# Patient Record
Sex: FEMALE | Race: WHITE | NOT HISPANIC OR LATINO | Employment: OTHER | URBAN - NONMETROPOLITAN AREA
[De-identification: names, ages, dates, MRNs, and addresses within clinical notes are randomized per-mention and may not be internally consistent; named-entity substitution may affect disease eponyms.]

---

## 2017-02-08 ENCOUNTER — HOSPITAL ENCOUNTER (EMERGENCY)
Facility: HOSPITAL | Age: 62
Discharge: HOME | End: 2017-02-09
Attending: FAMILY MEDICINE | Admitting: FAMILY MEDICINE

## 2017-02-08 ENCOUNTER — APPOINTMENT (OUTPATIENT)
Dept: GENERAL RADIOLOGY | Facility: HOSPITAL | Age: 62
End: 2017-02-08

## 2017-02-08 DIAGNOSIS — R07.9 CHEST PAIN, UNSPECIFIED TYPE: Primary | ICD-10-CM

## 2017-02-08 LAB
ANION GAP SERPL CALCULATED.3IONS-SCNC: 4 MMOL/L (ref 4–13)
BASOPHILS # BLD AUTO: 0.03 10*3/MM3 (ref 0–0.2)
BASOPHILS NFR BLD AUTO: 0.5 % (ref 0–2)
BUN BLD-MCNC: 12 MG/DL (ref 5–21)
BUN/CREAT SERPL: 14.1 (ref 7–25)
CALCIUM SPEC-SCNC: 8.9 MG/DL (ref 8.4–10.4)
CHLORIDE SERPL-SCNC: 106 MMOL/L (ref 98–110)
CK MB SERPL-CCNC: 0.46 NG/ML (ref 0–5)
CK SERPL-CCNC: 88 U/L (ref 0–203)
CO2 SERPL-SCNC: 29 MMOL/L (ref 24–31)
CREAT BLD-MCNC: 0.85 MG/DL (ref 0.5–1.4)
D DIMER PPP FEU-MCNC: 0.45 MG/L (FEU) (ref 0–0.5)
DEPRECATED RDW RBC AUTO: 48.4 FL (ref 40–54)
EOSINOPHIL # BLD AUTO: 0.3 10*3/MM3 (ref 0–0.7)
EOSINOPHIL NFR BLD AUTO: 4.8 % (ref 0–4)
ERYTHROCYTE [DISTWIDTH] IN BLOOD BY AUTOMATED COUNT: 15.7 % (ref 12–15)
GFR SERPL CREATININE-BSD FRML MDRD: 68 ML/MIN/1.73
GLUCOSE BLD-MCNC: 98 MG/DL (ref 70–100)
HCT VFR BLD AUTO: 36.5 % (ref 37–47)
HGB BLD-MCNC: 11.6 G/DL (ref 12–16)
IMM GRANULOCYTES # BLD: 0 10*3/MM3 (ref 0–0.03)
IMM GRANULOCYTES NFR BLD: 0 % (ref 0–5)
LYMPHOCYTES # BLD AUTO: 2.11 10*3/MM3 (ref 0.72–4.86)
LYMPHOCYTES NFR BLD AUTO: 33.9 % (ref 15–45)
MCH RBC QN AUTO: 27.2 PG (ref 28–32)
MCHC RBC AUTO-ENTMCNC: 31.8 G/DL (ref 33–36)
MCV RBC AUTO: 85.7 FL (ref 82–98)
MONOCYTES # BLD AUTO: 0.44 10*3/MM3 (ref 0.19–1.3)
MONOCYTES NFR BLD AUTO: 7.1 % (ref 4–12)
NEUTROPHILS # BLD AUTO: 3.35 10*3/MM3 (ref 1.87–8.4)
NEUTROPHILS NFR BLD AUTO: 53.7 % (ref 39–78)
PLATELET # BLD AUTO: 245 10*3/MM3 (ref 130–400)
PMV BLD AUTO: 10.6 FL (ref 6–12)
POTASSIUM BLD-SCNC: 4.2 MMOL/L (ref 3.5–5.3)
RBC # BLD AUTO: 4.26 10*6/MM3 (ref 4.2–5.4)
SODIUM BLD-SCNC: 139 MMOL/L (ref 135–145)
TROPONIN I SERPL-MCNC: 0 NG/ML (ref 0–0.07)
TROPONIN I SERPL-MCNC: <0.012 NG/ML (ref 0–0.03)
WBC NRBC COR # BLD: 6.23 10*3/MM3 (ref 4.8–10.8)

## 2017-02-08 PROCEDURE — 85379 FIBRIN DEGRADATION QUANT: CPT | Performed by: FAMILY MEDICINE

## 2017-02-08 PROCEDURE — 84484 ASSAY OF TROPONIN QUANT: CPT | Performed by: FAMILY MEDICINE

## 2017-02-08 PROCEDURE — 71010 HC CHEST PA OR AP: CPT

## 2017-02-08 PROCEDURE — 93010 ELECTROCARDIOGRAM REPORT: CPT | Performed by: INTERNAL MEDICINE

## 2017-02-08 PROCEDURE — 99284 EMERGENCY DEPT VISIT MOD MDM: CPT

## 2017-02-08 PROCEDURE — 80048 BASIC METABOLIC PNL TOTAL CA: CPT | Performed by: FAMILY MEDICINE

## 2017-02-08 PROCEDURE — 93005 ELECTROCARDIOGRAM TRACING: CPT

## 2017-02-08 PROCEDURE — 82553 CREATINE MB FRACTION: CPT | Performed by: FAMILY MEDICINE

## 2017-02-08 PROCEDURE — 85025 COMPLETE CBC W/AUTO DIFF WBC: CPT | Performed by: FAMILY MEDICINE

## 2017-02-08 PROCEDURE — 82550 ASSAY OF CK (CPK): CPT | Performed by: FAMILY MEDICINE

## 2017-02-08 PROCEDURE — 84484 ASSAY OF TROPONIN QUANT: CPT

## 2017-02-08 RX ORDER — CYCLOBENZAPRINE HCL 10 MG
TABLET ORAL 3 TIMES DAILY PRN
COMMUNITY
End: 2020-05-28

## 2017-02-08 RX ORDER — OXYCODONE HYDROCHLORIDE 5 MG/1
TABLET ORAL
COMMUNITY
Start: 2016-03-28 | End: 2019-09-13

## 2017-02-08 RX ORDER — CITALOPRAM 40 MG/1
TABLET ORAL
COMMUNITY
End: 2022-08-18 | Stop reason: DRUGHIGH

## 2017-02-08 RX ORDER — ESOMEPRAZOLE MAGNESIUM 20 MG/1
FOR SUSPENSION ORAL
COMMUNITY
End: 2019-09-30

## 2017-02-08 RX ORDER — LEVOTHYROXINE SODIUM 0.12 MG/1
112 TABLET ORAL DAILY
Status: ON HOLD | COMMUNITY
End: 2023-01-25

## 2017-02-08 RX ORDER — PRAMIPEXOLE DIHYDROCHLORIDE 0.12 MG/1
0.75 TABLET ORAL 2 TIMES DAILY
COMMUNITY
End: 2022-08-18 | Stop reason: DRUGHIGH

## 2017-02-08 RX ORDER — TIZANIDINE 4 MG/1
TABLET ORAL EVERY 6 HOURS
COMMUNITY
End: 2019-09-13

## 2017-02-09 ENCOUNTER — HOSPITAL ENCOUNTER (EMERGENCY)
Facility: HOSPITAL | Age: 62
Discharge: HOME | End: 2017-02-09
Attending: EMERGENCY MEDICINE | Admitting: EMERGENCY MEDICINE

## 2017-02-09 VITALS
OXYGEN SATURATION: 99 % | TEMPERATURE: 98.4 F | DIASTOLIC BLOOD PRESSURE: 85 MMHG | SYSTOLIC BLOOD PRESSURE: 128 MMHG | BODY MASS INDEX: 32.44 KG/M2 | RESPIRATION RATE: 11 BRPM | HEIGHT: 64 IN | HEART RATE: 65 BPM | WEIGHT: 190 LBS

## 2017-02-09 VITALS
TEMPERATURE: 98.2 F | HEART RATE: 73 BPM | OXYGEN SATURATION: 95 % | BODY MASS INDEX: 32.44 KG/M2 | WEIGHT: 190 LBS | RESPIRATION RATE: 21 BRPM | SYSTOLIC BLOOD PRESSURE: 119 MMHG | HEIGHT: 64 IN | DIASTOLIC BLOOD PRESSURE: 73 MMHG

## 2017-02-09 DIAGNOSIS — R07.9 CHEST PAIN, UNSPECIFIED TYPE: Primary | ICD-10-CM

## 2017-02-09 LAB
HOLD SPECIMEN: NORMAL
TROPONIN I SERPL-MCNC: 0 NG/ML (ref 0–0.07)
TROPONIN I SERPL-MCNC: 0 NG/ML (ref 0–0.07)
WHOLE BLOOD HOLD SPECIMEN: NORMAL

## 2017-02-09 PROCEDURE — 93005 ELECTROCARDIOGRAM TRACING: CPT

## 2017-02-09 PROCEDURE — 99284 EMERGENCY DEPT VISIT MOD MDM: CPT

## 2017-02-09 PROCEDURE — 93005 ELECTROCARDIOGRAM TRACING: CPT | Performed by: FAMILY MEDICINE

## 2017-02-09 PROCEDURE — 93010 ELECTROCARDIOGRAM REPORT: CPT | Performed by: INTERNAL MEDICINE

## 2017-02-09 PROCEDURE — 84484 ASSAY OF TROPONIN QUANT: CPT

## 2017-02-09 NOTE — ED PROVIDER NOTES
Subjective   HPI Comments: Patient was here last night with negative evaluation for same and set up for OP stress test.  Pain returned about 2 AM and has moved from right chest to left chest and back so came back.  Upon further questioning, has actually had this pain off and on for 2-3 years and had had 3 negative stress tests in past.  Also worried about clot because she has had that in past.  Says she would like to know what is causing this pain.    Patient is a 61 y.o. female presenting with chest pain.   History provided by:  Patient   used: No    Chest Pain   Pain location:  R chest  Pain quality: aching    Pain radiates to:  Does not radiate  Pain severity:  Moderate  Onset quality:  Sudden  Duration:  5 hours  Timing:  Sporadic  Progression:  Unchanged  Chronicity:  Recurrent  Context: not breathing, not drug use, not eating, not intercourse, not lifting, not movement, not raising an arm, not at rest, not stress and not trauma    Relieved by:  Nothing  Worsened by:  Nothing  Ineffective treatments:  None tried  Associated symptoms: back pain    Associated symptoms: no abdominal pain, no AICD problem and no altered mental status    Risk factors: no aortic disease, no birth control and no coronary artery disease        Review of Systems   Constitutional: Negative.    HENT: Negative.    Respiratory: Negative.    Cardiovascular: Positive for chest pain.   Gastrointestinal: Negative.  Negative for abdominal pain.   Musculoskeletal: Positive for arthralgias, back pain and myalgias.   Neurological: Negative.    Hematological: Negative.    Psychiatric/Behavioral: Negative.    All other systems reviewed and are negative.      Past Medical History   Diagnosis Date   • Acid reflux    • Arthritis    • Chest pain    • Chronic back pain    • Chronic neck pain    • DVT (deep venous thrombosis)    • Hypothyroid    • Hypothyroidism    • Migraine    • PE (pulmonary thromboembolism)        Allergies    Allergen Reactions   • Dilaudid [Hydromorphone Hcl] Anaphylaxis   • Erythromycin Swelling     Throat swells   • Ondansetron Hcl Other (See Comments)     Coded   • Meperidine      Makes her an angry person   • Codeine Rash       Past Surgical History   Procedure Laterality Date   • Hysterectomy     • Dilatation and curettage     • Colonoscopy     • Cholecystectomy     • Hernia repair         History reviewed. No pertinent family history.    Social History     Social History   • Marital status:      Spouse name: N/A   • Number of children: N/A   • Years of education: N/A     Social History Main Topics   • Smoking status: Heavy Tobacco Smoker   • Smokeless tobacco: None   • Alcohol use Yes   • Drug use: None   • Sexual activity: Not Asked     Other Topics Concern   • None     Social History Narrative       Prior to Admission medications    Medication Sig Start Date End Date Taking? Authorizing Provider   citalopram (CeleXA) 40 MG tablet Take 40 mg by mouth daily.    Historical Provider, MD   cyclobenzaprine (FLEXERIL) 10 MG tablet 3 (Three) Times a Day.    Historical Provider, MD   esomeprazole (NexIUM) 20 MG packet Take 20 mg by mouth daily.    Historical Provider, MD   levothyroxine (SYNTHROID, LEVOTHROID) 125 MCG tablet Take 125 mcg by mouth Daily    Historical Provider, MD   oxyCODONE (ROXICODONE) 5 MG immediate release tablet 1 to 3 tablets po q 3 to 4 hours prn 3/28/16   Historical Provider, MD   pramipexole (MIRAPEX) 0.125 MG tablet Take 0.125 mg by mouth 2 times daily.    Historical Provider, MD   rivaroxaban (XARELTO) 10 MG tablet Take 10 mg by mouth once Indications: day before surgery x 1 dose    Historical Provider, MD   tiZANidine (ZANAFLEX) 4 MG tablet Every 6 (Six) Hours.    Historical Provider, MD       Medications - No data to display    Vitals:    02/09/17 0802   BP: 128/85   Pulse: 65   Resp:    Temp:    SpO2: 99%         Objective   Physical Exam   Constitutional: She is oriented to person,  place, and time. She appears well-developed and well-nourished.   HENT:   Head: Normocephalic and atraumatic.   Neck: Normal range of motion. Neck supple.   Cardiovascular: Normal rate and regular rhythm.    Pulmonary/Chest: Effort normal and breath sounds normal.   Abdominal: Soft. Bowel sounds are normal.   Musculoskeletal: Normal range of motion.   Neurological: She is alert and oriented to person, place, and time.   Skin: Skin is warm and dry.   Psychiatric: She has a normal mood and affect. Her behavior is normal.   Nursing note and vitals reviewed.      Procedures         Lab Results (last 24 hours)     Procedure Component Value Units Date/Time    CBC & Differential [[id number]] Collected:  02/08/17 2159    Specimen:  Blood Updated:  02/08/17 2301    Narrative:       The following orders were created for panel order CBC & Differential.  Procedure                               Abnormality         Status                     ---------                               -----------         ------                     CBC Auto Differential[[id number]]         Abnormal            Final result                 Please view results for these tests on the individual orders.    Basic Metabolic Panel [[id number]]  (Normal) Collected:  02/08/17 2159    Specimen:  Blood from Arm, Right Updated:  02/08/17 2305     Glucose 98 mg/dL      BUN 12 mg/dL      Creatinine 0.85 mg/dL      Sodium 139 mmol/L      Potassium 4.2 mmol/L      Chloride 106 mmol/L      CO2 29.0 mmol/L      Calcium 8.9 mg/dL      eGFR Non African Amer 68 mL/min/1.73      BUN/Creatinine Ratio 14.1      Anion Gap 4.0 mmol/L     Narrative:       GFR Normal >60  Chronic Kidney Disease <60  Kidney Failure <15    Troponin [[id number]]  (Normal) Collected:  02/08/17 2159    Specimen:  Blood from Arm, Right Updated:  02/08/17 2317     Troponin I <0.012 ng/mL     D-dimer, Quantitative [[id number]]  (Normal) Collected:  02/08/17 2159    Specimen:  Blood from Arm, Right Updated:   02/08/17 2306     D-Dimer, Quantitative 0.45 mg/L (FEU)     Narrative:       Reference Range is 0-0.50 mg/L FEU. However, results <0.50 mg/L FEU tends to rule out DVT or PE. Results >0.50 mg/L FEU are not useful in predicting absence or presence of DVT or PE.    CK-MB [[id number]]  (Normal) Collected:  02/08/17 2159    Specimen:  Blood from Arm, Right Updated:  02/08/17 2317     CKMB 0.46 ng/mL     Narrative:       CKMB Index not indicated    CK [[id number]]  (Normal) Collected:  02/08/17 2159    Specimen:  Blood from Arm, Right Updated:  02/08/17 2317     Creatine Kinase 88 U/L     CBC Auto Differential [[id number]]  (Abnormal) Collected:  02/08/17 2159    Specimen:  Blood from Arm, Right Updated:  02/08/17 2301     WBC 6.23 10*3/mm3      RBC 4.26 10*6/mm3      Hemoglobin 11.6 (L) g/dL      Hematocrit 36.5 (L) %      MCV 85.7 fL      MCH 27.2 (L) pg      MCHC 31.8 (L) g/dL      RDW 15.7 (H) %      RDW-SD 48.4 fl      MPV 10.6 fL      Platelets 245 10*3/mm3      Neutrophil % 53.7 %      Lymphocyte % 33.9 %      Monocyte % 7.1 %      Eosinophil % 4.8 (H) %      Basophil % 0.5 %      Immature Grans % 0.0 %      Neutrophils, Absolute 3.35 10*3/mm3      Lymphocytes, Absolute 2.11 10*3/mm3      Monocytes, Absolute 0.44 10*3/mm3      Eosinophils, Absolute 0.30 10*3/mm3      Basophils, Absolute 0.03 10*3/mm3      Immature Grans, Absolute 0.00 10*3/mm3     POC Troponin, Rapid [[id number]]  (Normal) Collected:  02/08/17 2210    Specimen:  Blood Updated:  02/08/17 2226     Troponin I 0.00 ng/mL       Serial Number: [id number]    : [id number]       POC Troponin, Rapid [[id number]]  (Normal) Collected:  02/09/17 0110    Specimen:  Blood Updated:  02/09/17 0125     Troponin I 0.00 ng/mL       Serial Number: [id number]    : [id number]       POC Troponin, Rapid [[id number]]  (Normal) Collected:  02/09/17 0654    Specimen:  Blood Updated:  02/09/17 0711     Troponin I 0.00 ng/mL       Serial Number: [id number]    : [id number]              No orders to display       ED Course  ED Course   Comment By Time   Told the patient her EKG was okay this AM and dimer last night was normal.  I offered to get her stress test this AM and that is when she tells me that she has had 3 negative ones in past.  I explained to her that in ED we only check for life threatening things and if she wants definitive answer for her pain she needs to see her PCP and talk to her because all of the other things that can cause this pain are not tested in ED.  She does not want stress test now and I think this is reasonable. Brenden Real Jr., MD 02/09 1050     HEART Score  History: Slightly suspicious (+0)  ECG: Non specific repolarization disturbance (+1)  Age: 45 through 65 (+1)  Risk Factors: 1 - 2 risk factors (+1)  Troponin: Normal limit or lower (+0)  Total: 3        MDM  Number of Diagnoses or Management Options  Chest pain, unspecified type: established and worsening     Amount and/or Complexity of Data Reviewed  Tests in the medicine section of CPT®: ordered and reviewed    Risk of Complications, Morbidity, and/or Mortality  Presenting problems: moderate  Diagnostic procedures: moderate  Management options: moderate    Patient Progress  Patient progress: stable      Final diagnoses:   Chest pain, unspecified type        Brenden Real Jr., MD  02/09/17 3557

## 2017-02-09 NOTE — ED PROVIDER NOTES
Subjective   Patient is a 61 y.o. female presenting with chest pain.   History provided by:  Patient   used: No    Chest Pain   Pain location:  Substernal area (left neck and left arm)  Pain quality: aching and dull    Pain radiates to:  Neck and L jaw  Pain severity:  Mild  Onset quality:  Sudden  Timing:  Constant  Progression:  Partially resolved  Chronicity:  New  Context: not breathing, not drug use, not eating, not intercourse, not lifting, not movement, not raising an arm, not at rest, not stress and not trauma    Relieved by:  None tried  Worsened by:  Nothing  Ineffective treatments:  None tried  Associated symptoms: no abdominal pain, no AICD problem, no altered mental status, no anorexia, no anxiety, no back pain, no claudication, no cough, no dizziness, no dysphagia, no fatigue, no fever, no headache, no heartburn, no lower extremity edema, no nausea, no numbness, no orthopnea, no palpitations, no PND, no shortness of breath, no syncope, no vomiting and no weakness        Review of Systems   Constitutional: Negative for activity change, appetite change, chills, fatigue and fever.   HENT: Negative for congestion, dental problem, drooling and trouble swallowing.    Eyes: Negative for discharge and itching.   Respiratory: Negative for apnea, cough, chest tightness and shortness of breath.    Cardiovascular: Positive for chest pain. Negative for palpitations, orthopnea, claudication, syncope and PND.   Gastrointestinal: Negative for abdominal pain, anorexia, diarrhea, heartburn, nausea and vomiting.   Endocrine: Negative for polydipsia and polyuria.   Genitourinary: Negative for difficulty urinating and dysuria.   Musculoskeletal: Negative for back pain.   Skin: Negative for color change, pallor and rash.   Neurological: Negative for dizziness, facial asymmetry, weakness, numbness and headaches.   Hematological: Negative for adenopathy. Does not bruise/bleed easily.    Psychiatric/Behavioral: Negative for behavioral problems and suicidal ideas.   All other systems reviewed and are negative.      Past Medical History   Diagnosis Date   • Acid reflux    • Arthritis    • Chest pain    • Chronic back pain    • Chronic neck pain    • DVT (deep venous thrombosis)    • Hypothyroid    • Hypothyroidism    • Migraine    • PE (pulmonary thromboembolism)        Allergies   Allergen Reactions   • Dilaudid [Hydromorphone Hcl] Anaphylaxis   • Erythromycin Swelling     Throat swells   • Ondansetron Hcl Other (See Comments)     Coded   • Meperidine      Makes her an angry person   • Codeine Rash       Past Surgical History   Procedure Laterality Date   • Hysterectomy     • Dilatation and curettage     • Colonoscopy     • Cholecystectomy     • Hernia repair         History reviewed. No pertinent family history.    Social History     Social History   • Marital status:      Spouse name: N/A   • Number of children: N/A   • Years of education: N/A     Social History Main Topics   • Smoking status: Heavy Tobacco Smoker   • Smokeless tobacco: None   • Alcohol use Yes   • Drug use: None   • Sexual activity: Not Asked     Other Topics Concern   • None     Social History Narrative   • None       Lab Results (last 24 hours)     Procedure Component Value Units Date/Time    CBC & Differential [[id number]] Collected:  02/08/17 2159    Specimen:  Blood Updated:  02/08/17 2301    Narrative:       The following orders were created for panel order CBC & Differential.  Procedure                               Abnormality         Status                     ---------                               -----------         ------                     CBC Auto Differential[[id number]]         Abnormal            Final result                 Please view results for these tests on the individual orders.    Basic Metabolic Panel [[id number]]  (Normal) Collected:  02/08/17 2159    Specimen:  Blood from Arm, Right Updated:   02/08/17 2305     Glucose 98 mg/dL      BUN 12 mg/dL      Creatinine 0.85 mg/dL      Sodium 139 mmol/L      Potassium 4.2 mmol/L      Chloride 106 mmol/L      CO2 29.0 mmol/L      Calcium 8.9 mg/dL      eGFR Non African Amer 68 mL/min/1.73      BUN/Creatinine Ratio 14.1      Anion Gap 4.0 mmol/L     Narrative:       GFR Normal >60  Chronic Kidney Disease <60  Kidney Failure <15    Troponin [[id number]]  (Normal) Collected:  02/08/17 2159    Specimen:  Blood from Arm, Right Updated:  02/08/17 2317     Troponin I <0.012 ng/mL     D-dimer, Quantitative [[id number]]  (Normal) Collected:  02/08/17 2159    Specimen:  Blood from Arm, Right Updated:  02/08/17 2306     D-Dimer, Quantitative 0.45 mg/L (FEU)     Narrative:       Reference Range is 0-0.50 mg/L FEU. However, results <0.50 mg/L FEU tends to rule out DVT or PE. Results >0.50 mg/L FEU are not useful in predicting absence or presence of DVT or PE.    CK-MB [[id number]]  (Normal) Collected:  02/08/17 2159    Specimen:  Blood from Arm, Right Updated:  02/08/17 2317     CKMB 0.46 ng/mL     Narrative:       CKMB Index not indicated    CK [[id number]]  (Normal) Collected:  02/08/17 2159    Specimen:  Blood from Arm, Right Updated:  02/08/17 2317     Creatine Kinase 88 U/L     CBC Auto Differential [[id number]]  (Abnormal) Collected:  02/08/17 2159    Specimen:  Blood from Arm, Right Updated:  02/08/17 2301     WBC 6.23 10*3/mm3      RBC 4.26 10*6/mm3      Hemoglobin 11.6 (L) g/dL      Hematocrit 36.5 (L) %      MCV 85.7 fL      MCH 27.2 (L) pg      MCHC 31.8 (L) g/dL      RDW 15.7 (H) %      RDW-SD 48.4 fl      MPV 10.6 fL      Platelets 245 10*3/mm3      Neutrophil % 53.7 %      Lymphocyte % 33.9 %      Monocyte % 7.1 %      Eosinophil % 4.8 (H) %      Basophil % 0.5 %      Immature Grans % 0.0 %      Neutrophils, Absolute 3.35 10*3/mm3      Lymphocytes, Absolute 2.11 10*3/mm3      Monocytes, Absolute 0.44 10*3/mm3      Eosinophils, Absolute 0.30 10*3/mm3      Basophils, Absolute  "0.03 10*3/mm3      Immature Grans, Absolute 0.00 10*3/mm3     POC Troponin, Rapid [[id number]]  (Normal) Collected:  02/08/17 2210    Specimen:  Blood Updated:  02/08/17 2226     Troponin I 0.00 ng/mL       Serial Number: [id number]    : [id number]       POC Troponin, Rapid [[id number]]  (Normal) Collected:  02/09/17 0110    Specimen:  Blood Updated:  02/09/17 0125     Troponin I 0.00 ng/mL       Serial Number: [id number]    : [id number]             Objective   Physical Exam   Constitutional: She is oriented to person, place, and time. She appears well-developed and well-nourished.   HENT:   Head: Normocephalic and atraumatic.   Eyes: Conjunctivae and EOM are normal. Pupils are equal, round, and reactive to light.   Neck: Normal range of motion. Neck supple.   Cardiovascular: Normal rate, regular rhythm, normal heart sounds and intact distal pulses.    Pulmonary/Chest: Effort normal and breath sounds normal.   Abdominal: Soft. Bowel sounds are normal.   Neurological: She is alert and oriented to person, place, and time.   Skin: Skin is warm and dry.   Psychiatric: She has a normal mood and affect. Her behavior is normal.   Nursing note and vitals reviewed.      Procedures         XR Chest 1 View    (Results Pending)       Visit Vitals   • /73   • Pulse 73   • Temp 98.9 °F (37.2 °C) (Temporal Artery )   • Resp 21   • Ht 64\" (162.6 cm)   • Wt 190 lb (86.2 kg)   • SpO2 95%   • BMI 32.61 kg/m2       ED Course    ED Course       Medications - No data to display    HEART Score  History: Moderately suspicious (+1)  ECG: Normal (+0)  Age: 45 through 65 (+1)  Risk Factors: 1 - 2 risk factors (+1)  Troponin: Normal limit or lower (+0)  Total: 3        MDM  Number of Diagnoses or Management Options  Chest pain, unspecified type: new and requires workup     Amount and/or Complexity of Data Reviewed  Clinical lab tests: ordered and reviewed  Tests in the radiology section of CPT®: ordered and reviewed  Tests in the " medicine section of CPT®: ordered and reviewed  Independent visualization of images, tracings, or specimens: yes    Risk of Complications, Morbidity, and/or Mortality  Presenting problems: moderate  Diagnostic procedures: moderate  Management options: moderate  General comments: Pain resolvedcompletely prior to my seeing her and did not return.  Patient did not want to stay for 3rd set of troponins.  Patient instructed on no exertion and to follow up closely.  Given out patient stress test order.  Patient will return immediately if symptoms return.    Patient Progress  Patient progress: improved      Final diagnoses:   Chest pain, unspecified type          Reyna Mora DO  02/09/17 0220

## 2017-02-09 NOTE — DISCHARGE INSTRUCTIONS

## 2017-03-01 ENCOUNTER — TRANSCRIBE ORDERS (OUTPATIENT)
Dept: ADMINISTRATIVE | Facility: HOSPITAL | Age: 62
End: 2017-03-01

## 2017-03-01 DIAGNOSIS — Z12.31 ENCOUNTER FOR SCREENING MAMMOGRAM FOR MALIGNANT NEOPLASM OF BREAST: Primary | ICD-10-CM

## 2017-03-01 DIAGNOSIS — R07.9 CHEST PAIN, UNSPECIFIED TYPE: ICD-10-CM

## 2017-04-16 ENCOUNTER — APPOINTMENT (OUTPATIENT)
Dept: GENERAL RADIOLOGY | Facility: HOSPITAL | Age: 62
End: 2017-04-16

## 2017-04-16 ENCOUNTER — HOSPITAL ENCOUNTER (EMERGENCY)
Facility: HOSPITAL | Age: 62
Discharge: HOME | End: 2017-04-16
Attending: EMERGENCY MEDICINE | Admitting: EMERGENCY MEDICINE

## 2017-04-16 VITALS
BODY MASS INDEX: 31.65 KG/M2 | OXYGEN SATURATION: 98 % | HEART RATE: 84 BPM | DIASTOLIC BLOOD PRESSURE: 76 MMHG | SYSTOLIC BLOOD PRESSURE: 120 MMHG | TEMPERATURE: 98.4 F | RESPIRATION RATE: 16 BRPM | HEIGHT: 65 IN | WEIGHT: 190 LBS

## 2017-04-16 DIAGNOSIS — S90.32XA CONTUSION OF LEFT FOOT, INITIAL ENCOUNTER: Primary | ICD-10-CM

## 2017-04-16 PROCEDURE — 99283 EMERGENCY DEPT VISIT LOW MDM: CPT

## 2017-04-16 PROCEDURE — 73630 X-RAY EXAM OF FOOT: CPT

## 2017-04-16 NOTE — DISCHARGE INSTRUCTIONS
I recommend that you follow up with your shin on Monday.  If your symptoms worsen or you are unable to follow-up he should feel free to return to the emergency room at any time.

## 2017-04-16 NOTE — ED PROVIDER NOTES
Subjective  foot injury  History of Present Illness  Ms. Kwesi Cruz is a 61-year-old white female comes in today with chief complaint of a foot injury.  According to the patient she dropped a candle on her foot and has been expressing some discomfort.  She thereby decided she come in to have it evaluated.  She is having very little pain at the time of evaluation.  Review of Systems   Musculoskeletal: Positive for myalgias.       Past Medical History:   Diagnosis Date   • Acid reflux    • Arthritis    • Chest pain    • Chronic back pain    • Chronic neck pain    • DVT (deep venous thrombosis)    • Hypothyroid    • Hypothyroidism    • Migraine    • PE (pulmonary thromboembolism)        Allergies   Allergen Reactions   • Dilaudid [Hydromorphone Hcl] Anaphylaxis   • Erythromycin Swelling     Throat swells   • Ondansetron Hcl Other (See Comments)     Coded   • Meperidine      Makes her an angry person   • Codeine Rash       Past Surgical History:   Procedure Laterality Date   • CHOLECYSTECTOMY     • COLONOSCOPY     • DILATATION AND CURETTAGE     • HERNIA REPAIR     • HYSTERECTOMY         History reviewed. No pertinent family history.    Social History     Social History   • Marital status:      Spouse name: N/A   • Number of children: N/A   • Years of education: N/A     Social History Main Topics   • Smoking status: Heavy Tobacco Smoker   • Smokeless tobacco: None   • Alcohol use Yes   • Drug use: None   • Sexual activity: Not Asked     Other Topics Concern   • None     Social History Narrative           Objective   Physical Exam   Musculoskeletal: Normal range of motion. She exhibits tenderness. She exhibits no edema or deformity.   Noted tenderness to the dorsum of the left foot.  2+ dorsalis pedis pulse       Procedures         ED Course  ED Course   Comment By Time   X ray does not reveal any evidence of at this time of a fracture.  She will be discharged to home with instructions to follow with her own  primary care physician. Maria C Jarvis MD 04/16 0125                  MDM  Number of Diagnoses or Management Options  Contusion of left foot, initial encounter: new and requires workup     Amount and/or Complexity of Data Reviewed  Tests in the radiology section of CPT®: ordered and reviewed    Risk of Complications, Morbidity, and/or Mortality  Presenting problems: low  Diagnostic procedures: low  Management options: low        Final diagnoses:   Contusion of left foot, initial encounter            Maria C Jarvis MD  04/16/17 0130

## 2017-08-09 ENCOUNTER — HOSPITAL ENCOUNTER (EMERGENCY)
Facility: HOSPITAL | Age: 62
Discharge: HOME | End: 2017-08-09
Attending: EMERGENCY MEDICINE | Admitting: EMERGENCY MEDICINE

## 2017-08-09 VITALS
BODY MASS INDEX: 32.44 KG/M2 | HEIGHT: 64 IN | HEART RATE: 72 BPM | RESPIRATION RATE: 18 BRPM | DIASTOLIC BLOOD PRESSURE: 63 MMHG | WEIGHT: 190 LBS | TEMPERATURE: 98.1 F | SYSTOLIC BLOOD PRESSURE: 113 MMHG | OXYGEN SATURATION: 95 %

## 2017-08-09 DIAGNOSIS — S39.012A BACK STRAIN, INITIAL ENCOUNTER: Primary | ICD-10-CM

## 2017-08-09 PROCEDURE — 25010000002 LORAZEPAM PER 2 MG: Performed by: EMERGENCY MEDICINE

## 2017-08-09 PROCEDURE — 25010000002 MORPHINE PER 10 MG: Performed by: EMERGENCY MEDICINE

## 2017-08-09 PROCEDURE — 96372 THER/PROPH/DIAG INJ SC/IM: CPT

## 2017-08-09 PROCEDURE — 99283 EMERGENCY DEPT VISIT LOW MDM: CPT

## 2017-08-09 RX ORDER — METHYLPREDNISOLONE 4 MG/1
TABLET ORAL
Qty: 21 TABLET | Refills: 0 | OUTPATIENT
Start: 2017-08-09 | End: 2018-06-05

## 2017-08-09 RX ORDER — MORPHINE SULFATE 4 MG/ML
4 INJECTION, SOLUTION INTRAMUSCULAR; INTRAVENOUS ONCE
Status: COMPLETED | OUTPATIENT
Start: 2017-08-09 | End: 2017-08-09

## 2017-08-09 RX ORDER — LORAZEPAM 2 MG/ML
0.5 INJECTION INTRAMUSCULAR ONCE
Status: COMPLETED | OUTPATIENT
Start: 2017-08-09 | End: 2017-08-09

## 2017-08-09 RX ADMIN — MORPHINE SULFATE 4 MG: 4 INJECTION, SOLUTION INTRAMUSCULAR; INTRAVENOUS at 02:55

## 2017-08-09 RX ADMIN — LORAZEPAM 0.5 MG: 2 INJECTION INTRAMUSCULAR; INTRAVENOUS at 02:55

## 2017-08-09 NOTE — ED PROVIDER NOTES
Subjective   HPI Comments: Patient says that 2 weeks ago she turned to her left side and all of a sudden felt a sudden pain in her right mid back.  She actually points to an area in the right upper buttock that goes up into her right mid back area as the source of her pain.  This is continued since that time.  She denies any other injury or fall or trauma.  She says the pain just seems to hit her off and on at different times mostly with some type of movement.  She has never had anything like this before though she has had chronic knee pain and neck pain and back pain in different places.    Patient is a 61 y.o. female presenting with back pain.   History provided by:  Patient   used: No    Back Pain   Location:  Thoracic spine and lumbar spine  Quality:  Aching and stabbing  Radiates to:  Does not radiate  Pain severity:  No pain  Pain is:  Unable to specify  Onset quality:  Sudden  Duration:  2 weeks  Timing:  Intermittent  Progression:  Waxing and waning  Chronicity:  New  Context: not emotional stress, not falling, not jumping from heights, not lifting heavy objects, not MCA, not MVA, not occupational injury, not pedestrian accident, not physical stress, not recent illness, not recent injury and not twisting    Relieved by:  Nothing  Worsened by:  Movement  Ineffective treatments:  None tried  Associated symptoms: no abdominal pain, no abdominal swelling, no bladder incontinence, no bowel incontinence, no chest pain, no dysuria, no fever, no headaches, no leg pain, no numbness, no paresthesias, no pelvic pain, no perianal numbness, no tingling, no weakness and no weight loss    Risk factors: no hx of cancer, no hx of osteoporosis, no lack of exercise, no menopause, not obese, not pregnant, no recent surgery, no steroid use and no vascular disease        Review of Systems   Constitutional: Negative.  Negative for fever and weight loss.   HENT: Negative.    Respiratory: Negative.     Cardiovascular: Negative.  Negative for chest pain.   Gastrointestinal: Negative.  Negative for abdominal pain and bowel incontinence.   Genitourinary: Negative.  Negative for bladder incontinence, dysuria and pelvic pain.   Musculoskeletal: Positive for back pain.   Neurological: Negative.  Negative for tingling, weakness, numbness, headaches and paresthesias.   Hematological: Negative.    Psychiatric/Behavioral: Negative.    All other systems reviewed and are negative.      Past Medical History:   Diagnosis Date   • Acid reflux    • Arthritis    • Chest pain    • Chronic back pain    • Chronic neck pain    • DVT (deep venous thrombosis)    • Hypothyroid    • Hypothyroidism    • Migraine    • PE (pulmonary thromboembolism)        Allergies   Allergen Reactions   • Dilaudid [Hydromorphone Hcl] Anaphylaxis   • Erythromycin Swelling     Throat swells   • Ondansetron Hcl Other (See Comments)     Coded   • Meperidine      Makes her an angry person   • Codeine Rash       Past Surgical History:   Procedure Laterality Date   • CHOLECYSTECTOMY     • COLONOSCOPY     • DILATATION AND CURETTAGE     • HERNIA REPAIR     • HYSTERECTOMY         History reviewed. No pertinent family history.    Social History     Social History   • Marital status:      Spouse name: N/A   • Number of children: N/A   • Years of education: N/A     Social History Main Topics   • Smoking status: Heavy Tobacco Smoker   • Smokeless tobacco: None   • Alcohol use Yes   • Drug use: None   • Sexual activity: Not Asked     Other Topics Concern   • None     Social History Narrative   • None       Prior to Admission medications    Medication Sig Start Date End Date Taking? Authorizing Provider   citalopram (CeleXA) 40 MG tablet Take 40 mg by mouth daily.    Historical Provider, MD   cyclobenzaprine (FLEXERIL) 10 MG tablet 3 (Three) Times a Day.    Historical Provider, MD   esomeprazole (NexIUM) 20 MG packet Take 20 mg by mouth daily.    Historical  Provider, MD   levothyroxine (SYNTHROID, LEVOTHROID) 125 MCG tablet Take 125 mcg by mouth Daily    Historical Provider, MD   oxyCODONE (ROXICODONE) 5 MG immediate release tablet 1 to 3 tablets po q 3 to 4 hours prn 3/28/16   Historical Provider, MD   pramipexole (MIRAPEX) 0.125 MG tablet Take 0.125 mg by mouth 2 times daily.    Historical Provider, MD   rivaroxaban (XARELTO) 10 MG tablet Take 10 mg by mouth once Indications: day before surgery x 1 dose    Historical Provider, MD   tiZANidine (ZANAFLEX) 4 MG tablet Every 6 (Six) Hours.    Historical Provider, MD       Medications   Morphine sulfate (PF) injection 4 mg (not administered)   LORazepam (ATIVAN) injection 0.5 mg (not administered)       Vitals:    08/09/17 0219   BP: 113/63   Pulse: 72   Resp: 18   Temp:    SpO2: 95%         Objective   Physical Exam   Constitutional: She is oriented to person, place, and time. She appears well-developed and well-nourished.   HENT:   Head: Normocephalic and atraumatic.   Neck: Normal range of motion. Neck supple.   Musculoskeletal: Normal range of motion. She exhibits tenderness.   The patient is tender to palpation along the right paravertebral musculature most specifically at the T 10 area.  She is point tender to palpation in that area.  She is also lesser tender down the right side of the lower thoracic spine and lumbar spine.  There is no bony deformity.  She has good and equal reflexes on both lower extremities   Neurological: She is alert and oriented to person, place, and time. She has normal reflexes.   Skin: Skin is warm and dry.   Psychiatric: She has a normal mood and affect. Her behavior is normal.   Nursing note and vitals reviewed.      Procedures         Lab Results (last 24 hours)     ** No results found for the last 24 hours. **          No orders to display       ED Course  ED Course   Comment By Time   I told the patient she gives a very good description of some type of muscular or ligament strain.  I  think a bony abnormality is very unlikely but I did offer x-rays if she wanted to get them done.  However she decided against it for right now.  Treat her for muscular strains it would get better and follow with her regular doctor if it does not. Brenden Real Jr., MD 08/09 3837          MDM  Number of Diagnoses or Management Options  Back strain, initial encounter: new and does not require workup  Risk of Complications, Morbidity, and/or Mortality  Presenting problems: moderate  Diagnostic procedures: moderate  Management options: moderate    Patient Progress  Patient progress: stable      Final diagnoses:   Back strain, initial encounter          Brenden Real Jr., MD  08/09/17 0239

## 2017-10-11 ENCOUNTER — APPOINTMENT (OUTPATIENT)
Dept: CT IMAGING | Facility: HOSPITAL | Age: 62
End: 2017-10-11

## 2017-10-11 ENCOUNTER — HOSPITAL ENCOUNTER (EMERGENCY)
Facility: HOSPITAL | Age: 62
Discharge: HOME | End: 2017-10-12
Admitting: EMERGENCY MEDICINE

## 2017-10-11 ENCOUNTER — APPOINTMENT (OUTPATIENT)
Dept: GENERAL RADIOLOGY | Facility: HOSPITAL | Age: 62
End: 2017-10-11

## 2017-10-11 DIAGNOSIS — J18.9 PNEUMONIA OF BOTH LUNGS DUE TO INFECTIOUS ORGANISM, UNSPECIFIED PART OF LUNG: Primary | ICD-10-CM

## 2017-10-11 LAB
ALBUMIN SERPL-MCNC: 4.1 G/DL (ref 3.5–5)
ALBUMIN/GLOB SERPL: 1.1 G/DL (ref 1.1–2.5)
ALP SERPL-CCNC: 111 U/L (ref 24–120)
ALT SERPL W P-5'-P-CCNC: 46 U/L (ref 0–54)
ANION GAP SERPL CALCULATED.3IONS-SCNC: 10 MMOL/L (ref 4–13)
AST SERPL-CCNC: 47 U/L (ref 7–45)
BASOPHILS # BLD AUTO: 0.03 10*3/MM3 (ref 0–0.2)
BASOPHILS NFR BLD AUTO: 0.3 % (ref 0–2)
BILIRUB SERPL-MCNC: 0.3 MG/DL (ref 0.1–1)
BUN BLD-MCNC: 11 MG/DL (ref 5–21)
BUN/CREAT SERPL: 13.3 (ref 7–25)
CALCIUM SPEC-SCNC: 9.1 MG/DL (ref 8.4–10.4)
CHLORIDE SERPL-SCNC: 102 MMOL/L (ref 98–110)
CO2 SERPL-SCNC: 27 MMOL/L (ref 24–31)
CREAT BLD-MCNC: 0.83 MG/DL (ref 0.5–1.4)
D DIMER PPP FEU-MCNC: 0.66 MG/L (FEU) (ref 0–0.5)
DEPRECATED RDW RBC AUTO: 46.2 FL (ref 40–54)
EOSINOPHIL # BLD AUTO: 0.26 10*3/MM3 (ref 0–0.7)
EOSINOPHIL NFR BLD AUTO: 2.9 % (ref 0–4)
ERYTHROCYTE [DISTWIDTH] IN BLOOD BY AUTOMATED COUNT: 14.7 % (ref 12–15)
GFR SERPL CREATININE-BSD FRML MDRD: 70 ML/MIN/1.73
GLOBULIN UR ELPH-MCNC: 3.6 GM/DL
GLUCOSE BLD-MCNC: 106 MG/DL (ref 70–100)
HCT VFR BLD AUTO: 37 % (ref 37–47)
HGB BLD-MCNC: 12 G/DL (ref 12–16)
IMM GRANULOCYTES # BLD: 0.02 10*3/MM3 (ref 0–0.03)
IMM GRANULOCYTES NFR BLD: 0.2 % (ref 0–5)
LYMPHOCYTES # BLD AUTO: 1.91 10*3/MM3 (ref 0.72–4.86)
LYMPHOCYTES NFR BLD AUTO: 21.6 % (ref 15–45)
MCH RBC QN AUTO: 27.6 PG (ref 28–32)
MCHC RBC AUTO-ENTMCNC: 32.4 G/DL (ref 33–36)
MCV RBC AUTO: 85.3 FL (ref 82–98)
MONOCYTES # BLD AUTO: 0.71 10*3/MM3 (ref 0.19–1.3)
MONOCYTES NFR BLD AUTO: 8 % (ref 4–12)
NEUTROPHILS # BLD AUTO: 5.9 10*3/MM3 (ref 1.87–8.4)
NEUTROPHILS NFR BLD AUTO: 67 % (ref 39–78)
PLATELET # BLD AUTO: 252 10*3/MM3 (ref 130–400)
PMV BLD AUTO: 10.1 FL (ref 6–12)
POTASSIUM BLD-SCNC: 3.8 MMOL/L (ref 3.5–5.3)
PROT SERPL-MCNC: 7.7 G/DL (ref 6.3–8.7)
RBC # BLD AUTO: 4.34 10*6/MM3 (ref 4.2–5.4)
SODIUM BLD-SCNC: 139 MMOL/L (ref 135–145)
WBC NRBC COR # BLD: 8.83 10*3/MM3 (ref 4.8–10.8)

## 2017-10-11 PROCEDURE — 93005 ELECTROCARDIOGRAM TRACING: CPT | Performed by: NURSE PRACTITIONER

## 2017-10-11 PROCEDURE — 71010 HC CHEST PA OR AP: CPT

## 2017-10-11 PROCEDURE — 96360 HYDRATION IV INFUSION INIT: CPT

## 2017-10-11 PROCEDURE — 96361 HYDRATE IV INFUSION ADD-ON: CPT

## 2017-10-11 PROCEDURE — 93010 ELECTROCARDIOGRAM REPORT: CPT | Performed by: INTERNAL MEDICINE

## 2017-10-11 PROCEDURE — 80053 COMPREHEN METABOLIC PANEL: CPT | Performed by: NURSE PRACTITIONER

## 2017-10-11 PROCEDURE — 94640 AIRWAY INHALATION TREATMENT: CPT

## 2017-10-11 PROCEDURE — 71275 CT ANGIOGRAPHY CHEST: CPT

## 2017-10-11 PROCEDURE — 0 IOPAMIDOL PER 1 ML: Performed by: NURSE PRACTITIONER

## 2017-10-11 PROCEDURE — 85025 COMPLETE CBC W/AUTO DIFF WBC: CPT | Performed by: NURSE PRACTITIONER

## 2017-10-11 PROCEDURE — 85379 FIBRIN DEGRADATION QUANT: CPT | Performed by: NURSE PRACTITIONER

## 2017-10-11 PROCEDURE — 99284 EMERGENCY DEPT VISIT MOD MDM: CPT

## 2017-10-11 PROCEDURE — 87040 BLOOD CULTURE FOR BACTERIA: CPT | Performed by: NURSE PRACTITIONER

## 2017-10-11 RX ORDER — AMOXICILLIN 500 MG/1
1000 CAPSULE ORAL 3 TIMES DAILY
COMMUNITY
Start: 2017-10-09 | End: 2018-06-05

## 2017-10-11 RX ORDER — SODIUM CHLORIDE 9 MG/ML
125 INJECTION, SOLUTION INTRAVENOUS CONTINUOUS
Status: DISCONTINUED | OUTPATIENT
Start: 2017-10-11 | End: 2017-10-11

## 2017-10-11 RX ORDER — ALBUTEROL SULFATE 2.5 MG/3ML
2.5 SOLUTION RESPIRATORY (INHALATION) ONCE
Status: COMPLETED | OUTPATIENT
Start: 2017-10-11 | End: 2017-10-11

## 2017-10-11 RX ORDER — BUSPIRONE HYDROCHLORIDE 5 MG/1
5 TABLET ORAL 3 TIMES DAILY
COMMUNITY
End: 2019-09-13

## 2017-10-11 RX ORDER — SODIUM CHLORIDE 9 MG/ML
125 INJECTION, SOLUTION INTRAVENOUS CONTINUOUS
Status: DISCONTINUED | OUTPATIENT
Start: 2017-10-11 | End: 2017-10-12 | Stop reason: HOSPADM

## 2017-10-11 RX ORDER — SODIUM CHLORIDE 0.9 % (FLUSH) 0.9 %
10 SYRINGE (ML) INJECTION AS NEEDED
Status: DISCONTINUED | OUTPATIENT
Start: 2017-10-11 | End: 2017-10-12 | Stop reason: HOSPADM

## 2017-10-11 RX ADMIN — SODIUM CHLORIDE 125 ML/HR: 9 INJECTION, SOLUTION INTRAVENOUS at 21:25

## 2017-10-11 RX ADMIN — IOPAMIDOL 100 ML: 755 INJECTION, SOLUTION INTRAVENOUS at 22:46

## 2017-10-11 RX ADMIN — ALBUTEROL SULFATE 2.5 MG: 2.5 SOLUTION RESPIRATORY (INHALATION) at 21:31

## 2017-10-12 VITALS
TEMPERATURE: 98.9 F | OXYGEN SATURATION: 95 % | RESPIRATION RATE: 16 BRPM | WEIGHT: 194 LBS | SYSTOLIC BLOOD PRESSURE: 118 MMHG | DIASTOLIC BLOOD PRESSURE: 57 MMHG | HEIGHT: 65 IN | BODY MASS INDEX: 32.32 KG/M2 | HEART RATE: 88 BPM

## 2017-10-12 RX ORDER — PREDNISONE 20 MG/1
20 TABLET ORAL DAILY
Qty: 5 TABLET | Refills: 0 | Status: SHIPPED | OUTPATIENT
Start: 2017-10-11 | End: 2019-09-13

## 2017-10-12 RX ORDER — ALBUTEROL SULFATE 90 UG/1
2 AEROSOL, METERED RESPIRATORY (INHALATION)
Qty: 6.7 G | Refills: 0 | Status: ON HOLD | OUTPATIENT
Start: 2017-10-11

## 2017-10-12 RX ORDER — LEVOFLOXACIN 500 MG/1
500 TABLET, FILM COATED ORAL DAILY
Qty: 10 TABLET | Refills: 0 | OUTPATIENT
Start: 2017-10-11 | End: 2018-06-05

## 2017-10-12 NOTE — ED NOTES
Pt c/o cough and sore throat that began Saturday and has not gotten better.   Pt states that she has been taking ABX x 3 days.     Gladys Freed RN  10/11/17 5011

## 2017-10-12 NOTE — ED PROVIDER NOTES
Subjective   Patient is a 62 y.o. female presenting with URI.   URI   Presenting symptoms: congestion, cough, fever and sore throat    Severity:  Moderate  Onset quality:  Gradual  Duration:  4 days  Timing:  Constant  Progression:  Worsening  Chronicity:  New  Relieved by:  Nothing (on abx and cough syrup)  Worsened by:  Nothing  Ineffective treatments:  Decongestant (abx)  Associated symptoms: wheezing      63 y/o female present to the ER with c/o cough, bronchitis, fever, and sore throat. She called the Doctors office Monday and they called her in Amoxicillin and cough medicine. She states that she is not feeling any better and is coughing more. She states that her temperature has gotten up to 103 degrees. She states that she is coughing so much that it hurts. She states that she is only SOB when she starts coughing. She has not had a recent chest xray.   Review of Systems   Constitutional: Positive for fever.   HENT: Positive for congestion and sore throat.    Respiratory: Positive for cough and wheezing.    All other systems reviewed and are negative.      Past Medical History:   Diagnosis Date   • Acid reflux    • Arthritis    • Chest pain    • Chronic back pain    • Chronic neck pain    • DVT (deep venous thrombosis)    • Hypothyroid    • Hypothyroidism    • Migraine    • PE (pulmonary thromboembolism)        Allergies   Allergen Reactions   • Dilaudid [Hydromorphone Hcl] Anaphylaxis   • Erythromycin Swelling     Throat swells   • Ondansetron Hcl Other (See Comments)     Coded   • Guaifenesin & Derivatives Nausea And Vomiting   • Meperidine      Makes her an angry person   • Codeine Rash       Past Surgical History:   Procedure Laterality Date   • CHOLECYSTECTOMY     • COLONOSCOPY     • DILATATION AND CURETTAGE     • HERNIA REPAIR     • HYSTERECTOMY         History reviewed. No pertinent family history.    Social History     Social History   • Marital status:      Spouse name: N/A   • Number of  children: N/A   • Years of education: N/A     Social History Main Topics   • Smoking status: Heavy Tobacco Smoker   • Smokeless tobacco: None   • Alcohol use Yes   • Drug use: None   • Sexual activity: Not Asked     Other Topics Concern   • None     Social History Narrative           Objective   Physical Exam   Constitutional: She is oriented to person, place, and time. She appears well-developed and well-nourished.   HENT:   Head: Normocephalic and atraumatic.   Mouth/Throat: Posterior oropharyngeal erythema present.   Eyes: EOM are normal. Pupils are equal, round, and reactive to light.   Neck: Normal range of motion. Neck supple.   Cardiovascular:   tachycardia   Pulmonary/Chest: She has wheezes in the right lower field and the left lower field.           Neurological: She is alert and oriented to person, place, and time.   Skin: Skin is warm and dry.   Psychiatric: She has a normal mood and affect. Her behavior is normal. Thought content normal.   Nursing note and vitals reviewed.      Procedures         ED Course  ED Course   Comment By Time   Chest X ray stable, no signs of pneumonia Shaina Busby, APRN 10/11 2116   D-dimer is elevated, will do CTA to r/o PE. Shaina Busby, APRN 10/11 2157                  MDM  Number of Diagnoses or Management Options  Diagnosis management comments: Reviewed history with objective and water's nurse practitioner, spoke with the patient regarding her history laboratory values diagnostic tests.  We will discharge this patient on Levaquin and steroids and also offer her albuterol inhaler.  CT reveals possibly a bilateral pneumonia.  X-ray does not reflect these findings.  There is no leukocytosis present at present, vitals are stable, no reason for admission at this time.  Strong return precautions were given       Amount and/or Complexity of Data Reviewed  Clinical lab tests: reviewed and ordered  Tests in the radiology section of CPT®: reviewed and ordered  Tests  in the medicine section of CPT®: ordered and reviewed    Risk of Complications, Morbidity, and/or Mortality  Presenting problems: moderate  Diagnostic procedures: moderate  Management options: moderate    Patient Progress  Patient progress: improved      Final diagnoses:   Pneumonia of both lungs due to infectious organism, unspecified part of lung            Cory Whittington PA-C  10/12/17 0359

## 2017-10-16 LAB
BACTERIA SPEC AEROBE CULT: NORMAL
BACTERIA SPEC AEROBE CULT: NORMAL

## 2018-01-21 ENCOUNTER — HOSPITAL ENCOUNTER (EMERGENCY)
Facility: HOSPITAL | Age: 63
Discharge: HOME | End: 2018-01-22
Admitting: EMERGENCY MEDICINE

## 2018-01-21 ENCOUNTER — APPOINTMENT (OUTPATIENT)
Dept: CT IMAGING | Facility: HOSPITAL | Age: 63
End: 2018-01-21

## 2018-01-21 ENCOUNTER — APPOINTMENT (OUTPATIENT)
Dept: GENERAL RADIOLOGY | Facility: HOSPITAL | Age: 63
End: 2018-01-21

## 2018-01-21 DIAGNOSIS — J06.9 UPPER RESPIRATORY TRACT INFECTION, UNSPECIFIED TYPE: Primary | ICD-10-CM

## 2018-01-21 DIAGNOSIS — R50.9 FEVER AND CHILLS: ICD-10-CM

## 2018-01-21 DIAGNOSIS — M79.642 LEFT HAND PAIN: ICD-10-CM

## 2018-01-21 DIAGNOSIS — J43.9 PULMONARY EMPHYSEMA, UNSPECIFIED EMPHYSEMA TYPE (HCC): ICD-10-CM

## 2018-01-21 LAB
ALBUMIN SERPL-MCNC: 3.6 G/DL (ref 3.5–5)
ALBUMIN/GLOB SERPL: 1.1 G/DL (ref 1.1–2.5)
ALP SERPL-CCNC: 99 U/L (ref 24–120)
ALT SERPL W P-5'-P-CCNC: 45 U/L (ref 0–54)
ANION GAP SERPL CALCULATED.3IONS-SCNC: 7 MMOL/L (ref 4–13)
AST SERPL-CCNC: 36 U/L (ref 7–45)
BASOPHILS # BLD AUTO: 0.04 10*3/MM3 (ref 0–0.2)
BASOPHILS NFR BLD AUTO: 0.6 % (ref 0–2)
BILIRUB SERPL-MCNC: 0.1 MG/DL (ref 0.1–1)
BILIRUB UR QL STRIP: NEGATIVE
BUN BLD-MCNC: 10 MG/DL (ref 5–21)
BUN/CREAT SERPL: 13 (ref 7–25)
CALCIUM SPEC-SCNC: 8.9 MG/DL (ref 8.4–10.4)
CHLORIDE SERPL-SCNC: 106 MMOL/L (ref 98–110)
CLARITY UR: CLEAR
CO2 SERPL-SCNC: 29 MMOL/L (ref 24–31)
COLOR UR: YELLOW
CREAT BLD-MCNC: 0.77 MG/DL (ref 0.5–1.4)
D-LACTATE SERPL-SCNC: 0.9 MMOL/L (ref 0.5–2)
DEPRECATED RDW RBC AUTO: 46.8 FL (ref 40–54)
EOSINOPHIL # BLD AUTO: 0.33 10*3/MM3 (ref 0–0.7)
EOSINOPHIL NFR BLD AUTO: 4.6 % (ref 0–4)
ERYTHROCYTE [DISTWIDTH] IN BLOOD BY AUTOMATED COUNT: 15.3 % (ref 12–15)
GFR SERPL CREATININE-BSD FRML MDRD: 76 ML/MIN/1.73
GLOBULIN UR ELPH-MCNC: 3.4 GM/DL
GLUCOSE BLD-MCNC: 90 MG/DL (ref 70–100)
GLUCOSE UR STRIP-MCNC: NEGATIVE MG/DL
HCT VFR BLD AUTO: 35.1 % (ref 37–47)
HGB BLD-MCNC: 11.2 G/DL (ref 12–16)
HGB UR QL STRIP.AUTO: NEGATIVE
HOLD SPECIMEN: NORMAL
HOLD SPECIMEN: NORMAL
IMM GRANULOCYTES # BLD: 0.01 10*3/MM3 (ref 0–0.03)
IMM GRANULOCYTES NFR BLD: 0.1 % (ref 0–5)
KETONES UR QL STRIP: NEGATIVE
LEUKOCYTE ESTERASE UR QL STRIP.AUTO: NEGATIVE
LYMPHOCYTES # BLD AUTO: 2.15 10*3/MM3 (ref 0.72–4.86)
LYMPHOCYTES NFR BLD AUTO: 30.2 % (ref 15–45)
MCH RBC QN AUTO: 27.1 PG (ref 28–32)
MCHC RBC AUTO-ENTMCNC: 31.9 G/DL (ref 33–36)
MCV RBC AUTO: 85 FL (ref 82–98)
MONOCYTES # BLD AUTO: 0.48 10*3/MM3 (ref 0.19–1.3)
MONOCYTES NFR BLD AUTO: 6.8 % (ref 4–12)
NEUTROPHILS # BLD AUTO: 4.1 10*3/MM3 (ref 1.87–8.4)
NEUTROPHILS NFR BLD AUTO: 57.7 % (ref 39–78)
NITRITE UR QL STRIP: NEGATIVE
NRBC BLD MANUAL-RTO: 0 /100 WBC (ref 0–0)
PH UR STRIP.AUTO: 6 [PH] (ref 5–8)
PLATELET # BLD AUTO: 263 10*3/MM3 (ref 130–400)
PMV BLD AUTO: 10.1 FL (ref 6–12)
POTASSIUM BLD-SCNC: 3.8 MMOL/L (ref 3.5–5.3)
PROT SERPL-MCNC: 7 G/DL (ref 6.3–8.7)
PROT UR QL STRIP: NEGATIVE
RBC # BLD AUTO: 4.13 10*6/MM3 (ref 4.2–5.4)
SODIUM BLD-SCNC: 142 MMOL/L (ref 135–145)
SP GR UR STRIP: 1.01 (ref 1–1.03)
TROPONIN I SERPL-MCNC: <0.012 NG/ML (ref 0–0.03)
UROBILINOGEN UR QL STRIP: NORMAL
WBC NRBC COR # BLD: 7.11 10*3/MM3 (ref 4.8–10.8)
WHOLE BLOOD HOLD SPECIMEN: NORMAL
WHOLE BLOOD HOLD SPECIMEN: NORMAL

## 2018-01-21 PROCEDURE — 84484 ASSAY OF TROPONIN QUANT: CPT | Performed by: NURSE PRACTITIONER

## 2018-01-21 PROCEDURE — 93005 ELECTROCARDIOGRAM TRACING: CPT

## 2018-01-21 PROCEDURE — 99284 EMERGENCY DEPT VISIT MOD MDM: CPT

## 2018-01-21 PROCEDURE — 87804 INFLUENZA ASSAY W/OPTIC: CPT | Performed by: NURSE PRACTITIONER

## 2018-01-21 PROCEDURE — 83605 ASSAY OF LACTIC ACID: CPT | Performed by: NURSE PRACTITIONER

## 2018-01-21 PROCEDURE — 94640 AIRWAY INHALATION TREATMENT: CPT

## 2018-01-21 PROCEDURE — 85025 COMPLETE CBC W/AUTO DIFF WBC: CPT | Performed by: NURSE PRACTITIONER

## 2018-01-21 PROCEDURE — 81003 URINALYSIS AUTO W/O SCOPE: CPT | Performed by: NURSE PRACTITIONER

## 2018-01-21 PROCEDURE — 71260 CT THORAX DX C+: CPT

## 2018-01-21 PROCEDURE — 94799 UNLISTED PULMONARY SVC/PX: CPT

## 2018-01-21 PROCEDURE — 73130 X-RAY EXAM OF HAND: CPT

## 2018-01-21 PROCEDURE — 80053 COMPREHEN METABOLIC PANEL: CPT | Performed by: NURSE PRACTITIONER

## 2018-01-21 PROCEDURE — 0 IOPAMIDOL 61 % SOLUTION: Performed by: NURSE PRACTITIONER

## 2018-01-21 PROCEDURE — 93010 ELECTROCARDIOGRAM REPORT: CPT | Performed by: INTERNAL MEDICINE

## 2018-01-21 PROCEDURE — 87040 BLOOD CULTURE FOR BACTERIA: CPT | Performed by: NURSE PRACTITIONER

## 2018-01-21 RX ORDER — IPRATROPIUM BROMIDE AND ALBUTEROL SULFATE 2.5; .5 MG/3ML; MG/3ML
3 SOLUTION RESPIRATORY (INHALATION) ONCE
Status: COMPLETED | OUTPATIENT
Start: 2018-01-21 | End: 2018-01-21

## 2018-01-21 RX ADMIN — IOPAMIDOL 100 ML: 612 INJECTION, SOLUTION INTRAVENOUS at 22:43

## 2018-01-21 RX ADMIN — IPRATROPIUM BROMIDE AND ALBUTEROL SULFATE 3 ML: 2.5; .5 SOLUTION RESPIRATORY (INHALATION) at 21:56

## 2018-01-22 VITALS
RESPIRATION RATE: 16 BRPM | SYSTOLIC BLOOD PRESSURE: 130 MMHG | OXYGEN SATURATION: 99 % | TEMPERATURE: 98.4 F | HEART RATE: 86 BPM | WEIGHT: 195 LBS | BODY MASS INDEX: 32.49 KG/M2 | DIASTOLIC BLOOD PRESSURE: 67 MMHG | HEIGHT: 65 IN

## 2018-01-22 LAB
FLUAV AG NPH QL: NEGATIVE
FLUBV AG NPH QL IA: NEGATIVE

## 2018-01-22 RX ORDER — LEVOFLOXACIN 750 MG/1
750 TABLET ORAL DAILY
Qty: 5 TABLET | Refills: 0 | Status: SHIPPED | OUTPATIENT
Start: 2018-01-22 | End: 2018-01-27

## 2018-01-22 RX ORDER — METHYLPREDNISOLONE 4 MG/1
TABLET ORAL
Qty: 1 EACH | Refills: 0 | Status: SHIPPED | OUTPATIENT
Start: 2018-01-22 | End: 2019-09-13

## 2018-01-22 NOTE — ED PROVIDER NOTES
Subjective   Patient is a 62 y.o. female presenting with shortness of breath.   History provided by:  Patient   used: No    Shortness of Breath     63 y/o  female presents to the emergency room with complaint of fatigue, shortness of breath, and hand pain due to setting her hand in the car door.  She states that she was diagnosed with pneumonia 2 weeks ago here at the hospital but the last saw was from October 2017.  The CT of the chest Showed Infiltrate on the Right Lobe.  She Was Given an Antibiotic but She Does Not Recall Which One.  She States That She Felt Better after 2 Days of the Antibiotic. She states that she started feeling about about 2 days ago. Her fever was high as 103.0. She states that when she was sick before she did not have SOB but now she gets tired and SOB just walking to the bathroom. Patient also states that she slammed her left hand in the door and had to actually open the door to remove her and. She is able to squeeze her hand and move her fingers, but she states it is painful.   Review of Systems   Respiratory: Positive for shortness of breath.        Past Medical History:   Diagnosis Date   • Acid reflux    • Arthritis    • Chest pain    • Chronic back pain    • Chronic neck pain    • DVT (deep venous thrombosis)    • Hypothyroid    • Hypothyroidism    • Migraine    • PE (pulmonary thromboembolism)        Allergies   Allergen Reactions   • Dilaudid [Hydromorphone Hcl] Anaphylaxis   • Erythromycin Swelling     Throat swells   • Ondansetron Hcl Other (See Comments)     Coded   • Guaifenesin & Derivatives Nausea And Vomiting   • Meperidine      Makes her an angry person   • Codeine Rash       Past Surgical History:   Procedure Laterality Date   • CHOLECYSTECTOMY     • COLONOSCOPY     • DILATATION AND CURETTAGE     • HERNIA REPAIR     • HYSTERECTOMY         History reviewed. No pertinent family history.    Social History     Social History   • Marital status:       Spouse name: N/A   • Number of children: N/A   • Years of education: N/A     Social History Main Topics   • Smoking status: Heavy Tobacco Smoker     Packs/day: 0.50   • Smokeless tobacco: None   • Alcohol use Yes   • Drug use: No   • Sexual activity: Not Asked     Other Topics Concern   • None     Social History Narrative   • None           Objective   Physical Exam    Procedures         ED Course  ED Course   Comment By Time   Labs are unremarkable. Urine is clear.  Blood cultures pending. No signs of sepsis.   EKG shows NSR.  The reading of the CT Stat rad reads : centrilobular emphysema.  No other acute findings.     Xray of the hand is normal. Shaina Busby, APRN 01/21 4117   Patient is flu negative.   Will discharge patient home in stable condition.  Discussed the findings with the patient.. She verbalizes understanding. Shaina Busby, APRN 01/22 0027      Labs Reviewed   CBC WITH AUTO DIFFERENTIAL - Abnormal; Notable for the following:        Result Value    RBC 4.13 (*)     Hemoglobin 11.2 (*)     Hematocrit 35.1 (*)     MCH 27.1 (*)     MCHC 31.9 (*)     RDW 15.3 (*)     Eosinophil % 4.6 (*)     All other components within normal limits   INFLUENZA ANTIGEN, RAPID - Normal    Narrative:     Recommend confirmation of negative results by viral culture or molecular assay.   URINALYSIS W/ CULTURE IF INDICATED - Normal    Narrative:     Urine microscopic not indicated.   LACTIC ACID, PLASMA - Normal   TROPONIN (IN-HOUSE) - Normal   BLOOD CULTURE WITH VERNELL   BLOOD CULTURE WITH VERNELL   RAINBOW DRAW    Narrative:     The following orders were created for panel order [place] Draw.  Procedure                               Abnormality         Status                     ---------                               -----------         ------                     Light Blue Top[[id number]]                                   Final result               Green Top (Gel)[[id number]]                                  Final  result               Lavender Top[[id number]]                                     Final result               Red Top[[id number]]                                          Final result                 Please view results for these tests on the individual orders.   COMPREHENSIVE METABOLIC PANEL   LIGHT BLUE TOP   GREEN TOP   LAVENDER TOP   RED TOP   CBC AND DIFFERENTIAL    Narrative:     The following orders were created for panel order CBC & Differential.  Procedure                               Abnormality         Status                     ---------                               -----------         ------                     CBC Auto Differential[[id number]]        Abnormal            Final result                 Please view results for these tests on the individual orders.     XR Hand 3+ View Left   Final Result   1. No evidence of acute fracture.   2. Degenerative osteoarthritis.   3. Sclerotic lunate bone consistent with [place] disease.   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       This report was finalized on 01/21/2018 21:58 by Dr. Brandon Fenton MD.      CT Chest With Contrast    (Results Pending)     Vitals:    01/21/18 2146 01/21/18 2156 01/21/18 2244 01/21/18 2246   BP: 125/73   130/57   Pulse:  76 94    Resp:  22     Temp:       SpO2:  95% 94%    Weight:       Height:                     MDM  Number of Diagnoses or Management Options  Fever and chills: new and requires workup  Left hand pain: new and requires workup  Pulmonary emphysema, unspecified emphysema type: new and requires workup  Upper respiratory tract infection, unspecified type: new and requires workup     Amount and/or Complexity of Data Reviewed  Clinical lab tests: reviewed  Tests in the radiology section of CPT®: reviewed  Tests in the medicine section of CPT®: reviewed  Decide to obtain previous medical records or to obtain history from someone other than the patient: yes  Discuss the patient with other providers:  yes    Risk of Complications, Morbidity, and/or Mortality  Presenting problems: moderate  Diagnostic procedures: moderate  Management options: moderate    Patient Progress  Patient progress: stable      Final diagnoses:   Upper respiratory tract infection, unspecified type   Pulmonary emphysema, unspecified emphysema type   Fever and chills   Left hand pain            Shaina Busby, APRN  01/22/18 0032

## 2018-01-22 NOTE — ED PROVIDER NOTES
Subjective   History of Present Illness    Review of Systems    Past Medical History:   Diagnosis Date   • Acid reflux    • Arthritis    • Chest pain    • Chronic back pain    • Chronic neck pain    • DVT (deep venous thrombosis)    • Hypothyroid    • Hypothyroidism    • Migraine    • PE (pulmonary thromboembolism)        Allergies   Allergen Reactions   • Dilaudid [Hydromorphone Hcl] Anaphylaxis   • Erythromycin Swelling     Throat swells   • Ondansetron Hcl Other (See Comments)     Coded   • Guaifenesin & Derivatives Nausea And Vomiting   • Meperidine      Makes her an angry person   • Codeine Rash       Past Surgical History:   Procedure Laterality Date   • CHOLECYSTECTOMY     • COLONOSCOPY     • DILATATION AND CURETTAGE     • HERNIA REPAIR     • HYSTERECTOMY         History reviewed. No pertinent family history.    Social History     Social History   • Marital status:      Spouse name: N/A   • Number of children: N/A   • Years of education: N/A     Social History Main Topics   • Smoking status: Heavy Tobacco Smoker     Packs/day: 0.50   • Smokeless tobacco: None   • Alcohol use Yes   • Drug use: No   • Sexual activity: Not Asked     Other Topics Concern   • None     Social History Narrative   • None           Objective   Physical Exam    Procedures         ED Course  ED Course   Comment By Time   Labs are unremarkable. Urine is clear.  Blood cultures pending. No signs of sepsis.   EKG shows NSR.  The reading of the CT Stat rad reads : centrilobular emphysema.  No other acute findings.     Xray of the hand is normal. Shaina Busby, APRN 01/21 1102   Patient is flu negative.   Will discharge patient home in stable condition.  Discussed the findings with the patient.. She verbalizes understanding. Shaina Busby, APRN 01/22 0027   10-11 mm ill-defined nodule in the right apex, similar to 2017 but  new from 2010. While this could represent postinflammatory scarring  (related to pneumonia in  October 2017) underlying malignancy is not  excluded. Further evaluation with PET/CT or short interval 3 month  follow-up CT could be obtained.  Case discussed with the pt at length on the phone and also with Dr Monahan the pt will get a PET scan via Dr Hero Segura MD 01/22 6511                  MDM    Final diagnoses:   Upper respiratory tract infection, unspecified type   Pulmonary emphysema, unspecified emphysema type   Fever and chills   Left hand pain            Jeremy Segura MD  01/23/18 3053

## 2018-01-22 NOTE — DISCHARGE INSTRUCTIONS
Follow up with PCP in 2-3 days  Return to ER with worsening symptoms.  Take medications as prescribed

## 2018-01-26 LAB
BACTERIA SPEC AEROBE CULT: NORMAL
BACTERIA SPEC AEROBE CULT: NORMAL

## 2018-01-28 NOTE — ED NOTES
"ED Call Back Questions    1. How are you doing since leaving the Emergency Department?    Better, said ER visit was wonderful.  Another CT in 2 months.    2. Do you have any questions about your discharge instructions? No     3. Have you filled your new prescriptions yet? Yes   a. Do you have any questions about those medications? No     4. Were you able to make a follow-up appointment with the physician? Yes     5. Do you have a primary care physician? Yes   a. If No, would you like for me to set you up with one? N/A  i. If Yes, “I will have our ED  give you a call right back at this number to work with you on the best time for an appointment.”    6. We are always looking to get better at what we do. Do you have any suggestions for what we can do to be even better? No   a. If Yes, \"Thank you for sharing your concerns. I apologize. I will follow up with our manager and patient . Would you like someone to call you back?\" N/A    7. Is there anything else I can do for you? No     "

## 2018-06-04 ENCOUNTER — HOSPITAL ENCOUNTER (OUTPATIENT)
Dept: ULTRASOUND IMAGING | Facility: HOSPITAL | Age: 63
Discharge: HOME | End: 2018-06-04
Attending: ORTHOPAEDIC SURGERY | Admitting: ORTHOPAEDIC SURGERY

## 2018-06-04 ENCOUNTER — TRANSCRIBE ORDERS (OUTPATIENT)
Dept: LAB | Facility: HOSPITAL | Age: 63
End: 2018-06-04

## 2018-06-04 DIAGNOSIS — M79.604 RIGHT LEG PAIN: ICD-10-CM

## 2018-06-04 DIAGNOSIS — M79.604 RIGHT LEG PAIN: Primary | ICD-10-CM

## 2018-06-04 PROCEDURE — 93971 EXTREMITY STUDY: CPT

## 2018-06-12 ENCOUNTER — HOSPITAL ENCOUNTER (OUTPATIENT)
Dept: ULTRASOUND IMAGING | Facility: HOSPITAL | Age: 63
Discharge: HOME | End: 2018-06-12
Attending: ORTHOPAEDIC SURGERY | Admitting: ORTHOPAEDIC SURGERY

## 2018-06-12 ENCOUNTER — TRANSCRIBE ORDERS (OUTPATIENT)
Dept: ADMINISTRATIVE | Facility: HOSPITAL | Age: 63
End: 2018-06-12

## 2018-06-12 DIAGNOSIS — M79.604 RIGHT LEG PAIN: Primary | ICD-10-CM

## 2018-06-12 DIAGNOSIS — M79.605 LEFT LEG PAIN: ICD-10-CM

## 2018-06-12 DIAGNOSIS — M79.604 RIGHT LEG PAIN: ICD-10-CM

## 2018-06-12 PROCEDURE — 93970 EXTREMITY STUDY: CPT

## 2018-08-16 ENCOUNTER — TRANSCRIBE ORDERS (OUTPATIENT)
Dept: ADMINISTRATIVE | Facility: HOSPITAL | Age: 63
End: 2018-08-16

## 2018-08-16 DIAGNOSIS — R93.89 ABNORMAL RADIOLOGICAL FINDINGS IN SKIN AND SUBCUTANEOUS TISSUE: Primary | ICD-10-CM

## 2018-08-20 ENCOUNTER — HOSPITAL ENCOUNTER (OUTPATIENT)
Dept: ULTRASOUND IMAGING | Facility: HOSPITAL | Age: 63
Discharge: HOME | End: 2018-08-20
Admitting: INTERNAL MEDICINE

## 2018-08-20 DIAGNOSIS — R93.89 ABNORMAL RADIOLOGICAL FINDINGS IN SKIN AND SUBCUTANEOUS TISSUE: ICD-10-CM

## 2018-08-20 PROCEDURE — 76775 US EXAM ABDO BACK WALL LIM: CPT

## 2018-09-19 ENCOUNTER — OFFICE VISIT (OUTPATIENT)
Dept: CARDIAC SURGERY | Facility: CLINIC | Age: 63
End: 2018-09-19

## 2018-09-19 VITALS
OXYGEN SATURATION: 95 % | WEIGHT: 190.2 LBS | HEIGHT: 65 IN | DIASTOLIC BLOOD PRESSURE: 80 MMHG | HEART RATE: 75 BPM | BODY MASS INDEX: 31.69 KG/M2 | SYSTOLIC BLOOD PRESSURE: 130 MMHG

## 2018-09-19 DIAGNOSIS — R91.1 LUNG NODULE: Primary | ICD-10-CM

## 2018-09-19 PROCEDURE — 99203 OFFICE O/P NEW LOW 30 MIN: CPT | Performed by: THORACIC SURGERY (CARDIOTHORACIC VASCULAR SURGERY)

## 2018-09-19 RX ORDER — WARFARIN SODIUM 7.5 MG/1
TABLET ORAL
COMMUNITY
Start: 2018-09-01 | End: 2019-09-13

## 2018-09-19 RX ORDER — BUPROPION HYDROCHLORIDE 100 MG/1
100 TABLET, EXTENDED RELEASE ORAL
COMMUNITY
End: 2020-05-28 | Stop reason: DRUGHIGH

## 2018-09-19 NOTE — PROGRESS NOTES
Melly Cruz  1955  Reynaldo Jeter, DO  No ref. provider found    Chief Complaint   Patient presents with   • Lung Nodule     Patient is here for lung nodule evaluation       Present Illness: Mrs. Melly Cruz is a 63-year-old female referred to lung nodule clinic by Dr. Reynaldo Jeter.  She has a history of having ammonia or bronchitis ×2 beginning in November and again in January.  The latest CT scan I see is from January and was reported to have a 10-11 mm ill-defined nodule in the right apex which was unchanged from a November CT scan.  Surveillance was recommended by the radiologist at that time.  Since that time she apparently has had 2 CT scans at [facility] were done for workup of a blood clot and apparently to rule out pulmonary emboli.  We do not have the scans to look at.  The patient will get them in reviewing the chart office for further view.    She was a long-time smoker but stopped in April.  She has had no cough or hemoptysis.  She is on both Coumadin and Lovenox because of DVT after her knee surgery.  She denies weight loss or voice change.    Past Medical History:   Diagnosis Date   • Acid reflux    • Arthritis    • Chest pain    • Chronic back pain    • Chronic neck pain    • DVT (deep venous thrombosis) (CMS/HCC)    • Hypothyroid    • Hypothyroidism    • Migraine    • PE (pulmonary thromboembolism) (CMS/HCC)        Past Surgical History:   Procedure Laterality Date   • CHOLECYSTECTOMY     • COLONOSCOPY     • DILATATION AND CURETTAGE     • HERNIA REPAIR     • HYSTERECTOMY         Allergies   Allergen Reactions   • Dilaudid [Hydromorphone Hcl] Anaphylaxis   • Erythromycin Swelling     Throat swells   • Ondansetron Hcl Other (See Comments)     Coded   • Orphenadrine Citrate Anaphylaxis   • Guaifenesin & Derivatives Nausea And Vomiting   • Meperidine      Makes her an angry person   • Codeine Rash         Current Outpatient Prescriptions:   •  albuterol (PROVENTIL HFA;VENTOLIN HFA)  108 (90 Base) MCG/ACT inhaler, Inhale 2 puffs 4 (Four) Times a Day., Disp: 6.7 g, Rfl: 0  •  buPROPion SR (WELLBUTRIN SR) 100 MG 12 hr tablet, Take 100 mg by mouth., Disp: , Rfl:   •  busPIRone (BUSPAR) 5 MG tablet, Take 5 mg by mouth 3 (Three) Times a Day., Disp: , Rfl:   •  citalopram (CeleXA) 40 MG tablet, Take 40 mg by mouth daily., Disp: , Rfl:   •  esomeprazole (NexIUM) 20 MG packet, Take 20 mg by mouth daily., Disp: , Rfl:   •  levothyroxine (SYNTHROID, LEVOTHROID) 125 MCG tablet, Take 125 mcg by mouth Daily, Disp: , Rfl:   •  oxyCODONE (ROXICODONE) 5 MG immediate release tablet, 1 to 3 tablets po q 3 to 4 hours prn, Disp: , Rfl:   •  pramipexole (MIRAPEX) 0.125 MG tablet, Take 0.125 mg by mouth 2 times daily., Disp: , Rfl:   •  Tfohwqvwl-Nfcagvfu-PQ 30-3-15 MG/5ML syrup, Take 10 mL by mouth Every 6 (Six) Hours As Needed (FOR COUGH)., Disp: , Rfl:   •  tiZANidine (ZANAFLEX) 4 MG tablet, Every 6 (Six) Hours., Disp: , Rfl:   •  warfarin (COUMADIN) 7.5 MG tablet, Take one po nightly, Disp: , Rfl:   •  cyclobenzaprine (FLEXERIL) 10 MG tablet, 3 (Three) Times a Day As Needed., Disp: , Rfl:   •  MethylPREDNISolone (MEDROL, LANCE,) 4 MG tablet, Take as directed on package instructions., Disp: 1 each, Rfl: 0  •  predniSONE (DELTASONE) 20 MG tablet, Take 1 tablet by mouth Daily., Disp: 5 tablet, Rfl: 0  •  rivaroxaban (XARELTO) 10 MG tablet, Take 10 mg by mouth once Indications: day before surgery x 1 dose, Disp: , Rfl:     History reviewed. No pertinent family history.    Social History     Social History   • Marital status:      Social History Main Topics   • Smoking status: Former Smoker     Packs/day: 0.50     Quit date: 4/1/2018   • Smokeless tobacco: Never Used   • Alcohol use Yes   • Drug use: No     Other Topics Concern   • Not on file         Review of Systems  Negative except for that listed in present illness    /80 (BP Location: Left arm, Patient Position: Sitting, Cuff Size: Adult)   Pulse 75   " Ht 165.1 cm (65\")   Wt 86.3 kg (190 lb 3.2 oz)   SpO2 95%   BMI 31.65 kg/m²     Physical Exam  General: Pleasant 63-year-old  female in no acute distress  HEENT: Eyes: Pupils equal round and react to light; extraocular movements intact                Nose mouth and throat are benign                Neck: Neck is supple without thyromegaly, mass or bruit.  Chest/Lungs: No chest wall abnormality.  Chest is clear decreased breath sounds at the bases and some wheezes.  Heart:: Regular rate and rhythm without murmur gallop or rub  Abdomen: Soft, nontender without rebound guarding or mass  Extremities: Without clubbing cyanosis or edema  Pulses: 2+ bilateral and equal throughout  Neurologic: Cranial nerves II through XII are intact.  Motor and sensation are grossly normal    Assessment: Mrs. Cruz has had bouts of pneumonia or bronchitis and has been treated at [facility] and by Dr. Reynaldo Jeter.  She now has a ill-defined lesion in her right upper lobe which is approximately 1 cm.  It could be well from her lung disease or could be a mass.  She has minus heavy smoker in the past but quit in April.  I told her I would like to look at the films from [facility].  If is no change from the January CT scan that I have seen then we could all of this.  However if it has changed or definitely looks more like a mass then we might have to proceed with operative treatment given her history of smoking.  Prior to operation of course she would have to have preoperative workup including pulmonary function tests etc.  For now I will review the films when she brings him to us and then talk with her further about our plan.      Plan: As above      Harman Garza MD  09/19/18  8:39 AM  "

## 2018-09-21 ENCOUNTER — DOCUMENTATION (OUTPATIENT)
Dept: CARDIAC SURGERY | Facility: CLINIC | Age: 63
End: 2018-09-21

## 2018-09-21 DIAGNOSIS — R91.1 NODULE OF RIGHT LUNG: Primary | ICD-10-CM

## 2018-09-21 DIAGNOSIS — R91.1 LUNG NODULE: Primary | ICD-10-CM

## 2018-09-21 NOTE — PROGRESS NOTES
Mrs. Cruz brought me her films from [facility].  She had a CTA performed on 09/01/2018 to rule out pulmonary embolus.  When I look at the films she still has the spiculated right upper lobe nodule with some cavitary areas in close proximity to the lesion.  I reviewed it with the radiologist here and there does not seem to be a difference from her previous scan in January 2018.  However she has been a smoker until just recently and the lesion does look somewhat suspicious.  I feel that a PET scan would be the best if we can get it okayed by her provider.  If not repeat CT scan should be done in December.    Harman Garza M.D.  9/21/18.

## 2018-09-27 ENCOUNTER — TRANSCRIBE ORDERS (OUTPATIENT)
Dept: ADMINISTRATIVE | Facility: HOSPITAL | Age: 63
End: 2018-09-27

## 2018-09-27 DIAGNOSIS — R93.89 ABNORMAL FINDINGS ON IMAGING TEST: Primary | ICD-10-CM

## 2018-10-02 ENCOUNTER — HOSPITAL ENCOUNTER (OUTPATIENT)
Dept: ULTRASOUND IMAGING | Facility: HOSPITAL | Age: 63
Discharge: HOME | End: 2018-10-02

## 2018-10-04 ENCOUNTER — HOSPITAL ENCOUNTER (OUTPATIENT)
Dept: CT IMAGING | Facility: HOSPITAL | Age: 63
Discharge: HOME | End: 2018-10-04

## 2018-10-04 ENCOUNTER — HOSPITAL ENCOUNTER (OUTPATIENT)
Dept: CT IMAGING | Facility: HOSPITAL | Age: 63
Discharge: HOME | End: 2018-10-04
Admitting: NURSE PRACTITIONER

## 2018-10-04 DIAGNOSIS — R91.1 LUNG NODULE: ICD-10-CM

## 2018-10-04 PROCEDURE — A9552 F18 FDG: HCPCS | Performed by: NURSE PRACTITIONER

## 2018-10-04 PROCEDURE — 78815 PET IMAGE W/CT SKULL-THIGH: CPT

## 2018-10-04 PROCEDURE — 0 FLUDEOXYGLUCOSE F18 SOLUTION: Performed by: NURSE PRACTITIONER

## 2018-10-04 RX ADMIN — FLUDEOXYGLUCOSE F18 1 DOSE: 300 INJECTION INTRAVENOUS at 10:13

## 2018-10-09 ENCOUNTER — TRANSCRIBE ORDERS (OUTPATIENT)
Dept: ADMINISTRATIVE | Facility: HOSPITAL | Age: 63
End: 2018-10-09

## 2018-10-09 DIAGNOSIS — D68.9 COAGULATION DEFECT (HCC): ICD-10-CM

## 2018-10-09 DIAGNOSIS — I82.401 ACUTE EMBOLISM AND THROMBOSIS OF DEEP VEIN OF RIGHT LOWER EXTREMITY (HCC): Primary | ICD-10-CM

## 2018-10-09 DIAGNOSIS — M79.604 RIGHT LEG PAIN: ICD-10-CM

## 2018-10-10 ENCOUNTER — TELEPHONE (OUTPATIENT)
Dept: CARDIAC SURGERY | Facility: CLINIC | Age: 63
End: 2018-10-10

## 2018-10-10 ENCOUNTER — DOCUMENTATION (OUTPATIENT)
Dept: CARDIAC SURGERY | Facility: CLINIC | Age: 63
End: 2018-10-10

## 2018-10-10 NOTE — PROGRESS NOTES
CT surgery Office Note    Mrs. Cruz and a PET scan performed on 10/04/2018.  The nodule in her right upper lobe that we have been following did not show enhancement.  There were no enhancing lesions on the PET scan.  I feel that we should follow up again with a CT scan approximate 6 months.  If this is the same with no enlargement we could go to every year for follow-up.    I talked with her on the phone and informed her of this and she will see us again in 6 months.  She has stopped smoking of encouraged her to continue this.    Harman Garza M.D.  Wednesday, 10/10/2018  2:10 PM

## 2018-10-10 NOTE — TELEPHONE ENCOUNTER
Patient called stating that she had a PET scan done last Thursday and was wanting to know if Dr. Garza had the results.

## 2018-10-18 ENCOUNTER — HOSPITAL ENCOUNTER (OUTPATIENT)
Dept: ULTRASOUND IMAGING | Facility: HOSPITAL | Age: 63
Discharge: HOME | End: 2018-10-18
Admitting: INTERNAL MEDICINE

## 2018-10-18 DIAGNOSIS — M79.604 RIGHT LEG PAIN: ICD-10-CM

## 2018-10-18 PROCEDURE — 93971 EXTREMITY STUDY: CPT | Performed by: SURGERY

## 2018-10-18 PROCEDURE — 93971 EXTREMITY STUDY: CPT

## 2018-11-19 ENCOUNTER — TRANSCRIBE ORDERS (OUTPATIENT)
Dept: ADMINISTRATIVE | Facility: HOSPITAL | Age: 63
End: 2018-11-19

## 2018-11-19 DIAGNOSIS — R93.5 ABNORMAL FINDINGS ON DIAGNOSTIC IMAGING OF OTHER ABDOMINAL REGIONS, INCLUDING RETROPERITONEUM: Primary | ICD-10-CM

## 2018-11-20 ENCOUNTER — APPOINTMENT (OUTPATIENT)
Dept: MRI IMAGING | Facility: HOSPITAL | Age: 63
End: 2018-11-20

## 2018-11-23 ENCOUNTER — HOSPITAL ENCOUNTER (OUTPATIENT)
Dept: MRI IMAGING | Facility: HOSPITAL | Age: 63
Discharge: HOME | End: 2018-11-23
Admitting: INTERNAL MEDICINE

## 2018-11-23 DIAGNOSIS — R93.5 ABNORMAL FINDINGS ON DIAGNOSTIC IMAGING OF OTHER ABDOMINAL REGIONS, INCLUDING RETROPERITONEUM: ICD-10-CM

## 2018-11-23 LAB — CREAT BLDA-MCNC: 0.8 MG/DL (ref 0.6–1.3)

## 2018-11-23 PROCEDURE — A9577 INJ MULTIHANCE: HCPCS | Performed by: INTERNAL MEDICINE

## 2018-11-23 PROCEDURE — 74183 MRI ABD W/O CNTR FLWD CNTR: CPT

## 2018-11-23 PROCEDURE — 82565 ASSAY OF CREATININE: CPT

## 2018-11-23 PROCEDURE — 0 GADOBENATE DIMEGLUMINE 529 MG/ML SOLUTION: Performed by: INTERNAL MEDICINE

## 2018-11-23 RX ADMIN — GADOBENATE DIMEGLUMINE 18 ML: 529 INJECTION, SOLUTION INTRAVENOUS at 08:21

## 2019-04-08 ENCOUNTER — TELEPHONE (OUTPATIENT)
Dept: CARDIAC SURGERY | Facility: CLINIC | Age: 64
End: 2019-04-08

## 2019-04-08 NOTE — TELEPHONE ENCOUNTER
Have attempted to call pt multiple times Friday (4-5-19) and again today re: appt with Dr Greg little for today that must be rescheduled.  No answer at h# in system and no option for voice mail.   is listed as emergency contact.  Tried his number but recording states it is not a valid number./nadia

## 2019-04-15 DIAGNOSIS — R91.1 LUNG NODULE: Primary | ICD-10-CM

## 2019-04-22 ENCOUNTER — HOSPITAL ENCOUNTER (OUTPATIENT)
Dept: CT IMAGING | Facility: HOSPITAL | Age: 64
Discharge: HOME | End: 2019-04-22
Admitting: NURSE PRACTITIONER

## 2019-04-22 ENCOUNTER — OFFICE VISIT (OUTPATIENT)
Dept: CARDIAC SURGERY | Facility: CLINIC | Age: 64
End: 2019-04-22

## 2019-04-22 VITALS
SYSTOLIC BLOOD PRESSURE: 132 MMHG | BODY MASS INDEX: 34.29 KG/M2 | HEART RATE: 77 BPM | WEIGHT: 205.8 LBS | HEIGHT: 65 IN | DIASTOLIC BLOOD PRESSURE: 82 MMHG | OXYGEN SATURATION: 97 %

## 2019-04-22 DIAGNOSIS — R91.1 LUNG NODULE: ICD-10-CM

## 2019-04-22 DIAGNOSIS — R91.1 LUNG NODULE: Primary | ICD-10-CM

## 2019-04-22 LAB — CREAT BLDA-MCNC: 0.9 MG/DL (ref 0.6–1.3)

## 2019-04-22 PROCEDURE — 82565 ASSAY OF CREATININE: CPT

## 2019-04-22 PROCEDURE — 71260 CT THORAX DX C+: CPT

## 2019-04-22 PROCEDURE — 25010000002 IOPAMIDOL 61 % SOLUTION: Performed by: NURSE PRACTITIONER

## 2019-04-22 PROCEDURE — 99214 OFFICE O/P EST MOD 30 MIN: CPT | Performed by: THORACIC SURGERY (CARDIOTHORACIC VASCULAR SURGERY)

## 2019-04-22 RX ORDER — HYDROCODONE BITARTRATE AND ACETAMINOPHEN 7.5; 325 MG/1; MG/1
1 TABLET ORAL EVERY 6 HOURS PRN
COMMUNITY
End: 2022-04-19 | Stop reason: SDUPTHER

## 2019-04-22 RX ORDER — ERGOCALCIFEROL 1.25 MG/1
50000 CAPSULE ORAL
Refills: 6 | Status: ON HOLD | COMMUNITY
Start: 2019-04-02

## 2019-04-22 RX ADMIN — IOPAMIDOL 100 ML: 612 INJECTION, SOLUTION INTRAVENOUS at 13:25

## 2019-04-23 NOTE — PROGRESS NOTES
Chief Complaint   Patient presents with   • Lung Nodule     Patient is here for a follow up w/ ct.       Subjective     History of Present Illness    Patient previously followed by Dr. Harman Garza for lung nodule.  Dr. Brianda Alex will continue follow-up of the patient as requested by Dr. Harman Garza.    Mrs. Melly Cruz is a 63-year-old female referred to lung nodule clinic by Dr. Reynaldo Jeter in September 2018.  Patient has had a CT scan of the chest in January 2018 showing a 10-11 mm ill-defined nodule in the right apex.  PET scan was conducted in October 2018 and it showed an 11 mm right apical sub-solid nodule without significant uptake patient recently has a repeat CT of the chest on April 22, 2019 reported as unchanged from prior CT scans.    Patient denies any chest pain, hemoptysis, or shortness of breath.  Patient denies any specific symptoms.     Patient was a long-time smoker but stopped in April 2018.       Review of Systems   Constitutional: Negative.    HENT: Negative.         Headaches   Eyes: Negative.    Respiratory: Negative.    Cardiovascular: Negative.    Gastrointestinal: Negative.    Endocrine: Negative.    Genitourinary: Negative.    Musculoskeletal: Positive for back pain.   Skin: Negative.    Allergic/Immunologic: Negative.    Neurological: Negative.    Hematological: Negative.         History of deep venous thrombosis in lower extremities        Past Medical History:   Diagnosis Date   • Acid reflux    • Arthritis    • Chest pain    • Chronic back pain    • Chronic neck pain    • DVT (deep venous thrombosis) (CMS/HCC)    • Hypothyroid    • Hypothyroidism    • Migraine    • PE (pulmonary thromboembolism) (CMS/HCC)      Past Surgical History:   Procedure Laterality Date   • CHOLECYSTECTOMY     • COLONOSCOPY     • DILATATION AND CURETTAGE     • HERNIA REPAIR     • HYSTERECTOMY     • REPLACEMENT TOTAL KNEE      Right knee      Allergies:  Dilaudid [hydromorphone hcl]; Erythromycin;  Ondansetron hcl; Orphenadrine citrate; Guaifenesin & derivatives; Meperidine; and Codeine    Current Outpatient Medications   Medication Sig Dispense Refill   • albuterol (PROVENTIL HFA;VENTOLIN HFA) 108 (90 Base) MCG/ACT inhaler Inhale 2 puffs 4 (Four) Times a Day. 6.7 g 0   • buPROPion SR (WELLBUTRIN SR) 100 MG 12 hr tablet Take 100 mg by mouth.     • busPIRone (BUSPAR) 5 MG tablet Take 5 mg by mouth 3 (Three) Times a Day.     • citalopram (CeleXA) 40 MG tablet Take 40 mg by mouth daily.     • cyclobenzaprine (FLEXERIL) 10 MG tablet 3 (Three) Times a Day As Needed.     • esomeprazole (NexIUM) 20 MG packet Take 20 mg by mouth daily.     • HYDROcodone-acetaminophen (NORCO) 7.5-325 MG per tablet Take 1 tablet by mouth Every 6 (Six) Hours As Needed for Moderate Pain .     • levothyroxine (SYNTHROID, LEVOTHROID) 125 MCG tablet Take 125 mcg by mouth Daily     • MethylPREDNISolone (MEDROL, LANCE,) 4 MG tablet Take as directed on package instructions. 1 each 0   • pramipexole (MIRAPEX) 0.125 MG tablet Take 0.125 mg by mouth 2 times daily.     • sertraline (ZOLOFT) 50 MG tablet   2   • tiZANidine (ZANAFLEX) 4 MG tablet Every 6 (Six) Hours.     • vitamin D (ERGOCALCIFEROL) 11564 units capsule capsule   6   • warfarin (COUMADIN) 7.5 MG tablet Take one po nightly     • oxyCODONE (ROXICODONE) 5 MG immediate release tablet 1 to 3 tablets po q 3 to 4 hours prn     • predniSONE (DELTASONE) 20 MG tablet Take 1 tablet by mouth Daily. 5 tablet 0   • Iwkskwocz-Hxgwwmyf-AI 30-3-15 MG/5ML syrup Take 10 mL by mouth Every 6 (Six) Hours As Needed (FOR COUGH).     • rivaroxaban (XARELTO) 10 MG tablet Take 10 mg by mouth once Indications: day before surgery x 1 dose       No current facility-administered medications for this visit.      Social History     Tobacco Use   • Smoking status: Former Smoker     Packs/day: 0.50     Last attempt to quit: 2018     Years since quittin.0   • Smokeless tobacco: Never Used   Substance Use Topics  "  • Alcohol use: Yes     Comment: OCC   • Drug use: No     History reviewed. No pertinent family history.    Objective      Vital Signs  Visit Vitals  /82 (BP Location: Left arm, Patient Position: Sitting, Cuff Size: Adult)   Pulse 77   Ht 165.1 cm (65\")   Wt 93.4 kg (205 lb 12.8 oz)   SpO2 97%   BMI 34.25 kg/m²     Physical Exam   Constitutional: She is oriented to person, place, and time. She appears well-developed and well-nourished.   Moderate obesity   HENT:   Head: Normocephalic and atraumatic.   Eyes: Conjunctivae and EOM are normal.   Neck: Normal range of motion. No JVD present. No thyromegaly present.   Cardiovascular: Normal rate, regular rhythm and normal heart sounds.   No murmur heard.  Pulmonary/Chest: Effort normal and breath sounds normal. No respiratory distress. She has no wheezes. She has no rales.   Abdominal: Soft. She exhibits no distension and no mass. There is no tenderness.   Musculoskeletal: Normal range of motion. She exhibits no edema or deformity.   Lymphadenopathy:     She has no cervical adenopathy.   Neurological: She is alert and oriented to person, place, and time. No cranial nerve deficit or sensory deficit.   Skin: Skin is warm and dry.   Psychiatric: She has a normal mood and affect.       Results Review:   Ct Chest With Contrast    Result Date: 4/22/2019  Narrative: History: 63-year-old Lung Nodule.  Reference: PET/CT October 2018. CT chest January 2018. CTA chest October 2017.  Technique: Contrast-enhanced CT chest performed.  For this CT exam, one or more of the following dose reduction techniques was employed: -automated exposure control -mA and/or kVp adjustment for patient size -iterative reconstruction   mGy-cm  Findings: Subsolid nodule posterior right apex measures about 15 mm which is unchanged from October 2017 using same measurement techniques. There was no hypermetabolic abnormality in this area October 2018. This nodule is new from October 2010. " Minimal linear scarring/atelectasis right middle lobe and lingula. Small subpleural nodule left lower lobe base has been present from 2010 and is benign. No new nodule is seen. There is mild emphysema. No endobronchial mass.  Heart size upper normal. No central pulmonary embolus. Mildly atherosclerotic thoracic aorta without aneurysm or dissection. No lymphadenopathy. Small hiatal hernia. Right hepatic lobe hemangioma. Fatty liver. Prior cholecystectomy.  No acute osseous findings       Impression: Subsolid nodule in the right apex is unchanged from October 2017 and showed no significant metabolic activity on October 2018 PET. The nodule has developed from 2010. Focal benign scarring is most likely. May continue annual surveillance to ensure stability. This report was finalized on 04/22/2019 14:58 by Dr Abelino Younger, .     I reviewed the patient's new clinical results.  Discussed with patient and       Assessment/Plan       Melly was seen today for lung nodule.    Diagnoses and all orders for this visit:    Lung nodule      I discussed the patients findings and my recommendations with patient and .      63-year-old female with stable lung nodule in the right upper lobe.  Lung nodule has no change in size over the last 2 years and is PET scan negative.  Patient to continue follow-up to monitor the right upper lobe nodule in 1 year    Patient's Body mass index is 34.25 kg/m². BMI is above normal parameters. Recommendations include: referral to primary care     Establishing durable lifestyle changes to accomplish a weight acceptable for age and height    I advised Melly of the risks of continuing to use tobacco, and I provided her with tobacco cessation educational materials in the After Visit Summary.     During this visit, I spent 5 minutes counseling the patient regarding tobacco cessation.        Aaron Alex MD   4/23/2019  6:15 PM

## 2019-04-29 ENCOUNTER — APPOINTMENT (OUTPATIENT)
Dept: GENERAL RADIOLOGY | Facility: HOSPITAL | Age: 64
End: 2019-04-29

## 2019-04-29 ENCOUNTER — HOSPITAL ENCOUNTER (EMERGENCY)
Facility: HOSPITAL | Age: 64
Discharge: HOME | End: 2019-04-29
Admitting: EMERGENCY MEDICINE

## 2019-04-29 ENCOUNTER — APPOINTMENT (OUTPATIENT)
Dept: CT IMAGING | Facility: HOSPITAL | Age: 64
End: 2019-04-29

## 2019-04-29 VITALS
BODY MASS INDEX: 34.51 KG/M2 | TEMPERATURE: 97.6 F | DIASTOLIC BLOOD PRESSURE: 80 MMHG | WEIGHT: 207.1 LBS | HEIGHT: 65 IN | SYSTOLIC BLOOD PRESSURE: 138 MMHG | RESPIRATION RATE: 19 BRPM | HEART RATE: 88 BPM | OXYGEN SATURATION: 95 %

## 2019-04-29 DIAGNOSIS — R07.9 CHEST PAIN, UNSPECIFIED TYPE: Primary | ICD-10-CM

## 2019-04-29 LAB
ALBUMIN SERPL-MCNC: 3.8 G/DL (ref 3.5–5)
ALBUMIN/GLOB SERPL: 1.2 G/DL (ref 1.1–2.5)
ALP SERPL-CCNC: 103 U/L (ref 24–120)
ALT SERPL W P-5'-P-CCNC: 29 U/L (ref 0–54)
ANION GAP SERPL CALCULATED.3IONS-SCNC: 5 MMOL/L (ref 4–13)
AST SERPL-CCNC: 45 U/L (ref 7–45)
BASOPHILS # BLD AUTO: 0.03 10*3/MM3 (ref 0–0.2)
BASOPHILS # BLD AUTO: 0.03 10*3/MM3 (ref 0–0.2)
BASOPHILS NFR BLD AUTO: 0.6 % (ref 0–2)
BASOPHILS NFR BLD AUTO: 0.6 % (ref 0–2)
BILIRUB SERPL-MCNC: 0.2 MG/DL (ref 0.1–1)
BUN BLD-MCNC: 12 MG/DL (ref 5–21)
BUN/CREAT SERPL: 17.9 (ref 7–25)
CALCIUM SPEC-SCNC: 8.6 MG/DL (ref 8.4–10.4)
CHLORIDE SERPL-SCNC: 105 MMOL/L (ref 98–110)
CO2 SERPL-SCNC: 28 MMOL/L (ref 24–31)
CREAT BLD-MCNC: 0.67 MG/DL (ref 0.5–1.4)
D DIMER PPP FEU-MCNC: 3.22 MG/L (FEU) (ref 0–0.5)
DEPRECATED RDW RBC AUTO: 47.1 FL (ref 40–54)
DEPRECATED RDW RBC AUTO: 48.5 FL (ref 40–54)
EOSINOPHIL # BLD AUTO: 0.24 10*3/MM3 (ref 0–0.7)
EOSINOPHIL # BLD AUTO: 0.25 10*3/MM3 (ref 0–0.7)
EOSINOPHIL NFR BLD AUTO: 4.9 % (ref 0–4)
EOSINOPHIL NFR BLD AUTO: 5 % (ref 0–4)
ERYTHROCYTE [DISTWIDTH] IN BLOOD BY AUTOMATED COUNT: 17.7 % (ref 12–15)
ERYTHROCYTE [DISTWIDTH] IN BLOOD BY AUTOMATED COUNT: 17.8 % (ref 12–15)
GFR SERPL CREATININE-BSD FRML MDRD: 89 ML/MIN/1.73
GLOBULIN UR ELPH-MCNC: 3.3 GM/DL
GLUCOSE BLD-MCNC: 102 MG/DL (ref 70–100)
HCT VFR BLD AUTO: 30.2 % (ref 37–47)
HCT VFR BLD AUTO: 31.8 % (ref 37–47)
HGB BLD-MCNC: 9.2 G/DL (ref 12–16)
HGB BLD-MCNC: 9.5 G/DL (ref 12–16)
HOLD SPECIMEN: NORMAL
HOLD SPECIMEN: NORMAL
IMM GRANULOCYTES # BLD AUTO: 0.01 10*3/MM3 (ref 0–0.05)
IMM GRANULOCYTES NFR BLD AUTO: 0.2 % (ref 0–5)
INR PPP: 2.46 (ref 0.91–1.09)
LYMPHOCYTES # BLD AUTO: 1.61 10*3/MM3 (ref 0.72–4.86)
LYMPHOCYTES # BLD AUTO: 1.63 10*3/MM3 (ref 0.72–4.86)
LYMPHOCYTES NFR BLD AUTO: 31.4 % (ref 15–45)
LYMPHOCYTES NFR BLD AUTO: 34 % (ref 15–45)
MCH RBC QN AUTO: 22.7 PG (ref 28–32)
MCH RBC QN AUTO: 22.8 PG (ref 28–32)
MCHC RBC AUTO-ENTMCNC: 29.9 G/DL (ref 33–36)
MCHC RBC AUTO-ENTMCNC: 30.5 G/DL (ref 33–36)
MCV RBC AUTO: 74.9 FL (ref 82–98)
MCV RBC AUTO: 75.9 FL (ref 82–98)
MONOCYTES # BLD AUTO: 0.4 10*3/MM3 (ref 0.19–1.3)
MONOCYTES # BLD AUTO: 0.41 10*3/MM3 (ref 0.19–1.3)
MONOCYTES NFR BLD AUTO: 7.8 % (ref 4–12)
MONOCYTES NFR BLD AUTO: 8.6 % (ref 4–12)
NEUTROPHILS # BLD AUTO: 2.47 10*3/MM3 (ref 1.87–8.4)
NEUTROPHILS # BLD AUTO: 2.83 10*3/MM3 (ref 1.87–8.4)
NEUTROPHILS NFR BLD AUTO: 51.6 % (ref 39–78)
NEUTROPHILS NFR BLD AUTO: 55.1 % (ref 39–78)
NRBC BLD AUTO-RTO: 0 /100 WBC (ref 0–0.2)
PLATELET # BLD AUTO: 279 10*3/MM3 (ref 130–400)
PLATELET # BLD AUTO: 282 10*3/MM3 (ref 130–400)
PMV BLD AUTO: 9 FL (ref 6–12)
PMV BLD AUTO: 9.5 FL (ref 6–12)
POTASSIUM BLD-SCNC: 3.9 MMOL/L (ref 3.5–5.3)
PROT SERPL-MCNC: 7.1 G/DL (ref 6.3–8.7)
PROTHROMBIN TIME: 27.6 SECONDS (ref 11.9–14.6)
RBC # BLD AUTO: 4.03 10*6/MM3 (ref 4.2–5.4)
RBC # BLD AUTO: 4.19 10*6/MM3 (ref 4.2–5.4)
SODIUM BLD-SCNC: 138 MMOL/L (ref 135–145)
TROPONIN I SERPL-MCNC: <0.012 NG/ML (ref 0–0.03)
TROPONIN I SERPL-MCNC: <0.012 NG/ML (ref 0–0.03)
WBC NRBC COR # BLD: 4.79 10*3/MM3 (ref 4.8–10.8)
WBC NRBC COR # BLD: 5.13 10*3/MM3 (ref 4.8–10.8)
WHOLE BLOOD HOLD SPECIMEN: NORMAL
WHOLE BLOOD HOLD SPECIMEN: NORMAL

## 2019-04-29 PROCEDURE — 85025 COMPLETE CBC W/AUTO DIFF WBC: CPT | Performed by: NURSE PRACTITIONER

## 2019-04-29 PROCEDURE — 85379 FIBRIN DEGRADATION QUANT: CPT | Performed by: NURSE PRACTITIONER

## 2019-04-29 PROCEDURE — 99284 EMERGENCY DEPT VISIT MOD MDM: CPT

## 2019-04-29 PROCEDURE — 93010 ELECTROCARDIOGRAM REPORT: CPT | Performed by: INTERNAL MEDICINE

## 2019-04-29 PROCEDURE — 93005 ELECTROCARDIOGRAM TRACING: CPT | Performed by: NURSE PRACTITIONER

## 2019-04-29 PROCEDURE — 93005 ELECTROCARDIOGRAM TRACING: CPT

## 2019-04-29 PROCEDURE — 71275 CT ANGIOGRAPHY CHEST: CPT

## 2019-04-29 PROCEDURE — 71045 X-RAY EXAM CHEST 1 VIEW: CPT

## 2019-04-29 PROCEDURE — 85610 PROTHROMBIN TIME: CPT | Performed by: NURSE PRACTITIONER

## 2019-04-29 PROCEDURE — 0 IOPAMIDOL PER 1 ML: Performed by: NURSE PRACTITIONER

## 2019-04-29 PROCEDURE — 84484 ASSAY OF TROPONIN QUANT: CPT

## 2019-04-29 PROCEDURE — 80053 COMPREHEN METABOLIC PANEL: CPT | Performed by: NURSE PRACTITIONER

## 2019-04-29 PROCEDURE — 85025 COMPLETE CBC W/AUTO DIFF WBC: CPT

## 2019-04-29 RX ORDER — SODIUM CHLORIDE 0.9 % (FLUSH) 0.9 %
10 SYRINGE (ML) INJECTION AS NEEDED
Status: DISCONTINUED | OUTPATIENT
Start: 2019-04-29 | End: 2019-04-29 | Stop reason: HOSPADM

## 2019-04-29 RX ORDER — ASPIRIN 81 MG/1
324 TABLET, CHEWABLE ORAL ONCE
Status: COMPLETED | OUTPATIENT
Start: 2019-04-29 | End: 2019-04-29

## 2019-04-29 RX ADMIN — IOPAMIDOL 150 ML: 755 INJECTION, SOLUTION INTRAVENOUS at 13:17

## 2019-04-29 RX ADMIN — ASPIRIN 81 MG 324 MG: 81 TABLET ORAL at 11:02

## 2019-06-03 ENCOUNTER — OUTSIDE FACILITY SERVICE (OUTPATIENT)
Dept: PULMONOLOGY | Facility: CLINIC | Age: 64
End: 2019-06-03

## 2019-06-03 PROCEDURE — 94060 EVALUATION OF WHEEZING: CPT | Performed by: INTERNAL MEDICINE

## 2019-06-03 PROCEDURE — 94729 DIFFUSING CAPACITY: CPT | Performed by: INTERNAL MEDICINE

## 2019-06-03 PROCEDURE — 94727 GAS DIL/WSHOT DETER LNG VOL: CPT | Performed by: INTERNAL MEDICINE

## 2019-06-13 ENCOUNTER — OFFICE VISIT (OUTPATIENT)
Dept: CARDIOLOGY | Facility: CLINIC | Age: 64
End: 2019-06-13

## 2019-06-13 VITALS
WEIGHT: 214 LBS | DIASTOLIC BLOOD PRESSURE: 82 MMHG | HEART RATE: 83 BPM | SYSTOLIC BLOOD PRESSURE: 138 MMHG | BODY MASS INDEX: 35.65 KG/M2 | HEIGHT: 65 IN | OXYGEN SATURATION: 96 %

## 2019-06-13 DIAGNOSIS — R07.89 CHEST HEAVINESS: ICD-10-CM

## 2019-06-13 DIAGNOSIS — G89.29 CHRONIC MIDLINE THORACIC BACK PAIN: ICD-10-CM

## 2019-06-13 DIAGNOSIS — Z86.718 HISTORY OF DVT OF LOWER EXTREMITY: ICD-10-CM

## 2019-06-13 DIAGNOSIS — E03.9 HYPOTHYROIDISM (ACQUIRED): ICD-10-CM

## 2019-06-13 DIAGNOSIS — R06.09 DOE (DYSPNEA ON EXERTION): ICD-10-CM

## 2019-06-13 DIAGNOSIS — R91.1 LUNG NODULE: Primary | ICD-10-CM

## 2019-06-13 DIAGNOSIS — M54.6 CHRONIC MIDLINE THORACIC BACK PAIN: ICD-10-CM

## 2019-06-13 DIAGNOSIS — Z87.891 EX-SMOKER: ICD-10-CM

## 2019-06-13 PROCEDURE — 99204 OFFICE O/P NEW MOD 45 MIN: CPT | Performed by: INTERNAL MEDICINE

## 2019-06-13 PROCEDURE — 93000 ELECTROCARDIOGRAM COMPLETE: CPT | Performed by: INTERNAL MEDICINE

## 2019-06-13 NOTE — PROGRESS NOTES
Melly Cruz  [id number]  1955  63 y.o.  female    Referring Provider: Reynaldo Jeter DO    Reason for  Visit:  Initial visit for   shortness of breath and chest pain   In past told to have a heart murmur     Subjective     Chest pain and heaviness  with exertion and also at rest, moderate substernal, pressure like. Lasts 30 minutes to 4 hours  Has been to ER for chest pain   Started 6 months ago  Occurs once or twice a week  No associated diaphoresis    No associated nausea  No radiation  Precipitated with exertion    Relieved with rest and sometimes spontaneously  Not positional    No change with intake of food or antacids  No change with breathing  Moderate associated dyspnea      Chronic back pain    Joint pain in small, medium and large joints     Ex smoker 1 ppd x 30 years quit 15 months ago    Sees CTS for pulmonary embolism    History of present illness:  Melly Cruz is a 63 y.o. yo female with history of pulmonary embolism who presents today for   Chief Complaint   Patient presents with   • Chest Pain     NEW PT    • Shortness of Breath   • Dizziness   .    History  Past Medical History:   Diagnosis Date   • Acid reflux    • Anxiety    • Arthritis    • Chest pain    • Chronic back pain    • Chronic neck pain    • DVT (deep venous thrombosis) (CMS/HCC)    • Hypothyroid    • Hypothyroidism    • Migraine    • PE (pulmonary thromboembolism) (CMS/HCC)    ,   Past Surgical History:   Procedure Laterality Date   • CHOLECYSTECTOMY     • COLONOSCOPY     • DILATATION AND CURETTAGE     • HERNIA REPAIR     • HYSTERECTOMY     • REPLACEMENT TOTAL KNEE      Right knee    ,   History reviewed. No pertinent family history.,   Social History     Tobacco Use   • Smoking status: Former Smoker     Packs/day: 0.50     Years: 30.00     Pack years: 15.00     Last attempt to quit: 2018     Years since quittin.2   • Smokeless tobacco: Never Used   Substance Use Topics   • Alcohol use: Yes     Comment:  OCC   • Drug use: No   ,     Medications  Current Outpatient Medications   Medication Sig Dispense Refill   • albuterol (PROVENTIL HFA;VENTOLIN HFA) 108 (90 Base) MCG/ACT inhaler Inhale 2 puffs 4 (Four) Times a Day. 6.7 g 0   • buPROPion SR (WELLBUTRIN SR) 100 MG 12 hr tablet Take 100 mg by mouth.     • busPIRone (BUSPAR) 5 MG tablet Take 5 mg by mouth 3 (Three) Times a Day.     • citalopram (CeleXA) 40 MG tablet Take 40 mg by mouth daily.     • cyclobenzaprine (FLEXERIL) 10 MG tablet 3 (Three) Times a Day As Needed.     • esomeprazole (NexIUM) 20 MG packet Take 20 mg by mouth daily.     • HYDROcodone-acetaminophen (NORCO) 7.5-325 MG per tablet Take 1 tablet by mouth Every 6 (Six) Hours As Needed for Moderate Pain .     • levothyroxine (SYNTHROID, LEVOTHROID) 125 MCG tablet Take 125 mcg by mouth Daily     • oxyCODONE (ROXICODONE) 5 MG immediate release tablet 1 to 3 tablets po q 3 to 4 hours prn     • pramipexole (MIRAPEX) 0.125 MG tablet Take 0.125 mg by mouth 2 times daily.     • predniSONE (DELTASONE) 20 MG tablet Take 1 tablet by mouth Daily. 5 tablet 0   • Alorupnhp-Tmpbtxma-QY 30-3-15 MG/5ML syrup Take 10 mL by mouth Every 6 (Six) Hours As Needed (FOR COUGH).     • rivaroxaban (XARELTO) 10 MG tablet Take 10 mg by mouth once Indications: day before surgery x 1 dose     • sertraline (ZOLOFT) 50 MG tablet   2   • tiZANidine (ZANAFLEX) 4 MG tablet Every 6 (Six) Hours.     • vitamin D (ERGOCALCIFEROL) 03816 units capsule capsule   6   • warfarin (COUMADIN) 7.5 MG tablet Take one po nightly     • MethylPREDNISolone (MEDROL, LANCE,) 4 MG tablet Take as directed on package instructions. 1 each 0     No current facility-administered medications for this visit.        Allergies:  Dilaudid [hydromorphone hcl]; Erythromycin; Ondansetron hcl; Orphenadrine citrate; Guaifenesin & derivatives; Meperidine; and Codeine    Review of Systems  Review of Systems   Constitution: Positive for weakness and malaise/fatigue.   HENT:  "Negative.    Eyes: Negative.    Cardiovascular: Positive for chest pain and dyspnea on exertion. Negative for claudication, cyanosis, irregular heartbeat, leg swelling, near-syncope, orthopnea, palpitations, paroxysmal nocturnal dyspnea and syncope.   Respiratory: Negative.    Endocrine: Negative.    Hematologic/Lymphatic: Negative.    Skin: Negative.    Musculoskeletal: Positive for arthritis and back pain.   Gastrointestinal: Negative for anorexia.   Genitourinary: Negative.    Psychiatric/Behavioral: Negative.        Objective     Physical Exam:  /82   Pulse 83   Ht 165.1 cm (65\")   Wt 97.1 kg (214 lb)   SpO2 96%   BMI 35.61 kg/m²     Physical Exam   Constitutional: She appears well-developed.   HENT:   Head: Normocephalic.   Neck: Normal carotid pulses and no JVD present. No tracheal tenderness present. Carotid bruit is not present. No tracheal deviation and no edema present.   Cardiovascular: Regular rhythm and normal pulses.   Murmur heard.   Systolic murmur is present with a grade of 1/6.  Pulmonary/Chest: Effort normal. No stridor.   Abdominal: Soft.   Neurological: She is alert. She has normal strength. No cranial nerve deficit or sensory deficit.   Skin: Skin is warm.   Psychiatric: She has a normal mood and affect. Her speech is normal and behavior is normal.       Results Review:       ECG 12 Lead  Date/Time: 6/13/2019 12:21 PM  Performed by: Michael Wei MD  Authorized by: Michael Wei MD   Comparison: compared with previous ECG from 4/29/2019  Similar to previous ECG  Rhythm: sinus rhythm  Rate: normal  ST Segments: ST segments normal  T Waves: T waves normal  QRS axis: normal  Other findings: left ventricular hypertrophy    Clinical impression: abnormal EKG            Assessment/Plan   Melly was seen today for chest pain, shortness of breath and dizziness.    Diagnoses and all orders for this visit:    Lung nodule    Chest heaviness  -     ECG 12 Lead  -     Adult Transthoracic Echo " Complete W/ Cont if Necessary Per Protocol; Future    Hypothyroidism (acquired)    Chronic midline thoracic back pain    History of DVT of lower extremity after kneee surgery 9/18 off Coumadin now    ALLISON (dyspnea on exertion)    Ex-smoker           Plan    Conservative medical therapy per patient. Risks, benefits and alternatives explained. The patient understands and wishes to proceed with only conservative therapy.  The patient expressively declined any invasive testing or treatment       Keep f/u Dr Lamar for pulmonary nodule    If chest pain recurs may consider right radial cardiac cath in future    ____________________________________________________________________________________________________________________________________________  Health maintenance and recommendations      Low salt/ HTN/ Heart healthy carbohydrate restricted cardiac diet   The patient is advised to reduce or avoid caffeine or other cardiac stimulants.     Minimize or avoid  NSAID-type medications        Monitor for any signs of bleeding including red or dark stools. Fall precautions.        Advised staying uptodate with immunizations per established standard guidelines.      Offered to give patient  a copy of my notes       Questions were encouraged, asked and answered to the patient's  understanding and satisfaction. Questions if any regarding current medications and side effects, need for refills and importance of compliance to medications stressed.    Reviewed available prior notes, consults, prior visits, laboratory findings, radiology and cardiology relevant reports. Updated chart as applicable. I have reviewed the patient's medical history in detail and updated the computerized patient record as relevant.      Updated patient regarding any new or relevant abnormalities on review of records or any new findings on physical exam. Mentioned to patient about purpose of visit and desirable health short and long term goals and  objectives.    Primary to monitor CBC CMP Lipid panel and TSH as applicable    ___________________________________________________________________________________________________________________________________________          Return in about 3 months (around 9/13/2019).

## 2019-07-09 ENCOUNTER — HOSPITAL ENCOUNTER (OUTPATIENT)
Dept: CARDIOLOGY | Facility: HOSPITAL | Age: 64
Discharge: HOME | End: 2019-07-09
Admitting: INTERNAL MEDICINE

## 2019-07-09 VITALS
DIASTOLIC BLOOD PRESSURE: 95 MMHG | HEIGHT: 65 IN | BODY MASS INDEX: 35.65 KG/M2 | SYSTOLIC BLOOD PRESSURE: 154 MMHG | WEIGHT: 214 LBS

## 2019-07-09 DIAGNOSIS — R07.89 CHEST HEAVINESS: ICD-10-CM

## 2019-07-09 PROCEDURE — 93306 TTE W/DOPPLER COMPLETE: CPT | Performed by: INTERNAL MEDICINE

## 2019-07-09 PROCEDURE — 93306 TTE W/DOPPLER COMPLETE: CPT

## 2019-07-11 LAB
BH CV ECHO MEAS - AO MAX PG (FULL): 1.2 MMHG
BH CV ECHO MEAS - AO MAX PG: 7.3 MMHG
BH CV ECHO MEAS - AO MEAN PG (FULL): 1 MMHG
BH CV ECHO MEAS - AO MEAN PG: 4 MMHG
BH CV ECHO MEAS - AO ROOT AREA (BSA CORRECTED): 1.5
BH CV ECHO MEAS - AO ROOT AREA: 7.5 CM^2
BH CV ECHO MEAS - AO ROOT DIAM: 3.1 CM
BH CV ECHO MEAS - AO V2 MAX: 135 CM/SEC
BH CV ECHO MEAS - AO V2 MEAN: 92.6 CM/SEC
BH CV ECHO MEAS - AO V2 VTI: 27 CM
BH CV ECHO MEAS - AVA(I,A): 3.5 CM^2
BH CV ECHO MEAS - AVA(I,D): 3.5 CM^2
BH CV ECHO MEAS - AVA(V,A): 3.5 CM^2
BH CV ECHO MEAS - AVA(V,D): 3.5 CM^2
BH CV ECHO MEAS - BSA(HAYCOCK): 2.2 M^2
BH CV ECHO MEAS - BSA: 2 M^2
BH CV ECHO MEAS - BZI_BMI: 35.6 KILOGRAMS/M^2
BH CV ECHO MEAS - BZI_METRIC_HEIGHT: 165.1 CM
BH CV ECHO MEAS - BZI_METRIC_WEIGHT: 97.1 KG
BH CV ECHO MEAS - EDV(CUBED): 130.3 ML
BH CV ECHO MEAS - EDV(MOD-SP4): 42.3 ML
BH CV ECHO MEAS - EDV(TEICH): 122.1 ML
BH CV ECHO MEAS - EF(CUBED): 78.4 %
BH CV ECHO MEAS - EF(MOD-SP4): 66 %
BH CV ECHO MEAS - EF(TEICH): 70.4 %
BH CV ECHO MEAS - ESV(CUBED): 28.1 ML
BH CV ECHO MEAS - ESV(MOD-SP4): 14.4 ML
BH CV ECHO MEAS - ESV(TEICH): 36.2 ML
BH CV ECHO MEAS - FS: 40 %
BH CV ECHO MEAS - IVS/LVPW: 1.1
BH CV ECHO MEAS - IVSD: 1.1 CM
BH CV ECHO MEAS - LA DIMENSION: 4.1 CM
BH CV ECHO MEAS - LA/AO: 1.3
BH CV ECHO MEAS - LAT PEAK E' VEL: 8.1 CM/SEC
BH CV ECHO MEAS - LV DIASTOLIC VOL/BSA (35-75): 20.8 ML/M^2
BH CV ECHO MEAS - LV MASS(C)D: 193 GRAMS
BH CV ECHO MEAS - LV MASS(C)DI: 94.8 GRAMS/M^2
BH CV ECHO MEAS - LV MAX PG: 6.1 MMHG
BH CV ECHO MEAS - LV MEAN PG: 3 MMHG
BH CV ECHO MEAS - LV SYSTOLIC VOL/BSA (12-30): 7.1 ML/M^2
BH CV ECHO MEAS - LV V1 MAX: 123 CM/SEC
BH CV ECHO MEAS - LV V1 MEAN: 87.3 CM/SEC
BH CV ECHO MEAS - LV V1 VTI: 25.1 CM
BH CV ECHO MEAS - LVIDD: 5.1 CM
BH CV ECHO MEAS - LVIDS: 3 CM
BH CV ECHO MEAS - LVLD AP4: 7 CM
BH CV ECHO MEAS - LVLS AP4: 5.1 CM
BH CV ECHO MEAS - LVOT AREA (M): 3.8 CM^2
BH CV ECHO MEAS - LVOT AREA: 3.8 CM^2
BH CV ECHO MEAS - LVOT DIAM: 2.2 CM
BH CV ECHO MEAS - LVPWD: 0.99 CM
BH CV ECHO MEAS - MED PEAK E' VEL: 5.98 CM/SEC
BH CV ECHO MEAS - MV A MAX VEL: 92.6 CM/SEC
BH CV ECHO MEAS - MV DEC TIME: 0.23 SEC
BH CV ECHO MEAS - MV E MAX VEL: 59.2 CM/SEC
BH CV ECHO MEAS - MV E/A: 0.64
BH CV ECHO MEAS - PA MAX PG: 2 MMHG
BH CV ECHO MEAS - PA V2 MAX: 70.3 CM/SEC
BH CV ECHO MEAS - RAP SYSTOLE: 5 MMHG
BH CV ECHO MEAS - RVSP: 19.7 MMHG
BH CV ECHO MEAS - SI(AO): 100.1 ML/M^2
BH CV ECHO MEAS - SI(CUBED): 50.2 ML/M^2
BH CV ECHO MEAS - SI(LVOT): 46.9 ML/M^2
BH CV ECHO MEAS - SI(MOD-SP4): 13.7 ML/M^2
BH CV ECHO MEAS - SI(TEICH): 42.2 ML/M^2
BH CV ECHO MEAS - SV(AO): 203.8 ML
BH CV ECHO MEAS - SV(CUBED): 102.2 ML
BH CV ECHO MEAS - SV(LVOT): 95.4 ML
BH CV ECHO MEAS - SV(MOD-SP4): 27.9 ML
BH CV ECHO MEAS - SV(TEICH): 86 ML
BH CV ECHO MEAS - TR MAX VEL: 192 CM/SEC
BH CV ECHO MEASUREMENTS AVERAGE E/E' RATIO: 8.41
LEFT ATRIUM VOLUME INDEX: 24.5 ML/M2
LEFT ATRIUM VOLUME: 49.9 CM3
LV EF 2D ECHO EST: 55 %
MAXIMAL PREDICTED HEART RATE: 159 BPM
STRESS TARGET HR: 135 BPM

## 2019-09-13 ENCOUNTER — OFFICE VISIT (OUTPATIENT)
Dept: CARDIOLOGY | Facility: CLINIC | Age: 64
End: 2019-09-13

## 2019-09-13 VITALS
OXYGEN SATURATION: 95 % | HEIGHT: 65 IN | HEART RATE: 71 BPM | BODY MASS INDEX: 37.49 KG/M2 | DIASTOLIC BLOOD PRESSURE: 80 MMHG | SYSTOLIC BLOOD PRESSURE: 112 MMHG | WEIGHT: 225 LBS

## 2019-09-13 DIAGNOSIS — Z87.891 EX-SMOKER: ICD-10-CM

## 2019-09-13 DIAGNOSIS — G89.29 CHRONIC MIDLINE THORACIC BACK PAIN: ICD-10-CM

## 2019-09-13 DIAGNOSIS — M54.6 CHRONIC MIDLINE THORACIC BACK PAIN: ICD-10-CM

## 2019-09-13 DIAGNOSIS — R91.1 LUNG NODULE: ICD-10-CM

## 2019-09-13 DIAGNOSIS — Z86.718 HISTORY OF DVT OF LOWER EXTREMITY: Primary | ICD-10-CM

## 2019-09-13 DIAGNOSIS — R06.09 DOE (DYSPNEA ON EXERTION): ICD-10-CM

## 2019-09-13 DIAGNOSIS — R07.2 PRECORDIAL CHEST PAIN: ICD-10-CM

## 2019-09-13 DIAGNOSIS — E03.9 HYPOTHYROIDISM (ACQUIRED): ICD-10-CM

## 2019-09-13 PROCEDURE — 93000 ELECTROCARDIOGRAM COMPLETE: CPT | Performed by: INTERNAL MEDICINE

## 2019-09-13 PROCEDURE — 99214 OFFICE O/P EST MOD 30 MIN: CPT | Performed by: INTERNAL MEDICINE

## 2019-09-13 RX ORDER — CARBAMAZEPINE 200 MG/1
200 TABLET ORAL 3 TIMES DAILY
COMMUNITY
End: 2019-09-30

## 2019-09-13 RX ORDER — SODIUM CHLORIDE 9 MG/ML
75 INJECTION, SOLUTION INTRAVENOUS CONTINUOUS
Status: CANCELLED | OUTPATIENT
Start: 2019-09-13

## 2019-09-13 NOTE — PROGRESS NOTES
Melly Cruz  [id number]  1955  64 y.o.  female    Referring Provider: Reynaldo Jeter DO    Reason for  Visit:  Here for follow up after cardiac testing after initial   visit for shortness of breath and chest pain   In past told to have a heart murmur   Cardiac workup test results as below     Subjective         Similar symptoms as during last visit   Overall the patient feels no major change from baseline symptoms     Chest pain and heaviness  with exertion and also at rest, moderate substernal, pressure like. Lasts 30 minutes to 4 hours  Has been to ER for chest pain   Started 6 months ago  Occurs once or twice a week  Associated diaphoresis    Associated nausea  No radiation  Precipitated with exertion    Relieved with rest and sometimes spontaneously  Not positional    No change with intake of food or antacids  No change with breathing  Moderate associated dyspnea      Chronic back pain    Joint pain in small, medium and large joints     Ex smoker 1 ppd x 30 years quit 15 months ago    Sees CTS for pulmonary embolism    History of present illness:  Melly Cruz is a 64 y.o. yo female with history of pulmonary embolism who presents today for   Chief Complaint   Patient presents with   • Chest Pain     3 month follow up - recent ECHO    .    History  Past Medical History:   Diagnosis Date   • Acid reflux    • Anxiety    • Arthritis    • Chest pain    • Chronic back pain    • Chronic neck pain    • DVT (deep venous thrombosis) (CMS/HCC)    • Hypothyroid    • Hypothyroidism    • Migraine    • PE (pulmonary thromboembolism) (CMS/HCC)    ,   Past Surgical History:   Procedure Laterality Date   • CHOLECYSTECTOMY     • COLONOSCOPY     • DILATATION AND CURETTAGE     • HERNIA REPAIR     • HYSTERECTOMY     • REPLACEMENT TOTAL KNEE      Right knee    ,   History reviewed. No pertinent family history.,   Social History     Tobacco Use   • Smoking status: Former Smoker     Packs/day: 0.50     Years:  30.00     Pack years: 15.00     Last attempt to quit: 2018     Years since quittin.4   • Smokeless tobacco: Never Used   Substance Use Topics   • Alcohol use: Yes     Comment: OCC   • Drug use: No   ,     Medications  Current Outpatient Medications   Medication Sig Dispense Refill   • albuterol (PROVENTIL HFA;VENTOLIN HFA) 108 (90 Base) MCG/ACT inhaler Inhale 2 puffs 4 (Four) Times a Day. 6.7 g 0   • buPROPion SR (WELLBUTRIN SR) 100 MG 12 hr tablet Take 100 mg by mouth.     • carBAMazepine (TEGretol) 200 MG tablet Take 200 mg by mouth 3 (Three) Times a Day.     • citalopram (CeleXA) 40 MG tablet Take 40 mg by mouth daily.     • cyclobenzaprine (FLEXERIL) 10 MG tablet 3 (Three) Times a Day As Needed.     • esomeprazole (NexIUM) 20 MG packet Take 20 mg by mouth daily.     • HYDROcodone-acetaminophen (NORCO) 7.5-325 MG per tablet Take 1 tablet by mouth Every 6 (Six) Hours As Needed for Moderate Pain .     • levothyroxine (SYNTHROID, LEVOTHROID) 125 MCG tablet Take 125 mcg by mouth Daily     • pramipexole (MIRAPEX) 0.125 MG tablet Take 0.125 mg by mouth 2 times daily.     • sertraline (ZOLOFT) 50 MG tablet   2   • vitamin D (ERGOCALCIFEROL) 20715 units capsule capsule   6   • Pdzkfgtca-Kcryeonf-FW 30-3-15 MG/5ML syrup Take 10 mL by mouth Every 6 (Six) Hours As Needed (FOR COUGH).       No current facility-administered medications for this visit.        Allergies:  Dilaudid [hydromorphone hcl]; Erythromycin; Ondansetron hcl; Orphenadrine citrate; Guaifenesin & derivatives; Meperidine; and Codeine    Review of Systems  Review of Systems   Constitution: Positive for weakness and malaise/fatigue.   HENT: Negative.    Eyes: Negative.    Cardiovascular: Positive for chest pain and dyspnea on exertion. Negative for claudication, cyanosis, irregular heartbeat, leg swelling, near-syncope, orthopnea, palpitations, paroxysmal nocturnal dyspnea and syncope.   Respiratory: Positive for shortness of breath and snoring.   "  Endocrine: Negative.    Hematologic/Lymphatic: Negative.    Skin: Negative.    Musculoskeletal: Positive for arthritis and back pain.   Gastrointestinal: Negative for anorexia.   Genitourinary: Negative.    Psychiatric/Behavioral: Negative.        Objective     Physical Exam:  /80   Pulse 71   Ht 165.1 cm (65\")   Wt 102 kg (225 lb)   SpO2 95%   BMI 37.44 kg/m²     Physical Exam   Constitutional: She appears well-developed.   HENT:   Head: Normocephalic.   Neck: Normal carotid pulses and no JVD present. No tracheal tenderness present. Carotid bruit is not present. No tracheal deviation and no edema present.   Cardiovascular: Regular rhythm and normal pulses.   Murmur heard.   Systolic murmur is present with a grade of 1/6.  Pulmonary/Chest: Effort normal. No stridor.   Abdominal: Soft. She exhibits no distension. There is no hepatosplenomegaly. There is no tenderness.   Neurological: She is alert. She has normal strength. No cranial nerve deficit or sensory deficit.   Skin: Skin is warm.   Psychiatric: She has a normal mood and affect. Her speech is normal and behavior is normal.       Results Review:      Results for orders placed during the hospital encounter of 07/09/19   Adult Transthoracic Echo Complete W/ Cont if Necessary Per Protocol    Narrative · Estimated EF = 55%.  · Left ventricular systolic function is normal.  · Left ventricular diastolic dysfunction.  · No evidence of pulmonary hypertension is present.              ECG 12 Lead  Date/Time: 9/13/2019 11:33 AM  Performed by: Michael Wei MD  Authorized by: Michael Wei MD   Comparison: compared with previous ECG from 6/13/2019  Rhythm: sinus rhythm  Rate: normal  Conduction: conduction normal  ST Segments: ST segments normal  QRS axis: normal  Other findings: left ventricular hypertrophy    Clinical impression: abnormal EKG            Assessment/Plan   Melly was seen today for chest pain.    Diagnoses and all orders for this " visit:    History of DVT of lower extremity after kneee surgery 9/18 off Coumadin now    Ex-smoker    Chronic midline thoracic back pain    Hypothyroidism (acquired)    ALLISON (dyspnea on exertion)    Lung nodule    Precordial chest pain  -     XR chest pa and lateral; Future  -     Case Request Cath Lab: Cardiac Catheterization/Vascular Study  Radial cath  TIM OK  -     sodium chloride 0.9 % infusion    Other orders  -     ECG 12 Lead  -     Clip bilateral groin.; Standing  -     Notify MD if patient is taking the following medications or has a contrast allergy.; Standing  -     CBC & Differential; Standing  -     Comprehensive Metabolic Panel; Standing  -     Protime-INR; Standing           Plan    Recommend cardiac catheterization, selective coronary angiography, left ventriculography and percutaneous coronary intervention with application of arteriotomy hemostatic closure device.    I discussed cardiac catheterization, the procedure, risks (including bleeding, infection, vascular damage [including minor oozing, bruising, bleeding, and up to and including but not limited to the need for vascular surgery, emergency cardiothoracic surgery, contrast reaction, renal failure, respiratory failure, heart attack, stroke, arrhythmia and even death), benefits, and alternatives and the patient has voiced understanding and is willing to proceed.    Adequate pre-hydration and post cardiac catheterization hydration.  Premedications as required and indicated for cardiac catheterization.    No contraindication to drug eluting stent placement if required  Further recommendations pending results of cardiac catheterization      Right radial arterial approach for cardiac cath.     ____________________________________________________________________________________________________________________________________________  Health maintenance and recommendations      Low salt/ HTN/ Heart healthy carbohydrate restricted cardiac diet   The  patient is advised to reduce or avoid caffeine or other cardiac stimulants.     Minimize or avoid  NSAID-type medications        Monitor for any signs of bleeding including red or dark stools. Fall precautions.        Advised staying uptodate with immunizations per established standard guidelines.      Offered to give patient  a copy of my notes       Questions were encouraged, asked and answered to the patient's  understanding and satisfaction. Questions if any regarding current medications and side effects, need for refills and importance of compliance to medications stressed.    Reviewed available prior notes, consults, prior visits, laboratory findings, radiology and cardiology relevant reports. Updated chart as applicable. I have reviewed the patient's medical history in detail and updated the computerized patient record as relevant.      Updated patient regarding any new or relevant abnormalities on review of records or any new findings on physical exam. Mentioned to patient about purpose of visit and desirable health short and long term goals and objectives.    Primary to monitor CBC CMP Lipid panel and TSH as applicable    ___________________________________________________________________________________________________________________________________________          Return in about 3 months (around 12/13/2019).

## 2019-09-30 ENCOUNTER — HOSPITAL ENCOUNTER (OUTPATIENT)
Facility: HOSPITAL | Age: 64
Setting detail: HOSPITAL OUTPATIENT SURGERY
Discharge: HOME | End: 2019-09-30
Attending: INTERNAL MEDICINE | Admitting: INTERNAL MEDICINE

## 2019-09-30 VITALS
OXYGEN SATURATION: 98 % | RESPIRATION RATE: 20 BRPM | TEMPERATURE: 97.8 F | SYSTOLIC BLOOD PRESSURE: 149 MMHG | DIASTOLIC BLOOD PRESSURE: 93 MMHG | HEART RATE: 88 BPM

## 2019-09-30 DIAGNOSIS — R07.2 PRECORDIAL CHEST PAIN: ICD-10-CM

## 2019-09-30 LAB
ALBUMIN SERPL-MCNC: 4.1 G/DL (ref 3.5–5.2)
ALBUMIN/GLOB SERPL: 1.2 G/DL
ALP SERPL-CCNC: 98 U/L (ref 39–117)
ALT SERPL W P-5'-P-CCNC: 31 U/L (ref 1–33)
ANION GAP SERPL CALCULATED.3IONS-SCNC: 9 MMOL/L (ref 5–15)
AST SERPL-CCNC: 31 U/L (ref 1–32)
BASOPHILS # BLD AUTO: 0.04 10*3/MM3 (ref 0–0.2)
BASOPHILS NFR BLD AUTO: 0.8 % (ref 0–1.5)
BILIRUB SERPL-MCNC: 0.2 MG/DL (ref 0.2–1.2)
BUN BLD-MCNC: 13 MG/DL (ref 8–23)
BUN/CREAT SERPL: 15.5 (ref 7–25)
CALCIUM SPEC-SCNC: 8.9 MG/DL (ref 8.6–10.5)
CHLORIDE SERPL-SCNC: 104 MMOL/L (ref 98–107)
CO2 SERPL-SCNC: 27 MMOL/L (ref 22–29)
CREAT BLD-MCNC: 0.84 MG/DL (ref 0.57–1)
DEPRECATED RDW RBC AUTO: 48.9 FL (ref 37–54)
EOSINOPHIL # BLD AUTO: 0.25 10*3/MM3 (ref 0–0.4)
EOSINOPHIL NFR BLD AUTO: 4.9 % (ref 0.3–6.2)
ERYTHROCYTE [DISTWIDTH] IN BLOOD BY AUTOMATED COUNT: 16.8 % (ref 12.3–15.4)
GFR SERPL CREATININE-BSD FRML MDRD: 69 ML/MIN/1.73
GLOBULIN UR ELPH-MCNC: 3.4 GM/DL
GLUCOSE BLD-MCNC: 115 MG/DL (ref 65–99)
HCT VFR BLD AUTO: 33.3 % (ref 34–46.6)
HGB BLD-MCNC: 10.6 G/DL (ref 12–15.9)
IMM GRANULOCYTES # BLD AUTO: 0.01 10*3/MM3 (ref 0–0.05)
IMM GRANULOCYTES NFR BLD AUTO: 0.2 % (ref 0–0.5)
INR PPP: 0.92 (ref 0.91–1.09)
LYMPHOCYTES # BLD AUTO: 1.6 10*3/MM3 (ref 0.7–3.1)
LYMPHOCYTES NFR BLD AUTO: 31.6 % (ref 19.6–45.3)
MCH RBC QN AUTO: 25.5 PG (ref 26.6–33)
MCHC RBC AUTO-ENTMCNC: 31.8 G/DL (ref 31.5–35.7)
MCV RBC AUTO: 80.2 FL (ref 79–97)
MONOCYTES # BLD AUTO: 0.39 10*3/MM3 (ref 0.1–0.9)
MONOCYTES NFR BLD AUTO: 7.7 % (ref 5–12)
NEUTROPHILS # BLD AUTO: 2.77 10*3/MM3 (ref 1.7–7)
NEUTROPHILS NFR BLD AUTO: 54.8 % (ref 42.7–76)
NRBC BLD AUTO-RTO: 0 /100 WBC (ref 0–0.2)
PLATELET # BLD AUTO: 242 10*3/MM3 (ref 140–450)
PMV BLD AUTO: 9.7 FL (ref 6–12)
POTASSIUM BLD-SCNC: 4.2 MMOL/L (ref 3.5–5.2)
PROT SERPL-MCNC: 7.5 G/DL (ref 6–8.5)
PROTHROMBIN TIME: 12.6 SECONDS (ref 11.9–14.6)
RBC # BLD AUTO: 4.15 10*6/MM3 (ref 3.77–5.28)
SODIUM BLD-SCNC: 140 MMOL/L (ref 136–145)
WBC NRBC COR # BLD: 5.06 10*3/MM3 (ref 3.4–10.8)

## 2019-09-30 PROCEDURE — 80053 COMPREHEN METABOLIC PANEL: CPT | Performed by: INTERNAL MEDICINE

## 2019-09-30 PROCEDURE — 85025 COMPLETE CBC W/AUTO DIFF WBC: CPT | Performed by: INTERNAL MEDICINE

## 2019-09-30 PROCEDURE — 85610 PROTHROMBIN TIME: CPT | Performed by: INTERNAL MEDICINE

## 2019-09-30 RX ORDER — SODIUM CHLORIDE 9 MG/ML
75 INJECTION, SOLUTION INTRAVENOUS CONTINUOUS
Status: DISCONTINUED | OUTPATIENT
Start: 2019-09-30 | End: 2019-10-01 | Stop reason: HOSPADM

## 2019-10-01 NOTE — INTERVAL H&P NOTE
H&P reviewed. The patient was examined and there are no changes to the H&P.     Due to recent GI bleeding as already documented case was canceled.  She will see me in follow-up as outpatient prior to another attempted cardiac catheterization

## 2019-12-13 ENCOUNTER — OFFICE VISIT (OUTPATIENT)
Dept: CARDIOLOGY | Facility: CLINIC | Age: 64
End: 2019-12-13

## 2019-12-13 VITALS
DIASTOLIC BLOOD PRESSURE: 70 MMHG | BODY MASS INDEX: 35.99 KG/M2 | SYSTOLIC BLOOD PRESSURE: 100 MMHG | WEIGHT: 216 LBS | HEART RATE: 84 BPM | HEIGHT: 65 IN | OXYGEN SATURATION: 98 %

## 2019-12-13 DIAGNOSIS — M54.6 CHRONIC MIDLINE THORACIC BACK PAIN: ICD-10-CM

## 2019-12-13 DIAGNOSIS — R91.1 LUNG NODULE: ICD-10-CM

## 2019-12-13 DIAGNOSIS — G89.29 CHRONIC MIDLINE THORACIC BACK PAIN: ICD-10-CM

## 2019-12-13 DIAGNOSIS — Z86.718 HISTORY OF DVT OF LOWER EXTREMITY: Primary | ICD-10-CM

## 2019-12-13 DIAGNOSIS — E03.9 HYPOTHYROIDISM (ACQUIRED): ICD-10-CM

## 2019-12-13 DIAGNOSIS — R06.09 DOE (DYSPNEA ON EXERTION): ICD-10-CM

## 2019-12-13 PROCEDURE — 93000 ELECTROCARDIOGRAM COMPLETE: CPT | Performed by: INTERNAL MEDICINE

## 2019-12-13 PROCEDURE — 99214 OFFICE O/P EST MOD 30 MIN: CPT | Performed by: INTERNAL MEDICINE

## 2019-12-13 RX ORDER — SUCRALFATE ORAL 1 G/10ML
1 SUSPENSION ORAL 2 TIMES DAILY
Status: ON HOLD | COMMUNITY
Start: 2019-09-25 | End: 2023-04-05

## 2019-12-13 RX ORDER — PANTOPRAZOLE SODIUM 40 MG/1
40 TABLET, DELAYED RELEASE ORAL 2 TIMES DAILY
Status: ON HOLD | COMMUNITY
Start: 2019-09-25

## 2019-12-13 RX ORDER — NITROGLYCERIN 0.4 MG/1
TABLET SUBLINGUAL
Qty: 100 TABLET | Refills: 11 | Status: SHIPPED | OUTPATIENT
Start: 2019-12-13 | End: 2021-01-04 | Stop reason: SDUPTHER

## 2019-12-13 RX ORDER — GABAPENTIN 300 MG/1
CAPSULE ORAL
Refills: 0 | COMMUNITY
Start: 2019-12-06 | End: 2020-05-28

## 2019-12-13 NOTE — PROGRESS NOTES
Melly Cruz  [id number]  1955  64 y.o.  female    Referring Provider: Reynaldo Jeter DO    Reason for  Visit:  Here for routine follow up   Prior visit for follow up after cardiac testing after initial   visit for shortness of breath and chest pain   In past told to have a heart murmur   Cardiac workup test results as below  Sees GI in [place]     Subjective       Feels same overall as during last visit except more GERD symptoms  No new events or complaints since last visit   Overall the patient feels no major change from baseline symptoms   Similar symptoms as during last visit        Similar symptoms as during last visit   Overall the patient feels no major change from baseline symptoms     Chest pain and heaviness  with exertion and also at rest, moderate substernal, pressure like. Lasts 30 minutes to 4 hours  Has been to ER for chest pain   Started 6 months ago  Occurs once or twice a week  Associated diaphoresis    Associated nausea  No radiation  Precipitated with exertion    Relieved with rest and sometimes spontaneously  Not positional    No change with intake of food or antacids  No change with breathing  Moderate associated dyspnea      Chronic back pain    Joint pain in small, medium and large joints     Ex smoker 1 ppd x 30 years quit 15 months ago    Sees CTS for pulmonary embolism    History of present illness:  Melly Cruz is a 64 y.o. yo female with history of pulmonary embolism who presents today for   Chief Complaint   Patient presents with   • Chest Pain     3 MO F/U   • DVT   .    History  Past Medical History:   Diagnosis Date   • Acid reflux    • Anxiety    • Arthritis    • Chest pain    • Chronic back pain    • Chronic neck pain    • DVT (deep venous thrombosis) (CMS/HCC)    • Hypothyroid    • Hypothyroidism    • Migraine    • PE (pulmonary thromboembolism) (CMS/HCC)    ,   Past Surgical History:   Procedure Laterality Date   • CHOLECYSTECTOMY     • COLONOSCOPY     •  DILATATION AND CURETTAGE     • HERNIA REPAIR     • HYSTERECTOMY     • REPLACEMENT TOTAL KNEE      Right knee    ,   History reviewed. No pertinent family history.,   Social History     Tobacco Use   • Smoking status: Former Smoker     Packs/day: 0.50     Years: 30.00     Pack years: 15.00     Last attempt to quit: 2018     Years since quittin.7   • Smokeless tobacco: Never Used   Substance Use Topics   • Alcohol use: Yes     Comment: OCC   • Drug use: No   ,     Medications  Current Outpatient Medications   Medication Sig Dispense Refill   • albuterol (PROVENTIL HFA;VENTOLIN HFA) 108 (90 Base) MCG/ACT inhaler Inhale 2 puffs 4 (Four) Times a Day. 6.7 g 0   • buPROPion HCl (WELLBUTRIN PO) Take 300 mg by mouth.     • citalopram (CeleXA) 40 MG tablet Take 40 mg by mouth daily.     • cyclobenzaprine (FLEXERIL) 10 MG tablet 3 (Three) Times a Day As Needed.     • gabapentin (NEURONTIN) 300 MG capsule TAKE 1 CAPSULE BY MOUTH AT BEDTIME FOR RESTLESS LEG SYNDROME  0   • HYDROcodone-acetaminophen (NORCO) 7.5-325 MG per tablet Take 1 tablet by mouth Every 6 (Six) Hours As Needed for Moderate Pain .     • levothyroxine (SYNTHROID, LEVOTHROID) 125 MCG tablet Take 125 mcg by mouth Daily     • pantoprazole (PROTONIX) 40 MG EC tablet Take 40 mg by mouth 2 (Two) Times a Day.     • sertraline (ZOLOFT) 50 MG tablet   2   • sucralfate (CARAFATE) 1 GM/10ML suspension Take 1 g by mouth Daily.     • vitamin D (ERGOCALCIFEROL) 68597 units capsule capsule   6   • buPROPion SR (WELLBUTRIN SR) 100 MG 12 hr tablet Take 100 mg by mouth.     • nitroglycerin (NITROSTAT) 0.4 MG SL tablet 1 under the tongue as needed for angina, may repeat q5mins for up three doses 100 tablet 11   • pramipexole (MIRAPEX) 0.125 MG tablet Take 0.125 mg by mouth 2 times daily.     • Fkazqzkiq-Iexriaew-ZX 30-3-15 MG/5ML syrup Take 10 mL by mouth Every 6 (Six) Hours As Needed (FOR COUGH).       No current facility-administered medications for this visit.   "      Allergies:  Dilaudid [hydromorphone hcl]; Erythromycin; Ondansetron hcl; Orphenadrine citrate; Guaifenesin & derivatives; Meperidine; and Codeine    Review of Systems  Review of Systems   Constitution: Positive for malaise/fatigue.   HENT: Negative.    Eyes: Negative.    Cardiovascular: Positive for chest pain and dyspnea on exertion. Negative for claudication, cyanosis, irregular heartbeat, leg swelling, near-syncope, orthopnea, palpitations, paroxysmal nocturnal dyspnea and syncope.   Respiratory: Positive for shortness of breath and snoring.    Endocrine: Negative.    Hematologic/Lymphatic: Negative.    Skin: Negative.    Musculoskeletal: Positive for arthritis and back pain.   Gastrointestinal: Positive for bloating, abdominal pain and heartburn. Negative for anorexia.   Genitourinary: Negative.    Neurological: Positive for weakness.   Psychiatric/Behavioral: Negative.        Objective     Physical Exam:  /70   Pulse 84   Ht 165.1 cm (65\")   Wt 98 kg (216 lb)   SpO2 98%   BMI 35.94 kg/m²     Physical Exam   Constitutional: She appears well-developed.   HENT:   Head: Normocephalic.   Neck: Normal carotid pulses and no JVD present. No tracheal tenderness present. Carotid bruit is not present. No tracheal deviation and no edema present.   Cardiovascular: Regular rhythm and normal pulses.   Murmur heard.   Systolic murmur is present with a grade of 1/6.  Pulmonary/Chest: Effort normal. No stridor.   Abdominal: Soft. She exhibits no distension. There is no hepatosplenomegaly. There is no tenderness.   Neurological: She is alert. She has normal strength. No cranial nerve deficit or sensory deficit.   Skin: Skin is warm.   Psychiatric: She has a normal mood and affect. Her speech is normal and behavior is normal.       Results Review:      Results for orders placed during the hospital encounter of 07/09/19   Adult Transthoracic Echo Complete W/ Cont if Necessary Per Protocol    Narrative · Estimated " EF = 55%.  · Left ventricular systolic function is normal.  · Left ventricular diastolic dysfunction.  · No evidence of pulmonary hypertension is present.              ECG 12 Lead  Date/Time: 2019 9:42 AM  Performed by: Michael Wei MD  Authorized by: Michael Wei MD   Comparison: compared with previous ECG from 2019  Similar to previous ECG  Rhythm: sinus rhythm  Rate: normal  Conduction: conduction normal  QRS axis: normal  Other findings: left ventricular hypertrophy    Clinical impression: abnormal EKG            Assessment/Plan   Melly was seen today for chest pain and dvt.    Diagnoses and all orders for this visit:    History of DVT of lower extremity after kneee surgery  off Coumadin now    ALLISON (dyspnea on exertion)  -     ECG 12 Lead    Chronic midline thoracic back pain    Hypothyroidism (acquired)    Lung nodule    Other orders  -     nitroglycerin (NITROSTAT) 0.4 MG SL tablet; 1 under the tongue as needed for angina, may repeat q5mins for up three doses           Plan    Conservative medical therapy per patient. Risks, benefits and alternatives explained. The patient understands and wishes to proceed with only conservative therapy.  The patient expressively declined any non-invasive/ invasive testing or treatment     Being monitored for GI bleeding   Has Curtis's esophagus      Requested Prescriptions     Signed Prescriptions Disp Refills   • nitroglycerin (NITROSTAT) 0.4 MG SL tablet 100 tablet 11     Si under the tongue as needed for angina, may repeat q5mins for up three doses        ____________________________________________________________________________________________________________________________________________  Health maintenance and recommendations    Similar recommendations as last visit     Monitor CBC, CMP, TSH (as indicated) and Lipid Panel by primary  Monitor for GI bleeding       Offered to give patient  a copy of my notes       Questions were encouraged, asked  and answered to the patient's  understanding and satisfaction. Questions if any regarding current medications and side effects, need for refills and importance of compliance to medications stressed.    Reviewed available prior notes, consults, prior visits, laboratory findings, radiology and cardiology relevant reports. Updated chart as applicable. I have reviewed the patient's medical history in detail and updated the computerized patient record as relevant.      Updated patient regarding any new or relevant abnormalities on review of records or any new findings on physical exam. Mentioned to patient about purpose of visit and desirable health short and long term goals and objectives.    Primary to monitor CBC CMP Lipid panel and TSH as applicable    ___________________________________________________________________________________________________________________________________________          Return in about 3 months (around 3/13/2020).

## 2020-03-02 ENCOUNTER — TRANSCRIBE ORDERS (OUTPATIENT)
Dept: ADMINISTRATIVE | Facility: HOSPITAL | Age: 65
End: 2020-03-02

## 2020-03-02 DIAGNOSIS — Z12.31 ENCOUNTER FOR SCREENING MAMMOGRAM FOR MALIGNANT NEOPLASM OF BREAST: Primary | ICD-10-CM

## 2020-03-16 ENCOUNTER — HOSPITAL ENCOUNTER (OUTPATIENT)
Dept: MAMMOGRAPHY | Facility: HOSPITAL | Age: 65
Discharge: HOME | End: 2020-03-16
Admitting: INTERNAL MEDICINE

## 2020-03-16 DIAGNOSIS — Z12.31 ENCOUNTER FOR SCREENING MAMMOGRAM FOR MALIGNANT NEOPLASM OF BREAST: ICD-10-CM

## 2020-03-16 PROCEDURE — 77063 BREAST TOMOSYNTHESIS BI: CPT

## 2020-03-16 PROCEDURE — 77067 SCR MAMMO BI INCL CAD: CPT

## 2020-03-20 ENCOUNTER — HOSPITAL ENCOUNTER (OUTPATIENT)
Dept: MAMMOGRAPHY | Facility: HOSPITAL | Age: 65
Discharge: HOME | End: 2020-03-20
Admitting: INTERNAL MEDICINE

## 2020-03-20 ENCOUNTER — HOSPITAL ENCOUNTER (OUTPATIENT)
Dept: ULTRASOUND IMAGING | Facility: HOSPITAL | Age: 65
Discharge: HOME | End: 2020-03-20

## 2020-03-20 DIAGNOSIS — R92.8 ABNORMAL MAMMOGRAM: ICD-10-CM

## 2020-03-20 PROCEDURE — G0279 TOMOSYNTHESIS, MAMMO: HCPCS

## 2020-03-20 PROCEDURE — 76642 ULTRASOUND BREAST LIMITED: CPT

## 2020-03-20 PROCEDURE — 77065 DX MAMMO INCL CAD UNI: CPT

## 2020-05-28 ENCOUNTER — OFFICE VISIT (OUTPATIENT)
Dept: OBSTETRICS AND GYNECOLOGY | Facility: CLINIC | Age: 65
End: 2020-05-28

## 2020-05-28 VITALS
HEIGHT: 65 IN | WEIGHT: 213 LBS | BODY MASS INDEX: 35.49 KG/M2 | SYSTOLIC BLOOD PRESSURE: 120 MMHG | DIASTOLIC BLOOD PRESSURE: 70 MMHG

## 2020-05-28 DIAGNOSIS — Z87.891 FORMER SMOKER: ICD-10-CM

## 2020-05-28 DIAGNOSIS — Z01.419 ENCOUNTER FOR GYNECOLOGICAL EXAMINATION WITHOUT ABNORMAL FINDING: Primary | ICD-10-CM

## 2020-05-28 PROBLEM — K21.9 GERD (GASTROESOPHAGEAL REFLUX DISEASE): Status: ACTIVE | Noted: 2018-12-19

## 2020-05-28 PROBLEM — M17.10 ARTHRITIS OF KNEE: Status: ACTIVE | Noted: 2018-05-09

## 2020-05-28 PROBLEM — K92.0 HEMATEMESIS: Status: ACTIVE | Noted: 2019-09-24

## 2020-05-28 PROBLEM — K59.01 SLOW TRANSIT CONSTIPATION: Status: ACTIVE | Noted: 2018-12-19

## 2020-05-28 PROBLEM — R73.9 HYPERGLYCEMIA: Status: ACTIVE | Noted: 2018-12-19

## 2020-05-28 PROBLEM — D50.9 IRON DEFICIENCY ANEMIA: Status: ACTIVE | Noted: 2018-12-19

## 2020-05-28 PROBLEM — I26.99 PE (PULMONARY THROMBOEMBOLISM): Status: ACTIVE | Noted: 2018-12-19

## 2020-05-28 PROBLEM — I10 HTN (HYPERTENSION): Status: ACTIVE | Noted: 2018-12-19

## 2020-05-28 PROBLEM — O22.30 DVT (DEEP VEIN THROMBOSIS) IN PREGNANCY: Status: ACTIVE | Noted: 2018-12-19

## 2020-05-28 PROCEDURE — G0101 CA SCREEN;PELVIC/BREAST EXAM: HCPCS | Performed by: NURSE PRACTITIONER

## 2020-05-28 RX ORDER — CYANOCOBALAMIN (VITAMIN B-12) 1000 MCG
1000 TABLET, EXTENDED RELEASE ORAL DAILY
Status: ON HOLD | COMMUNITY

## 2020-05-28 RX ORDER — PROMETHAZINE HYDROCHLORIDE 25 MG/1
25 TABLET ORAL
COMMUNITY
Start: 2020-03-01 | End: 2022-04-20 | Stop reason: HOSPADM

## 2020-05-28 RX ORDER — CELECOXIB 200 MG/1
CAPSULE ORAL
COMMUNITY
Start: 2020-05-21 | End: 2022-01-06

## 2020-05-28 RX ORDER — BUPROPION HYDROCHLORIDE 300 MG/1
300 TABLET ORAL EVERY MORNING
Status: ON HOLD | COMMUNITY

## 2020-05-28 RX ORDER — PROMETHAZINE HYDROCHLORIDE 25 MG/1
25 SUPPOSITORY RECTAL
COMMUNITY
Start: 2020-03-01 | End: 2022-04-19 | Stop reason: SDUPTHER

## 2020-05-28 NOTE — PROGRESS NOTES
"Subjective   Melly Cruz is a 64 y.o. female.     Annual exam.       The following portions of the patient's history were reviewed and updated as appropriate: allergies, current medications, past family history, past medical history, past social history, past surgical history and problem list.    /70 (BP Location: Right arm, Patient Position: Sitting, Cuff Size: Adult)   Ht 165.1 cm (65\")   Wt 96.6 kg (213 lb)   BMI 35.45 kg/m²     Review of Systems   Constitutional: Negative for activity change, appetite change, fatigue and fever.   HENT: Negative for congestion, sore throat and trouble swallowing.    Eyes: Negative for pain, discharge and visual disturbance.   Respiratory: Negative for apnea, shortness of breath and wheezing.    Cardiovascular: Negative for palpitations and leg swelling. Chest pain: followed by cardiologist.   Gastrointestinal: Negative for abdominal pain, constipation and diarrhea.   Genitourinary: Negative for frequency, pelvic pain, urgency and vaginal discharge.   Musculoskeletal: Negative for back pain and gait problem.   Skin: Negative for color change and rash.   Neurological: Negative for dizziness, weakness and numbness.   Psychiatric/Behavioral: Negative for confusion, self-injury, sleep disturbance and suicidal ideas.       Objective   Physical Exam   Constitutional: She is oriented to person, place, and time. She appears well-developed and well-nourished. No distress.   HENT:   Head: Normocephalic.   Right Ear: External ear normal.   Left Ear: External ear normal.   Nose: Nose normal.   Mouth/Throat: Oropharynx is clear and moist.   Eyes: Conjunctivae are normal. Right eye exhibits no discharge. Left eye exhibits no discharge. No scleral icterus.   Neck: Normal range of motion. Neck supple. Carotid bruit is not present. No tracheal deviation present. No thyromegaly present.   Cardiovascular: Normal rate, regular rhythm, normal heart sounds and intact distal pulses.   No " murmur heard.  Pulmonary/Chest: Effort normal and breath sounds normal. No respiratory distress. She has no wheezes. Right breast exhibits no inverted nipple, no mass, no nipple discharge, no skin change and no tenderness. Left breast exhibits no inverted nipple, no mass, no nipple discharge, no skin change and no tenderness. No breast swelling, tenderness, discharge or bleeding. Breasts are symmetrical.   Abdominal: Soft. She exhibits no distension and no mass. There is no tenderness. There is no guarding. No hernia. Hernia confirmed negative in the right inguinal area and confirmed negative in the left inguinal area.   Genitourinary: Rectum normal and vagina normal. Rectal exam shows no mass. No breast swelling, tenderness, discharge or bleeding. Pelvic exam was performed with patient supine. There is no rash, tenderness, lesion or injury on the right labia. There is no rash, tenderness, lesion or injury on the left labia. No erythema, tenderness or bleeding in the vagina. No foreign body in the vagina. No signs of injury around the vagina. No vaginal discharge found.   Genitourinary Comments:   BSU normal  Urethral meatus  Normal  Perineum  Normal   Musculoskeletal: Normal range of motion. She exhibits no edema or tenderness.   Lymphadenopathy:        Head (right side): No submental, no submandibular, no tonsillar, no preauricular, no posterior auricular and no occipital adenopathy present.        Head (left side): No submental, no submandibular, no tonsillar, no preauricular, no posterior auricular and no occipital adenopathy present.     She has no cervical adenopathy.        Right cervical: No superficial cervical, no deep cervical and no posterior cervical adenopathy present.       Left cervical: No superficial cervical, no deep cervical and no posterior cervical adenopathy present.     She has no axillary adenopathy.        Right: No inguinal adenopathy present.        Left: No inguinal adenopathy present.    Neurological: She is alert and oriented to person, place, and time. Coordination normal.   Skin: Skin is warm and dry. No bruising and no rash noted. She is not diaphoretic. No erythema.   Psychiatric: She has a normal mood and affect. Her behavior is normal. Judgment and thought content normal.   Nursing note and vitals reviewed.      Assessment/Plan   Well woman exam. Pap not indicated.   Mammograms followed by PCP.   Bone density followed by [facility].     Requested testosterone, informed pt she is not a good candidate for HRT.   Patient's Body mass index is 35.45 kg/m². BMI is above normal parameters. Recommendations include: educational material.    RV annual exam/prn  Melly was seen today for gynecologic exam.    Diagnoses and all orders for this visit:    Encounter for gynecological examination without abnormal finding    BMI 35.0-35.9,adult    Former smoker

## 2020-05-28 NOTE — PATIENT INSTRUCTIONS

## 2020-06-16 ENCOUNTER — OFFICE VISIT (OUTPATIENT)
Dept: CARDIAC SURGERY | Facility: CLINIC | Age: 65
End: 2020-06-16

## 2020-06-16 ENCOUNTER — HOSPITAL ENCOUNTER (OUTPATIENT)
Dept: CT IMAGING | Facility: HOSPITAL | Age: 65
Discharge: HOME | End: 2020-06-16
Admitting: NURSE PRACTITIONER

## 2020-06-16 VITALS
OXYGEN SATURATION: 97 % | HEIGHT: 65 IN | HEART RATE: 90 BPM | SYSTOLIC BLOOD PRESSURE: 128 MMHG | BODY MASS INDEX: 36.15 KG/M2 | DIASTOLIC BLOOD PRESSURE: 84 MMHG | WEIGHT: 217 LBS

## 2020-06-16 DIAGNOSIS — R91.1 LUNG NODULE: Primary | ICD-10-CM

## 2020-06-16 DIAGNOSIS — Z87.891 FORMER TOBACCO USE: ICD-10-CM

## 2020-06-16 DIAGNOSIS — E66.09 CLASS 2 OBESITY DUE TO EXCESS CALORIES WITH BODY MASS INDEX (BMI) OF 36.0 TO 36.9 IN ADULT, UNSPECIFIED WHETHER SERIOUS COMORBIDITY PRESENT: ICD-10-CM

## 2020-06-16 DIAGNOSIS — R91.1 LUNG NODULE: ICD-10-CM

## 2020-06-16 LAB — CREAT BLDA-MCNC: 0.9 MG/DL (ref 0.6–1.3)

## 2020-06-16 PROCEDURE — 99214 OFFICE O/P EST MOD 30 MIN: CPT | Performed by: NURSE PRACTITIONER

## 2020-06-16 PROCEDURE — 71260 CT THORAX DX C+: CPT

## 2020-06-16 PROCEDURE — 25010000002 IOPAMIDOL 61 % SOLUTION: Performed by: NURSE PRACTITIONER

## 2020-06-16 PROCEDURE — 82565 ASSAY OF CREATININE: CPT

## 2020-06-16 RX ADMIN — IOPAMIDOL 100 ML: 612 INJECTION, SOLUTION INTRAVENOUS at 07:56

## 2020-06-16 NOTE — PROGRESS NOTES
Chief Complaint   Patient presents with   • Lung Nodule     Patient is here for a follow up w/ ct.        Subjective     History of Present Illness  Mrs. Melly Cruz is a 64-year-old female followed by the lung nodule for a known right upper lobe nodule that has been stable since 2018. She returns for 1 year follow up with CT scan of the chest. She does report chest pain with radiation into left arm at times and shortness of breath with exertion. She is followed by Dr. Wei of the Cardiology service and states a cardiac catheterization was cancelled due to bleeding esophageal ulcer. She does not have follow up with cardiology. She reports 3 episodes of bronchitis last year. She was treated with steroids and reports a 40 pound weight gain. She has been trying to lose this weight and has lost 7 pounds over the last month. She denies chronic cough and hemoptysis.    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 PCP is Dr. Reynaldo Jeter. She is a former respiratory therapist.     Review of Systems   Constitutional: Positive for fatigue. Negative for chills, diaphoresis and fever.   HENT: Positive for trouble swallowing and voice change.    Eyes: Negative.    Respiratory: Positive for shortness of breath.    Cardiovascular: Positive for chest pain. Negative for palpitations and leg swelling.   Gastrointestinal: Negative.    Endocrine: Negative.    Genitourinary: Negative.    Musculoskeletal: Positive for back pain and neck pain.   Skin: Negative.    Allergic/Immunologic: Negative.    Neurological: Positive for weakness. Negative for seizures and speech difficulty.   Hematological: Negative.         History of DVT/PE   Psychiatric/Behavioral: Negative for behavioral problems and confusion.        Past Medical History:   Diagnosis Date   • Acid reflux    • Anxiety    • Arthritis    • Chest pain    •  Chronic back pain    • Chronic neck pain    • DVT (deep venous thrombosis) (CMS/HCC)    • Hypothyroid    • Hypothyroidism    • Migraine    • PE (pulmonary thromboembolism) (CMS/HCC)      Past Surgical History:   Procedure Laterality Date   • CHOLECYSTECTOMY     • COLONOSCOPY     • DILATATION AND CURETTAGE     • HERNIA REPAIR     • HYSTERECTOMY     • OOPHORECTOMY     • REPLACEMENT TOTAL KNEE      Right knee      Allergies:  Dilaudid [hydromorphone hcl]; Erythromycin; Ondansetron hcl; Orphenadrine citrate; Guaifenesin & derivatives; Meperidine; and Codeine    Current Outpatient Medications   Medication Sig Dispense Refill   • albuterol (PROVENTIL HFA;VENTOLIN HFA) 108 (90 Base) MCG/ACT inhaler Inhale 2 puffs 4 (Four) Times a Day. 6.7 g 0   • buPROPion XL (WELLBUTRIN XL) 300 MG 24 hr tablet Take 300 mg by mouth.     • celecoxib (CeleBREX) 200 MG capsule      • citalopram (CeleXA) 40 MG tablet Take 40 mg by mouth daily.     • Cyanocobalamin (VITAMIN B-12 ER) 1000 MCG tablet controlled-release Take 1,000 mcg by mouth.     • HYDROcodone-acetaminophen (NORCO) 7.5-325 MG per tablet Take 1 tablet by mouth Every 6 (Six) Hours As Needed for Moderate Pain .     • levothyroxine (SYNTHROID, LEVOTHROID) 125 MCG tablet Take 125 mcg by mouth Daily     • nitroglycerin (NITROSTAT) 0.4 MG SL tablet 1 under the tongue as needed for angina, may repeat q5mins for up three doses 100 tablet 11   • pantoprazole (PROTONIX) 40 MG EC tablet Take 40 mg by mouth 2 (Two) Times a Day.     • pramipexole (MIRAPEX) 0.125 MG tablet Take 0.125 mg by mouth 2 times daily.     • promethazine (PHENERGAN) 25 MG suppository Insert 25 mg into the rectum.     • promethazine (PHENERGAN) 25 MG tablet Take 25 mg by mouth.     • sertraline (ZOLOFT) 50 MG tablet   2   • sucralfate (CARAFATE) 1 GM/10ML suspension Take 1 g by mouth Daily.     • vitamin D (ERGOCALCIFEROL) 34873 units capsule capsule   6     No current facility-administered medications for this visit.   "    Social History     Tobacco Use   • Smoking status: Former Smoker     Packs/day: 1.00     Years: 30.00     Pack years: 30.00     Last attempt to quit: 2018     Years since quittin.2   • Smokeless tobacco: Never Used   Substance Use Topics   • Alcohol use: Yes     Comment: OCC   • Drug use: No     Family History   Problem Relation Age of Onset   • Breast cancer Paternal Aunt    • Ovarian cancer Neg Hx    • Uterine cancer Neg Hx    • Colon cancer Neg Hx        Objective      Vital Signs  Visit Vitals  /84 (BP Location: Left arm, Patient Position: Sitting, Cuff Size: Large Adult)   Pulse 90   Ht 165.1 cm (65\")   Wt 98.4 kg (217 lb)   SpO2 97%   BMI 36.11 kg/m²     Physical Exam   Constitutional: She is oriented to person, place, and time. She appears well-developed and well-nourished.   HENT:   Head: Normocephalic and atraumatic.   Eyes: Conjunctivae and EOM are normal.   Neck: Normal range of motion. No JVD present. No thyromegaly present.   Cardiovascular: Normal rate, regular rhythm and normal heart sounds.   No murmur heard.  Pulmonary/Chest: Effort normal and breath sounds normal. No respiratory distress. She has no wheezes. She has no rales.   Abdominal: Soft. She exhibits no distension and no mass. There is no tenderness.   obese   Musculoskeletal: Normal range of motion. She exhibits no edema or deformity.   Lymphadenopathy:     She has no cervical adenopathy.   Neurological: She is alert and oriented to person, place, and time. No cranial nerve deficit or sensory deficit.   Skin: Skin is warm and dry.   Psychiatric: She has a normal mood and affect.       Results Review:   Ct Chest With Contrast    Result Date: 2020  Narrative: EXAM: CT CHEST W CONTRAST- - 2020 7:54 AM CDT  HISTORY: Lung nodule, <1cm; R91.1-Solitary pulmonary nodule   COMPARISON: 2019.  DOSE LENGTH PRODUCT: 529 mGy cm. Automatic exposure control was utilized to make radiation dose as low as reasonably " achievable.  TECHNIQUE: Enhanced CT images of the chest obtained with multiplanar reformats.  FINDINGS:  AIRWAYS/PULMONARY PARENCHYMA: The central airways are midline and patent.  Stable right upper lobe groundglass opacity measuring about 1.5 cm compared to 4/29/2019, as well as 10/11/2017.  Lingula with a 0.9 cm pleural-based pulmonary nodule versus atelectasis. This previously had a more triangular appearance on 4/29/2019.  Emphysema. No pleural effusion or pneumothorax.  VASCULATURE: Thoracic aorta is normal in course and caliber. Mild calcified aortic atherosclerosis. No large central pulmonary artery filling defect on this non-CTA exam. Normal pulmonary artery caliber.  CARDIAC:  Normal heart size.  No pericardial effusion.  Scattered coronary artery calcifications.  MEDIASTINUM: There is no mediastinal or hilar lymphadenopathy by CT size criteria. Esophagus has normal course, caliber and wall thickness.  EXTRATHORACIC: The visualized portions of the thyroid gland are unremarkable. No thoracic inlet or axillary adenopathy. The extrathoracic soft tissues are within normal limits.  INCLUDED UPPER ABDOMEN: Diffuse low-density of the liver, which could be due to phase of imaging, but suspicious for fatty liver. There are 2 vague peripheral areas of enhancement on axial image 128 and 116, stable compared to priors considering differences in technique, may represent a transient hepatic attenuation difference or focal fatty sparing. The visualized portion of the pancreas, spleen, adrenal glands are within normal limits. Small hiatal hernia. Cholecystectomy clips.  BONES: No acute or suspicious bony finding.       Impression: 1. New 0.9 cm lingula pleural-based pulmonary nodule versus atelectasis. This previously appeared more triangular on 4/29/2019. Therefore, recommend follow-up CT chest in 3 months. 2. Stable right upper lobe groundglass opacity measuring 1.5 cm, stable compared to 10/11/2017 considering  "differences in technique.  Exam was tagged in PACS with \"new lung findings\" to assist in appropriate follow up. This report was finalized on 06/16/2020 08:30 by Dr Neeta Walker MD.    Independent review: right upper lobe nodule 15 mm is stable, new finding of 9 mm pleural based nodule in the lingula    I reviewed the patient's new clinical results. Discussed with patient.      Assessment/Plan       Melly was seen today for lung nodule.    Diagnoses and all orders for this visit:    Lung nodule  -     NM Pet Skull Base To Mid Thigh; Future    Class 2 obesity due to excess calories with body mass index (BMI) of 36.0 to 36.9 in adult, unspecified whether serious comorbidity present    Former tobacco use    I discussed the patient's findings and my recommendations with Ms. Anthony. We discussed the right upper lobe nodule has been stable since 2018. However, there is a new finding of a 9 mm pleural based nodule in the lingula. We discussed the options of repeat CT scan of the chest in 3 months verse CT/PET scan now. We discussed the differential diagnoses possible with malignancy in the differential. The pros and cons of each option were discussed at length. We discussed should she need to be considered for resection, an assessment of lung function and cardiac clearance would be needed. Decision-making made to proceed with CT/PET scan now. All questions have been answered to the best of my ability and she is agreeable to the aforementioned plan. RTC after PET scan obtained. Separately, we discussed importance of follow up with cardiology due to report of chest pain and shortness of breath with exertion. We discussed signs/symptoms of chest pain that warrant evaluation in the ER. She expresses understanding and has made follow up appointment with Dr. Wei for 6/29.     Patient's Body mass index is 36.11 kg/m². BMI is above normal parameters. Recommendations include: referral to primary care to establishing durable " lifestyle changes to accomplish a weight acceptable for age and height. She has been congratulated on recent weight loss.     Melly Cruz  reports that she quit smoking about 2 years ago. She has a 30.00 pack-year smoking history. She has never used smokeless tobacco. I have congratulated her on smoking cessation.    Advance Care Planning   ACP discussion was declined by the patient. Patient does not have an advance directive, declines further assistance.               Brandy Castillo, APRN   6/16/2020  12:41

## 2020-06-29 ENCOUNTER — OFFICE VISIT (OUTPATIENT)
Dept: CARDIOLOGY | Facility: CLINIC | Age: 65
End: 2020-06-29

## 2020-06-29 VITALS
DIASTOLIC BLOOD PRESSURE: 80 MMHG | SYSTOLIC BLOOD PRESSURE: 130 MMHG | BODY MASS INDEX: 35.99 KG/M2 | HEART RATE: 93 BPM | HEIGHT: 65 IN | OXYGEN SATURATION: 97 % | WEIGHT: 216 LBS

## 2020-06-29 DIAGNOSIS — Z86.718 HISTORY OF DVT IN ADULTHOOD: ICD-10-CM

## 2020-06-29 DIAGNOSIS — R91.1 LUNG NODULE: ICD-10-CM

## 2020-06-29 DIAGNOSIS — I26.99 PE (PULMONARY THROMBOEMBOLISM) (HCC): ICD-10-CM

## 2020-06-29 DIAGNOSIS — M54.6 CHRONIC MIDLINE THORACIC BACK PAIN: ICD-10-CM

## 2020-06-29 DIAGNOSIS — K21.9 GASTROESOPHAGEAL REFLUX DISEASE, ESOPHAGITIS PRESENCE NOT SPECIFIED: ICD-10-CM

## 2020-06-29 DIAGNOSIS — R07.89 CHEST HEAVINESS: ICD-10-CM

## 2020-06-29 DIAGNOSIS — Z87.891 FORMER HEAVY TOBACCO SMOKER: ICD-10-CM

## 2020-06-29 DIAGNOSIS — R73.9 HYPERGLYCEMIA: ICD-10-CM

## 2020-06-29 DIAGNOSIS — R06.09 DOE (DYSPNEA ON EXERTION): ICD-10-CM

## 2020-06-29 DIAGNOSIS — I10 ESSENTIAL HYPERTENSION: ICD-10-CM

## 2020-06-29 DIAGNOSIS — Z80.0 FAMILY HISTORY- STOMACH CANCER: ICD-10-CM

## 2020-06-29 DIAGNOSIS — E03.9 HYPOTHYROIDISM (ACQUIRED): ICD-10-CM

## 2020-06-29 DIAGNOSIS — G89.29 CHRONIC MIDLINE THORACIC BACK PAIN: ICD-10-CM

## 2020-06-29 DIAGNOSIS — O22.30 DVT (DEEP VEIN THROMBOSIS) IN PREGNANCY: Primary | ICD-10-CM

## 2020-06-29 DIAGNOSIS — K22.719 BARRETT'S ESOPHAGUS WITH DYSPLASIA: ICD-10-CM

## 2020-06-29 PROCEDURE — 99214 OFFICE O/P EST MOD 30 MIN: CPT | Performed by: INTERNAL MEDICINE

## 2020-06-29 PROCEDURE — 93000 ELECTROCARDIOGRAM COMPLETE: CPT | Performed by: INTERNAL MEDICINE

## 2020-06-29 NOTE — PROGRESS NOTES
Melly Cruz  [id number]  1955  64 y.o.  female    Referring Provider: Reynaldo Jeter DO    Reason for  Visit:  Here for routine follow up   Prior visit for follow up after cardiac testing after initial   visit for shortness of breath and chest pain   In past told to have a heart murmur   Cardiac workup test results as below  Sees GI in [place]     Subjective     Overall feels well    No new events or complaints since last visit   Overall the patient feels no major change from baseline symptoms   Similar symptoms as during last visit     Tolerating current medications well with no untoward side effects   Compliant with prescribed medication regimen. Tries to adhere to cardiac diet.      Chest pain and heaviness  with exertion and also at rest, moderate substernal, pressure like.   Lasts 30 minutes to 4 hours  Has been to ER for chest pain   Started 6 months ago  Occurs once or twice a week  Associated diaphoresis    Associated nausea  No radiation  Precipitated with exertion    Relieved with rest and sometimes spontaneously  Not positional    No change with intake of food or antacids  No change with breathing  Moderate associated dyspnea      Chronic back pain    Joint pain in small, medium and large joints     Ex smoker 1 ppd x 30 years quit 15 months ago    Sees CTS for pulmonary embolism    History of present illness:  Melly Cruz is a 64 y.o. yo female with history of pulmonary embolism who presents today for   Chief Complaint   Patient presents with   • Chest Pain     6 MON FU    .    History  Past Medical History:   Diagnosis Date   • Acid reflux    • Anxiety    • Arthritis    • Chest pain    • Chronic back pain    • Chronic neck pain    • DVT (deep venous thrombosis) (CMS/HCC)    • Hypothyroid    • Hypothyroidism    • Migraine    • PE (pulmonary thromboembolism) (CMS/HCC)    ,   Past Surgical History:   Procedure Laterality Date   • CHOLECYSTECTOMY     • COLONOSCOPY     • DILATATION AND  CURETTAGE     • HERNIA REPAIR     • HYSTERECTOMY     • OOPHORECTOMY     • REPLACEMENT TOTAL KNEE      Right knee    ,   Family History   Problem Relation Age of Onset   • Breast cancer Paternal Aunt    • Ovarian cancer Neg Hx    • Uterine cancer Neg Hx    • Colon cancer Neg Hx    ,   Social History     Tobacco Use   • Smoking status: Former Smoker     Packs/day: 1.00     Years: 30.00     Pack years: 30.00     Last attempt to quit: 2018     Years since quittin.2   • Smokeless tobacco: Never Used   Substance Use Topics   • Alcohol use: Yes     Comment: OCC   • Drug use: No   ,     Medications  Current Outpatient Medications   Medication Sig Dispense Refill   • albuterol (PROVENTIL HFA;VENTOLIN HFA) 108 (90 Base) MCG/ACT inhaler Inhale 2 puffs 4 (Four) Times a Day. 6.7 g 0   • buPROPion XL (WELLBUTRIN XL) 300 MG 24 hr tablet Take 300 mg by mouth.     • celecoxib (CeleBREX) 200 MG capsule      • citalopram (CeleXA) 40 MG tablet Take 40 mg by mouth daily.     • Cyanocobalamin (VITAMIN B-12 ER) 1000 MCG tablet controlled-release Take 1,000 mcg by mouth.     • HYDROcodone-acetaminophen (NORCO) 7.5-325 MG per tablet Take 1 tablet by mouth Every 6 (Six) Hours As Needed for Moderate Pain .     • levothyroxine (SYNTHROID, LEVOTHROID) 125 MCG tablet Take 125 mcg by mouth Daily     • nitroglycerin (NITROSTAT) 0.4 MG SL tablet 1 under the tongue as needed for angina, may repeat q5mins for up three doses 100 tablet 11   • pantoprazole (PROTONIX) 40 MG EC tablet Take 40 mg by mouth 2 (Two) Times a Day.     • pramipexole (MIRAPEX) 0.125 MG tablet Take 0.125 mg by mouth 2 times daily.     • promethazine (PHENERGAN) 25 MG suppository Insert 25 mg into the rectum.     • promethazine (PHENERGAN) 25 MG tablet Take 25 mg by mouth.     • sertraline (ZOLOFT) 50 MG tablet   2   • sucralfate (CARAFATE) 1 GM/10ML suspension Take 1 g by mouth Daily.     • vitamin D (ERGOCALCIFEROL) 53977 units capsule capsule   6     No current  "facility-administered medications for this visit.        Allergies:  Dilaudid [hydromorphone hcl]; Erythromycin; Ondansetron hcl; Orphenadrine citrate; Guaifenesin & derivatives; Meperidine; and Codeine    Review of Systems  Review of Systems   Constitution: Positive for malaise/fatigue.   HENT: Negative.    Eyes: Negative.    Cardiovascular: Positive for chest pain and dyspnea on exertion. Negative for claudication, cyanosis, irregular heartbeat, leg swelling, near-syncope, orthopnea, palpitations, paroxysmal nocturnal dyspnea and syncope.   Respiratory: Positive for shortness of breath and snoring.    Endocrine: Negative.    Hematologic/Lymphatic: Negative.    Skin: Negative.    Musculoskeletal: Positive for arthritis and back pain.   Gastrointestinal: Positive for bloating, abdominal pain and heartburn. Negative for anorexia.   Genitourinary: Negative.    Neurological: Positive for weakness.   Psychiatric/Behavioral: Negative.        Objective     Physical Exam:  /80   Pulse 93   Ht 165.1 cm (65\")   Wt 98 kg (216 lb)   SpO2 97%   BMI 35.94 kg/m²     Physical Exam   Constitutional: She appears well-developed.   HENT:   Head: Normocephalic.   Neck: Normal carotid pulses and no JVD present. No tracheal tenderness present. Carotid bruit is not present. No tracheal deviation and no edema present.   Cardiovascular: Regular rhythm and normal pulses.   Murmur heard.   Systolic murmur is present with a grade of 1/6.  Pulmonary/Chest: Effort normal. No stridor.   Abdominal: Soft. She exhibits no distension. There is no hepatosplenomegaly. There is no tenderness.   Neurological: She is alert. She has normal strength. No cranial nerve deficit or sensory deficit.   Skin: Skin is warm.   Psychiatric: She has a normal mood and affect. Her speech is normal and behavior is normal.       Results Review:      Results for orders placed during the hospital encounter of 07/09/19   Adult Transthoracic Echo Complete W/ Cont " if Necessary Per Protocol    Narrative · Estimated EF = 55%.  · Left ventricular systolic function is normal.  · Left ventricular diastolic dysfunction.  · No evidence of pulmonary hypertension is present.              ECG 12 Lead  Date/Time: 6/29/2020 1:27 PM  Performed by: Michael Wei MD  Authorized by: Michael Wei MD   Comparison: compared with previous ECG from 12/13/2019  Similar to previous ECG  Rhythm: sinus rhythm  Rate: normal  Conduction: conduction normal  ST Segments: ST segments normal  T Waves: T waves normal  QRS axis: normal  Other: no other findings    Clinical impression: normal ECG            Assessment/Plan   Melly was seen today for chest pain.    Diagnoses and all orders for this visit:    DVT (deep vein thrombosis) in pregnancy    Essential hypertension    PE (pulmonary thromboembolism) (CMS/HCC)    Lung nodule    ALLISON (dyspnea on exertion)    Gastroesophageal reflux disease, esophagitis presence not specified    Hypothyroidism (acquired)    Chronic midline thoracic back pain    Chest heaviness  -     CT Angiogram Coronary; Future    History of DVT in adulthood  2018    Former heavy tobacco smoker    Family history- stomach cancer    Hyperglycemia    Curtis's esophagus with dysplasia    Other orders  -     ECG 12 Lead           _________________________________________________________________________________________________________________  Health maintenance and recommendations    Similar recommendations as last visit     Monitor CBC, CMP, TSH (as indicated) and Lipid Panel by primary  Monitor for GI bleeding       Offered to give patient  a copy of my notes       Questions were encouraged, asked and answered to the patient's  understanding and satisfaction. Questions if any regarding current medications and side effects, need for refills and importance of compliance to medications stressed.    Reviewed available prior notes, consults, prior visits, laboratory findings, radiology and  cardiology relevant reports. Updated chart as applicable. I have reviewed the patient's medical history in detail and updated the computerized patient record as relevant.      Updated patient regarding any new or relevant abnormalities on review of records or any new findings on physical exam. Mentioned to patient about purpose of visit and desirable health short and long term goals and objectives.    Primary to monitor CBC CMP Lipid panel and TSH as applicable    ___________________________________________________________________________________________________________________________________________    Plan    Conservative medical therapy per patient. Risks, benefits and alternatives explained. The patient understands and wishes to proceed with only conservative therapy.  The patient expressively declined any non-invasive/ invasive testing or treatment     Being monitored for GI bleeding   Has Curtis's esophagus       Orders Placed This Encounter   Procedures   • CT Angiogram Coronary     Standing Status:   Future     Standing Expiration Date:   6/29/2021   • ECG 12 Lead     This order was created via procedure documentation        Coronary CT angiography would have the highest predictive value for cardiac events based on calcium scoring as well as high risk negative predictive value if no significant coronary artery disease is identified  This would be the most cost effective test for this patient with intermittent chest pain with multiple risk factors for coronary artery disease  Hopefully his insurance will see the benefit and approved coronary CT angiography         Return in about 6 weeks (around 8/10/2020).

## 2020-06-30 ENCOUNTER — HOSPITAL ENCOUNTER (OUTPATIENT)
Dept: CT IMAGING | Facility: HOSPITAL | Age: 65
Discharge: HOME | End: 2020-06-30
Admitting: NURSE PRACTITIONER

## 2020-06-30 ENCOUNTER — HOSPITAL ENCOUNTER (OUTPATIENT)
Dept: CT IMAGING | Facility: HOSPITAL | Age: 65
Discharge: HOME | End: 2020-06-30

## 2020-06-30 DIAGNOSIS — R91.1 LUNG NODULE: ICD-10-CM

## 2020-06-30 PROCEDURE — 0 FLUDEOXYGLUCOSE F18 SOLUTION: Performed by: NURSE PRACTITIONER

## 2020-06-30 PROCEDURE — 78815 PET IMAGE W/CT SKULL-THIGH: CPT

## 2020-06-30 PROCEDURE — A9552 F18 FDG: HCPCS | Performed by: NURSE PRACTITIONER

## 2020-06-30 RX ADMIN — FLUDEOXYGLUCOSE F18 1 DOSE: 300 INJECTION INTRAVENOUS at 09:41

## 2020-07-01 ENCOUNTER — DOCUMENTATION (OUTPATIENT)
Dept: CARDIAC SURGERY | Facility: CLINIC | Age: 65
End: 2020-07-01

## 2020-07-01 ENCOUNTER — TELEPHONE (OUTPATIENT)
Dept: CARDIAC SURGERY | Facility: CLINIC | Age: 65
End: 2020-07-01

## 2020-07-01 DIAGNOSIS — R91.1 LUNG NODULE: Primary | ICD-10-CM

## 2020-07-01 NOTE — TELEPHONE ENCOUNTER
Attempted to reach patient to discuss PET scan results and provide next appointment information 12/16/2020 9:30 am with Dr. Thompson with CT scan of chest. Patient and 's cell phones are not available and unable to leave message on either. Please inform me if she calls back. Can we reach out to Dr. Jeter's office to see if they have a different phone number for her?

## 2020-07-01 NOTE — PROGRESS NOTES
Ct Chest With Contrast    Result Date: 6/16/2020  Narrative: EXAM: CT CHEST W CONTRAST- - 6/16/2020 7:54 AM CDT  HISTORY: Lung nodule, <1cm; R91.1-Solitary pulmonary nodule   COMPARISON: 4/29/2019.  DOSE LENGTH PRODUCT: 529 mGy cm. Automatic exposure control was utilized to make radiation dose as low as reasonably achievable.  TECHNIQUE: Enhanced CT images of the chest obtained with multiplanar reformats.  FINDINGS:  AIRWAYS/PULMONARY PARENCHYMA: The central airways are midline and patent.  Stable right upper lobe groundglass opacity measuring about 1.5 cm compared to 4/29/2019, as well as 10/11/2017.  Lingula with a 0.9 cm pleural-based pulmonary nodule versus atelectasis. This previously had a more triangular appearance on 4/29/2019.  Emphysema. No pleural effusion or pneumothorax.  VASCULATURE: Thoracic aorta is normal in course and caliber. Mild calcified aortic atherosclerosis. No large central pulmonary artery filling defect on this non-CTA exam. Normal pulmonary artery caliber.  CARDIAC:  Normal heart size.  No pericardial effusion.  Scattered coronary artery calcifications.  MEDIASTINUM: There is no mediastinal or hilar lymphadenopathy by CT size criteria. Esophagus has normal course, caliber and wall thickness.  EXTRATHORACIC: The visualized portions of the thyroid gland are unremarkable. No thoracic inlet or axillary adenopathy. The extrathoracic soft tissues are within normal limits.  INCLUDED UPPER ABDOMEN: Diffuse low-density of the liver, which could be due to phase of imaging, but suspicious for fatty liver. There are 2 vague peripheral areas of enhancement on axial image 128 and 116, stable compared to priors considering differences in technique, may represent a transient hepatic attenuation difference or focal fatty sparing. The visualized portion of the pancreas, spleen, adrenal glands are within normal limits. Small hiatal hernia. Cholecystectomy clips.  BONES: No acute or suspicious bony finding.  "      Impression: 1. New 0.9 cm lingula pleural-based pulmonary nodule versus atelectasis. This previously appeared more triangular on 4/29/2019. Therefore, recommend follow-up CT chest in 3 months. 2. Stable right upper lobe groundglass opacity measuring 1.5 cm, stable compared to 10/11/2017 considering differences in technique.  Exam was tagged in PACS with \"new lung findings\" to assist in appropriate follow up. This report was finalized on 06/16/2020 08:30 by Dr Neeta Walker MD.    Nm Pet Skull Base To Mid Thigh    Result Date: 6/30/2020  Narrative: EXAMINATION: NM PET SKULL BASE TO MID THIGH- 6/30/2020 11:22 AM CDT  HISTORY: New lung nodule; R91.1-Solitary pulmonary nodule.  Dose: 1. 11.83 mCi FDG F-18 intravenously, the patient's weight equals 214 pounds and serum glucose level 100 mg/dL at the time of FDG injection. 2. 1064 mGycm for the CT portion of the examination.  REPORT: Multiplanar pet imaging was performed with a field-of-view extending from the skull base to the mid thighs, CT was also performed for attenuation correction and fusion imaging.  COMPARISON: PET/CT 10/04/2018. CT chest with contrast 06/16/2020.  Physiologic (normal) activity is noted within the head and neck. There is no abnormal FDG activity associated with the small new lingular nodule, which measures 9 mm. The fusion CT demonstrates a stable 13 mm groundglass nodule in the right upper lobe, with no significant FDG uptake. This most likely represents an area of focal scarring. There is a tiny subpleural nodule in the left lower lobe which is not calcified and measures approximately 3 mm. This is stable. There is mild diffuse emphysema. No pneumothorax or pleural effusion is identified. There is no evidence of axillary, mediastinal or hilar lymphadenopathy. There are a few normal sized lymph nodes in the inferior axilla bilaterally, which have low-level uptake, with a maximum SUV of 2.7. These may be reactive. Physiologic activity is " noted within the myocardium. Below the diaphragm, there is physiologic activity within the solid abdominal organs, collecting structures of the kidneys, ureters and bladder as expected. There is physiologic activity within the GI tract as expected. The CT also demonstrates diffuse fatty infiltration of the liver and evidence previous cholecystectomy. There is a small area of fat sparing in the upper right hepatic lobe which is stable. There is moderate atherosclerosis with calcification within the aorta and iliac arteries. The appendix is normal. There is streak artifact in the pelvis related to previous surgery. Prior hysterectomy is noted. There is mild sigmoid diverticulosis without evidence of acute diverticulitis.      Impression: 1. There is no abnormal FDG activity associated with the small new nodule in the lingula, which is probably related to focal scarring or atelectasis. There is no abnormal FDG activity associated with the 15 mm groundglass nodule in the right upper lobe. 2. There are a few normal sized lymph nodes in the inferior axilla bilaterally, with low level FDG avidity, these may be reactive lymph nodes. Correlation with physical examination of both axilla is suggested. No measurable axillary or intrathoracic lymphadenopathy is identified. 3. No evidence of active neoplasm is identified.   This report was finalized on 06/30/2020 12:34 by Dr. Tony Chen MD.    Reviewed PET scan and imaging with Dr. Thompson. Recommend 6 month follow up with repeat CT scan of the chest. Have attempted to reach patient to discuss but her voicemail box is full and unable to leave a message. Tried reaching her  as he was listed on release of information but his voicemail box is full as well. See separate telephone encounter for further information.

## 2020-07-02 NOTE — TELEPHONE ENCOUNTER
Called patient again. States she has been having phone issues. Discussed PET scan results to include few normal sized lymph nodes in the inferior axilla bilaterally, with low level FDG avidity, these may be reactive lymph  nodes. States she has seen GYN recently for exam. Instructed her to just relay the information to GYN. FU with Dr. Thompson 12/16/2020 9:30am with CT scan of the chest. She expresses understanding.

## 2020-07-22 ENCOUNTER — HOSPITAL ENCOUNTER (OUTPATIENT)
Dept: CT IMAGING | Facility: HOSPITAL | Age: 65
Discharge: HOME | End: 2020-07-22
Admitting: INTERNAL MEDICINE

## 2020-07-22 VITALS
SYSTOLIC BLOOD PRESSURE: 130 MMHG | HEART RATE: 56 BPM | OXYGEN SATURATION: 99 % | TEMPERATURE: 98 F | RESPIRATION RATE: 16 BRPM | BODY MASS INDEX: 35.82 KG/M2 | DIASTOLIC BLOOD PRESSURE: 80 MMHG | HEIGHT: 65 IN | WEIGHT: 215 LBS

## 2020-07-22 DIAGNOSIS — R07.89 CHEST HEAVINESS: ICD-10-CM

## 2020-07-22 LAB
CREAT SERPL-MCNC: 0.92 MG/DL (ref 0.57–1)
GFR SERPL CREATININE-BSD FRML MDRD: 62 ML/MIN/1.73

## 2020-07-22 PROCEDURE — 82565 ASSAY OF CREATININE: CPT | Performed by: INTERNAL MEDICINE

## 2020-07-22 PROCEDURE — 0 IOPAMIDOL PER 1 ML: Performed by: INTERNAL MEDICINE

## 2020-07-22 PROCEDURE — 75574 CT ANGIO HRT W/3D IMAGE: CPT

## 2020-07-22 PROCEDURE — 63710000001 NITROGLYCERIN 0.4 MG SUBLINGUAL TABLET 25 EACH BOTTLE

## 2020-07-22 PROCEDURE — A9270 NON-COVERED ITEM OR SERVICE: HCPCS

## 2020-07-22 PROCEDURE — 75574 CT ANGIO HRT W/3D IMAGE: CPT | Performed by: INTERNAL MEDICINE

## 2020-07-22 RX ORDER — METOPROLOL TARTRATE 50 MG/1
TABLET, FILM COATED ORAL
Status: DISCONTINUED
Start: 2020-07-22 | End: 2020-07-23 | Stop reason: HOSPADM

## 2020-07-22 RX ORDER — SODIUM CHLORIDE 0.9 % (FLUSH) 0.9 %
3 SYRINGE (ML) INJECTION EVERY 12 HOURS SCHEDULED
Status: DISCONTINUED | OUTPATIENT
Start: 2020-07-22 | End: 2020-07-23 | Stop reason: HOSPADM

## 2020-07-22 RX ORDER — SODIUM CHLORIDE 0.9 % (FLUSH) 0.9 %
10 SYRINGE (ML) INJECTION AS NEEDED
Status: DISCONTINUED | OUTPATIENT
Start: 2020-07-22 | End: 2020-07-23 | Stop reason: HOSPADM

## 2020-07-22 RX ORDER — METOPROLOL TARTRATE 50 MG/1
100 TABLET, FILM COATED ORAL ONCE AS NEEDED
Status: DISCONTINUED | OUTPATIENT
Start: 2020-07-22 | End: 2020-07-23 | Stop reason: HOSPADM

## 2020-07-22 RX ORDER — METOPROLOL TARTRATE 50 MG/1
50 TABLET, FILM COATED ORAL ONCE AS NEEDED
Status: DISCONTINUED | OUTPATIENT
Start: 2020-07-22 | End: 2020-07-23 | Stop reason: HOSPADM

## 2020-07-22 RX ORDER — LIDOCAINE HYDROCHLORIDE 10 MG/ML
5 INJECTION, SOLUTION EPIDURAL; INFILTRATION; INTRACAUDAL; PERINEURAL AS NEEDED
Status: DISCONTINUED | OUTPATIENT
Start: 2020-07-22 | End: 2020-07-23 | Stop reason: HOSPADM

## 2020-07-22 RX ORDER — NITROGLYCERIN 0.4 MG/1
0.4 TABLET SUBLINGUAL
Status: COMPLETED | OUTPATIENT
Start: 2020-07-22 | End: 2020-07-22

## 2020-07-22 RX ORDER — NITROGLYCERIN 0.4 MG/1
TABLET SUBLINGUAL
Status: COMPLETED
Start: 2020-07-22 | End: 2020-07-22

## 2020-07-22 RX ADMIN — IOPAMIDOL 96 ML: 755 INJECTION, SOLUTION INTRAVENOUS at 13:58

## 2020-07-22 RX ADMIN — NITROGLYCERIN 0.4 MG: 0.4 TABLET SUBLINGUAL at 13:55

## 2020-08-10 ENCOUNTER — OFFICE VISIT (OUTPATIENT)
Dept: CARDIOLOGY | Facility: CLINIC | Age: 65
End: 2020-08-10

## 2020-08-10 VITALS
SYSTOLIC BLOOD PRESSURE: 138 MMHG | HEART RATE: 95 BPM | HEIGHT: 65 IN | BODY MASS INDEX: 35.82 KG/M2 | OXYGEN SATURATION: 97 % | WEIGHT: 215 LBS | DIASTOLIC BLOOD PRESSURE: 82 MMHG

## 2020-08-10 DIAGNOSIS — Z87.891 FORMER HEAVY TOBACCO SMOKER: ICD-10-CM

## 2020-08-10 DIAGNOSIS — R06.09 DOE (DYSPNEA ON EXERTION): ICD-10-CM

## 2020-08-10 DIAGNOSIS — R07.2 PRECORDIAL CHEST PAIN: ICD-10-CM

## 2020-08-10 DIAGNOSIS — E03.9 HYPOTHYROIDISM (ACQUIRED): ICD-10-CM

## 2020-08-10 DIAGNOSIS — R73.9 HYPERGLYCEMIA: ICD-10-CM

## 2020-08-10 DIAGNOSIS — I10 ESSENTIAL HYPERTENSION: ICD-10-CM

## 2020-08-10 DIAGNOSIS — K22.719 BARRETT'S ESOPHAGUS WITH DYSPLASIA: Primary | ICD-10-CM

## 2020-08-10 DIAGNOSIS — K21.9 GASTROESOPHAGEAL REFLUX DISEASE, ESOPHAGITIS PRESENCE NOT SPECIFIED: ICD-10-CM

## 2020-08-10 DIAGNOSIS — Z80.0 FAMILY HISTORY- STOMACH CANCER: ICD-10-CM

## 2020-08-10 PROBLEM — Z86.711 HISTORY OF PULMONARY EMBOLISM: Status: ACTIVE | Noted: 2020-08-10

## 2020-08-10 PROBLEM — I26.99 PE (PULMONARY THROMBOEMBOLISM) (HCC): Status: RESOLVED | Noted: 2018-12-19 | Resolved: 2020-08-10

## 2020-08-10 PROCEDURE — 99214 OFFICE O/P EST MOD 30 MIN: CPT | Performed by: INTERNAL MEDICINE

## 2020-08-10 PROCEDURE — 93000 ELECTROCARDIOGRAM COMPLETE: CPT | Performed by: INTERNAL MEDICINE

## 2020-08-10 NOTE — PROGRESS NOTES
Melly Cruz  [id number]  1955  64 y.o.  female    Referring Provider: Reynaldo Jeter DO    Reason for  Visit:  Here for routine follow up   Prior visit for follow up after cardiac testing after initial   visit for shortness of breath and chest pain   In past told to have a heart murmur   Cardiac workup test results as below  Sees GI in [facility]  Recent admission 1 week ago   Had hematemesis and admitted in [facility]   upper GI endoscopy there showed ulcer and Curtis's esophagus    Cardiac CTA results as below     Subjective     Overall feels well    Overall the patient feels no major change from baseline symptoms   Similar symptoms as during last visit     Tolerating current medications well with no untoward side effects   Compliant with prescribed medication regimen. Tries to adhere to cardiac diet.        Chest pain occurs at random and not related to exertion   Overall much better   Chest pain and heaviness  with exertion and also at rest, moderate substernal, pressure like.   Lasts 30 minutes to 4 hours  Has been to ER for chest pain   Started >6 months ago    Occurs once or twice a week  Associated diaphoresis    Associated nausea  No radiation  Precipitated with exertion    Relieved with rest and sometimes spontaneously  Not positional    No change with intake of food or antacids  No change with breathing  Moderate associated dyspnea      Chronic back pain    Joint pain in small, medium and large joints     Ex smoker 1 ppd x 30 years quit 15 months ago    Sees CTS for pulmonary embolism    History of present illness:  Melly Cruz is a 64 y.o. yo female with history of pulmonary embolism who presents today for   Chief Complaint   Patient presents with   • Chest Pain     2 MON FU    .    History  Past Medical History:   Diagnosis Date   • Acid reflux    • Anxiety    • Arthritis    • Chest pain    • Chronic back pain    • Chronic neck pain    • DVT (deep venous thrombosis) (CMS/[facility])    •  Hypothyroid    • Hypothyroidism    • Migraine    • PE (pulmonary thromboembolism) (CMS/HCC)    ,   Past Surgical History:   Procedure Laterality Date   • CHOLECYSTECTOMY     • COLONOSCOPY     • DILATATION AND CURETTAGE     • HERNIA REPAIR     • HYSTERECTOMY     • OOPHORECTOMY     • REPLACEMENT TOTAL KNEE      Right knee    ,   Family History   Problem Relation Age of Onset   • Breast cancer Paternal Aunt    • Ovarian cancer Neg Hx    • Uterine cancer Neg Hx    • Colon cancer Neg Hx    ,   Social History     Tobacco Use   • Smoking status: Former Smoker     Packs/day: 1.00     Years: 30.00     Pack years: 30.00     Last attempt to quit: 2018     Years since quittin.3   • Smokeless tobacco: Never Used   Substance Use Topics   • Alcohol use: Yes     Comment: OCC   • Drug use: No   ,     Medications  Current Outpatient Medications   Medication Sig Dispense Refill   • albuterol (PROVENTIL HFA;VENTOLIN HFA) 108 (90 Base) MCG/ACT inhaler Inhale 2 puffs 4 (Four) Times a Day. 6.7 g 0   • buPROPion XL (WELLBUTRIN XL) 300 MG 24 hr tablet Take 300 mg by mouth.     • citalopram (CeleXA) 40 MG tablet Take 40 mg by mouth daily.     • Cyanocobalamin (VITAMIN B-12 ER) 1000 MCG tablet controlled-release Take 1,000 mcg by mouth.     • HYDROcodone-acetaminophen (NORCO) 7.5-325 MG per tablet Take 1 tablet by mouth Every 6 (Six) Hours As Needed for Moderate Pain .     • levothyroxine (SYNTHROID, LEVOTHROID) 125 MCG tablet Take 112 mcg by mouth Daily.     • nitroglycerin (NITROSTAT) 0.4 MG SL tablet 1 under the tongue as needed for angina, may repeat q5mins for up three doses 100 tablet 11   • pantoprazole (PROTONIX) 40 MG EC tablet Take 40 mg by mouth 2 (Two) Times a Day.     • pramipexole (MIRAPEX) 0.125 MG tablet Take 0.75 mg by mouth 2 (two) times a day.     • promethazine (PHENERGAN) 25 MG suppository Insert 25 mg into the rectum.     • promethazine (PHENERGAN) 25 MG tablet Take 25 mg by mouth.     • sertraline (ZOLOFT) 50 MG  "tablet   2   • sucralfate (CARAFATE) 1 GM/10ML suspension Take 1 g by mouth Daily.     • vitamin D (ERGOCALCIFEROL) 58210 units capsule capsule   6   • celecoxib (CeleBREX) 200 MG capsule        No current facility-administered medications for this visit.        Allergies:  Dilaudid [hydromorphone hcl]; Erythromycin; Ondansetron hcl; Orphenadrine citrate; Guaifenesin & derivatives; Meperidine; and Codeine    Review of Systems  Review of Systems   Constitution: Positive for malaise/fatigue.   HENT: Negative.    Eyes: Negative.    Cardiovascular: Positive for chest pain and dyspnea on exertion. Negative for claudication, cyanosis, irregular heartbeat, leg swelling, near-syncope, orthopnea, palpitations, paroxysmal nocturnal dyspnea and syncope.   Respiratory: Positive for shortness of breath and snoring.    Endocrine: Negative.    Hematologic/Lymphatic: Negative.    Skin: Negative.    Musculoskeletal: Positive for arthritis and back pain.   Gastrointestinal: Positive for bloating, abdominal pain and heartburn. Negative for anorexia.   Genitourinary: Negative.    Neurological: Positive for weakness.   Psychiatric/Behavioral: Negative.        Objective     Physical Exam:  /82   Pulse 95   Ht 165.1 cm (65\")   Wt 97.5 kg (215 lb)   SpO2 97%   BMI 35.78 kg/m²     Physical Exam   Constitutional: She appears well-developed.   HENT:   Head: Normocephalic.   Neck: Normal carotid pulses and no JVD present. No tracheal tenderness present. Carotid bruit is not present. No tracheal deviation and no edema present.   Cardiovascular: Regular rhythm and normal pulses.   Murmur heard.   Systolic murmur is present with a grade of 1/6.  Pulmonary/Chest: Effort normal. No stridor.   Abdominal: Soft. She exhibits no distension. There is no hepatosplenomegaly. There is no tenderness.   Neurological: She is alert. She has normal strength. No cranial nerve deficit or sensory deficit.   Skin: Skin is warm.   Psychiatric: She has a " normal mood and affect. Her speech is normal and behavior is normal.       Results Review:      Cardiac CT angiography 7/22/2020     Impression:      1. Total calcium score : 72.5.  This score is at 83rd percentile for age gender and ethnicity.  This reflects an increased risk of future cardiac events.  Recommend intensive risk factor modification including statin therapy and aspirin therapy.  2.  Mild focal calcification of the right coronary artery as well as the left anterior descending coronary artery without any obstructive disease  3.  Small patent foramen ovale with left-to-right shunt.         Results for orders placed during the hospital encounter of 07/09/19   Adult Transthoracic Echo Complete W/ Cont if Necessary Per Protocol    Narrative · Estimated EF = 55%.  · Left ventricular systolic function is normal.  · Left ventricular diastolic dysfunction.  · No evidence of pulmonary hypertension is present.              ECG 12 Lead  Date/Time: 8/10/2020 2:23 PM  Performed by: Michael Wei MD  Authorized by: Michael Wei MD   Comparison: compared with previous ECG from 6/29/2020  Similar to previous ECG  Rhythm: sinus rhythm  Rate: normal  Conduction: conduction normal  ST Segments: ST segments normal  T Waves: T waves normal  QRS axis: normal  Other: no other findings    Clinical impression: normal ECG            Assessment/Plan   Melly was seen today for chest pain.    Diagnoses and all orders for this visit:    Curtis's esophagus with dysplasia sees Chayo ALLISON (dyspnea on exertion)    Family history- stomach cancer    Former heavy tobacco smoker    Gastroesophageal reflux disease, esophagitis presence not specified    Hyperglycemia    Essential hypertension    Hypothyroidism (acquired)    Precordial chest pain    Other orders  -     ECG 12 Lead           _________________________________________________________________________________________________________________  Health maintenance and  recommendations    Similar recommendations as last visit     Monitor CBC, CMP, TSH (as indicated) and Lipid Panel by primary  Monitor for GI bleeding       Offered to give patient  a copy of my notes  Questions were encouraged, asked and answered to the patient's  understanding and satisfaction. Questions if any regarding current medications and side effects, need for refills and importance of compliance to medications stressed.    Reviewed available prior notes, consults, prior visits, laboratory findings, radiology and cardiology relevant reports. Updated chart as applicable. I have reviewed the patient's medical history in detail and updated the computerized patient record as relevant.      Updated patient regarding any new or relevant abnormalities on review of records or any new findings on physical exam. Mentioned to patient about purpose of visit and desirable health short and long term goals and objectives.    Primary to monitor CBC CMP Lipid panel and TSH as applicable    ___________________________________________________________________________________________________________________________________________    Plan    Discussed results of prior testing with patient  Patient expressed understanding  Encouraged and answered all questions   Discussed with the patient and all questioned fully answered. She will call me if any problems arise.      Being monitored for GI bleeding   Has Curtis's esophagus       Keep LDL below 70 mg/dl. Monitor liver and renal functions.   Monitor CBC, CMP, TSH (as indicated) and Lipid Panel by primary     No Aspirin due to GI bleed         Return in about 6 months (around 2/10/2021).

## 2020-12-16 ENCOUNTER — OFFICE VISIT (OUTPATIENT)
Dept: CARDIAC SURGERY | Facility: CLINIC | Age: 65
End: 2020-12-16

## 2020-12-16 ENCOUNTER — HOSPITAL ENCOUNTER (OUTPATIENT)
Dept: CT IMAGING | Facility: HOSPITAL | Age: 65
Discharge: HOME | End: 2020-12-16
Admitting: NURSE PRACTITIONER

## 2020-12-16 VITALS
DIASTOLIC BLOOD PRESSURE: 78 MMHG | SYSTOLIC BLOOD PRESSURE: 130 MMHG | HEART RATE: 79 BPM | WEIGHT: 212.8 LBS | OXYGEN SATURATION: 96 % | HEIGHT: 65 IN | BODY MASS INDEX: 35.45 KG/M2

## 2020-12-16 DIAGNOSIS — R91.1 LUNG NODULE: Primary | ICD-10-CM

## 2020-12-16 DIAGNOSIS — R91.1 LUNG NODULE: ICD-10-CM

## 2020-12-16 LAB — CREAT BLDA-MCNC: 0.9 MG/DL (ref 0.6–1.3)

## 2020-12-16 PROCEDURE — 25010000002 IOPAMIDOL 61 % SOLUTION: Performed by: NURSE PRACTITIONER

## 2020-12-16 PROCEDURE — 82565 ASSAY OF CREATININE: CPT

## 2020-12-16 PROCEDURE — 71260 CT THORAX DX C+: CPT

## 2020-12-16 PROCEDURE — 99213 OFFICE O/P EST LOW 20 MIN: CPT | Performed by: THORACIC SURGERY (CARDIOTHORACIC VASCULAR SURGERY)

## 2020-12-16 RX ORDER — PRAMIPEXOLE DIHYDROCHLORIDE 1 MG/1
TABLET ORAL
COMMUNITY
Start: 2020-11-16 | End: 2022-04-19 | Stop reason: SDUPTHER

## 2020-12-16 RX ORDER — SUCRALFATE 1 G/1
1 TABLET ORAL
Status: ON HOLD | COMMUNITY
Start: 2020-09-06 | End: 2022-04-20

## 2020-12-16 RX ORDER — CYCLOBENZAPRINE HCL 5 MG
5 TABLET ORAL 3 TIMES DAILY PRN
COMMUNITY
Start: 2020-10-13 | End: 2022-08-18 | Stop reason: DRUGHIGH

## 2020-12-16 RX ORDER — HYDROCODONE BITARTRATE AND ACETAMINOPHEN 7.5; 325 MG/1; MG/1
1 TABLET ORAL
COMMUNITY
End: 2022-04-20 | Stop reason: HOSPADM

## 2020-12-16 RX ADMIN — IOPAMIDOL 100 ML: 612 INJECTION, SOLUTION INTRAVENOUS at 08:24

## 2021-01-04 RX ORDER — NITROGLYCERIN 0.4 MG/1
TABLET SUBLINGUAL
Qty: 100 TABLET | Refills: 11 | Status: ON HOLD | OUTPATIENT
Start: 2021-01-04 | End: 2023-01-25

## 2021-01-04 NOTE — PROGRESS NOTES
Chief Complaint   Patient presents with   • Lung Nodule     Pt is here for follow up w/CT        Subjective     History of Present Illness      She was originally seen by Dr. Lamar and thereafter by Brandy Castillo.  She is followed for a lung nodule in the right upper lobe that has been stable since 2018.  A new lingular lesion was identified previously.  CT/PET scan was obtained and the lesion was not avid.  Continued surveillance was advised.  She returns for interval follow-up.   She denies unintended weight loss, hoarseness of voice, chest pain, hemoptysis, history of pneumonia, or cough.  From medical point of view no new changes.  Additional past medical history includes DVT/PE  She has quit smoking and remains free from smoking.    Review of Systems   Constitutional: Positive for fatigue. Negative for chills, diaphoresis and fever.   HENT: Positive for trouble swallowing and voice change.    Eyes: Negative.    Respiratory: Positive for shortness of breath.    Cardiovascular: Positive for chest pain. Negative for palpitations and leg swelling.   Gastrointestinal: Negative.    Endocrine: Negative.    Genitourinary: Negative.    Musculoskeletal: Positive for back pain and neck pain.   Skin: Negative.    Allergic/Immunologic: Negative.    Neurological: Positive for weakness. Negative for seizures and speech difficulty.   Hematological: Negative.         History of DVT/PE   Psychiatric/Behavioral: Negative for behavioral problems and confusion.        Allergies:  Dilaudid [hydromorphone hcl], Erythromycin, Ondansetron hcl, Orphenadrine citrate, Guaifenesin & derivatives, Meperidine, and Codeine    Current Outpatient Medications   Medication Sig Dispense Refill   • albuterol (PROVENTIL HFA;VENTOLIN HFA) 108 (90 Base) MCG/ACT inhaler Inhale 2 puffs 4 (Four) Times a Day. 6.7 g 0   • buPROPion XL (WELLBUTRIN XL) 300 MG 24 hr tablet Take 300 mg by mouth.     • citalopram (CeleXA) 40 MG tablet Take 40 mg by mouth  "daily.     • Cyanocobalamin (VITAMIN B-12 ER) 1000 MCG tablet controlled-release Take 1,000 mcg by mouth.     • HYDROcodone-acetaminophen (NORCO) 7.5-325 MG per tablet Take 1 tablet by mouth Every 6 (Six) Hours As Needed for Moderate Pain .     • levothyroxine (SYNTHROID, LEVOTHROID) 125 MCG tablet Take 112 mcg by mouth Daily.     • nitroglycerin (NITROSTAT) 0.4 MG SL tablet 1 under the tongue as needed for angina, may repeat q5mins for up three doses 100 tablet 11   • pantoprazole (PROTONIX) 40 MG EC tablet Take 40 mg by mouth 2 (Two) Times a Day.     • pramipexole (MIRAPEX) 0.125 MG tablet Take 0.75 mg by mouth 2 (two) times a day.     • promethazine (PHENERGAN) 25 MG tablet Take 25 mg by mouth.     • sertraline (ZOLOFT) 50 MG tablet   2   • sucralfate (CARAFATE) 1 g tablet Take 1 g by mouth.     • sucralfate (CARAFATE) 1 GM/10ML suspension Take 1 g by mouth Daily.     • vitamin D (ERGOCALCIFEROL) 30089 units capsule capsule   6   • celecoxib (CeleBREX) 200 MG capsule      • cyclobenzaprine (FLEXERIL) 5 MG tablet Take 5 mg by mouth 3 (Three) Times a Day As Needed.     • Diclofenac Sodium (VOLTAREN) 1 % gel gel APPLY 2 GRAMS FOUR TIMES DAILY TO AFFECTED AREA(S)     • HYDROcodone-acetaminophen (NORCO) 7.5-325 MG per tablet Take 1 tablet by mouth.     • pramipexole (MIRAPEX) 1 MG tablet      • promethazine (PHENERGAN) 25 MG suppository Insert 25 mg into the rectum.       No current facility-administered medications for this visit.        Objective      Vital Signs  Visit Vitals  /78 (BP Location: Left arm, Patient Position: Sitting, Cuff Size: Adult)   Pulse 79   Ht 165.1 cm (65\")   Wt 96.5 kg (212 lb 12.8 oz)   SpO2 96%   BMI 35.41 kg/m²     Physical Exam   Constitutional: She is oriented to person, place, and time. She appears well-developed.   HENT:   Head: Normocephalic and atraumatic.   Eyes: Conjunctivae are normal.   Neck: Normal range of motion. No JVD present. No thyromegaly present.   Cardiovascular: " Normal rate, regular rhythm and normal heart sounds.   No murmur heard.  Pulmonary/Chest: Effort normal and breath sounds normal. No respiratory distress. She has no wheezes. She has no rales.   Abdominal: Soft. She exhibits no distension and no mass. There is no abdominal tenderness.   obese   Musculoskeletal: Normal range of motion. No deformity.   Lymphadenopathy:     She has no cervical adenopathy.   Neurological: She is alert and oriented to person, place, and time. No cranial nerve deficit or sensory deficit.   Skin: Skin is warm and dry.       Results Review:   Study Result    EXAMINATION: NM PET SKULL BASE TO MID THIGH- 6/30/2020 11:22 AM CDT     HISTORY: New lung nodule; R91.1-Solitary pulmonary nodule.     Dose:  1. 11.83 mCi FDG F-18 intravenously, the patient's weight equals 214  pounds and serum glucose level 100 mg/dL at the time of FDG injection.  2. 1064 mGycm for the CT portion of the examination.     REPORT: Multiplanar pet imaging was performed with a field-of-view  extending from the skull base to the mid thighs, CT was also performed  for attenuation correction and fusion imaging.     COMPARISON: PET/CT 10/04/2018. CT chest with contrast 06/16/2020.     Physiologic (normal) activity is noted within the head and neck. There  is no abnormal FDG activity associated with the small new lingular  nodule, which measures 9 mm. The fusion CT demonstrates a stable 13 mm  groundglass nodule in the right upper lobe, with no significant FDG  uptake. This most likely represents an area of focal scarring. There is  a tiny subpleural nodule in the left lower lobe which is not calcified  and measures approximately 3 mm. This is stable. There is mild diffuse  emphysema. No pneumothorax or pleural effusion is identified. There is  no evidence of axillary, mediastinal or hilar lymphadenopathy. There are  a few normal sized lymph nodes in the inferior axilla bilaterally, which  have low-level uptake, with a maximum  SUV of 2.7. These may be reactive.  Physiologic activity is noted within the myocardium. Below the  diaphragm, there is physiologic activity within the solid abdominal  organs, collecting structures of the kidneys, ureters and bladder as  expected. There is physiologic activity within the GI tract as expected.  The CT also demonstrates diffuse fatty infiltration of the liver and  evidence previous cholecystectomy. There is a small area of fat sparing  in the upper right hepatic lobe which is stable. There is moderate  atherosclerosis with calcification within the aorta and iliac arteries.  The appendix is normal. There is streak artifact in the pelvis related  to previous surgery. Prior hysterectomy is noted. There is mild sigmoid  diverticulosis without evidence of acute diverticulitis.     IMPRESSION:  1. There is no abnormal FDG activity associated with the small new  nodule in the lingula, which is probably related to focal scarring or  atelectasis. There is no abnormal FDG activity associated with the 15 mm  groundglass nodule in the right upper lobe.  2. There are a few normal sized lymph nodes in the inferior axilla  bilaterally, with low level FDG avidity, these may be reactive lymph  nodes. Correlation with physical examination of both axilla is  suggested. No measurable axillary or intrathoracic lymphadenopathy is  identified.  3. No evidence of active neoplasm is identified.        This report was finalized on 06/30/2020 12:34 by Dr. Tony Chen MD.         Ct Chest With Contrast    Result Date: 12/16/2020  Narrative: EXAM: CT CHEST W CONTRAST- - 12/16/2020 8:21 AM CST  HISTORY: lung nodule; R91.1-Solitary pulmonary nodule   COMPARISON: 6/16/2020.  DOSE LENGTH PRODUCT: 511 mGy cm. Automatic exposure control was utilized to make radiation dose as low as reasonably achievable.  TECHNIQUE: Enhanced CT images of the chest obtained with multiplanar reformats.  Recommendations for incidental nodule  "follow-up are based on Fleischner Society recommendations.  FINDINGS:  AIRWAYS/PULMONARY PARENCHYMA: The central airways are midline and patent.  Right upper lobe groundglass opacity measuring about 1.5 cm appears similar to 6/16/2020, 4/29/2019. This has a decreased cystic component compared 1/21/2018.  Lingula with a 0.9 cm pulmonary nodule versus atelectasis on axial series 4, image 82, similar compared to 6/16/2020.  Stable 5 mm pleural-based left lower lobe pulmonary nodule compared to 1/21/2008, which is 3 years of stability.  Emphysema. No pleural effusion or pneumothorax.  VASCULATURE: Thoracic aorta is normal in course and caliber. Mild calcified aortic atherosclerosis. Normal pulmonary artery caliber.  CARDIAC:  Normal heart size.  No pericardial effusion.   MEDIASTINUM: There is no mediastinal or hilar lymphadenopathy by CT size criteria. Esophagus has normal course, caliber and wall thickness. Small hiatal hernia.  EXTRATHORACIC: Thyroid diminutive or absent. No supraclavicular or axillary adenopathy. The extrathoracic soft tissues are within normal limits.  INCLUDED UPPER ABDOMEN: Fatty liver. There are 2 peripheral areas of hyperenhancement anterolaterally and posterior medially, axial series 2, image 113 and 119 respectively. These are similar compared to 1/21/2018, possibly a area of focal fatty sparing or transient hepatic attenuation difference (KATI).  Cholecystectomy clips. No bile duct dilation. Normal appearance of the visualized pancreas, spleen, adrenal glands.  BONES: No acute or suspicious bony finding.       Impression: 1. Stable pulmonary findings compared to prior. Per Fleischner Society, consider follow-up CT chest in 12-18 months, which will be 2 years of follow-up. 2. Fatty liver.  Exam was tagged in PACS with \"new lung findings\" to assist in appropriate follow up. This report was finalized on 12/16/2020 08:48 by Dr Neeta Walker MD.      I reviewed the patient's new clinical " results. Discussed with patient.      Assessment/Plan       Diagnoses and all orders for this visit:    1. Lung nodule (Primary)    lingula  RUL 1.5 cm ground glass 2018    LLL 0.5 cm 2018  Lingula 0.9 cm 6/2020      I discussed the patient's findings and my recommendations with Ms. Cruz.   The previously identified 9 mm pleural-based nodule in the lingula remained stable.  The right upper lobe lesion has been stable since 2018.  He continues to do so.    Joint decision-making was made to proceed with ongoing surveillance.  We discussed signs/symptoms that would suggest an earlier evaluation.  On review of the chart she did previously have chest pain or shortness of breath.  She has established care with Dr. Wei.    Patient's Body mass index is 35.41 kg/m². BMI is above normal parameters. Recommendations include: referral to primary care to establishing durable lifestyle changes to accomplish a weight acceptable for age and height. She has been congratulated on recent weight loss.     Melly Cruz  reports that she quit smoking about 2 years ago. She has a 30.00 pack-year smoking history. She has never used smokeless tobacco. I have congratulated her on smoking cessation.    Advance Care Planning   ACP discussion was declined by the patient. Patient does not have an advance directive, declines further assistance.    Return to clinic in 1 year.

## 2021-03-09 ENCOUNTER — TELEPHONE (OUTPATIENT)
Dept: CARDIOLOGY | Facility: CLINIC | Age: 66
End: 2021-03-09

## 2021-03-16 ENCOUNTER — OFFICE VISIT (OUTPATIENT)
Dept: CARDIOLOGY | Facility: CLINIC | Age: 66
End: 2021-03-16

## 2021-03-16 VITALS
BODY MASS INDEX: 35.16 KG/M2 | DIASTOLIC BLOOD PRESSURE: 60 MMHG | HEIGHT: 65 IN | HEART RATE: 87 BPM | WEIGHT: 211 LBS | OXYGEN SATURATION: 97 % | SYSTOLIC BLOOD PRESSURE: 115 MMHG

## 2021-03-16 DIAGNOSIS — G89.29 CHRONIC MIDLINE THORACIC BACK PAIN: ICD-10-CM

## 2021-03-16 DIAGNOSIS — Z86.711 HISTORY OF PULMONARY EMBOLISM: ICD-10-CM

## 2021-03-16 DIAGNOSIS — E03.9 HYPOTHYROIDISM (ACQUIRED): Primary | ICD-10-CM

## 2021-03-16 DIAGNOSIS — Z86.718 HISTORY OF DVT IN ADULTHOOD: ICD-10-CM

## 2021-03-16 DIAGNOSIS — K21.9 GASTROESOPHAGEAL REFLUX DISEASE, UNSPECIFIED WHETHER ESOPHAGITIS PRESENT: ICD-10-CM

## 2021-03-16 DIAGNOSIS — I10 ESSENTIAL HYPERTENSION: ICD-10-CM

## 2021-03-16 DIAGNOSIS — M54.6 CHRONIC MIDLINE THORACIC BACK PAIN: ICD-10-CM

## 2021-03-16 PROBLEM — R07.89 CHEST HEAVINESS: Status: RESOLVED | Noted: 2019-06-13 | Resolved: 2021-03-16

## 2021-03-16 PROBLEM — R07.2 PRECORDIAL CHEST PAIN: Status: RESOLVED | Noted: 2019-09-13 | Resolved: 2021-03-16

## 2021-03-16 PROCEDURE — 99214 OFFICE O/P EST MOD 30 MIN: CPT | Performed by: INTERNAL MEDICINE

## 2021-03-16 PROCEDURE — 93000 ELECTROCARDIOGRAM COMPLETE: CPT | Performed by: INTERNAL MEDICINE

## 2021-03-16 NOTE — PROGRESS NOTES
Melly Cruz  [id number]  1955  65 y.o.  female    Referring Provider: Reynaldo Jeter DO    Reason for  Visit:      Here for routine follow up   Prior visit for follow up after cardiac testing after initial   visit for shortness of breath and chest pain   In past told to have a heart murmur     Cardiac workup test results as below  Sees GI in [facility]  Recent admission 1 week ago   Had hematemesis and admitted in [facility]     upper GI endoscopy there showed ulcer and Curtis's esophagus    Cardiac CTA results as below   Recently had pleuritic chest pain and was admitted to [facility] after breast reduction due to back pain   CTA chest was negative for pulmonary embolsm   Echo essentially normal except for LV diastolic dysfunction    Subjective       Subjective    Mild chronic exertional shortness of breath on exertion relieved with rest  No significant cough or wheezing    No palpitations  No associated chest pain  No significant pedal edema    No fever or chills  No significant expectoration    No hemoptysis  No presyncope or syncope    Tolerating current medications well with no untoward side effects   Compliant with prescribed medication regimen. Tries to adhere to cardiac diet.     Chest pain at random and not necessarily related to exertion  Overall feels better and chest pain now improved     Walking more and feeling better   Easy fatiguability   Feels tired   Planning to restart gym     Chronic back pain    Joint pain in small, medium and large joints     Ex smoker 1 ppd x 30 years quit 3 years      No results found for: CHOL  Lab Results   Component Value Date    TRIG 133 03/09/2021     Lab Results   Component Value Date    HDL 57 (L) 03/09/2021     Lab Results   Component Value Date    LDL 84 03/09/2021     No results found for: VLDL  No results found for: LDLHDL         History of present illness:  Melly Cruz is a 65 y.o. yo female with history of pulmonary embolism who presents today  for   Chief Complaint   Patient presents with   • Hypertension     6 mo f/u   • Chest Pain     recent ER visit ([facility])   .    History  Past Medical History:   Diagnosis Date   • Acid reflux    • Anxiety    • Arthritis    • Chest pain    • Chronic back pain    • Chronic neck pain    • DVT (deep venous thrombosis) (CMS/HCC)    • Hypothyroid    • Hypothyroidism    • Migraine    • PE (pulmonary thromboembolism) (CMS/HCC)    ,   Past Surgical History:   Procedure Laterality Date   • CHOLECYSTECTOMY     • COLONOSCOPY     • DILATATION AND CURETTAGE     • HERNIA REPAIR     • HYSTERECTOMY     • OOPHORECTOMY     • REPLACEMENT TOTAL KNEE      Right knee    ,   Family History   Problem Relation Age of Onset   • Breast cancer Paternal Aunt    • Ovarian cancer Neg Hx    • Uterine cancer Neg Hx    • Colon cancer Neg Hx    ,   Social History     Tobacco Use   • Smoking status: Former Smoker     Packs/day: 1.00     Years: 30.00     Pack years: 30.00     Quit date: 2018     Years since quittin.9   • Smokeless tobacco: Never Used   Substance Use Topics   • Alcohol use: Not Currently   • Drug use: No   ,     Medications  Current Outpatient Medications   Medication Sig Dispense Refill   • albuterol (PROVENTIL HFA;VENTOLIN HFA) 108 (90 Base) MCG/ACT inhaler Inhale 2 puffs 4 (Four) Times a Day. 6.7 g 0   • buPROPion XL (WELLBUTRIN XL) 300 MG 24 hr tablet Take 300 mg by mouth.     • citalopram (CeleXA) 40 MG tablet Take 40 mg by mouth daily.     • Cyanocobalamin (VITAMIN B-12 ER) 1000 MCG tablet controlled-release Take 1,000 mcg by mouth.     • cyclobenzaprine (FLEXERIL) 5 MG tablet Take 5 mg by mouth 3 (Three) Times a Day As Needed.     • Diclofenac Sodium (VOLTAREN) 1 % gel gel APPLY 2 GRAMS FOUR TIMES DAILY TO AFFECTED AREA(S)     • HYDROcodone-acetaminophen (NORCO) 7.5-325 MG per tablet Take 1 tablet by mouth Every 6 (Six) Hours As Needed for Moderate Pain .     • HYDROcodone-acetaminophen (NORCO) 7.5-325 MG per tablet Take 1  tablet by mouth.     • levothyroxine (SYNTHROID, LEVOTHROID) 125 MCG tablet Take 112 mcg by mouth Daily. 112 mcg alternating with 125 mcg     • nitroglycerin (NITROSTAT) 0.4 MG SL tablet 1 under the tongue as needed for angina, may repeat q5mins for up three doses 100 tablet 11   • pantoprazole (PROTONIX) 40 MG EC tablet Take 40 mg by mouth 2 (Two) Times a Day.     • pramipexole (MIRAPEX) 0.125 MG tablet Take 0.75 mg by mouth 2 (two) times a day.     • pramipexole (MIRAPEX) 1 MG tablet      • promethazine (PHENERGAN) 25 MG suppository Insert 25 mg into the rectum.     • promethazine (PHENERGAN) 25 MG tablet Take 25 mg by mouth.     • sertraline (ZOLOFT) 50 MG tablet   2   • sucralfate (CARAFATE) 1 g tablet Take 1 g by mouth.     • sucralfate (CARAFATE) 1 GM/10ML suspension Take 1 g by mouth Daily.     • vitamin D (ERGOCALCIFEROL) 62845 units capsule capsule   6   • celecoxib (CeleBREX) 200 MG capsule        No current facility-administered medications for this visit.       Allergies:  Dilaudid [hydromorphone hcl], Erythromycin, Ondansetron hcl, Orphenadrine citrate, Guaifenesin & derivatives, Meperidine, and Codeine    Review of Systems  Review of Systems   Constitutional: Positive for malaise/fatigue.   HENT: Negative.    Eyes: Negative.    Cardiovascular: Positive for chest pain and dyspnea on exertion. Negative for claudication, cyanosis, irregular heartbeat, leg swelling, near-syncope, orthopnea, palpitations, paroxysmal nocturnal dyspnea and syncope.   Respiratory: Positive for shortness of breath and snoring.    Endocrine: Negative.    Hematologic/Lymphatic: Negative.    Skin: Negative.    Musculoskeletal: Positive for arthritis and back pain.   Gastrointestinal: Positive for bloating, abdominal pain and heartburn. Negative for anorexia.   Genitourinary: Negative.    Neurological: Positive for weakness.   Psychiatric/Behavioral: Negative.        Objective     Physical Exam:  /60   Pulse 87   Ht 165.1  "cm (65\")   Wt 95.7 kg (211 lb)   SpO2 97%   BMI 35.11 kg/m²     Physical Exam   Constitutional: She appears well-developed.   HENT:   Head: Normocephalic.   Neck: Normal carotid pulses and no JVD present. No tracheal tenderness present. Carotid bruit is not present. No tracheal deviation present.   Cardiovascular: Regular rhythm and normal pulses.   Murmur heard.   Systolic murmur is present with a grade of 1/6.  Pulmonary/Chest: Effort normal. No stridor.   Abdominal: Soft. She exhibits no distension. There is no abdominal tenderness.   Neurological: She is alert. No cranial nerve deficit or sensory deficit.   Skin: Skin is warm.   Psychiatric: Her speech is normal and behavior is normal.       Results Review:      Cardiac CT angiography 7/22/2020     Impression:      1. Total calcium score : 72.5.  This score is at 83rd percentile for age gender and ethnicity.  This reflects an increased risk of future cardiac events.  Recommend intensive risk factor modification including statin therapy and aspirin therapy.  2.  Mild focal calcification of the right coronary artery as well as the left anterior descending coronary artery without any obstructive disease  3.  Small patent foramen ovale with left-to-right shunt.         Results for orders placed during the hospital encounter of 07/09/19    Adult Transthoracic Echo Complete W/ Cont if Necessary Per Protocol    Interpretation Summary  · Estimated EF = 55%.  · Left ventricular systolic function is normal.  · Left ventricular diastolic dysfunction.  · No evidence of pulmonary hypertension is present.          ECG 12 Lead    Date/Time: 3/16/2021 11:46 AM  Performed by: Michael Wei MD  Authorized by: Michael Wei MD   Comparison: compared with previous ECG from 8/10/2020  Similar to previous ECG  Rhythm: sinus rhythm  Rate: normal  Conduction: conduction normal  ST Segments: ST segments normal  QRS axis: normal  Other: no other findings    Clinical impression: normal " ECG            Assessment/Plan   Diagnoses and all orders for this visit:    1. Hypothyroidism (acquired) (Primary)    2. History of DVT in adulthood  2018    3. Chronic midline thoracic back pain    4. Gastroesophageal reflux disease, unspecified whether esophagitis present    5. Essential hypertension    6. History of pulmonary embolism    Other orders  -     ECG 12 Lead        Plan    Patient expressed understanding  Encouraged and answered all questions   Discussed with the patient and all questioned fully answered. She will call me if any problems arise.   Discussed results of prior testing with patient: echo and CT chest at [facility]     Check BP and heart rates twice daily at least 3x / week, week a month  at home and bring a recording for me to review next visit  If BP >130/85 or < 100/60 persistently over 3 reading 30 mins apart call sooner      Monitor for any signs of bleeding including red or dark stools as well as easy bruisabilty. Fall precautions.     Patient undecided regarding the Covid vaccine   Urged to consider vaccination further   I can provide more input if required   Recommend further discussion with primary care provider     Overall doing well no new cardiovascular symptoms and therefore no additional cardiac testing is required   If any interim issues arise will call me for further evaluation.         ____________________________________________________________________________________________________________________________________________  Health maintenance and recommendations    Low salt/ HTN/ Heart healthy carbohydrate restricted cardiac diet (printed dietary and general instructions provided for home review )  The patient is advised to reduce or avoid caffeine or other cardiac stimulants.     The patient was advised to avoid long term NSAIDS , use Tylenol PRN instead  Avoid cardiac stimulants including common drugs like Pseudoephedrine or excessive amounts of caffeine  Monitor for any  signs of bleeding including red or dark stools. Fall precautions.   Advised staying uptodate with immunizations per established standard guidelines.  Offered to give patient  a copy      Questions were encouraged, asked and answered to the patient's  understanding and satisfaction. Questions if any regarding current medications and side effects, need for refills and importance of compliance to medications stressed.    Reviewed available prior notes, consults, prior visits, laboratory findings, radiology and cardiology relevant reports. Updated chart as applicable. I have reviewed the patient's medical history in detail and updated the computerized patient record as relevant.      Updated patient regarding any new or relevant abnormalities on review of records or any new findings on physical exam. Mentioned to patient about purpose of visit and desirable health short and long term goals and objectives.    Primary to monitor CBC CMP Lipid panel and TSH as applicable    ___________________________________________________________________________________________________________________________________________        Follow up with [facility] to address interim issues             Return in about 4 months (around 7/16/2021).

## 2021-04-07 NOTE — TELEPHONE ENCOUNTER
PT CALLED TO LET US KNOW THAT SHE HAS BEEN ADMITTED TO CARDIAC CARE IN [facility] FOR CHEST PAIN & POSSIBLE BLOOD CLOT.    SHE WILL CALL US, ONCE DISCHARGED, TO SET UP A D/C F/U  
Discharged

## 2021-07-15 ENCOUNTER — HOSPITAL ENCOUNTER (OUTPATIENT)
Dept: GENERAL RADIOLOGY | Facility: HOSPITAL | Age: 66
Discharge: HOME | End: 2021-07-15
Admitting: NURSE PRACTITIONER

## 2021-07-15 ENCOUNTER — TRANSCRIBE ORDERS (OUTPATIENT)
Dept: ADMINISTRATIVE | Facility: HOSPITAL | Age: 66
End: 2021-07-15

## 2021-07-15 DIAGNOSIS — M54.16 LUMBAR RADICULOPATHY: ICD-10-CM

## 2021-07-15 DIAGNOSIS — M54.16 LUMBAR RADICULOPATHY: Primary | ICD-10-CM

## 2021-07-15 PROCEDURE — 72110 X-RAY EXAM L-2 SPINE 4/>VWS: CPT

## 2021-12-08 ENCOUNTER — OFFICE VISIT (OUTPATIENT)
Dept: CARDIAC SURGERY | Facility: CLINIC | Age: 66
End: 2021-12-08

## 2021-12-08 ENCOUNTER — HOSPITAL ENCOUNTER (OUTPATIENT)
Dept: CT IMAGING | Facility: HOSPITAL | Age: 66
Discharge: HOME | End: 2021-12-08
Admitting: THORACIC SURGERY (CARDIOTHORACIC VASCULAR SURGERY)

## 2021-12-08 VITALS
BODY MASS INDEX: 37.17 KG/M2 | DIASTOLIC BLOOD PRESSURE: 83 MMHG | HEART RATE: 89 BPM | SYSTOLIC BLOOD PRESSURE: 129 MMHG | WEIGHT: 223.1 LBS | OXYGEN SATURATION: 98 % | HEIGHT: 65 IN

## 2021-12-08 DIAGNOSIS — Z87.891 FORMER HEAVY TOBACCO SMOKER: ICD-10-CM

## 2021-12-08 DIAGNOSIS — R91.8 MULTIPLE LUNG NODULES: Primary | ICD-10-CM

## 2021-12-08 DIAGNOSIS — R91.1 LUNG NODULE: ICD-10-CM

## 2021-12-08 LAB — CREAT BLDA-MCNC: 0.8 MG/DL (ref 0.6–1.3)

## 2021-12-08 PROCEDURE — 99213 OFFICE O/P EST LOW 20 MIN: CPT | Performed by: THORACIC SURGERY (CARDIOTHORACIC VASCULAR SURGERY)

## 2021-12-08 PROCEDURE — 71260 CT THORAX DX C+: CPT

## 2021-12-08 PROCEDURE — 82565 ASSAY OF CREATININE: CPT

## 2021-12-08 PROCEDURE — 25010000002 IOPAMIDOL 61 % SOLUTION: Performed by: THORACIC SURGERY (CARDIOTHORACIC VASCULAR SURGERY)

## 2021-12-08 RX ORDER — FAMOTIDINE 20 MG/1
20 TABLET, FILM COATED ORAL 2 TIMES DAILY PRN
Status: ON HOLD | COMMUNITY
Start: 2021-11-29 | End: 2023-04-05

## 2021-12-08 RX ADMIN — IOPAMIDOL 100 ML: 612 INJECTION, SOLUTION INTRAVENOUS at 09:25

## 2021-12-15 DIAGNOSIS — R91.1 LUNG NODULE: Primary | ICD-10-CM

## 2022-01-03 PROBLEM — R91.8 MULTIPLE LUNG NODULES: Status: ACTIVE | Noted: 2018-09-19

## 2022-01-03 NOTE — PROGRESS NOTES
Chief Complaint   Patient presents with   • Lung Nodule     Pt is here for follow up w/CT        Subjective     History of Present Illness      She was seen by me recently.  Initial referral was for a right upper lobe lung nodule which has been stable since 2018.  During her last surveillance a new lingular nodule was identified in 2020.  This was not hypermetabolic.  Thereafter 1 imaging series was obtained and this demonstrated stability.  In the interim she has been diagnosed with Curtis's esophagus.  She has been seen by Dr. Wei with no new recommendations.  She is clinically stable.  She remains a non-smoker.    She denies unintended weight loss, hoarseness of voice, chest pain, hemoptysis, history of pneumonia, or cough.  Additional past medical history includes DVT/PE    Review of Systems   Constitutional: Positive for fatigue. Negative for chills, diaphoresis and fever.   HENT: Negative for trouble swallowing and voice change.    Eyes: Negative.    Respiratory: Positive for shortness of breath.    Cardiovascular: Negative for chest pain, palpitations and leg swelling.   Gastrointestinal: Negative.    Endocrine: Negative.    Genitourinary: Negative.    Musculoskeletal: Positive for back pain and neck pain.   Skin: Negative.    Allergic/Immunologic: Negative.    Neurological: Positive for weakness. Negative for seizures and speech difficulty.   Hematological: Negative.         History of DVT/PE   Psychiatric/Behavioral: Negative for behavioral problems and confusion.        Allergies:  Dilaudid [hydromorphone hcl], Erythromycin, Ondansetron hcl, Orphenadrine citrate, Guaifenesin & derivatives, Meperidine, and Codeine    Current Outpatient Medications   Medication Sig Dispense Refill   • albuterol (PROVENTIL HFA;VENTOLIN HFA) 108 (90 Base) MCG/ACT inhaler Inhale 2 puffs 4 (Four) Times a Day. 6.7 g 0   • budesonide (PULMICORT) 90 MCG/ACT inhaler 2 puffs three times a day, swallow, do not inhale, for 8 weeks    "  • buPROPion XL (WELLBUTRIN XL) 300 MG 24 hr tablet Take 300 mg by mouth.     • citalopram (CeleXA) 40 MG tablet Take 40 mg by mouth daily.     • Cyanocobalamin (VITAMIN B-12 ER) 1000 MCG tablet controlled-release Take 1,000 mcg by mouth.     • cyclobenzaprine (FLEXERIL) 5 MG tablet Take 5 mg by mouth 3 (Three) Times a Day As Needed.     • Diclofenac Sodium (VOLTAREN) 1 % gel gel APPLY 2 GRAMS FOUR TIMES DAILY TO AFFECTED AREA(S)     • famotidine (PEPCID) 20 MG tablet      • HYDROcodone-acetaminophen (NORCO) 7.5-325 MG per tablet Take 1 tablet by mouth Every 6 (Six) Hours As Needed for Moderate Pain .     • levothyroxine (SYNTHROID, LEVOTHROID) 125 MCG tablet Take 112 mcg by mouth Daily. 112 mcg alternating with 125 mcg     • nitroglycerin (NITROSTAT) 0.4 MG SL tablet 1 under the tongue as needed for angina, may repeat q5mins for up three doses 100 tablet 11   • pantoprazole (PROTONIX) 40 MG EC tablet Take 40 mg by mouth 2 (Two) Times a Day.     • pramipexole (MIRAPEX) 1 MG tablet      • sertraline (ZOLOFT) 50 MG tablet   2   • sucralfate (CARAFATE) 1 g tablet Take 1 g by mouth.     • vitamin D (ERGOCALCIFEROL) 33476 units capsule capsule   6   • celecoxib (CeleBREX) 200 MG capsule      • HYDROcodone-acetaminophen (NORCO) 7.5-325 MG per tablet Take 1 tablet by mouth.     • pramipexole (MIRAPEX) 0.125 MG tablet Take 0.75 mg by mouth 2 (two) times a day.     • promethazine (PHENERGAN) 25 MG suppository Insert 25 mg into the rectum.     • promethazine (PHENERGAN) 25 MG tablet Take 25 mg by mouth.     • sucralfate (CARAFATE) 1 GM/10ML suspension Take 1 g by mouth Daily.       No current facility-administered medications for this visit.       Objective      Vital Signs  Visit Vitals  /83 (BP Location: Right arm, Patient Position: Sitting, Cuff Size: Adult)   Pulse 89   Ht 165.1 cm (65\")   Wt 101 kg (223 lb 1.6 oz)   SpO2 98%   BMI 37.13 kg/m²     Physical Exam   Constitutional: She is oriented to person, place, " and time. She appears well-developed.   HENT:   Head: Normocephalic and atraumatic.   Eyes: Conjunctivae are normal.   Neck: No JVD present. No thyromegaly present.   Cardiovascular: Normal rate, regular rhythm and normal heart sounds.   No murmur heard.  Pulmonary/Chest: Effort normal and breath sounds normal. No respiratory distress. She has no wheezes. She has no rales.   Abdominal: Soft. She exhibits no distension and no mass. There is no abdominal tenderness.   obese   Musculoskeletal: Normal range of motion. No deformity.   Lymphadenopathy:     She has no cervical adenopathy.   Neurological: She is alert and oriented to person, place, and time. No cranial nerve deficit or sensory deficit.   Skin: Skin is warm and dry.       Results Review:   Study Result    EXAMINATION: NM PET SKULL BASE TO MID THIGH- 6/30/2020 11:22 AM CDT     HISTORY: New lung nodule; R91.1-Solitary pulmonary nodule.     Dose:  1. 11.83 mCi FDG F-18 intravenously, the patient's weight equals 214  pounds and serum glucose level 100 mg/dL at the time of FDG injection.  2. 1064 mGycm for the CT portion of the examination.     REPORT: Multiplanar pet imaging was performed with a field-of-view  extending from the skull base to the mid thighs, CT was also performed  for attenuation correction and fusion imaging.     COMPARISON: PET/CT 10/04/2018. CT chest with contrast 06/16/2020.     Physiologic (normal) activity is noted within the head and neck. There  is no abnormal FDG activity associated with the small new lingular  nodule, which measures 9 mm. The fusion CT demonstrates a stable 13 mm  groundglass nodule in the right upper lobe, with no significant FDG  uptake. This most likely represents an area of focal scarring. There is  a tiny subpleural nodule in the left lower lobe which is not calcified  and measures approximately 3 mm. This is stable. There is mild diffuse  emphysema. No pneumothorax or pleural effusion is identified. There is  no  evidence of axillary, mediastinal or hilar lymphadenopathy. There are  a few normal sized lymph nodes in the inferior axilla bilaterally, which  have low-level uptake, with a maximum SUV of 2.7. These may be reactive.  Physiologic activity is noted within the myocardium. Below the  diaphragm, there is physiologic activity within the solid abdominal  organs, collecting structures of the kidneys, ureters and bladder as  expected. There is physiologic activity within the GI tract as expected.  The CT also demonstrates diffuse fatty infiltration of the liver and  evidence previous cholecystectomy. There is a small area of fat sparing  in the upper right hepatic lobe which is stable. There is moderate  atherosclerosis with calcification within the aorta and iliac arteries.  The appendix is normal. There is streak artifact in the pelvis related  to previous surgery. Prior hysterectomy is noted. There is mild sigmoid  diverticulosis without evidence of acute diverticulitis.     IMPRESSION:  1. There is no abnormal FDG activity associated with the small new  nodule in the lingula, which is probably related to focal scarring or  atelectasis. There is no abnormal FDG activity associated with the 15 mm  groundglass nodule in the right upper lobe.  2. There are a few normal sized lymph nodes in the inferior axilla  bilaterally, with low level FDG avidity, these may be reactive lymph  nodes. Correlation with physical examination of both axilla is  suggested. No measurable axillary or intrathoracic lymphadenopathy is  identified.  3. No evidence of active neoplasm is identified.        This report was finalized on 06/30/2020 12:34 by Dr. Tony Chen MD.         Ct Chest With Contrast    Result Date: 12/16/2020  Narrative: EXAM: CT CHEST W CONTRAST- - 12/16/2020 8:21 AM CST  HISTORY: lung nodule; R91.1-Solitary pulmonary nodule   COMPARISON: 6/16/2020.  DOSE LENGTH PRODUCT: 511 mGy cm. Automatic exposure control was utilized  to make radiation dose as low as reasonably achievable.  TECHNIQUE: Enhanced CT images of the chest obtained with multiplanar reformats.  Recommendations for incidental nodule follow-up are based on Fleischner Society recommendations.  FINDINGS:  AIRWAYS/PULMONARY PARENCHYMA: The central airways are midline and patent.  Right upper lobe groundglass opacity measuring about 1.5 cm appears similar to 6/16/2020, 4/29/2019. This has a decreased cystic component compared 1/21/2018.  Lingula with a 0.9 cm pulmonary nodule versus atelectasis on axial series 4, image 82, similar compared to 6/16/2020.  Stable 5 mm pleural-based left lower lobe pulmonary nodule compared to 1/21/2008, which is 3 years of stability.  Emphysema. No pleural effusion or pneumothorax.  VASCULATURE: Thoracic aorta is normal in course and caliber. Mild calcified aortic atherosclerosis. Normal pulmonary artery caliber.  CARDIAC:  Normal heart size.  No pericardial effusion.   MEDIASTINUM: There is no mediastinal or hilar lymphadenopathy by CT size criteria. Esophagus has normal course, caliber and wall thickness. Small hiatal hernia.  EXTRATHORACIC: Thyroid diminutive or absent. No supraclavicular or axillary adenopathy. The extrathoracic soft tissues are within normal limits.  INCLUDED UPPER ABDOMEN: Fatty liver. There are 2 peripheral areas of hyperenhancement anterolaterally and posterior medially, axial series 2, image 113 and 119 respectively. These are similar compared to 1/21/2018, possibly a area of focal fatty sparing or transient hepatic attenuation difference (KATI).  Cholecystectomy clips. No bile duct dilation. Normal appearance of the visualized pancreas, spleen, adrenal glands.  BONES: No acute or suspicious bony finding.       Impression: 1. Stable pulmonary findings compared to prior. Per Fleischner Society, consider follow-up CT chest in 12-18 months, which will be 2 years of follow-up. 2. Fatty liver.  Exam was tagged in PACS with  "\"new lung findings\" to assist in appropriate follow up. This report was finalized on 12/16/2020 08:48 by Dr Neeta Walker MD.      I reviewed the patient's new clinical results. Discussed with patient.    Study Result    Narrative & Impression   CT CHEST W CONTRAST DIAGNOSTIC- 12/8/2021 8:52 AM CST     HISTORY: multiple lung nodules; R91.1-Solitary pulmonary nodule      COMPARISON: 12/16/2020, 6/16/2020 and 4/29/2019     DLP: 485 mGy cm. Automated exposure control was utilized to diminish  patient radiation dose.     TECHNIQUE: Serial helical tomographic images of the chest were acquired  following the infusion of Isovue contrast intravenously. Bone and soft  tissue algorithms were provided.       FINDINGS:   Neck base: The imaged portion of the neck and thyroid gland is  unremarkable.      Lungs: There is a groundglass opacity within the right upper lobe on  image 29. This is essentially unchanged in appearance from the previous  study of 4/29/2019 and may represent an area of asymmetric parenchymal  scarring. An adjacent subpleural nodule posteriorly within the right  upper lobe on image 27 is stable from the previous study. The focus of  pleural-based nodularity within the inferior lingular segment of the  left upper lobe on image 81 is unchanged from the previous study and I  suspect represent some focal atelectasis and/or scarring within this  segment. A subpleural soft tissue nodule within the left posterior  costophrenic angle and lower lobe is stable from previous studies dating  back to 2019. No new lung nodules are present. There are changes of  centrilobular emphysema.. No pleural effusion is present. The trachea  and bronchial tree are patent.      Heart: There is moderate cardiomegaly. Mild coronary calcifications are  present.. There is no pericardial effusion.      Vasculature: Aorta is normal in caliber and appearance without  significant atherosclerosis, stenosis, or aneurysm. Mild " coronary  calcifications are demonstrated. The great vessels are normal in  appearance. The pulmonary arteries are normal in appearance.     Lymph nodes: No enlarged axillary, hilar, or mediastinal lymph nodes.      Bones and soft tissues: There is a focus of fat density within the left  breast. This could be related to previous surgery representing some fat  necrosis versus a lipoma or hamartoma..      Upper abdomen: A moderate size hiatal hernia is present. There is  diffuse steatosis of the liver. The adrenals are unremarkable. The  patient is status post cholecystectomy..      IMPRESSION:  1. Stable pulmonary nodules within the lungs from the previous study  with no new lesions present. Given the lack of change over a more than  two-year period of time I feel benignity would BE suggested. No new lung  lesions are present. There are changes of centrilobular emphysema.  2. Cardiomegaly with mild coronary calcifications present. The thoracic  aorta is normal in caliber.  3. Fat-containing lesion within the upper left breast. This may be  postoperative in nature versus posttraumatic fat necrosis versus a fatty  containing mass. No overlying skin changes are observed.  4. Steatosis of the liver. A moderate size hiatal hernia is present..        This report was finalized on 12/08/2021 10:00 by Dr. Grey Mckeon MD.     Independent review of imaging obtained today is performed by me.  Her right upper lobe abnormality is more consistent with scarring at this time.  The left lower lobe nodule is stable.  Lingular lesion is also stable.  No new additional nodules.  Emphysematous changes are noted.      Assessment/Plan       Diagnoses and all orders for this visit:    1. Multiple lung nodules (Primary)    2. Former heavy tobacco smoker    lingula  RUL 1.5 cm ground glass 2018  Stable  LLL 0.5 cm 2018 Stable  Lingula 0.9 cm 6/2020  Stable    I discussed the patient's findings and my recommendations with Ms. Cruz.   The  previously identified 9 mm pleural-based nodule in the lingula remains stable.  The remaining nodules are also stable.  Joint decision-making was made to proceed with ongoing surveillance.  We discussed signs/symptoms that would suggest an earlier evaluation.      Patient's Body mass index is 37.13 kg/m². BMI is above normal parameters. Recommendations include: referral to primary care to establishing durable lifestyle changes to accomplish a weight acceptable for age and height. She has been congratulated on recent weight loss.     Melly Orosco Anthony  reports that she quit smoking about 3 years ago. She has a 30.00 pack-year smoking history. She has never used smokeless tobacco. I have congratulated her on smoking cessation.    Advance Care Planning   ACP discussion was declined by the patient. Patient does not have an advance directive, declines further assistance.    Return to clinic in 1 year.  If the lingula nodule is stable and no new lung nodules are identified,she can proceed with ongoing screening.

## 2022-01-06 ENCOUNTER — OFFICE VISIT (OUTPATIENT)
Dept: PODIATRY | Facility: CLINIC | Age: 67
End: 2022-01-06

## 2022-01-06 ENCOUNTER — PATIENT ROUNDING (BHMG ONLY) (OUTPATIENT)
Dept: PODIATRY | Facility: CLINIC | Age: 67
End: 2022-01-06

## 2022-01-06 ENCOUNTER — HOSPITAL ENCOUNTER (OUTPATIENT)
Dept: GENERAL RADIOLOGY | Facility: HOSPITAL | Age: 67
Discharge: HOME | End: 2022-01-06
Admitting: PODIATRIST

## 2022-01-06 VITALS — HEART RATE: 88 BPM | BODY MASS INDEX: 37.89 KG/M2 | HEIGHT: 65 IN | WEIGHT: 227.4 LBS | OXYGEN SATURATION: 96 %

## 2022-01-06 DIAGNOSIS — M79.672 FOOT PAIN, LEFT: ICD-10-CM

## 2022-01-06 DIAGNOSIS — M79.672 FOOT PAIN, LEFT: Primary | ICD-10-CM

## 2022-01-06 DIAGNOSIS — G57.61 PLANTAR NEUROMA, RIGHT: ICD-10-CM

## 2022-01-06 PROCEDURE — 99203 OFFICE O/P NEW LOW 30 MIN: CPT | Performed by: PODIATRIST

## 2022-01-06 PROCEDURE — 73630 X-RAY EXAM OF FOOT: CPT

## 2022-01-06 NOTE — PROGRESS NOTES
January 6, 2022    Hello, may I speak with Mellybina Agudeloblair Cruz?    My name is TRACY MATHEW      I am  with [facility]  [address], [address]  formerly [facility] [id number] [phone].    Before we get started may I verify your date of birth? 1955    I am calling to officially welcome you to our practice and ask about your recent visit. Is this a good time to talk? yes    Tell me about your visit with us. What things went well?  EVERYTHING WENT WELL ALL QUESTIONS WERE ANSWERED       We're always looking for ways to make our patients' experiences even better. Do you have recommendations on ways we may improve?  no    Overall were you satisfied with your first visit to our practice? yes       I appreciate you taking the time to speak with me today. Is there anything else I can do for you? no      Thank you, and have a great day.

## 2022-01-06 NOTE — PROGRESS NOTES
[facility] - PODIATRY    Today's Date: 01/06/22    Patient Name: Melly Cruz  MRN: [id number]  CSN: [id number]  PCP: Reynaldo Jeter DO  Referring Provider: Reynaldo Jeter DO    SUBJECTIVE     Chief Complaint   Patient presents with   • Establish Care     PCP05/03/2021 HUB READY TO SCHEDULE- NEW PATIENT- INCOMING REF- LEFT FOOT PAIN- PROGNOTES 11/05/2021 - pt states dropped shampoo bottle on left foot, whenever walking starts to worsen - pt pain 5/10 - pt presents left top foot, swollen and no other visible siting      HPI: Melly Cruz, a 66 y.o.female, comes to clinic as a(n) new patient complaining of foot pain. Patient has h/o acid reflux, anxiety, arthritis, back pain, DVT, Hypothyroid, Migraine, PE. Relates that 3 months ago she dropped a shampoo bottle on the top of her left foot. Since that time the area has slowly gotten more painful and tender when pressed upon. Had xrays done at [facility] but relates they did not see any issues.  Also notes a neuroma to her right foot that is not currently painful. Can't take NSAIDs. Admits pain at 5/10 level and described as aching, nagging and throbbing. Denies previous treatment. Denies any constitutional symptoms. No other pedal complaints at this time.    Past Medical History:   Diagnosis Date   • Acid reflux    • Anxiety    • Arthritis    • Chest pain    • Chronic back pain    • Chronic neck pain    • DVT (deep venous thrombosis) (HCC)    • Hypothyroid    • Hypothyroidism    • Migraine    • PE (pulmonary thromboembolism) (HCC)      Past Surgical History:   Procedure Laterality Date   • CHOLECYSTECTOMY     • COLONOSCOPY     • DILATATION AND CURETTAGE     • HERNIA REPAIR     • HYSTERECTOMY     • OOPHORECTOMY     • REPLACEMENT TOTAL KNEE      Right knee      Family History   Problem Relation Age of Onset   • Breast cancer Paternal Aunt    • Ovarian cancer Neg Hx    • Uterine cancer Neg Hx    • Colon cancer Neg Hx      Social History      Socioeconomic History   • Marital status:    Tobacco Use   • Smoking status: Former Smoker     Packs/day: 1.00     Years: 30.00     Pack years: 30.00     Quit date: 4/1/2018     Years since quitting: 3.7   • Smokeless tobacco: Never Used   Vaping Use   • Vaping Use: Never used   Substance and Sexual Activity   • Alcohol use: Not Currently   • Drug use: No   • Sexual activity: Yes     Partners: Male     Birth control/protection: Surgical     Allergies   Allergen Reactions   • Dilaudid [Hydromorphone Hcl] Anaphylaxis   • Erythromycin Swelling     Throat swells   • Ondansetron Hcl Other (See Comments)     Coded   • Orphenadrine Citrate Anaphylaxis   • Guaifenesin & Derivatives Nausea And Vomiting   • Meperidine      Makes her an angry person   • Codeine Rash     Current Outpatient Medications   Medication Sig Dispense Refill   • albuterol (PROVENTIL HFA;VENTOLIN HFA) 108 (90 Base) MCG/ACT inhaler Inhale 2 puffs 4 (Four) Times a Day. 6.7 g 0   • budesonide (PULMICORT) 90 MCG/ACT inhaler 2 puffs three times a day, swallow, do not inhale, for 8 weeks     • buPROPion XL (WELLBUTRIN XL) 300 MG 24 hr tablet Take 300 mg by mouth.     • citalopram (CeleXA) 40 MG tablet Take 40 mg by mouth daily.     • Cyanocobalamin (VITAMIN B-12 ER) 1000 MCG tablet controlled-release Take 1,000 mcg by mouth.     • cyclobenzaprine (FLEXERIL) 5 MG tablet Take 5 mg by mouth 3 (Three) Times a Day As Needed.     • Diclofenac Sodium (VOLTAREN) 1 % gel gel APPLY 2 GRAMS FOUR TIMES DAILY TO AFFECTED AREA(S)     • famotidine (PEPCID) 20 MG tablet      • HYDROcodone-acetaminophen (NORCO) 7.5-325 MG per tablet Take 1 tablet by mouth Every 6 (Six) Hours As Needed for Moderate Pain .     • HYDROcodone-acetaminophen (NORCO) 7.5-325 MG per tablet Take 1 tablet by mouth.     • levothyroxine (SYNTHROID, LEVOTHROID) 125 MCG tablet Take 112 mcg by mouth Daily. 112 mcg alternating with 125 mcg     • nitroglycerin (NITROSTAT) 0.4 MG SL tablet 1 under  the tongue as needed for angina, may repeat q5mins for up three doses 100 tablet 11   • pantoprazole (PROTONIX) 40 MG EC tablet Take 40 mg by mouth 2 (Two) Times a Day.     • pramipexole (MIRAPEX) 0.125 MG tablet Take 0.75 mg by mouth 2 (two) times a day.     • pramipexole (MIRAPEX) 1 MG tablet      • promethazine (PHENERGAN) 25 MG suppository Insert 25 mg into the rectum.     • promethazine (PHENERGAN) 25 MG tablet Take 25 mg by mouth.     • sertraline (ZOLOFT) 50 MG tablet   2   • sucralfate (CARAFATE) 1 g tablet Take 1 g by mouth.     • sucralfate (CARAFATE) 1 GM/10ML suspension Take 1 g by mouth Daily.     • vitamin D (ERGOCALCIFEROL) 65550 units capsule capsule   6     No current facility-administered medications for this visit.     Review of Systems   Constitutional: Negative for chills and fever.   HENT: Negative for congestion.    Respiratory: Negative for shortness of breath.    Cardiovascular: Negative for chest pain and leg swelling.   Gastrointestinal: Negative for constipation, diarrhea, nausea and vomiting.   Musculoskeletal: Positive for arthralgias, back pain and neck pain.        Foot pain   Skin: Negative for wound.   Neurological: Positive for numbness.       OBJECTIVE     Vitals:    01/06/22 0921   Pulse: 88   SpO2: 96%       PHYSICAL EXAM  GEN:   Accompanied by none.     Foot/Ankle Exam:       General:   Appearance: appears stated age and healthy and obesity    Orientation: AAOx3    Affect: appropriate    Gait: antalgic    Gait comment:  To left  Assistance: independent    Shoe Gear:  Casual shoes    VASCULAR      Right Foot Vascularity   Dorsalis pedis:  2+  Posterior tibial:  2+  Skin Temperature: warm    Edema Grading:  None  CFT:  3  Pedal Hair Growth:  Present  Varicosities: spider veins       Left Foot Vascularity   Dorsalis pedis:  2+  Posterior tibial:  2+  Skin Temperature: warm    Edema Grading:  None  CFT:  3  Pedal Hair Growth:  Present  Varicosities: spider veins         NEUROLOGIC     Right Foot Neurologic   Light touch sensation:  Diminished  Vibratory sensation:  Normal  Hot/Cold sensation: normal    Protective Sensation using [place] Monofilament:  10     Left Foot Neurologic   Light touch sensation:  Diminished  Vibratory sensation:  Normal  Hot/cold sensation: normal    Protective Sensation using [place] Monofilament:  10     MUSCULOSKELETAL      Right Foot Musculoskeletal   Ecchymosis:  None  Tenderness: neuroma    Tenderness comment:  Plantar 3rd interspace  Arch:  Normal  Hammertoe:  Second toe, third toe, fourth toe and fifth toe     Left Foot Musculoskeletal   Ecchymosis:  None  Tenderness: dorsal foot    Arch:  Normal  Hammertoe:  Second toe, third toe, fourth toe and fifth toe     MUSCLE STRENGTH     Right Foot Muscle Strength   Foot dorsiflexion:  5  Foot plantar flexion:  5  Foot inversion:  5  Foot eversion:  5     Left Foot Muscle Strength   Foot dorsiflexion:  5  Foot plantar flexion:  5  Foot inversion:  5  Foot eversion:  5     RANGE OF MOTION      Right Foot Range of Motion   Foot and ankle ROM within normal limits       Left Foot Range of Motion   Foot and ankle ROM within normal limits    active eversion pain    active inversion pain       DERMATOLOGIC     Right Foot Dermatologic   Skin: skin intact    Nails: onychomycosis and abnormally thick       Left Foot Dermatologic   Skin: skin intact    Nails: onychomycosis and abnormally thick       Image:       RADIOLOGY/NUCLEAR:  CT Chest With Contrast Diagnostic    Result Date: 12/8/2021  Narrative: CT CHEST W CONTRAST DIAGNOSTIC- 12/8/2021 8:52 AM CST  HISTORY: multiple lung nodules; R91.1-Solitary pulmonary nodule  COMPARISON: 12/16/2020, 6/16/2020 and 4/29/2019  DLP: 485 mGy cm. Automated exposure control was utilized to diminish patient radiation dose.  TECHNIQUE: Serial helical tomographic images of the chest were acquired following the infusion of Isovue contrast intravenously. Bone and soft  tissue algorithms were provided.   FINDINGS: Neck base: The imaged portion of the neck and thyroid gland is unremarkable.  Lungs: There is a groundglass opacity within the right upper lobe on image 29. This is essentially unchanged in appearance from the previous study of 4/29/2019 and may represent an area of asymmetric parenchymal scarring. An adjacent subpleural nodule posteriorly within the right upper lobe on image 27 is stable from the previous study. The focus of pleural-based nodularity within the inferior lingular segment of the left upper lobe on image 81 is unchanged from the previous study and I suspect represent some focal atelectasis and/or scarring within this segment. A subpleural soft tissue nodule within the left posterior costophrenic angle and lower lobe is stable from previous studies dating back to 2019. No new lung nodules are present. There are changes of centrilobular emphysema.. No pleural effusion is present. The trachea and bronchial tree are patent.  Heart: There is moderate cardiomegaly. Mild coronary calcifications are present.. There is no pericardial effusion.  Vasculature: Aorta is normal in caliber and appearance without significant atherosclerosis, stenosis, or aneurysm. Mild coronary calcifications are demonstrated. The great vessels are normal in appearance. The pulmonary arteries are normal in appearance.  Lymph nodes: No enlarged axillary, hilar, or mediastinal lymph nodes.  Bones and soft tissues: There is a focus of fat density within the left breast. This could be related to previous surgery representing some fat necrosis versus a lipoma or hamartoma..  Upper abdomen: A moderate size hiatal hernia is present. There is diffuse steatosis of the liver. The adrenals are unremarkable. The patient is status post cholecystectomy..      Impression: 1. Stable pulmonary nodules within the lungs from the previous study with no new lesions present. Given the lack of change over a more  than two-year period of time I feel benignity would BE suggested. No new lung lesions are present. There are changes of centrilobular emphysema. 2. Cardiomegaly with mild coronary calcifications present. The thoracic aorta is normal in caliber. 3. Fat-containing lesion within the upper left breast. This may be postoperative in nature versus posttraumatic fat necrosis versus a fatty containing mass. No overlying skin changes are observed. 4. Steatosis of the liver. A moderate size hiatal hernia is present..   This report was finalized on 12/08/2021 10:00 by Dr. Grey Mckeon MD.      LABORATORY/CULTURE RESULTS:      PATHOLOGY RESULTS:       ASSESSMENT/PLAN     Diagnoses and all orders for this visit:    1. Foot pain, left (Primary)  -     XR Foot 3+ View Bilateral; Future    2. Plantar neuroma, right      Comprehensive lower extremity examination and evaluation was performed.  Discussed findings and treatment plan including risks, benefits, and treatment options with patient in detail. Patient agreed with treatment plan.  Concerned for midfoot arthritis vs avulsion fracture.    Will order new xrays to further evaluate. May ultimately need CT.  Initially will place in CAM for immobilization.    An After Visit Summary was printed and given to the patient at discharge, including (if requested) any available informative/educational handouts regarding diagnosis, treatment, or medications. All questions were answered to patient/family satisfaction. Should symptoms fail to improve or worsen they agree to call or return to clinic or to go to the Emergency Department. Discussed the importance of following up with any needed screening tests/labs/specialist appointments and any requested follow-up recommended by me today. Importance of maintaining follow-up discussed and patient accepts that missed appointments can delay diagnosis and potentially lead to worsening of conditions.  Return in about 1 month (around 2/6/2022) for  Follow-up with Dr. Maradiaga., or sooner if acute issues arise.    Lab Frequency Next Occurrence   CT chest w contrast Once 12/15/2022       This document has been electronically signed by Bud Maradiaga DPM on January 6, 2022 09:52 CST

## 2022-01-12 ENCOUNTER — TRANSCRIBE ORDERS (OUTPATIENT)
Dept: NEUROLOGY | Facility: HOSPITAL | Age: 67
End: 2022-01-12

## 2022-01-12 DIAGNOSIS — M54.16 LUMBAR RADICULOPATHY: Primary | ICD-10-CM

## 2022-01-27 ENCOUNTER — APPOINTMENT (OUTPATIENT)
Dept: MRI IMAGING | Facility: HOSPITAL | Age: 67
End: 2022-01-27

## 2022-01-31 ENCOUNTER — TRANSCRIBE ORDERS (OUTPATIENT)
Dept: ADMINISTRATIVE | Facility: HOSPITAL | Age: 67
End: 2022-01-31

## 2022-01-31 DIAGNOSIS — M54.16 LUMBAR RADICULOPATHY: Primary | ICD-10-CM

## 2022-02-07 NOTE — PROGRESS NOTES
[facility] - PODIATRY    Today's Date: 02/08/22    Patient Name: Melly Cruz  MRN: [id number]  CSN: [id number]  PCP: Reynaldo Jeter DO  Referring Provider: No ref. provider found    SUBJECTIVE     Chief Complaint   Patient presents with   • Follow-up     Reynaldo Jeter DO pcp05/03/2021 1 MTH FU Foot pain, left - pt states out of boot its about same, in boot it feels much better- pt pain 7/10 without boot- pt present in walking boot left foot, pain on top of left foot     HPI: Melly Cruz, a 66 y.o.female, comes to clinic as a(n) established patient for follow-up treatment of left foot pain. Patient has h/o acid reflux, anxiety, arthritis, back pain, DVT, Hypothyroid, Migraine, PE. Patient presents for re-evaluation of left dorsal foot pain. States that when wearing the boot she has 70-80% improvement in the pain. When attempting to walk barefoot or in regular shoe, pain increases in severity up to 7/10. She did get x-rays after last appointment.  Admits pain at 7/10 level and described as aching, nagging and throbbing. Relates previous treatment(s) including CAM boot. Denies any constitutional symptoms. No other pedal complaints at this time.    Past Medical History:   Diagnosis Date   • Acid reflux    • Anxiety    • Arthritis    • Chest pain    • Chronic back pain    • Chronic neck pain    • DVT (deep venous thrombosis) (HCC)    • Hypothyroid    • Hypothyroidism    • Migraine    • PE (pulmonary thromboembolism) (HCC)      Past Surgical History:   Procedure Laterality Date   • CHOLECYSTECTOMY     • COLONOSCOPY     • DILATATION AND CURETTAGE     • HERNIA REPAIR     • HYSTERECTOMY     • OOPHORECTOMY     • REPLACEMENT TOTAL KNEE      Right knee      Family History   Problem Relation Age of Onset   • Breast cancer Paternal Aunt    • Ovarian cancer Neg Hx    • Uterine cancer Neg Hx    • Colon cancer Neg Hx      Social History     Socioeconomic History   • Marital status:     Tobacco Use   • Smoking status: Former Smoker     Packs/day: 1.00     Years: 30.00     Pack years: 30.00     Quit date: 4/1/2018     Years since quitting: 3.8   • Smokeless tobacco: Never Used   Vaping Use   • Vaping Use: Never used   Substance and Sexual Activity   • Alcohol use: Not Currently   • Drug use: No   • Sexual activity: Yes     Partners: Male     Birth control/protection: Surgical     Allergies   Allergen Reactions   • Dilaudid [Hydromorphone Hcl] Anaphylaxis   • Erythromycin Swelling     Throat swells   • Ondansetron Hcl Other (See Comments)     Coded   • Orphenadrine Citrate Anaphylaxis   • Guaifenesin & Derivatives Nausea And Vomiting   • Meperidine      Makes her an angry person   • Codeine Rash     Current Outpatient Medications   Medication Sig Dispense Refill   • albuterol (PROVENTIL HFA;VENTOLIN HFA) 108 (90 Base) MCG/ACT inhaler Inhale 2 puffs 4 (Four) Times a Day. 6.7 g 0   • budesonide (PULMICORT) 90 MCG/ACT inhaler 2 puffs three times a day, swallow, do not inhale, for 8 weeks     • buPROPion XL (WELLBUTRIN XL) 300 MG 24 hr tablet Take 300 mg by mouth.     • citalopram (CeleXA) 40 MG tablet Take 40 mg by mouth daily.     • Cyanocobalamin (VITAMIN B-12 ER) 1000 MCG tablet controlled-release Take 1,000 mcg by mouth.     • cyclobenzaprine (FLEXERIL) 5 MG tablet Take 5 mg by mouth 3 (Three) Times a Day As Needed.     • Diclofenac Sodium (VOLTAREN) 1 % gel gel APPLY 2 GRAMS FOUR TIMES DAILY TO AFFECTED AREA(S)     • famotidine (PEPCID) 20 MG tablet      • HYDROcodone-acetaminophen (NORCO) 7.5-325 MG per tablet Take 1 tablet by mouth Every 6 (Six) Hours As Needed for Moderate Pain .     • HYDROcodone-acetaminophen (NORCO) 7.5-325 MG per tablet Take 1 tablet by mouth.     • levothyroxine (SYNTHROID, LEVOTHROID) 125 MCG tablet Take 112 mcg by mouth Daily. 112 mcg alternating with 125 mcg     • nitroglycerin (NITROSTAT) 0.4 MG SL tablet 1 under the tongue as needed for angina, may repeat q5mins for  up three doses 100 tablet 11   • pantoprazole (PROTONIX) 40 MG EC tablet Take 40 mg by mouth 2 (Two) Times a Day.     • pramipexole (MIRAPEX) 0.125 MG tablet Take 0.75 mg by mouth 2 (two) times a day.     • pramipexole (MIRAPEX) 1 MG tablet      • promethazine (PHENERGAN) 25 MG suppository Insert 25 mg into the rectum.     • promethazine (PHENERGAN) 25 MG tablet Take 25 mg by mouth.     • sertraline (ZOLOFT) 50 MG tablet   2   • sucralfate (CARAFATE) 1 g tablet Take 1 g by mouth.     • sucralfate (CARAFATE) 1 GM/10ML suspension Take 1 g by mouth Daily.     • vitamin D (ERGOCALCIFEROL) 75071 units capsule capsule   6     No current facility-administered medications for this visit.     Review of Systems   Constitutional: Negative for chills and fever.   HENT: Negative for congestion.    Respiratory: Negative for shortness of breath.    Cardiovascular: Negative for chest pain and leg swelling.   Gastrointestinal: Negative for constipation, diarrhea, nausea and vomiting.   Musculoskeletal: Positive for arthralgias, back pain and neck pain.        Foot pain   Skin: Negative for wound.   Neurological: Positive for numbness.       OBJECTIVE     Vitals:    02/08/22 1123   BP: 124/64   Pulse: 85   SpO2: 96%       PHYSICAL EXAM  GEN:   Accompanied by none.     Foot/Ankle Exam:       General:   Appearance: appears stated age and healthy and obesity    Orientation: AAOx3    Affect: appropriate    Gait: antalgic    Gait comment:  To left  Assistance: independent    Shoe Gear:  Casual shoes and CAM boot    VASCULAR      Right Foot Vascularity   Dorsalis pedis:  2+  Posterior tibial:  2+  Skin Temperature: warm    Edema Grading:  None  CFT:  3  Pedal Hair Growth:  Present  Varicosities: spider veins       Left Foot Vascularity   Dorsalis pedis:  2+  Posterior tibial:  2+  Skin Temperature: warm    Edema Grading:  None  CFT:  3  Pedal Hair Growth:  Present  Varicosities: spider veins        NEUROLOGIC     Right Foot Neurologic    Light touch sensation:  Diminished  Vibratory sensation:  Normal  Hot/Cold sensation: normal    Protective Sensation using [place] Monofilament:  10     Left Foot Neurologic   Light touch sensation:  Diminished  Vibratory sensation:  Normal  Hot/cold sensation: normal    Protective Sensation using [place] Monofilament:  10     MUSCULOSKELETAL      Right Foot Musculoskeletal   Ecchymosis:  None  Tenderness: neuroma    Tenderness comment:  Plantar 3rd interspace  Arch:  Normal  Hammertoe:  Second toe, third toe, fourth toe and fifth toe     Left Foot Musculoskeletal   Ecchymosis:  None  Tenderness: dorsal foot    Arch:  Normal  Hammertoe:  Second toe, third toe, fourth toe and fifth toe     MUSCLE STRENGTH     Right Foot Muscle Strength   Foot dorsiflexion:  5  Foot plantar flexion:  5  Foot inversion:  5  Foot eversion:  5     Left Foot Muscle Strength   Foot dorsiflexion:  5  Foot plantar flexion:  5  Foot inversion:  5  Foot eversion:  5     RANGE OF MOTION      Right Foot Range of Motion   Foot and ankle ROM within normal limits       Left Foot Range of Motion   Foot and ankle ROM within normal limits    active eversion pain    active inversion pain       DERMATOLOGIC     Right Foot Dermatologic   Skin: skin intact    Nails: onychomycosis and abnormally thick       Left Foot Dermatologic   Skin: skin intact    Nails: onychomycosis and abnormally thick       Image:       RADIOLOGY/NUCLEAR:  No results found.    LABORATORY/CULTURE RESULTS:      PATHOLOGY RESULTS:       ASSESSMENT/PLAN     Diagnoses and all orders for this visit:    1. Foot pain, left (Primary)    2. Neurapraxia of left lower extremity, subsequent encounter      Comprehensive lower extremity examination and evaluation was performed.  Discussed findings and treatment plan including risks, benefits, and treatment options with patient in detail. Patient agreed with treatment plan.  Xrays reviewed with patient. Arthritic changes noted  but no other bony abnormalities seen   Continue CAM for mobilization to relieve dorsal foot pressure/pain.  May use OTC Voltaren gel or Lidocaine cream.   May consider additional imaging in the future if symptoms fail to improve with continued conservative measures.    An After Visit Summary was printed and given to the patient at discharge, including (if requested) any available informative/educational handouts regarding diagnosis, treatment, or medications. All questions were answered to patient/family satisfaction. Should symptoms fail to improve or worsen they agree to call or return to clinic or to go to the Emergency Department. Discussed the importance of following up with any needed screening tests/labs/specialist appointments and any requested follow-up recommended by me today. Importance of maintaining follow-up discussed and patient accepts that missed appointments can delay diagnosis and potentially lead to worsening of conditions.  Return in about 1 month (around 3/8/2022)., or sooner if acute issues arise.    Lab Frequency Next Occurrence   CT chest w contrast Once 12/15/2022       This document has been electronically signed by Bud Maradiaga DPM on February 8, 2022 11:52 CST

## 2022-02-08 ENCOUNTER — OFFICE VISIT (OUTPATIENT)
Dept: PODIATRY | Facility: CLINIC | Age: 67
End: 2022-02-08

## 2022-02-08 VITALS
WEIGHT: 227 LBS | HEIGHT: 65 IN | OXYGEN SATURATION: 96 % | HEART RATE: 85 BPM | BODY MASS INDEX: 37.82 KG/M2 | SYSTOLIC BLOOD PRESSURE: 124 MMHG | DIASTOLIC BLOOD PRESSURE: 64 MMHG

## 2022-02-08 DIAGNOSIS — M79.672 FOOT PAIN, LEFT: Primary | ICD-10-CM

## 2022-02-08 DIAGNOSIS — S84.92XD NEURAPRAXIA OF LEFT LOWER EXTREMITY, SUBSEQUENT ENCOUNTER: ICD-10-CM

## 2022-02-08 PROCEDURE — 99213 OFFICE O/P EST LOW 20 MIN: CPT | Performed by: PODIATRIST

## 2022-02-18 ENCOUNTER — APPOINTMENT (OUTPATIENT)
Dept: MRI IMAGING | Facility: HOSPITAL | Age: 67
End: 2022-02-18

## 2022-03-08 NOTE — PROGRESS NOTES
[facility] - PODIATRY    Today's Date: 03/10/22    Patient Name: Melly Cruz  MRN: [id number]  CSN: [id number]  PCP: Reynaldo Jeter DO  Referring Provider: No ref. provider found    SUBJECTIVE     Chief Complaint   Patient presents with   • Follow-up     Reynaldo Jeter, DOPCP05/03/2021 1 month fu - pt states as long as wearing the boot, doing good without the boot it does hurt - pt denies pain - pt presents walking boot, 1 month follow up      HPI: Melly Cruz, a 66 y.o.female, comes to clinic as a(n) established patient for follow-up treatment of left foot pain. Patient has h/o acid reflux, anxiety, arthritis, back pain, DVT, Hypothyroid, Migraine, PE. Patient presents for re-evaluation of left dorsal foot pain. States that when wearing the boot she has 70-80% improvement in the pain. When attempting to walk barefoot or in regular shoe, pain increases in severity up to 7/10. Relates no significant change from previous.  Denies pain today while wearing CAM. Relates previous treatment(s) including CAM boot. Denies any constitutional symptoms. No other pedal complaints at this time.    Past Medical History:   Diagnosis Date   • Acid reflux    • Anxiety    • Arthritis    • Chest pain    • Chronic back pain    • Chronic neck pain    • DVT (deep venous thrombosis) (HCC)    • Hypothyroid    • Hypothyroidism    • Migraine    • PE (pulmonary thromboembolism) (HCC)      Past Surgical History:   Procedure Laterality Date   • CHOLECYSTECTOMY     • COLONOSCOPY     • DILATATION AND CURETTAGE     • HERNIA REPAIR     • HYSTERECTOMY     • OOPHORECTOMY     • REPLACEMENT TOTAL KNEE      Right knee      Family History   Problem Relation Age of Onset   • Breast cancer Paternal Aunt    • Ovarian cancer Neg Hx    • Uterine cancer Neg Hx    • Colon cancer Neg Hx      Social History     Socioeconomic History   • Marital status:    Tobacco Use   • Smoking status: Former Smoker     Packs/day:  1.00     Years: 30.00     Pack years: 30.00     Quit date: 4/1/2018     Years since quitting: 3.9   • Smokeless tobacco: Never Used   Vaping Use   • Vaping Use: Never used   Substance and Sexual Activity   • Alcohol use: Not Currently   • Drug use: No   • Sexual activity: Yes     Partners: Male     Birth control/protection: Surgical     Allergies   Allergen Reactions   • Dilaudid [Hydromorphone Hcl] Anaphylaxis   • Erythromycin Swelling     Throat swells   • Ondansetron Hcl Other (See Comments)     Coded   • Orphenadrine Citrate Anaphylaxis   • Guaifenesin & Derivatives Nausea And Vomiting   • Meperidine      Makes her an angry person   • Codeine Rash     Current Outpatient Medications   Medication Sig Dispense Refill   • albuterol (PROVENTIL HFA;VENTOLIN HFA) 108 (90 Base) MCG/ACT inhaler Inhale 2 puffs 4 (Four) Times a Day. 6.7 g 0   • budesonide (PULMICORT) 90 MCG/ACT inhaler 2 puffs three times a day, swallow, do not inhale, for 8 weeks     • buPROPion XL (WELLBUTRIN XL) 300 MG 24 hr tablet Take 300 mg by mouth.     • citalopram (CeleXA) 40 MG tablet Take 40 mg by mouth daily.     • Cyanocobalamin (VITAMIN B-12 ER) 1000 MCG tablet controlled-release Take 1,000 mcg by mouth.     • cyclobenzaprine (FLEXERIL) 5 MG tablet Take 5 mg by mouth 3 (Three) Times a Day As Needed.     • Diclofenac Sodium (VOLTAREN) 1 % gel gel APPLY 2 GRAMS FOUR TIMES DAILY TO AFFECTED AREA(S)     • famotidine (PEPCID) 20 MG tablet      • HYDROcodone-acetaminophen (NORCO) 7.5-325 MG per tablet Take 1 tablet by mouth Every 6 (Six) Hours As Needed for Moderate Pain .     • HYDROcodone-acetaminophen (NORCO) 7.5-325 MG per tablet Take 1 tablet by mouth.     • levothyroxine (SYNTHROID, LEVOTHROID) 125 MCG tablet Take 112 mcg by mouth Daily. 112 mcg alternating with 125 mcg     • nitroglycerin (NITROSTAT) 0.4 MG SL tablet 1 under the tongue as needed for angina, may repeat q5mins for up three doses 100 tablet 11   • pantoprazole (PROTONIX) 40 MG  EC tablet Take 40 mg by mouth 2 (Two) Times a Day.     • pramipexole (MIRAPEX) 0.125 MG tablet Take 0.75 mg by mouth 2 (two) times a day.     • pramipexole (MIRAPEX) 1 MG tablet      • promethazine (PHENERGAN) 25 MG suppository Insert 25 mg into the rectum.     • promethazine (PHENERGAN) 25 MG tablet Take 25 mg by mouth.     • sertraline (ZOLOFT) 50 MG tablet   2   • sucralfate (CARAFATE) 1 g tablet Take 1 g by mouth.     • sucralfate (CARAFATE) 1 GM/10ML suspension Take 1 g by mouth Daily.     • tiZANidine (ZANAFLEX) 4 MG tablet Take 10 mg by mouth At Night As Needed for Muscle Spasms.     • vitamin D (ERGOCALCIFEROL) 72667 units capsule capsule   6     No current facility-administered medications for this visit.     Review of Systems   Constitutional: Negative for chills and fever.   HENT: Negative for congestion.    Respiratory: Negative for shortness of breath.    Cardiovascular: Negative for chest pain and leg swelling.   Gastrointestinal: Negative for constipation, diarrhea, nausea and vomiting.   Musculoskeletal: Positive for arthralgias, back pain and neck pain.        Foot pain   Skin: Negative for wound.   Neurological: Positive for numbness.       OBJECTIVE     Vitals:    03/10/22 1319   BP: 115/85   Pulse: 90   SpO2: 95%       PHYSICAL EXAM  GEN:   Accompanied by none.     Foot/Ankle Exam:       General:   Appearance: appears stated age and healthy and obesity    Orientation: AAOx3    Affect: appropriate    Gait: antalgic    Gait comment:  To left  Assistance: independent    Shoe Gear:  Casual shoes and CAM boot    VASCULAR      Right Foot Vascularity   Dorsalis pedis:  2+  Posterior tibial:  2+  Skin Temperature: warm    Edema Grading:  None  CFT:  3  Pedal Hair Growth:  Present  Varicosities: spider veins       Left Foot Vascularity   Dorsalis pedis:  2+  Posterior tibial:  2+  Skin Temperature: warm    Edema Grading:  None  CFT:  3  Pedal Hair Growth:  Present  Varicosities: spider veins         NEUROLOGIC     Right Foot Neurologic   Light touch sensation:  Diminished  Vibratory sensation:  Normal  Hot/Cold sensation: normal    Protective Sensation using [place] Monofilament:  10     Left Foot Neurologic   Light touch sensation:  Diminished  Vibratory sensation:  Normal  Hot/cold sensation: normal    Protective Sensation using [place] Monofilament:  10     MUSCULOSKELETAL      Right Foot Musculoskeletal   Ecchymosis:  None  Tenderness: neuroma    Tenderness comment:  Plantar 3rd interspace  Arch:  Normal  Hamm[place]oe:  Second toe, third toe, fourth toe and fifth toe     Left Foot Musculoskeletal   Ecchymosis:  None  Tenderness: dorsal foot    Arch:  Normal  Hamm[place]oe:  Second toe, third toe, fourth toe and fifth toe     MUSCLE STRENGTH     Right Foot Muscle Strength   Foot dorsiflexion:  5  Foot plantar flexion:  5  Foot inversion:  5  Foot eversion:  5     Left Foot Muscle Strength   Foot dorsiflexion:  5  Foot plantar flexion:  5  Foot inversion:  5  Foot eversion:  5     RANGE OF MOTION      Right Foot Range of Motion   Foot and ankle ROM within normal limits       Left Foot Range of Motion   Foot and ankle ROM within normal limits    active eversion pain    active inversion pain       DERMATOLOGIC     Right Foot Dermatologic   Skin: skin intact    Nails: onychomycosis and abnormally thick       Left Foot Dermatologic   Skin: skin intact    Nails: onychomycosis and abnormally thick       Image:       RADIOLOGY/NUCLEAR:  No results found.    LABORATORY/CULTURE RESULTS:      PATHOLOGY RESULTS:       ASSESSMENT/PLAN     Diagnoses and all orders for this visit:    1. Foot pain, left (Primary)  -     CT FOOT LEFT WITHOUT CONTRAST; Future    2. Arthritis of midfoot  -     CT FOOT LEFT WITHOUT CONTRAST; Future    3. Neurapraxia of left lower extremity, subsequent encounter      Comprehensive lower extremity examination and evaluation was performed.  Discussed findings and treatment plan  including risks, benefits, and treatment options with patient in detail. Patient agreed with treatment plan.  Xrays reviewed with patient. Arthritic changes noted but no other bony abnormalities seen   Continue CAM for mobilization to relieve dorsal foot pressure/pain.  Will order CT for further evaluation.   Discussed potential need for surgery if pain fails to improve.  An After Visit Summary was printed and given to the patient at discharge, including (if requested) any available informative/educational handouts regarding diagnosis, treatment, or medications. All questions were answered to patient/family satisfaction. Should symptoms fail to improve or worsen they agree to call or return to clinic or to go to the Emergency Department. Discussed the importance of following up with any needed screening tests/labs/specialist appointments and any requested follow-up recommended by me today. Importance of maintaining follow-up discussed and patient accepts that missed appointments can delay diagnosis and potentially lead to worsening of conditions.  Return in about 3 weeks (around 3/31/2022) for Follow-up with Dr. Maradiaga., or sooner if acute issues arise.    Lab Frequency Next Occurrence   CT chest w contrast Once 12/15/2022       This document has been electronically signed by Bud Maradiaga DPM on March 10, 2022 13:32 CST

## 2022-03-09 ENCOUNTER — TELEPHONE (OUTPATIENT)
Dept: PODIATRY | Facility: CLINIC | Age: 67
End: 2022-03-09

## 2022-03-09 NOTE — TELEPHONE ENCOUNTER
Called patient regarding appt on 03/10/2022. Left message for patient to return call if any questions or concerns arise.

## 2022-03-10 ENCOUNTER — OFFICE VISIT (OUTPATIENT)
Dept: PODIATRY | Facility: CLINIC | Age: 67
End: 2022-03-10

## 2022-03-10 VITALS
DIASTOLIC BLOOD PRESSURE: 85 MMHG | WEIGHT: 228 LBS | HEIGHT: 65 IN | OXYGEN SATURATION: 95 % | SYSTOLIC BLOOD PRESSURE: 115 MMHG | HEART RATE: 90 BPM | BODY MASS INDEX: 37.99 KG/M2

## 2022-03-10 DIAGNOSIS — M79.672 FOOT PAIN, LEFT: Primary | ICD-10-CM

## 2022-03-10 DIAGNOSIS — S84.92XD NEURAPRAXIA OF LEFT LOWER EXTREMITY, SUBSEQUENT ENCOUNTER: ICD-10-CM

## 2022-03-10 DIAGNOSIS — M19.079 ARTHRITIS OF MIDFOOT: ICD-10-CM

## 2022-03-10 PROCEDURE — 99213 OFFICE O/P EST LOW 20 MIN: CPT | Performed by: PODIATRIST

## 2022-03-10 RX ORDER — TIZANIDINE 4 MG/1
10 TABLET ORAL NIGHTLY PRN
Status: ON HOLD | COMMUNITY
End: 2023-01-25 | Stop reason: DRUGHIGH

## 2022-03-14 ENCOUNTER — APPOINTMENT (OUTPATIENT)
Dept: CT IMAGING | Facility: HOSPITAL | Age: 67
End: 2022-03-14

## 2022-03-14 ENCOUNTER — HOSPITAL ENCOUNTER (OUTPATIENT)
Dept: CT IMAGING | Facility: HOSPITAL | Age: 67
Discharge: HOME | End: 2022-03-14
Admitting: PODIATRIST

## 2022-03-14 DIAGNOSIS — M19.079 ARTHRITIS OF MIDFOOT: ICD-10-CM

## 2022-03-14 DIAGNOSIS — M79.672 FOOT PAIN, LEFT: ICD-10-CM

## 2022-03-14 PROCEDURE — 73700 CT LOWER EXTREMITY W/O DYE: CPT

## 2022-03-28 NOTE — PROGRESS NOTES
[facility] - PODIATRY    Today's Date: 03/31/22    Patient Name: Melly Cruz  MRN: [id number]  CSN: [id number]  PCP: Reynaldo Jeter DO  Referring Provider: No ref. provider found    SUBJECTIVE     Chief Complaint   Patient presents with   • Follow-up     Reynaldo Jeter, DOp05/03/2021 3 week fu foot care - pt states hurts without the boot but doing ok - pt pain without the boot 7/10 and with the boot walking for so long left foot will start to hurt - pt presents 3 week follow up with left foot pain with xrays     HPI: Melly Cruz, a 66 y.o.female, comes to clinic as a(n) established patient for follow-up treatment of left foot pain from arthritis and deformities. Patient has h/o acid reflux, anxiety, arthritis, back pain, DVT, Hypothyroid, Migraine, PE. Patient presents for re-evaluation of left dorsal foot pain. States that when wearing the boot she has 70-80% improvement in the pain. When attempting to walk barefoot or in regular shoe, pain increases in severity up to 7/10. Relates no significant change from previous.  Patient had her CT scan performed.  She is interested in surgical correction for improvement in pain. Admits pain at 7/10 level and described as aching, nagging and throbbing. Relates previous treatment(s) including CAM boot. Denies any constitutional symptoms. No other pedal complaints at this time.    Past Medical History:   Diagnosis Date   • Acid reflux    • Anxiety    • Arthritis    • Chest pain    • Chronic back pain    • Chronic neck pain    • DVT (deep venous thrombosis) (HCC)    • Hypothyroid    • Hypothyroidism    • Migraine    • PE (pulmonary thromboembolism) (HCC)      Past Surgical History:   Procedure Laterality Date   • CHOLECYSTECTOMY     • COLONOSCOPY     • DILATATION AND CURETTAGE     • HERNIA REPAIR     • HYSTERECTOMY     • OOPHORECTOMY     • REPLACEMENT TOTAL KNEE      Right knee      Family History   Problem Relation Age of Onset   •  Breast cancer Paternal Aunt    • Ovarian cancer Neg Hx    • Uterine cancer Neg Hx    • Colon cancer Neg Hx      Social History     Socioeconomic History   • Marital status:    Tobacco Use   • Smoking status: Former Smoker     Packs/day: 1.00     Years: 30.00     Pack years: 30.00     Quit date: 2018     Years since quittin.0   • Smokeless tobacco: Never Used   Vaping Use   • Vaping Use: Never used   Substance and Sexual Activity   • Alcohol use: Not Currently   • Drug use: No   • Sexual activity: Yes     Partners: Male     Birth control/protection: Surgical     Allergies   Allergen Reactions   • Dilaudid [Hydromorphone Hcl] Anaphylaxis   • Erythromycin Swelling     Throat swells   • Ondansetron Hcl Other (See Comments)     Coded   • Orphenadrine Citrate Anaphylaxis   • Guaifenesin & Derivatives Nausea And Vomiting   • Meperidine      Makes her an angry person   • Codeine Rash     Current Outpatient Medications   Medication Sig Dispense Refill   • albuterol (PROVENTIL HFA;VENTOLIN HFA) 108 (90 Base) MCG/ACT inhaler Inhale 2 puffs 4 (Four) Times a Day. 6.7 g 0   • budesonide (PULMICORT) 90 MCG/ACT inhaler 2 puffs three times a day, swallow, do not inhale, for 8 weeks     • buPROPion XL (WELLBUTRIN XL) 300 MG 24 hr tablet Take 300 mg by mouth.     • citalopram (CeleXA) 40 MG tablet Take 40 mg by mouth daily.     • Cyanocobalamin (VITAMIN B-12 ER) 1000 MCG tablet controlled-release Take 1,000 mcg by mouth.     • cyclobenzaprine (FLEXERIL) 5 MG tablet Take 5 mg by mouth 3 (Three) Times a Day As Needed.     • Diclofenac Sodium (VOLTAREN) 1 % gel gel APPLY 2 GRAMS FOUR TIMES DAILY TO AFFECTED AREA(S)     • famotidine (PEPCID) 20 MG tablet      • HYDROcodone-acetaminophen (NORCO) 7.5-325 MG per tablet Take 1 tablet by mouth Every 6 (Six) Hours As Needed for Moderate Pain .     • HYDROcodone-acetaminophen (NORCO) 7.5-325 MG per tablet Take 1 tablet by mouth.     • levothyroxine (SYNTHROID, LEVOTHROID) 125 MCG  tablet Take 112 mcg by mouth Daily. 112 mcg alternating with 125 mcg     • nitroglycerin (NITROSTAT) 0.4 MG SL tablet 1 under the tongue as needed for angina, may repeat q5mins for up three doses 100 tablet 11   • omeprazole (priLOSEC) 40 MG capsule Take 40 mg by mouth Daily.     • pantoprazole (PROTONIX) 40 MG EC tablet Take 40 mg by mouth 2 (Two) Times a Day.     • pramipexole (MIRAPEX) 0.125 MG tablet Take 0.75 mg by mouth 2 (two) times a day.     • pramipexole (MIRAPEX) 1 MG tablet      • pregabalin (LYRICA) 50 MG capsule Take 50 mg by mouth 3 (Three) Times a Day.     • promethazine (PHENERGAN) 25 MG suppository Insert 25 mg into the rectum.     • promethazine (PHENERGAN) 25 MG tablet Take 25 mg by mouth.     • sertraline (ZOLOFT) 50 MG tablet   2   • sucralfate (CARAFATE) 1 g tablet Take 1 g by mouth.     • sucralfate (CARAFATE) 1 GM/10ML suspension Take 1 g by mouth Daily.     • tiZANidine (ZANAFLEX) 4 MG tablet Take 10 mg by mouth At Night As Needed for Muscle Spasms.     • vitamin D (ERGOCALCIFEROL) 43140 units capsule capsule   6     No current facility-administered medications for this visit.     Review of Systems   Constitutional: Negative for chills and fever.   HENT: Negative for congestion.    Respiratory: Negative for shortness of breath.    Cardiovascular: Negative for chest pain and leg swelling.   Gastrointestinal: Negative for constipation, diarrhea, nausea and vomiting.   Musculoskeletal: Positive for arthralgias, back pain and neck pain.        Foot pain   Skin: Negative for wound.   Neurological: Positive for numbness.       OBJECTIVE     Vitals:    03/31/22 0851   BP: 128/90   Pulse: 89   SpO2: 94%       PHYSICAL EXAM  GEN:   Accompanied by none.     Physical Exam  Vitals reviewed.   Constitutional:       Appearance: Normal appearance. She is well-developed. She is obese.   HENT:      Head: Normocephalic and atraumatic.      Right Ear: Tympanic membrane normal.      Left Ear: Tympanic membrane  normal.      Nose: Nose normal.      Mouth/Throat:      Pharynx: Oropharynx is clear.   Eyes:      Extraocular Movements: Extraocular movements intact.      Pupils: Pupils are equal, round, and reactive to light.   Cardiovascular:      Rate and Rhythm: Normal rate and regular rhythm.      Pulses: Normal pulses.           Dorsalis pedis pulses are 2+ on the right side and 2+ on the left side.        Posterior tibial pulses are 2+ on the right side and 2+ on the left side.      Heart sounds: Normal heart sounds.   Pulmonary:      Effort: Pulmonary effort is normal.      Breath sounds: Normal breath sounds.   Abdominal:      General: Bowel sounds are normal.      Palpations: Abdomen is soft.   Musculoskeletal:      Cervical back: Normal range of motion and neck supple.      Left foot: Bunion (Mild medial bony eminence with abduction of hallux. No crepitus. Not track bound.) present.   Feet:      Right foot:      Protective Sensation: 10 sites sensed.      Skin integrity: Warmth present.      Toenail Condition: Right toenails are abnormally thick.      Left foot:      Protective Sensation: 10 sites sensed.      Skin integrity: Warmth present.      Toenail Condition: Left toenails are abnormally thick.   Neurological:      General: No focal deficit present.      Mental Status: She is alert and oriented to person, place, and time. Mental status is at baseline.   Psychiatric:         Mood and Affect: Mood normal.         Behavior: Behavior normal.         Thought Content: Thought content normal.         Judgment: Judgment normal.         Foot/Ankle Exam:       General:   Appearance: appears stated age and healthy and obesity    Orientation: AAOx3    Affect: appropriate    Gait: antalgic    Gait comment:  To left  Assistance: independent    Shoe Gear:  Casual shoes and CAM boot    VASCULAR      Right Foot Vascularity   Dorsalis pedis:  2+  Posterior tibial:  2+  Skin Temperature: warm    Edema Grading:  None  CFT:  3  Pedal  Hair Growth:  Present  Varicosities: spider veins       Left Foot Vascularity   Dorsalis pedis:  2+  Posterior tibial:  2+  Skin Temperature: warm    Edema Grading:  None  CFT:  3  Pedal Hair Growth:  Present  Varicosities: spider veins        NEUROLOGIC     Right Foot Neurologic   Light touch sensation:  Diminished  Vibratory sensation:  Normal  Hot/Cold sensation: normal    Protective Sensation using [place]erry Monofilament:  10     Left Foot Neurologic   Light touch sensation:  Diminished  Vibratory sensation:  Normal  Hot/cold sensation: normal    Protective Sensation using [place]-Kerry Monofilament:  10     MUSCULOSKELETAL      Right Foot Musculoskeletal   Ecchymosis:  None  Tenderness: neuroma    Tenderness comment:  Plantar 3rd interspace  Arch:  Normal  Hammertoe:  Second toe, third toe, fourth toe and fifth toe     Left Foot Musculoskeletal   Ecchymosis:  None  Tenderness: great toe metatarsophalangeal joint, dorsal foot, toe 2, toe 3, toe 4 and toe 5    Arch:  Normal  Hammertoe:  Second toe, third toe, fourth toe and fifth toe  Hallux valgus: Yes (Mild medial bony eminence with abduction of hallux. No crepitus. Not track bound.)       MUSCLE STRENGTH     Right Foot Muscle Strength   Foot dorsiflexion:  5  Foot plantar flexion:  5  Foot inversion:  5  Foot eversion:  5     Left Foot Muscle Strength   Foot dorsiflexion:  5  Foot plantar flexion:  5  Foot inversion:  5  Foot eversion:  5     RANGE OF MOTION      Right Foot Range of Motion   Foot and ankle ROM within normal limits       Left Foot Range of Motion   Foot and ankle ROM within normal limits    active eversion pain    active inversion pain       DERMATOLOGIC     Right Foot Dermatologic   Skin: skin intact    Nails: onychomycosis and abnormally thick       Left Foot Dermatologic   Skin: skin intact    Nails: onychomycosis and abnormally thick       Image:       RADIOLOGY/NUCLEAR:  CT foot left wo contrast    Result Date:  3/14/2022  Narrative: EXAMINATION: CT FOOT LEFT WO CONTRAST-  3/14/2022 1:29 PM CDT  History: Foot pain, chronic, osteoarthritis suspected; M79.672-Pain in left foot; M19.079-Primary osteoarthritis, unspecified ankle and foot  Comparison: 01/06/2022  DLP: 199 mGy cm  In order to have a CT radiation dose as low as reasonably achievable, Automated Exposure Control was utilized for adjustment of the mA and/or KV according to patient size.  Technique: Helical tomographic images of the LEFT foot were obtained without the use of contrast. Multiplanar reformatted images were provided for review. In order to have a CT radiation dose as low as reasonably achievable, Automated Exposure Control was utilized for adjustment of the mA and/or KV according to patient size.  Findings: OSSEOUS STRUCTURES: Subchondral cystic change in the second and third TMT joints on both sides of the joints. Some mild subchondral sclerosis in this region as well. Mild dorsal spurring at both of these joints as well. Plantar calcaneal spur noted. I don't see any definite evidence of tarsal coalition. Bipartite medial first metatarsal sesamoid noted.  JOINT SPACES: Ossific density along the anterior aspect of the medial portion of the tibiotalar joint space may represent sequelae of an old injury but is not acute finding.  SOFT TISSUES: Mild diffuse soft tissue swelling over the dorsum of the midfoot in the region of the second and third TMT joints.  OTHER: No other acute findings are identified.       Impression:  1.  Moderate degenerative osteoarthritis at the second third TMT joints with some mild hypertrophic change as well as subchondral cystic change and joint space narrowing.  2.  No acute fracture.  3.  There is some mild nonspecific soft tissue swelling of the dorsum of the midfoot in the region of the TMT joint osteoarthritis.  4.  Plantar calcaneal spur noted.  5.  Other incidental findings as above. This report was finalized on 03/14/2022  13:35 by Dr. Paulino Scruggs MD.      LABORATORY/CULTURE RESULTS:      PATHOLOGY RESULTS:       ASSESSMENT/PLAN     Diagnoses and all orders for this visit:    1. Arthritis of midfoot (Primary)  -     COVID PRE-OP / PRE-PROCEDURE SCREENING ORDER (NO ISOLATION) - Swab, Nasopharynx; Future  -     Case Request; Standing  -     Instructions on coughing, deep breathing, and incentive spirometry.; Future  -     CBC & Differential; Future  -     Comprehensive Metabolic Panel; Future  -     ECG 12 Lead; Future  -     XR chest 2 vw; Future  -     Case Request    2. Hallux valgus, left  -     COVID PRE-OP / PRE-PROCEDURE SCREENING ORDER (NO ISOLATION) - Swab, Nasopharynx; Future  -     Case Request; Standing  -     Instructions on coughing, deep breathing, and incentive spirometry.; Future  -     CBC & Differential; Future  -     Comprehensive Metabolic Panel; Future  -     ECG 12 Lead; Future  -     XR chest 2 vw; Future  -     Case Request    3. Hammer toe of left foot  -     COVID PRE-OP / PRE-PROCEDURE SCREENING ORDER (NO ISOLATION) - Swab, Nasopharynx; Future  -     Case Request; Standing  -     Instructions on coughing, deep breathing, and incentive spirometry.; Future  -     CBC & Differential; Future  -     Comprehensive Metabolic Panel; Future  -     ECG 12 Lead; Future  -     XR chest 2 vw; Future  -     Case Request    4. Foot pain, left    5. Neurapraxia of left lower extremity, subsequent encounter    6. Pre-op exam   -     CBC & Differential; Future    Other orders  -     Follow Anesthesia Guidelines / Protocol; Future  -     Obtain Informed Consent; Future  -     Provide Instructions to Patient Regarding NPO Status; Future  -     Chlorhexidine Skin Prep - Educate and Review With Patient; Future  -     Provide NPO Instructions to Patient      Comprehensive lower extremity examination and evaluation was performed.  Discussed findings and treatment plan including risks, benefits, and treatment options with patient  in detail. Patient agreed with treatment plan.  Reviewed x-rays and CAT scan with patient.   Discussed continued conservative therapy versus surgical intervention.  Patient wishes to forego conservative therapy and pursue surgery.  I believe the best course of action would be to proceed with a Lapidus bunionectomy with possible intercuneiform 1-2 arthrodesis, arthrodesis of tarsometatarsal joints 2 and 3, hammertoe repair 2 through 5, possible harvesting of bone graft from calcaneus of the left foot.  All options, benefits, nurse associate with surgery were discussed with the patient including but not limited to: Standard risk of anesthesia, pain, bleeding, infection, nonhealing/dehiscence, deformity, nonunion, malunion, arthritis, loss of limb or life.  Pre and postoperative courses were discussed in detail including minimum 3 to 6 weeks nonweightbearing status postoperatively.  No guarantees were inferred.  An After Visit Summary was printed and given to the patient at discharge, including (if requested) any available informative/educational handouts regarding diagnosis, treatment, or medications. All questions were answered to patient/family satisfaction. Should symptoms fail to improve or worsen they agree to call or return to clinic or to go to the Emergency Department. Discussed the importance of following up with any needed screening tests/labs/specialist appointments and any requested follow-up recommended by me today. Importance of maintaining follow-up discussed and patient accepts that missed appointments can delay diagnosis and potentially lead to worsening of conditions.  Return for Post-Op appointment., or sooner if acute issues arise.    Lab Frequency Next Occurrence   CT chest w contrast Once 12/15/2022       This document has been electronically signed by Bud Maradiaga DPM on March 31, 2022 09:15 CDT

## 2022-03-31 ENCOUNTER — OFFICE VISIT (OUTPATIENT)
Dept: PODIATRY | Facility: CLINIC | Age: 67
End: 2022-03-31

## 2022-03-31 VITALS
WEIGHT: 230.6 LBS | SYSTOLIC BLOOD PRESSURE: 128 MMHG | OXYGEN SATURATION: 94 % | DIASTOLIC BLOOD PRESSURE: 90 MMHG | BODY MASS INDEX: 38.42 KG/M2 | HEIGHT: 65 IN | HEART RATE: 89 BPM

## 2022-03-31 DIAGNOSIS — M20.42 HAMMER TOE OF LEFT FOOT: ICD-10-CM

## 2022-03-31 DIAGNOSIS — M79.672 FOOT PAIN, LEFT: ICD-10-CM

## 2022-03-31 DIAGNOSIS — M19.079 ARTHRITIS OF MIDFOOT: Primary | ICD-10-CM

## 2022-03-31 DIAGNOSIS — S84.92XD NEURAPRAXIA OF LEFT LOWER EXTREMITY, SUBSEQUENT ENCOUNTER: ICD-10-CM

## 2022-03-31 DIAGNOSIS — Z01.818 PRE-OP EXAM: ICD-10-CM

## 2022-03-31 DIAGNOSIS — M20.12 HALLUX VALGUS, LEFT: ICD-10-CM

## 2022-03-31 PROCEDURE — 99214 OFFICE O/P EST MOD 30 MIN: CPT | Performed by: PODIATRIST

## 2022-03-31 RX ORDER — PREGABALIN 50 MG/1
50 CAPSULE ORAL 3 TIMES DAILY
Status: ON HOLD | COMMUNITY
End: 2023-01-25 | Stop reason: DRUGHIGH

## 2022-03-31 RX ORDER — OMEPRAZOLE 40 MG/1
40 CAPSULE, DELAYED RELEASE ORAL DAILY
Status: ON HOLD | COMMUNITY
End: 2023-04-05

## 2022-03-31 NOTE — H&P
[facility] - PODIATRY    Today's Date: 03/31/22    Patient Name: Melly Cruz  MRN: [id number]  CSN: [id number]  PCP: Reynaldo Jeter DO  Referring Provider: No ref. provider found    SUBJECTIVE     Chief Complaint   Patient presents with   • Follow-up     Reynaldo Jeter, DOp05/03/2021 3 week fu foot care - pt states hurts without the boot but doing ok - pt pain without the boot 7/10 and with the boot walking for so long left foot will start to hurt - pt presents 3 week follow up with left foot pain with xrays     HPI: Melly Cruz, a 66 y.o.female, comes to clinic as a(n) established patient for follow-up treatment of left foot pain from arthritis and deformities. Patient has h/o acid reflux, anxiety, arthritis, back pain, DVT, Hypothyroid, Migraine, PE. Patient presents for re-evaluation of left dorsal foot pain. States that when wearing the boot she has 70-80% improvement in the pain. When attempting to walk barefoot or in regular shoe, pain increases in severity up to 7/10. Relates no significant change from previous.  Patient had her CT scan performed.  She is interested in surgical correction for improvement in pain. Admits pain at 7/10 level and described as aching, nagging and throbbing. Relates previous treatment(s) including CAM boot. Denies any constitutional symptoms. No other pedal complaints at this time.    Past Medical History:   Diagnosis Date   • Acid reflux    • Anxiety    • Arthritis    • Chest pain    • Chronic back pain    • Chronic neck pain    • DVT (deep venous thrombosis) (HCC)    • Hypothyroid    • Hypothyroidism    • Migraine    • PE (pulmonary thromboembolism) (HCC)      Past Surgical History:   Procedure Laterality Date   • CHOLECYSTECTOMY     • COLONOSCOPY     • DILATATION AND CURETTAGE     • HERNIA REPAIR     • HYSTERECTOMY     • OOPHORECTOMY     • REPLACEMENT TOTAL KNEE      Right knee      Family History   Problem Relation Age of Onset   •  Breast cancer Paternal Aunt    • Ovarian cancer Neg Hx    • Uterine cancer Neg Hx    • Colon cancer Neg Hx      Social History     Socioeconomic History   • Marital status:    Tobacco Use   • Smoking status: Former Smoker     Packs/day: 1.00     Years: 30.00     Pack years: 30.00     Quit date: 2018     Years since quittin.0   • Smokeless tobacco: Never Used   Vaping Use   • Vaping Use: Never used   Substance and Sexual Activity   • Alcohol use: Not Currently   • Drug use: No   • Sexual activity: Yes     Partners: Male     Birth control/protection: Surgical     Allergies   Allergen Reactions   • Dilaudid [Hydromorphone Hcl] Anaphylaxis   • Erythromycin Swelling     Throat swells   • Ondansetron Hcl Other (See Comments)     Coded   • Orphenadrine Citrate Anaphylaxis   • Guaifenesin & Derivatives Nausea And Vomiting   • Meperidine      Makes her an angry person   • Codeine Rash     Current Outpatient Medications   Medication Sig Dispense Refill   • albuterol (PROVENTIL HFA;VENTOLIN HFA) 108 (90 Base) MCG/ACT inhaler Inhale 2 puffs 4 (Four) Times a Day. 6.7 g 0   • budesonide (PULMICORT) 90 MCG/ACT inhaler 2 puffs three times a day, swallow, do not inhale, for 8 weeks     • buPROPion XL (WELLBUTRIN XL) 300 MG 24 hr tablet Take 300 mg by mouth.     • citalopram (CeleXA) 40 MG tablet Take 40 mg by mouth daily.     • Cyanocobalamin (VITAMIN B-12 ER) 1000 MCG tablet controlled-release Take 1,000 mcg by mouth.     • cyclobenzaprine (FLEXERIL) 5 MG tablet Take 5 mg by mouth 3 (Three) Times a Day As Needed.     • Diclofenac Sodium (VOLTAREN) 1 % gel gel APPLY 2 GRAMS FOUR TIMES DAILY TO AFFECTED AREA(S)     • famotidine (PEPCID) 20 MG tablet      • HYDROcodone-acetaminophen (NORCO) 7.5-325 MG per tablet Take 1 tablet by mouth Every 6 (Six) Hours As Needed for Moderate Pain .     • HYDROcodone-acetaminophen (NORCO) 7.5-325 MG per tablet Take 1 tablet by mouth.     • levothyroxine (SYNTHROID, LEVOTHROID) 125 MCG  tablet Take 112 mcg by mouth Daily. 112 mcg alternating with 125 mcg     • nitroglycerin (NITROSTAT) 0.4 MG SL tablet 1 under the tongue as needed for angina, may repeat q5mins for up three doses 100 tablet 11   • omeprazole (priLOSEC) 40 MG capsule Take 40 mg by mouth Daily.     • pantoprazole (PROTONIX) 40 MG EC tablet Take 40 mg by mouth 2 (Two) Times a Day.     • pramipexole (MIRAPEX) 0.125 MG tablet Take 0.75 mg by mouth 2 (two) times a day.     • pramipexole (MIRAPEX) 1 MG tablet      • pregabalin (LYRICA) 50 MG capsule Take 50 mg by mouth 3 (Three) Times a Day.     • promethazine (PHENERGAN) 25 MG suppository Insert 25 mg into the rectum.     • promethazine (PHENERGAN) 25 MG tablet Take 25 mg by mouth.     • sertraline (ZOLOFT) 50 MG tablet   2   • sucralfate (CARAFATE) 1 g tablet Take 1 g by mouth.     • sucralfate (CARAFATE) 1 GM/10ML suspension Take 1 g by mouth Daily.     • tiZANidine (ZANAFLEX) 4 MG tablet Take 10 mg by mouth At Night As Needed for Muscle Spasms.     • vitamin D (ERGOCALCIFEROL) 36068 units capsule capsule   6     No current facility-administered medications for this visit.     Review of Systems   Constitutional: Negative for chills and fever.   HENT: Negative for congestion.    Respiratory: Negative for shortness of breath.    Cardiovascular: Negative for chest pain and leg swelling.   Gastrointestinal: Negative for constipation, diarrhea, nausea and vomiting.   Musculoskeletal: Positive for arthralgias, back pain and neck pain.        Foot pain   Skin: Negative for wound.   Neurological: Positive for numbness.       OBJECTIVE     Vitals:    03/31/22 0851   BP: 128/90   Pulse: 89   SpO2: 94%       PHYSICAL EXAM  GEN:   Accompanied by none.     Physical Exam  Vitals reviewed.   Constitutional:       Appearance: Normal appearance. She is well-developed. She is obese.   HENT:      Head: Normocephalic and atraumatic.      Right Ear: Tympanic membrane normal.      Left Ear: Tympanic membrane  normal.      Nose: Nose normal.      Mouth/Throat:      Pharynx: Oropharynx is clear.   Eyes:      Extraocular Movements: Extraocular movements intact.      Pupils: Pupils are equal, round, and reactive to light.   Cardiovascular:      Rate and Rhythm: Normal rate and regular rhythm.      Pulses: Normal pulses.           Dorsalis pedis pulses are 2+ on the right side and 2+ on the left side.        Posterior tibial pulses are 2+ on the right side and 2+ on the left side.      Heart sounds: Normal heart sounds.   Pulmonary:      Effort: Pulmonary effort is normal.      Breath sounds: Normal breath sounds.   Abdominal:      General: Bowel sounds are normal.      Palpations: Abdomen is soft.   Musculoskeletal:      Cervical back: Normal range of motion and neck supple.      Left foot: Bunion (Mild medial bony eminence with abduction of hallux. No crepitus. Not track bound.) present.   Feet:      Right foot:      Protective Sensation: 10 sites sensed.      Skin integrity: Warmth present.      Toenail Condition: Right toenails are abnormally thick.      Left foot:      Protective Sensation: 10 sites sensed.      Skin integrity: Warmth present.      Toenail Condition: Left toenails are abnormally thick.   Neurological:      General: No focal deficit present.      Mental Status: She is alert and oriented to person, place, and time. Mental status is at baseline.   Psychiatric:         Mood and Affect: Mood normal.         Behavior: Behavior normal.         Thought Content: Thought content normal.         Judgment: Judgment normal.         Foot/Ankle Exam:       General:   Appearance: appears stated age and healthy and obesity    Orientation: AAOx3    Affect: appropriate    Gait: antalgic    Gait comment:  To left  Assistance: independent    Shoe Gear:  Casual shoes and CAM boot    VASCULAR      Right Foot Vascularity   Dorsalis pedis:  2+  Posterior tibial:  2+  Skin Temperature: warm    Edema Grading:  None  CFT:  3  Pedal  Hair Growth:  Present  Varicosities: spider veins       Left Foot Vascularity   Dorsalis pedis:  2+  Posterior tibial:  2+  Skin Temperature: warm    Edema Grading:  None  CFT:  3  Pedal Hair Growth:  Present  Varicosities: spider veins        NEUROLOGIC     Right Foot Neurologic   Light touch sensation:  Diminished  Vibratory sensation:  Normal  Hot/Cold sensation: normal    Protective Sensation using Burns-Kerry Monofilament:  10     Left Foot Neurologic   Light touch sensation:  Diminished  Vibratory sensation:  Normal  Hot/cold sensation: normal    Protective Sensation using Burns-Kerry Monofilament:  10     MUSCULOSKELETAL      Right Foot Musculoskeletal   Ecchymosis:  None  Tenderness: neuroma    Tenderness comment:  Plantar 3rd interspace  Arch:  Normal  Hammertoe:  Second toe, third toe, fourth toe and fifth toe     Left Foot Musculoskeletal   Ecchymosis:  None  Tenderness: great toe metatarsophalangeal joint, dorsal foot, toe 2, toe 3, toe 4 and toe 5    Arch:  Normal  Hammertoe:  Second toe, third toe, fourth toe and fifth toe  Hallux valgus: Yes (Mild medial bony eminence with abduction of hallux. No crepitus. Not track bound.)       MUSCLE STRENGTH     Right Foot Muscle Strength   Foot dorsiflexion:  5  Foot plantar flexion:  5  Foot inversion:  5  Foot eversion:  5     Left Foot Muscle Strength   Foot dorsiflexion:  5  Foot plantar flexion:  5  Foot inversion:  5  Foot eversion:  5     RANGE OF MOTION      Right Foot Range of Motion   Foot and ankle ROM within normal limits       Left Foot Range of Motion   Foot and ankle ROM within normal limits    active eversion pain    active inversion pain       DERMATOLOGIC     Right Foot Dermatologic   Skin: skin intact    Nails: onychomycosis and abnormally thick       Left Foot Dermatologic   Skin: skin intact    Nails: onychomycosis and abnormally thick       Image:       RADIOLOGY/NUCLEAR:  CT foot left wo contrast    Result Date:  3/14/2022  Narrative: EXAMINATION: CT FOOT LEFT WO CONTRAST-  3/14/2022 1:29 PM CDT  History: Foot pain, chronic, osteoarthritis suspected; M79.672-Pain in left foot; M19.079-Primary osteoarthritis, unspecified ankle and foot  Comparison: 01/06/2022  DLP: 199 mGy cm  In order to have a CT radiation dose as low as reasonably achievable, Automated Exposure Control was utilized for adjustment of the mA and/or KV according to patient size.  Technique: Helical tomographic images of the LEFT foot were obtained without the use of contrast. Multiplanar reformatted images were provided for review. In order to have a CT radiation dose as low as reasonably achievable, Automated Exposure Control was utilized for adjustment of the mA and/or KV according to patient size.  Findings: OSSEOUS STRUCTURES: Subchondral cystic change in the second and third TMT joints on both sides of the joints. Some mild subchondral sclerosis in this region as well. Mild dorsal spurring at both of these joints as well. Plantar calcaneal spur noted. I don't see any definite evidence of tarsal coalition. Bipartite medial first metatarsal sesamoid noted.  JOINT SPACES: Ossific density along the anterior aspect of the medial portion of the tibiotalar joint space may represent sequelae of an old injury but is not acute finding.  SOFT TISSUES: Mild diffuse soft tissue swelling over the dorsum of the midfoot in the region of the second and third TMT joints.  OTHER: No other acute findings are identified.       Impression:  1.  Moderate degenerative osteoarthritis at the second third TMT joints with some mild hypertrophic change as well as subchondral cystic change and joint space narrowing.  2.  No acute fracture.  3.  There is some mild nonspecific soft tissue swelling of the dorsum of the midfoot in the region of the TMT joint osteoarthritis.  4.  Plantar calcaneal spur noted.  5.  Other incidental findings as above. This report was finalized on 03/14/2022  13:35 by Dr. Paulino Scruggs MD.      LABORATORY/CULTURE RESULTS:      PATHOLOGY RESULTS:       ASSESSMENT/PLAN     Diagnoses and all orders for this visit:    1. Arthritis of midfoot (Primary)  -     COVID PRE-OP / PRE-PROCEDURE SCREENING ORDER (NO ISOLATION) - Swab, Nasopharynx; Future  -     Case Request; Standing  -     Instructions on coughing, deep breathing, and incentive spirometry.; Future  -     CBC & Differential; Future  -     Comprehensive Metabolic Panel; Future  -     ECG 12 Lead; Future  -     XR chest 2 vw; Future  -     Case Request    2. Hallux valgus, left  -     COVID PRE-OP / PRE-PROCEDURE SCREENING ORDER (NO ISOLATION) - Swab, Nasopharynx; Future  -     Case Request; Standing  -     Instructions on coughing, deep breathing, and incentive spirometry.; Future  -     CBC & Differential; Future  -     Comprehensive Metabolic Panel; Future  -     ECG 12 Lead; Future  -     XR chest 2 vw; Future  -     Case Request    3. Hammer toe of left foot  -     COVID PRE-OP / PRE-PROCEDURE SCREENING ORDER (NO ISOLATION) - Swab, Nasopharynx; Future  -     Case Request; Standing  -     Instructions on coughing, deep breathing, and incentive spirometry.; Future  -     CBC & Differential; Future  -     Comprehensive Metabolic Panel; Future  -     ECG 12 Lead; Future  -     XR chest 2 vw; Future  -     Case Request    4. Foot pain, left    5. Neurapraxia of left lower extremity, subsequent encounter    6. Pre-op exam   -     CBC & Differential; Future    Other orders  -     Follow Anesthesia Guidelines / Protocol; Future  -     Obtain Informed Consent; Future  -     Provide Instructions to Patient Regarding NPO Status; Future  -     Chlorhexidine Skin Prep - Educate and Review With Patient; Future  -     Provide NPO Instructions to Patient      Comprehensive lower extremity examination and evaluation was performed.  Discussed findings and treatment plan including risks, benefits, and treatment options with patient  in detail. Patient agreed with treatment plan.  Reviewed x-rays and CAT scan with patient.   Discussed continued conservative therapy versus surgical intervention.  Patient wishes to forego conservative therapy and pursue surgery.  I believe the best course of action would be to proceed with a Lapidus bunionectomy with possible intercuneiform 1-2 arthrodesis, arthrodesis of tarsometatarsal joints 2 and 3, hammertoe repair 2 through 5, possible harvesting of bone graft from calcaneus of the left foot.  All options, benefits, nurse associate with surgery were discussed with the patient including but not limited to: Standard risk of anesthesia, pain, bleeding, infection, nonhealing/dehiscence, deformity, nonunion, malunion, arthritis, loss of limb or life.  Pre and postoperative courses were discussed in detail including minimum 3 to 6 weeks nonweightbearing status postoperatively.  No guarantees were inferred.  An After Visit Summary was printed and given to the patient at discharge, including (if requested) any available informative/educational handouts regarding diagnosis, treatment, or medications. All questions were answered to patient/family satisfaction. Should symptoms fail to improve or worsen they agree to call or return to clinic or to go to the Emergency Department. Discussed the importance of following up with any needed screening tests/labs/specialist appointments and any requested follow-up recommended by me today. Importance of maintaining follow-up discussed and patient accepts that missed appointments can delay diagnosis and potentially lead to worsening of conditions.  Return for Post-Op appointment., or sooner if acute issues arise.    Lab Frequency Next Occurrence   CT chest w contrast Once 12/15/2022       This document has been electronically signed by Bud Maradiaga DPM on March 31, 2022 18:27 CDT

## 2022-04-05 ENCOUNTER — TELEPHONE (OUTPATIENT)
Dept: PODIATRY | Facility: CLINIC | Age: 67
End: 2022-04-05

## 2022-04-05 PROBLEM — M20.12 HALLUX VALGUS, LEFT: Status: ACTIVE | Noted: 2022-04-05

## 2022-04-05 PROBLEM — M19.079 ARTHRITIS OF MIDFOOT: Status: ACTIVE | Noted: 2022-04-05

## 2022-04-05 PROBLEM — M20.42 HAMMER TOE OF LEFT FOOT: Status: ACTIVE | Noted: 2022-04-05

## 2022-04-05 NOTE — TELEPHONE ENCOUNTER
SPOKE WITH PATIENT REGARDING SURGERY. PT WAS INFORMED OF PRE WORK ON 04/15 AT 1215. PT WAS ALSO INFORMED OF SURGERY ON 04/20 AT 0600. PT WAS INFORMED OF COVID TEST AND VISITATION. PT STATED UNDERSTANDING OF INSTRUCTIONS AND ALL INFORMATION.

## 2022-04-15 ENCOUNTER — HOSPITAL ENCOUNTER (OUTPATIENT)
Dept: GENERAL RADIOLOGY | Facility: HOSPITAL | Age: 67
Discharge: HOME | End: 2022-04-15

## 2022-04-15 ENCOUNTER — PRE-ADMISSION TESTING (OUTPATIENT)
Dept: PREADMISSION TESTING | Facility: HOSPITAL | Age: 67
End: 2022-04-15

## 2022-04-15 VITALS
HEIGHT: 64 IN | RESPIRATION RATE: 16 BRPM | DIASTOLIC BLOOD PRESSURE: 96 MMHG | OXYGEN SATURATION: 96 % | SYSTOLIC BLOOD PRESSURE: 169 MMHG | HEART RATE: 96 BPM | BODY MASS INDEX: 38.43 KG/M2 | WEIGHT: 225.09 LBS

## 2022-04-15 DIAGNOSIS — M20.42 HAMMER TOE OF LEFT FOOT: ICD-10-CM

## 2022-04-15 DIAGNOSIS — M19.079 ARTHRITIS OF MIDFOOT: ICD-10-CM

## 2022-04-15 DIAGNOSIS — Z01.818 PRE-OP EXAM: ICD-10-CM

## 2022-04-15 DIAGNOSIS — Z01.818 PRE-OP TESTING: Primary | ICD-10-CM

## 2022-04-15 DIAGNOSIS — M20.12 HALLUX VALGUS, LEFT: ICD-10-CM

## 2022-04-15 LAB
ALBUMIN SERPL-MCNC: 4 G/DL (ref 3.5–5.2)
ALBUMIN/GLOB SERPL: 1.4 G/DL
ALP SERPL-CCNC: 88 U/L (ref 39–117)
ALT SERPL W P-5'-P-CCNC: 39 U/L (ref 1–33)
ANION GAP SERPL CALCULATED.3IONS-SCNC: 11 MMOL/L (ref 5–15)
AST SERPL-CCNC: 32 U/L (ref 1–32)
BASOPHILS # BLD AUTO: 0.02 10*3/MM3 (ref 0–0.2)
BASOPHILS NFR BLD AUTO: 0.4 % (ref 0–1.5)
BILIRUB SERPL-MCNC: 0.4 MG/DL (ref 0–1.2)
BUN SERPL-MCNC: 10 MG/DL (ref 8–23)
BUN/CREAT SERPL: 12.7 (ref 7–25)
CALCIUM SPEC-SCNC: 9.2 MG/DL (ref 8.6–10.5)
CHLORIDE SERPL-SCNC: 104 MMOL/L (ref 98–107)
CO2 SERPL-SCNC: 23 MMOL/L (ref 22–29)
CREAT SERPL-MCNC: 0.79 MG/DL (ref 0.57–1)
DEPRECATED RDW RBC AUTO: 44.4 FL (ref 37–54)
EGFRCR SERPLBLD CKD-EPI 2021: 83.6 ML/MIN/1.73
EOSINOPHIL # BLD AUTO: 0.29 10*3/MM3 (ref 0–0.4)
EOSINOPHIL NFR BLD AUTO: 5.5 % (ref 0.3–6.2)
ERYTHROCYTE [DISTWIDTH] IN BLOOD BY AUTOMATED COUNT: 14.1 % (ref 12.3–15.4)
GLOBULIN UR ELPH-MCNC: 2.9 GM/DL
GLUCOSE SERPL-MCNC: 116 MG/DL (ref 65–99)
HCT VFR BLD AUTO: 38.5 % (ref 34–46.6)
HGB BLD-MCNC: 12.3 G/DL (ref 12–15.9)
IMM GRANULOCYTES # BLD AUTO: 0.01 10*3/MM3 (ref 0–0.05)
IMM GRANULOCYTES NFR BLD AUTO: 0.2 % (ref 0–0.5)
LYMPHOCYTES # BLD AUTO: 1.64 10*3/MM3 (ref 0.7–3.1)
LYMPHOCYTES NFR BLD AUTO: 30.9 % (ref 19.6–45.3)
MCH RBC QN AUTO: 27.4 PG (ref 26.6–33)
MCHC RBC AUTO-ENTMCNC: 31.9 G/DL (ref 31.5–35.7)
MCV RBC AUTO: 85.7 FL (ref 79–97)
MONOCYTES # BLD AUTO: 0.35 10*3/MM3 (ref 0.1–0.9)
MONOCYTES NFR BLD AUTO: 6.6 % (ref 5–12)
NEUTROPHILS NFR BLD AUTO: 2.99 10*3/MM3 (ref 1.7–7)
NEUTROPHILS NFR BLD AUTO: 56.4 % (ref 42.7–76)
NRBC BLD AUTO-RTO: 0 /100 WBC (ref 0–0.2)
PLATELET # BLD AUTO: 246 10*3/MM3 (ref 140–450)
PMV BLD AUTO: 9.5 FL (ref 6–12)
POTASSIUM SERPL-SCNC: 3.7 MMOL/L (ref 3.5–5.2)
PROT SERPL-MCNC: 6.9 G/DL (ref 6–8.5)
RBC # BLD AUTO: 4.49 10*6/MM3 (ref 3.77–5.28)
SODIUM SERPL-SCNC: 138 MMOL/L (ref 136–145)
WBC NRBC COR # BLD: 5.3 10*3/MM3 (ref 3.4–10.8)

## 2022-04-15 PROCEDURE — 93010 ELECTROCARDIOGRAM REPORT: CPT | Performed by: INTERNAL MEDICINE

## 2022-04-15 PROCEDURE — 71046 X-RAY EXAM CHEST 2 VIEWS: CPT

## 2022-04-15 PROCEDURE — 36415 COLL VENOUS BLD VENIPUNCTURE: CPT

## 2022-04-15 PROCEDURE — 80053 COMPREHEN METABOLIC PANEL: CPT

## 2022-04-15 PROCEDURE — 85025 COMPLETE CBC W/AUTO DIFF WBC: CPT

## 2022-04-15 PROCEDURE — 93005 ELECTROCARDIOGRAM TRACING: CPT

## 2022-04-15 NOTE — DISCHARGE INSTRUCTIONS
Before you come to the hospital        Arrival time: AS DIRECTED BY OFFICE   Only one family member or friend will be allowed per patient    YOU MAY TAKE THE FOLLOWING MEDICATION(S) THE MORNING OF SURGERY WITH A SIP OF WATER: ***none     ALL OTHER HOME MEDICATION CHECK WITH YOUR PHYSICIAN (especially if you are taking diabetes medicines or blood thinners)    Do not take any Erectile Dysfunction medications (EX: CIALIS, VIAGRA) 24 hours prior to surgery      If you were given and instructed to use a germ- killing soap, use as directed the night before surgery and the morning of surgery before coming to the hospital.       Eating and drinking restrictions  (It is extremely important that you follow these guidelines to prevent delay or cancelation of their procedure)   Up to 2 hours prior to the time you are told to arrive to the hospital - you may continue to drink clear liquids, such as water, clear fruit juice (no pulp), black coffee, and plain tea.          Eating and drinking restrictions prior to scheduled arrival time  Clear liquids (water, apple juice-no pulp)                       2 hours   Breast milk                           4 hours   Milk or drinks that contain milk, full liquids           6 hours   Light meals or foods, such as toast or cereal                6 hours   Heavy foods, such as meat, fried foods or fatty foods   8 hours       Clear Liquids  Water and flavored water                                                                       Sugar water.  Ice or frozen ice pops.  Clear Fruit juices, such as cranberry juice and apple juice.  Black coffee.  Plain tea  Clear bouillon or broth.  Broth-based soups that have been strained.  Flavored gelatin.  Soda.  Gatorade or Powerade.  Full liquid examples  Juices that have pulp.  Frozen ice pops that contain fruit pieces.  Coffee with creamer  Milk.  Cream or cream-based soups.  Yogurt.              MANAGING PAIN AFTER SURGERY    We know you are probably  wondering what your pain will be like after surgery.  Following surgery it is unrealistic to expect you will not have pain.   Pain is how our bodies let us know that something is wrong or cautions us to be careful.  That said, our goal is to make your pain tolerable.    Methods we may use to treat your pain include (oral or IV medications, PCAs, epidurals, nerve blocks, etc.)   While some procedures require IV pain medications for a short time after surgery, transitioning to pain medications by mouth allows for better management of pain.   Your nurse will encourage you to take oral pain medications whenever possible.  IV medications work almost immediately, but only last a short while.  Taking medications by mouth allows for a more constant level of medication in your blood stream for a longer period of time.      Once your pain is out of control it is harder to get back under control.  It is important you are aware when your next dose of pain medication is due.  If you are admitted, your nurse may write the time of your next dose on the white board in your room to help you remember.      We are interested in your pain and encourage you to inform us about aggravating factors during your visit.   Many times a simple repositioning every few hours can make a big difference.    If your physician says it is okay, do not let your pain prevent you from getting out of bed. Be sure to call your nurse for assistance prior to getting up so you do not fall.      Before surgery, please decide your tolerable pain goal.  These faces help describe the pain ratings we use on a 0-10 scale.   Be prepared to tell us your goal and whether or not you take pain or anxiety medications at home.          Before you come to the hospital        Arrival time: AS DIRECTED BY OFFICE   Only one family member or friend will be allowed per patient    YOU MAY TAKE THE FOLLOWING MEDICATION(S) THE MORNING OF SURGERY WITH A SIP OF WATER: ***     ALL  OTHER HOME MEDICATION CHECK WITH YOUR PHYSICIAN (especially if you are taking diabetes medicines or blood thinners)    Do not take any Erectile Dysfunction medications (EX: CIALIS, VIAGRA) 24 hours prior to surgery      If you were given and instructed to use a germ- killing soap, use as directed the night before surgery and the morning of surgery before coming to the hospital.       Eating and drinking restrictions  (It is extremely important that you follow these guidelines to prevent delay or cancelation of their procedure)   Up to 2 hours prior to the time you are told to arrive to the hospital - you may continue to drink clear liquids, such as water, clear fruit juice (no pulp), black coffee, and plain tea.          Eating and drinking restrictions prior to scheduled arrival time  Clear liquids (water, apple juice-no pulp)                       2 hours   Breast milk                           4 hours   Milk or drinks that contain milk, full liquids           6 hours   Light meals or foods, such as toast or cereal                6 hours   Heavy foods, such as meat, fried foods or fatty foods   8 hours       Clear Liquids  Water and flavored water                                                                       Sugar water.  Ice or frozen ice pops.  Clear Fruit juices, such as cranberry juice and apple juice.  Black coffee.  Plain tea  Clear bouillon or broth.  Broth-based soups that have been strained.  Flavored gelatin.  Soda.  Gatorade or Powerade.  Full liquid examples  Juices that have pulp.  Frozen ice pops that contain fruit pieces.  Coffee with creamer  Milk.  Cream or cream-based soups.  Yogurt.              MANAGING PAIN AFTER SURGERY    We know you are probably wondering what your pain will be like after surgery.  Following surgery it is unrealistic to expect you will not have pain.   Pain is how our bodies let us know that something is wrong or cautions us to be careful.  That said, our goal is to  make your pain tolerable.    Methods we may use to treat your pain include (oral or IV medications, PCAs, epidurals, nerve blocks, etc.)   While some procedures require IV pain medications for a short time after surgery, transitioning to pain medications by mouth allows for better management of pain.   Your nurse will encourage you to take oral pain medications whenever possible.  IV medications work almost immediately, but only last a short while.  Taking medications by mouth allows for a more constant level of medication in your blood stream for a longer period of time.      Once your pain is out of control it is harder to get back under control.  It is important you are aware when your next dose of pain medication is due.  If you are admitted, your nurse may write the time of your next dose on the white board in your room to help you remember.      We are interested in your pain and encourage you to inform us about aggravating factors during your visit.   Many times a simple repositioning every few hours can make a big difference.    If your physician says it is okay, do not let your pain prevent you from getting out of bed. Be sure to call your nurse for assistance prior to getting up so you do not fall.      Before surgery, please decide your tolerable pain goal.  These faces help describe the pain ratings we use on a 0-10 scale.   Be prepared to tell us your goal and whether or not you take pain or anxiety medications at home.

## 2022-04-18 ENCOUNTER — LAB (OUTPATIENT)
Dept: LAB | Facility: HOSPITAL | Age: 67
End: 2022-04-18

## 2022-04-18 DIAGNOSIS — M19.079 ARTHRITIS OF MIDFOOT: ICD-10-CM

## 2022-04-18 DIAGNOSIS — M20.42 HAMMER TOE OF LEFT FOOT: ICD-10-CM

## 2022-04-18 DIAGNOSIS — M20.12 HALLUX VALGUS, LEFT: ICD-10-CM

## 2022-04-18 LAB
QT INTERVAL: 384 MS
QTC INTERVAL: 456 MS
SARS-COV-2 ORF1AB RESP QL NAA+PROBE: NOT DETECTED

## 2022-04-18 PROCEDURE — U0004 COV-19 TEST NON-CDC HGH THRU: HCPCS

## 2022-04-18 PROCEDURE — C9803 HOPD COVID-19 SPEC COLLECT: HCPCS

## 2022-04-19 ENCOUNTER — OFFICE VISIT (OUTPATIENT)
Dept: NEUROSURGERY | Facility: CLINIC | Age: 67
End: 2022-04-19

## 2022-04-19 ENCOUNTER — TELEPHONE (OUTPATIENT)
Dept: PODIATRY | Facility: CLINIC | Age: 67
End: 2022-04-19

## 2022-04-19 VITALS
WEIGHT: 228.4 LBS | DIASTOLIC BLOOD PRESSURE: 82 MMHG | BODY MASS INDEX: 38.05 KG/M2 | SYSTOLIC BLOOD PRESSURE: 132 MMHG | HEIGHT: 65 IN

## 2022-04-19 DIAGNOSIS — M54.50 CHRONIC BILATERAL LOW BACK PAIN WITHOUT SCIATICA: ICD-10-CM

## 2022-04-19 DIAGNOSIS — M51.37 DEGENERATION OF LUMBAR OR LUMBOSACRAL INTERVERTEBRAL DISC: Primary | ICD-10-CM

## 2022-04-19 DIAGNOSIS — G89.29 CHRONIC BILATERAL LOW BACK PAIN WITHOUT SCIATICA: ICD-10-CM

## 2022-04-19 DIAGNOSIS — Z78.9 NON-SMOKER: ICD-10-CM

## 2022-04-19 DIAGNOSIS — E66.09 CLASS 2 OBESITY DUE TO EXCESS CALORIES WITH BODY MASS INDEX (BMI) OF 38.0 TO 38.9 IN ADULT, UNSPECIFIED WHETHER SERIOUS COMORBIDITY PRESENT: ICD-10-CM

## 2022-04-19 PROCEDURE — 99214 OFFICE O/P EST MOD 30 MIN: CPT | Performed by: NURSE PRACTITIONER

## 2022-04-19 NOTE — PATIENT INSTRUCTIONS
"BMI for Adults  What is BMI?  Body mass index (BMI) is a number that is calculated from a person's weight and height. BMI can help estimate how much of a person's weight is composed of fat. BMI does not measure body fat directly. Rather, it is an alternative to procedures that directly measure body fat, which can be difficult and expensive.  BMI can help identify people who may be at higher risk for certain medical problems.  What are BMI measurements used for?  BMI is used as a screening tool to identify possible weight problems. It helps determine whether a person is obese, overweight, a healthy weight, or underweight.  BMI is useful for:  Identifying a weight problem that may be related to a medical condition or may increase the risk for medical problems.  Promoting changes, such as changes in diet and exercise, to help reach a healthy weight. BMI screening can be repeated to see if these changes are working.  How is BMI calculated?  BMI involves measuring your weight in relation to your height. Both height and weight are measured, and the BMI is calculated from those numbers. This can be done either in English (U[place] or metric measurements. Note that charts and online BMI calculators are available to help you find your BMI quickly and easily without having to do these calculations yourself.  To calculate your BMI in English (U[place] measurements:    Measure your weight in pounds (lb).  Multiply the number of pounds by 703.  For example, for a person who weighs 180 lb, multiply that number by 703, which equals 126,540.  Measure your height in inches. Then multiply that number by itself to get a measurement called \"inches squared.\"  For example, for a person who is 70 inches tall, the \"inches squared\" measurement is 70 inches x 70 inches, which equals 4,900 inches squared.  Divide the total from step 2 (number of lb x 703) by the total from step 3 (inches squared): 126,540 ÷ 4,900 = 25.8. This is your BMI.    To " "calculate your BMI in metric measurements:  Measure your weight in kilograms (kg).  Measure your height in meters (m). Then multiply that number by itself to get a measurement called \"meters squared.\"  For example, for a person who is 1.75 m tall, the \"meters squared\" measurement is 1.75 m x 1.75 m, which is equal to 3.1 meters squared.  Divide the number of kilograms (your weight) by the meters squared number. In this example: 70 ÷ 3.1 = 22.6. This is your BMI.  What do the results mean?  BMI charts are used to identify whether you are underweight, normal weight, overweight, or obese. The following guidelines will be used:  Underweight: BMI less than 18.5.  Normal weight: BMI between 18.5 and 24.9.  Overweight: BMI between 25 and 29.9.  Obese: BMI of 30 or above.  Keep these notes in mind:  Weight includes both fat and muscle, so someone with a muscular build, such as an athlete, may have a BMI that is higher than 24.9. In cases like these, BMI is not an accurate measure of body fat.  To determine if excess body fat is the cause of a BMI of 25 or higher, further assessments may need to be done by a health care provider.  BMI is usually interpreted in the same way for men and women.  Where to find more information  For more information about BMI, including tools to quickly calculate your BMI, go to these websites:  Centers for Disease Control and Prevention: www.cdc.gov  American Heart Association: www.heart.org  National Heart, Lung, and Blood Comfort: www.nhlbi.nih.gov  Summary  Body mass index (BMI) is a number that is calculated from a person's weight and height.  BMI may help estimate how much of a person's weight is composed of fat. BMI can help identify those who may be at higher risk for certain medical problems.  BMI can be measured using English measurements or metric measurements.  BMI charts are used to identify whether you are underweight, normal weight, overweight, or obese.  This information is not " "intended to replace advice given to you by your health care provider. Make sure you discuss any questions you have with your health care provider.  Document Revised: 09/09/2020 Document Reviewed: 07/17/2020  [employer]itesh Patient Education © 2021 [employer]  https://www.nhlbi.nih.gov/files/docs/public/heart/dash_brief.pdf\">   DASH Eating Plan  DASH stands for Dietary Approaches to Stop Hypertension. The DASH eating plan is a healthy eating plan that has been shown to:  Reduce high blood pressure (hypertension).  Reduce your risk for type 2 diabetes, heart disease, and stroke.  Help with weight loss.  What are tips for following this plan?  Reading food labels  Check food labels for the amount of salt (sodium) per serving. Choose foods with less than 5 percent of the Daily Value of sodium. Generally, foods with less than 300 milligrams (mg) of sodium per serving fit into this eating plan.  To find whole grains, look for the word \"whole\" as the first word in the ingredient list.  Shopping  Buy products labeled as \"low-sodium\" or \"no salt added.\"  Buy fresh foods. Avoid canned foods and pre-made or frozen meals.  Cooking  Avoid adding salt when cooking. Use salt-free seasonings or herbs instead of table salt or sea salt. Check with your health care provider or pharmacist before using salt substitutes.  Do not pino foods. Cook foods using healthy methods such as baking, boiling, grilling, roasting, and broiling instead.  Cook with heart-healthy oils, such as olive, canola, avocado, soybean, or sunflower oil.  Meal planning    Eat a balanced diet that includes:  4 or more servings of fruits and 4 or more servings of vegetables each day. Try to fill one-half of your plate with fruits and vegetables.  6-8 servings of whole grains each day.  Less than 6 oz (170 g) of lean meat, poultry, or fish each day. A 3-oz (85-g) serving of meat is about the same size as a deck of cards. One egg equals 1 oz (28 g).  2-3 servings of " low-fat dairy each day. One serving is 1 cup (237 mL).  1 serving of nuts, seeds, or beans 5 times each week.  2-3 servings of heart-healthy fats. Healthy fats called omega-3 fatty acids are found in foods such as walnuts, flaxseeds, fortified milks, and eggs. These fats are also found in cold-water fish, such as sardines, salmon, and mackerel.  Limit how much you eat of:  Canned or prepackaged foods.  Food that is high in trans fat, such as some fried foods.  Food that is high in saturated fat, such as fatty meat.  Desserts and other sweets, sugary drinks, and other foods with added sugar.  Full-fat dairy products.  Do not salt foods before eating.  Do not eat more than 4 egg yolks a week.  Try to eat at least 2 vegetarian meals a week.  Eat more home-cooked food and less restaurant, buffet, and fast food.    Lifestyle  When eating at a restaurant, ask that your food be prepared with less salt or no salt, if possible.  If you drink alcohol:  Limit how much you use to:  0-1 drink a day for women who are not pregnant.  0-2 drinks a day for men.  Be aware of how much alcohol is in your drink. In the [place] one drink equals one 12 oz bottle of beer (355 mL), one 5 oz glass of wine (148 mL), or one 1½ oz glass of hard liquor (44 mL).  General information  Avoid eating more than 2,300 mg of salt a day. If you have hypertension, you may need to reduce your sodium intake to 1,500 mg a day.  Work with your health care provider to maintain a healthy body weight or to lose weight. Ask what an ideal weight is for you.  Get at least 30 minutes of exercise that causes your heart to beat faster (aerobic exercise) most days of the week. Activities may include walking, swimming, or biking.  Work with your health care provider or dietitian to adjust your eating plan to your individual calorie needs.  What foods should I eat?  Fruits  All fresh, dried, or frozen fruit. Canned fruit in natural juice (without added  sugar).  Vegetables  Fresh or frozen vegetables (raw, steamed, roasted, or grilled). Low-sodium or reduced-sodium tomato and vegetable juice. Low-sodium or reduced-sodium tomato sauce and tomato paste. Low-sodium or reduced-sodium canned vegetables.  Grains  Whole-grain or whole-wheat bread. Whole-grain or whole-wheat pasta. Brown rice. Oatmeal. Quinoa. Bulgur. Whole-grain and low-sodium cereals. Juani bread. Low-fat, low-sodium crackers. Whole-wheat flour tortillas.  Meats and other proteins  Skinless chicken or turkey. Ground chicken or turkey. Pork with fat trimmed off. Fish and seafood. Egg whites. Dried beans, peas, or lentils. Unsalted nuts, nut butters, and seeds. Unsalted canned beans. Lean cuts of beef with fat trimmed off. Low-sodium, lean precooked or cured meat, such as sausages or meat loaves.  Dairy  Low-fat (1%) or fat-free (skim) milk. Reduced-fat, low-fat, or fat-free cheeses. Nonfat, low-sodium ricotta or cottage cheese. Low-fat or nonfat yogurt. Low-fat, low-sodium cheese.  Fats and oils  Soft margarine without trans fats. Vegetable oil. Reduced-fat, low-fat, or light mayonnaise and salad dressings (reduced-sodium). Canola, safflower, olive, avocado, soybean, and sunflower oils. Avocado.  Seasonings and condiments  Herbs. Spices. Seasoning mixes without salt.  Other foods  Unsalted popcorn and pretzels. Fat-free sweets.  The items listed above may not be a complete list of foods and beverages you can eat. Contact a dietitian for more information.  What foods should I avoid?  Fruits  Canned fruit in a light or heavy syrup. Fried fruit. Fruit in cream or butter sauce.  Vegetables  Creamed or fried vegetables. Vegetables in a cheese sauce. Regular canned vegetables (not low-sodium or reduced-sodium). Regular canned tomato sauce and paste (not low-sodium or reduced-sodium). Regular tomato and vegetable juice (not low-sodium or reduced-sodium). Pickles. Olives.  Grains  Baked goods made with fat, such  as croissants, muffins, or some breads. Dry pasta or rice meal packs.  Meats and other proteins  Fatty cuts of meat. Ribs. Fried meat. Collier. Bologna, salami, and other precooked or cured meats, such as sausages or meat loaves. Fat from the back of a pig (fatback). Bratwurst. Salted nuts and seeds. Canned beans with added salt. Canned or smoked fish. Whole eggs or egg yolks. Chicken or turkey with skin.  Dairy  Whole or 2% milk, cream, and half-and-half. Whole or full-fat cream cheese. Whole-fat or sweetened yogurt. Full-fat cheese. Nondairy creamers. Whipped toppings. Processed cheese and cheese spreads.  Fats and oils  Butter. Stick margarine. Lard. Shortening. Ghee. Collier fat. Tropical oils, such as coconut, palm kernel, or palm oil.  Seasonings and condiments  Onion salt, garlic salt, seasoned salt, table salt, and sea salt. Worcestershire sauce. Tartar sauce. Barbecue sauce. Teriyaki sauce. Soy sauce, including reduced-sodium. Steak sauce. Canned and packaged gravies. Fish sauce. Oyster sauce. Cocktail sauce. Store-bought horseradish. Ketchup. Mustard. Meat flavorings and tenderizers. Bouillon cubes. Hot sauces. Pre-made or packaged marinades. Pre-made or packaged taco seasonings. Relishes. Regular salad dressings.  Other foods  Salted popcorn and pretzels.  The items listed above may not be a complete list of foods and beverages you should avoid. Contact a dietitian for more information.  Where to find more information  National Heart, Lung, and Blood [place]: www.nhlbi.nih.gov  American Heart Association: www.heart.org  Academy of Nutrition and Dietetics: www.eatright.org  National Kidney Foundation: www.kidney.org  Summary  The DASH eating plan is a healthy eating plan that has been shown to reduce high blood pressure (hypertension). It may also reduce your risk for type 2 diabetes, heart disease, and stroke.  When on the DASH eating plan, aim to eat more fresh fruits and vegetables, whole grains, lean  proteins, low-fat dairy, and heart-healthy fats.  With the DASH eating plan, you should limit salt (sodium) intake to 2,300 mg a day. If you have hypertension, you may need to reduce your sodium intake to 1,500 mg a day.  Work with your health care provider or dietitian to adjust your eating plan to your individual calorie needs.  This information is not intended to replace advice given to you by your health care provider. Make sure you discuss any questions you have with your health care provider.  Document Revised: 11/20/2020 Document Reviewed: 11/20/2020  Elsevier Patient Education © 2021 [employer]

## 2022-04-19 NOTE — PROGRESS NOTES
Chief complaint:   Chief Complaint   Patient presents with   • Back Pain     Melly has been referred here today for her low back pain, she treats with [facility] for her chronic back pain, no recent imaging, pain management tried to get an MRI but insurance wouldn't allow it.  No previous back surgery and she states the pain management is not working any more.         Subjective     HPI: This is a 66-year-old female patient who was referred to us by Dr. Reynaldo Jeter for back pain.  She is here to be evaluated today.  The patient says that the pain has been going on for about 8 or 9 years.  There is no accident or injury associated with this.  The pain in her back is constant.  Is worse with standing and better with sitting and lying down.  She denies any lower extremity pain.  She denies any urinary or fecal incontinence.  She did do 12 sessions of physical therapy over the course of 6 weeks within the last 6 months.  She has not done any chiropractic care.  She has been working with pain management and has had multiple injections with varying amounts of improvement.  She also does take hydrocodone 3 times a day.  She also takes Lyrica and Flexeril.  The patient is not able to tolerate NSAIDs.  The patient is disabled.  She denies any tobacco, alcohol, or illicit drug use.  She is right-hand dominant.  She is .  Rates her pain on a scale 0-10 at a 7.  She says it does interfere with her actives of daily living    Review of Systems   Constitutional: Positive for fatigue.   HENT: Positive for congestion and sinus pressure.    Genitourinary: Positive for urgency.   Musculoskeletal: Positive for back pain, neck pain and neck stiffness.   Allergic/Immunologic: Positive for environmental allergies.   Neurological: Positive for weakness and numbness.   Psychiatric/Behavioral: The patient is nervous/anxious.    All other systems reviewed and are negative.       Past Medical History:   Diagnosis Date   •  "Acid reflux    • Anxiety    • Arthritis    • Curtis esophagus    • Chest pain    • Chronic back pain    • Chronic neck pain    • COPD (chronic obstructive pulmonary disease) ([facility])    • DVT (deep venous thrombosis) ([facility])    • Hypothyroid    • Hypothyroidism    • Low back pain    • Migraine    • PE (pulmonary thromboembolism) ([facility])      Past Surgical History:   Procedure Laterality Date   • CHOLECYSTECTOMY     • COLONOSCOPY     • DILATATION AND CURETTAGE     • HERNIA REPAIR     • HYSTERECTOMY     • JOINT REPLACEMENT     • OOPHORECTOMY     • REPLACEMENT TOTAL KNEE      Right knee partial, left knee     Family History   Problem Relation Age of Onset   • Breast cancer Paternal Aunt    • Ovarian cancer Neg Hx    • Uterine cancer Neg Hx    • Colon cancer Neg Hx      Social History     Tobacco Use   • Smoking status: Former Smoker     Packs/day: 1.00     Years: 30.00     Pack years: 30.00     Quit date: 2018     Years since quittin.0   • Smokeless tobacco: Never Used   Vaping Use   • Vaping Use: Never used   Substance Use Topics   • Alcohol use: Not Currently   • Drug use: No     (Not in a hospital admission)    Allergies:  Dilaudid [hydromorphone hcl], Erythromycin, Ondansetron hcl, Orphenadrine citrate, Guaifenesin & derivatives, Meperidine, and Codeine    Objective      Vital Signs  /82   Ht 163.8 cm (64.5\")   Wt 104 kg (228 lb 6.4 oz)   BMI 38.60 kg/m²     Physical Exam  Constitutional:       Appearance: Normal appearance. She is well-developed.   HENT:      Head: Normocephalic.   Eyes:      General: Lids are normal.      Extraocular Movements: EOM normal.      Conjunctiva/sclera: Conjunctivae normal.      Pupils: Pupils are equal, round, and reactive to light.   Cardiovascular:      Rate and Rhythm: Normal rate and regular rhythm.   Pulmonary:      Effort: Pulmonary effort is normal.      Breath sounds: Normal breath sounds.   Musculoskeletal:         General: Normal range of motion.      Cervical " back: Normal range of motion.      Comments: Back pain    Left foot is in a walking boot   Skin:     General: Skin is warm.   Neurological:      Mental Status: She is alert and oriented to person, place, and time.      GCS: GCS eye subscore is 4. GCS verbal subscore is 5. GCS motor subscore is 6.      Cranial Nerves: No cranial nerve deficit.      Sensory: No sensory deficit.      Gait: Gait is intact.      Deep Tendon Reflexes: Strength normal and reflexes are normal and symmetric. Reflexes normal.   Psychiatric:         Speech: Speech normal.         Behavior: Behavior normal.         Thought Content: Thought content normal.         Neurologic Exam     Mental Status   Oriented to person, place, and time.   Attention: normal. Concentration: normal.   Speech: speech is normal   Level of consciousness: alert  Normal comprehension.     Cranial Nerves     CN II   Visual fields full to confrontation.     CN III, IV, VI   Pupils are equal, round, and reactive to light.  Extraocular motions are normal.     CN V   Facial sensation intact.     CN VII   Facial expression full, symmetric.     CN VIII   CN VIII normal.     CN IX, X   CN IX normal.   CN X normal.     CN XI   CN XI normal.     CN XII   CN XII normal.     Motor Exam   Muscle bulk: normal    Strength   Strength 5/5 throughout.     Sensory Exam   Light touch normal.     Gait, Coordination, and Reflexes     Gait  Gait: normal    Reflexes   Reflexes 2+ except as noted.       Imaging review: X-ray of the lumbar spine that was done here at [facility] on July 15, 2021 does show disc degeneration from L3-S1.  There is a slight exaggeration of the lumbar lordosis.  No fracture visualized.        Assessment/Plan: The patient has been through conservative measures.  I do feel like it would be appropriate to send the patient for an MRI of the lumbar spine to see if there is anything from a surgical standpoint that would be of a benefit to the patient.  We will order the  MRI and have her follow-up with Dr. Granados at the next available appointment.  Her questions and concerns were addressed  Patient is a nonsmoker  The patient's Body mass index is 38.6 kg/m².. BMI is above normal parameters. Recommendations include: educational material and nutrition counseling    Diagnoses and all orders for this visit:    1. Degeneration of lumbar or lumbosacral intervertebral disc (Primary)  -     MRI Lumbar Spine Without Contrast; Future    2. Chronic bilateral low back pain without sciatica  -     MRI Lumbar Spine Without Contrast; Future    3. Class 2 obesity due to excess calories with body mass index (BMI) of 38.0 to 38.9 in adult, unspecified whether serious comorbidity present    4. Non-smoker          I discussed the patients findings and my recommendations with patient    Alfredo Gant, APRN  04/19/22  13:32 CDT

## 2022-04-20 ENCOUNTER — ANESTHESIA (OUTPATIENT)
Dept: PERIOP | Facility: HOSPITAL | Age: 67
End: 2022-04-20

## 2022-04-20 ENCOUNTER — ANESTHESIA EVENT (OUTPATIENT)
Dept: PERIOP | Facility: HOSPITAL | Age: 67
End: 2022-04-20

## 2022-04-20 ENCOUNTER — APPOINTMENT (OUTPATIENT)
Dept: GENERAL RADIOLOGY | Facility: HOSPITAL | Age: 67
End: 2022-04-20

## 2022-04-20 ENCOUNTER — HOSPITAL ENCOUNTER (OUTPATIENT)
Facility: HOSPITAL | Age: 67
Setting detail: HOSPITAL OUTPATIENT SURGERY
Discharge: HOME | End: 2022-04-20
Attending: PODIATRIST | Admitting: PODIATRIST

## 2022-04-20 ENCOUNTER — PATIENT ROUNDING (BHMG ONLY) (OUTPATIENT)
Dept: NEUROSURGERY | Facility: CLINIC | Age: 67
End: 2022-04-20

## 2022-04-20 VITALS
TEMPERATURE: 98.2 F | OXYGEN SATURATION: 92 % | DIASTOLIC BLOOD PRESSURE: 76 MMHG | RESPIRATION RATE: 18 BRPM | HEART RATE: 89 BPM | SYSTOLIC BLOOD PRESSURE: 120 MMHG

## 2022-04-20 DIAGNOSIS — M20.42 HAMMER TOE OF LEFT FOOT: ICD-10-CM

## 2022-04-20 DIAGNOSIS — M19.079 ARTHRITIS OF MIDFOOT: ICD-10-CM

## 2022-04-20 DIAGNOSIS — M20.12 HALLUX VALGUS, LEFT: ICD-10-CM

## 2022-04-20 PROCEDURE — C1713 ANCHOR/SCREW BN/BN,TIS/BN: HCPCS | Performed by: PODIATRIST

## 2022-04-20 PROCEDURE — 28285 REPAIR OF HAMMERTOE: CPT | Performed by: NURSE PRACTITIONER

## 2022-04-20 PROCEDURE — C1769 GUIDE WIRE: HCPCS | Performed by: PODIATRIST

## 2022-04-20 PROCEDURE — 28730 FUSION OF FOOT BONES: CPT | Performed by: PODIATRIST

## 2022-04-20 PROCEDURE — 28285 REPAIR OF HAMMERTOE: CPT | Performed by: PODIATRIST

## 2022-04-20 PROCEDURE — 28730 FUSION OF FOOT BONES: CPT | Performed by: NURSE PRACTITIONER

## 2022-04-20 PROCEDURE — 28297 COR HLX VLGS JT ARTHRD: CPT | Performed by: NURSE PRACTITIONER

## 2022-04-20 PROCEDURE — 20900 REMOVAL OF BONE FOR GRAFT: CPT | Performed by: NURSE PRACTITIONER

## 2022-04-20 PROCEDURE — C1776 JOINT DEVICE (IMPLANTABLE): HCPCS | Performed by: PODIATRIST

## 2022-04-20 PROCEDURE — 28297 COR HLX VLGS JT ARTHRD: CPT | Performed by: PODIATRIST

## 2022-04-20 PROCEDURE — 20900 REMOVAL OF BONE FOR GRAFT: CPT | Performed by: PODIATRIST

## 2022-04-20 PROCEDURE — 73620 X-RAY EXAM OF FOOT: CPT

## 2022-04-20 PROCEDURE — 28310 REVISION OF BIG TOE: CPT | Performed by: NURSE PRACTITIONER

## 2022-04-20 PROCEDURE — 25010000002 FENTANYL CITRATE (PF) 100 MCG/2ML SOLUTION: Performed by: NURSE ANESTHETIST, CERTIFIED REGISTERED

## 2022-04-20 PROCEDURE — 25010000002 DEXAMETHASONE PER 1 MG: Performed by: ANESTHESIOLOGY

## 2022-04-20 PROCEDURE — 28310 REVISION OF BIG TOE: CPT | Performed by: PODIATRIST

## 2022-04-20 PROCEDURE — 25010000002 PROPOFOL 10 MG/ML EMULSION: Performed by: NURSE ANESTHETIST, CERTIFIED REGISTERED

## 2022-04-20 DEVICE — 2.7MM HIGH PITCH LOCKING SCREW - 12MM/14MM
Type: IMPLANTABLE DEVICE | Site: FOOT | Status: FUNCTIONAL
Brand: FASTPITCH

## 2022-04-20 DEVICE — ANATOMIC FIXATION
Type: IMPLANTABLE DEVICE | Site: FOOT | Status: FUNCTIONAL
Brand: LAPIPLASTY LESSER TMT FIXATION PACK

## 2022-04-20 DEVICE — KWIRE SMOTH DBL/TROC .062X9IN: Type: IMPLANTABLE DEVICE | Site: TOE FIRST | Status: FUNCTIONAL

## 2022-04-20 DEVICE — LOCKING SCREWS
Type: IMPLANTABLE DEVICE | Site: FOOT | Status: FUNCTIONAL
Brand: FASTPITCH 2.7MM HIGH PITCH LOCKING SCREW

## 2022-04-20 DEVICE — ANATOMIC BIPLANAR IMPLANTS
Type: IMPLANTABLE DEVICE | Site: FOOT | Status: FUNCTIONAL
Brand: LAPIPLASTY MINI INCISION SYSTEM

## 2022-04-20 DEVICE — STPL FIX/COMPR BONE ZSTAPLE/MINI ARTHRD TI 8X8MM STRL: Type: IMPLANTABLE DEVICE | Site: FOOT | Status: FUNCTIONAL

## 2022-04-20 DEVICE — HAMMERTUBE™ SYSTEM IMPLANT KIT, Ø2.75 X 14MM, 0°, STERILE
Type: IMPLANTABLE DEVICE | Site: FOOT | Status: FUNCTIONAL
Brand: HAMMERTUBE™ SYSTEM

## 2022-04-20 DEVICE — 3.5MM TRANSVERSE SCREW
Type: IMPLANTABLE DEVICE | Site: FOOT | Status: FUNCTIONAL
Brand: LAPIPLASTY

## 2022-04-20 RX ORDER — DEXAMETHASONE SODIUM PHOSPHATE 4 MG/ML
4 INJECTION, SOLUTION INTRA-ARTICULAR; INTRALESIONAL; INTRAMUSCULAR; INTRAVENOUS; SOFT TISSUE ONCE AS NEEDED
Status: COMPLETED | OUTPATIENT
Start: 2022-04-20 | End: 2022-04-20

## 2022-04-20 RX ORDER — PROPOFOL 10 MG/ML
VIAL (ML) INTRAVENOUS AS NEEDED
Status: DISCONTINUED | OUTPATIENT
Start: 2022-04-20 | End: 2022-04-20 | Stop reason: SURG

## 2022-04-20 RX ORDER — DEXTROSE MONOHYDRATE 25 G/50ML
12.5 INJECTION, SOLUTION INTRAVENOUS AS NEEDED
Status: DISCONTINUED | OUTPATIENT
Start: 2022-04-20 | End: 2022-04-20 | Stop reason: HOSPADM

## 2022-04-20 RX ORDER — DROPERIDOL 2.5 MG/ML
0.62 INJECTION, SOLUTION INTRAMUSCULAR; INTRAVENOUS ONCE AS NEEDED
Status: DISCONTINUED | OUTPATIENT
Start: 2022-04-20 | End: 2022-04-20 | Stop reason: HOSPADM

## 2022-04-20 RX ORDER — DOCUSATE SODIUM 100 MG/1
100 CAPSULE, LIQUID FILLED ORAL DAILY
Qty: 7 CAPSULE | Refills: 0 | Status: SHIPPED | OUTPATIENT
Start: 2022-04-20 | End: 2022-04-27

## 2022-04-20 RX ORDER — LIDOCAINE HYDROCHLORIDE 10 MG/ML
0.5 INJECTION, SOLUTION EPIDURAL; INFILTRATION; INTRACAUDAL; PERINEURAL ONCE AS NEEDED
Status: DISCONTINUED | OUTPATIENT
Start: 2022-04-20 | End: 2022-04-20 | Stop reason: HOSPADM

## 2022-04-20 RX ORDER — FENTANYL CITRATE 50 UG/ML
INJECTION, SOLUTION INTRAMUSCULAR; INTRAVENOUS AS NEEDED
Status: DISCONTINUED | OUTPATIENT
Start: 2022-04-20 | End: 2022-04-20 | Stop reason: SURG

## 2022-04-20 RX ORDER — MAGNESIUM HYDROXIDE 1200 MG/15ML
LIQUID ORAL AS NEEDED
Status: DISCONTINUED | OUTPATIENT
Start: 2022-04-20 | End: 2022-04-20 | Stop reason: HOSPADM

## 2022-04-20 RX ORDER — SUFENTANIL CITRATE 50 UG/ML
INJECTION EPIDURAL; INTRAVENOUS AS NEEDED
Status: DISCONTINUED | OUTPATIENT
Start: 2022-04-20 | End: 2022-04-20 | Stop reason: SURG

## 2022-04-20 RX ORDER — LABETALOL HYDROCHLORIDE 5 MG/ML
INJECTION, SOLUTION INTRAVENOUS AS NEEDED
Status: DISCONTINUED | OUTPATIENT
Start: 2022-04-20 | End: 2022-04-20 | Stop reason: SURG

## 2022-04-20 RX ORDER — ROCURONIUM BROMIDE 10 MG/ML
INJECTION, SOLUTION INTRAVENOUS AS NEEDED
Status: DISCONTINUED | OUTPATIENT
Start: 2022-04-20 | End: 2022-04-20 | Stop reason: SURG

## 2022-04-20 RX ORDER — NALOXONE HCL 0.4 MG/ML
0.04 VIAL (ML) INJECTION AS NEEDED
Status: DISCONTINUED | OUTPATIENT
Start: 2022-04-20 | End: 2022-04-20 | Stop reason: HOSPADM

## 2022-04-20 RX ORDER — OXYCODONE AND ACETAMINOPHEN 10; 325 MG/1; MG/1
1 TABLET ORAL ONCE AS NEEDED
Status: DISCONTINUED | OUTPATIENT
Start: 2022-04-20 | End: 2022-04-20 | Stop reason: HOSPADM

## 2022-04-20 RX ORDER — PROMETHAZINE HYDROCHLORIDE 12.5 MG/1
12.5 TABLET ORAL EVERY 8 HOURS PRN
Qty: 21 TABLET | Refills: 0 | Status: SHIPPED | OUTPATIENT
Start: 2022-04-20 | End: 2022-04-27

## 2022-04-20 RX ORDER — FENTANYL CITRATE 50 UG/ML
25 INJECTION, SOLUTION INTRAMUSCULAR; INTRAVENOUS
Status: DISCONTINUED | OUTPATIENT
Start: 2022-04-20 | End: 2022-04-20 | Stop reason: HOSPADM

## 2022-04-20 RX ORDER — OXYCODONE AND ACETAMINOPHEN 7.5; 325 MG/1; MG/1
2 TABLET ORAL EVERY 4 HOURS PRN
Status: DISCONTINUED | OUTPATIENT
Start: 2022-04-20 | End: 2022-04-20 | Stop reason: HOSPADM

## 2022-04-20 RX ORDER — SODIUM CHLORIDE 0.9 % (FLUSH) 0.9 %
10 SYRINGE (ML) INJECTION AS NEEDED
Status: DISCONTINUED | OUTPATIENT
Start: 2022-04-20 | End: 2022-04-20 | Stop reason: HOSPADM

## 2022-04-20 RX ORDER — BUPIVACAINE HYDROCHLORIDE 5 MG/ML
INJECTION, SOLUTION PERINEURAL AS NEEDED
Status: DISCONTINUED | OUTPATIENT
Start: 2022-04-20 | End: 2022-04-20 | Stop reason: HOSPADM

## 2022-04-20 RX ORDER — FLUMAZENIL 0.1 MG/ML
0.2 INJECTION INTRAVENOUS AS NEEDED
Status: DISCONTINUED | OUTPATIENT
Start: 2022-04-20 | End: 2022-04-20 | Stop reason: HOSPADM

## 2022-04-20 RX ORDER — ASPIRIN 325 MG
325 TABLET ORAL DAILY
Qty: 30 TABLET | Refills: 1 | Status: SHIPPED | OUTPATIENT
Start: 2022-04-20 | End: 2022-08-09

## 2022-04-20 RX ORDER — LABETALOL HYDROCHLORIDE 5 MG/ML
5 INJECTION, SOLUTION INTRAVENOUS
Status: DISCONTINUED | OUTPATIENT
Start: 2022-04-20 | End: 2022-04-20 | Stop reason: HOSPADM

## 2022-04-20 RX ORDER — OXYCODONE AND ACETAMINOPHEN 7.5; 325 MG/1; MG/1
1 TABLET ORAL EVERY 6 HOURS PRN
Qty: 28 TABLET | Refills: 0 | Status: SHIPPED | OUTPATIENT
Start: 2022-04-20 | End: 2022-04-27

## 2022-04-20 RX ORDER — MIDAZOLAM HYDROCHLORIDE 1 MG/ML
1 INJECTION INTRAMUSCULAR; INTRAVENOUS
Status: DISCONTINUED | OUTPATIENT
Start: 2022-04-20 | End: 2022-04-20 | Stop reason: HOSPADM

## 2022-04-20 RX ORDER — SODIUM CHLORIDE 0.9 % (FLUSH) 0.9 %
10 SYRINGE (ML) INJECTION EVERY 12 HOURS SCHEDULED
Status: DISCONTINUED | OUTPATIENT
Start: 2022-04-20 | End: 2022-04-20 | Stop reason: HOSPADM

## 2022-04-20 RX ORDER — SODIUM CHLORIDE 0.9 % (FLUSH) 0.9 %
3 SYRINGE (ML) INJECTION AS NEEDED
Status: DISCONTINUED | OUTPATIENT
Start: 2022-04-20 | End: 2022-04-20 | Stop reason: HOSPADM

## 2022-04-20 RX ORDER — SODIUM CHLORIDE, SODIUM LACTATE, POTASSIUM CHLORIDE, CALCIUM CHLORIDE 600; 310; 30; 20 MG/100ML; MG/100ML; MG/100ML; MG/100ML
1000 INJECTION, SOLUTION INTRAVENOUS CONTINUOUS
Status: DISCONTINUED | OUTPATIENT
Start: 2022-04-20 | End: 2022-04-20 | Stop reason: HOSPADM

## 2022-04-20 RX ORDER — IBUPROFEN 600 MG/1
600 TABLET ORAL ONCE AS NEEDED
Status: DISCONTINUED | OUTPATIENT
Start: 2022-04-20 | End: 2022-04-20 | Stop reason: HOSPADM

## 2022-04-20 RX ORDER — SODIUM CHLORIDE, SODIUM LACTATE, POTASSIUM CHLORIDE, CALCIUM CHLORIDE 600; 310; 30; 20 MG/100ML; MG/100ML; MG/100ML; MG/100ML
9 INJECTION, SOLUTION INTRAVENOUS CONTINUOUS
Status: DISCONTINUED | OUTPATIENT
Start: 2022-04-20 | End: 2022-04-20 | Stop reason: HOSPADM

## 2022-04-20 RX ORDER — LIDOCAINE HYDROCHLORIDE 20 MG/ML
INJECTION, SOLUTION EPIDURAL; INFILTRATION; INTRACAUDAL; PERINEURAL AS NEEDED
Status: DISCONTINUED | OUTPATIENT
Start: 2022-04-20 | End: 2022-04-20 | Stop reason: SURG

## 2022-04-20 RX ORDER — ONDANSETRON 2 MG/ML
4 INJECTION INTRAMUSCULAR; INTRAVENOUS AS NEEDED
Status: DISCONTINUED | OUTPATIENT
Start: 2022-04-20 | End: 2022-04-20

## 2022-04-20 RX ORDER — SCOLOPAMINE TRANSDERMAL SYSTEM 1 MG/1
1 PATCH, EXTENDED RELEASE TRANSDERMAL CONTINUOUS
Status: DISCONTINUED | OUTPATIENT
Start: 2022-04-20 | End: 2022-04-20 | Stop reason: HOSPADM

## 2022-04-20 RX ADMIN — SCOPALAMINE 1 PATCH: 1 PATCH, EXTENDED RELEASE TRANSDERMAL at 08:57

## 2022-04-20 RX ADMIN — SODIUM CHLORIDE, POTASSIUM CHLORIDE, SODIUM LACTATE AND CALCIUM CHLORIDE 1000 ML: 600; 310; 30; 20 INJECTION, SOLUTION INTRAVENOUS at 08:40

## 2022-04-20 RX ADMIN — LIDOCAINE HYDROCHLORIDE 100 MG: 20 INJECTION, SOLUTION EPIDURAL; INFILTRATION; INTRACAUDAL; PERINEURAL at 10:34

## 2022-04-20 RX ADMIN — PROPOFOL 150 MG: 10 INJECTION, EMULSION INTRAVENOUS at 10:36

## 2022-04-20 RX ADMIN — LABETALOL HYDROCHLORIDE 10 MG: 5 INJECTION INTRAVENOUS at 12:27

## 2022-04-20 RX ADMIN — SUFENTANIL CITRATE 20 MCG: 50 INJECTION EPIDURAL; INTRAVENOUS at 11:01

## 2022-04-20 RX ADMIN — OXYCODONE HYDROCHLORIDE AND ACETAMINOPHEN 2 TABLET: 7.5; 325 TABLET ORAL at 14:16

## 2022-04-20 RX ADMIN — SUFENTANIL CITRATE 20 MCG: 50 INJECTION EPIDURAL; INTRAVENOUS at 10:34

## 2022-04-20 RX ADMIN — SODIUM CHLORIDE, POTASSIUM CHLORIDE, SODIUM LACTATE AND CALCIUM CHLORIDE: 600; 310; 30; 20 INJECTION, SOLUTION INTRAVENOUS at 12:19

## 2022-04-20 RX ADMIN — DEXAMETHASONE SODIUM PHOSPHATE 4 MG: 4 INJECTION, SOLUTION INTRA-ARTICULAR; INTRALESIONAL; INTRAMUSCULAR; INTRAVENOUS; SOFT TISSUE at 08:57

## 2022-04-20 RX ADMIN — CEFAZOLIN SODIUM 2 G: 1 INJECTION, POWDER, FOR SOLUTION INTRAMUSCULAR; INTRAVENOUS at 10:43

## 2022-04-20 RX ADMIN — SUFENTANIL CITRATE 10 MCG: 50 INJECTION EPIDURAL; INTRAVENOUS at 10:54

## 2022-04-20 RX ADMIN — FENTANYL CITRATE 100 MCG: 50 INJECTION, SOLUTION INTRAMUSCULAR; INTRAVENOUS at 12:48

## 2022-04-20 RX ADMIN — LABETALOL HYDROCHLORIDE 10 MG: 5 INJECTION INTRAVENOUS at 12:35

## 2022-04-20 RX ADMIN — ROCURONIUM BROMIDE 30 MG: 10 SOLUTION INTRAVENOUS at 10:36

## 2022-04-20 NOTE — ANESTHESIA PROCEDURE NOTES
Airway  Date/Time: 4/20/2022 10:38 AM  Airway not difficult    General Information and Staff    Patient location during procedure: OR  CRNA: Meliton Babcock CRNA    Indications and Patient Condition  Indications for airway management: airway protection    Preoxygenated: yes  Mask difficulty assessment: 0 - not attempted    Final Airway Details  Final airway type: endotracheal airway      Successful airway: ETT  Cuffed: yes   Successful intubation technique: direct laryngoscopy  Blade: Yoder  Blade size: 2  ETT size (mm): 7.5  Cormack-Lehane Classification: grade I - full view of glottis  Placement verified by: chest auscultation and capnometry   Cuff volume (mL): 6  Measured from: gums  ETT/EBT to gums (cm): 22  Number of attempts at approach: 1  Assessment: lips, teeth, and gum same as pre-op and atraumatic intubation

## 2022-04-20 NOTE — ANESTHESIA POSTPROCEDURE EVALUATION
Patient: Melly Cruz    Procedure Summary     Date: 04/20/22 Room / Location:  [facility] OR [address] [place] [address] [facility] OR    Anesthesia Start: 1032 Anesthesia Stop: 1346    Procedures:       Lapidus Bunionectomy with Possible Inner Cuneiform 1-2 Arthrodesis, Tarsal Metatarsal Joint 2 and 3 Arthrodesis, Hammertoe Repair 2-5, Possible Harvesting of Bone Graft from Calcaneus - Left Foot (Left Foot)      Hammertoe Repair 2-5, (Left Toes)      1-2 Arthrodesis, Tarsal Metatarsal Joint 2 and 3 Arthrodesis (Left Foot) Diagnosis:       Arthritis of midfoot      Hallux valgus, left      Hammer toe of left foot      (Arthritis of midfoot [M19.079])      (Hallux valgus, left [M20.12])      (Hammer toe of left foot [M20.42])    Surgeons: Bud Maradiaga DPM Provider: Meliton Babcock CRNA    Anesthesia Type: general ASA Status: 2          Anesthesia Type: general    Vitals  Vitals Value Taken Time   /72 04/20/22 1420   Temp 98.2 °F (36.8 °C) 04/20/22 1420   Pulse 93 04/20/22 1421   Resp 18 04/20/22 1420   SpO2 96 % 04/20/22 1421   Vitals shown include unvalidated device data.        Post Anesthesia Care and Evaluation    Patient location during evaluation: PACU  Patient participation: complete - patient participated  Level of consciousness: awake and alert  Pain management: adequate  Airway patency: patent  Anesthetic complications: No anesthetic complications  PONV Status: none  Cardiovascular status: acceptable and hemodynamically stable  Respiratory status: acceptable  Hydration status: acceptable    Comments: Blood pressure 134/93, pulse 92, temperature 98.2 °F (36.8 °C), temperature source Temporal, resp. rate 18, SpO2 95 %.    Patient discharged from PACU based upon Kwan score. Please see RN notes for further details

## 2022-04-20 NOTE — PROGRESS NOTES
A My-Chart message has been sent to the patient for PATIENT ROUNDING with [facility].

## 2022-04-20 NOTE — ANESTHESIA PREPROCEDURE EVALUATION
Anesthesia Evaluation     Patient summary reviewed   no history of anesthetic complications:  NPO Solid Status: > 8 hours             Airway   Mallampati: II  TM distance: >3 FB  Neck ROM: full  Dental    (+) upper dentures and lower dentures    Pulmonary    (+) a smoker Former, COPD,   (-) asthma, sleep apnea  Cardiovascular   Exercise tolerance: good (4-7 METS)    (+) DVT,   (-) pacemaker, past MI, angina, cardiac stents      Neuro/Psych  (-) seizures, TIA, CVA  GI/Hepatic/Renal/Endo    (+) obesity,  GERD,  thyroid problem hypothyroidism  (-) liver disease, no renal disease, diabetes    Musculoskeletal     Abdominal    Substance History      OB/GYN          Other                        Anesthesia Plan    ASA 2     general     intravenous induction     Anesthetic plan, all risks, benefits, and alternatives have been provided, discussed and informed consent has been obtained with: patient.        CODE STATUS:

## 2022-04-21 ENCOUNTER — TELEPHONE (OUTPATIENT)
Dept: PODIATRY | Facility: CLINIC | Age: 67
End: 2022-04-21

## 2022-04-21 NOTE — TELEPHONE ENCOUNTER
Called pt checking on her post op. Pt states she is having a lot of pain to the point of making her shake. Keeping ice on it, elevated, no visible blood.

## 2022-04-22 ENCOUNTER — TELEPHONE (OUTPATIENT)
Dept: PODIATRY | Facility: CLINIC | Age: 67
End: 2022-04-22

## 2022-04-22 DIAGNOSIS — G89.18 POST-OP PAIN: Primary | ICD-10-CM

## 2022-04-22 RX ORDER — GABAPENTIN 100 MG/1
100 CAPSULE ORAL 3 TIMES DAILY
Qty: 42 CAPSULE | Refills: 0 | Status: SHIPPED | OUTPATIENT
Start: 2022-04-22 | End: 2022-08-09

## 2022-04-22 RX ORDER — MORPHINE SULFATE 15 MG/1
15 TABLET ORAL EVERY 6 HOURS PRN
Qty: 20 TABLET | Refills: 0 | Status: SHIPPED | OUTPATIENT
Start: 2022-04-22 | End: 2022-04-27

## 2022-04-22 NOTE — TELEPHONE ENCOUNTER
Spoke with pt this am, is in such pain she cant sleep for more than 15 to 20 min. Its a burning type pain. Would like something a little stronger if poss to get through this for couple days. She is keeping it elevated, iced, no blood. She has had a temp since getting home of 100.2ish but has had this in prior surgerys.     Pt also stated that its so swollen her toes feel numb.

## 2022-04-25 ENCOUNTER — HOSPITAL ENCOUNTER (EMERGENCY)
Facility: HOSPITAL | Age: 67
Discharge: HOME | End: 2022-04-26
Attending: STUDENT IN AN ORGANIZED HEALTH CARE EDUCATION/TRAINING PROGRAM | Admitting: STUDENT IN AN ORGANIZED HEALTH CARE EDUCATION/TRAINING PROGRAM

## 2022-04-25 ENCOUNTER — APPOINTMENT (OUTPATIENT)
Dept: ULTRASOUND IMAGING | Facility: HOSPITAL | Age: 67
End: 2022-04-25

## 2022-04-25 DIAGNOSIS — I82.4Z2 ACUTE DEEP VEIN THROMBOSIS (DVT) OF DISTAL VEIN OF LEFT LOWER EXTREMITY: Primary | ICD-10-CM

## 2022-04-25 LAB
ALBUMIN SERPL-MCNC: 3.8 G/DL (ref 3.5–5.2)
ALBUMIN/GLOB SERPL: 1.1 G/DL
ALP SERPL-CCNC: 108 U/L (ref 39–117)
ALT SERPL W P-5'-P-CCNC: 34 U/L (ref 1–33)
ANION GAP SERPL CALCULATED.3IONS-SCNC: 9 MMOL/L (ref 5–15)
APTT PPP: 32.1 SECONDS (ref 24.1–35)
AST SERPL-CCNC: 24 U/L (ref 1–32)
BASOPHILS # BLD AUTO: 0.04 10*3/MM3 (ref 0–0.2)
BASOPHILS NFR BLD AUTO: 0.5 % (ref 0–1.5)
BILIRUB SERPL-MCNC: 0.6 MG/DL (ref 0–1.2)
BUN SERPL-MCNC: 9 MG/DL (ref 8–23)
BUN/CREAT SERPL: 11.5 (ref 7–25)
CALCIUM SPEC-SCNC: 9.4 MG/DL (ref 8.6–10.5)
CHLORIDE SERPL-SCNC: 97 MMOL/L (ref 98–107)
CO2 SERPL-SCNC: 28 MMOL/L (ref 22–29)
CREAT SERPL-MCNC: 0.78 MG/DL (ref 0.57–1)
DEPRECATED RDW RBC AUTO: 46.7 FL (ref 37–54)
EGFRCR SERPLBLD CKD-EPI 2021: 84.9 ML/MIN/1.73
EOSINOPHIL # BLD AUTO: 0.21 10*3/MM3 (ref 0–0.4)
EOSINOPHIL NFR BLD AUTO: 2.7 % (ref 0.3–6.2)
ERYTHROCYTE [DISTWIDTH] IN BLOOD BY AUTOMATED COUNT: 14.6 % (ref 12.3–15.4)
GLOBULIN UR ELPH-MCNC: 3.5 GM/DL
GLUCOSE SERPL-MCNC: 116 MG/DL (ref 65–99)
HCT VFR BLD AUTO: 35 % (ref 34–46.6)
HGB BLD-MCNC: 10.9 G/DL (ref 12–15.9)
HOLD SPECIMEN: NORMAL
HOLD SPECIMEN: NORMAL
IMM GRANULOCYTES # BLD AUTO: 0.02 10*3/MM3 (ref 0–0.05)
IMM GRANULOCYTES NFR BLD AUTO: 0.3 % (ref 0–0.5)
INR PPP: 1.08 (ref 0.91–1.09)
LYMPHOCYTES # BLD AUTO: 1.43 10*3/MM3 (ref 0.7–3.1)
LYMPHOCYTES NFR BLD AUTO: 18.1 % (ref 19.6–45.3)
MCH RBC QN AUTO: 27.6 PG (ref 26.6–33)
MCHC RBC AUTO-ENTMCNC: 31.1 G/DL (ref 31.5–35.7)
MCV RBC AUTO: 88.6 FL (ref 79–97)
MONOCYTES # BLD AUTO: 0.66 10*3/MM3 (ref 0.1–0.9)
MONOCYTES NFR BLD AUTO: 8.3 % (ref 5–12)
NEUTROPHILS NFR BLD AUTO: 5.55 10*3/MM3 (ref 1.7–7)
NEUTROPHILS NFR BLD AUTO: 70.1 % (ref 42.7–76)
NRBC BLD AUTO-RTO: 0 /100 WBC (ref 0–0.2)
PLATELET # BLD AUTO: 249 10*3/MM3 (ref 140–450)
PMV BLD AUTO: 9.3 FL (ref 6–12)
POTASSIUM SERPL-SCNC: 4 MMOL/L (ref 3.5–5.2)
PROT SERPL-MCNC: 7.3 G/DL (ref 6–8.5)
PROTHROMBIN TIME: 13.6 SECONDS (ref 11.9–14.6)
RBC # BLD AUTO: 3.95 10*6/MM3 (ref 3.77–5.28)
SODIUM SERPL-SCNC: 134 MMOL/L (ref 136–145)
WBC NRBC COR # BLD: 7.91 10*3/MM3 (ref 3.4–10.8)
WHOLE BLOOD HOLD SPECIMEN: NORMAL
WHOLE BLOOD HOLD SPECIMEN: NORMAL

## 2022-04-25 PROCEDURE — 25010000002 MORPHINE PER 10 MG: Performed by: STUDENT IN AN ORGANIZED HEALTH CARE EDUCATION/TRAINING PROGRAM

## 2022-04-25 PROCEDURE — 93971 EXTREMITY STUDY: CPT

## 2022-04-25 PROCEDURE — 85730 THROMBOPLASTIN TIME PARTIAL: CPT | Performed by: STUDENT IN AN ORGANIZED HEALTH CARE EDUCATION/TRAINING PROGRAM

## 2022-04-25 PROCEDURE — 99283 EMERGENCY DEPT VISIT LOW MDM: CPT

## 2022-04-25 PROCEDURE — 93971 EXTREMITY STUDY: CPT | Performed by: SURGERY

## 2022-04-25 PROCEDURE — 85025 COMPLETE CBC W/AUTO DIFF WBC: CPT | Performed by: STUDENT IN AN ORGANIZED HEALTH CARE EDUCATION/TRAINING PROGRAM

## 2022-04-25 PROCEDURE — 96372 THER/PROPH/DIAG INJ SC/IM: CPT

## 2022-04-25 PROCEDURE — 36415 COLL VENOUS BLD VENIPUNCTURE: CPT

## 2022-04-25 PROCEDURE — 80053 COMPREHEN METABOLIC PANEL: CPT | Performed by: STUDENT IN AN ORGANIZED HEALTH CARE EDUCATION/TRAINING PROGRAM

## 2022-04-25 PROCEDURE — 85610 PROTHROMBIN TIME: CPT | Performed by: STUDENT IN AN ORGANIZED HEALTH CARE EDUCATION/TRAINING PROGRAM

## 2022-04-25 RX ADMIN — MORPHINE SULFATE 4 MG: 4 INJECTION, SOLUTION INTRAMUSCULAR; INTRAVENOUS at 22:52

## 2022-04-25 NOTE — PROGRESS NOTES
[facility] - PODIATRY    Today's Date: 04/28/22    Patient Name: Melly Cruz  MRN: [id number]  CSN: [id number]  PCP: Reynaldo Jeter DO  Referring Provider: No ref. provider found    SUBJECTIVE     Chief Complaint   Patient presents with   • Follow-up      Reynaldo Jeter DO PCP05/03/2021 1 WK POST OP Lapidus Bunionectomy with Possible Inner Cuneiform 1-2 Arthrodesis, Tarsal Metatarsal Joint 2 and 3 Arthrodesis, Hammertoe Repair 2-5, Possible Harvesting of Bone Graft from Calcaneus - Left Foot  Hammertoe Repair 2-5, Left General  1-2 Arthrodesis, Tarsal Metatarsal Joint 2 and 3 Arthrodesis- pt states doing pretty good- pt pain 8/10 in last 48 hours, its more of a burning type pain, throbbing is 5/10 at its worst- pt presents in wheel chair, wrapped      HPI: Melly Cruz, a 66 y.o.female, comes to clinic as a(n) established patient for post-op appt 1 weeks s/p Left Lapidus/Akin, 2nd and 3rd TMT AD, HT 2-5 Repair. Patient has h/o acid reflux, anxiety, arthritis, back pain, DVT, Hypothyroid, Migraine, PE. Relates that she has kept the bandage clean, dry, and intact.  Last night, she did end up going to the ER for evaluation and was found to have a DVT to her left lower leg postoperatively.  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  She was placed on Lovenox.  She was given a full dose aspirin after surgery but she was advised to discontinue this by GI and was given a baby aspirin.  Relates that she does have some tenderness but pain is managed with pain medication.  Relates that while she was in the ER, the outer bandage was removed for evaluation but then her bandage was left intact.  She does note keeping her leg in a dependent position but has remained nonweightbearing.  Admits pain at 5-8/10 level and described as aching, nagging and throbbing. Relates previous treatment(s) including She is taking medications as  prescribed. Denies any constitutional symptoms. No other pedal complaints at this time.    Past Medical History:   Diagnosis Date   • Acid reflux    • Anxiety    • Arthritis    • Curtis esophagus    • Chest pain    • Chronic back pain    • Chronic neck pain    • COPD (chronic obstructive pulmonary disease) (HCC)    • DVT (deep venous thrombosis) ([facility])    • Hypothyroid    • Hypothyroidism    • Low back pain    • Migraine    • PE (pulmonary thromboembolism) ([facility])      Past Surgical History:   Procedure Laterality Date   • CHOLECYSTECTOMY     • COLONOSCOPY     • DILATATION AND CURETTAGE     • HERNIA REPAIR     • HYSTERECTOMY     • JOINT REPLACEMENT     • OOPHORECTOMY     • REPLACEMENT TOTAL KNEE      Right knee partial, left knee     Family History   Problem Relation Age of Onset   • Breast cancer Paternal Aunt    • Ovarian cancer Neg Hx    • Uterine cancer Neg Hx    • Colon cancer Neg Hx      Social History     Socioeconomic History   • Marital status:    Tobacco Use   • Smoking status: Former Smoker     Packs/day: 1.00     Years: 30.     Pack years: 30.     Quit date: 2018     Years since quittin.0   • Smokeless tobacco: Never Used   Vaping Use   • Vaping Use: Never used   Substance and Sexual Activity   • Alcohol use: Not Currently   • Drug use: No   • Sexual activity: Yes     Partners: Male     Birth control/protection: Surgical     Allergies   Allergen Reactions   • Dilaudid [Hydromorphone Hcl] Anaphylaxis   • Erythromycin Swelling     Throat swells   • Ondansetron Hcl Other (See Comments)     Coded   • Orphenadrine Citrate Anaphylaxis   • Meperidine Other (See Comments)     Makes her an angry person   • Codeine Rash   • Guaifenesin & Derivatives Nausea And Vomiting     Current Outpatient Medications   Medication Sig Dispense Refill   • albuterol (PROVENTIL HFA;VENTOLIN HFA) 108 (90 Base) MCG/ACT inhaler Inhale 2 puffs 4 (Four) Times a Day. 6.7 g 0   • aspirin (Yasir Aspirin) 325 MG tablet Take  1 tablet by mouth Daily. 30 tablet 1   • budesonide (PULMICORT) 90 MCG/ACT inhaler 2 puffs three times a day, swallow, do not inhale, for 8 weeks     • buPROPion XL (WELLBUTRIN XL) 300 MG 24 hr tablet Take 300 mg by mouth.     • citalopram (CeleXA) 40 MG tablet Take 40 mg by mouth daily.     • Cyanocobalamin (VITAMIN B-12 ER) 1000 MCG tablet controlled-release Take 1,000 mcg by mouth.     • cyclobenzaprine (FLEXERIL) 5 MG tablet Take 5 mg by mouth 3 (Three) Times a Day As Needed.     • Diclofenac Sodium (VOLTAREN) 1 % gel gel APPLY 2 GRAMS FOUR TIMES DAILY TO AFFECTED AREA(S)     • enoxaparin (LOVENOX) 60 MG/0.6ML solution syringe Inject 1 mL under the skin into the appropriate area as directed Every 12 (Twelve) Hours for 7 days. 14 mL 0   • famotidine (PEPCID) 20 MG tablet      • gabapentin (NEURONTIN) 100 MG capsule Take 1 capsule by mouth 3 (Three) Times a Day for 14 days. 42 capsule 0   • levothyroxine (SYNTHROID, LEVOTHROID) 125 MCG tablet Take 112 mcg by mouth Daily. 112 mcg alternating with 125 mcg     • nitroglycerin (NITROSTAT) 0.4 MG SL tablet 1 under the tongue as needed for angina, may repeat q5mins for up three doses 100 tablet 11   • omeprazole (priLOSEC) 40 MG capsule Take 40 mg by mouth Daily.     • pantoprazole (PROTONIX) 40 MG EC tablet Take 40 mg by mouth 2 (Two) Times a Day.     • pramipexole (MIRAPEX) 0.125 MG tablet Take 0.75 mg by mouth 2 (two) times a day.     • pregabalin (LYRICA) 50 MG capsule Take 50 mg by mouth 3 (Three) Times a Day.     • Semaglutide (OZEMPIC, 1 MG/DOSE, SC) Inject  under the skin into the appropriate area as directed 1 (One) Time Per Week. On sundays. Pt states she takes it to decrease appetite and reduce weight     • sertraline (ZOLOFT) 50 MG tablet   2   • sucralfate (CARAFATE) 1 GM/10ML suspension Take 1 g by mouth Daily.     • tiZANidine (ZANAFLEX) 4 MG tablet Take 10 mg by mouth At Night As Needed for Muscle Spasms.     • vitamin D (ERGOCALCIFEROL) 23618 units  capsule capsule   6   • VITAMIN D PO Take 1 tablet by mouth Daily.       No current facility-administered medications for this visit.     Review of Systems   Constitutional: Negative for chills and fever.   HENT: Negative for congestion.    Respiratory: Negative for shortness of breath.    Cardiovascular: Positive for leg swelling. Negative for chest pain.   Gastrointestinal: Negative for constipation, diarrhea, nausea and vomiting.   Musculoskeletal: Positive for arthralgias, back pain and neck pain.        Foot pain   Skin: Positive for wound.   Neurological: Positive for numbness.       OBJECTIVE     Vitals:    22 1419   BP: 132/88   Pulse: 90   SpO2: 96%       PHYSICAL EXAM  GEN:   Accompanied by friend.     Foot/Ankle Exam:       General:   Appearance: appears stated age and healthy and obesity    Orientation: AAOx3    Affect: appropriate    Assistance: wheelchair    Shoes: Posterior splint.    VASCULAR      Right Foot Vascularity   Dorsalis pedis:  2+  Posterior tibial:  2+  Skin Temperature: warm    Edema Grading:  None  CFT:  3  Pedal Hair Growth:  Present  Varicosities: spider veins       Left Foot Vascularity   Dorsalis pedis:  2+  Posterior tibial:  2+  Skin Temperature: warm    Edema Gradin+ and pitting  CFT:  3  Pedal Hair Growth:  Present  Varicosities: spider veins        NEUROLOGIC     Right Foot Neurologic   Light touch sensation:  Diminished  Vibratory sensation:  Normal  Hot/Cold sensation: normal    Protective Sensation using [place] Monofilament:  10     Left Foot Neurologic   Light touch sensation:  Diminished  Vibratory sensation:  Normal  Hot/cold sensation: normal    Protective Sensation using [place] Monofilament:  10     MUSCULOSKELETAL      Right Foot Musculoskeletal   Ecchymosis:  None  Tenderness: neuroma    Tenderness comment:  Plantar 3rd interspace  Arch:  Normal  Hammertoe:  Second toe, third toe, fourth toe and fifth toe     Left Foot Musculoskeletal    Ecchymosis:  None  Tenderness comment:  Mildly to surgical sites.  Diffusely to lower leg.  Arch:  Normal  Hammertoe:  Second toe, third toe, fourth toe and fifth toe  Hallux valgus: Yes (Mild medial bony eminence with abduction of hallux. No crepitus. Not track bound.)       MUSCLE STRENGTH     Right Foot Muscle Strength   Foot dorsiflexion:  5  Foot plantar flexion:  5  Foot inversion:  5  Foot eversion:  5     Left Foot Muscle Strength   Foot dorsiflexion:  5  Foot plantar flexion:  5  Foot inversion:  5  Foot eversion:  5     RANGE OF MOTION      Right Foot Range of Motion   Foot and ankle ROM within normal limits       Left Foot Range of Motion   Foot and ankle ROM within normal limits       DERMATOLOGIC     Right Foot Dermatologic   Skin: skin intact    Nails: onychomycosis and abnormally thick       Left Foot Dermatologic   Nails: onychomycosis and abnormally thick        Left Foot Additional Comments: Incisions to left foot with sutures intact to all wounds except for the dorsal wound that has had a few sutures appear to have come loose from edema but the skin at this area is coapted.  Increased edema distally.      RADIOLOGY/NUCLEAR:  XR chest 2 vw    Result Date: 4/15/2022  Narrative: EXAM: XR CHEST 2 VW-  INDICATION: pre-op; M19.079-Primary osteoarthritis, unspecified ankle and foot; M20.12-Hallux valgus (acquired), left foot; M20.42-Other hammer toe(s) (acquired), left foot  COMPARISON: 4/29/2019  FINDINGS:  Cardiomediastinal silhouette is within normal limits. No pleural effusion or pneumothorax. No focal consolidation. No acute osseous findings.      Impression:  No acute findings. This report was finalized on 04/15/2022 12:45 by Dr. Andrei Ortiz MD.    XR Foot 2 View Left    Result Date: 4/20/2022  Narrative: HISTORY: Postop assessment left foot  Left foot: 2 views left foot are obtained. Splint in place.  External fixation screw traverses the left great toe with osteotomy site of the proximal  great toe. Arthrodesis of the first second and third tarsometatarsal joints. Alignment appears anatomic. Calcaneal spurring. Questionable osteotomy defect of the posterior inferior calcaneus. Soft tissue swelling and subcutaneous emphysema related to surgery.      Impression: 1. Postoperative changes left foot as described above. This report was finalized on 04/20/2022 14:44 by Dr. Marisol Martino MD.    US Venous Doppler Lower Extremity Left (duplex)    Result Date: 4/26/2022  Narrative: History: Pain and swelling      Impression: Impression: There is evidence of deep venous thrombosis of the left lower extremity.  Comments: Left lower extremity venous duplex exam was performed using color Doppler flow, Doppler wave form analysis, and grayscale imaging, with and without compression. There is evidence of deep venous thrombosis in the peroneal veins and soleal veins in the mid calf. There is no thrombus identified in the saphenofemoral junction or the greater saphenous vein.  This report was finalized on 04/26/2022 15:50 by Dr. Chuck Stubbs MD.      LABORATORY/CULTURE RESULTS:      PATHOLOGY RESULTS:       ASSESSMENT/PLAN     Diagnoses and all orders for this visit:    1. S/P foot surgery (Primary)    2. Acute deep vein thrombosis (DVT) of distal vein of left lower extremity (HCC)      Comprehensive lower extremity examination and evaluation was performed.  Discussed findings and treatment plan including risks, benefits, and treatment options with patient in detail. Patient agreed with treatment plan.  Viewed postoperative x-rays with patient.   Bandage removed and surgical sites evaluated.  Overall, healing as expected.  Reapplied similar bandage.  Continue Lovenox as prescribed.  Advised patient to keep leg elevated to assist with edema but to move her hips and knees frequently throughout the day.  An After Visit Summary was printed and given to the patient at discharge, including (if requested) any available  informative/educational handouts regarding diagnosis, treatment, or medications. All questions were answered to patient/family satisfaction. Should symptoms fail to improve or worsen they agree to call or return to clinic or to go to the Emergency Department. Discussed the importance of following up with any needed screening tests/labs/specialist appointments and any requested follow-up recommended by me today. Importance of maintaining follow-up discussed and patient accepts that missed appointments can delay diagnosis and potentially lead to worsening of conditions.  Return in about 2 weeks (around 5/12/2022) for Post-Op appointment, Follow-up with Dr. Maradiaga., or sooner if acute issues arise.    Lab Frequency Next Occurrence   CT chest w contrast Once 12/15/2022       This document has been electronically signed by Bud Maradiaga DPM on April 28, 2022 14:50 CDT

## 2022-04-26 VITALS
SYSTOLIC BLOOD PRESSURE: 119 MMHG | WEIGHT: 220 LBS | RESPIRATION RATE: 16 BRPM | BODY MASS INDEX: 37.56 KG/M2 | DIASTOLIC BLOOD PRESSURE: 77 MMHG | OXYGEN SATURATION: 93 % | HEIGHT: 64 IN | HEART RATE: 88 BPM | TEMPERATURE: 98.1 F

## 2022-04-28 ENCOUNTER — HOSPITAL ENCOUNTER (EMERGENCY)
Facility: HOSPITAL | Age: 67
End: 2022-04-28

## 2022-04-28 ENCOUNTER — OFFICE VISIT (OUTPATIENT)
Dept: PODIATRY | Facility: CLINIC | Age: 67
End: 2022-04-28

## 2022-04-28 VITALS
BODY MASS INDEX: 37.56 KG/M2 | OXYGEN SATURATION: 96 % | HEIGHT: 64 IN | SYSTOLIC BLOOD PRESSURE: 132 MMHG | WEIGHT: 220 LBS | HEART RATE: 90 BPM | DIASTOLIC BLOOD PRESSURE: 88 MMHG

## 2022-04-28 DIAGNOSIS — Z98.890 S/P FOOT SURGERY: Primary | ICD-10-CM

## 2022-04-28 DIAGNOSIS — I82.4Z2 ACUTE DEEP VEIN THROMBOSIS (DVT) OF DISTAL VEIN OF LEFT LOWER EXTREMITY: ICD-10-CM

## 2022-04-28 PROCEDURE — 99024 POSTOP FOLLOW-UP VISIT: CPT | Performed by: PODIATRIST

## 2022-05-01 NOTE — ED PROVIDER NOTES
Subjective   Patient states that she has history of blood clots in her legs and lungs.  States that she was told that she is not allowed to take most of the blood thinners that he can take for them.  She states that she had surgery recently on her left foot and noticed worsening swelling and pain and so came in for further evaluation.  States that she is not having any new shortness of breath, chest pain or any numbness or tingling.  States that she is concerned about a blood clot in that leg.          Review of Systems   All other systems reviewed and are negative.      Past Medical History:   Diagnosis Date   • Acid reflux    • Anxiety    • Arthritis    • Curtis esophagus    • Chest pain    • Chronic back pain    • Chronic neck pain    • COPD (chronic obstructive pulmonary disease) ([facility])    • DVT (deep venous thrombosis) ([facility])    • Hypothyroid    • Hypothyroidism    • Low back pain    • Migraine    • PE (pulmonary thromboembolism) ([facility])        Allergies   Allergen Reactions   • Dilaudid [Hydromorphone Hcl] Anaphylaxis   • Erythromycin Swelling     Throat swells   • Ondansetron Hcl Other (See Comments)     Coded   • Orphenadrine Citrate Anaphylaxis   • Meperidine Other (See Comments)     Makes her an angry person   • Codeine Rash   • Guaifenesin & Derivatives Nausea And Vomiting       Past Surgical History:   Procedure Laterality Date   • CHOLECYSTECTOMY     • COLONOSCOPY     • DILATATION AND CURETTAGE     • HERNIA REPAIR     • HYSTERECTOMY     • JOINT REPLACEMENT     • OOPHORECTOMY     • REPLACEMENT TOTAL KNEE      Right knee partial, left knee       Family History   Problem Relation Age of Onset   • Breast cancer Paternal Aunt    • Ovarian cancer Neg Hx    • Uterine cancer Neg Hx    • Colon cancer Neg Hx        Social History     Socioeconomic History   • Marital status:    Tobacco Use   • Smoking status: Former Smoker     Packs/day: 1.00     Years: 30.00     Pack years: 30.00     Quit date: 4/1/2018      Years since quittin.0   • Smokeless tobacco: Never Used   Vaping Use   • Vaping Use: Never used   Substance and Sexual Activity   • Alcohol use: Not Currently   • Drug use: No   • Sexual activity: Yes     Partners: Male     Birth control/protection: Surgical           Objective   Physical Exam  Vitals and nursing note reviewed.   Constitutional:       General: She is not in acute distress.     Appearance: She is not toxic-appearing or diaphoretic.   HENT:      Head: Normocephalic and atraumatic.      Nose: Nose normal.   Eyes:      General:         Right eye: No discharge.         Left eye: No discharge.      Extraocular Movements: Extraocular movements intact.      Conjunctiva/sclera: Conjunctivae normal.   Cardiovascular:      Rate and Rhythm: Normal rate.      Pulses: Normal pulses.   Pulmonary:      Effort: Pulmonary effort is normal. No respiratory distress.      Breath sounds: No stridor. No rhonchi.   Abdominal:      General: Abdomen is flat.   Musculoskeletal:         General: Tenderness present. Normal range of motion.      Cervical back: No rigidity.      Right lower leg: No edema.      Left lower leg: Edema present.      Comments: Surgical site appears clean, dry, intact. Pin in place in toes.   Skin:     General: Skin is warm.      Capillary Refill: Capillary refill takes less than 2 seconds.   Neurological:      General: No focal deficit present.      Mental Status: She is alert and oriented to person, place, and time.      Cranial Nerves: No cranial nerve deficit.      Sensory: No sensory deficit.      Motor: No weakness.   Psychiatric:         Mood and Affect: Mood normal.         Behavior: Behavior normal.         Thought Content: Thought content normal.         Judgment: Judgment normal.         Procedures           ED Course                                                 MDM  Number of Diagnoses or Management Options  Acute deep vein thrombosis (DVT) of distal vein of left lower extremity  (HCC)  Diagnosis management comments: This is a 66-year-old female presenting today with leg swelling after surgery.  Given her history of examination plan to obtain basic labs, CBC, CMP as well as an ultrasound of the left lower extremity to evaluate for a DVT.  Patient has a history of DVTs.  She currently has no chest pain, shortness of breath.  She is not tachycardic or hypoxic.  She is not complaining of anything other than her leg swelling.  Splint was removed and her leg appears to be clean dry and intact however there is some mild tenderness over the calf.  Venous duplex was obtained and showed evidence of a DVT.  I asked the patient what medication she can take for anticoagulation.  She states that she has taken Eliquis in the past and is okay with taking this. I reassessed the patient and discussed the findings of the work up so far. I told her that if she notices any GI bleeding or any kind of bleeding or shortness of breath or other symptoms that she needs to return to the ER soon as possible.  She is comfortable taking the Lovenox and following up with her surgeon and her primary care doctor. I answered all her questions regarding the emergency department evaluation, diagnosis, and treatment plan in plain and simple language that she was able to understand.     She voiced agreement with the plan of care so far and had no further questions. I told her that there is always some diagnostic uncertainty in the ER and that her work up, physical exam, and even her current presentation may not always reveal other underlying conditions. I also went over the fact that her condition may change or show itself after being discharged. She expressed understanding and agreed that there are reasonable limitations with the practice of emergency medicine.    I gave her return precautions and told her to return to the emergency department within 24 - 48hrs if she has any new, worsening, or concerning symptoms. I told her  that it is VERY IMPORTANT that she follows up (by calling to set up an appointment) with her primary care doctor within the next few days or as soon as reasonably possible so that she can be re-evaluated for improvement in her symptoms or for any other questions. She verbalized understanding of these instructions.     She was discharged in stable condition and was observed ambulating out of the ER.         Amount and/or Complexity of Data Reviewed  Clinical lab tests: reviewed and ordered  Tests in the radiology section of CPT®: ordered and reviewed        Final diagnoses:   Acute deep vein thrombosis (DVT) of distal vein of left lower extremity (HCC)       ED Disposition  ED Disposition     ED Disposition   Discharge    Condition   Stable    Comment   --             Reynaldo Jeter, DO  [address] [facility] [address]  [phone]    Call in 1 day  As needed, If symptoms worsen         Medication List      New Prescriptions    enoxaparin 60 MG/0.6ML solution syringe  Commonly known as: LOVENOX  Inject 1 mL under the skin into the appropriate area as directed Every 12 (Twelve) Hours for 7 days.        ASK your doctor about these medications    docusate sodium 100 MG capsule  Commonly known as: COLACE  Take 1 capsule by mouth Daily for 7 days.  Ask about: Should I take this medication?     Morphine 15 MG tablet  Commonly known as: MSIR  Take 1 tablet by mouth Every 6 (Six) Hours As Needed for Severe Pain  (breakthrough pain) for up to 5 days.  Ask about: Should I take this medication?     oxyCODONE-acetaminophen 7.5-325 MG per tablet  Commonly known as: PERCOCET  Take 1 tablet by mouth Every 6 (Six) Hours As Needed (Pain) for up to 7 days.  Ask about: Should I take this medication?     promethazine 12.5 MG tablet  Commonly known as: PHENERGAN  Take 1 tablet by mouth Every 8 (Eight) Hours As Needed for Nausea or Vomiting for up to 7 days.  Ask about: Should I take this medication?           Where to Get Your  Medications      These medications were sent to [facility] [place] [place] - [address] - [phone]  - [phone] FX  [address] [address]    Phone: [phone]   · enoxaparin 60 MG/0.6ML solution syringe          Giovany Quijano MD  05/01/22 9499

## 2022-05-02 ENCOUNTER — TELEPHONE (OUTPATIENT)
Dept: VASCULAR SURGERY | Facility: CLINIC | Age: 67
End: 2022-05-02

## 2022-05-02 NOTE — TELEPHONE ENCOUNTER
Pt called and is experiencing severe itching, to the left foot, following surgery on 04/20/22 and would like to know what she can do to relieve the symptoms.  Pt states she, also has burning and knows this is due to nerve pain.    Please advise.

## 2022-05-02 NOTE — TELEPHONE ENCOUNTER
Called pt and gave instructions from [employer]bin [employer]UBBS to take Benadryl as directed on pkg for itching.    Pt voiced understanding.

## 2022-05-10 ENCOUNTER — TELEPHONE (OUTPATIENT)
Dept: PODIATRY | Facility: CLINIC | Age: 67
End: 2022-05-10

## 2022-05-10 DIAGNOSIS — G89.18 POST-OP PAIN: Primary | ICD-10-CM

## 2022-05-10 RX ORDER — TRAMADOL HYDROCHLORIDE 50 MG/1
50 TABLET ORAL EVERY 6 HOURS PRN
Qty: 28 TABLET | Refills: 0 | Status: ON HOLD | OUTPATIENT
Start: 2022-05-10 | End: 2023-01-24

## 2022-05-12 ENCOUNTER — OFFICE VISIT (OUTPATIENT)
Dept: PODIATRY | Facility: CLINIC | Age: 67
End: 2022-05-12

## 2022-05-12 VITALS
HEART RATE: 88 BPM | SYSTOLIC BLOOD PRESSURE: 140 MMHG | OXYGEN SATURATION: 98 % | DIASTOLIC BLOOD PRESSURE: 90 MMHG | WEIGHT: 220 LBS | BODY MASS INDEX: 37.56 KG/M2 | HEIGHT: 64 IN

## 2022-05-12 DIAGNOSIS — I82.4Z2 ACUTE DEEP VEIN THROMBOSIS (DVT) OF DISTAL VEIN OF LEFT LOWER EXTREMITY: ICD-10-CM

## 2022-05-12 DIAGNOSIS — Z98.890 S/P FOOT SURGERY: Primary | ICD-10-CM

## 2022-05-12 PROCEDURE — 99024 POSTOP FOLLOW-UP VISIT: CPT | Performed by: PODIATRIST

## 2022-05-12 RX ORDER — BUSPIRONE HYDROCHLORIDE 10 MG/1
300 TABLET ORAL 3 TIMES DAILY
COMMUNITY
End: 2022-08-18 | Stop reason: DRUGHIGH

## 2022-05-12 NOTE — PROGRESS NOTES
[facility] - PODIATRY    Today's Date: 05/12/22    Patient Name: Melly Cruz  MRN: [id number]  CSN: [id number]  PCP: Reynaldo Jeter DO  Referring Provider: No ref. provider found    SUBJECTIVE     Chief Complaint   Patient presents with   • Post-op     Reynaldo Jeter DO pcp05/03/2021 2 WK POST OP - pt states a knot has started to develop is where the stitched area is and its very painful, having pain in great toe and on down the side - pt pain 4/10 - pt present 3 week  post op, having some pain and swelling in left foot      HPI: Melly Cruz, a 66 y.o.female, comes to clinic as a(n) established patient for post-op appt 3 weeks s/p Left Lapidus/Akin, 2nd and 3rd TMT AD, HT 2-5 Repair. Patient has h/o acid reflux, anxiety, arthritis, back pain, DVT, Hypothyroid, Migraine, PE. She has been taking Lovenox and feels that the DVT has resolved. She has remained NWB in her splint. She did  her Tramadol and notes mild improvement in pain.  Admits pain at 4/10 level and described as aching, throbbing and dull. Relates previous treatment(s) including She is taking medications as prescribed. Denies any constitutional symptoms. No other pedal complaints at this time.    Past Medical History:   Diagnosis Date   • Acid reflux    • Anxiety    • Arthritis    • Curtis esophagus    • Chest pain    • Chronic back pain    • Chronic neck pain    • COPD (chronic obstructive pulmonary disease) ([facility])    • DVT (deep venous thrombosis) ([facility])    • Hypothyroid    • Hypothyroidism    • Low back pain    • Migraine    • PE (pulmonary thromboembolism) ([facility])      Past Surgical History:   Procedure Laterality Date   • CHOLECYSTECTOMY     • COLONOSCOPY     • DILATATION AND CURETTAGE     • HERNIA REPAIR     • HYSTERECTOMY     • JOINT REPLACEMENT     • OOPHORECTOMY     • REPLACEMENT TOTAL KNEE      Right knee partial, left knee     Family History   Problem Relation Age of Onset   • Breast cancer  Paternal Aunt    • Ovarian cancer Neg Hx    • Uterine cancer Neg Hx    • Colon cancer Neg Hx      Social History     Socioeconomic History   • Marital status:    Tobacco Use   • Smoking status: Former Smoker     Packs/day: 1.00     Years: 30.00     Pack years: 30.00     Quit date: 2018     Years since quittin.1   • Smokeless tobacco: Never Used   Vaping Use   • Vaping Use: Never used   Substance and Sexual Activity   • Alcohol use: Not Currently   • Drug use: No   • Sexual activity: Yes     Partners: Male     Birth control/protection: Surgical     Allergies   Allergen Reactions   • Dilaudid [Hydromorphone Hcl] Anaphylaxis   • Erythromycin Swelling     Throat swells   • Ondansetron Hcl Other (See Comments)     Coded   • Orphenadrine Citrate Anaphylaxis   • Meperidine Other (See Comments)     Makes her an angry person   • Codeine Rash   • Guaifenesin & Derivatives Nausea And Vomiting     Current Outpatient Medications   Medication Sig Dispense Refill   • albuterol (PROVENTIL HFA;VENTOLIN HFA) 108 (90 Base) MCG/ACT inhaler Inhale 2 puffs 4 (Four) Times a Day. 6.7 g 0   • aspirin (Yasir Aspirin) 325 MG tablet Take 1 tablet by mouth Daily. 30 tablet 1   • budesonide (PULMICORT) 90 MCG/ACT inhaler 2 puffs three times a day, swallow, do not inhale, for 8 weeks     • buPROPion XL (WELLBUTRIN XL) 300 MG 24 hr tablet Take 300 mg by mouth.     • busPIRone (BUSPAR) 10 MG tablet Take 300 mg by mouth 3 (Three) Times a Day.     • citalopram (CeleXA) 40 MG tablet Take 40 mg by mouth daily.     • Cyanocobalamin (VITAMIN B-12 ER) 1000 MCG tablet controlled-release Take 1,000 mcg by mouth.     • cyclobenzaprine (FLEXERIL) 5 MG tablet Take 5 mg by mouth 3 (Three) Times a Day As Needed.     • Diclofenac Sodium (VOLTAREN) 1 % gel gel APPLY 2 GRAMS FOUR TIMES DAILY TO AFFECTED AREA(S)     • famotidine (PEPCID) 20 MG tablet      • levothyroxine (SYNTHROID, LEVOTHROID) 125 MCG tablet Take 112 mcg by mouth Daily. 112 mcg  alternating with 125 mcg     • nitroglycerin (NITROSTAT) 0.4 MG SL tablet 1 under the tongue as needed for angina, may repeat q5mins for up three doses 100 tablet 11   • omeprazole (priLOSEC) 40 MG capsule Take 40 mg by mouth Daily.     • pantoprazole (PROTONIX) 40 MG EC tablet Take 40 mg by mouth 2 (Two) Times a Day.     • pramipexole (MIRAPEX) 0.125 MG tablet Take 0.75 mg by mouth 2 (two) times a day.     • pregabalin (LYRICA) 50 MG capsule Take 50 mg by mouth 3 (Three) Times a Day.     • Semaglutide (OZEMPIC, 1 MG/DOSE, SC) Inject  under the skin into the appropriate area as directed 1 (One) Time Per Week. On sundays. Pt states she takes it to decrease appetite and reduce weight     • sertraline (ZOLOFT) 50 MG tablet   2   • sucralfate (CARAFATE) 1 GM/10ML suspension Take 1 g by mouth Daily.     • tiZANidine (ZANAFLEX) 4 MG tablet Take 10 mg by mouth At Night As Needed for Muscle Spasms.     • traMADol (ULTRAM) 50 MG tablet Take 1 tablet by mouth Every 6 (Six) Hours As Needed for Moderate Pain  (pain). Take one to two every 4-6 hours prn pain. 28 tablet 0   • vitamin D (ERGOCALCIFEROL) 34089 units capsule capsule   6   • VITAMIN D PO Take 1 tablet by mouth Daily.     • gabapentin (NEURONTIN) 100 MG capsule Take 1 capsule by mouth 3 (Three) Times a Day for 14 days. 42 capsule 0     No current facility-administered medications for this visit.     Review of Systems   Constitutional: Negative for chills and fever.   HENT: Negative for congestion.    Respiratory: Negative for shortness of breath.    Cardiovascular: Positive for leg swelling. Negative for chest pain.   Gastrointestinal: Negative for constipation, diarrhea, nausea and vomiting.   Musculoskeletal: Positive for arthralgias, back pain and neck pain.        Foot pain   Skin: Positive for wound.   Neurological: Positive for numbness.       OBJECTIVE     Vitals:    05/12/22 0824   BP: 140/90   Pulse: 88   SpO2: 98%       PHYSICAL EXAM  GEN:   Accompanied by  mother.     Foot/Ankle Exam:       General:   Appearance: appears stated age and healthy and obesity    Orientation: AAOx3    Affect: appropriate    Assistance: wheelchair    Shoes: Posterior splint.    VASCULAR      Right Foot Vascularity   Dorsalis pedis:  2+  Posterior tibial:  2+  Skin Temperature: warm    Edema Grading:  None  CFT:  3  Pedal Hair Growth:  Present  Varicosities: spider veins       Left Foot Vascularity   Dorsalis pedis:  2+  Posterior tibial:  2+  Skin Temperature: warm    Edema Gradin+ and pitting  CFT:  3  Pedal Hair Growth:  Present  Varicosities: spider veins        NEUROLOGIC     Right Foot Neurologic   Light touch sensation:  Diminished  Vibratory sensation:  Normal  Hot/Cold sensation: normal    Protective Sensation using [place] Monofilament:  10     Left Foot Neurologic   Light touch sensation:  Diminished  Vibratory sensation:  Normal  Hot/cold sensation: normal    Protective Sensation using [place] Monofilament:  10     MUSCULOSKELETAL      Right Foot Musculoskeletal   Ecchymosis:  None  Tenderness: neuroma    Tenderness comment:  Plantar 3rd interspace  Arch:  Normal  Hammertoe:  Second toe, third toe, fourth toe and fifth toe     Left Foot Musculoskeletal   Ecchymosis:  None  Tenderness comment:  Mildly to surgical sites.  Diffusely to lower leg.  Arch:  Normal     MUSCLE STRENGTH     Right Foot Muscle Strength   Foot dorsiflexion:  5  Foot plantar flexion:  5  Foot inversion:  5  Foot eversion:  5     RANGE OF MOTION      Right Foot Range of Motion   Foot and ankle ROM within normal limits       Left Foot Range of Motion   Foot and ankle ROM within normal limits       DERMATOLOGIC     Right Foot Dermatologic   Skin: skin intact    Nails: onychomycosis and abnormally thick       Left Foot Dermatologic   Nails: onychomycosis and abnormally thick        Left Foot Additional Comments: Incisions to left foot with sutures intact to all wounds except for the dorsal  wound that has had a few sutures appear to have come loose from edema but the skin at this area is coapted.  Upon removal of sutures, incisions are fully coapted. No SOI or dehiscence.  Increased edema distally.      RADIOLOGY/NUCLEAR:  XR chest 2 vw    Result Date: 4/15/2022  Narrative: EXAM: XR CHEST 2 VW-  INDICATION: pre-op; M19.079-Primary osteoarthritis, unspecified ankle and foot; M20.12-Hallux valgus (acquired), left foot; M20.42-Other hammer toe(s) (acquired), left foot  COMPARISON: 4/29/2019  FINDINGS:  Cardiomediastinal silhouette is within normal limits. No pleural effusion or pneumothorax. No focal consolidation. No acute osseous findings.      Impression:  No acute findings. This report was finalized on 04/15/2022 12:45 by Dr. Andrei Ortiz MD.    XR Foot 2 View Left    Result Date: 4/20/2022  Narrative: HISTORY: Postop assessment left foot  Left foot: 2 views left foot are obtained. Splint in place.  External fixation screw traverses the left great toe with osteotomy site of the proximal great toe. Arthrodesis of the first second and third tarsometatarsal joints. Alignment appears anatomic. Calcaneal spurring. Questionable osteotomy defect of the posterior inferior calcaneus. Soft tissue swelling and subcutaneous emphysema related to surgery.      Impression: 1. Postoperative changes left foot as described above. This report was finalized on 04/20/2022 14:44 by Dr. Marisol Martino MD.    US Venous Doppler Lower Extremity Left (duplex)    Result Date: 4/26/2022  Narrative: History: Pain and swelling      Impression: Impression: There is evidence of deep venous thrombosis of the left lower extremity.  Comments: Left lower extremity venous duplex exam was performed using color Doppler flow, Doppler wave form analysis, and grayscale imaging, with and without compression. There is evidence of deep venous thrombosis in the peroneal veins and soleal veins in the mid calf. There is no thrombus identified in  the saphenofemoral junction or the greater saphenous vein.  This report was finalized on 04/26/2022 15:50 by Dr. Chuck Stubbs MD.      LABORATORY/CULTURE RESULTS:      PATHOLOGY RESULTS:       ASSESSMENT/PLAN     Diagnoses and all orders for this visit:    1. S/P foot surgery (Primary)  -     XR Foot 3+ View Left; Future    2. Acute deep vein thrombosis (DVT) of distal vein of left lower extremity (HCC)      Comprehensive lower extremity examination and evaluation was performed.  Discussed findings and treatment plan including risks, benefits, and treatment options with patient in detail. Patient agreed with treatment plan.  Viewed postoperative x-rays with patient.   Bandage removed and surgical sites evaluated.  Sutures removed  Applied ACE and CAM  Ok for heel touch only.   New xrays before next appt.   Continue Lovenox as prescribed.  Advised patient to keep leg elevated to assist with edema but to move her hips and knees frequently throughout the day.  An After Visit Summary was printed and given to the patient at discharge, including (if requested) any available informative/educational handouts regarding diagnosis, treatment, or medications. All questions were answered to patient/family satisfaction. Should symptoms fail to improve or worsen they agree to call or return to clinic or to go to the Emergency Department. Discussed the importance of following up with any needed screening tests/labs/specialist appointments and any requested follow-up recommended by me today. Importance of maintaining follow-up discussed and patient accepts that missed appointments can delay diagnosis and potentially lead to worsening of conditions.  Return in 3 weeks (on 6/2/2022) for Post-Op appointment, Follow-up with Dr. Maradiaga., or sooner if acute issues arise.    Lab Frequency Next Occurrence   CT chest w contrast Once 12/15/2022       This document has been electronically signed by Bud Maradiaga DPM on May 12, 2022  08:48 CDT       Answers for HPI/ROS submitted by the patient on 5/11/2022  Please describe your symptoms.: Post-op stiches out  Have you had these symptoms before?: No  How long have you been having these symptoms?: Greater than 2 weeks  Please list any medications you are currently taking for this condition.: Tremadol 50mg q6  Please describe any probable cause for these symptoms. : Post surgery  What is the primary reason for your visit?: Other

## 2022-05-16 ENCOUNTER — HOSPITAL ENCOUNTER (OUTPATIENT)
Dept: MRI IMAGING | Facility: HOSPITAL | Age: 67
Discharge: HOME | End: 2022-05-16

## 2022-05-16 DIAGNOSIS — G89.29 CHRONIC BILATERAL LOW BACK PAIN WITHOUT SCIATICA: ICD-10-CM

## 2022-05-16 DIAGNOSIS — M54.50 CHRONIC BILATERAL LOW BACK PAIN WITHOUT SCIATICA: ICD-10-CM

## 2022-05-16 DIAGNOSIS — M51.37 DEGENERATION OF LUMBAR OR LUMBOSACRAL INTERVERTEBRAL DISC: ICD-10-CM

## 2022-06-02 ENCOUNTER — OFFICE VISIT (OUTPATIENT)
Dept: PODIATRY | Facility: CLINIC | Age: 67
End: 2022-06-02

## 2022-06-02 ENCOUNTER — HOSPITAL ENCOUNTER (OUTPATIENT)
Dept: GENERAL RADIOLOGY | Facility: HOSPITAL | Age: 67
Discharge: HOME | End: 2022-06-02
Admitting: PODIATRIST

## 2022-06-02 VITALS
WEIGHT: 212 LBS | SYSTOLIC BLOOD PRESSURE: 132 MMHG | BODY MASS INDEX: 36.19 KG/M2 | HEART RATE: 86 BPM | OXYGEN SATURATION: 98 % | HEIGHT: 64 IN | DIASTOLIC BLOOD PRESSURE: 80 MMHG

## 2022-06-02 DIAGNOSIS — Z98.890 S/P FOOT SURGERY: Primary | ICD-10-CM

## 2022-06-02 DIAGNOSIS — Z98.890 S/P FOOT SURGERY: ICD-10-CM

## 2022-06-02 PROCEDURE — 99024 POSTOP FOLLOW-UP VISIT: CPT | Performed by: PODIATRIST

## 2022-06-02 PROCEDURE — 73630 X-RAY EXAM OF FOOT: CPT

## 2022-06-03 ENCOUNTER — HOSPITAL ENCOUNTER (OUTPATIENT)
Dept: MRI IMAGING | Facility: HOSPITAL | Age: 67
Discharge: HOME | End: 2022-06-03
Admitting: NURSE PRACTITIONER

## 2022-06-03 PROCEDURE — 72148 MRI LUMBAR SPINE W/O DYE: CPT

## 2022-06-06 ENCOUNTER — TRANSCRIBE ORDERS (OUTPATIENT)
Dept: ADMINISTRATIVE | Facility: HOSPITAL | Age: 67
End: 2022-06-06

## 2022-06-06 ENCOUNTER — HOSPITAL ENCOUNTER (OUTPATIENT)
Dept: GENERAL RADIOLOGY | Facility: HOSPITAL | Age: 67
Discharge: HOME | End: 2022-06-06
Admitting: NURSE PRACTITIONER

## 2022-06-06 DIAGNOSIS — M54.16 LUMBAR RADICULOPATHY: Primary | ICD-10-CM

## 2022-06-06 PROCEDURE — 72110 X-RAY EXAM L-2 SPINE 4/>VWS: CPT

## 2022-07-01 ENCOUNTER — TELEPHONE (OUTPATIENT)
Dept: PODIATRY | Facility: CLINIC | Age: 67
End: 2022-07-01

## 2022-07-05 ENCOUNTER — OFFICE VISIT (OUTPATIENT)
Dept: PODIATRY | Facility: CLINIC | Age: 67
End: 2022-07-05

## 2022-07-05 VITALS
WEIGHT: 212 LBS | SYSTOLIC BLOOD PRESSURE: 138 MMHG | HEIGHT: 64 IN | HEART RATE: 84 BPM | DIASTOLIC BLOOD PRESSURE: 88 MMHG | OXYGEN SATURATION: 96 % | BODY MASS INDEX: 36.19 KG/M2

## 2022-07-05 DIAGNOSIS — T84.84XA PAINFUL ORTHOPAEDIC HARDWARE: ICD-10-CM

## 2022-07-05 DIAGNOSIS — Z98.890 S/P FOOT SURGERY: Primary | ICD-10-CM

## 2022-07-05 PROCEDURE — 99024 POSTOP FOLLOW-UP VISIT: CPT | Performed by: PODIATRIST

## 2022-07-05 NOTE — PROGRESS NOTES
[facility] - PODIATRY    Today's Date: 07/05/22    Patient Name: Melly Cruz  MRN: [id number]  CSN: [id number]  PCP: Reynaldo Jeter DO  Referring Provider: No ref. provider found    SUBJECTIVE     Chief Complaint   Patient presents with   • Post-op     Reynaldo Jeter DO PCP05/03/2021 Return in about 1 month - pt states left foot still very painful and swollen, hurts more after walking so much - pt pain 5/10 - pt presents 2 month post op, with left foot swollen and causing pain     HPI: Melly Cruz, a 66 y.o.female, comes to clinic as a(n) established patient for post-op appt 10 weeks s/p Left Lapidus/Akin, 2nd and 3rd TMT AD, HT 2-5 Repair. Patient has h/o acid reflux, anxiety, arthritis, back pain, DVT, Hypothyroid, Migraine, PE. Patient notes that she has returned to normal shoe gear but states that she continues to have swelling and tenderness that hurts much more with walking. States that most of her pain is actually in the ankle and is only somewhat tender near surgical sites.  Admits pain at 5/10 level and described as aching and dull. Relates previous treatment(s) including surgical correction. Denies any constitutional symptoms. No other pedal complaints at this time.    Past Medical History:   Diagnosis Date   • Acid reflux    • Anxiety    • Arthritis    • Curtis esophagus    • Chest pain    • Chronic back pain    • Chronic neck pain    • COPD (chronic obstructive pulmonary disease) ([facility])    • DVT (deep venous thrombosis) ([facility])    • Hypothyroid    • Hypothyroidism    • Low back pain    • Migraine    • PE (pulmonary thromboembolism) ([facility])      Past Surgical History:   Procedure Laterality Date   • CHOLECYSTECTOMY     • COLONOSCOPY     • DILATATION AND CURETTAGE     • FOOT FUSION Left 4/20/2022    Procedure: 1-2 Arthrodesis, Tarsal Metatarsal Joint 2 and 3 Arthrodesis;  Surgeon: Bud Maradiaga DPM;  Location: [facility];  Service: Podiatry;  Laterality: Left;   •  HAMMER TOE REPAIR Left 2022    Procedure: Hammertoe Repair 2-5,;  Surgeon: Bud Maradiaga DPM;  Location: [facility] OR;  Service: Podiatry;  Laterality: Left;   • HERNIA REPAIR     • HYSTERECTOMY     • JOINT REPLACEMENT     • OOPHORECTOMY     • REPLACEMENT TOTAL KNEE      Right knee partial, left knee     Family History   Problem Relation Age of Onset   • Breast cancer Paternal Aunt    • Ovarian cancer Neg Hx    • Uterine cancer Neg Hx    • Colon cancer Neg Hx      Social History     Socioeconomic History   • Marital status:    Tobacco Use   • Smoking status: Former Smoker     Packs/day: 1.00     Years: 30.00     Pack years: 30.00     Quit date: 2018     Years since quittin.2   • Smokeless tobacco: Never Used   Vaping Use   • Vaping Use: Never used   Substance and Sexual Activity   • Alcohol use: Not Currently   • Drug use: No   • Sexual activity: Yes     Partners: Male     Birth control/protection: Surgical     Allergies   Allergen Reactions   • Dilaudid [Hydromorphone Hcl] Anaphylaxis   • Erythromycin Swelling     Throat swells   • Ondansetron Hcl Other (See Comments)     Coded   • Orphenadrine Citrate Anaphylaxis   • Meperidine Other (See Comments)     Makes her an angry person   • Codeine Rash   • Guaifenesin & Derivatives Nausea And Vomiting     Current Outpatient Medications   Medication Sig Dispense Refill   • albuterol (PROVENTIL HFA;VENTOLIN HFA) 108 (90 Base) MCG/ACT inhaler Inhale 2 puffs 4 (Four) Times a Day. 6.7 g 0   • aspirin (Yasir Aspirin) 325 MG tablet Take 1 tablet by mouth Daily. 30 tablet 1   • budesonide (PULMICORT) 90 MCG/ACT inhaler 2 puffs three times a day, swallow, do not inhale, for 8 weeks     • buPROPion XL (WELLBUTRIN XL) 300 MG 24 hr tablet Take 300 mg by mouth.     • busPIRone (BUSPAR) 10 MG tablet Take 300 mg by mouth 3 (Three) Times a Day.     • citalopram (CeleXA) 40 MG tablet Take 40 mg by mouth daily.     • Cyanocobalamin (VITAMIN B-12 ER) 1000 MCG tablet  controlled-release Take 1,000 mcg by mouth.     • cyclobenzaprine (FLEXERIL) 5 MG tablet Take 5 mg by mouth 3 (Three) Times a Day As Needed.     • Diclofenac Sodium (VOLTAREN) 1 % gel gel APPLY 2 GRAMS FOUR TIMES DAILY TO AFFECTED AREA(S)     • famotidine (PEPCID) 20 MG tablet      • levothyroxine (SYNTHROID, LEVOTHROID) 125 MCG tablet Take 112 mcg by mouth Daily. 112 mcg alternating with 125 mcg     • nitroglycerin (NITROSTAT) 0.4 MG SL tablet 1 under the tongue as needed for angina, may repeat q5mins for up three doses 100 tablet 11   • omeprazole (priLOSEC) 40 MG capsule Take 40 mg by mouth Daily.     • pantoprazole (PROTONIX) 40 MG EC tablet Take 40 mg by mouth 2 (Two) Times a Day.     • pramipexole (MIRAPEX) 0.125 MG tablet Take 0.75 mg by mouth 2 (two) times a day.     • pregabalin (LYRICA) 50 MG capsule Take 50 mg by mouth 3 (Three) Times a Day.     • Semaglutide (OZEMPIC, 1 MG/DOSE, SC) Inject  under the skin into the appropriate area as directed 1 (One) Time Per Week. On sundays. Pt states she takes it to decrease appetite and reduce weight     • sertraline (ZOLOFT) 50 MG tablet   2   • sucralfate (CARAFATE) 1 GM/10ML suspension Take 1 g by mouth Daily.     • tiZANidine (ZANAFLEX) 4 MG tablet Take 10 mg by mouth At Night As Needed for Muscle Spasms.     • traMADol (ULTRAM) 50 MG tablet Take 1 tablet by mouth Every 6 (Six) Hours As Needed for Moderate Pain  (pain). Take one to two every 4-6 hours prn pain. 28 tablet 0   • vitamin D (ERGOCALCIFEROL) 16560 units capsule capsule   6   • VITAMIN D PO Take 1 tablet by mouth Daily.     • gabapentin (NEURONTIN) 100 MG capsule Take 1 capsule by mouth 3 (Three) Times a Day for 14 days. 42 capsule 0     No current facility-administered medications for this visit.     Review of Systems   Constitutional: Negative for chills and fever.   HENT: Negative for congestion.    Respiratory: Negative for shortness of breath.    Cardiovascular: Positive for leg swelling. Negative  for chest pain.   Gastrointestinal: Negative for constipation, diarrhea, nausea and vomiting.   Musculoskeletal: Positive for arthralgias, back pain and neck pain.        Foot pain   Skin: Negative for wound.   Neurological: Positive for numbness.       OBJECTIVE     Vitals:    22 0958   BP: 138/88   Pulse: 84   SpO2: 96%       PHYSICAL EXAM  GEN:   Accompanied by none.     Foot/Ankle Exam:       General:   Appearance: appears stated age and healthy and obesity    Orientation: AAOx3    Affect: appropriate    Shoe Gear:  Casual shoes    VASCULAR      Right Foot Vascularity   Dorsalis pedis:  2+  Posterior tibial:  2+  Skin Temperature: warm    Edema Grading:  None  CFT:  3  Pedal Hair Growth:  Present  Varicosities: spider veins       Left Foot Vascularity   Dorsalis pedis:  2+  Posterior tibial:  2+  Skin Temperature: warm    Edema Gradin+ and pitting  CFT:  3  Pedal Hair Growth:  Present  Varicosities: spider veins        NEUROLOGIC     Right Foot Neurologic   Light touch sensation:  Diminished  Vibratory sensation:  Normal  Hot/Cold sensation: normal    Protective Sensation using [place] Monofilament:  10     Left Foot Neurologic   Light touch sensation:  Diminished  Vibratory sensation:  Normal  Hot/cold sensation: normal    Protective Sensation using [place] Monofilament:  10     MUSCULOSKELETAL      Right Foot Musculoskeletal   Ecchymosis:  None  Tenderness: none    Arch:  Normal  Hammertoe:  Second toe, third toe, fourth toe and fifth toe     Left Foot Musculoskeletal   Ecchymosis:  None  Tenderness: dorsal foot    Tenderness comment:  Mildly to dorsal foot near surgical sites  Arch:  Normal     MUSCLE STRENGTH     Right Foot Muscle Strength   Foot dorsiflexion:  5  Foot plantar flexion:  5  Foot inversion:  5  Foot eversion:  5     Left Foot Muscle Strength   Foot dorsiflexion:  4+  Foot plantar flexion:  4+  Foot inversion:  4+  Foot eversion:  4+     RANGE OF MOTION      Right  Foot Range of Motion   Foot and ankle ROM within normal limits       Left Foot Range of Motion   Foot and ankle ROM within normal limits       DERMATOLOGIC     Right Foot Dermatologic   Skin: skin intact    Nails: onychomycosis and abnormally thick       Left Foot Dermatologic   Skin: skin intact    Nails: onychomycosis and abnormally thick        Left Foot Additional Comments: Mildly prominent distal aspect of hardware at dorsal 1st Metatarsal      RADIOLOGY/NUCLEAR:  XR Spine Lumbar AP & Lateral With Flex & Ext    Result Date: 6/6/2022  Narrative: HISTORY: Low back pain radiating to the hips  Lumbar spine: Frontal and lateral views lumbar spine are obtained with lateral flexion-extension views.  COMPARISON: 7/15/2021  FINDINGS: There is minimal grade 1 spondylolisthesis at the L3/L4 level with retrolisthesis of L3 over L4 measuring 2 mm. This is stable with flexion and extension. There is no compression deformity. Degenerative facet changes at the lower 3 lumbar levels. Only borderline L3/L4 and L5-S1 disc space narrowing.  Cholecystectomy clips. Mild vascular calcification.      Impression: 1. Subtle grade 1 spondylolisthesis at L3/L4 with no dynamic instability. Lower lumbar facet arthropathy with no acute radiographic abnormality. 2. Moderate degenerative disc change at T11/T12. This report was finalized on 06/06/2022 09:51 by Dr. Marisol Martino MD.      LABORATORY/CULTURE RESULTS:      PATHOLOGY RESULTS:       ASSESSMENT/PLAN     Diagnoses and all orders for this visit:    1. S/P foot surgery (Primary)  -     XR Foot 3+ View Left; Future    2. Painful orthopaedic hardware (HCC)      Comprehensive lower extremity examination and evaluation was performed.  Discussed findings and treatment plan including risks, benefits, and treatment options with patient in detail. Patient agreed with treatment plan.  Overall surgical site feels stable and is healing without significant complications.   Residual swelling is NWL  of healing process.   Xrays of left foot in 1 month.  Prominent hardware along dorsal 1st metatarsal noted. Will further evaluate with x-rays and at next appointment. Discussed potentially removing hardware if it continues to be problematic moving forward.   Compressigrip applied.  An After Visit Summary was printed and given to the patient at discharge, including (if requested) any available informative/educational handouts regarding diagnosis, treatment, or medications. All questions were answered to patient/family satisfaction. Should symptoms fail to improve or worsen they agree to call or return to clinic or to go to the Emergency Department. Discussed the importance of following up with any needed screening tests/labs/specialist appointments and any requested follow-up recommended by me today. Importance of maintaining follow-up discussed and patient accepts that missed appointments can delay diagnosis and potentially lead to worsening of conditions.  Return in about 1 month (around 8/5/2022)., or sooner if acute issues arise.    Lab Frequency Next Occurrence   CT chest w contrast Once 12/15/2022       This document has been electronically signed by Bud Maradiaga DPM on July 5, 2022 10:18 CDT

## 2022-08-09 ENCOUNTER — OFFICE VISIT (OUTPATIENT)
Dept: PODIATRY | Facility: CLINIC | Age: 67
End: 2022-08-09

## 2022-08-09 ENCOUNTER — HOSPITAL ENCOUNTER (OUTPATIENT)
Dept: GENERAL RADIOLOGY | Facility: HOSPITAL | Age: 67
Discharge: HOME | End: 2022-08-09
Admitting: PODIATRIST

## 2022-08-09 VITALS
OXYGEN SATURATION: 97 % | SYSTOLIC BLOOD PRESSURE: 136 MMHG | HEART RATE: 84 BPM | DIASTOLIC BLOOD PRESSURE: 76 MMHG | HEIGHT: 64 IN | WEIGHT: 207.4 LBS | BODY MASS INDEX: 35.41 KG/M2

## 2022-08-09 DIAGNOSIS — Z98.890 S/P FOOT SURGERY: Primary | ICD-10-CM

## 2022-08-09 DIAGNOSIS — Z98.890 S/P FOOT SURGERY: ICD-10-CM

## 2022-08-09 DIAGNOSIS — M79.672 FOOT PAIN, LEFT: ICD-10-CM

## 2022-08-09 PROCEDURE — 99213 OFFICE O/P EST LOW 20 MIN: CPT | Performed by: PODIATRIST

## 2022-08-09 PROCEDURE — 73630 X-RAY EXAM OF FOOT: CPT

## 2022-08-09 NOTE — PROGRESS NOTES
[facility] - PODIATRY    Today's Date: 08/09/22    Patient Name: Melly Cruz  MRN: [id number]  CSN: [id number]  PCP: Reynaldo Jeter DO  Referring Provider: No ref. provider found    SUBJECTIVE     Chief Complaint   Patient presents with   • Follow-up     Reynaldo Jeter DO pcp03/17/2022 Return in about 1 month- pt states left foot still having pain, cant stand for long before it starts throbbing. 2nd toe left foot curving bad- pt pain 8/10 at worst, burning/throbbing pain- pt presents with 2nd toe left foot curving, healing surgical site top of left foot from april     HPI: Melly Cruz, a 66 y.o.female, comes to clinic as a(n) established patient for follow-up treatment of left foot pain post operatively. Patient has h/o acid reflux, anxiety, arthritis, back pain, DVT, Hypothyroid, Migraine, PE. Patient presents for follow-up of left foot pain near surgical sites. Had repeat x-rays completed this morning. Notes that she continues to have throbbing pain near the medial aspect of the foot at surgical site. Pain is worse with standing/walking. Notes that 2nd toe of left foot seems to be continuing to curve out laterally at the distal tip. Note that the redness and swelling have improved since last visit.  Admits pain at 7-8/10 level and described as aching and dull. Relates previous treatment(s) including surgical correction. Denies any constitutional symptoms. No other pedal complaints at this time.    Past Medical History:   Diagnosis Date   • Acid reflux    • Anxiety    • Arthritis    • Curtis esophagus    • Chest pain    • Chronic back pain    • Chronic neck pain    • COPD (chronic obstructive pulmonary disease) (HCC)    • DVT (deep venous thrombosis) ([facility])    • Hypothyroid    • Hypothyroidism    • Low back pain    • Migraine    • PE (pulmonary thromboembolism) ([facility])      Past Surgical History:   Procedure Laterality Date   • CHOLECYSTECTOMY     • COLONOSCOPY     •  DILATATION AND CURETTAGE     • FOOT FUSION Left 2022    Procedure: 1-2 Arthrodesis, Tarsal Metatarsal Joint 2 and 3 Arthrodesis;  Surgeon: Bud Maradiaga DPM;  Location:  [facility] OR;  Service: Podiatry;  Laterality: Left;   • HAMMER TOE REPAIR Left 2022    Procedure: Hammertoe Repair 2-5,;  Surgeon: Bud Maradiaga DPM;  Location:  [facility] OR;  Service: Podiatry;  Laterality: Left;   • HERNIA REPAIR     • HYSTERECTOMY     • JOINT REPLACEMENT     • OOPHORECTOMY     • REPLACEMENT TOTAL KNEE      Right knee partial, left knee     Family History   Problem Relation Age of Onset   • Breast cancer Paternal Aunt    • Ovarian cancer Neg Hx    • Uterine cancer Neg Hx    • Colon cancer Neg Hx      Social History     Socioeconomic History   • Marital status:    Tobacco Use   • Smoking status: Former Smoker     Packs/day: 1.00     Years: 30.00     Pack years: 30.00     Quit date: 2018     Years since quittin.3   • Smokeless tobacco: Never Used   Vaping Use   • Vaping Use: Never used   Substance and Sexual Activity   • Alcohol use: Not Currently   • Drug use: No   • Sexual activity: Yes     Partners: Male     Birth control/protection: Surgical     Allergies   Allergen Reactions   • Dilaudid [Hydromorphone Hcl] Anaphylaxis   • Erythromycin Swelling     Throat swells   • Ondansetron Hcl Other (See Comments)     Coded   • Orphenadrine Citrate Anaphylaxis   • Meperidine Other (See Comments)     Makes her an angry person   • Codeine Rash   • Guaifenesin & Derivatives Nausea And Vomiting     Current Outpatient Medications   Medication Sig Dispense Refill   • albuterol (PROVENTIL HFA;VENTOLIN HFA) 108 (90 Base) MCG/ACT inhaler Inhale 2 puffs 4 (Four) Times a Day. 6.7 g 0   • budesonide (PULMICORT) 90 MCG/ACT inhaler 2 puffs three times a day, swallow, do not inhale, for 8 weeks     • buPROPion XL (WELLBUTRIN XL) 300 MG 24 hr tablet Take 300 mg by mouth.     • busPIRone (BUSPAR) 10 MG tablet Take 300 mg by  mouth 3 (Three) Times a Day.     • citalopram (CeleXA) 40 MG tablet Take 40 mg by mouth daily.     • Cyanocobalamin (VITAMIN B-12 ER) 1000 MCG tablet controlled-release Take 1,000 mcg by mouth.     • cyclobenzaprine (FLEXERIL) 5 MG tablet Take 5 mg by mouth 3 (Three) Times a Day As Needed.     • Diclofenac Sodium (VOLTAREN) 1 % gel gel APPLY 2 GRAMS FOUR TIMES DAILY TO AFFECTED AREA(S)     • famotidine (PEPCID) 20 MG tablet      • levothyroxine (SYNTHROID, LEVOTHROID) 125 MCG tablet Take 112 mcg by mouth Daily. 112 mcg alternating with 125 mcg     • nitroglycerin (NITROSTAT) 0.4 MG SL tablet 1 under the tongue as needed for angina, may repeat q5mins for up three doses 100 tablet 11   • omeprazole (priLOSEC) 40 MG capsule Take 40 mg by mouth Daily.     • pantoprazole (PROTONIX) 40 MG EC tablet Take 40 mg by mouth 2 (Two) Times a Day.     • pramipexole (MIRAPEX) 0.125 MG tablet Take 0.75 mg by mouth 2 (two) times a day.     • pregabalin (LYRICA) 50 MG capsule Take 50 mg by mouth 3 (Three) Times a Day.     • Semaglutide (OZEMPIC, 1 MG/DOSE, SC) Inject  under the skin into the appropriate area as directed 1 (One) Time Per Week. On sundays. Pt states she takes it to decrease appetite and reduce weight     • sertraline (ZOLOFT) 50 MG tablet   2   • sucralfate (CARAFATE) 1 GM/10ML suspension Take 1 g by mouth Daily.     • tiZANidine (ZANAFLEX) 4 MG tablet Take 10 mg by mouth At Night As Needed for Muscle Spasms.     • traMADol (ULTRAM) 50 MG tablet Take 1 tablet by mouth Every 6 (Six) Hours As Needed for Moderate Pain  (pain). Take one to two every 4-6 hours prn pain. 28 tablet 0   • vitamin D (ERGOCALCIFEROL) 72808 units capsule capsule   6   • VITAMIN D PO Take 1 tablet by mouth Daily.       No current facility-administered medications for this visit.     Review of Systems   Constitutional: Negative for chills and fever.   HENT: Negative for congestion.    Respiratory: Negative for shortness of breath.    Cardiovascular:  Positive for leg swelling. Negative for chest pain.   Gastrointestinal: Negative for constipation, diarrhea, nausea and vomiting.   Musculoskeletal: Positive for arthralgias, back pain and neck pain.        Foot pain   Skin: Negative for wound.   Neurological: Positive for numbness.       OBJECTIVE     Vitals:    22 0831   BP: 136/76   Pulse: 84   SpO2: 97%       PHYSICAL EXAM  GEN:   Accompanied by none.     Foot/Ankle Exam:       General:   Appearance: appears stated age and healthy and obesity    Orientation: AAOx3    Affect: appropriate    Shoe Gear:  Casual shoes    VASCULAR      Right Foot Vascularity   Dorsalis pedis:  2+  Posterior tibial:  2+  Skin Temperature: warm    Edema Grading:  None  CFT:  3  Pedal Hair Growth:  Present  Varicosities: spider veins       Left Foot Vascularity   Dorsalis pedis:  2+  Posterior tibial:  2+  Skin Temperature: warm    Edema Gradin+ and pitting  CFT:  3  Pedal Hair Growth:  Present  Varicosities: spider veins        NEUROLOGIC     Right Foot Neurologic   Light touch sensation:  Diminished  Vibratory sensation:  Normal  Hot/Cold sensation: normal    Protective Sensation using [place]erry Monofilament:  10     Left Foot Neurologic   Light touch sensation:  Diminished  Vibratory sensation:  Normal  Hot/cold sensation: normal    Protective Sensation using [place]Kerry Monofilament:  10     MUSCULOSKELETAL      Right Foot Musculoskeletal   Ecchymosis:  None  Tenderness: none    Arch:  Normal  Hammertoe:  Second toe, third toe, fourth toe and fifth toe     Left Foot Musculoskeletal   Ecchymosis:  None  Tenderness: dorsal foot    Tenderness comment:  Mildly to dorsal foot near surgical sites  Arch:  Normal     MUSCLE STRENGTH     Right Foot Muscle Strength   Foot dorsiflexion:  5  Foot plantar flexion:  5  Foot inversion:  5  Foot eversion:  5     Left Foot Muscle Strength   Foot dorsiflexion:  4+  Foot plantar flexion:  4+  Foot inversion:  4+  Foot eversion:   4+     RANGE OF MOTION      Right Foot Range of Motion   Foot and ankle ROM within normal limits       Left Foot Range of Motion   Foot and ankle ROM within normal limits       DERMATOLOGIC     Right Foot Dermatologic   Skin: skin intact    Nails: onychomycosis and abnormally thick       Left Foot Dermatologic   Skin: skin intact    Nails: onychomycosis and abnormally thick        Left Foot Additional Comments: Mildly prominent distal aspect of hardware at dorsal 1st Metatarsal      RADIOLOGY/NUCLEAR:  XR Foot 3+ View Left    Result Date: 8/9/2022  Narrative: EXAMINATION: XR FOOT 3+ VW LEFT- 8/9/2022 8:26 AM CDT  HISTORY: foot pain, post-op; Z98.890-Other specified postprocedural states.  REPORT: 3 views of the left foot were obtained.  COMPARISON: Left foot x-ray 6/2/2022.  Arthrodesis hardware is noted at the first, second and third tarsometatarsal joints as before, with well-healed fusion and no hardware failure. The external pin seen within the left great toe before has been removed. There is an osteotomy site at the midshaft of the proximal phalanx of the great toe with a metallic staple, with evidence of interval healing. The osteotomies and postoperative changes involving the second, third and fourth toes appear stable. There is diffuse osteopenia. Soft tissue swelling is noted over the distal foot dorsally at the plantar surface. An inferior calcaneal enthesophyte is present.      Impression: 1. Evidence of interval removal of the external pin within the left great toe. There is healing of the osteotomy site at the proximal phalanx of the great toe. There is previous arthrodesis involving the first, second and third tarsometatarsal joints with excellent bony bridging and satisfactory hardware position. No change is seen in appearance of postop changes involving the second, third and fourth PIP joints. Soft tissue swelling is present distally. This report was finalized on 08/09/2022 08:29 by Dr. Jimenez  MD Gustavo.      LABORATORY/CULTURE RESULTS:      PATHOLOGY RESULTS:       ASSESSMENT/PLAN     Diagnoses and all orders for this visit:    1. S/P foot surgery (Primary)    2. Foot pain, left      Comprehensive lower extremity examination and evaluation was performed.  Discussed findings and treatment plan including risks, benefits, and treatment options with patient in detail. Patient agreed with treatment plan.  Updated x-rays reviewed today with patient. Arthrodesis sites appear fully fused without complication. No evidence of hardware complication. Possible mild elevation of medial plate that could be causing some irritation.   Residual swelling is NWL of healing process but improving.   I feel that she is dealing with the normal post-op healing process.  Will continue to monitor for now and re-evaluate at follow-up.    An After Visit Summary was printed and given to the patient at discharge, including (if requested) any available informative/educational handouts regarding diagnosis, treatment, or medications. All questions were answered to patient/family satisfaction. Should symptoms fail to improve or worsen they agree to call or return to clinic or to go to the Emergency Department. Discussed the importance of following up with any needed screening tests/labs/specialist appointments and any requested follow-up recommended by me today. Importance of maintaining follow-up discussed and patient accepts that missed appointments can delay diagnosis and potentially lead to worsening of conditions.  Return in about 3 months (around 11/9/2022) for Recheck., or sooner if acute issues arise.    Lab Frequency Next Occurrence   CT chest w contrast Once 12/15/2022       This document has been electronically signed by Bud Maradiaga DPM on August 9, 2022 08:58 CDT

## 2022-08-18 ENCOUNTER — OFFICE VISIT (OUTPATIENT)
Dept: NEUROSURGERY | Facility: CLINIC | Age: 67
End: 2022-08-18

## 2022-08-18 VITALS — WEIGHT: 205.2 LBS | HEIGHT: 64 IN | BODY MASS INDEX: 35.03 KG/M2

## 2022-08-18 DIAGNOSIS — G89.29 CHRONIC BILATERAL LOW BACK PAIN WITHOUT SCIATICA: ICD-10-CM

## 2022-08-18 DIAGNOSIS — Z87.891 FORMER HEAVY TOBACCO SMOKER: ICD-10-CM

## 2022-08-18 DIAGNOSIS — M54.50 CHRONIC BILATERAL LOW BACK PAIN WITHOUT SCIATICA: ICD-10-CM

## 2022-08-18 DIAGNOSIS — E66.01 CLASS 2 SEVERE OBESITY DUE TO EXCESS CALORIES WITH SERIOUS COMORBIDITY AND BODY MASS INDEX (BMI) OF 35.0 TO 35.9 IN ADULT: ICD-10-CM

## 2022-08-18 DIAGNOSIS — M51.37 DEGENERATION OF LUMBAR OR LUMBOSACRAL INTERVERTEBRAL DISC: Primary | ICD-10-CM

## 2022-08-18 PROCEDURE — 99213 OFFICE O/P EST LOW 20 MIN: CPT | Performed by: NEUROLOGICAL SURGERY

## 2022-08-18 RX ORDER — LANCETS 30 GAUGE
EACH MISCELLANEOUS
Status: ON HOLD | COMMUNITY
Start: 2022-06-16 | End: 2023-01-25

## 2022-08-18 RX ORDER — PRAMIPEXOLE DIHYDROCHLORIDE 1 MG/1
1 TABLET ORAL 2 TIMES DAILY
Status: ON HOLD | COMMUNITY
Start: 2022-08-05

## 2022-08-18 RX ORDER — BLOOD SUGAR DIAGNOSTIC
1 STRIP MISCELLANEOUS 3 TIMES DAILY
Status: ON HOLD | COMMUNITY
Start: 2022-06-16 | End: 2023-01-25

## 2022-08-18 RX ORDER — FOLIC ACID 1 MG/1
1 TABLET ORAL DAILY
COMMUNITY
Start: 2022-07-12 | End: 2022-10-11

## 2022-08-18 RX ORDER — LEVOTHYROXINE SODIUM 112 UG/1
TABLET ORAL
Status: ON HOLD | COMMUNITY
Start: 2022-06-23 | End: 2023-01-25 | Stop reason: DRUGHIGH

## 2022-08-18 RX ORDER — SEMAGLUTIDE 1.34 MG/ML
INJECTION, SOLUTION SUBCUTANEOUS
Status: ON HOLD | COMMUNITY
Start: 2022-06-29 | End: 2023-01-25

## 2022-08-18 RX ORDER — CYCLOBENZAPRINE HCL 10 MG
10 TABLET ORAL 3 TIMES DAILY PRN
Status: ON HOLD | COMMUNITY
Start: 2022-08-11

## 2022-08-18 RX ORDER — EPINEPHRINE 0.3 MG/.3ML
INJECTION SUBCUTANEOUS
Status: ON HOLD | COMMUNITY
Start: 2022-06-07

## 2022-08-18 RX ORDER — HYDROCODONE BITARTRATE AND ACETAMINOPHEN 7.5; 325 MG/1; MG/1
1 TABLET ORAL EVERY 8 HOURS PRN
Status: ON HOLD | COMMUNITY
Start: 2022-08-15

## 2022-08-18 NOTE — PROGRESS NOTES
SUBJECTIVE:  Patient ID: Melly Cruz is a 66 y.o. female is here today for follow-up.    Chief Complaint: Back pain  Chief Complaint   Patient presents with   • Back Pain     Patient is here today to go over the MRI that was done @ [facility] on 6/3/22.  She has completed therapy and has been seeing Dr Lopez - no relief of her symptoms.       HPI  66-year-old female with several year history of back pain.  He denies any lower extremity pain numbness or tingling.  Back pain is claudicant in nature.  She is working with Dr. Lopez for several years.  The injection treatments at this point she feels that no longer helpful.  6 Norco 3 times a day as well as Flexeril and Lyrica.  She did a dedicated course of physical therapy within the last 6 months without any improvement.    The following portions of the patient's history were reviewed and updated as appropriate: allergies, current medications, past family history, past medical history, past social history, past surgical history and problem list.    OBJECTIVE:    Review of Systems   Musculoskeletal: Positive for back pain.   All other systems reviewed and are negative.         Physical Exam  Eyes:      Extraocular Movements: EOM normal.      Pupils: Pupils are equal, round, and reactive to light.   Neurological:      Mental Status: She is oriented to person, place, and time.      Coordination: Finger-Nose-Finger Test normal.      Gait: Gait is intact.      Deep Tendon Reflexes: Strength normal.   Psychiatric:         Speech: Speech normal.         Neurologic Exam     Mental Status   Oriented to person, place, and time.   Attention: normal.   Speech: speech is normal   Level of consciousness: alert  Knowledge: good.     Cranial Nerves     CN II   Visual fields full to confrontation.     CN III, IV, VI   Pupils are equal, round, and reactive to light.  Extraocular motions are normal.     CN V   Facial sensation intact.     CN VII   Facial expression full, symmetric.      CN VIII   CN VIII normal.     CN IX, X   CN IX normal.   CN X normal.     CN XI   CN XI normal.     CN XII   CN XII normal.     Motor Exam   Muscle bulk: normal  Overall muscle tone: normal  Right arm pronator drift: absent  Left arm pronator drift: absent    Strength   Strength 5/5 throughout.     Sensory Exam   Light touch normal.   Pinprick normal.     Gait, Coordination, and Reflexes     Gait  Gait: normal    Coordination   Finger to nose coordination: normal    Tremor   Resting tremor: absent  Intention tremor: absent  Action tremor: absent    Reflexes   Reflexes 2+ except as noted.       Independent Review of Radiographic Studies:   MRI of the lumbar spine and plain x-rays show very mild degenerative changes with no significant neuroforaminal compromise or compression.    ASSESSMENT/PLAN:  Patient complains of low back pain.  This is also a myofascial, tension myositis pain syndrome.  She has not acrania surgical intervention.  We discussed the possibility of a dorsal column stimulator trial.  Her questions and concerns were addressed.  She is getting continue to work with Dr. Lpoez to maximize control of her symptoms.      1. Degeneration of lumbar or lumbosacral intervertebral disc    2. Chronic bilateral low back pain without sciatica    3. Former heavy tobacco smoker    4. Class 2 severe obesity due to excess calories with serious comorbidity and body mass index (BMI) of 35.0 to 35.9 in adult (HCC)        The patient's Body mass index is 35.22 kg/m².. BMI is above normal parameters. Recommendations include: educational material    Return if symptoms worsen or fail to improve.      Brenden Granados MD

## 2022-08-18 NOTE — PATIENT INSTRUCTIONS
"PATIENT TO CONTINUE TO FOLLOW UP WITH HER PRIMARY CARE PROVIDER FOR YEARLY PHYSICAL EXAMS TO ENSURE COMPLETE HEALTH MAINTENANCE      BMI for Adults  What is BMI?  Body mass index (BMI) is a number that is calculated from a person's weight and height. BMI can help estimate how much of a person's weight is composed of fat. BMI does not measure body fat directly. Rather, it is an alternative to procedures that directly measure body fat, which can be difficult and expensive.  BMI can help identify people who may be at higher risk for certain medical problems.  What are BMI measurements used for?  BMI is used as a screening tool to identify possible weight problems. It helps determine whether a person is obese, overweight, a healthy weight, or underweight.  BMI is useful for:  Identifying a weight problem that may be related to a medical condition or may increase the risk for medical problems.  Promoting changes, such as changes in diet and exercise, to help reach a healthy weight. BMI screening can be repeated to see if these changes are working.  How is BMI calculated?  BMI involves measuring your weight in relation to your height. Both height and weight are measured, and the BMI is calculated from those numbers. This can be done either in English (U.S[place] or metric measurements. Note that charts and online BMI calculators are available to help you find your BMI quickly and easily without having to do these calculations yourself.  To calculate your BMI in English (U.S.) measurements:    Measure your weight in pounds (lb).  Multiply the number of pounds by 703.  For example, for a person who weighs 180 lb, multiply that number by 703, which equals 126,540.  Measure your height in inches. Then multiply that number by itself to get a measurement called \"inches squared.\"  For example, for a person who is 70 inches tall, the \"inches squared\" measurement is 70 inches x 70 inches, which equals 4,900 inches squared.  Divide the " "total from step 2 (number of lb x 703) by the total from step 3 (inches squared): 126,540 ÷ 4,900 = 25.8. This is your BMI.    To calculate your BMI in metric measurements:  Measure your weight in kilograms (kg).  Measure your height in meters (m). Then multiply that number by itself to get a measurement called \"meters squared.\"  For example, for a person who is 1.75 m tall, the \"meters squared\" measurement is 1.75 m x 1.75 m, which is equal to 3.1 meters squared.  Divide the number of kilograms (your weight) by the meters squared number. In this example: 70 ÷ 3.1 = 22.6. This is your BMI.  What do the results mean?  BMI charts are used to identify whether you are underweight, normal weight, overweight, or obese. The following guidelines will be used:  Underweight: BMI less than 18.5.  Normal weight: BMI between 18.5 and 24.9.  Overweight: BMI between 25 and 29.9.  Obese: BMI of 30 or above.  Keep these notes in mind:  Weight includes both fat and muscle, so someone with a muscular build, such as an athlete, may have a BMI that is higher than 24.9. In cases like these, BMI is not an accurate measure of body fat.  To determine if excess body fat is the cause of a BMI of 25 or higher, further assessments may need to be done by a health care provider.  BMI is usually interpreted in the same way for men and women.  Where to find more information  For more information about BMI, including tools to quickly calculate your BMI, go to these websites:  Centers for Disease Control and Prevention: www.cdc.gov  American Heart Association: www.heart.org  National Heart, Lung, and Blood [place]: www.nhlbi.nih.gov  Summary  Body mass index (BMI) is a number that is calculated from a person's weight and height.  BMI may help estimate how much of a person's weight is composed of fat. BMI can help identify those who may be at higher risk for certain medical problems.  BMI can be measured using English measurements or metric " "measurements.  BMI charts are used to identify whether you are underweight, normal weight, overweight, or obese.  This information is not intended to replace advice given to you by your health care provider. Make sure you discuss any questions you have with your health care provider.  Document Revised: 09/09/2020 Document Reviewed: 07/17/2020  [employer]h Patient Education © 2021 [employer]  https://www.nhlbi.nih.gov/files/docs/public/heart/dash_brief.pdf\">   DASH Eating Plan  DASH stands for Dietary Approaches to Stop Hypertension. The DASH eating plan is a healthy eating plan that has been shown to:  Reduce high blood pressure (hypertension).  Reduce your risk for type 2 diabetes, heart disease, and stroke.  Help with weight loss.  What are tips for following this plan?  Reading food labels  Check food labels for the amount of salt (sodium) per serving. Choose foods with less than 5 percent of the Daily Value of sodium. Generally, foods with less than 300 milligrams (mg) of sodium per serving fit into this eating plan.  To find whole grains, look for the word \"whole\" as the first word in the ingredient list.  Shopping  Buy products labeled as \"low-sodium\" or \"no salt added.\"  Buy fresh foods. Avoid canned foods and pre-made or frozen meals.  Cooking  Avoid adding salt when cooking. Use salt-free seasonings or herbs instead of table salt or sea salt. Check with your health care provider or pharmacist before using salt substitutes.  Do not pino foods. Cook foods using healthy methods such as baking, boiling, grilling, roasting, and broiling instead.  Cook with heart-healthy oils, such as olive, canola, avocado, soybean, or sunflower oil.  Meal planning    Eat a balanced diet that includes:  4 or more servings of fruits and 4 or more servings of vegetables each day. Try to fill one-half of your plate with fruits and vegetables.  6-8 servings of whole grains each day.  Less than 6 oz (170 g) of lean meat, poultry, or " fish each day. A 3-oz (85-g) serving of meat is about the same size as a deck of cards. One egg equals 1 oz (28 g).  2-3 servings of low-fat dairy each day. One serving is 1 cup (237 mL).  1 serving of nuts, seeds, or beans 5 times each week.  2-3 servings of heart-healthy fats. Healthy fats called omega-3 fatty acids are found in foods such as walnuts, flaxseeds, fortified milks, and eggs. These fats are also found in cold-water fish, such as sardines, salmon, and mackerel.  Limit how much you eat of:  Canned or prepackaged foods.  Food that is high in trans fat, such as some fried foods.  Food that is high in saturated fat, such as fatty meat.  Desserts and other sweets, sugary drinks, and other foods with added sugar.  Full-fat dairy products.  Do not salt foods before eating.  Do not eat more than 4 egg yolks a week.  Try to eat at least 2 vegetarian meals a week.  Eat more home-cooked food and less restaurant, buffet, and fast food.    Lifestyle  When eating at a restaurant, ask that your food be prepared with less salt or no salt, if possible.  If you drink alcohol:  Limit how much you use to:  0-1 drink a day for women who are not pregnant.  0-2 drinks a day for men.  Be aware of how much alcohol is in your drink. In the [place] one drink equals one 12 oz bottle of beer (355 mL), one 5 oz glass of wine (148 mL), or one 1½ oz glass of hard liquor (44 mL).  General information  Avoid eating more than 2,300 mg of salt a day. If you have hypertension, you may need to reduce your sodium intake to 1,500 mg a day.  Work with your health care provider to maintain a healthy body weight or to lose weight. Ask what an ideal weight is for you.  Get at least 30 minutes of exercise that causes your heart to beat faster (aerobic exercise) most days of the week. Activities may include walking, swimming, or biking.  Work with your health care provider or dietitian to adjust your eating plan to your individual calorie  needs.  What foods should I eat?  Fruits  All fresh, dried, or frozen fruit. Canned fruit in natural juice (without added sugar).  Vegetables  Fresh or frozen vegetables (raw, steamed, roasted, or grilled). Low-sodium or reduced-sodium tomato and vegetable juice. Low-sodium or reduced-sodium tomato sauce and tomato paste. Low-sodium or reduced-sodium canned vegetables.  Grains  Whole-grain or whole-wheat bread. Whole-grain or whole-wheat pasta. Brown rice. Oatmeal. Quinoa. Bulgur. Whole-grain and low-sodium cereals. Juani bread. Low-fat, low-sodium crackers. Whole-wheat flour tortillas.  Meats and other proteins  Skinless chicken or turkey. Ground chicken or turkey. Pork with fat trimmed off. Fish and seafood. Egg whites. Dried beans, peas, or lentils. Unsalted nuts, nut butters, and seeds. Unsalted canned beans. Lean cuts of beef with fat trimmed off. Low-sodium, lean precooked or cured meat, such as sausages or meat loaves.  Dairy  Low-fat (1%) or fat-free (skim) milk. Reduced-fat, low-fat, or fat-free cheeses. Nonfat, low-sodium ricotta or cottage cheese. Low-fat or nonfat yogurt. Low-fat, low-sodium cheese.  Fats and oils  Soft margarine without trans fats. Vegetable oil. Reduced-fat, low-fat, or light mayonnaise and salad dressings (reduced-sodium). Canola, safflower, olive, avocado, soybean, and sunflower oils. Avocado.  Seasonings and condiments  Herbs. Spices. Seasoning mixes without salt.  Other foods  Unsalted popcorn and pretzels. Fat-free sweets.  The items listed above may not be a complete list of foods and beverages you can eat. Contact a dietitian for more information.  What foods should I avoid?  Fruits  Canned fruit in a light or heavy syrup. Fried fruit. Fruit in cream or butter sauce.  Vegetables  Creamed or fried vegetables. Vegetables in a cheese sauce. Regular canned vegetables (not low-sodium or reduced-sodium). Regular canned tomato sauce and paste (not low-sodium or reduced-sodium). Regular  tomato and vegetable juice (not low-sodium or reduced-sodium). Pickles. Olives.  Grains  Baked goods made with fat, such as croissants, muffins, or some breads. Dry pasta or rice meal packs.  Meats and other proteins  Fatty cuts of meat. Ribs. Fried meat. Collier. Bologna, salami, and other precooked or cured meats, such as sausages or meat loaves. Fat from the back of a pig (fatback). Bratwurst. Salted nuts and seeds. Canned beans with added salt. Canned or smoked fish. Whole eggs or egg yolks. Chicken or turkey with skin.  Dairy  Whole or 2% milk, cream, and half-and-half. Whole or full-fat cream cheese. Whole-fat or sweetened yogurt. Full-fat cheese. Nondairy creamers. Whipped toppings. Processed cheese and cheese spreads.  Fats and oils  Butter. Stick margarine. Lard. Shortening. Ghee. Collier fat. Tropical oils, such as coconut, palm kernel, or palm oil.  Seasonings and condiments  Onion salt, garlic salt, seasoned salt, table salt, and sea salt. Worcestershire sauce. Tartar sauce. Barbecue sauce. Teriyaki sauce. Soy sauce, including reduced-sodium. Steak sauce. Canned and packaged gravies. Fish sauce. Oyster sauce. Cocktail sauce. Store-bought horseradish. Ketchup. Mustard. Meat flavorings and tenderizers. Bouillon cubes. Hot sauces. Pre-made or packaged marinades. Pre-made or packaged taco seasonings. Relishes. Regular salad dressings.  Other foods  Salted popcorn and pretzels.  The items listed above may not be a complete list of foods and beverages you should avoid. Contact a dietitian for more information.  Where to find more information  National Heart, Lung, and Blood [place]: www.nhlbi.nih.gov  American Heart Association: www.heart.org  Academy of Nutrition and Dietetics: www.eatright.org  National Kidney Foundation: www.kidney.org  Summary  The DASH eating plan is a healthy eating plan that has been shown to reduce high blood pressure (hypertension). It may also reduce your risk for type 2 diabetes,  heart disease, and stroke.  When on the DASH eating plan, aim to eat more fresh fruits and vegetables, whole grains, lean proteins, low-fat dairy, and heart-healthy fats.  With the DASH eating plan, you should limit salt (sodium) intake to 2,300 mg a day. If you have hypertension, you may need to reduce your sodium intake to 1,500 mg a day.  Work with your health care provider or dietitian to adjust your eating plan to your individual calorie needs.  This information is not intended to replace advice given to you by your health care provider. Make sure you discuss any questions you have with your health care provider.  Document Revised: 11/20/2020 Document Reviewed: 11/20/2020  Elsevier Patient Education © 2021 [employer]

## 2022-08-23 ENCOUNTER — HOSPITAL ENCOUNTER (EMERGENCY)
Facility: HOSPITAL | Age: 67
Discharge: HOME | End: 2022-08-23
Attending: EMERGENCY MEDICINE | Admitting: EMERGENCY MEDICINE

## 2022-08-23 ENCOUNTER — APPOINTMENT (OUTPATIENT)
Dept: CT IMAGING | Facility: HOSPITAL | Age: 67
End: 2022-08-23

## 2022-08-23 VITALS
HEART RATE: 95 BPM | BODY MASS INDEX: 34.83 KG/M2 | DIASTOLIC BLOOD PRESSURE: 78 MMHG | RESPIRATION RATE: 18 BRPM | HEIGHT: 64 IN | TEMPERATURE: 98.8 F | SYSTOLIC BLOOD PRESSURE: 122 MMHG | OXYGEN SATURATION: 97 % | WEIGHT: 204 LBS

## 2022-08-23 DIAGNOSIS — R91.1 PULMONARY NODULE: ICD-10-CM

## 2022-08-23 DIAGNOSIS — R11.2 NAUSEA AND VOMITING, UNSPECIFIED VOMITING TYPE: Primary | ICD-10-CM

## 2022-08-23 DIAGNOSIS — R19.7 DIARRHEA, UNSPECIFIED TYPE: ICD-10-CM

## 2022-08-23 LAB
ALBUMIN SERPL-MCNC: 3.9 G/DL (ref 3.5–5.2)
ALBUMIN/GLOB SERPL: 1.2 G/DL
ALP SERPL-CCNC: 108 U/L (ref 39–117)
ALT SERPL W P-5'-P-CCNC: 28 U/L (ref 1–33)
ANION GAP SERPL CALCULATED.3IONS-SCNC: 6 MMOL/L (ref 5–15)
AST SERPL-CCNC: 27 U/L (ref 1–32)
BACTERIA UR QL AUTO: ABNORMAL /HPF
BASOPHILS # BLD AUTO: 0.03 10*3/MM3 (ref 0–0.2)
BASOPHILS NFR BLD AUTO: 0.4 % (ref 0–1.5)
BILIRUB SERPL-MCNC: 0.3 MG/DL (ref 0–1.2)
BILIRUB UR QL STRIP: NEGATIVE
BUN SERPL-MCNC: 7 MG/DL (ref 8–23)
BUN/CREAT SERPL: 8.5 (ref 7–25)
CALCIUM SPEC-SCNC: 9.3 MG/DL (ref 8.6–10.5)
CHLORIDE SERPL-SCNC: 103 MMOL/L (ref 98–107)
CLARITY UR: CLEAR
CO2 SERPL-SCNC: 30 MMOL/L (ref 22–29)
COLOR UR: YELLOW
CREAT SERPL-MCNC: 0.82 MG/DL (ref 0.57–1)
DEPRECATED RDW RBC AUTO: 47.6 FL (ref 37–54)
EGFRCR SERPLBLD CKD-EPI 2021: 80 ML/MIN/1.73
EOSINOPHIL # BLD AUTO: 0.44 10*3/MM3 (ref 0–0.4)
EOSINOPHIL NFR BLD AUTO: 6 % (ref 0.3–6.2)
ERYTHROCYTE [DISTWIDTH] IN BLOOD BY AUTOMATED COUNT: 14.7 % (ref 12.3–15.4)
GLOBULIN UR ELPH-MCNC: 3.3 GM/DL
GLUCOSE SERPL-MCNC: 119 MG/DL (ref 65–99)
GLUCOSE UR STRIP-MCNC: NEGATIVE MG/DL
HCT VFR BLD AUTO: 37.9 % (ref 34–46.6)
HGB BLD-MCNC: 11.5 G/DL (ref 12–15.9)
HGB UR QL STRIP.AUTO: NEGATIVE
HYALINE CASTS UR QL AUTO: ABNORMAL /LPF
IMM GRANULOCYTES # BLD AUTO: 0 10*3/MM3 (ref 0–0.05)
IMM GRANULOCYTES NFR BLD AUTO: 0 % (ref 0–0.5)
KETONES UR QL STRIP: NEGATIVE
LEUKOCYTE ESTERASE UR QL STRIP.AUTO: ABNORMAL
LIPASE SERPL-CCNC: 36 U/L (ref 13–60)
LYMPHOCYTES # BLD AUTO: 1.53 10*3/MM3 (ref 0.7–3.1)
LYMPHOCYTES NFR BLD AUTO: 20.9 % (ref 19.6–45.3)
MCH RBC QN AUTO: 26.6 PG (ref 26.6–33)
MCHC RBC AUTO-ENTMCNC: 30.3 G/DL (ref 31.5–35.7)
MCV RBC AUTO: 87.7 FL (ref 79–97)
MONOCYTES # BLD AUTO: 0.33 10*3/MM3 (ref 0.1–0.9)
MONOCYTES NFR BLD AUTO: 4.5 % (ref 5–12)
NEUTROPHILS NFR BLD AUTO: 4.98 10*3/MM3 (ref 1.7–7)
NEUTROPHILS NFR BLD AUTO: 68.2 % (ref 42.7–76)
NITRITE UR QL STRIP: NEGATIVE
NRBC BLD AUTO-RTO: 0 /100 WBC (ref 0–0.2)
PH UR STRIP.AUTO: 5.5 [PH] (ref 5–8)
PLATELET # BLD AUTO: 268 10*3/MM3 (ref 140–450)
PMV BLD AUTO: 9.9 FL (ref 6–12)
POTASSIUM SERPL-SCNC: 4.3 MMOL/L (ref 3.5–5.2)
PROT SERPL-MCNC: 7.2 G/DL (ref 6–8.5)
PROT UR QL STRIP: ABNORMAL
RBC # BLD AUTO: 4.32 10*6/MM3 (ref 3.77–5.28)
RBC # UR STRIP: ABNORMAL /HPF
REF LAB TEST METHOD: ABNORMAL
SODIUM SERPL-SCNC: 139 MMOL/L (ref 136–145)
SP GR UR STRIP: 1.02 (ref 1–1.03)
SQUAMOUS #/AREA URNS HPF: ABNORMAL /HPF
UROBILINOGEN UR QL STRIP: ABNORMAL
WBC # UR STRIP: ABNORMAL /HPF
WBC NRBC COR # BLD: 7.31 10*3/MM3 (ref 3.4–10.8)

## 2022-08-23 PROCEDURE — 81001 URINALYSIS AUTO W/SCOPE: CPT | Performed by: EMERGENCY MEDICINE

## 2022-08-23 PROCEDURE — 74176 CT ABD & PELVIS W/O CONTRAST: CPT

## 2022-08-23 PROCEDURE — 96374 THER/PROPH/DIAG INJ IV PUSH: CPT

## 2022-08-23 PROCEDURE — 85025 COMPLETE CBC W/AUTO DIFF WBC: CPT | Performed by: EMERGENCY MEDICINE

## 2022-08-23 PROCEDURE — 25010000002 MORPHINE PER 10 MG: Performed by: EMERGENCY MEDICINE

## 2022-08-23 PROCEDURE — 83690 ASSAY OF LIPASE: CPT | Performed by: EMERGENCY MEDICINE

## 2022-08-23 PROCEDURE — 25010000002 METOCLOPRAMIDE PER 10 MG: Performed by: EMERGENCY MEDICINE

## 2022-08-23 PROCEDURE — 99283 EMERGENCY DEPT VISIT LOW MDM: CPT

## 2022-08-23 PROCEDURE — 96375 TX/PRO/DX INJ NEW DRUG ADDON: CPT

## 2022-08-23 PROCEDURE — 80053 COMPREHEN METABOLIC PANEL: CPT | Performed by: EMERGENCY MEDICINE

## 2022-08-23 RX ORDER — METOCLOPRAMIDE 5 MG/1
5 TABLET ORAL 3 TIMES DAILY PRN
Qty: 10 TABLET | Refills: 0 | Status: ON HOLD | OUTPATIENT
Start: 2022-08-23

## 2022-08-23 RX ORDER — PANTOPRAZOLE SODIUM 40 MG/10ML
80 INJECTION, POWDER, LYOPHILIZED, FOR SOLUTION INTRAVENOUS ONCE
Status: COMPLETED | OUTPATIENT
Start: 2022-08-23 | End: 2022-08-23

## 2022-08-23 RX ORDER — SODIUM CHLORIDE 0.9 % (FLUSH) 0.9 %
10 SYRINGE (ML) INJECTION AS NEEDED
Status: DISCONTINUED | OUTPATIENT
Start: 2022-08-23 | End: 2022-08-23 | Stop reason: HOSPADM

## 2022-08-23 RX ORDER — METOCLOPRAMIDE HYDROCHLORIDE 5 MG/ML
10 INJECTION INTRAMUSCULAR; INTRAVENOUS ONCE
Status: COMPLETED | OUTPATIENT
Start: 2022-08-23 | End: 2022-08-23

## 2022-08-23 RX ORDER — ALUMINA, MAGNESIA, AND SIMETHICONE 2400; 2400; 240 MG/30ML; MG/30ML; MG/30ML
15 SUSPENSION ORAL ONCE
Status: COMPLETED | OUTPATIENT
Start: 2022-08-23 | End: 2022-08-23

## 2022-08-23 RX ORDER — PRAMIPEXOLE DIHYDROCHLORIDE 1 MG/1
1 TABLET ORAL ONCE
Status: DISCONTINUED | OUTPATIENT
Start: 2022-08-23 | End: 2022-08-23 | Stop reason: HOSPADM

## 2022-08-23 RX ORDER — DICYCLOMINE HYDROCHLORIDE 10 MG/1
10 CAPSULE ORAL 3 TIMES DAILY PRN
Qty: 12 CAPSULE | Refills: 0 | Status: ON HOLD | OUTPATIENT
Start: 2022-08-23 | End: 2023-04-05

## 2022-08-23 RX ORDER — LIDOCAINE HYDROCHLORIDE 20 MG/ML
15 SOLUTION OROPHARYNGEAL ONCE
Status: COMPLETED | OUTPATIENT
Start: 2022-08-23 | End: 2022-08-23

## 2022-08-23 RX ADMIN — METOCLOPRAMIDE 10 MG: 5 INJECTION, SOLUTION INTRAMUSCULAR; INTRAVENOUS at 11:49

## 2022-08-23 RX ADMIN — PANTOPRAZOLE SODIUM 80 MG: 40 INJECTION, POWDER, FOR SOLUTION INTRAVENOUS at 11:49

## 2022-08-23 RX ADMIN — LIDOCAINE HYDROCHLORIDE 15 ML: 20 SOLUTION ORAL; TOPICAL at 11:49

## 2022-08-23 RX ADMIN — ALUMINUM HYDROXIDE, MAGNESIUM HYDROXIDE, AND DIMETHICONE 15 ML: 400; 400; 40 SUSPENSION ORAL at 11:49

## 2022-08-23 RX ADMIN — SODIUM CHLORIDE 500 ML: 9 INJECTION, SOLUTION INTRAVENOUS at 13:01

## 2022-08-23 RX ADMIN — MORPHINE SULFATE 4 MG: 4 INJECTION, SOLUTION INTRAMUSCULAR; INTRAVENOUS at 11:48

## 2022-08-23 NOTE — ED PROVIDER NOTES
Subjective   Patient is a 66-year-old female who presents to the ER with nausea vomiting and diarrhea.  Patient states she has had nausea vomiting and diarrhea for the last 2-1/2 weeks intermittently but has been more constant for the last 2 days.  Patient also complains of right-sided abdominal pain.  Patient states she feels like she has a lot of acid in her body.  She denies any fever, chest pain, shortness of air, urinary changes, neurologic changes.  Patient has no other concerns.           Review of Systems   Constitutional: Negative.    HENT: Negative.    Eyes: Negative.    Respiratory: Negative.    Cardiovascular: Negative.    Gastrointestinal: Positive for abdominal pain, diarrhea, nausea and vomiting.   Endocrine: Negative.    Genitourinary: Negative.    Musculoskeletal: Negative.    Skin: Negative.    Allergic/Immunologic: Negative.    Neurological: Negative.    Hematological: Negative.    Psychiatric/Behavioral: Negative.    All other systems reviewed and are negative.      Past Medical History:   Diagnosis Date   • Acid reflux    • Anxiety    • Arthritis    • Curtis esophagus    • Chest pain    • Chronic back pain    • Chronic neck pain    • COPD (chronic obstructive pulmonary disease) ([facility])    • DVT (deep venous thrombosis) ([facility])    • GI bleed    • Hypothyroid    • Hypothyroidism    • Low back pain    • Migraine    • PE (pulmonary thromboembolism) ([facility])        Allergies   Allergen Reactions   • Erythromycin Swelling     Throat swells   • Hydromorphone Hcl Anaphylaxis and Other (See Comments)     Other reaction(s): Shock and/or Unconsciousness  Note: Anaphylaxis   • Ondansetron Hcl Other (See Comments)     Coded  Coded  Other reaction(s): Zofran  Note:  Allergic Reaction: Anaphylaxis   • Orphenadrine Citrate Anaphylaxis   • Meperidine Other (See Comments)     Makes her an angry person   • Codeine Rash   • Guaifenesin & Derivatives Nausea And Vomiting   • Guaifenesin Er Swelling     Throat swelling        Past Surgical History:   Procedure Laterality Date   • CHOLECYSTECTOMY     • COLONOSCOPY     • DILATATION AND CURETTAGE     • FOOT FUSION Left 2022    Procedure: 1-2 Arthrodesis, Tarsal Metatarsal Joint 2 and 3 Arthrodesis;  Surgeon: Bud Maradiaga DPM;  Location:  [facility] OR;  Service: Podiatry;  Laterality: Left;   • HAMMER TOE REPAIR Left 2022    Procedure: Hammertoe Repair 2-5,;  Surgeon: Bud Maradiaga DPM;  Location:  [facility] OR;  Service: Podiatry;  Laterality: Left;   • HERNIA REPAIR     • HYSTERECTOMY     • JOINT REPLACEMENT     • OOPHORECTOMY     • REPLACEMENT TOTAL KNEE      Right knee partial, left knee       Family History   Problem Relation Age of Onset   • Breast cancer Paternal Aunt    • Ovarian cancer Neg Hx    • Uterine cancer Neg Hx    • Colon cancer Neg Hx        Social History     Socioeconomic History   • Marital status:    Tobacco Use   • Smoking status: Former Smoker     Packs/day: 1.00     Years: 30.00     Pack years: 30.00     Quit date: 2018     Years since quittin.3   • Smokeless tobacco: Never Used   Vaping Use   • Vaping Use: Never used   Substance and Sexual Activity   • Alcohol use: Not Currently   • Drug use: No   • Sexual activity: Yes     Partners: Male     Birth control/protection: Surgical           Objective   Physical Exam  Vitals and nursing note reviewed.   Constitutional:       Appearance: She is well-developed.   HENT:      Head: Normocephalic and atraumatic.   Eyes:      Conjunctiva/sclera: Conjunctivae normal.      Pupils: Pupils are equal, round, and reactive to light.   Cardiovascular:      Rate and Rhythm: Normal rate and regular rhythm.      Heart sounds: Normal heart sounds.   Pulmonary:      Effort: Pulmonary effort is normal.      Breath sounds: Normal breath sounds.   Abdominal:      Palpations: Abdomen is soft.      Tenderness: There is no abdominal tenderness. There is no right CVA tenderness, left CVA tenderness, guarding or  rebound.   Musculoskeletal:         General: No deformity. Normal range of motion.      Cervical back: Normal range of motion.   Skin:     General: Skin is warm.   Neurological:      Mental Status: She is alert and oriented to person, place, and time.   Psychiatric:         Behavior: Behavior normal.         Procedures           ED Course      Patient was given IV fluids, morphine, Reglan, Protonix and a GI cocktail.  Symptoms resolved with treatment and patient was feeling much better.     Lab Results (last 24 hours)     Procedure Component Value Units Date/Time    CBC & Differential [[id number]]  (Abnormal) Collected: 08/23/22 1148    Specimen: Blood Updated: 08/23/22 1207    Narrative:      The following orders were created for panel order CBC & Differential.  Procedure                               Abnormality         Status                     ---------                               -----------         ------                     CBC Auto Differential[[id number]]        Abnormal            Final result                 Please view results for these tests on the individual orders.    Comprehensive Metabolic Panel [[id number]]  (Abnormal) Collected: 08/23/22 1148    Specimen: Blood Updated: 08/23/22 1224     Glucose 119 mg/dL      BUN 7 mg/dL      Creatinine 0.82 mg/dL      Sodium 139 mmol/L      Potassium 4.3 mmol/L      Chloride 103 mmol/L      CO2 30.0 mmol/L      Calcium 9.3 mg/dL      Total Protein 7.2 g/dL      Albumin 3.90 g/dL      ALT (SGPT) 28 U/L      AST (SGOT) 27 U/L      Alkaline Phosphatase 108 U/L      Total Bilirubin 0.3 mg/dL      Globulin 3.3 gm/dL      A/G Ratio 1.2 g/dL      BUN/Creatinine Ratio 8.5     Anion Gap 6.0 mmol/L      eGFR 80.0 mL/min/1.73      Comment: National Kidney Foundation and American Society of Nephrology (ASN) Task Force recommended calculation based on the Chronic Kidney Disease Epidemiology Collaboration (CKD-EPI) equation refit without adjustment for race.       Narrative:       GFR Normal >60  Chronic Kidney Disease <60  Kidney Failure <15      Lipase [[id number]]  (Normal) Collected: 08/23/22 1148    Specimen: Blood Updated: 08/23/22 1219     Lipase 36 U/L     CBC Auto Differential [[id number]]  (Abnormal) Collected: 08/23/22 1148    Specimen: Blood Updated: 08/23/22 1207     WBC 7.31 10*3/mm3      RBC 4.32 10*6/mm3      Hemoglobin 11.5 g/dL      Hematocrit 37.9 %      MCV 87.7 fL      MCH 26.6 pg      MCHC 30.3 g/dL      RDW 14.7 %      RDW-SD 47.6 fl      MPV 9.9 fL      Platelets 268 10*3/mm3      Neutrophil % 68.2 %      Lymphocyte % 20.9 %      Monocyte % 4.5 %      Eosinophil % 6.0 %      Basophil % 0.4 %      Immature Grans % 0.0 %      Neutrophils, Absolute 4.98 10*3/mm3      Lymphocytes, Absolute 1.53 10*3/mm3      Monocytes, Absolute 0.33 10*3/mm3      Eosinophils, Absolute 0.44 10*3/mm3      Basophils, Absolute 0.03 10*3/mm3      Immature Grans, Absolute 0.00 10*3/mm3      nRBC 0.0 /100 WBC     Urinalysis With Culture If Indicated - Urine, Clean Catch [[id number]]  (Abnormal) Collected: 08/23/22 1152    Specimen: Urine, Clean Catch Updated: 08/23/22 1211     Color, UA Yellow     Appearance, UA Clear     pH, UA 5.5     Specific Gravity, UA 1.021     Glucose, UA Negative     Ketones, UA Negative     Bilirubin, UA Negative     Blood, UA Negative     Protein, UA Trace     Leuk Esterase, UA Trace     Nitrite, UA Negative     Urobilinogen, UA 1.0 E.U./dL    Narrative:      In absence of clinical symptoms, the presence of pyuria, bacteria, and/or nitrites on the urinalysis result does not correlate with infection.    Urinalysis, Microscopic Only - Urine, Clean Catch [[id number]]  (Abnormal) Collected: 08/23/22 1152    Specimen: Urine, Clean Catch Updated: 08/23/22 1211     RBC, UA 3-5 /HPF      WBC, UA 3-5 /HPF      Comment: Urine culture not indicated.        Bacteria, UA None Seen /HPF      Squamous Epithelial Cells, UA 7-12 /HPF      Hyaline Casts, UA 3-6 /LPF      Methodology  Automated Microscopy        CT Abdomen Pelvis Without Contrast   Final Result        Summary:  1. Indeterminate 15 x 17 mm lingular nodule.  Follow-up options include 3 month noncontrast chest CT and PET/CT.  2. No acute abnormality is seen within the abdomen or pelvis.    Labs were normal.  CT scan showed 15 x 17 mm lingular nodule.  Patient was advised to have a repeat CT scan and PET scan in 3 months to ensure stability.  Patient had no acute findings in her abdomen or pelvis.  She was feeling much better.  Patient states she is waiting to see a GI doctor at [facility] at the beginning of next month.  She was advised to continue her current medicines.  She will be given Reglan for nausea and Bentyl for abdominal cramping.  She is to return to the ER for any further pain, vomiting or other concerns.  Patient agreeable.                                MDM    Final diagnoses:   Nausea and vomiting, unspecified vomiting type   Diarrhea, unspecified type   Pulmonary nodule       ED Disposition  ED Disposition     ED Disposition   Discharge    Condition   Good    Comment   --             Reynaldo Jeter, DO  [address] [place] [id number] [phone]    Schedule an appointment as soon as possible for a visit            Medication List      New Prescriptions    dicyclomine 10 MG capsule  Commonly known as: BENTYL  Take 1 capsule by mouth 3 (Three) Times a Day As Needed (abdominal cramping).     metoclopramide 5 MG tablet  Commonly known as: REGLAN  Take 1 tablet by mouth 3 (Three) Times a Day As Needed (nausea and vomiting).           Where to Get Your Medications      These medications were sent to [facility] [address] [place] [address] - [address] - [phone] [facility] [phone]   [address] [facility] [address]    Phone: [phone]   · dicyclomine 10 MG capsule  · metoclopramide 5 MG tablet          Neeta Barger MD  08/23/22 3719

## 2022-08-24 ENCOUNTER — TELEPHONE (OUTPATIENT)
Dept: CARDIAC SURGERY | Facility: CLINIC | Age: 67
End: 2022-08-24

## 2022-08-24 NOTE — TELEPHONE ENCOUNTER
Pt calling.  She has follow up sched with Dr Thompson in December but she was in [facility] ER yesterday for GI bleed and had CT of her abdomen done which pt was told revealed a new lung nodule.  Pt calling to find out if she needs to be seen sooner or what to do re: this.  Can reach pt at #[phone]/nadia

## 2022-11-07 NOTE — PROGRESS NOTES
[facility] - PODIATRY    Today's Date: 11/11/22    Patient Name: Melly Cruz  MRN: [id number]  CSN: [id number]  PCP: Reynaldo Jeter DO  Referring Provider: No ref. provider found    SUBJECTIVE     Chief Complaint   Patient presents with   • Follow-up     Reynaldo Jeter DO pcp03/17/2022 3 months for Recheck-pt states her feet are a pain to walk or to touch-pt reports 5/10 to 6/10 pain- pt presents with slight swelling in L foot      HPI: Melly Cruz, a 67 y.o.female, comes to clinic as a(n) established patient complaining of foot pain. Patient has h/o acid reflux, anxiety, arthritis, back pain, DVT, Hypothyroid, Migraine, PE. Patient presents wt complaints of pain in the left lateral foot near the 5th met base. Denies any known injury or trauma to the area. States that the area is not close to previous surgical sites. Pain is present when walking or standing and also notes some pain through light touch with sheet on bed.   Admits pain at 5-6/10 level and described as burning, stabbing, shooting and sharp. Relates previous treatment(s) including lidocaine ointment. Denies any constitutional symptoms. No other pedal complaints at this time.    Past Medical History:   Diagnosis Date   • Acid reflux    • Anxiety    • Arthritis    • Curtis esophagus    • Chest pain    • Chronic back pain    • Chronic neck pain    • COPD (chronic obstructive pulmonary disease) ([facility])    • DVT (deep venous thrombosis) ([facility])    • GI bleed    • Hypothyroid    • Hypothyroidism    • Low back pain    • Migraine    • PE (pulmonary thromboembolism) ([facility])      Past Surgical History:   Procedure Laterality Date   • CHOLECYSTECTOMY     • COLONOSCOPY     • DILATATION AND CURETTAGE     • FOOT FUSION Left 4/20/2022    Procedure: 1-2 Arthrodesis, Tarsal Metatarsal Joint 2 and 3 Arthrodesis;  Surgeon: Bud Maradiaga DPM;  Location: [facility];  Service: Podiatry;  Laterality: Left;   • HAMMER TOE REPAIR Left  2022    Procedure: Hammertoe Repair 2-5,;  Surgeon: Bud Maradiaga DPM;  Location: [facility] OR;  Service: Podiatry;  Laterality: Left;   • HERNIA REPAIR     • HYSTERECTOMY     • JOINT REPLACEMENT     • OOPHORECTOMY     • REPLACEMENT TOTAL KNEE      Right knee partial, left knee     Family History   Problem Relation Age of Onset   • Breast cancer Paternal Aunt    • Ovarian cancer Neg Hx    • Uterine cancer Neg Hx    • Colon cancer Neg Hx      Social History     Socioeconomic History   • Marital status:    Tobacco Use   • Smoking status: Former     Packs/day: 1.00     Years: 30.00     Pack years: 30.00     Types: Cigarettes     Quit date: 2018     Years since quittin.6     Passive exposure: Past   • Smokeless tobacco: Never   Vaping Use   • Vaping Use: Never used   Substance and Sexual Activity   • Alcohol use: Not Currently   • Drug use: No   • Sexual activity: Yes     Partners: Male     Birth control/protection: Surgical     Allergies   Allergen Reactions   • Erythromycin Swelling     Throat swells   • Hydromorphone Hcl Anaphylaxis and Other (See Comments)     Other reaction(s): Shock and/or Unconsciousness  Note: Anaphylaxis   • Ondansetron Hcl Other (See Comments)     Coded  Coded  Other reaction(s): Zofran  Note:  Allergic Reaction: Anaphylaxis   • Orphenadrine Citrate Anaphylaxis   • Meperidine Other (See Comments)     Makes her an angry person   • Codeine Rash   • Guaifenesin & Derivatives Nausea And Vomiting   • Guaifenesin Er Swelling     Throat swelling     Current Outpatient Medications   Medication Sig Dispense Refill   • albuterol (PROVENTIL HFA;VENTOLIN HFA) 108 (90 Base) MCG/ACT inhaler Inhale 2 puffs 4 (Four) Times a Day. 6.7 g 0   • budesonide (PULMICORT) 90 MCG/ACT inhaler 2 puffs three times a day, swallow, do not inhale, for 8 weeks     • buPROPion XL (WELLBUTRIN XL) 300 MG 24 hr tablet Take 300 mg by mouth.     • Cyanocobalamin (VITAMIN B-12 ER) 1000 MCG tablet  controlled-release Take 1,000 mcg by mouth.     • cyclobenzaprine (FLEXERIL) 10 MG tablet      • Diclofenac Sodium (VOLTAREN) 1 % gel gel APPLY 2 GRAMS FOUR TIMES DAILY TO AFFECTED AREA(S)     • dicyclomine (BENTYL) 10 MG capsule Take 1 capsule by mouth 3 (Three) Times a Day As Needed (abdominal cramping). 12 capsule 0   • EPINEPHrine (EPIPEN) 0.3 MG/0.3ML solution auto-injector injection      • famotidine (PEPCID) 20 MG tablet      • HYDROcodone-acetaminophen (NORCO) 7.5-325 MG per tablet      • Lancets (OneTouch Delica Plus Pxyfbg25V) misc USE 1 TO CHECK GLUCOSE THREE TIMES DAILY     • levothyroxine (SYNTHROID, LEVOTHROID) 112 MCG tablet TAKE 1 TABLET BY MOUTH EVERY OTHER DAY ALTERNATING WITH 125 MCG     • levothyroxine (SYNTHROID, LEVOTHROID) 125 MCG tablet Take 112 mcg by mouth Daily. 112 mcg alternating with 125 mcg     • metoclopramide (REGLAN) 5 MG tablet Take 1 tablet by mouth 3 (Three) Times a Day As Needed (nausea and vomiting). 10 tablet 0   • nitroglycerin (NITROSTAT) 0.4 MG SL tablet 1 under the tongue as needed for angina, may repeat q5mins for up three doses 100 tablet 11   • omeprazole (priLOSEC) 40 MG capsule Take 40 mg by mouth Daily.     • OneTouch Ultra test strip 1 each by Other route 3 (Three) Times a Day.     • Ozempic, 1 MG/DOSE, 4 MG/3ML solution pen-injector INJECT 1 MG SUBCUTANEOUSLY ONCE A WEEK     • pantoprazole (PROTONIX) 40 MG EC tablet Take 40 mg by mouth 2 (Two) Times a Day.     • pramipexole (MIRAPEX) 1 MG tablet      • pregabalin (LYRICA) 50 MG capsule Take 50 mg by mouth 3 (Three) Times a Day.     • sertraline (ZOLOFT) 50 MG tablet   2   • sucralfate (CARAFATE) 1 GM/10ML suspension Take 1 g by mouth Daily.     • tiZANidine (ZANAFLEX) 4 MG tablet Take 10 mg by mouth At Night As Needed for Muscle Spasms.     • traMADol (ULTRAM) 50 MG tablet Take 1 tablet by mouth Every 6 (Six) Hours As Needed for Moderate Pain  (pain). Take one to two every 4-6 hours prn pain. 28 tablet 0   •  vitamin D (ERGOCALCIFEROL) 24328 units capsule capsule   6   • VITAMIN D PO Take 1 tablet by mouth Daily.     • lidocaine (XYLOCAINE) 5 % ointment Apply 1 application topically to the appropriate area as directed Every 2 (Two) Hours As Needed for Mild Pain. 2500 g 5     No current facility-administered medications for this visit.     Review of Systems   Constitutional: Negative for chills and fever.   HENT: Negative for congestion.    Respiratory: Negative for shortness of breath.    Cardiovascular: Positive for leg swelling. Negative for chest pain.   Gastrointestinal: Negative for constipation, diarrhea, nausea and vomiting.   Musculoskeletal: Positive for arthralgias, back pain and neck pain.        Foot pain   Skin: Negative for wound.   Neurological: Positive for numbness.       OBJECTIVE     Vitals:    22 1314   BP: 130/83   Pulse: 95   SpO2: 98%       PHYSICAL EXAM  GEN:   Accompanied by none.     Foot/Ankle Exam:       General:   Appearance: appears stated age and healthy and obesity    Orientation: AAOx3    Affect: appropriate    Shoe Gear:  Casual shoes    VASCULAR      Right Foot Vascularity   Dorsalis pedis:  2+  Posterior tibial:  2+  Skin Temperature: warm    Edema Grading:  None  CFT:  3  Pedal Hair Growth:  Present  Varicosities: spider veins       Left Foot Vascularity   Dorsalis pedis:  2+  Posterior tibial:  2+  Skin Temperature: warm    Edema Gradin+ and pitting  CFT:  3  Pedal Hair Growth:  Present  Varicosities: spider veins        NEUROLOGIC     Right Foot Neurologic   Light touch sensation:  Diminished  Vibratory sensation:  Normal  Hot/Cold sensation: normal    Protective Sensation using [place] Monofilament:  10     Left Foot Neurologic   Light touch sensation:  Diminished  Vibratory sensation:  Normal  Hot/cold sensation: normal    Protective Sensation using [place] Monofilament:  10     MUSCULOSKELETAL      Right Foot Musculoskeletal   Ecchymosis:   None  Tenderness: none    Arch:  Normal  Hammertoe:  Second toe, third toe, fourth toe and fifth toe     Left Foot Musculoskeletal   Ecchymosis:  None  Tenderness: fifth metatarsal base    Arch:  Normal     MUSCLE STRENGTH     Right Foot Muscle Strength   Foot dorsiflexion:  5  Foot plantar flexion:  5  Foot inversion:  5  Foot eversion:  5     Left Foot Muscle Strength   Foot dorsiflexion:  4+  Foot plantar flexion:  4+  Foot inversion:  4+  Foot eversion:  4+     RANGE OF MOTION      Right Foot Range of Motion   Foot and ankle ROM within normal limits       Left Foot Range of Motion   Foot and ankle ROM within normal limits       DERMATOLOGIC     Right Foot Dermatologic   Skin: skin intact    Nails: onychomycosis and abnormally thick       Left Foot Dermatologic   Skin: skin intact    Nails: onychomycosis and abnormally thick       Image:       RADIOLOGY/NUCLEAR:  Manometry Esophageal    Result Date: 11/3/2022  Narrative: This result has an attachment that is not available.      LABORATORY/CULTURE RESULTS:      PATHOLOGY RESULTS:       ASSESSMENT/PLAN     Diagnoses and all orders for this visit:    1. Foot pain, left (Primary)    2. Neuritis of left sural nerve  -     lidocaine (XYLOCAINE) 5 % ointment; Apply 1 application topically to the appropriate area as directed Every 2 (Two) Hours As Needed for Mild Pain.  Dispense: 2500 g; Refill: 5      Comprehensive lower extremity examination and evaluation was performed.  Discussed findings and treatment plan including risks, benefits, and treatment options with patient in detail. Patient agreed with treatment plan.  Pain seems mostly associated with sural neuritis. Apply lidocaine ointment PRN.    Suggest use of Mole Skin to decreased friction to site.   Patient is interested in right foot surgery but will wait till spring.   An After Visit Summary was printed and given to the patient at discharge, including (if requested) any available informative/educational  handouts regarding diagnosis, treatment, or medications. All questions were answered to patient/family satisfaction. Should symptoms fail to improve or worsen they agree to call or return to clinic or to go to the Emergency Department. Discussed the importance of following up with any needed screening tests/labs/specialist appointments and any requested follow-up recommended by me today. Importance of maintaining follow-up discussed and patient accepts that missed appointments can delay diagnosis and potentially lead to worsening of conditions.  Return if symptoms worsen or fail to improve., or sooner if acute issues arise.    Lab Frequency Next Occurrence   CT chest w contrast Once 12/15/2022       This document has been electronically signed by Bud Maradiaga DPM on November 11, 2022 13:26 CST

## 2022-11-10 ENCOUNTER — TELEPHONE (OUTPATIENT)
Dept: PODIATRY | Facility: CLINIC | Age: 67
End: 2022-11-10

## 2022-11-11 ENCOUNTER — OFFICE VISIT (OUTPATIENT)
Dept: PODIATRY | Facility: CLINIC | Age: 67
End: 2022-11-11

## 2022-11-11 VITALS
BODY MASS INDEX: 35.51 KG/M2 | DIASTOLIC BLOOD PRESSURE: 83 MMHG | SYSTOLIC BLOOD PRESSURE: 130 MMHG | OXYGEN SATURATION: 98 % | HEIGHT: 64 IN | WEIGHT: 208 LBS | HEART RATE: 95 BPM

## 2022-11-11 DIAGNOSIS — M79.672 FOOT PAIN, LEFT: Primary | ICD-10-CM

## 2022-11-11 DIAGNOSIS — G57.82 NEURITIS OF LEFT SURAL NERVE: ICD-10-CM

## 2022-11-11 PROCEDURE — 99213 OFFICE O/P EST LOW 20 MIN: CPT | Performed by: PODIATRIST

## 2022-11-11 RX ORDER — LIDOCAINE 50 MG/G
1 OINTMENT TOPICAL
Qty: 2500 G | Refills: 5 | Status: ON HOLD | OUTPATIENT
Start: 2022-11-11

## 2022-11-14 ENCOUNTER — TELEPHONE (OUTPATIENT)
Dept: PODIATRY | Facility: CLINIC | Age: 67
End: 2022-11-14

## 2022-12-07 ENCOUNTER — OFFICE VISIT (OUTPATIENT)
Dept: CARDIAC SURGERY | Facility: CLINIC | Age: 67
End: 2022-12-07
Payer: MEDICARE

## 2022-12-07 ENCOUNTER — HOSPITAL ENCOUNTER (OUTPATIENT)
Dept: CT IMAGING | Facility: HOSPITAL | Age: 67
Discharge: HOME | End: 2022-12-07
Admitting: NURSE PRACTITIONER

## 2022-12-07 VITALS
HEART RATE: 81 BPM | WEIGHT: 209.4 LBS | SYSTOLIC BLOOD PRESSURE: 136 MMHG | DIASTOLIC BLOOD PRESSURE: 90 MMHG | HEIGHT: 64 IN | BODY MASS INDEX: 35.75 KG/M2 | OXYGEN SATURATION: 95 %

## 2022-12-07 DIAGNOSIS — Z80.3 FAMILY HISTORY OF BREAST CANCER: ICD-10-CM

## 2022-12-07 DIAGNOSIS — R91.8 MULTIPLE LUNG NODULES: ICD-10-CM

## 2022-12-07 DIAGNOSIS — R91.1 LUNG NODULE: ICD-10-CM

## 2022-12-07 DIAGNOSIS — R91.1 LUNG NODULE: Primary | ICD-10-CM

## 2022-12-07 DIAGNOSIS — Z87.891 FORMER HEAVY TOBACCO SMOKER: ICD-10-CM

## 2022-12-07 DIAGNOSIS — N64.1 FAT NECROSIS OF LEFT BREAST: ICD-10-CM

## 2022-12-07 LAB — CREAT BLDA-MCNC: 0.9 MG/DL (ref 0.6–1.3)

## 2022-12-07 PROCEDURE — 82565 ASSAY OF CREATININE: CPT

## 2022-12-07 PROCEDURE — 99213 OFFICE O/P EST LOW 20 MIN: CPT | Performed by: THORACIC SURGERY (CARDIOTHORACIC VASCULAR SURGERY)

## 2022-12-07 PROCEDURE — 71260 CT THORAX DX C+: CPT

## 2022-12-07 PROCEDURE — 25010000002 IOPAMIDOL 61 % SOLUTION: Performed by: NURSE PRACTITIONER

## 2022-12-07 PROCEDURE — 1160F RVW MEDS BY RX/DR IN RCRD: CPT | Performed by: THORACIC SURGERY (CARDIOTHORACIC VASCULAR SURGERY)

## 2022-12-07 PROCEDURE — 1159F MED LIST DOCD IN RCRD: CPT | Performed by: THORACIC SURGERY (CARDIOTHORACIC VASCULAR SURGERY)

## 2022-12-07 RX ADMIN — IOPAMIDOL 100 ML: 612 INJECTION, SOLUTION INTRAVENOUS at 09:46

## 2022-12-08 NOTE — PROGRESS NOTES
Chief Complaint   Patient presents with   • Lung Nodule     Pt is here for 1 year follow-up w/ CT       Subjective     History of Present Illness      She reports no change in her condition since her last visit.  Upcoming Nissen fundoplication to be done soon.  She was told that no one in [place] was capable of doing this procedure and was referred to [place], but [place] does not accept her insurance. She was asked will contact Dr. Ortiz's office to inquire about having procedure done locally.  She had a muscle strength test performed.    Reports COPD has worsened. Her general practitioner prescribes her an inhaler for this.  Denies any unintentional weight loss.  Denies pneumonia.  Suspects oncoming bronchitis. She reports that her cough is more so in her neck and upper chest rather than deep in her chest.  Discussed CT results.  Reports intermittent chest pain likely related to reflux. She states that any cardiac cause for chest pain was previously ruled out with echocardiograms and x-rays.  She denies unintended weight loss, hoarseness of voice, chest pain, hemoptysis, or history of pneumonia      Initial referral was for a right upper lobe lung nodule which has been stable since 2018.  During her last surveillance a new lingular nodule was identified in 2020.  This was not hypermetabolic.    In the interim she has been diagnosed with Curtis's esophagus.  She is seen by Dr. Wei as well.  She is clinically stable.  She remains a non-smoker.  Mrs. Cruz has a history of smoking. At least 46 pack years but quit in 2018.    Additional past medical history includes DVT/PE      Review of Systems   Constitutional: Positive for fatigue. Negative for chills, diaphoresis and fever.   HENT: Negative for trouble swallowing and voice change.    Eyes: Negative.    Respiratory: Positive for shortness of breath.    Cardiovascular: Negative for chest pain, palpitations and leg swelling.   Gastrointestinal: Negative.     Endocrine: Negative.    Genitourinary: Negative.    Musculoskeletal: Positive for back pain and neck pain.   Skin: Negative.    Allergic/Immunologic: Negative.    Neurological: Positive for weakness. Negative for seizures and speech difficulty.   Hematological: Negative.         History of DVT/PE   Psychiatric/Behavioral: Negative for behavioral problems and confusion.        Allergies:  Erythromycin, Hydromorphone hcl, Ondansetron hcl, Orphenadrine citrate, Meperidine, Codeine, Guaifenesin & derivatives, and Guaifenesin er    Current Outpatient Medications   Medication Sig Dispense Refill   • albuterol (PROVENTIL HFA;VENTOLIN HFA) 108 (90 Base) MCG/ACT inhaler Inhale 2 puffs 4 (Four) Times a Day. 6.7 g 0   • budesonide (PULMICORT) 90 MCG/ACT inhaler 2 puffs three times a day, swallow, do not inhale, for 8 weeks     • buPROPion XL (WELLBUTRIN XL) 300 MG 24 hr tablet Take 300 mg by mouth.     • Cyanocobalamin (VITAMIN B-12 ER) 1000 MCG tablet controlled-release Take 1,000 mcg by mouth.     • cyclobenzaprine (FLEXERIL) 10 MG tablet      • Diclofenac Sodium (VOLTAREN) 1 % gel gel APPLY 2 GRAMS FOUR TIMES DAILY TO AFFECTED AREA(S)     • dicyclomine (BENTYL) 10 MG capsule Take 1 capsule by mouth 3 (Three) Times a Day As Needed (abdominal cramping). 12 capsule 0   • EPINEPHrine (EPIPEN) 0.3 MG/0.3ML solution auto-injector injection      • famotidine (PEPCID) 20 MG tablet      • HYDROcodone-acetaminophen (NORCO) 7.5-325 MG per tablet      • Lancets (OneTouch Delica Plus [id number]) misc USE 1 TO CHECK GLUCOSE THREE TIMES DAILY     • levothyroxine (SYNTHROID, LEVOTHROID) 112 MCG tablet TAKE 1 TABLET BY MOUTH EVERY OTHER DAY ALTERNATING WITH 125 MCG     • lidocaine (XYLOCAINE) 5 % ointment Apply 1 application topically to the appropriate area as directed Every 2 (Two) Hours As Needed for Mild Pain. 2500 g 5   • metoclopramide (REGLAN) 5 MG tablet Take 1 tablet by mouth 3 (Three) Times a Day As Needed (nausea and vomiting).  10 tablet 0   • nitroglycerin (NITROSTAT) 0.4 MG SL tablet 1 under the tongue as needed for angina, may repeat q5mins for up three doses 100 tablet 11   • omeprazole (priLOSEC) 40 MG capsule Take 40 mg by mouth Daily.     • OneTouch Ultra test strip 1 each by Other route 3 (Three) Times a Day.     • Ozempic, 1 MG/DOSE, 4 MG/3ML solution pen-injector INJECT 1 MG SUBCUTANEOUSLY ONCE A WEEK     • pantoprazole (PROTONIX) 40 MG EC tablet Take 40 mg by mouth 2 (Two) Times a Day.     • pramipexole (MIRAPEX) 1 MG tablet      • pregabalin (LYRICA) 50 MG capsule Take 50 mg by mouth 3 (Three) Times a Day.     • sertraline (ZOLOFT) 50 MG tablet   2   • sucralfate (CARAFATE) 1 GM/10ML suspension Take 1 g by mouth Daily.     • tiZANidine (ZANAFLEX) 4 MG tablet Take 10 mg by mouth At Night As Needed for Muscle Spasms.     • vitamin D (ERGOCALCIFEROL) 00561 units capsule capsule Take 50,000 Units by mouth Every 7 (Seven) Days.  6   • levothyroxine (SYNTHROID, LEVOTHROID) 125 MCG tablet Take 112 mcg by mouth Daily. 112 mcg alternating with 125 mcg     • traMADol (ULTRAM) 50 MG tablet Take 1 tablet by mouth Every 6 (Six) Hours As Needed for Moderate Pain  (pain). Take one to two every 4-6 hours prn pain. 28 tablet 0   • VITAMIN D PO Take 1 tablet by mouth Daily.       No current facility-administered medications for this visit.       Objective      Vital Signs  Visit Vitals  /90 (BP Location: Right arm, Patient Position: Sitting, Cuff Size: Adult)   Pulse 81   Ht 162.6 cm (64.02\")   Wt 95 kg (209 lb 6.4 oz)   SpO2 95%   BMI 35.93 kg/m²     Physical Exam   Constitutional: She is oriented to person, place, and time. She appears well-developed.   HENT:   Head: Normocephalic and atraumatic.   Eyes: Conjunctivae are normal.   Neck: No JVD present. No thyromegaly present.   Cardiovascular: Normal rate, regular rhythm and normal heart sounds.   No murmur heard.  Pulmonary/Chest: Effort normal and breath sounds normal. No respiratory  distress. She has no wheezes. She has no rales.   Abdominal: Soft. She exhibits no distension and no mass. There is no abdominal tenderness.   obese   Musculoskeletal: Normal range of motion. No deformity.   Lymphadenopathy:     She has no cervical adenopathy.   Neurological: She is alert and oriented to person, place, and time. No cranial nerve deficit or sensory deficit.   Skin: Skin is warm and dry.       Results Review:   Study Result    EXAMINATION: NM PET SKULL BASE TO MID THIGH- 6/30/2020 11:22 AM CDT     HISTORY: New lung nodule; R91.1-Solitary pulmonary nodule.     Dose:  1. 11.83 mCi FDG F-18 intravenously, the patient's weight equals 214  pounds and serum glucose level 100 mg/dL at the time of FDG injection.  2. 1064 mGycm for the CT portion of the examination.     REPORT: Multiplanar pet imaging was performed with a field-of-view  extending from the skull base to the mid thighs, CT was also performed  for attenuation correction and fusion imaging.     COMPARISON: PET/CT 10/04/2018. CT chest with contrast 06/16/2020.     Physiologic (normal) activity is noted within the head and neck. There  is no abnormal FDG activity associated with the small new lingular  nodule, which measures 9 mm. The fusion CT demonstrates a stable 13 mm  groundglass nodule in the right upper lobe, with no significant FDG  uptake. This most likely represents an area of focal scarring. There is  a tiny subpleural nodule in the left lower lobe which is not calcified  and measures approximately 3 mm. This is stable. There is mild diffuse  emphysema. No pneumothorax or pleural effusion is identified. There is  no evidence of axillary, mediastinal or hilar lymphadenopathy. There are  a few normal sized lymph nodes in the inferior axilla bilaterally, which  have low-level uptake, with a maximum SUV of 2.7. These may be reactive.  Physiologic activity is noted within the myocardium. Below the  diaphragm, there is physiologic activity  within the solid abdominal  organs, collecting structures of the kidneys, ureters and bladder as  expected. There is physiologic activity within the GI tract as expected.  The CT also demonstrates diffuse fatty infiltration of the liver and  evidence previous cholecystectomy. There is a small area of fat sparing  in the upper right hepatic lobe which is stable. There is moderate  atherosclerosis with calcification within the aorta and iliac arteries.  The appendix is normal. There is streak artifact in the pelvis related  to previous surgery. Prior hysterectomy is noted. There is mild sigmoid  diverticulosis without evidence of acute diverticulitis.     IMPRESSION:  1. There is no abnormal FDG activity associated with the small new  nodule in the lingula, which is probably related to focal scarring or  atelectasis. There is no abnormal FDG activity associated with the 15 mm  groundglass nodule in the right upper lobe.  2. There are a few normal sized lymph nodes in the inferior axilla  bilaterally, with low level FDG avidity, these may be reactive lymph  nodes. Correlation with physical examination of both axilla is  suggested. No measurable axillary or intrathoracic lymphadenopathy is  identified.  3. No evidence of active neoplasm is identified.        This report was finalized on 06/30/2020 12:34 by Dr. Tony Chen MD.         Ct Chest With Contrast    Result Date: 12/16/2020  Narrative: EXAM: CT CHEST W CONTRAST- - 12/16/2020 8:21 AM CST  HISTORY: lung nodule; R91.1-Solitary pulmonary nodule   COMPARISON: 6/16/2020.  DOSE LENGTH PRODUCT: 511 mGy cm. Automatic exposure control was utilized to make radiation dose as low as reasonably achievable.  TECHNIQUE: Enhanced CT images of the chest obtained with multiplanar reformats.  Recommendations for incidental nodule follow-up are based on Fleischner Society recommendations.  FINDINGS:  AIRWAYS/PULMONARY PARENCHYMA: The central airways are midline and patent.   Right upper lobe groundglass opacity measuring about 1.5 cm appears similar to 6/16/2020, 4/29/2019. This has a decreased cystic component compared 1/21/2018.  Lingula with a 0.9 cm pulmonary nodule versus atelectasis on axial series 4, image 82, similar compared to 6/16/2020.  Stable 5 mm pleural-based left lower lobe pulmonary nodule compared to 1/21/2008, which is 3 years of stability.  Emphysema. No pleural effusion or pneumothorax.  VASCULATURE: Thoracic aorta is normal in course and caliber. Mild calcified aortic atherosclerosis. Normal pulmonary artery caliber.  CARDIAC:  Normal heart size.  No pericardial effusion.   MEDIASTINUM: There is no mediastinal or hilar lymphadenopathy by CT size criteria. Esophagus has normal course, caliber and wall thickness. Small hiatal hernia.  EXTRATHORACIC: Thyroid diminutive or absent. No supraclavicular or axillary adenopathy. The extrathoracic soft tissues are within normal limits.  INCLUDED UPPER ABDOMEN: Fatty liver. There are 2 peripheral areas of hyperenhancement anterolaterally and posterior medially, axial series 2, image 113 and 119 respectively. These are similar compared to 1/21/2018, possibly a area of focal fatty sparing or transient hepatic attenuation difference (KATI).  Cholecystectomy clips. No bile duct dilation. Normal appearance of the visualized pancreas, spleen, adrenal glands.  BONES: No acute or suspicious bony finding.       Impression: 1. Stable pulmonary findings compared to prior. Per Fleischner Society, consider follow-up CT chest in 12-18 months, which will be 2 years of follow-up. 2. Fatty liver.  Exam was tagged in PACS with \"new lung findings\" to assist in appropriate follow up. This report was finalized on 12/16/2020 08:48 by Dr Neeta Walker MD.      I reviewed the patient's new clinical results. Discussed with patient.    Study Result    Narrative & Impression   CT CHEST W CONTRAST DIAGNOSTIC- 12/8/2021 8:52 AM CST     HISTORY: multiple  lung nodules; R91.1-Solitary pulmonary nodule      COMPARISON: 12/16/2020, 6/16/2020 and 4/29/2019     DLP: 485 mGy cm. Automated exposure control was utilized to diminish  patient radiation dose.     TECHNIQUE: Serial helical tomographic images of the chest were acquired  following the infusion of Isovue contrast intravenously. Bone and soft  tissue algorithms were provided.       FINDINGS:   Neck base: The imaged portion of the neck and thyroid gland is  unremarkable.      Lungs: There is a groundglass opacity within the right upper lobe on  image 29. This is essentially unchanged in appearance from the previous  study of 4/29/2019 and may represent an area of asymmetric parenchymal  scarring. An adjacent subpleural nodule posteriorly within the right  upper lobe on image 27 is stable from the previous study. The focus of  pleural-based nodularity within the inferior lingular segment of the  left upper lobe on image 81 is unchanged from the previous study and I  suspect represent some focal atelectasis and/or scarring within this  segment. A subpleural soft tissue nodule within the left posterior  costophrenic angle and lower lobe is stable from previous studies dating  back to 2019. No new lung nodules are present. There are changes of  centrilobular emphysema.. No pleural effusion is present. The trachea  and bronchial tree are patent.      Heart: There is moderate cardiomegaly. Mild coronary calcifications are  present.. There is no pericardial effusion.      Vasculature: Aorta is normal in caliber and appearance without  significant atherosclerosis, stenosis, or aneurysm. Mild coronary  calcifications are demonstrated. The great vessels are normal in  appearance. The pulmonary arteries are normal in appearance.     Lymph nodes: No enlarged axillary, hilar, or mediastinal lymph nodes.      Bones and soft tissues: There is a focus of fat density within the left  breast. This could be related to previous surgery  representing some fat  necrosis versus a lipoma or hamartoma..      Upper abdomen: A moderate size hiatal hernia is present. There is  diffuse steatosis of the liver. The adrenals are unremarkable. The  patient is status post cholecystectomy..      IMPRESSION:  1. Stable pulmonary nodules within the lungs from the previous study  with no new lesions present. Given the lack of change over a more than  two-year period of time I feel benignity would BE suggested. No new lung  lesions are present. There are changes of centrilobular emphysema.  2. Cardiomegaly with mild coronary calcifications present. The thoracic  aorta is normal in caliber.  3. Fat-containing lesion within the upper left breast. This may be  postoperative in nature versus posttraumatic fat necrosis versus a fatty  containing mass. No overlying skin changes are observed.  4. Steatosis of the liver. A moderate size hiatal hernia is present..        This report was finalized on 12/08/2021 10:00 by Dr. Grey Mckeon MD.     Independent review of imaging obtained today is performed by me.  Her right upper lobe abnormality is more consistent with scarring at this time.  The left lower lobe nodule is stable.  Lingular lesion is also stable.  No new additional nodules.  Emphysematous changes are noted.  CT chest w contrast [CXX923] (Order [id number])  Order  Date: 12/7/2022 Department: [facility] CARDIOTHORACIC SURGERY Ordering/Authorizing: Agusto Thompson MD     Future Order Information    Expected Expires      12/12/2022 12/7/2023               Order History  Outpatient  Date/Time Action Taken User Additional Information   12/07/22 1412 Stefani Auguste MA Ordering Mode: Verbal with readback     Order Details    Frequency Duration Priority Order Class   None None Routine Hospital Performed     Start Date/Time    Start Date   12/07/22     Source Order Set / Preference List    Preference List   [facility] OP IMAGING [[id number]]      Verbal Order Info    Action Created on Order Mode Entered by Responsible Provider Signed by Signed on   Ordering 12/07/22 1412 Verbal with readback Stefani Johnson MA Lopez, Nicholas M, MD       Clinical Indications     ICD-10-CM ICD-9-CM   Lung nodule  - Primary     R91.1 793.11     Reprint Order Requisition    CT chest w contrast (Order #[id number]) on 12/7/22     Encounter    View Encounter           Intra Procedure Documentation    Intra Procedure Documentation      Order Provider Info        Office phone Pager E-mail   Ordering User Stefani Johnson MA -- -- --   Authorizing Provider Agusto Thompson [phone] -- --   Entered By Stefani Johnson MA -- -- --   Ordering Provider Agusto Thompson [phone] -- --     Linked Charges    Charge Code Link Type Charge Type Modifiers   HC CT Chest W Contrast Diagnostic [[id number]] 45030 Automatic Technical    Diagnostic Computed Tomography Thorax W/Contrast 75722 Automatic Professional 26   Diagnostic Computed Tomography Thorax W/Contrast 59905 Automatic Global      Tracking Reports    Cosign Tracking Order Transmittal Tracking     Referred To    Specialty: Radiology     Independent interpretation reveals stable right upper lobe ground glass lesion now since 2018. Her lingular lesion was identified in June 2020 and subjectively shows potentially some increase in growth, but given his position, precise measurements suggest 11.9 x 5.7 versus 13.8 by 4.9. Differences may be allotted due to technique. The left lower lobe half centimeter lesion remains 5 mm in size as well.    Assessment & Plan       Diagnoses and all orders for this visit:    1. Lung nodule (Primary)  -     CT chest w contrast; Future    2. Fat necrosis of left breast  -     Ambulatory Referral to General Surgery    3. Family history of breast cancer  -     Ambulatory Referral to General Surgery    lingula  RUL 1.5 cm ground glass 2018  Stable  LLL 0.5 cm 2018 Stable  Lingula 0.9 cm 6/2020   Stable    I discussed the patient's findings and my recommendations with Ms. Cruz.       Overall, her right upper lobe and her lower lobe nodule are clearly stable. The lingular lesion, by measurements, appears to be reasonably stable, however, getting a precise measurement on previous imaging compared to the current is with some variance due to proximity of chest wall and imaging technique. Subjectively this may in fact have increased in size from a from a width point of view rather than length. I have recommended an interval CT scan in 6 months to ensure stability. She has been congratulated on smoking cessation for 4 and a half years. Discussed signs and symptoms which would suggest potential nodular progression.      Patient's Body mass index is 35.93 kg/m². BMI is above normal parameters. Recommendations include: referral to primary care to establishing durable lifestyle changes to accomplish a weight acceptable for age and height. She has been congratulated on recent weight loss.     Melly Cruz  reports that she quit smoking about 4 years ago. Her smoking use included cigarettes. She started smoking about 50 years ago. She has a 69.00 pack-year smoking history. She has been exposed to tobacco smoke. She has never used smokeless tobacco. I have congratulated her on smoking cessation.    Advance Care Planning   ACP discussion was declined by the patient. Patient does not have an advance directive, declines further assistance.    If the lingula nodule is stable and no new lung nodules are identified,she can proceed with ongoing screening.

## 2022-12-08 NOTE — PROGRESS NOTES
Reviewed imaging with Dr. Thompson-see his formal office note for full details but there is mention of left breast fat necrosis. Referral placed to Dr. Beatrice Birmingham. Called patient to inform her of this. States she underwent breast reduction surgery about 2 years ago and has not had a follow up mammogram since then. She is very appreciative as she reports a family history of breast cancer. Please make sure patient gets appt with Dr. Birmingham.

## 2022-12-12 ENCOUNTER — OFFICE VISIT (OUTPATIENT)
Dept: SURGERY | Facility: CLINIC | Age: 67
End: 2022-12-12

## 2022-12-12 ENCOUNTER — PATIENT ROUNDING (BHMG ONLY) (OUTPATIENT)
Dept: SURGERY | Facility: CLINIC | Age: 67
End: 2022-12-12

## 2022-12-12 VITALS
OXYGEN SATURATION: 98 % | HEART RATE: 89 BPM | HEIGHT: 64 IN | WEIGHT: 209.44 LBS | BODY MASS INDEX: 35.76 KG/M2 | SYSTOLIC BLOOD PRESSURE: 134 MMHG | DIASTOLIC BLOOD PRESSURE: 87 MMHG

## 2022-12-12 DIAGNOSIS — N63.21 MASS OF UPPER OUTER QUADRANT OF LEFT BREAST: Primary | ICD-10-CM

## 2022-12-12 PROCEDURE — 99203 OFFICE O/P NEW LOW 30 MIN: CPT | Performed by: SPECIALIST

## 2022-12-12 NOTE — PROGRESS NOTES
December 12, 2022    Hello, may I speak with Melly Cruz?    My name is Rose Marie    I am  [place] [facility]  [facility] GENERAL SURGERY  [address]  [facility] [id number] [phone].    Before we get started may I verify your date of birth? 1955    I am calling to officially welcome you to our practice and ask about your recent visit. Is this a good time to talk? yes    Tell me about your visit with us. What things went well?  You all are perfection, everyone very nice. I like Dr. Birmingham.       We're always looking for ways to make our patients' experiences even better. Do you have recommendations on ways we may improve?  no    Overall were you satisfied with your first visit to our practice? yes       I appreciate you taking the time to speak with me today. Is there anything else I can do for you? no      Thank you, and have a great day.

## 2022-12-12 NOTE — PROGRESS NOTES
Beatrice Birmingham MD [facility] History and Physical     Referring Provider: Agusto Thompson MD      Chief complaint   Chief Complaint   Patient presents with   • Abnormal Breast Imaging        Subjective .     History of present illness:  Melly Cruz is a 67 y.o. female who presents after recent CT chest that demonstrated a left breast mass.  She is status post bilateral reduction mammoplasty and has developed a known mass at 12 o'clock.  She denies any breast, nipple, or skin changes.    History    Past Medical History:   Diagnosis Date   • Acid reflux    • Anxiety    • Arthritis    • Curtis esophagus    • Chest pain    • Chronic back pain    • Chronic neck pain    • COPD (chronic obstructive pulmonary disease) ([facility])    • DVT (deep venous thrombosis) ([facility])    • GI bleed    • Hypothyroid    • Hypothyroidism    • Low back pain    • Migraine    • PE (pulmonary thromboembolism) ([facility])    ,   Past Surgical History:   Procedure Laterality Date   • CHOLECYSTECTOMY     • COLONOSCOPY     • DILATATION AND CURETTAGE     • FOOT FUSION Left 2022    Procedure: 1-2 Arthrodesis, Tarsal Metatarsal Joint 2 and 3 Arthrodesis;  Surgeon: Bud Maradiaga DPM;  Location:  [facility] OR;  Service: Podiatry;  Laterality: Left;   • HAMMER TOE REPAIR Left 2022    Procedure: Hammertoe Repair 2-5,;  Surgeon: Bud Maradiaga DPM;  Location:  [facility] OR;  Service: Podiatry;  Laterality: Left;   • HERNIA REPAIR     • HYSTERECTOMY     • JOINT REPLACEMENT     • OOPHORECTOMY     • REPLACEMENT TOTAL KNEE      Right knee partial, left knee   ,   Family History   Problem Relation Age of Onset   • Breast cancer Paternal Aunt    • Ovarian cancer Neg Hx    • Uterine cancer Neg Hx    • Colon cancer Neg Hx    ,   Social History     Tobacco Use   • Smoking status: Former     Packs/day: 1.50     Years: 46.00     Pack years: 69.00     Types: Cigarettes     Start date:      Quit date: 2018     Years since quittin.7     Passive  exposure: Past   • Smokeless tobacco: Never   • Tobacco comments:     Pt started at age 17, then quit at age 31. Pt restarted smoking at age 32-33 and quit smoking in 2018   Vaping Use   • Vaping Use: Never used   Substance Use Topics   • Alcohol use: Not Currently   • Drug use: No   , (Not in a hospital admission)   and Allergies:  Erythromycin, Hydromorphone hcl, Ondansetron hcl, Orphenadrine citrate, Meperidine, Codeine, Guaifenesin & derivatives, and Guaifenesin er    Current Outpatient Medications:   •  albuterol (PROVENTIL HFA;VENTOLIN HFA) 108 (90 Base) MCG/ACT inhaler, Inhale 2 puffs 4 (Four) Times a Day., Disp: 6.7 g, Rfl: 0  •  budesonide (PULMICORT) 90 MCG/ACT inhaler, 2 puffs three times a day, swallow, do not inhale, for 8 weeks, Disp: , Rfl:   •  buPROPion XL (WELLBUTRIN XL) 300 MG 24 hr tablet, Take 300 mg by mouth., Disp: , Rfl:   •  Cyanocobalamin (VITAMIN B-12 ER) 1000 MCG tablet controlled-release, Take 1,000 mcg by mouth., Disp: , Rfl:   •  cyclobenzaprine (FLEXERIL) 10 MG tablet, , Disp: , Rfl:   •  Diclofenac Sodium (VOLTAREN) 1 % gel gel, APPLY 2 GRAMS FOUR TIMES DAILY TO AFFECTED AREA(S), Disp: , Rfl:   •  dicyclomine (BENTYL) 10 MG capsule, Take 1 capsule by mouth 3 (Three) Times a Day As Needed (abdominal cramping)., Disp: 12 capsule, Rfl: 0  •  EPINEPHrine (EPIPEN) 0.3 MG/0.3ML solution auto-injector injection, , Disp: , Rfl:   •  famotidine (PEPCID) 20 MG tablet, , Disp: , Rfl:   •  HYDROcodone-acetaminophen (NORCO) 7.5-325 MG per tablet, , Disp: , Rfl:   •  Lancets (OneTouch Delica Plus Flumct08A) misc, USE 1 TO CHECK GLUCOSE THREE TIMES DAILY, Disp: , Rfl:   •  levothyroxine (SYNTHROID, LEVOTHROID) 112 MCG tablet, TAKE 1 TABLET BY MOUTH EVERY OTHER DAY ALTERNATING WITH 125 MCG, Disp: , Rfl:   •  levothyroxine (SYNTHROID, LEVOTHROID) 125 MCG tablet, Take 112 mcg by mouth Daily. 112 mcg alternating with 125 mcg, Disp: , Rfl:   •  lidocaine (XYLOCAINE) 5 % ointment, Apply 1 application  topically to the appropriate area as directed Every 2 (Two) Hours As Needed for Mild Pain., Disp: 2500 g, Rfl: 5  •  metoclopramide (REGLAN) 5 MG tablet, Take 1 tablet by mouth 3 (Three) Times a Day As Needed (nausea and vomiting)., Disp: 10 tablet, Rfl: 0  •  nitroglycerin (NITROSTAT) 0.4 MG SL tablet, 1 under the tongue as needed for angina, may repeat q5mins for up three doses, Disp: 100 tablet, Rfl: 11  •  omeprazole (priLOSEC) 40 MG capsule, Take 40 mg by mouth Daily., Disp: , Rfl:   •  OneTouch Ultra test strip, 1 each by Other route 3 (Three) Times a Day., Disp: , Rfl:   •  Ozempic, 1 MG/DOSE, 4 MG/3ML solution pen-injector, INJECT 1 MG SUBCUTANEOUSLY ONCE A WEEK, Disp: , Rfl:   •  pantoprazole (PROTONIX) 40 MG EC tablet, Take 40 mg by mouth 2 (Two) Times a Day., Disp: , Rfl:   •  pramipexole (MIRAPEX) 1 MG tablet, , Disp: , Rfl:   •  pregabalin (LYRICA) 50 MG capsule, Take 50 mg by mouth 3 (Three) Times a Day., Disp: , Rfl:   •  sertraline (ZOLOFT) 50 MG tablet, , Disp: , Rfl: 2  •  sucralfate (CARAFATE) 1 GM/10ML suspension, Take 1 g by mouth Daily., Disp: , Rfl:   •  tiZANidine (ZANAFLEX) 4 MG tablet, Take 10 mg by mouth At Night As Needed for Muscle Spasms., Disp: , Rfl:   •  traMADol (ULTRAM) 50 MG tablet, Take 1 tablet by mouth Every 6 (Six) Hours As Needed for Moderate Pain  (pain). Take one to two every 4-6 hours prn pain., Disp: 28 tablet, Rfl: 0  •  vitamin D (ERGOCALCIFEROL) 36506 units capsule capsule, Take 50,000 Units by mouth Every 7 (Seven) Days., Disp: , Rfl: 6  •  VITAMIN D PO, Take 1 tablet by mouth Daily., Disp: , Rfl:     GYN History:  Age of first menses    16    Age of first live birth    30    Age of menopause    33, TAHBSO    Hormone replacement therapy  yes    Family history breast cancer   P aunt    Family history ovarian cancer   Vulvar, P aunt    History breast biopsy    no        Review of Systems:    All organ systems were evaluated and found negative except those which are  "mentioned in the History of Present Illness.      Objective     Physical Exam:    Vital Signs   /87 (BP Location: Left arm, Patient Position: Sitting, Cuff Size: Adult)   Pulse 89   Ht 162.6 cm (64.02\")   Wt 95 kg (209 lb 7 oz)   SpO2 98%   BMI 35.93 kg/m²        Constitutional:    Well-developed, well-nourished in no acute distress  Eyes:     Extraocular movements intact; pupils equal, round, and reactive  Ears, Nose, Mouth, Throat:  Hearing intact, nose midline, no mucosal lesions  Cardiovascular:   Regular rate and rhythm   Respiratory:    Clear to auscultation bilaterally  Gastrointestinal:   Soft, nontender, nondistended, no masses, bowel sounds intact  Genitourinary:    Deferred  Musculoskeletal:   Full range of motion, no muscle wasting, no weakness  Skin:     No rashes or excoriations  Neurological:    Moves all extremities, sensation intact  Psychiatric:    Alert and oriented to person, place, and time  Hematologic/Lymphatic/Immune: No lymphadenopathy  Breast     Incisional scars in Wise pattern, 2-3cm round mass 12-1 o'clock on left, no axillary adenopathy    Imaging:       Class 2 Severe Obesity (BMI >=35 and <=39.9). Obesity-related health conditions include the following: none. Obesity is newly identified. BMI is is above average; BMI management plan is completed. We discussed portion control and increasing exercise.    Assessment & Plan       Diagnoses and all orders for this visit:    1. Mass of upper outer quadrant of left breast (Primary)  -     Mammo Diagnostic Bilateral With CAD; Future           A comprehensive discussion of the breast including her clinical findings and imaging was undertaken.  The mass seen on the CT chest most likely is fat necrosis from the recent reduction mammoplasty.  She will be scheduled for bilateral diagnostic mammograms and left ultrasound.  She will return to discuss the findings.      An extensive review of patient intake forms, referring physician " documents, laboratories, and imaging was performed in the medical decision making and surgical planning of this patient.          Beatrice Birmingham MD

## 2022-12-21 ENCOUNTER — HOSPITAL ENCOUNTER (OUTPATIENT)
Dept: MAMMOGRAPHY | Facility: HOSPITAL | Age: 67
Discharge: HOME | End: 2022-12-21

## 2022-12-21 ENCOUNTER — HOSPITAL ENCOUNTER (OUTPATIENT)
Dept: ULTRASOUND IMAGING | Facility: HOSPITAL | Age: 67
Discharge: HOME | End: 2022-12-21

## 2022-12-21 DIAGNOSIS — N63.21 MASS OF UPPER OUTER QUADRANT OF LEFT BREAST: ICD-10-CM

## 2022-12-21 PROCEDURE — 77066 DX MAMMO INCL CAD BI: CPT

## 2022-12-21 PROCEDURE — G0279 TOMOSYNTHESIS, MAMMO: HCPCS

## 2022-12-21 PROCEDURE — 76642 ULTRASOUND BREAST LIMITED: CPT

## 2022-12-22 DIAGNOSIS — Z12.31 ENCOUNTER FOR SCREENING MAMMOGRAM FOR MALIGNANT NEOPLASM OF BREAST: ICD-10-CM

## 2022-12-22 DIAGNOSIS — M79.89 FAT NECROSIS: Primary | ICD-10-CM

## 2023-01-24 ENCOUNTER — APPOINTMENT (OUTPATIENT)
Dept: GENERAL RADIOLOGY | Facility: HOSPITAL | Age: 68
End: 2023-01-24
Payer: MEDICARE

## 2023-01-24 ENCOUNTER — APPOINTMENT (OUTPATIENT)
Dept: CT IMAGING | Facility: HOSPITAL | Age: 68
End: 2023-01-24
Payer: MEDICARE

## 2023-01-24 ENCOUNTER — HOSPITAL ENCOUNTER (OUTPATIENT)
Facility: HOSPITAL | Age: 68
Setting detail: OBSERVATION
Discharge: HOME | End: 2023-01-25
Attending: INTERNAL MEDICINE | Admitting: INTERNAL MEDICINE
Payer: MEDICARE

## 2023-01-24 DIAGNOSIS — R07.1 CHEST PAIN ON BREATHING: Primary | ICD-10-CM

## 2023-01-24 PROBLEM — E66.9 OBESITY (BMI 30-39.9): Status: ACTIVE | Noted: 2023-01-24

## 2023-01-24 PROBLEM — E11.9 TYPE 2 DIABETES MELLITUS, WITHOUT LONG-TERM CURRENT USE OF INSULIN (HCC): Status: ACTIVE | Noted: 2023-01-24

## 2023-01-24 PROBLEM — R07.9 CHEST PAIN: Status: ACTIVE | Noted: 2023-01-24

## 2023-01-24 LAB
ALBUMIN SERPL-MCNC: 3.8 G/DL (ref 3.5–5.2)
ALBUMIN/GLOB SERPL: 1.2 G/DL
ALP SERPL-CCNC: 103 U/L (ref 39–117)
ALT SERPL W P-5'-P-CCNC: 26 U/L (ref 1–33)
ANION GAP SERPL CALCULATED.3IONS-SCNC: 9 MMOL/L (ref 5–15)
AST SERPL-CCNC: 25 U/L (ref 1–32)
BASOPHILS # BLD AUTO: 0.03 10*3/MM3 (ref 0–0.2)
BASOPHILS NFR BLD AUTO: 0.6 % (ref 0–1.5)
BILIRUB SERPL-MCNC: 0.2 MG/DL (ref 0–1.2)
BUN SERPL-MCNC: 9 MG/DL (ref 8–23)
BUN/CREAT SERPL: 11.3 (ref 7–25)
CALCIUM SPEC-SCNC: 8.7 MG/DL (ref 8.6–10.5)
CHLORIDE SERPL-SCNC: 105 MMOL/L (ref 98–107)
CO2 SERPL-SCNC: 26 MMOL/L (ref 22–29)
CREAT SERPL-MCNC: 0.8 MG/DL (ref 0.57–1)
D DIMER PPP FEU-MCNC: 0.54 MCGFEU/ML (ref 0–0.65)
DEPRECATED RDW RBC AUTO: 45.3 FL (ref 37–54)
EGFRCR SERPLBLD CKD-EPI 2021: 81.9 ML/MIN/1.73
EOSINOPHIL # BLD AUTO: 0.18 10*3/MM3 (ref 0–0.4)
EOSINOPHIL NFR BLD AUTO: 3.5 % (ref 0.3–6.2)
ERYTHROCYTE [DISTWIDTH] IN BLOOD BY AUTOMATED COUNT: 14.5 % (ref 12.3–15.4)
GLOBULIN UR ELPH-MCNC: 3.2 GM/DL
GLUCOSE SERPL-MCNC: 100 MG/DL (ref 65–99)
HCT VFR BLD AUTO: 38.3 % (ref 34–46.6)
HGB BLD-MCNC: 11.7 G/DL (ref 12–15.9)
IMM GRANULOCYTES # BLD AUTO: 0.01 10*3/MM3 (ref 0–0.05)
IMM GRANULOCYTES NFR BLD AUTO: 0.2 % (ref 0–0.5)
INR PPP: 1 (ref 0.91–1.09)
LIPASE SERPL-CCNC: 39 U/L (ref 13–60)
LYMPHOCYTES # BLD AUTO: 1.61 10*3/MM3 (ref 0.7–3.1)
LYMPHOCYTES NFR BLD AUTO: 30.9 % (ref 19.6–45.3)
MCH RBC QN AUTO: 26.2 PG (ref 26.6–33)
MCHC RBC AUTO-ENTMCNC: 30.5 G/DL (ref 31.5–35.7)
MCV RBC AUTO: 85.7 FL (ref 79–97)
MONOCYTES # BLD AUTO: 0.39 10*3/MM3 (ref 0.1–0.9)
MONOCYTES NFR BLD AUTO: 7.5 % (ref 5–12)
NEUTROPHILS NFR BLD AUTO: 2.99 10*3/MM3 (ref 1.7–7)
NEUTROPHILS NFR BLD AUTO: 57.3 % (ref 42.7–76)
NRBC BLD AUTO-RTO: 0 /100 WBC (ref 0–0.2)
PLATELET # BLD AUTO: 247 10*3/MM3 (ref 140–450)
PMV BLD AUTO: 9.6 FL (ref 6–12)
POTASSIUM SERPL-SCNC: 3.9 MMOL/L (ref 3.5–5.2)
PROT SERPL-MCNC: 7 G/DL (ref 6–8.5)
PROTHROMBIN TIME: 13.3 SECONDS (ref 11.8–14.8)
RBC # BLD AUTO: 4.47 10*6/MM3 (ref 3.77–5.28)
SODIUM SERPL-SCNC: 140 MMOL/L (ref 136–145)
TROPONIN T SERPL-MCNC: <0.01 NG/ML (ref 0–0.03)
TROPONIN T SERPL-MCNC: <0.01 NG/ML (ref 0–0.03)
WBC NRBC COR # BLD: 5.21 10*3/MM3 (ref 3.4–10.8)

## 2023-01-24 PROCEDURE — 84484 ASSAY OF TROPONIN QUANT: CPT | Performed by: INTERNAL MEDICINE

## 2023-01-24 PROCEDURE — 71275 CT ANGIOGRAPHY CHEST: CPT

## 2023-01-24 PROCEDURE — 80053 COMPREHEN METABOLIC PANEL: CPT | Performed by: INTERNAL MEDICINE

## 2023-01-24 PROCEDURE — G0378 HOSPITAL OBSERVATION PER HR: HCPCS

## 2023-01-24 PROCEDURE — 71045 X-RAY EXAM CHEST 1 VIEW: CPT

## 2023-01-24 PROCEDURE — 96375 TX/PRO/DX INJ NEW DRUG ADDON: CPT

## 2023-01-24 PROCEDURE — 96372 THER/PROPH/DIAG INJ SC/IM: CPT

## 2023-01-24 PROCEDURE — 85025 COMPLETE CBC W/AUTO DIFF WBC: CPT | Performed by: INTERNAL MEDICINE

## 2023-01-24 PROCEDURE — 99285 EMERGENCY DEPT VISIT HI MDM: CPT

## 2023-01-24 PROCEDURE — 85379 FIBRIN DEGRADATION QUANT: CPT | Performed by: INTERNAL MEDICINE

## 2023-01-24 PROCEDURE — 25010000002 MORPHINE PER 10 MG: Performed by: INTERNAL MEDICINE

## 2023-01-24 PROCEDURE — 83690 ASSAY OF LIPASE: CPT | Performed by: INTERNAL MEDICINE

## 2023-01-24 PROCEDURE — 93010 ELECTROCARDIOGRAM REPORT: CPT | Performed by: INTERNAL MEDICINE

## 2023-01-24 PROCEDURE — 0 IOPAMIDOL PER 1 ML: Performed by: INTERNAL MEDICINE

## 2023-01-24 PROCEDURE — 85610 PROTHROMBIN TIME: CPT | Performed by: INTERNAL MEDICINE

## 2023-01-24 PROCEDURE — 36415 COLL VENOUS BLD VENIPUNCTURE: CPT | Performed by: INTERNAL MEDICINE

## 2023-01-24 PROCEDURE — 25010000002 ENOXAPARIN PER 10 MG: Performed by: INTERNAL MEDICINE

## 2023-01-24 PROCEDURE — 93005 ELECTROCARDIOGRAM TRACING: CPT | Performed by: INTERNAL MEDICINE

## 2023-01-24 RX ORDER — SODIUM CHLORIDE 0.9 % (FLUSH) 0.9 %
10 SYRINGE (ML) INJECTION AS NEEDED
Status: DISCONTINUED | OUTPATIENT
Start: 2023-01-24 | End: 2023-01-25 | Stop reason: HOSPADM

## 2023-01-24 RX ORDER — METOCLOPRAMIDE 5 MG/1
5 TABLET ORAL 3 TIMES DAILY PRN
Status: DISCONTINUED | OUTPATIENT
Start: 2023-01-24 | End: 2023-01-25 | Stop reason: HOSPADM

## 2023-01-24 RX ORDER — ACETAMINOPHEN 325 MG/1
650 TABLET ORAL EVERY 4 HOURS PRN
Status: DISCONTINUED | OUTPATIENT
Start: 2023-01-24 | End: 2023-01-25 | Stop reason: HOSPADM

## 2023-01-24 RX ORDER — ACETAMINOPHEN 650 MG/1
650 SUPPOSITORY RECTAL EVERY 4 HOURS PRN
Status: DISCONTINUED | OUTPATIENT
Start: 2023-01-24 | End: 2023-01-25 | Stop reason: HOSPADM

## 2023-01-24 RX ORDER — CYCLOBENZAPRINE HCL 10 MG
10 TABLET ORAL 3 TIMES DAILY PRN
Status: DISCONTINUED | OUTPATIENT
Start: 2023-01-24 | End: 2023-01-25 | Stop reason: HOSPADM

## 2023-01-24 RX ORDER — PRAMIPEXOLE DIHYDROCHLORIDE 1 MG/1
1 TABLET ORAL NIGHTLY
Status: DISCONTINUED | OUTPATIENT
Start: 2023-01-24 | End: 2023-01-25 | Stop reason: HOSPADM

## 2023-01-24 RX ORDER — ENOXAPARIN SODIUM 100 MG/ML
40 INJECTION SUBCUTANEOUS
Status: DISCONTINUED | OUTPATIENT
Start: 2023-01-24 | End: 2023-01-25 | Stop reason: HOSPADM

## 2023-01-24 RX ORDER — LEVOTHYROXINE SODIUM 112 UG/1
112 TABLET ORAL
Status: DISCONTINUED | OUTPATIENT
Start: 2023-01-25 | End: 2023-01-25 | Stop reason: HOSPADM

## 2023-01-24 RX ORDER — BUPROPION HYDROCHLORIDE 150 MG/1
300 TABLET ORAL DAILY
Status: DISCONTINUED | OUTPATIENT
Start: 2023-01-25 | End: 2023-01-25 | Stop reason: HOSPADM

## 2023-01-24 RX ORDER — HYDROCODONE BITARTRATE AND ACETAMINOPHEN 7.5; 325 MG/1; MG/1
1 TABLET ORAL EVERY 6 HOURS PRN
Status: DISCONTINUED | OUTPATIENT
Start: 2023-01-24 | End: 2023-01-25 | Stop reason: HOSPADM

## 2023-01-24 RX ORDER — SODIUM CHLORIDE 9 MG/ML
40 INJECTION, SOLUTION INTRAVENOUS AS NEEDED
Status: DISCONTINUED | OUTPATIENT
Start: 2023-01-24 | End: 2023-01-25 | Stop reason: HOSPADM

## 2023-01-24 RX ORDER — LANOLIN ALCOHOL/MO/W.PET/CERES
6 CREAM (GRAM) TOPICAL NIGHTLY PRN
Status: DISCONTINUED | OUTPATIENT
Start: 2023-01-24 | End: 2023-01-25 | Stop reason: HOSPADM

## 2023-01-24 RX ORDER — MORPHINE SULFATE 2 MG/ML
1 INJECTION, SOLUTION INTRAMUSCULAR; INTRAVENOUS ONCE
Status: COMPLETED | OUTPATIENT
Start: 2023-01-24 | End: 2023-01-24

## 2023-01-24 RX ORDER — ACETAMINOPHEN 160 MG/5ML
650 SOLUTION ORAL EVERY 4 HOURS PRN
Status: DISCONTINUED | OUTPATIENT
Start: 2023-01-24 | End: 2023-01-25 | Stop reason: HOSPADM

## 2023-01-24 RX ORDER — PREGABALIN 50 MG/1
50 CAPSULE ORAL 3 TIMES DAILY
Status: DISCONTINUED | OUTPATIENT
Start: 2023-01-24 | End: 2023-01-25 | Stop reason: HOSPADM

## 2023-01-24 RX ORDER — FAMOTIDINE 20 MG/1
20 TABLET, FILM COATED ORAL DAILY
Status: DISCONTINUED | OUTPATIENT
Start: 2023-01-25 | End: 2023-01-25 | Stop reason: HOSPADM

## 2023-01-24 RX ORDER — DICYCLOMINE HYDROCHLORIDE 10 MG/1
10 CAPSULE ORAL 3 TIMES DAILY PRN
Status: DISCONTINUED | OUTPATIENT
Start: 2023-01-24 | End: 2023-01-25 | Stop reason: HOSPADM

## 2023-01-24 RX ORDER — SODIUM CHLORIDE 0.9 % (FLUSH) 0.9 %
10 SYRINGE (ML) INJECTION EVERY 12 HOURS SCHEDULED
Status: DISCONTINUED | OUTPATIENT
Start: 2023-01-24 | End: 2023-01-25 | Stop reason: HOSPADM

## 2023-01-24 RX ADMIN — MORPHINE SULFATE 1 MG: 2 INJECTION, SOLUTION INTRAMUSCULAR; INTRAVENOUS at 15:46

## 2023-01-24 RX ADMIN — PREGABALIN 50 MG: 50 CAPSULE ORAL at 22:36

## 2023-01-24 RX ADMIN — PRAMIPEXOLE DIHYDROCHLORIDE 1 MG: 1 TABLET ORAL at 23:48

## 2023-01-24 RX ADMIN — CYCLOBENZAPRINE 10 MG: 10 TABLET, FILM COATED ORAL at 22:36

## 2023-01-24 RX ADMIN — IOPAMIDOL 84 ML: 755 INJECTION, SOLUTION INTRAVENOUS at 19:49

## 2023-01-24 RX ADMIN — HYDROCODONE BITARTRATE AND ACETAMINOPHEN 1 TABLET: 7.5; 325 TABLET ORAL at 22:37

## 2023-01-24 RX ADMIN — Medication 10 ML: at 22:38

## 2023-01-24 RX ADMIN — ENOXAPARIN SODIUM 40 MG: 100 INJECTION SUBCUTANEOUS at 22:36

## 2023-01-25 ENCOUNTER — APPOINTMENT (OUTPATIENT)
Dept: CARDIOLOGY | Facility: HOSPITAL | Age: 68
End: 2023-01-25
Payer: MEDICARE

## 2023-01-25 ENCOUNTER — APPOINTMENT (OUTPATIENT)
Dept: CT IMAGING | Facility: HOSPITAL | Age: 68
End: 2023-01-25
Payer: MEDICARE

## 2023-01-25 VITALS
SYSTOLIC BLOOD PRESSURE: 140 MMHG | TEMPERATURE: 98 F | DIASTOLIC BLOOD PRESSURE: 78 MMHG | RESPIRATION RATE: 18 BRPM | HEART RATE: 72 BPM | WEIGHT: 218 LBS | OXYGEN SATURATION: 95 % | BODY MASS INDEX: 37.22 KG/M2 | HEIGHT: 64 IN

## 2023-01-25 LAB
ANION GAP SERPL CALCULATED.3IONS-SCNC: 11 MMOL/L (ref 5–15)
BH CV STRESS BP STAGE 1: NORMAL
BH CV STRESS BP STAGE 2: NORMAL
BH CV STRESS BP STAGE 3: NORMAL
BH CV STRESS DOSE DOBUTAMINE STAGE 1: 10
BH CV STRESS DOSE DOBUTAMINE STAGE 2: 20
BH CV STRESS DOSE DOBUTAMINE STAGE 3: 30
BH CV STRESS DURATION MIN STAGE 1: 3
BH CV STRESS DURATION MIN STAGE 2: 3
BH CV STRESS DURATION MIN STAGE 3: 2
BH CV STRESS DURATION SEC STAGE 1: 0
BH CV STRESS DURATION SEC STAGE 2: 0
BH CV STRESS DURATION SEC STAGE 3: 36
BH CV STRESS HR STAGE 1: 97
BH CV STRESS HR STAGE 2: 123
BH CV STRESS HR STAGE 3: 140
BH CV STRESS PROTOCOL 1: NORMAL
BH CV STRESS RECOVERY BP: NORMAL MMHG
BH CV STRESS RECOVERY HR: 87 BPM
BH CV STRESS STAGE 1: 1
BH CV STRESS STAGE 2: 2
BH CV STRESS STAGE 3: 3
BUN SERPL-MCNC: 12 MG/DL (ref 8–23)
BUN/CREAT SERPL: 13.2 (ref 7–25)
CALCIUM SPEC-SCNC: 9.7 MG/DL (ref 8.6–10.5)
CHLORIDE SERPL-SCNC: 100 MMOL/L (ref 98–107)
CHOLEST SERPL-MCNC: 178 MG/DL (ref 0–200)
CO2 SERPL-SCNC: 27 MMOL/L (ref 22–29)
CREAT SERPL-MCNC: 0.91 MG/DL (ref 0.57–1)
EGFRCR SERPLBLD CKD-EPI 2021: 70.2 ML/MIN/1.73
GLUCOSE SERPL-MCNC: 100 MG/DL (ref 65–99)
HBA1C MFR BLD: 5.8 % (ref 4.8–5.6)
HDLC SERPL-MCNC: 59 MG/DL (ref 40–60)
LDLC SERPL CALC-MCNC: 103 MG/DL (ref 0–100)
LDLC/HDLC SERPL: 1.73 {RATIO}
MAXIMAL PREDICTED HEART RATE: 155 BPM
PERCENT MAX PREDICTED HR: 90.32 %
POTASSIUM SERPL-SCNC: 4.2 MMOL/L (ref 3.5–5.2)
QT INTERVAL: 386 MS
QTC INTERVAL: 442 MS
SODIUM SERPL-SCNC: 138 MMOL/L (ref 136–145)
STRESS BASELINE BP: NORMAL MMHG
STRESS BASELINE HR: 72 BPM
STRESS PERCENT HR: 106 %
STRESS POST EXERCISE DUR MIN: 8 MIN
STRESS POST EXERCISE DUR SEC: 36 SEC
STRESS POST PEAK BP: NORMAL MMHG
STRESS POST PEAK HR: 140 BPM
STRESS TARGET HR: 132 BPM
TRIGL SERPL-MCNC: 85 MG/DL (ref 0–150)
TROPONIN T SERPL-MCNC: <0.01 NG/ML (ref 0–0.03)
VLDLC SERPL-MCNC: 16 MG/DL (ref 5–40)

## 2023-01-25 PROCEDURE — 25010000002 PERFLUTREN 6.52 MG/ML SUSPENSION: Performed by: INTERNAL MEDICINE

## 2023-01-25 PROCEDURE — 93018 CV STRESS TEST I&R ONLY: CPT | Performed by: INTERNAL MEDICINE

## 2023-01-25 PROCEDURE — 74176 CT ABD & PELVIS W/O CONTRAST: CPT

## 2023-01-25 PROCEDURE — 0 DOBUTAMINE PER 250 MG: Performed by: INTERNAL MEDICINE

## 2023-01-25 PROCEDURE — 80061 LIPID PANEL: CPT | Performed by: INTERNAL MEDICINE

## 2023-01-25 PROCEDURE — 93350 STRESS TTE ONLY: CPT | Performed by: INTERNAL MEDICINE

## 2023-01-25 PROCEDURE — G0378 HOSPITAL OBSERVATION PER HR: HCPCS

## 2023-01-25 PROCEDURE — 80048 BASIC METABOLIC PNL TOTAL CA: CPT | Performed by: INTERNAL MEDICINE

## 2023-01-25 PROCEDURE — 93017 CV STRESS TEST TRACING ONLY: CPT

## 2023-01-25 PROCEDURE — 93350 STRESS TTE ONLY: CPT

## 2023-01-25 PROCEDURE — 83036 HEMOGLOBIN GLYCOSYLATED A1C: CPT | Performed by: INTERNAL MEDICINE

## 2023-01-25 PROCEDURE — 93352 ADMIN ECG CONTRAST AGENT: CPT | Performed by: INTERNAL MEDICINE

## 2023-01-25 RX ORDER — BUDESONIDE 90 UG/1
2 AEROSOL, POWDER RESPIRATORY (INHALATION)
Refills: 11
Start: 2023-01-25

## 2023-01-25 RX ORDER — BUDESONIDE 90 UG/1
2 AEROSOL, POWDER RESPIRATORY (INHALATION) 3 TIMES DAILY
Status: ON HOLD | COMMUNITY
End: 2023-01-25 | Stop reason: SDUPTHER

## 2023-01-25 RX ORDER — TIZANIDINE 4 MG/1
2-4 TABLET ORAL NIGHTLY PRN
COMMUNITY

## 2023-01-25 RX ORDER — LEVOTHYROXINE SODIUM 112 UG/1
112 TABLET ORAL DAILY
COMMUNITY

## 2023-01-25 RX ORDER — FOLIC ACID 1 MG/1
1 TABLET ORAL DAILY
COMMUNITY

## 2023-01-25 RX ORDER — NITROGLYCERIN 0.4 MG/1
0.4 TABLET SUBLINGUAL
COMMUNITY

## 2023-01-25 RX ORDER — SEMAGLUTIDE 1.34 MG/ML
1 INJECTION, SOLUTION SUBCUTANEOUS WEEKLY
COMMUNITY

## 2023-01-25 RX ORDER — FLUTICASONE PROPIONATE 50 MCG
2 SPRAY, SUSPENSION (ML) NASAL DAILY
COMMUNITY

## 2023-01-25 RX ORDER — DOBUTAMINE HYDROCHLORIDE 100 MG/100ML
5-50 INJECTION INTRAVENOUS CONTINUOUS
Status: DISCONTINUED | OUTPATIENT
Start: 2023-01-25 | End: 2023-01-25

## 2023-01-25 RX ORDER — CIPROFLOXACIN 500 MG/1
500 TABLET, FILM COATED ORAL 2 TIMES DAILY
Qty: 20 TABLET | Refills: 0 | Status: SHIPPED | OUTPATIENT
Start: 2023-01-25 | End: 2023-02-04

## 2023-01-25 RX ORDER — PREGABALIN 75 MG/1
75 CAPSULE ORAL 2 TIMES DAILY
COMMUNITY

## 2023-01-25 RX ORDER — METRONIDAZOLE 500 MG/1
500 TABLET ORAL 3 TIMES DAILY
Qty: 30 TABLET | Refills: 0 | Status: SHIPPED | OUTPATIENT
Start: 2023-01-25 | End: 2023-02-04

## 2023-01-25 RX ADMIN — HYDROCODONE BITARTRATE AND ACETAMINOPHEN 1 TABLET: 7.5; 325 TABLET ORAL at 17:13

## 2023-01-25 RX ADMIN — PERFLUTREN 8.48 MG: 6.52 INJECTION, SUSPENSION INTRAVENOUS at 14:05

## 2023-01-25 RX ADMIN — HYDROCODONE BITARTRATE AND ACETAMINOPHEN 1 TABLET: 7.5; 325 TABLET ORAL at 09:36

## 2023-01-25 RX ADMIN — LEVOTHYROXINE SODIUM 112 MCG: 112 TABLET ORAL at 12:34

## 2023-01-25 RX ADMIN — FAMOTIDINE 20 MG: 20 TABLET, FILM COATED ORAL at 12:34

## 2023-01-25 RX ADMIN — DOBUTAMINE HYDROCHLORIDE 10 MCG/KG/MIN: 100 INJECTION INTRAVENOUS at 14:06

## 2023-01-25 RX ADMIN — Medication 10 ML: at 09:36

## 2023-01-25 RX ADMIN — PRAMIPEXOLE DIHYDROCHLORIDE 1 MG: 1 TABLET ORAL at 12:34

## 2023-01-26 ENCOUNTER — TELEPHONE (OUTPATIENT)
Dept: CARDIAC SURGERY | Facility: CLINIC | Age: 68
End: 2023-01-26
Payer: MEDICARE

## 2023-01-26 PROBLEM — R07.89 MUSCULOSKELETAL CHEST PAIN: Status: ACTIVE | Noted: 2023-01-24

## 2023-01-26 PROBLEM — K57.32 SIGMOID DIVERTICULITIS: Status: ACTIVE | Noted: 2023-01-26

## 2023-02-08 ENCOUNTER — TELEPHONE (OUTPATIENT)
Dept: PODIATRY | Facility: CLINIC | Age: 68
End: 2023-02-08
Payer: MEDICARE

## 2023-02-09 ENCOUNTER — HOSPITAL ENCOUNTER (OUTPATIENT)
Dept: GENERAL RADIOLOGY | Facility: HOSPITAL | Age: 68
Discharge: HOME | End: 2023-02-09
Admitting: NURSE PRACTITIONER
Payer: MEDICARE

## 2023-02-09 ENCOUNTER — OFFICE VISIT (OUTPATIENT)
Dept: PODIATRY | Facility: CLINIC | Age: 68
End: 2023-02-09
Payer: MEDICARE

## 2023-02-09 VITALS
DIASTOLIC BLOOD PRESSURE: 88 MMHG | BODY MASS INDEX: 36.37 KG/M2 | HEART RATE: 82 BPM | SYSTOLIC BLOOD PRESSURE: 140 MMHG | WEIGHT: 213 LBS | HEIGHT: 64 IN | OXYGEN SATURATION: 98 %

## 2023-02-09 DIAGNOSIS — M79.672 FOOT PAIN, LEFT: Primary | ICD-10-CM

## 2023-02-09 DIAGNOSIS — Z98.890 HISTORY OF FOOT SURGERY: ICD-10-CM

## 2023-02-09 DIAGNOSIS — G57.62 MORTON NEUROMA, LEFT: ICD-10-CM

## 2023-02-09 PROCEDURE — 99214 OFFICE O/P EST MOD 30 MIN: CPT | Performed by: NURSE PRACTITIONER

## 2023-02-09 PROCEDURE — 73630 X-RAY EXAM OF FOOT: CPT

## 2023-03-06 ENCOUNTER — TELEPHONE (OUTPATIENT)
Dept: PODIATRY | Facility: CLINIC | Age: 68
End: 2023-03-06
Payer: MEDICARE

## 2023-03-06 NOTE — TELEPHONE ENCOUNTER
Called patient to confirmed appointment for 03/07 @ 1715 with Dr. Maradiaga. Patient will be here for appointment.

## 2023-03-07 ENCOUNTER — OFFICE VISIT (OUTPATIENT)
Dept: PODIATRY | Facility: CLINIC | Age: 68
End: 2023-03-07
Payer: MEDICARE

## 2023-03-07 VITALS
RESPIRATION RATE: 18 BRPM | BODY MASS INDEX: 38.1 KG/M2 | HEIGHT: 64 IN | WEIGHT: 223.2 LBS | DIASTOLIC BLOOD PRESSURE: 78 MMHG | HEART RATE: 87 BPM | SYSTOLIC BLOOD PRESSURE: 118 MMHG | OXYGEN SATURATION: 96 %

## 2023-03-07 DIAGNOSIS — M79.672 FOOT PAIN, LEFT: Primary | ICD-10-CM

## 2023-03-07 DIAGNOSIS — M77.42 METATARSALGIA, LEFT FOOT: ICD-10-CM

## 2023-03-07 PROCEDURE — 99213 OFFICE O/P EST LOW 20 MIN: CPT | Performed by: PODIATRIST

## 2023-03-08 ENCOUNTER — OFFICE VISIT (OUTPATIENT)
Dept: CARDIAC SURGERY | Facility: CLINIC | Age: 68
End: 2023-03-08
Payer: MEDICARE

## 2023-03-08 VITALS
OXYGEN SATURATION: 97 % | BODY MASS INDEX: 38.07 KG/M2 | SYSTOLIC BLOOD PRESSURE: 146 MMHG | HEART RATE: 88 BPM | WEIGHT: 223 LBS | HEIGHT: 64 IN | DIASTOLIC BLOOD PRESSURE: 102 MMHG

## 2023-03-08 DIAGNOSIS — Z87.891 FORMER HEAVY TOBACCO SMOKER: ICD-10-CM

## 2023-03-08 DIAGNOSIS — E11.69 TYPE 2 DIABETES MELLITUS WITH OTHER SPECIFIED COMPLICATION, WITHOUT LONG-TERM CURRENT USE OF INSULIN: ICD-10-CM

## 2023-03-08 DIAGNOSIS — E66.09 CLASS 2 OBESITY DUE TO EXCESS CALORIES WITH BODY MASS INDEX (BMI) OF 38.0 TO 38.9 IN ADULT, UNSPECIFIED WHETHER SERIOUS COMORBIDITY PRESENT: ICD-10-CM

## 2023-03-08 DIAGNOSIS — R91.1 LUNG NODULE: Primary | ICD-10-CM

## 2023-03-08 DIAGNOSIS — K92.0 HEMATEMESIS, UNSPECIFIED WHETHER NAUSEA PRESENT: ICD-10-CM

## 2023-03-08 DIAGNOSIS — R91.1 PULMONARY NODULE, RIGHT: ICD-10-CM

## 2023-03-08 DIAGNOSIS — Z86.718 HISTORY OF DVT IN ADULTHOOD: ICD-10-CM

## 2023-03-08 DIAGNOSIS — K22.719 BARRETT'S ESOPHAGUS WITH DYSPLASIA: ICD-10-CM

## 2023-03-08 DIAGNOSIS — Z86.711 HISTORY OF PULMONARY EMBOLISM: ICD-10-CM

## 2023-03-08 DIAGNOSIS — K92.2 UPPER GI BLEED: ICD-10-CM

## 2023-03-08 PROCEDURE — 1160F RVW MEDS BY RX/DR IN RCRD: CPT | Performed by: THORACIC SURGERY (CARDIOTHORACIC VASCULAR SURGERY)

## 2023-03-08 PROCEDURE — 3077F SYST BP >= 140 MM HG: CPT | Performed by: THORACIC SURGERY (CARDIOTHORACIC VASCULAR SURGERY)

## 2023-03-08 PROCEDURE — 3080F DIAST BP >= 90 MM HG: CPT | Performed by: THORACIC SURGERY (CARDIOTHORACIC VASCULAR SURGERY)

## 2023-03-08 PROCEDURE — 99213 OFFICE O/P EST LOW 20 MIN: CPT | Performed by: THORACIC SURGERY (CARDIOTHORACIC VASCULAR SURGERY)

## 2023-03-08 PROCEDURE — 1159F MED LIST DOCD IN RCRD: CPT | Performed by: THORACIC SURGERY (CARDIOTHORACIC VASCULAR SURGERY)

## 2023-03-08 NOTE — PROGRESS NOTES
Chief Complaint   Patient presents with   • Lung Nodule     3MO FU 12/07/2022       Subjective     History of Present Illness  3/8/2023  Ms. GERSON BROWNLEE presents for evaluation of a lung nodule.    - She was recently hospitalized for chest pain.  - She had a CAT scan performed at that time.  - She denies a history of pneumonia.  - She denies hemoptysis.  - She reports severe acid reflux which causes her to cough up blood from her stomach.  - She is scheduled to undergo Nissen fundoplication.  - She denies unintentional weight loss.  - She had a lung test performed a few years ago, which showed mild COPD.  - She has not smoked in approximately 5 years.  - She has a family history of cancer.  - She was on Ozempic and lost 26 pounds.  - She was advised to discontinue Ozempic until after her stomach surgery.  - She reports that her A1c was in normal range while on Ozempic.  - She denies abdominal pain.  - She reports undergoing a PET scan some time ago.    Family History  She reports cancer as the cause of death for all of her paternal family members.      Previously noted  Initial referral was for a right upper lobe lung nodule which has been stable since 2018.  During her last surveillance a new lingular nodule was identified in 2020.  This was not hypermetabolic.    In the interim she has been diagnosed with Curtis's esophagus.  She is seen by Dr. Wei as well.  She is clinically stable.  She remains a non-smoker.  Mrs. Brownlee has a history of smoking. At least 46 pack years but quit in 2018.    Additional past medical history includes DVT/PE      Review of Systems   Constitutional: Negative for chills, diaphoresis, fatigue and fever.   HENT: Negative for trouble swallowing and voice change.    Eyes: Negative.    Respiratory: Negative for shortness of breath.    Cardiovascular: Negative for chest pain, palpitations and leg swelling.   Gastrointestinal: Negative.    Endocrine: Negative.    Genitourinary: Negative.     Musculoskeletal: Negative for back pain and neck pain.   Skin: Negative.    Allergic/Immunologic: Negative.    Neurological: Negative for seizures, speech difficulty and weakness.   Hematological: Negative.         History of DVT/PE   Psychiatric/Behavioral: Negative for behavioral problems and confusion.        Allergies:  Erythromycin, Hydromorphone hcl, Ondansetron hcl, Orphenadrine, Orphenadrine citrate, Meperidine, Codeine, Guaifenesin & derivatives, and Guaifenesin er    Current Outpatient Medications   Medication Sig Dispense Refill   • albuterol (PROVENTIL HFA;VENTOLIN HFA) 108 (90 Base) MCG/ACT inhaler Inhale 2 puffs 4 (Four) Times a Day. 6.7 g 0   • budesonide (Pulmicort Flexhaler) 90 MCG/ACT inhaler Inhale 2 puffs 2 (Two) Times a Day. Swallow, do not inhale  11   • buPROPion XL (WELLBUTRIN XL) 300 MG 24 hr tablet Take 1 tablet by mouth Every Morning.     • Cyanocobalamin (VITAMIN B-12 ER) 1000 MCG tablet controlled-release Take 1,000 mcg by mouth Daily.     • cyclobenzaprine (FLEXERIL) 10 MG tablet Take 1 tablet by mouth 3 (Three) Times a Day As Needed.     • EPINEPHrine (EPIPEN) 0.3 MG/0.3ML solution auto-injector injection      • fluticasone (FLONASE) 50 MCG/ACT nasal spray 2 sprays into the nostril(s) as directed by provider Daily.     • folic acid (FOLVITE) 1 MG tablet Take 1 tablet by mouth Daily.     • HYDROcodone-acetaminophen (NORCO) 7.5-325 MG per tablet Take 1 tablet by mouth Every 8 (Eight) Hours As Needed.     • levothyroxine (SYNTHROID, LEVOTHROID) 112 MCG tablet Take 1 tablet by mouth Daily.     • lidocaine (XYLOCAINE) 5 % ointment Apply 1 application topically to the appropriate area as directed Every 2 (Two) Hours As Needed for Mild Pain. (Patient taking differently: Apply 1 application topically to the appropriate area as directed Every 2 (Two) Hours As Needed for Mild Pain. USES ON FOOT) 2500 g 5   • metoclopramide (REGLAN) 5 MG tablet Take 1 tablet by mouth 3 (Three) Times a Day As  "Needed (nausea and vomiting). 10 tablet 0   • nitroglycerin (NITROSTAT) 0.4 MG SL tablet Place 1 tablet under the tongue Every 5 (Five) Minutes As Needed for Chest Pain. Take no more than 3 doses in 15 minutes.     • pramipexole (MIRAPEX) 1 MG tablet Take 1 tablet by mouth 2 (Two) Times a Day.     • pregabalin (LYRICA) 75 MG capsule Take 1 capsule by mouth 2 (Two) Times a Day.     • Semaglutide, 1 MG/DOSE, (Ozempic, 1 MG/DOSE,) 4 MG/3ML solution pen-injector Inject 1 mg under the skin into the appropriate area as directed 1 (One) Time Per Week. Friday (Patient not taking: Reported on 3/29/2023)     • tiZANidine (ZANAFLEX) 4 MG tablet Take 2-4 mg by mouth At Night As Needed for Muscle Spasms.     • vitamin D (ERGOCALCIFEROL) 34836 units capsule capsule Take 1 capsule by mouth Every 7 (Seven) Days. SUNDAY  6   • aluminum-magnesium hydroxide-simethicone (MAALOX MAX) 400-400-40 MG/5ML suspension Take 30 mL by mouth Every 6 (Six) Hours As Needed for Indigestion or Heartburn.     • esomeprazole (nexIUM) 40 MG capsule Take 2 capsules by mouth Every Morning Before Breakfast. OTC     • levoFLOXacin (LEVAQUIN) 750 MG tablet Take 1 tablet by mouth Daily. Indications: Pneumonia 5 tablet 0   • sucralfate (CARAFATE) 1 GM/10ML suspension Take 10 mL by mouth 4 (Four) Times a Day Before Meals & at Bedtime. 414 mL 0     No current facility-administered medications for this visit.       Objective      Vital Signs  Visit Vitals  BP (!) 146/102   Pulse 88   Ht 162.6 cm (64.02\")   Wt 101 kg (223 lb)   SpO2 97%   BMI 38.26 kg/m²     Physical Exam   Constitutional: She is oriented to person, place, and time. She appears well-developed.   HENT:   Head: Normocephalic and atraumatic.   Eyes: Conjunctivae are normal.   Neck: No JVD present. No thyromegaly present.   Cardiovascular: Normal rate, regular rhythm and normal heart sounds.   No murmur heard.  Pulmonary/Chest: Effort normal and breath sounds normal. No respiratory distress. She has " no wheezes. She has no rales.   Abdominal: Soft. She exhibits no distension and no mass. There is no abdominal tenderness.   obese   Musculoskeletal: Normal range of motion. No deformity.      Comments: She has a foot brace on her left foot.   Lymphadenopathy:     She has no cervical adenopathy.   Neurological: She is alert and oriented to person, place, and time. No cranial nerve deficit or sensory deficit.   Skin: Skin is warm and dry.       Results Review:   Study Result    EXAMINATION: NM PET SKULL BASE TO MID THIGH- 6/30/2020 11:22 AM CDT     HISTORY: New lung nodule; R91.1-Solitary pulmonary nodule.     Dose:  1. 11.83 mCi FDG F-18 intravenously, the patient's weight equals 214  pounds and serum glucose level 100 mg/dL at the time of FDG injection.  2. 1064 mGycm for the CT portion of the examination.     REPORT: Multiplanar pet imaging was performed with a field-of-view  extending from the skull base to the mid thighs, CT was also performed  for attenuation correction and fusion imaging.     COMPARISON: PET/CT 10/04/2018. CT chest with contrast 06/16/2020.     Physiologic (normal) activity is noted within the head and neck. There  is no abnormal FDG activity associated with the small new lingular  nodule, which measures 9 mm. The fusion CT demonstrates a stable 13 mm  groundglass nodule in the right upper lobe, with no significant FDG  uptake. This most likely represents an area of focal scarring. There is  a tiny subpleural nodule in the left lower lobe which is not calcified  and measures approximately 3 mm. This is stable. There is mild diffuse  emphysema. No pneumothorax or pleural effusion is identified. There is  no evidence of axillary, mediastinal or hilar lymphadenopathy. There are  a few normal sized lymph nodes in the inferior axilla bilaterally, which  have low-level uptake, with a maximum SUV of 2.7. These may be reactive.  Physiologic activity is noted within the myocardium. Below  the  diaphragm, there is physiologic activity within the solid abdominal  organs, collecting structures of the kidneys, ureters and bladder as  expected. There is physiologic activity within the GI tract as expected.  The CT also demonstrates diffuse fatty infiltration of the liver and  evidence previous cholecystectomy. There is a small area of fat sparing  in the upper right hepatic lobe which is stable. There is moderate  atherosclerosis with calcification within the aorta and iliac arteries.  The appendix is normal. There is streak artifact in the pelvis related  to previous surgery. Prior hysterectomy is noted. There is mild sigmoid  diverticulosis without evidence of acute diverticulitis.     IMPRESSION:  1. There is no abnormal FDG activity associated with the small new  nodule in the lingula, which is probably related to focal scarring or  atelectasis. There is no abnormal FDG activity associated with the 15 mm  groundglass nodule in the right upper lobe.  2. There are a few normal sized lymph nodes in the inferior axilla  bilaterally, with low level FDG avidity, these may be reactive lymph  nodes. Correlation with physical examination of both axilla is  suggested. No measurable axillary or intrathoracic lymphadenopathy is  identified.  3. No evidence of active neoplasm is identified.        This report was finalized on 06/30/2020 12:34 by Dr. Tony Chen MD.         Ct Chest With Contrast    Result Date: 12/16/2020  Narrative: EXAM: CT CHEST W CONTRAST- - 12/16/2020 8:21 AM CST  HISTORY: lung nodule; R91.1-Solitary pulmonary nodule   COMPARISON: 6/16/2020.  DOSE LENGTH PRODUCT: 511 mGy cm. Automatic exposure control was utilized to make radiation dose as low as reasonably achievable.  TECHNIQUE: Enhanced CT images of the chest obtained with multiplanar reformats.  Recommendations for incidental nodule follow-up are based on Fleischner Society recommendations.  FINDINGS:  AIRWAYS/PULMONARY PARENCHYMA:  "The central airways are midline and patent.  Right upper lobe groundglass opacity measuring about 1.5 cm appears similar to 6/16/2020, 4/29/2019. This has a decreased cystic component compared 1/21/2018.  Lingula with a 0.9 cm pulmonary nodule versus atelectasis on axial series 4, image 82, similar compared to 6/16/2020.  Stable 5 mm pleural-based left lower lobe pulmonary nodule compared to 1/21/2008, which is 3 years of stability.  Emphysema. No pleural effusion or pneumothorax.  VASCULATURE: Thoracic aorta is normal in course and caliber. Mild calcified aortic atherosclerosis. Normal pulmonary artery caliber.  CARDIAC:  Normal heart size.  No pericardial effusion.   MEDIASTINUM: There is no mediastinal or hilar lymphadenopathy by CT size criteria. Esophagus has normal course, caliber and wall thickness. Small hiatal hernia.  EXTRATHORACIC: Thyroid diminutive or absent. No supraclavicular or axillary adenopathy. The extrathoracic soft tissues are within normal limits.  INCLUDED UPPER ABDOMEN: Fatty liver. There are 2 peripheral areas of hyperenhancement anterolaterally and posterior medially, axial series 2, image 113 and 119 respectively. These are similar compared to 1/21/2018, possibly a area of focal fatty sparing or transient hepatic attenuation difference (KATI).  Cholecystectomy clips. No bile duct dilation. Normal appearance of the visualized pancreas, spleen, adrenal glands.  BONES: No acute or suspicious bony finding.       Impression: 1. Stable pulmonary findings compared to prior. Per Fleischner Society, consider follow-up CT chest in 12-18 months, which will be 2 years of follow-up. 2. Fatty liver.  Exam was tagged in PACS with \"new lung findings\" to assist in appropriate follow up. This report was finalized on 12/16/2020 08:48 by Dr Neeta Walker MD.      I reviewed the patient's new clinical results. Discussed with patient.    Study Result    Narrative & Impression   CT CHEST W CONTRAST " DIAGNOSTIC- 12/8/2021 8:52 AM CST     HISTORY: multiple lung nodules; R91.1-Solitary pulmonary nodule      COMPARISON: 12/16/2020, 6/16/2020 and 4/29/2019     DLP: 485 mGy cm. Automated exposure control was utilized to diminish  patient radiation dose.     TECHNIQUE: Serial helical tomographic images of the chest were acquired  following the infusion of Isovue contrast intravenously. Bone and soft  tissue algorithms were provided.       FINDINGS:   Neck base: The imaged portion of the neck and thyroid gland is  unremarkable.      Lungs: There is a groundglass opacity within the right upper lobe on  image 29. This is essentially unchanged in appearance from the previous  study of 4/29/2019 and may represent an area of asymmetric parenchymal  scarring. An adjacent subpleural nodule posteriorly within the right  upper lobe on image 27 is stable from the previous study. The focus of  pleural-based nodularity within the inferior lingular segment of the  left upper lobe on image 81 is unchanged from the previous study and I  suspect represent some focal atelectasis and/or scarring within this  segment. A subpleural soft tissue nodule within the left posterior  costophrenic angle and lower lobe is stable from previous studies dating  back to 2019. No new lung nodules are present. There are changes of  centrilobular emphysema.. No pleural effusion is present. The trachea  and bronchial tree are patent.      Heart: There is moderate cardiomegaly. Mild coronary calcifications are  present.. There is no pericardial effusion.      Vasculature: Aorta is normal in caliber and appearance without  significant atherosclerosis, stenosis, or aneurysm. Mild coronary  calcifications are demonstrated. The great vessels are normal in  appearance. The pulmonary arteries are normal in appearance.     Lymph nodes: No enlarged axillary, hilar, or mediastinal lymph nodes.      Bones and soft tissues: There is a focus of fat density within the  left  breast. This could be related to previous surgery representing some fat  necrosis versus a lipoma or hamartoma..      Upper abdomen: A moderate size hiatal hernia is present. There is  diffuse steatosis of the liver. The adrenals are unremarkable. The  patient is status post cholecystectomy..      IMPRESSION:  1. Stable pulmonary nodules within the lungs from the previous study  with no new lesions present. Given the lack of change over a more than  two-year period of time I feel benignity would BE suggested. No new lung  lesions are present. There are changes of centrilobular emphysema.  2. Cardiomegaly with mild coronary calcifications present. The thoracic  aorta is normal in caliber.  3. Fat-containing lesion within the upper left breast. This may be  postoperative in nature versus posttraumatic fat necrosis versus a fatty  containing mass. No overlying skin changes are observed.  4. Steatosis of the liver. A moderate size hiatal hernia is present..        This report was finalized on 12/08/2021 10:00 by Dr. Grey Mckeon MD.     Independent review of imaging obtained today is performed by me.  Her right upper lobe abnormality is more consistent with scarring at this time.  The left lower lobe nodule is stable.  Lingular lesion is also stable.  No new additional nodules.  Emphysematous changes are noted.  CT chest w contrast [[id number]] (Order [id number])  Order  Date: 12/7/2022 Department: [facility] CARDIOTHORACIC SURGERY Ordering/Authorizing: Agusto Thompson MD     Future Order Information    Expected Expires      12/12/2022 12/7/2023               Order History  Outpatient  Date/Time Action Taken User Additional Information   12/07/22 1412 Stefani Auguste MA Ordering Mode: Verbal with readback     Order Details    Frequency Duration Priority Order Class   None None Routine Hospital Performed     Start Date/Time    Start Date   12/07/22     Source Order Set / Preference  List    Preference List   [facility] [[id number]]     Verbal Order Info    Action Created on Order Mode Entered by Responsible Provider Signed by Signed on   Ordering 12/07/22 1412 Verbal with readback Stefani Johnson MA Lopez, Nicholas M, MD       Clinical Indications     ICD-10-CM ICD-9-CM   Lung nodule  - Primary     R91.1 793.11     Reprint Order Requisition    CT chest w contrast (Order #[id number]) on 12/7/22     Encounter    View Encounter           Intra Procedure Documentation    Intra Procedure Documentation      Order Provider Info        Office phone Pager E-mail   Ordering User Stefani Johnson MA -- -- --   Authorizing Provider Agusto Thompson [phone] -- --   Entered By Stefani Johnson MA -- -- --   Ordering Provider Agusto Thompson [phone] -- --     Linked Charges    Charge Code Link Type Charge Type Modifiers   HC CT Chest W Contrast Diagnostic [[id number]] 82898 Automatic Technical    Diagnostic Computed Tomography Thorax W/Contrast 32395 Automatic Professional 26   Diagnostic Computed Tomography Thorax W/Contrast 46521 Automatic Global      Tracking Reports    Cosign Tracking Order Transmittal Tracking     Referred To    Specialty: Radiology     Independent interpretation reveals stable right upper lobe ground glass lesion now since 2018. Her lingular lesion was identified in June 2020 and subjectively shows potentially some increase in growth, but given his position, precise measurements suggest 11.9 x 5.7 versus 13.8 by 4.9. Differences may be allotted due to technique. The left lower lobe half centimeter lesion remains 5 mm in size as well.      Assessment & Plan     Impression:  1. Right upper lobe nodularity, appears to have increased volume. Given it's a tangential shape, direct comparative measurements are with increased challenge. Lingular finding is stable.  2. History of past tobacco use, quit now 5 years.    Medical decision making/Recommendations/Plan:  I think  at this point a malignancy certainly is high in her family's history.  In the differential, I would say certainly elevated in the higher key of a list for nodular finding.  She has no recent history of pneumonia.  I believe a PET scan is an order at this time.  Additionally, we will obtain pulmonary function test pre and post bronchodilator with DLCO and EBG.  I will have her come back to see me in 3 weeks to discuss the findings of those results and make further recommendations at that time.  She has been congratulated as to being a non-smoker.    Many thanks for opportunity to care for your patient. I will continue to keep you apprised of care as it ensues.    Diagnoses and all orders for this visit:    1. Lung nodule (Primary)  -     Full Pulmonary Function Test With Bronchodilator & ABG; Future  -     NM PET/CT Skull Base to Mid Thigh; Future    2. Pulmonary nodule, right    3. Former heavy tobacco smoker    4. Hematemesis, unspecified whether nausea present    5. History of DVT in adulthood  2018    6. History of pulmonary embolism    7. Class 2 obesity due to excess calories with body mass index (BMI) of 38.0 to 38.9 in adult, unspecified whether serious comorbidity present    8. Type 2 diabetes mellitus with other specified complication, without long-term current use of insulin    9. Curtis's esophagus with dysplasia sees [facility]    10. Upper GI bleed      Transcribed from ambient dictation for Agusto Thompson MD by Roxi Garza.  03/08/23   17:34 CST    Patient or patient representative verbalized consent to the visit recording.  I have personally performed the services described in this document as transcribed by the above individual, and it is both accurate and complete.

## 2023-03-17 ENCOUNTER — HOSPITAL ENCOUNTER (OUTPATIENT)
Dept: CT IMAGING | Facility: HOSPITAL | Age: 68
Discharge: HOME | End: 2023-03-17
Payer: MEDICARE

## 2023-03-17 DIAGNOSIS — R91.1 LUNG NODULE: ICD-10-CM

## 2023-03-17 PROCEDURE — 0 FLUDEOXYGLUCOSE F18 SOLUTION: Performed by: THORACIC SURGERY (CARDIOTHORACIC VASCULAR SURGERY)

## 2023-03-17 PROCEDURE — 78815 PET IMAGE W/CT SKULL-THIGH: CPT

## 2023-03-17 PROCEDURE — A9552 F18 FDG: HCPCS | Performed by: THORACIC SURGERY (CARDIOTHORACIC VASCULAR SURGERY)

## 2023-03-17 RX ADMIN — FLUDEOXYGLUCOSE F18 1 DOSE: 300 INJECTION INTRAVENOUS at 10:34

## 2023-03-20 ENCOUNTER — TELEPHONE (OUTPATIENT)
Dept: CARDIAC SURGERY | Facility: CLINIC | Age: 68
End: 2023-03-20
Payer: MEDICARE

## 2023-03-20 NOTE — TELEPHONE ENCOUNTER
I did not call patient.  Her upcoming appointment is to go over results and she will need PFTs completed as well to discuss best plan of care by Dr. Thompson.

## 2023-03-20 NOTE — TELEPHONE ENCOUNTER
Pt returning call to our office. She believes this is regarding her PET Scan results from 03/17/2023. She would like a return call from provider to discuss these. I informed her I will put a message in her chart to the providers. Reminded her of upcoming PFT appointment on 03/28/2023 and follow up appointment with Dr. Thompson on 03/29/2023. Pt voiced understanding. Pt can be reached at [phone].

## 2023-03-21 ENCOUNTER — APPOINTMENT (OUTPATIENT)
Dept: CT IMAGING | Facility: HOSPITAL | Age: 68
End: 2023-03-21
Payer: MEDICARE

## 2023-03-21 ENCOUNTER — HOSPITAL ENCOUNTER (EMERGENCY)
Facility: HOSPITAL | Age: 68
Discharge: HOME | End: 2023-03-21
Admitting: EMERGENCY MEDICINE
Payer: MEDICARE

## 2023-03-21 ENCOUNTER — APPOINTMENT (OUTPATIENT)
Dept: GENERAL RADIOLOGY | Facility: HOSPITAL | Age: 68
End: 2023-03-21
Payer: MEDICARE

## 2023-03-21 ENCOUNTER — TELEPHONE (OUTPATIENT)
Dept: CARDIAC SURGERY | Facility: CLINIC | Age: 68
End: 2023-03-21
Payer: MEDICARE

## 2023-03-21 VITALS
TEMPERATURE: 98.1 F | RESPIRATION RATE: 19 BRPM | HEART RATE: 89 BPM | SYSTOLIC BLOOD PRESSURE: 127 MMHG | DIASTOLIC BLOOD PRESSURE: 79 MMHG | WEIGHT: 220 LBS | OXYGEN SATURATION: 97 % | BODY MASS INDEX: 37.56 KG/M2 | HEIGHT: 64 IN

## 2023-03-21 DIAGNOSIS — R07.89 CHEST WALL PAIN: ICD-10-CM

## 2023-03-21 DIAGNOSIS — R91.8 MASS OF RIGHT LUNG: Primary | ICD-10-CM

## 2023-03-21 DIAGNOSIS — K44.9 HIATAL HERNIA: ICD-10-CM

## 2023-03-21 DIAGNOSIS — K76.9 LIVER LESION: ICD-10-CM

## 2023-03-21 LAB
ALBUMIN SERPL-MCNC: 4.3 G/DL (ref 3.5–5.2)
ALBUMIN/GLOB SERPL: 1.4 G/DL
ALP SERPL-CCNC: 114 U/L (ref 39–117)
ALT SERPL W P-5'-P-CCNC: 19 U/L (ref 1–33)
ANION GAP SERPL CALCULATED.3IONS-SCNC: 12 MMOL/L (ref 5–15)
AST SERPL-CCNC: 20 U/L (ref 1–32)
BASOPHILS # BLD AUTO: 0.05 10*3/MM3 (ref 0–0.2)
BASOPHILS NFR BLD AUTO: 0.5 % (ref 0–1.5)
BILIRUB SERPL-MCNC: 0.3 MG/DL (ref 0–1.2)
BUN SERPL-MCNC: 10 MG/DL (ref 8–23)
BUN/CREAT SERPL: 11.6 (ref 7–25)
CALCIUM SPEC-SCNC: 9.1 MG/DL (ref 8.6–10.5)
CHLORIDE SERPL-SCNC: 99 MMOL/L (ref 98–107)
CO2 SERPL-SCNC: 26 MMOL/L (ref 22–29)
CREAT SERPL-MCNC: 0.86 MG/DL (ref 0.57–1)
DEPRECATED RDW RBC AUTO: 47.4 FL (ref 37–54)
EGFRCR SERPLBLD CKD-EPI 2021: 75.1 ML/MIN/1.73
EOSINOPHIL # BLD AUTO: 0.41 10*3/MM3 (ref 0–0.4)
EOSINOPHIL NFR BLD AUTO: 4 % (ref 0.3–6.2)
ERYTHROCYTE [DISTWIDTH] IN BLOOD BY AUTOMATED COUNT: 14.8 % (ref 12.3–15.4)
GEN 5 2HR TROPONIN T REFLEX: <6 NG/L
GLOBULIN UR ELPH-MCNC: 3.1 GM/DL
GLUCOSE SERPL-MCNC: 130 MG/DL (ref 65–99)
HCT VFR BLD AUTO: 38.3 % (ref 34–46.6)
HGB BLD-MCNC: 11.5 G/DL (ref 12–15.9)
HOLD SPECIMEN: NORMAL
IMM GRANULOCYTES # BLD AUTO: 0.04 10*3/MM3 (ref 0–0.05)
IMM GRANULOCYTES NFR BLD AUTO: 0.4 % (ref 0–0.5)
LYMPHOCYTES # BLD AUTO: 2.59 10*3/MM3 (ref 0.7–3.1)
LYMPHOCYTES NFR BLD AUTO: 25.2 % (ref 19.6–45.3)
MCH RBC QN AUTO: 26.1 PG (ref 26.6–33)
MCHC RBC AUTO-ENTMCNC: 30 G/DL (ref 31.5–35.7)
MCV RBC AUTO: 86.8 FL (ref 79–97)
MONOCYTES # BLD AUTO: 0.63 10*3/MM3 (ref 0.1–0.9)
MONOCYTES NFR BLD AUTO: 6.1 % (ref 5–12)
NEUTROPHILS NFR BLD AUTO: 6.56 10*3/MM3 (ref 1.7–7)
NEUTROPHILS NFR BLD AUTO: 63.8 % (ref 42.7–76)
NRBC BLD AUTO-RTO: 0 /100 WBC (ref 0–0.2)
NT-PROBNP SERPL-MCNC: 53.1 PG/ML (ref 0–900)
PLATELET # BLD AUTO: 289 10*3/MM3 (ref 140–450)
PMV BLD AUTO: 10.6 FL (ref 6–12)
POTASSIUM SERPL-SCNC: 4.5 MMOL/L (ref 3.5–5.2)
PROT SERPL-MCNC: 7.4 G/DL (ref 6–8.5)
RBC # BLD AUTO: 4.41 10*6/MM3 (ref 3.77–5.28)
SODIUM SERPL-SCNC: 137 MMOL/L (ref 136–145)
TROPONIN T DELTA: NORMAL
TROPONIN T SERPL HS-MCNC: <6 NG/L
WBC NRBC COR # BLD: 10.28 10*3/MM3 (ref 3.4–10.8)
WHOLE BLOOD HOLD COAG: NORMAL
WHOLE BLOOD HOLD SPECIMEN: NORMAL

## 2023-03-21 PROCEDURE — 84484 ASSAY OF TROPONIN QUANT: CPT

## 2023-03-21 PROCEDURE — 83880 ASSAY OF NATRIURETIC PEPTIDE: CPT

## 2023-03-21 PROCEDURE — 71045 X-RAY EXAM CHEST 1 VIEW: CPT

## 2023-03-21 PROCEDURE — 99284 EMERGENCY DEPT VISIT MOD MDM: CPT

## 2023-03-21 PROCEDURE — 80053 COMPREHEN METABOLIC PANEL: CPT

## 2023-03-21 PROCEDURE — 25010000002 MORPHINE PER 10 MG: Performed by: EMERGENCY MEDICINE

## 2023-03-21 PROCEDURE — 85025 COMPLETE CBC W/AUTO DIFF WBC: CPT

## 2023-03-21 PROCEDURE — 96374 THER/PROPH/DIAG INJ IV PUSH: CPT

## 2023-03-21 PROCEDURE — 25510000001 IOPAMIDOL PER 1 ML

## 2023-03-21 PROCEDURE — 36415 COLL VENOUS BLD VENIPUNCTURE: CPT

## 2023-03-21 PROCEDURE — 96376 TX/PRO/DX INJ SAME DRUG ADON: CPT

## 2023-03-21 PROCEDURE — 71275 CT ANGIOGRAPHY CHEST: CPT

## 2023-03-21 RX ORDER — MORPHINE SULFATE 2 MG/ML
2 INJECTION, SOLUTION INTRAMUSCULAR; INTRAVENOUS ONCE
Status: COMPLETED | OUTPATIENT
Start: 2023-03-21 | End: 2023-03-21

## 2023-03-21 RX ORDER — SODIUM CHLORIDE 0.9 % (FLUSH) 0.9 %
10 SYRINGE (ML) INJECTION AS NEEDED
Status: DISCONTINUED | OUTPATIENT
Start: 2023-03-21 | End: 2023-03-21 | Stop reason: HOSPADM

## 2023-03-21 RX ADMIN — MORPHINE SULFATE 4 MG: 4 INJECTION, SOLUTION INTRAMUSCULAR; INTRAVENOUS at 10:19

## 2023-03-21 RX ADMIN — IOPAMIDOL 100 ML: 755 INJECTION, SOLUTION INTRAVENOUS at 11:15

## 2023-03-21 RX ADMIN — MORPHINE SULFATE 4 MG: 4 INJECTION, SOLUTION INTRAMUSCULAR; INTRAVENOUS at 15:06

## 2023-03-21 RX ADMIN — MORPHINE SULFATE 2 MG: 2 INJECTION, SOLUTION INTRAMUSCULAR; INTRAVENOUS at 11:55

## 2023-03-21 NOTE — DISCHARGE INSTRUCTIONS
Your CT scan shows that you have a mass in your right lung which you have reported that you are aware of.  Your heart work-up is normal.  Please follow-up with your PCP in the next 24 to 48 hours and return to the ED with any new, worsening, or persistent symptoms.

## 2023-03-21 NOTE — ED PROVIDER NOTES
Subjective   History of Present Illness  Patient is a 67-year-old female who presents to the ED today via EMS complaining of right anterior chest pain that radiates through to her back, down her right arm, and up the right side of her neck.  She reports this came on slowly and progressively this morning.  She was just recently diagnosed with right-sided lung cancer via PET scan.  On my exam the patient is writhing in pain, is tearful.  She denies any medications at home for this.  She is hypertensive at 176/114, heart rate is 92 bpm.  Patient reports the pain is worse with deep inspiration as well as movement, pain is reproducible with palpation. PMH is significant for anemia, anxiety, acid reflux, arthritis, COPD, Curtis's esophagus, GI bleed, chest pain, chronic back and neck pain, DVT, hypothyroidism, migraine, and PE.  Differential diagnoses: ACS, PE, worsening lung neoplasm, aortic aneurysm/dissection, pneumonia, and other.    History provided by:  Patient   used: No        Review of Systems   Constitutional: Negative for chills, diaphoresis, fatigue and fever.   HENT: Negative.    Eyes: Negative.    Respiratory: Negative for cough, shortness of breath and wheezing.    Cardiovascular: Positive for chest pain. Negative for palpitations and leg swelling.   Gastrointestinal: Negative for abdominal pain, nausea and vomiting.   Genitourinary: Negative.    Musculoskeletal: Positive for back pain. Negative for neck pain.   Skin: Negative.    Neurological: Negative for dizziness, syncope, weakness, numbness and headaches.   Psychiatric/Behavioral: Negative.        Past Medical History:   Diagnosis Date   • Acid reflux    • Anemia    • Anxiety    • Arthritis    • Asthma     Mild COPD   • Curtis esophagus    • Bleeding disorder (HCC)     Reflux acid overload   • Bunion Aug    Repaired with surgery   • Chest pain    • Chronic back pain    • Chronic neck pain    • Clotting disorder (HCC)     Stomach  bleeds from acid   • COPD (chronic obstructive pulmonary disease) ([facility])    • Difficulty walking Dec 2022    After surgery wslking has become more and more difficult   • DVT (deep venous thrombosis) ([facility])    • GI bleed    • Hammer toe Aug 22    Repaired surgery   • Hypothyroid    • Hypothyroidism    • Low back pain    • Migraine    • PE (pulmonary thromboembolism) ([facility])    • Plantar fasciitis Years ago   • Shin splints Years ago       Allergies   Allergen Reactions   • Erythromycin Swelling     Throat swells   • Hydromorphone Hcl Anaphylaxis and Other (See Comments)     Other reaction(s): Shock and/or Unconsciousness  Note: Anaphylaxis   • Ondansetron Hcl Other (See Comments)     Coded  Coded  Other reaction(s): Zofran  Note:  Allergic Reaction: Anaphylaxis   • Orphenadrine Anaphylaxis   • Orphenadrine Citrate Anaphylaxis   • Meperidine Other (See Comments)     Makes her an angry person   • Codeine Rash   • Guaifenesin & Derivatives Nausea And Vomiting   • Guaifenesin Er Swelling     Throat swelling       Past Surgical History:   Procedure Laterality Date   • BUNIONECTOMY     • CHOLECYSTECTOMY     • COLONOSCOPY     • CORRECTION HAMMER TOE     • DILATATION AND CURETTAGE     • FOOT FUSION Left 04/20/2022    Procedure: 1-2 Arthrodesis, Tarsal Metatarsal Joint 2 and 3 Arthrodesis;  Surgeon: Bud Maradiaga DPM;  Location:  [facility] OR;  Service: Podiatry;  Laterality: Left;   • HAMMER TOE REPAIR Left 04/20/2022    Procedure: Hammertoe Repair 2-5,;  Surgeon: Bud Maradiaga DPM;  Location:  [facility] OR;  Service: Podiatry;  Laterality: Left;   • HERNIA REPAIR     • HYSTERECTOMY     • JOINT REPLACEMENT     • OOPHORECTOMY     • REDUCTION MAMMAPLASTY  01/2021   • REPLACEMENT TOTAL KNEE      Right knee partial, left knee       Family History   Problem Relation Age of Onset   • Breast cancer Paternal Aunt    • Heart disease Mother    • Osteoporosis Mother    • Thyroid disease Mother    • Cancer Father         Father’s entire  family  from Cancer   • Diabetes Father    • Thyroid disease Father    • Thyroid disease Maternal Aunt    • Thyroid disease Maternal Uncle    • Thyroid disease Sister    • Ovarian cancer Neg Hx    • Uterine cancer Neg Hx    • Colon cancer Neg Hx        Social History     Socioeconomic History   • Marital status:    Tobacco Use   • Smoking status: Former     Packs/day: 1.50     Years: 46.00     Pack years: 69.00     Types: Cigarettes     Start date: 1972     Quit date: 2018     Years since quittin.9     Passive exposure: Past   • Smokeless tobacco: Never   • Tobacco comments:     Pt started at age 17, then quit at age 31. Pt restarted smoking at age 32-33 and quit smoking in 2018   Vaping Use   • Vaping Use: Never used   Substance and Sexual Activity   • Alcohol use: Not Currently     Alcohol/week: 1.0 standard drink     Types: 1 Drinks containing 0.5 oz of alcohol per week     Comment: A chocolate drink made with moonshine and creamer   • Drug use: No   • Sexual activity: Yes     Partners: Male     Birth control/protection: Surgical           Objective   Physical Exam  Vitals and nursing note reviewed.   Constitutional:       General: She is in acute distress.      Appearance: Normal appearance. She is not toxic-appearing or diaphoretic.   HENT:      Head: Normocephalic and atraumatic.      Right Ear: External ear normal.      Left Ear: External ear normal.      Nose: Nose normal.   Eyes:      Extraocular Movements: Extraocular movements intact.      Conjunctiva/sclera: Conjunctivae normal.      Pupils: Pupils are equal, round, and reactive to light.   Cardiovascular:      Rate and Rhythm: Normal rate and regular rhythm.      Pulses: Normal pulses.           Radial pulses are 2+ on the right side and 2+ on the left side.      Heart sounds: Normal heart sounds.      Comments: Right anterior chest pain, tightness, radiates into back as well as down the right arm and up the right side of her  neck.  Is worse on deep inspiration and is reproducible on palpation.  Pulmonary:      Effort: Pulmonary effort is normal. No respiratory distress.      Breath sounds: Normal breath sounds. No stridor. No wheezing, rhonchi or rales.   Musculoskeletal:      Cervical back: Normal range of motion and neck supple. No rigidity or tenderness.   Lymphadenopathy:      Cervical: No cervical adenopathy.   Skin:     General: Skin is warm and dry.      Capillary Refill: Capillary refill takes less than 2 seconds.   Neurological:      Mental Status: She is alert and oriented to person, place, and time. Mental status is at baseline.      GCS: GCS eye subscore is 4. GCS verbal subscore is 5. GCS motor subscore is 6.   Psychiatric:         Mood and Affect: Mood is anxious. Affect is tearful.         Behavior: Behavior normal.         Thought Content: Thought content normal.         Judgment: Judgment normal.       Labs Reviewed   COMPREHENSIVE METABOLIC PANEL - Abnormal; Notable for the following components:       Result Value    Glucose 130 (*)     All other components within normal limits    Narrative:     GFR Normal >60  Chronic Kidney Disease <60  Kidney Failure <15     CBC WITH AUTO DIFFERENTIAL - Abnormal; Notable for the following components:    Hemoglobin 11.5 (*)     MCH 26.1 (*)     MCHC 30.0 (*)     Eosinophils, Absolute 0.41 (*)     All other components within normal limits   BNP (IN-HOUSE) - Normal    Narrative:     Among patients with dyspnea, NT-proBNP is highly sensitive for the detection of acute congestive heart failure. In addition NT-proBNP of <300 pg/ml effectively rules out acute congestive heart failure with 99% negative predictive value.    Results may be falsely decreased if patient taking Biotin.     TROPONIN - Normal    Narrative:     High Sensitive Troponin T Reference Range:  <10.0 ng/L- Negative Female for AMI  <15.0 ng/L- Negative Male for AMI  >=10 - Abnormal Female indicating possible myocardial  injury.  >=15 - Abnormal Male indicating possible myocardial injury.   Clinicians would have to utilize clinical acumen, EKG, Troponin, and serial changes to determine if it is an Acute Myocardial Infarction or myocardial injury due to an underlying chronic condition.        RAINBOW DRAW    Narrative:     The following orders were created for panel order [place] Draw.  Procedure                               Abnormality         Status                     ---------                               -----------         ------                     Green Top (Gel)[[id number]]                                  Final result               Lavender Top[[id number]]                                     Final result               Red Top[[id number]]                                          Final result               Gray Top[[id number]]                                         Final result               Light Blue Top[[id number]]                                   Final result                 Please view results for these tests on the individual orders.   HIGH SENSITIVITIY TROPONIN T 2HR    Narrative:     High Sensitive Troponin T Reference Range:  <10.0 ng/L- Negative Female for AMI  <15.0 ng/L- Negative Male for AMI  >=10 - Abnormal Female indicating possible myocardial injury.  >=15 - Abnormal Male indicating possible myocardial injury.   Clinicians would have to utilize clinical acumen, EKG, Troponin, and serial changes to determine if it is an Acute Myocardial Infarction or myocardial injury due to an underlying chronic condition.        GREEN TOP   LAVENDER TOP   RED TOP   GRAY TOP   LIGHT BLUE TOP   CBC AND DIFFERENTIAL    Narrative:     The following orders were created for panel order CBC & Differential.  Procedure                               Abnormality         Status                     ---------                               -----------         ------                     CBC Auto Differential[[id number]]        Abnormal             Final result                 Please view results for these tests on the individual orders.     CT Angiogram Chest   Final Result   1. A limited diagnostic study due to poor opacification of the pulmonary   arteries. No significant or large vessel embolus is noted. The smaller   subsegmental lesion/emboli may not be evaluated or excluded.   2. Chronic emphysematous lung changes.   3. An enlarging mass in the right upper lobe. Of                                       This report was finalized on 03/21/2023 11:47 by Dr. Arsalan Narayan MD.      XR Chest 1 View   Final Result   1. Shallow inspiration with low lung volumes. No pulmonary edema or   obvious acute infiltrate.       This report was finalized on 03/21/2023 09:57 by Dr Reed Mari, .        HEART Score for Major Cardiac Events - MDCalc  Calculated on Mar 21 2023 2:47 PM  3 points -> Low Score (0-3 points) Risk of MACE of 0.9-1.7%.    Data Reviewed:  Adult Stress Echo W/ Cont or Stress Agent if Necessary Per Protocol (01/25/2023 14:59)    Procedures           ED Course  ED Course as of 03/21/23 2207   Tue Mar 21, 2023   1019 EKG shows NSR 89 bpm. [HE]   1134 Nursing staff is informing that the patient is still complaining of 10 out of 10 pain with IV morphine dose. [HE]   1233 Blood pressure and heart rate have improved with pain medication administration. [HE]   1354 On reeval the patient is reporting that she is still having a significant amount of pain despite IV morphine.  She states the pain is very specific with a deep inspiration. [HE]      ED Course User Index  [HE] Terri Bender, ENOC                                           Medical Decision Making  Patient is a 67-year-old female who presents to the ED today via EMS complaining of right anterior chest pain that radiates through to her back, down her right arm, and up the right side of her neck.  She reports this came on slowly and progressively.  She was just recently diagnosed with  right-sided lung cancer via PET scan.  On my exam the patient is writhing in pain, is tearful.  She denies any medications at home for this.  She is hypertensive at 176/114, heart rate is 92 bpm.  Patient reports the pain is worse with deep inspiration as well as movement, pain is reproducible with palpation. PMH is significant for anemia, anxiety, acid reflux, arthritis, COPD, Curtis's esophagus, GI bleed, chest pain, chronic back and neck pain, DVT, hypothyroidism, migraine, and PE.  Differential diagnoses: ACS, PE, worsening lung neoplasm, aortic aneurysm/dissection, pneumonia, and other.    Patient was given 4 mg morphine in the ED for pain control.  Chest x-ray reveals shallow inspiration with low lung volumes. No pulmonary edema or obvious acute infiltrate.  EKG shows NSR 89 bpm.  WBC unremarkable, H&H at baseline for patient, BNP normal, and CMP unremarkable.  Serial troponins are negative.  CT angiogram chest reveals a limited diagnostic study due to poor opacification of the pulmonary arteries. No significant or large vessel embolus is noted. The smaller subsegmental lesion/emboli may not be evaluated or excluded. Chronic emphysematous lung changes. An enlarging mass in the right upper lobe.  Pt is not septic. Heart score is 3.   On re-eval pt is no longer writhing in pain and VS have normalized although the pt still reports pain. Cardiac workup is negative, pain is not cardiac related but likely related to her worsening lung mass/chest wall pain. She is not requiring O2. She will f/u with her PCP and CT surgeon and return to the ED with any new, worsening, or persistent symptoms. She reports she has not been called about her PET scan results yet specifically but they are working on getting her in to an oncologist.     Mass of right lung: acute illness or injury  Amount and/or Complexity of Data Reviewed  Labs: ordered.  Radiology: ordered.      Risk  Prescription drug management.          Final diagnoses:    Mass of right lung   Chest wall pain   Liver lesion   Hiatal hernia       ED Disposition  ED Disposition     ED Disposition   Discharge    Condition   Stable    Comment   --             Reynaldo Jeter, DO  [address] [place] [id number]  [phone]    In 2 days           Medication List      No changes were made to your prescriptions during this visit.          Terri Bender, APRN  03/21/23 [id number]

## 2023-03-21 NOTE — TELEPHONE ENCOUNTER
Pt calling from [facility] ER stating that she wanted Dr. Thompson to be aware she was there because she is unable to breath well and thinks this has to do with the spot on her lung. Informed pt that I will message the providers and they will see her if consulted. Pt voiced understanding.

## 2023-03-28 ENCOUNTER — HOSPITAL ENCOUNTER (OUTPATIENT)
Dept: PULMONOLOGY | Facility: HOSPITAL | Age: 68
Discharge: HOME | End: 2023-03-28
Admitting: THORACIC SURGERY (CARDIOTHORACIC VASCULAR SURGERY)
Payer: MEDICARE

## 2023-03-28 DIAGNOSIS — R91.1 LUNG NODULE: ICD-10-CM

## 2023-03-28 LAB
ARTERIAL PATENCY WRIST A: POSITIVE
ATMOSPHERIC PRESS: 755 MMHG
BASE EXCESS BLDA CALC-SCNC: 3.6 MMOL/L (ref 0–2)
BDY SITE: ABNORMAL
BODY TEMPERATURE: 37 C
HCO3 BLDA-SCNC: 27.8 MMOL/L (ref 20–26)
Lab: ABNORMAL
MODALITY: ABNORMAL
PCO2 BLDA: 39.7 MM HG (ref 35–45)
PCO2 TEMP ADJ BLD: 39.7 MM HG (ref 35–45)
PH BLDA: 7.45 PH UNITS (ref 7.35–7.45)
PH, TEMP CORRECTED: 7.45 PH UNITS (ref 7.35–7.45)
PO2 BLDA: 90.3 MM HG (ref 83–108)
PO2 TEMP ADJ BLD: 90.3 MM HG (ref 83–108)
SAO2 % BLDCOA: 97.8 % (ref 94–99)
VENTILATOR MODE: ABNORMAL

## 2023-03-28 PROCEDURE — 94726 PLETHYSMOGRAPHY LUNG VOLUMES: CPT | Performed by: INTERNAL MEDICINE

## 2023-03-28 PROCEDURE — 94729 DIFFUSING CAPACITY: CPT | Performed by: INTERNAL MEDICINE

## 2023-03-28 PROCEDURE — 94726 PLETHYSMOGRAPHY LUNG VOLUMES: CPT

## 2023-03-28 PROCEDURE — 94010 BREATHING CAPACITY TEST: CPT | Performed by: INTERNAL MEDICINE

## 2023-03-28 PROCEDURE — 94010 BREATHING CAPACITY TEST: CPT

## 2023-03-28 PROCEDURE — 94729 DIFFUSING CAPACITY: CPT

## 2023-03-28 PROCEDURE — 36600 WITHDRAWAL OF ARTERIAL BLOOD: CPT

## 2023-03-28 PROCEDURE — 82803 BLOOD GASES ANY COMBINATION: CPT

## 2023-03-28 RX ORDER — ALBUTEROL SULFATE 2.5 MG/3ML
2.5 SOLUTION RESPIRATORY (INHALATION) ONCE
Status: DISCONTINUED | OUTPATIENT
Start: 2023-03-28 | End: 2023-03-28

## 2023-03-29 ENCOUNTER — OFFICE VISIT (OUTPATIENT)
Dept: CARDIAC SURGERY | Facility: CLINIC | Age: 68
End: 2023-03-29
Payer: MEDICARE

## 2023-03-29 VITALS
HEIGHT: 64 IN | DIASTOLIC BLOOD PRESSURE: 90 MMHG | OXYGEN SATURATION: 97 % | HEART RATE: 83 BPM | WEIGHT: 226.6 LBS | SYSTOLIC BLOOD PRESSURE: 138 MMHG | BODY MASS INDEX: 38.68 KG/M2

## 2023-03-29 DIAGNOSIS — K92.2 UPPER GI BLEED: ICD-10-CM

## 2023-03-29 DIAGNOSIS — R91.1 LUNG NODULE: Primary | ICD-10-CM

## 2023-03-29 DIAGNOSIS — Z86.711 HISTORY OF PULMONARY EMBOLISM: ICD-10-CM

## 2023-03-29 DIAGNOSIS — R91.1 PULMONARY NODULE, RIGHT: ICD-10-CM

## 2023-03-29 DIAGNOSIS — Z87.891 FORMER HEAVY TOBACCO SMOKER: ICD-10-CM

## 2023-03-29 PROCEDURE — 3080F DIAST BP >= 90 MM HG: CPT | Performed by: THORACIC SURGERY (CARDIOTHORACIC VASCULAR SURGERY)

## 2023-03-29 PROCEDURE — 1159F MED LIST DOCD IN RCRD: CPT | Performed by: THORACIC SURGERY (CARDIOTHORACIC VASCULAR SURGERY)

## 2023-03-29 PROCEDURE — 3075F SYST BP GE 130 - 139MM HG: CPT | Performed by: THORACIC SURGERY (CARDIOTHORACIC VASCULAR SURGERY)

## 2023-03-29 PROCEDURE — 1160F RVW MEDS BY RX/DR IN RCRD: CPT | Performed by: THORACIC SURGERY (CARDIOTHORACIC VASCULAR SURGERY)

## 2023-03-29 PROCEDURE — 99214 OFFICE O/P EST MOD 30 MIN: CPT | Performed by: THORACIC SURGERY (CARDIOTHORACIC VASCULAR SURGERY)

## 2023-03-29 RX ORDER — DOXYCYCLINE 100 MG/1
CAPSULE ORAL
COMMUNITY
Start: 2023-03-24 | End: 2023-04-04

## 2023-03-29 RX ORDER — DEXAMETHASONE 4 MG/1
TABLET ORAL
COMMUNITY
Start: 2023-03-24 | End: 2023-04-04

## 2023-03-29 NOTE — PROGRESS NOTES
Chief Complaint   Patient presents with   • Lung Nodule     Pt is here for 3 week follow-up       Subjective     History of Present Illness  3/29/2023    Ms. GERSON BROWNLEE is here for a 3-week follow-up of lung nodule. She is accompanied by an adult male.    She reports that she had surgery on her foot 1 year ago that needs to be redone.  She reports being told that some of the hardware in her foot is beginning to come back out.  She is being treated by Dr. Maradiaga for her foot.  She reports she is not in any hurry to have her foot surgery.  She reports being scheduled for a CT scan the end of 4/2023.  She reports being able to run with her foot in its current condition.    She reports seeing a new spot on her recent scan.  She reports her only reservation about surgery is her history of blood clots.  She can not take blood thinners due to her stomach issues.  She reports blood thinner will cause her to have a GI bleed.  She has severe acid reflux for which she takes several medications.  She has a hiatal hernia.  She reports burning associated with eating and drinking which causes her stomach to bleed and ultimately results in her being hospitalized.  Her last blood clot was in 4/2022.  She states her blood clot in 4/2022 was the first clot she has had in several years.  She reports that her blood clots travel and generally develop after undergoing surgery.  This is the first blood clot she has had in several years.  She states she does not have a GI ulcer.  She reports having GERD and acid reflux caused by her hiatal hernia.  She reports undergoing tests to determine if she is eligible for Nissen fundoplication surgery.  She reports having an appointment with a provider in [place] on 4/7/2023 to find out if she is eligible for Nissen fundoplication surgery.  She reports previously having 17 blood clots discovered in her lungs at [facility].  She reports her blood clots being mistaken as pleurisy  at that time.  At that time, her blood clots were discovered when she began to experience hemoptysis.  She has had 5 pulmonary emboli over the last 36 years.  She has not smoked in 5 years.  She does have bronchitis.            Initial referral was for a right upper lobe lung nodule which has been stable since 2018.  During her last surveillance a new lingular nodule was identified in 2020.  This was not hypermetabolic.    In the interim she has been diagnosed with Curtis's esophagus.  She is seen by Dr. Wei as well.  She is clinically stable.  She remains a non-smoker.  Mrs. Cruz has a history of smoking. At least 46 pack years but quit in 2018.    Additional past medical history includes DVT/PE      Review of Systems   Constitutional: Negative for chills, diaphoresis, fatigue and fever.   HENT: Negative for trouble swallowing and voice change.    Eyes: Negative.    Respiratory: Negative for shortness of breath.    Cardiovascular: Negative for chest pain, palpitations and leg swelling.   Gastrointestinal: Negative.    Endocrine: Negative.    Genitourinary: Negative.    Musculoskeletal: Negative for back pain and neck pain.   Skin: Negative.    Allergic/Immunologic: Negative.    Neurological: Negative for seizures, speech difficulty and weakness.   Hematological: Negative.         History of DVT/PE   Psychiatric/Behavioral: Negative for behavioral problems and confusion.        Allergies:  Erythromycin, Hydromorphone hcl, Ondansetron hcl, Orphenadrine, Orphenadrine citrate, Meperidine, Codeine, Guaifenesin & derivatives, and Guaifenesin er    Current Outpatient Medications   Medication Sig Dispense Refill   • albuterol (PROVENTIL HFA;VENTOLIN HFA) 108 (90 Base) MCG/ACT inhaler Inhale 2 puffs 4 (Four) Times a Day. 6.7 g 0   • budesonide (Pulmicort Flexhaler) 90 MCG/ACT inhaler Inhale 2 puffs 2 (Two) Times a Day. Swallow, do not inhale  11   • buPROPion XL (WELLBUTRIN XL) 300 MG 24 hr tablet Take 1 tablet by mouth  Every Morning.     • Cyanocobalamin (VITAMIN B-12 ER) 1000 MCG tablet controlled-release Take 1,000 mcg by mouth Daily.     • dexamethasone (DECADRON) 4 MG tablet      • dicyclomine (BENTYL) 10 MG capsule Take 1 capsule by mouth 3 (Three) Times a Day As Needed (abdominal cramping). 12 capsule 0   • doxycycline (MONODOX) 100 MG capsule      • EPINEPHrine (EPIPEN) 0.3 MG/0.3ML solution auto-injector injection      • famotidine (PEPCID) 20 MG tablet Take 1 tablet by mouth 2 (Two) Times a Day As Needed.     • fluticasone (FLONASE) 50 MCG/ACT nasal spray 2 sprays into the nostril(s) as directed by provider Daily.     • folic acid (FOLVITE) 1 MG tablet Take 1 tablet by mouth Daily.     • HYDROcodone-acetaminophen (NORCO) 7.5-325 MG per tablet Take 1 tablet by mouth Every 8 (Eight) Hours As Needed.     • levothyroxine (SYNTHROID, LEVOTHROID) 112 MCG tablet Take 1 tablet by mouth Daily.     • lidocaine (XYLOCAINE) 5 % ointment Apply 1 application topically to the appropriate area as directed Every 2 (Two) Hours As Needed for Mild Pain. 2500 g 5   • metoclopramide (REGLAN) 5 MG tablet Take 1 tablet by mouth 3 (Three) Times a Day As Needed (nausea and vomiting). 10 tablet 0   • nitroglycerin (NITROSTAT) 0.4 MG SL tablet Place 1 tablet under the tongue Every 5 (Five) Minutes As Needed for Chest Pain. Take no more than 3 doses in 15 minutes.     • omeprazole (priLOSEC) 40 MG capsule Take 1 capsule by mouth Daily.     • pantoprazole (PROTONIX) 40 MG EC tablet Take 1 tablet by mouth 2 (Two) Times a Day.     • pramipexole (MIRAPEX) 1 MG tablet Take 1 tablet by mouth 2 (Two) Times a Day.     • pregabalin (LYRICA) 75 MG capsule Take 1 capsule by mouth 2 (Two) Times a Day.     • sucralfate (CARAFATE) 1 GM/10ML suspension Take 10 mL by mouth 2 (Two) Times a Day.     • tiZANidine (ZANAFLEX) 4 MG tablet Take 2-4 mg by mouth At Night As Needed for Muscle Spasms.     • vitamin D (ERGOCALCIFEROL) 03205 units capsule capsule Take 1 capsule  "by mouth Every 7 (Seven) Days. Sunday  6   • cyclobenzaprine (FLEXERIL) 10 MG tablet Take 1 tablet by mouth 3 (Three) Times a Day As Needed. (Patient not taking: Reported on 3/29/2023)     • loratadine-pseudoephedrine (Allergy Relief D12) 5-120 MG per 12 hr tablet Take 1 tablet by mouth 2 (Two) Times a Day As Needed for Allergies. (Patient not taking: Reported on 3/29/2023)     • Semaglutide, 1 MG/DOSE, (Ozempic, 1 MG/DOSE,) 4 MG/3ML solution pen-injector Inject 1 mg under the skin into the appropriate area as directed 1 (One) Time Per Week. Friday (Patient not taking: Reported on 3/29/2023)     • sertraline (ZOLOFT) 50 MG tablet        No current facility-administered medications for this visit.       Objective      Vital Signs  Visit Vitals  /90 (BP Location: Right arm, Patient Position: Sitting, Cuff Size: Adult)   Pulse 83   Ht 162.6 cm (64\")   Wt 103 kg (226 lb 9.6 oz)   SpO2 97%   BMI 38.90 kg/m²     Physical Exam   Constitutional: She is oriented to person, place, and time. She appears well-developed.   HENT:   Head: Normocephalic and atraumatic.   Eyes: Conjunctivae are normal.   Neck: No JVD present. No thyromegaly present.   Cardiovascular: Normal rate, regular rhythm and normal heart sounds.   No murmur heard.  Pulmonary/Chest: Effort normal and breath sounds normal. No respiratory distress. She has no wheezes. She has no rales.   Abdominal: Soft. She exhibits no distension and no mass. There is no abdominal tenderness.   obese   Musculoskeletal: Normal range of motion. No deformity.      Comments: She has a foot brace on her left foot.   Lymphadenopathy:     She has no cervical adenopathy.   Neurological: She is alert and oriented to person, place, and time. No cranial nerve deficit or sensory deficit.   Skin: Skin is warm and dry.       Results Review:     PFT was obtained on 3/28/2023. FEV1 is 90%, FVC is 89%. Her DLCO is 64% with an adjusted for alveolar volume of 82% value.      Study " Result  Narrative & Impression   EXAMINATION: CT ANGIOGRAM CHEST-      3/21/2023 11:19 AM CDT     HISTORY: Right anterior chest pain through to the back, down right  shoulder and up the neck     In order to have a CT radiation dose as low as reasonably achievable  Automated Exposure Control was utilized for adjustment of the mA and/or  KV according to patient size.     DLP in mGycm= 552     The CT angiography of the chest are performed after intravenous contrast  enhancement.     Images are acquired in axial plane and subsequent 2-D reconstruction in  coronal and sagittal planes and 3-D maximum intensity projection  reconstruction.     The comparison is made with the previous study dated 01/24/2023.     The study is of limited diagnostic value due to delayed image  acquisition. There is significant opacification of the left heart and  aorta in relation to the pulmonary arterial branches     There is suboptimal opacification of pulmonary artery and branches. No  large filling defects in the main pulmonary arteries and segmental  branches are noted. However distal/subsegmental lesions may not be  evaluated aren't visualized in this study.     RV/LV ratio is 26/34. This is normal range. No finding to suggest right  heart strain.     Atheromatous change of thoracic aorta. No aneurysmal dilatation.  Atheromatous plaques at the origin of the brachiocephalic, left common  carotid and left subclavian arteries. No stenosis.     Mild atheromatous changes of coronary arteries.     There is no evidence of mediastinal mass or lymphadenopathy.     An oblong mass in the right upper lobe posterior laterally now measures  3.2 x 1.7 there are surrounding pleural adhesions. Cm. It previously  measured 2.5 x 1.6 cm. There is another small nodule in the anterior  costophrenic sulcus measuring 6 6 mm in diameter. It was noted in the  previous study and appears to have moderately increased in size in the  interval.     There are chronic  emphysematous lung changes.     There are atelectatic change at bilateral lung bases, right more than  the left.     The central airway is patent. No endobronchial abnormality or a lesion  is noted.     There is a moderate-sized Bochdalek hernia and a moderate size hiatal  hernia similar to the previous study.     The limited visualized liver shows fatty infiltration of the liver.  There are ill-defined foci of contrast enhancement in the prevertebral  part of the right lobe of the liver which probably represent transient  hepatic attenuation difference (Gunner). However an enhancing lesion may  not be excluded. This is incompletely evaluated in the study. Spleen is  normal. The adrenal glands are normal. The pancreas and kidneys are  incompletely included and suboptimally evaluated. The gallbladder is  surgically absent.     Images are reviewed in bone window show no acute bony abnormality.  Marginal sclerosis of vertebra T11 and T12 probably represent a chronic  degenerative process. No focal bony lesion or acute bony abnormality.     An area of fat necrosis in the left breast is similar to the previous  study and may represent site of previous surgery/biopsy?.     IMPRESSION:  1. A limited diagnostic study due to poor opacification of the pulmonary  arteries. No significant or large vessel embolus is noted. The smaller  subsegmental lesion/emboli may not be evaluated or excluded.  2. Chronic emphysematous lung changes.  3. An enlarging mass in the right upper lobe. Of                             This report was finalized on 03/21/2023 11:47 by Dr. Arsalan Narayan MD.     CT scan of the chest is performed on 3/21/2023 showing an increased right upper lobe lung lesion.      Study Result  EXAMINATION: NM PET SKULL BASE TO MID THIGH- 6/30/2020 11:22 AM CDT     HISTORY: New lung nodule; R91.1-Solitary pulmonary nodule.     Dose:  1. 11.83 mCi FDG F-18 intravenously, the patient's weight equals 214  pounds and serum  glucose level 100 mg/dL at the time of FDG injection.  2. 1064 mGycm for the CT portion of the examination.     REPORT: Multiplanar pet imaging was performed with a field-of-view  extending from the skull base to the mid thighs, CT was also performed  for attenuation correction and fusion imaging.     COMPARISON: PET/CT 10/04/2018. CT chest with contrast 06/16/2020.     Physiologic (normal) activity is noted within the head and neck. There  is no abnormal FDG activity associated with the small new lingular  nodule, which measures 9 mm. The fusion CT demonstrates a stable 13 mm  groundglass nodule in the right upper lobe, with no significant FDG  uptake. This most likely represents an area of focal scarring. There is  a tiny subpleural nodule in the left lower lobe which is not calcified  and measures approximately 3 mm. This is stable. There is mild diffuse  emphysema. No pneumothorax or pleural effusion is identified. There is  no evidence of axillary, mediastinal or hilar lymphadenopathy. There are  a few normal sized lymph nodes in the inferior axilla bilaterally, which  have low-level uptake, with a maximum SUV of 2.7. These may be reactive.  Physiologic activity is noted within the myocardium. Below the  diaphragm, there is physiologic activity within the solid abdominal  organs, collecting structures of the kidneys, ureters and bladder as  expected. There is physiologic activity within the GI tract as expected.  The CT also demonstrates diffuse fatty infiltration of the liver and  evidence previous cholecystectomy. There is a small area of fat sparing  in the upper right hepatic lobe which is stable. There is moderate  atherosclerosis with calcification within the aorta and iliac arteries.  The appendix is normal. There is streak artifact in the pelvis related  to previous surgery. Prior hysterectomy is noted. There is mild sigmoid  diverticulosis without evidence of acute diverticulitis.     IMPRESSION:  1.  There is no abnormal FDG activity associated with the small new  nodule in the lingula, which is probably related to focal scarring or  atelectasis. There is no abnormal FDG activity associated with the 15 mm  groundglass nodule in the right upper lobe.  2. There are a few normal sized lymph nodes in the inferior axilla  bilaterally, with low level FDG avidity, these may be reactive lymph  nodes. Correlation with physical examination of both axilla is  suggested. No measurable axillary or intrathoracic lymphadenopathy is  identified.  3. No evidence of active neoplasm is identified.        This report was finalized on 06/30/2020 12:34 by Dr. Tony Chen MD.         Ct Chest With Contrast    Result Date: 12/16/2020  Narrative: EXAM: CT CHEST W CONTRAST- - 12/16/2020 8:21 AM CST  HISTORY: lung nodule; R91.1-Solitary pulmonary nodule   COMPARISON: 6/16/2020.  DOSE LENGTH PRODUCT: 511 mGy cm. Automatic exposure control was utilized to make radiation dose as low as reasonably achievable.  TECHNIQUE: Enhanced CT images of the chest obtained with multiplanar reformats.  Recommendations for incidental nodule follow-up are based on Fleischner Society recommendations.  FINDINGS:  AIRWAYS/PULMONARY PARENCHYMA: The central airways are midline and patent.  Right upper lobe groundglass opacity measuring about 1.5 cm appears similar to 6/16/2020, 4/29/2019. This has a decreased cystic component compared 1/21/2018.  Lingula with a 0.9 cm pulmonary nodule versus atelectasis on axial series 4, image 82, similar compared to 6/16/2020.  Stable 5 mm pleural-based left lower lobe pulmonary nodule compared to 1/21/2008, which is 3 years of stability.  Emphysema. No pleural effusion or pneumothorax.  VASCULATURE: Thoracic aorta is normal in course and caliber. Mild calcified aortic atherosclerosis. Normal pulmonary artery caliber.  CARDIAC:  Normal heart size.  No pericardial effusion.   MEDIASTINUM: There is no mediastinal or hilar  "lymphadenopathy by CT size criteria. Esophagus has normal course, caliber and wall thickness. Small hiatal hernia.  EXTRATHORACIC: Thyroid diminutive or absent. No supraclavicular or axillary adenopathy. The extrathoracic soft tissues are within normal limits.  INCLUDED UPPER ABDOMEN: Fatty liver. There are 2 peripheral areas of hyperenhancement anterolaterally and posterior medially, axial series 2, image 113 and 119 respectively. These are similar compared to 1/21/2018, possibly a area of focal fatty sparing or transient hepatic attenuation difference (KATI).  Cholecystectomy clips. No bile duct dilation. Normal appearance of the visualized pancreas, spleen, adrenal glands.  BONES: No acute or suspicious bony finding.       Impression: 1. Stable pulmonary findings compared to prior. Per Fleischner Society, consider follow-up CT chest in 12-18 months, which will be 2 years of follow-up. 2. Fatty liver.  Exam was tagged in PACS with \"new lung findings\" to assist in appropriate follow up. This report was finalized on 12/16/2020 08:48 by Dr Neeta Walker MD.      I reviewed the patient's new clinical results. Discussed with patient.    Study Result    Narrative & Impression   CT CHEST W CONTRAST DIAGNOSTIC- 12/8/2021 8:52 AM CST     HISTORY: multiple lung nodules; R91.1-Solitary pulmonary nodule      COMPARISON: 12/16/2020, 6/16/2020 and 4/29/2019     DLP: 485 mGy cm. Automated exposure control was utilized to diminish  patient radiation dose.     TECHNIQUE: Serial helical tomographic images of the chest were acquired  following the infusion of Isovue contrast intravenously. Bone and soft  tissue algorithms were provided.       FINDINGS:   Neck base: The imaged portion of the neck and thyroid gland is  unremarkable.      Lungs: There is a groundglass opacity within the right upper lobe on  image 29. This is essentially unchanged in appearance from the previous  study of 4/29/2019 and may represent an area of " asymmetric parenchymal  scarring. An adjacent subpleural nodule posteriorly within the right  upper lobe on image 27 is stable from the previous study. The focus of  pleural-based nodularity within the inferior lingular segment of the  left upper lobe on image 81 is unchanged from the previous study and I  suspect represent some focal atelectasis and/or scarring within this  segment. A subpleural soft tissue nodule within the left posterior  costophrenic angle and lower lobe is stable from previous studies dating  back to 2019. No new lung nodules are present. There are changes of  centrilobular emphysema.. No pleural effusion is present. The trachea  and bronchial tree are patent.      Heart: There is moderate cardiomegaly. Mild coronary calcifications are  present.. There is no pericardial effusion.      Vasculature: Aorta is normal in caliber and appearance without  significant atherosclerosis, stenosis, or aneurysm. Mild coronary  calcifications are demonstrated. The great vessels are normal in  appearance. The pulmonary arteries are normal in appearance.     Lymph nodes: No enlarged axillary, hilar, or mediastinal lymph nodes.      Bones and soft tissues: There is a focus of fat density within the left  breast. This could be related to previous surgery representing some fat  necrosis versus a lipoma or hamartoma..      Upper abdomen: A moderate size hiatal hernia is present. There is  diffuse steatosis of the liver. The adrenals are unremarkable. The  patient is status post cholecystectomy..      IMPRESSION:  1. Stable pulmonary nodules within the lungs from the previous study  with no new lesions present. Given the lack of change over a more than  two-year period of time I feel benignity would BE suggested. No new lung  lesions are present. There are changes of centrilobular emphysema.  2. Cardiomegaly with mild coronary calcifications present. The thoracic  aorta is normal in caliber.  3. Fat-containing  lesion within the upper left breast. This may be  postoperative in nature versus posttraumatic fat necrosis versus a fatty  containing mass. No overlying skin changes are observed.  4. Steatosis of the liver. A moderate size hiatal hernia is present..        This report was finalized on 12/08/2021 10:00 by Dr. Grey Mckeon MD.     Independent review of imaging obtained today is performed by me.  Her right upper lobe abnormality is more consistent with scarring at this time.  The left lower lobe nodule is stable.  Lingular lesion is also stable.  No new additional nodules.  Emphysematous changes are noted.  CT chest w contrast [[id number]] (Order [id number])  Order  Date: 12/7/2022 Department: [facility] CARDIOTHORACIC SURGERY Ordering/Authorizing: Agusto Thompson MD     Future Order Information    Expected Expires      12/12/2022 12/7/2023               Order History  Outpatient  Date/Time Action Taken User Additional Information   12/07/22 1412 Sign Stefani Johnson MA Ordering Mode: Verbal with readback     Order Details    Frequency Duration Priority Order Class   None None Routine Hospital Performed     Start Date/Time    Start Date   12/07/22     Source Order Set / Preference List    Preference List   [facility] OP IMAGING [[id number]]     Verbal Order Info    Action Created on Order Mode Entered by Responsible Provider Signed by Signed on   Ordering 12/07/22 1412 Verbal with readback Stefani Johnson MA Lopez, Nicholas M, MD       Clinical Indications     ICD-10-CM ICD-9-CM   Lung nodule  - Primary     R91.1 793.11     Reprint Order Requisition    CT chest w contrast (Order #[id number]) on 12/7/22     Encounter    View Encounter           Intra Procedure Documentation    Intra Procedure Documentation      Order Provider Info        Office phone Pager E-mail   Ordering User Stefani Johnson MA -- -- --   Authorizing Provider Agusto Thompson [phone] -- --   Entered By Stefani Johnson MA --  -- --   Ordering Provider Agusto Thompson [phone] -- --     Linked Charges    Charge Code Link Type Charge Type Modifiers   HC CT Chest W Contrast Diagnostic [[id number]] 49094 Automatic Technical    Diagnostic Computed Tomography Thorax W/Contrast 03140 Automatic Professional 26   Diagnostic Computed Tomography Thorax W/Contrast 93747 Automatic Global      Tracking Reports    Cosign Tracking Order Transmittal Tracking     Referred To    Specialty: Radiology     Independent interpretation reveals stable right upper lobe ground glass lesion now since 2018. Her lingular lesion was identified in June 2020 and subjectively shows potentially some increase in growth, but given his position, precise measurements suggest 11.9 x 5.7 versus 13.8 by 4.9. Differences may be allotted due to technique. The left lower lobe half centimeter lesion remains 5 mm in size as well.      Assessment & Plan     Impression:  1. Increasing right upper lobe lung lesion.  2. History of multiple pulmonary emboli.  3. Contraindication to anticoagulation.  4. History of GI bleeding, multiple events including blood transfusions.  5. LLL 0.5 cm 2018 Stable  6  Lingula 0.9 cm 6/2020  Stable        Medical decision making/Recommendations/Plan:  At this point, from a straightforward presence of a increasing right upper lobe lung lesion, no adenopathy, she certainly would be a candidate for resection based on pulmonary function tests with wedge resection and then if positive for lung cancer to proceed with lobectomy and mediastinal lymph node sampling.  Problematic is the following: She has had at least five different events of pulmonary emboli and she has also had several bouts of bleeding.  I have proposed two scenarios, the first options to proceed with a IVC filter placed prior to thoracic surgery and then to proceed with surgery as discussed.  The alternative option would be to proceed with a biopsy and sampling of the mediastinum as  indicated with EBUS and navigational bronchoscopy.  Discussed that we have partnered with Dr. Mendoza and Dr. Dove for access.  Then would favor radiation then of this lesion.  Certainly, this would avoid the risks of bleeding complications as well as multiple pulmonary emboli.  We discussed the pros and cons of each option at large.  Melly Crzu will be added to the tumor conference list to be presented.  I have answered all questions to the best of my ability.  She favors to proceed forward with the second option, therefore, we will make a referral to Dr. Mendoza and Dr. Dove for diagnosis and laurie sampling.  After which we will plan to refer to Dr. Jerry for his opinion.  Certainly, we could reconsider if the bulk of a clinical conference and Dr. Jerry's input that the proposed currently agreed option would be suboptimal.  Certainly, agree that this is not the standard first answer, but in light of this lady's comorbidities, perhaps may be a very reasonable answer.  She remains asymptomatic from a lung nodule point of view.  She is a non-smoker.  I will continue to keep you apprised of care as it ensues.  Many thanks.    She has been congratulated as to being a non-smoker.    Many thanks for opportunity to care for your patient. I will continue to keep you apprised of care as it ensues.    Diagnoses and all orders for this visit:    1. Lung nodule (Primary)  -     Ambulatory Referral to Pulmonology         Transcribed from ambient dictation for Agusto Thompson MD by Roxi Garza.  03/29/23   13:04 CDT    Patient or patient representative verbalized consent to the visit recording.  I have personally performed the services described in this document as transcribed by the above individual, and it is both accurate and complete.

## 2023-03-31 ENCOUNTER — TELEPHONE (OUTPATIENT)
Dept: CARDIAC SURGERY | Facility: CLINIC | Age: 68
End: 2023-03-31
Payer: MEDICARE

## 2023-03-31 NOTE — TELEPHONE ENCOUNTER
Cameron with [facility] called this morning to report that they are unable to see patient as her [place] [employer] is out of network with facility and providers, so she will need to be re-referred.

## 2023-04-04 ENCOUNTER — HOSPITAL ENCOUNTER (OUTPATIENT)
Facility: HOSPITAL | Age: 68
Setting detail: OBSERVATION
Discharge: HOME | End: 2023-04-08
Attending: FAMILY MEDICINE | Admitting: FAMILY MEDICINE
Payer: MEDICARE

## 2023-04-04 ENCOUNTER — APPOINTMENT (OUTPATIENT)
Dept: CT IMAGING | Facility: HOSPITAL | Age: 68
End: 2023-04-04
Payer: MEDICARE

## 2023-04-04 ENCOUNTER — APPOINTMENT (OUTPATIENT)
Dept: GENERAL RADIOLOGY | Facility: HOSPITAL | Age: 68
End: 2023-04-04
Payer: MEDICARE

## 2023-04-04 DIAGNOSIS — J18.9 PNEUMONIA OF BOTH LUNGS DUE TO INFECTIOUS ORGANISM, UNSPECIFIED PART OF LUNG: Primary | ICD-10-CM

## 2023-04-04 DIAGNOSIS — J96.01 ACUTE RESPIRATORY FAILURE WITH HYPOXIA: ICD-10-CM

## 2023-04-04 DIAGNOSIS — R06.02 SHORTNESS OF BREATH: ICD-10-CM

## 2023-04-04 DIAGNOSIS — R91.1 PULMONARY NODULE, RIGHT: ICD-10-CM

## 2023-04-04 LAB
ALBUMIN SERPL-MCNC: 3.6 G/DL (ref 3.5–5.2)
ALBUMIN/GLOB SERPL: 1.2 G/DL
ALP SERPL-CCNC: 97 U/L (ref 39–117)
ALT SERPL W P-5'-P-CCNC: 37 U/L (ref 1–33)
ANION GAP SERPL CALCULATED.3IONS-SCNC: 10 MMOL/L (ref 5–15)
AST SERPL-CCNC: 29 U/L (ref 1–32)
BASOPHILS # BLD AUTO: 0.04 10*3/MM3 (ref 0–0.2)
BASOPHILS NFR BLD AUTO: 0.2 % (ref 0–1.5)
BILIRUB SERPL-MCNC: 0.6 MG/DL (ref 0–1.2)
BUN SERPL-MCNC: 19 MG/DL (ref 8–23)
BUN/CREAT SERPL: 25 (ref 7–25)
CALCIUM SPEC-SCNC: 7.9 MG/DL (ref 8.6–10.5)
CHLORIDE SERPL-SCNC: 98 MMOL/L (ref 98–107)
CO2 SERPL-SCNC: 23 MMOL/L (ref 22–29)
CREAT SERPL-MCNC: 0.76 MG/DL (ref 0.57–1)
D DIMER PPP FEU-MCNC: 1.26 MCGFEU/ML (ref 0–0.65)
D-LACTATE SERPL-SCNC: 1.7 MMOL/L (ref 0.5–2)
DEPRECATED RDW RBC AUTO: 47.8 FL (ref 37–54)
EGFRCR SERPLBLD CKD-EPI 2021: 87.1 ML/MIN/1.73
EOSINOPHIL # BLD AUTO: 0.03 10*3/MM3 (ref 0–0.4)
EOSINOPHIL NFR BLD AUTO: 0.2 % (ref 0.3–6.2)
ERYTHROCYTE [DISTWIDTH] IN BLOOD BY AUTOMATED COUNT: 15.9 % (ref 12.3–15.4)
GEN 5 2HR TROPONIN T REFLEX: <6 NG/L
GLOBULIN UR ELPH-MCNC: 2.9 GM/DL
GLUCOSE SERPL-MCNC: 125 MG/DL (ref 65–99)
HCT VFR BLD AUTO: 39.6 % (ref 34–46.6)
HGB BLD-MCNC: 12.2 G/DL (ref 12–15.9)
IMM GRANULOCYTES # BLD AUTO: 0.09 10*3/MM3 (ref 0–0.05)
IMM GRANULOCYTES NFR BLD AUTO: 0.6 % (ref 0–0.5)
LYMPHOCYTES # BLD AUTO: 0.81 10*3/MM3 (ref 0.7–3.1)
LYMPHOCYTES NFR BLD AUTO: 5 % (ref 19.6–45.3)
MCH RBC QN AUTO: 25.7 PG (ref 26.6–33)
MCHC RBC AUTO-ENTMCNC: 30.8 G/DL (ref 31.5–35.7)
MCV RBC AUTO: 83.5 FL (ref 79–97)
MONOCYTES # BLD AUTO: 0.52 10*3/MM3 (ref 0.1–0.9)
MONOCYTES NFR BLD AUTO: 3.2 % (ref 5–12)
NEUTROPHILS NFR BLD AUTO: 14.83 10*3/MM3 (ref 1.7–7)
NEUTROPHILS NFR BLD AUTO: 90.8 % (ref 42.7–76)
NRBC BLD AUTO-RTO: 0 /100 WBC (ref 0–0.2)
NT-PROBNP SERPL-MCNC: 55.3 PG/ML (ref 0–900)
PLATELET # BLD AUTO: 255 10*3/MM3 (ref 140–450)
PMV BLD AUTO: 9 FL (ref 6–12)
POTASSIUM SERPL-SCNC: 3.7 MMOL/L (ref 3.5–5.2)
PROCALCITONIN SERPL-MCNC: 0.62 NG/ML (ref 0–0.25)
PROT SERPL-MCNC: 6.5 G/DL (ref 6–8.5)
RBC # BLD AUTO: 4.74 10*6/MM3 (ref 3.77–5.28)
SODIUM SERPL-SCNC: 131 MMOL/L (ref 136–145)
TROPONIN T DELTA: NORMAL
TROPONIN T SERPL HS-MCNC: <6 NG/L
WBC NRBC COR # BLD: 16.32 10*3/MM3 (ref 3.4–10.8)

## 2023-04-04 PROCEDURE — 96375 TX/PRO/DX INJ NEW DRUG ADDON: CPT

## 2023-04-04 PROCEDURE — 96372 THER/PROPH/DIAG INJ SC/IM: CPT

## 2023-04-04 PROCEDURE — 83605 ASSAY OF LACTIC ACID: CPT | Performed by: FAMILY MEDICINE

## 2023-04-04 PROCEDURE — 85025 COMPLETE CBC W/AUTO DIFF WBC: CPT | Performed by: FAMILY MEDICINE

## 2023-04-04 PROCEDURE — 25010000002 LEVOFLOXACIN PER 250 MG: Performed by: INTERNAL MEDICINE

## 2023-04-04 PROCEDURE — 71045 X-RAY EXAM CHEST 1 VIEW: CPT

## 2023-04-04 PROCEDURE — 25010000002 CEFEPIME PER 500 MG: Performed by: FAMILY MEDICINE

## 2023-04-04 PROCEDURE — G0378 HOSPITAL OBSERVATION PER HR: HCPCS

## 2023-04-04 PROCEDURE — 99285 EMERGENCY DEPT VISIT HI MDM: CPT

## 2023-04-04 PROCEDURE — 25010000002 KETOROLAC TROMETHAMINE PER 15 MG: Performed by: FAMILY MEDICINE

## 2023-04-04 PROCEDURE — 93005 ELECTROCARDIOGRAM TRACING: CPT | Performed by: FAMILY MEDICINE

## 2023-04-04 PROCEDURE — 93010 ELECTROCARDIOGRAM REPORT: CPT | Performed by: INTERNAL MEDICINE

## 2023-04-04 PROCEDURE — 84484 ASSAY OF TROPONIN QUANT: CPT | Performed by: FAMILY MEDICINE

## 2023-04-04 PROCEDURE — 25010000002 ENOXAPARIN PER 10 MG: Performed by: INTERNAL MEDICINE

## 2023-04-04 PROCEDURE — 25510000001 IOPAMIDOL PER 1 ML: Performed by: FAMILY MEDICINE

## 2023-04-04 PROCEDURE — 80053 COMPREHEN METABOLIC PANEL: CPT | Performed by: FAMILY MEDICINE

## 2023-04-04 PROCEDURE — 84145 PROCALCITONIN (PCT): CPT | Performed by: FAMILY MEDICINE

## 2023-04-04 PROCEDURE — 96367 TX/PROPH/DG ADDL SEQ IV INF: CPT

## 2023-04-04 PROCEDURE — 94799 UNLISTED PULMONARY SVC/PX: CPT

## 2023-04-04 PROCEDURE — 25010000002 CEFTRIAXONE PER 250 MG: Performed by: FAMILY MEDICINE

## 2023-04-04 PROCEDURE — 71275 CT ANGIOGRAPHY CHEST: CPT

## 2023-04-04 PROCEDURE — 85379 FIBRIN DEGRADATION QUANT: CPT | Performed by: FAMILY MEDICINE

## 2023-04-04 PROCEDURE — 96366 THER/PROPH/DIAG IV INF ADDON: CPT

## 2023-04-04 PROCEDURE — 25010000002 MAGNESIUM SULFATE IN D5W 1G/100ML (PREMIX) 1-5 GM/100ML-% SOLUTION: Performed by: FAMILY MEDICINE

## 2023-04-04 PROCEDURE — 87040 BLOOD CULTURE FOR BACTERIA: CPT | Performed by: FAMILY MEDICINE

## 2023-04-04 PROCEDURE — 83880 ASSAY OF NATRIURETIC PEPTIDE: CPT | Performed by: FAMILY MEDICINE

## 2023-04-04 PROCEDURE — 96365 THER/PROPH/DIAG IV INF INIT: CPT

## 2023-04-04 RX ORDER — IPRATROPIUM BROMIDE AND ALBUTEROL SULFATE 2.5; .5 MG/3ML; MG/3ML
3 SOLUTION RESPIRATORY (INHALATION) EVERY 4 HOURS PRN
Status: DISCONTINUED | OUTPATIENT
Start: 2023-04-04 | End: 2023-04-08 | Stop reason: HOSPADM

## 2023-04-04 RX ORDER — FAMOTIDINE 20 MG/1
20 TABLET, FILM COATED ORAL 2 TIMES DAILY PRN
Status: DISCONTINUED | OUTPATIENT
Start: 2023-04-04 | End: 2023-04-05

## 2023-04-04 RX ORDER — IPRATROPIUM BROMIDE AND ALBUTEROL SULFATE 2.5; .5 MG/3ML; MG/3ML
3 SOLUTION RESPIRATORY (INHALATION) ONCE
Status: COMPLETED | OUTPATIENT
Start: 2023-04-04 | End: 2023-04-04

## 2023-04-04 RX ORDER — FLUTICASONE PROPIONATE 50 MCG
2 SPRAY, SUSPENSION (ML) NASAL DAILY
Status: DISCONTINUED | OUTPATIENT
Start: 2023-04-05 | End: 2023-04-08 | Stop reason: HOSPADM

## 2023-04-04 RX ORDER — MAGNESIUM SULFATE 1 G/100ML
1 INJECTION INTRAVENOUS ONCE
Status: COMPLETED | OUTPATIENT
Start: 2023-04-04 | End: 2023-04-04

## 2023-04-04 RX ORDER — CYCLOBENZAPRINE HCL 10 MG
10 TABLET ORAL 3 TIMES DAILY PRN
Status: DISCONTINUED | OUTPATIENT
Start: 2023-04-04 | End: 2023-04-08 | Stop reason: HOSPADM

## 2023-04-04 RX ORDER — BUPROPION HYDROCHLORIDE 150 MG/1
300 TABLET ORAL
Status: DISCONTINUED | OUTPATIENT
Start: 2023-04-05 | End: 2023-04-08 | Stop reason: HOSPADM

## 2023-04-04 RX ORDER — DICYCLOMINE HYDROCHLORIDE 10 MG/1
10 CAPSULE ORAL 3 TIMES DAILY PRN
Status: DISCONTINUED | OUTPATIENT
Start: 2023-04-04 | End: 2023-04-08 | Stop reason: HOSPADM

## 2023-04-04 RX ORDER — LEVOFLOXACIN 5 MG/ML
750 INJECTION, SOLUTION INTRAVENOUS EVERY 24 HOURS
Status: DISCONTINUED | OUTPATIENT
Start: 2023-04-05 | End: 2023-04-08

## 2023-04-04 RX ORDER — BUDESONIDE 0.5 MG/2ML
0.5 INHALANT ORAL
Status: DISCONTINUED | OUTPATIENT
Start: 2023-04-04 | End: 2023-04-08 | Stop reason: HOSPADM

## 2023-04-04 RX ORDER — ALUMINA, MAGNESIA, AND SIMETHICONE 2400; 2400; 240 MG/30ML; MG/30ML; MG/30ML
15 SUSPENSION ORAL ONCE
Status: COMPLETED | OUTPATIENT
Start: 2023-04-04 | End: 2023-04-04

## 2023-04-04 RX ORDER — ENOXAPARIN SODIUM 100 MG/ML
40 INJECTION SUBCUTANEOUS EVERY 24 HOURS
Status: DISCONTINUED | OUTPATIENT
Start: 2023-04-04 | End: 2023-04-06

## 2023-04-04 RX ORDER — FOLIC ACID 1 MG/1
1 TABLET ORAL DAILY
Status: DISCONTINUED | OUTPATIENT
Start: 2023-04-05 | End: 2023-04-08 | Stop reason: HOSPADM

## 2023-04-04 RX ORDER — PREGABALIN 75 MG/1
75 CAPSULE ORAL 2 TIMES DAILY
Status: DISCONTINUED | OUTPATIENT
Start: 2023-04-04 | End: 2023-04-05

## 2023-04-04 RX ORDER — METOCLOPRAMIDE 5 MG/1
5 TABLET ORAL 3 TIMES DAILY PRN
Status: DISCONTINUED | OUTPATIENT
Start: 2023-04-04 | End: 2023-04-08 | Stop reason: HOSPADM

## 2023-04-04 RX ORDER — KETOROLAC TROMETHAMINE 30 MG/ML
30 INJECTION, SOLUTION INTRAMUSCULAR; INTRAVENOUS ONCE
Status: COMPLETED | OUTPATIENT
Start: 2023-04-04 | End: 2023-04-04

## 2023-04-04 RX ORDER — HYDROCODONE BITARTRATE AND ACETAMINOPHEN 7.5; 325 MG/1; MG/1
1 TABLET ORAL EVERY 8 HOURS PRN
Status: DISCONTINUED | OUTPATIENT
Start: 2023-04-04 | End: 2023-04-08 | Stop reason: HOSPADM

## 2023-04-04 RX ORDER — LIDOCAINE HYDROCHLORIDE 20 MG/ML
15 SOLUTION OROPHARYNGEAL ONCE
Status: COMPLETED | OUTPATIENT
Start: 2023-04-04 | End: 2023-04-04

## 2023-04-04 RX ORDER — LEVOTHYROXINE SODIUM 112 UG/1
112 TABLET ORAL
Status: DISCONTINUED | OUTPATIENT
Start: 2023-04-05 | End: 2023-04-08 | Stop reason: HOSPADM

## 2023-04-04 RX ADMIN — HYDROCODONE BITARTRATE AND ACETAMINOPHEN 1 TABLET: 7.5; 325 TABLET ORAL at 21:59

## 2023-04-04 RX ADMIN — CEFEPIME 2 G: 2 INJECTION, POWDER, FOR SOLUTION INTRAVENOUS at 22:55

## 2023-04-04 RX ADMIN — MAGNESIUM SULFATE IN DEXTROSE 1 G: 10 INJECTION, SOLUTION INTRAVENOUS at 15:46

## 2023-04-04 RX ADMIN — CEFTRIAXONE 1 G: 1 INJECTION, POWDER, FOR SOLUTION INTRAMUSCULAR; INTRAVENOUS at 17:21

## 2023-04-04 RX ADMIN — METOCLOPRAMIDE 5 MG: 5 TABLET ORAL at 23:46

## 2023-04-04 RX ADMIN — KETOROLAC TROMETHAMINE 30 MG: 30 INJECTION, SOLUTION INTRAMUSCULAR; INTRAVENOUS at 19:09

## 2023-04-04 RX ADMIN — FAMOTIDINE 20 MG: 20 TABLET, FILM COATED ORAL at 23:46

## 2023-04-04 RX ADMIN — LIDOCAINE HYDROCHLORIDE 15 ML: 20 SOLUTION ORAL; TOPICAL at 15:44

## 2023-04-04 RX ADMIN — IPRATROPIUM BROMIDE AND ALBUTEROL SULFATE 3 ML: 2.5; .5 SOLUTION RESPIRATORY (INHALATION) at 15:49

## 2023-04-04 RX ADMIN — SODIUM CHLORIDE, POTASSIUM CHLORIDE, SODIUM LACTATE AND CALCIUM CHLORIDE 500 ML: 600; 310; 30; 20 INJECTION, SOLUTION INTRAVENOUS at 15:45

## 2023-04-04 RX ADMIN — IOPAMIDOL 100 ML: 755 INJECTION, SOLUTION INTRAVENOUS at 18:12

## 2023-04-04 RX ADMIN — ALUMINUM HYDROXIDE, MAGNESIUM HYDROXIDE, AND DIMETHICONE 15 ML: 400; 400; 40 SUSPENSION ORAL at 15:44

## 2023-04-04 RX ADMIN — SODIUM CHLORIDE 1000 ML: 9 INJECTION, SOLUTION INTRAVENOUS at 20:03

## 2023-04-04 RX ADMIN — ENOXAPARIN SODIUM 40 MG: 100 INJECTION SUBCUTANEOUS at 22:55

## 2023-04-04 RX ADMIN — LEVOFLOXACIN 750 MG: 750 INJECTION, SOLUTION INTRAVENOUS at 23:46

## 2023-04-04 NOTE — TELEPHONE ENCOUNTER
Pt called today to find out what is next since she cannot be seen with [facility]. Informed her that we are waiting on a new decision from Dr. Thomspon, however he is out of the office this week and will return next week. When he returns, we will discuss this and return her call with the next steps. Pt voiced understanding.

## 2023-04-04 NOTE — ED PROVIDER NOTES
Subjective   History of Present Illness  Patient is a 67-year-old female that presents to the emergency room with dyspnea.  Patient has a history of COPD, hypothyroid, pulmonary embolisms.  Patient was febrile when she was initially assessed, she is accompanied by her daughter.        Review of Systems   All other systems reviewed and are negative.      Past Medical History:   Diagnosis Date   • Acid reflux    • Anemia    • Anxiety    • Arthritis    • Asthma     Mild COPD   • Curtis esophagus    • Bleeding disorder     Reflux acid overload   • Bunion Aug    Repaired with surgery   • Chest pain    • Chronic back pain    • Chronic neck pain    • Clotting disorder     Stomach bleeds from acid   • COPD (chronic obstructive pulmonary disease)    • Difficulty walking Dec 2022    After surgery wslking has become more and more difficult   • DVT (deep venous thrombosis)    • GI bleed    • Hammer toe Aug 22    Repaired surgery   • Hypothyroid    • Hypothyroidism    • Low back pain    • Migraine    • PE (pulmonary thromboembolism)    • Plantar fasciitis Years ago   • Shin splints Years ago       Allergies   Allergen Reactions   • Erythromycin Swelling     Throat swells   • Hydromorphone Hcl Anaphylaxis and Other (See Comments)     Other reaction(s): Shock and/or Unconsciousness  Note: Anaphylaxis   • Ondansetron Hcl Other (See Comments)     Coded  Coded  Other reaction(s): Zofran  Note:  Allergic Reaction: Anaphylaxis   • Orphenadrine Anaphylaxis   • Orphenadrine Citrate Anaphylaxis   • Meperidine Other (See Comments)     Makes her an angry person   • Codeine Rash   • Guaifenesin & Derivatives Nausea And Vomiting   • Guaifenesin Er Swelling     Throat swelling       Past Surgical History:   Procedure Laterality Date   • BUNIONECTOMY     • CHOLECYSTECTOMY     • COLONOSCOPY     • CORRECTION HAMMER TOE     • DILATATION AND CURETTAGE     • FOOT FUSION Left 04/20/2022    Procedure: 1-2 Arthrodesis, Tarsal Metatarsal Joint 2 and 3  Arthrodesis;  Surgeon: Bud Maradiaga DPM;  Location:  [facility] OR;  Service: Podiatry;  Laterality: Left;   • HAMMER TOE REPAIR Left 2022    Procedure: Hammertoe Repair 2-5,;  Surgeon: Bud Maradiaga DPM;  Location:  [facility];  Service: Podiatry;  Laterality: Left;   • HERNIA REPAIR     • HYSTERECTOMY     • JOINT REPLACEMENT     • OOPHORECTOMY     • REDUCTION MAMMAPLASTY  2021   • REPLACEMENT TOTAL KNEE      Right knee partial, left knee       Family History   Problem Relation Age of Onset   • Breast cancer Paternal Aunt    • Heart disease Mother    • Osteoporosis Mother    • Thyroid disease Mother    • Cancer Father         Father’s entire family  from Cancer   • Diabetes Father    • Thyroid disease Father    • Thyroid disease Maternal Aunt    • Thyroid disease Maternal Uncle    • Thyroid disease Sister    • Ovarian cancer Neg Hx    • Uterine cancer Neg Hx    • Colon cancer Neg Hx        Social History     Socioeconomic History   • Marital status:    Tobacco Use   • Smoking status: Former     Packs/day: 1.50     Years: 46.00     Pack years: 69.00     Types: Cigarettes     Start date: 1972     Quit date: 2018     Years since quittin.0     Passive exposure: Past   • Smokeless tobacco: Never   • Tobacco comments:     Pt started at age 17, then quit at age 31. Pt restarted smoking at age 32-33 and quit smoking in 2018   Vaping Use   • Vaping Use: Never used   Substance and Sexual Activity   • Alcohol use: Not Currently     Alcohol/week: 1.0 standard drink     Types: 1 Drinks containing 0.5 oz of alcohol per week     Comment: A chocolate drink made with moonshine and creamer   • Drug use: No   • Sexual activity: Yes     Partners: Male     Birth control/protection: Surgical           Objective   Physical Exam  Vitals reviewed.   Constitutional:       General: She is not in acute distress.     Appearance: She is normal weight. She is not ill-appearing or toxic-appearing.   HENT:       Head: Normocephalic and atraumatic.   Cardiovascular:      Rate and Rhythm: Normal rate and regular rhythm.   Pulmonary:      Effort: Pulmonary effort is normal.      Breath sounds: Decreased breath sounds and wheezing present.   Abdominal:      General: Bowel sounds are normal.      Palpations: Abdomen is soft.   Musculoskeletal:      Right lower leg: Edema present.      Left lower leg: Edema present.   Skin:     General: Skin is warm and dry.      Capillary Refill: Capillary refill takes less than 2 seconds.   Neurological:      General: No focal deficit present.      Mental Status: She is alert and oriented to person, place, and time.   Psychiatric:         Mood and Affect: Mood normal.         Procedures           ED Course  ED Course as of 04/1955 Tue Apr 04, 2023   1603 WBC(!): 16.32 [MA]   1603 MCHC(!): 30.8 [MA]   1603 RDW(!): 15.9  Patient has elevated leukocytosis will order a Rocephin on patient. [MA]   1621 Procalcitonin(!): 0.62 [MA]   1621 D-Dimer, Quant(!): 1.26 [MA]   1621 HS Troponin T: <6 [MA]   1621 proBNP: 55.3 [MA]   1633 Patient has an elevated D-dimer of 1.26 with a Pro-Joshua of 0.62 and a white count of 16.32 he did [MA]   1724 IMPRESSION:  Pulmonary hypoventilation with advanced COPD, mild increase  in central vascular congestion and increased interstitial opacities in  the left upper lobe. This may be related to left greater than right  asymmetric interstitial pneumonitis, versus asymmetric pulmonary edema. [MA]   1838 Temp(!): 100.6 °F (38.1 °C) [MA]   1839 I assumed care of the patient from Dr. Gillis.  Patient CT angiogram of the chest was pending. [RP]      ED Course User Index  [MA] Karthik Gillis MD  [RP] Tommy Trotter MD                                         EXAMINATION:  CT ANGIOGRAM CHEST-  4/4/2023 6:07 PM CDT     HISTORY: Pulmonary embolism (PE) suspected, unknown D-dimer. Shortness  of air. COPD. Fever. Wheezing and decreased breath sounds. Lower  extremity edema.      COMPARISON : 03/21/2023.     DLP: 800 mGy-cm. Automated dosage reduction technique was utilized to  decrease patient dosage.     TECHNIQUE: CT angio was performed of the chest with IV contrast.  Coronal, sagittal and 3-D reconstruction were performed.     INDEPENDENT 3-D WORKSTATION UTILIZED FOR RECONSTRUCTION: Yes. A  radiologist was not present in the department.     MEDIASTINUM, HEART AND VASCULAR STRUCTURES: There is mild atheromatous  disease of the thoracic aorta and coronary arteries. The thoracic aorta  is normal caliber. There is cardiomegaly. The main pulmonary artery  segment measures 3 cm. There is no CT evidence of pulmonary embolus.     LUNGS: A mass in the right lung apex is again noted. There is no change  compared to the recent CT. However, this lesion has been increasing in  size and is PET positive. A subpleural 7 mm right middle lobe nodule  image 87 series 8 is stable. There are new bilateral parenchymal  infiltrates greatest in the left upper lobe. There are new infiltrates  in both lower lobes. There is no significant pleural effusion.     UPPER ABDOMEN: There is fatty infiltration of the liver. There is a  moderate size hiatal hernia. There has been prior cholecystectomy. There  is a 1.6 cm right renal lesion with a Hounsfield unit measurement of 48.     BONES: No acute bony abnormality is seen.        IMPRESSION:  1. No CT evidence of pulmonary embolus. Main pulmonary artery segment  borderline dilated at 3 cm. Atheromatous disease of the thoracic aorta  and coronary arteries. Cardiomegaly.  2. New lung infiltrates bilaterally most likely due to pneumonia. This  is most dense in the left upper lobe.  3. A mass in the right lung apex is again noted. This mass is PET  positive and worrisome for primary lung carcinoma.  4. Stable small nodule in the right middle lobe laterally.  5. Indeterminate small right renal lesion measuring 1.6 cm with a  Hounsfield unit measurement of 48.  6. Fatty  infiltration of the liver.  7. Moderate size hiatal hernia.     The full report of this exam was immediately signed and available to the  emergency room. The patient is currently in the emergency room.    This report was finalized on 04/04/2023 18:44 by Dr. Brandon Fenton MD.    Lab Results (last 24 hours)     Procedure Component Value Units Date/Time    CBC & Differential [[id number]]  (Abnormal) Collected: 04/04/23 1546    Specimen: Blood Updated: 04/04/23 1554    Narrative:      The following orders were created for panel order CBC & Differential.  Procedure                               Abnormality         Status                     ---------                               -----------         ------                     CBC Auto Differential[[id number]]        Abnormal            Final result                 Please view results for these tests on the individual orders.    Comprehensive Metabolic Panel [[id number]]  (Abnormal) Collected: 04/04/23 1546    Specimen: Blood Updated: 04/04/23 1614     Glucose 125 mg/dL      BUN 19 mg/dL      Creatinine 0.76 mg/dL      Sodium 131 mmol/L      Potassium 3.7 mmol/L      Chloride 98 mmol/L      CO2 23.0 mmol/L      Calcium 7.9 mg/dL      Total Protein 6.5 g/dL      Albumin 3.6 g/dL      ALT (SGPT) 37 U/L      AST (SGOT) 29 U/L      Alkaline Phosphatase 97 U/L      Total Bilirubin 0.6 mg/dL      Globulin 2.9 gm/dL      A/G Ratio 1.2 g/dL      BUN/Creatinine Ratio 25.0     Anion Gap 10.0 mmol/L      eGFR 87.1 mL/min/1.73     Narrative:      GFR Normal >60  Chronic Kidney Disease <60  Kidney Failure <15      BNP [[id number]]  (Normal) Collected: 04/04/23 1546    Specimen: Blood Updated: 04/04/23 1610     proBNP 55.3 pg/mL     Narrative:      Among patients with dyspnea, NT-proBNP is highly sensitive for the detection of acute congestive heart failure. In addition NT-proBNP of <300 pg/ml effectively rules out acute congestive heart failure with 99% negative predictive  "value.    Results may be falsely decreased if patient taking Biotin.      D-dimer, Quantitative [[id number]]  (Abnormal) Collected: 04/04/23 1546    Specimen: Blood Updated: 04/04/23 1610     D-Dimer, Quantitative 1.26 MCGFEU/mL     Narrative:      According to the assay 's published package insert, a normal (<0.50 MCGFEU/mL) D-dimer result in conjunction with a non-high clinical probability assessment, excludes deep vein thrombosis (DVT) and pulmonary embolism (PE) with high sensitivity.    D-dimer values increase with age and this can make VTE exclusion of an older population difficult. To address this, the American College [employer], based on best available evidence and recent guidelines, recommends that clinicians use age-adjusted D-dimer thresholds in patients greater than 50 years of age with: a) a low probability of PE who do not meet all Pulmonary Embolism Rule Out Criteria, or b) in those with intermediate probability of PE.   The formula for an age-adjusted D-dimer cut-off is \"age/100\".  For example, a 60 year old patient would have an age-adjusted cut-off of 0.60 MCGFEU/mL and an 80 year old 0.80 MCGFEU/mL.    High Sensitivity Troponin T [[id number]]  (Normal) Collected: 04/04/23 1546    Specimen: Blood Updated: 04/04/23 1610     HS Troponin T <6 ng/L     Narrative:      High Sensitive Troponin T Reference Range:  <10.0 ng/L- Negative Female for AMI  <15.0 ng/L- Negative Male for AMI  >=10 - Abnormal Female indicating possible myocardial injury.  >=15 - Abnormal Male indicating possible myocardial injury.   Clinicians would have to utilize clinical acumen, EKG, Troponin, and serial changes to determine if it is an Acute Myocardial Infarction or myocardial injury due to an underlying chronic condition.         Procalcitonin [[id number]]  (Abnormal) Collected: 04/04/23 1546    Specimen: Blood Updated: 04/04/23 1620     Procalcitonin 0.62 ng/mL     Narrative:      As a Marker for Sepsis " "(Non-Neonates):    1. <0.5 ng/mL represents a low risk of severe sepsis and/or septic shock.  2. >2 ng/mL represents a high risk of severe sepsis and/or septic shock.    As a Marker for Lower Respiratory Tract Infections that require antibiotic therapy:    PCT on Admission    Antibiotic Therapy       6-12 Hrs later    >0.5                Strongly Recommended  >0.25 - <0.5        Recommended   0.1 - 0.25          Discouraged              Remeasure/reassess PCT  <0.1                Strongly Discouraged     Remeasure/reassess PCT    As 28 day mortality risk marker: \"Change in Procalcitonin Result\" (>80% or <=80%) if Day 0 (or Day 1) and Day 4 values are available. Refer to http://[email or web address] [email or web address]-[email or web address].com    Change in PCT <=80%  A decrease of PCT levels below or equal to 80% defines a positive change in PCT test result representing a higher risk for 28-day all-cause mortality of patients diagnosed with severe sepsis for septic shock.    Change in PCT >80%  A decrease of PCT levels of more than 80% defines a negative change in PCT result representing a lower risk for 28-day all-cause mortality of patients diagnosed with severe sepsis or septic shock.       CBC Auto Differential [[id number]]  (Abnormal) Collected: 04/04/23 1546    Specimen: Blood Updated: 04/04/23 1554     WBC 16.32 10*3/mm3      RBC 4.74 10*6/mm3      Hemoglobin 12.2 g/dL      Hematocrit 39.6 %      MCV 83.5 fL      MCH 25.7 pg      MCHC 30.8 g/dL      RDW 15.9 %      RDW-SD 47.8 fl      MPV 9.0 fL      Platelets 255 10*3/mm3      Neutrophil % 90.8 %      Lymphocyte % 5.0 %      Monocyte % 3.2 %      Eosinophil % 0.2 %      Basophil % 0.2 %      Immature Grans % 0.6 %      Neutrophils, Absolute 14.83 10*3/mm3      Lymphocytes, Absolute 0.81 10*3/mm3      Monocytes, Absolute 0.52 10*3/mm3      Eosinophils, Absolute 0.03 10*3/mm3      Basophils, Absolute 0.04 10*3/mm3      Immature Grans, Absolute 0.09 10*3/mm3      nRBC 0.0 /100 WBC     Lactic Acid, " Plasma [[id number]]  (Normal) Collected: 04/04/23 1619    Specimen: Blood Updated: 04/04/23 1706     Lactate 1.7 mmol/L     Blood Culture - Blood, Arm, Right [[id number]] Collected: 04/04/23 1630    Specimen: Blood from Arm, Right Updated: 04/04/23 1712    Blood Culture - Blood, Wrist, Right [[id number]] Collected: 04/04/23 1715    Specimen: Blood from Wrist, Right Updated: 04/04/23 1747    High Sensitivity Troponin T 2Hr [[id number]] Collected: 04/04/23 1845    Specimen: Blood Updated: 04/04/23 1917     HS Troponin T <6 ng/L      Troponin T Delta --     Comment: Unable to calculate.       Narrative:      High Sensitive Troponin T Reference Range:  <10.0 ng/L- Negative Female for AMI  <15.0 ng/L- Negative Male for AMI  >=10 - Abnormal Female indicating possible myocardial injury.  >=15 - Abnormal Male indicating possible myocardial injury.   Clinicians would have to utilize clinical acumen, EKG, Troponin, and serial changes to determine if it is an Acute Myocardial Infarction or myocardial injury due to an underlying chronic condition.               Medical Decision Making  Differential diagnosis for the patient includes COPD exacerbation, CHF exacerbation, PE, ACS, PNA, PTX, URI, Viral illness, Anemia, Electrolyte abnormality, Pneumonia, Sepsis. In the emergency room, the patient was given Duonebs immediately, and we will obtain an EKG. Will also pursue with a chest x-ray and labs including CBC, CMP, Cardiac Troponin, Dimer, and BNP.  Patient was also given magnesium 1g IV.       1953  Patient CT angiogram of the chest did show bilateral pneumonia.  Patient CT angiogram of the chest did not show PE.  Patient is hypoxic and requiring oxygen.  Patient does not use any oxygen at home.  Patient was given cefepime for her pneumonia that was found on the CAT scan.  Patient will be admitted to the hospitalist service for further work-up and evaluation.  I spoke with the hospitalist on-call Dr. Lock who has accepted the  patient under his services.    Acute respiratory failure with hypoxia: acute illness or injury  Pneumonia of both lungs due to infectious organism, unspecified part of lung: acute illness or injury  Shortness of breath: acute illness or injury  Amount and/or Complexity of Data Reviewed  Labs: ordered. Decision-making details documented in ED Course.  Radiology: ordered. Decision-making details documented in ED Course.  ECG/medicine tests: ordered. Decision-making details documented in ED Course.      Risk  OTC drugs.  Prescription drug management.  Decision regarding hospitalization.          Final diagnoses:   Pneumonia of both lungs due to infectious organism, unspecified part of lung   Shortness of breath   Acute respiratory failure with hypoxia       ED Disposition  ED Disposition     ED Disposition   Decision to Admit    Condition   --    Comment   Level of Care: Telemetry [5]   Diagnosis: Pneumonia of both lungs due to infectious organism, unspecified part of lung [[id number]]   Admitting Physician: JUAN RAMON CEBALLOS [[id number]]   Attending Physician: JUAN RAMON CEBALLOS [[id number]]               No follow-up provider specified.       Medication List      No changes were made to your prescriptions during this visit.          Tommy Trotter MD  04/1955

## 2023-04-05 PROBLEM — R11.2 NAUSEA AND VOMITING: Status: ACTIVE | Noted: 2023-04-05

## 2023-04-05 PROBLEM — G25.81 RESTLESS LEG SYNDROME: Status: ACTIVE | Noted: 2023-04-05

## 2023-04-05 PROBLEM — R19.7 DIARRHEA: Status: ACTIVE | Noted: 2023-04-05

## 2023-04-05 PROBLEM — N28.9 RENAL LESION: Status: ACTIVE | Noted: 2023-04-05

## 2023-04-05 LAB
ANION GAP SERPL CALCULATED.3IONS-SCNC: 9 MMOL/L (ref 5–15)
BASOPHILS # BLD AUTO: 0.05 10*3/MM3 (ref 0–0.2)
BASOPHILS NFR BLD AUTO: 0.3 % (ref 0–1.5)
BUN SERPL-MCNC: 21 MG/DL (ref 8–23)
BUN/CREAT SERPL: 26.3 (ref 7–25)
CALCIUM SPEC-SCNC: 8.3 MG/DL (ref 8.6–10.5)
CHLORIDE SERPL-SCNC: 107 MMOL/L (ref 98–107)
CO2 SERPL-SCNC: 22 MMOL/L (ref 22–29)
CREAT SERPL-MCNC: 0.8 MG/DL (ref 0.57–1)
DEPRECATED RDW RBC AUTO: 51.7 FL (ref 37–54)
EGFRCR SERPLBLD CKD-EPI 2021: 81.9 ML/MIN/1.73
EOSINOPHIL # BLD AUTO: 0.08 10*3/MM3 (ref 0–0.4)
EOSINOPHIL NFR BLD AUTO: 0.5 % (ref 0.3–6.2)
ERYTHROCYTE [DISTWIDTH] IN BLOOD BY AUTOMATED COUNT: 15.9 % (ref 12.3–15.4)
FLUAV RNA RESP QL NAA+PROBE: NOT DETECTED
FLUBV RNA RESP QL NAA+PROBE: NOT DETECTED
GLUCOSE SERPL-MCNC: 87 MG/DL (ref 65–99)
HCT VFR BLD AUTO: 42.8 % (ref 34–46.6)
HGB BLD-MCNC: 12.4 G/DL (ref 12–15.9)
IMM GRANULOCYTES # BLD AUTO: 0.14 10*3/MM3 (ref 0–0.05)
IMM GRANULOCYTES NFR BLD AUTO: 0.9 % (ref 0–0.5)
LYMPHOCYTES # BLD AUTO: 1.46 10*3/MM3 (ref 0.7–3.1)
LYMPHOCYTES NFR BLD AUTO: 9.6 % (ref 19.6–45.3)
MCH RBC QN AUTO: 25.6 PG (ref 26.6–33)
MCHC RBC AUTO-ENTMCNC: 29 G/DL (ref 31.5–35.7)
MCV RBC AUTO: 88.4 FL (ref 79–97)
MONOCYTES # BLD AUTO: 0.46 10*3/MM3 (ref 0.1–0.9)
MONOCYTES NFR BLD AUTO: 3 % (ref 5–12)
NEUTROPHILS NFR BLD AUTO: 12.98 10*3/MM3 (ref 1.7–7)
NEUTROPHILS NFR BLD AUTO: 85.7 % (ref 42.7–76)
NRBC BLD AUTO-RTO: 0 /100 WBC (ref 0–0.2)
PLATELET # BLD AUTO: 242 10*3/MM3 (ref 140–450)
PMV BLD AUTO: 9.5 FL (ref 6–12)
POTASSIUM SERPL-SCNC: 3.8 MMOL/L (ref 3.5–5.2)
QT INTERVAL: 342 MS
QTC INTERVAL: 429 MS
RBC # BLD AUTO: 4.84 10*6/MM3 (ref 3.77–5.28)
SARS-COV-2 RNA RESP QL NAA+PROBE: NOT DETECTED
SODIUM SERPL-SCNC: 138 MMOL/L (ref 136–145)
TROPONIN T SERPL HS-MCNC: 7 NG/L
WBC NRBC COR # BLD: 15.17 10*3/MM3 (ref 3.4–10.8)

## 2023-04-05 PROCEDURE — 85025 COMPLETE CBC W/AUTO DIFF WBC: CPT | Performed by: INTERNAL MEDICINE

## 2023-04-05 PROCEDURE — 94799 UNLISTED PULMONARY SVC/PX: CPT

## 2023-04-05 PROCEDURE — 25010000002 ENOXAPARIN PER 10 MG: Performed by: INTERNAL MEDICINE

## 2023-04-05 PROCEDURE — 36415 COLL VENOUS BLD VENIPUNCTURE: CPT | Performed by: INTERNAL MEDICINE

## 2023-04-05 PROCEDURE — 84484 ASSAY OF TROPONIN QUANT: CPT | Performed by: INTERNAL MEDICINE

## 2023-04-05 PROCEDURE — G0378 HOSPITAL OBSERVATION PER HR: HCPCS

## 2023-04-05 PROCEDURE — 94761 N-INVAS EAR/PLS OXIMETRY MLT: CPT

## 2023-04-05 PROCEDURE — 94640 AIRWAY INHALATION TREATMENT: CPT

## 2023-04-05 PROCEDURE — 87636 SARSCOV2 & INF A&B AMP PRB: CPT

## 2023-04-05 PROCEDURE — 80048 BASIC METABOLIC PNL TOTAL CA: CPT | Performed by: INTERNAL MEDICINE

## 2023-04-05 RX ORDER — PROCHLORPERAZINE EDISYLATE 5 MG/ML
5 INJECTION INTRAMUSCULAR; INTRAVENOUS EVERY 6 HOURS PRN
Status: DISCONTINUED | OUTPATIENT
Start: 2023-04-05 | End: 2023-04-08 | Stop reason: HOSPADM

## 2023-04-05 RX ORDER — PRAMIPEXOLE DIHYDROCHLORIDE 1 MG/1
1 TABLET ORAL 2 TIMES DAILY
Status: DISCONTINUED | OUTPATIENT
Start: 2023-04-05 | End: 2023-04-08 | Stop reason: HOSPADM

## 2023-04-05 RX ORDER — PANTOPRAZOLE SODIUM 40 MG/1
40 TABLET, DELAYED RELEASE ORAL 2 TIMES DAILY
Status: DISCONTINUED | OUTPATIENT
Start: 2023-04-05 | End: 2023-04-07

## 2023-04-05 RX ORDER — ALUMINA, MAGNESIA, AND SIMETHICONE 2400; 2400; 240 MG/30ML; MG/30ML; MG/30ML
30 SUSPENSION ORAL EVERY 6 HOURS PRN
Status: DISCONTINUED | OUTPATIENT
Start: 2023-04-05 | End: 2023-04-08 | Stop reason: HOSPADM

## 2023-04-05 RX ORDER — PREGABALIN 75 MG/1
75 CAPSULE ORAL 2 TIMES DAILY PRN
Status: DISCONTINUED | OUTPATIENT
Start: 2023-04-05 | End: 2023-04-08 | Stop reason: HOSPADM

## 2023-04-05 RX ORDER — ESOMEPRAZOLE MAGNESIUM 40 MG/1
80 CAPSULE, DELAYED RELEASE ORAL
COMMUNITY

## 2023-04-05 RX ADMIN — ALUMINUM HYDROXIDE, MAGNESIUM HYDROXIDE, AND DIMETHICONE 30 ML: 400; 400; 40 SUSPENSION ORAL at 05:21

## 2023-04-05 RX ADMIN — CYCLOBENZAPRINE 10 MG: 10 TABLET, FILM COATED ORAL at 04:39

## 2023-04-05 RX ADMIN — HYDROCODONE BITARTRATE AND ACETAMINOPHEN 1 TABLET: 7.5; 325 TABLET ORAL at 07:12

## 2023-04-05 RX ADMIN — FLUTICASONE PROPIONATE 2 SPRAY: 50 SPRAY, METERED NASAL at 08:04

## 2023-04-05 RX ADMIN — LEVOTHYROXINE SODIUM 112 MCG: 112 TABLET ORAL at 06:15

## 2023-04-05 RX ADMIN — PRAMIPEXOLE DIHYDROCHLORIDE 1 MG: 1 TABLET ORAL at 20:39

## 2023-04-05 RX ADMIN — METOCLOPRAMIDE 5 MG: 5 TABLET ORAL at 06:15

## 2023-04-05 RX ADMIN — DICYCLOMINE HYDROCHLORIDE 10 MG: 10 CAPSULE ORAL at 11:48

## 2023-04-05 RX ADMIN — PRAMIPEXOLE DIHYDROCHLORIDE 1 MG: 1 TABLET ORAL at 09:42

## 2023-04-05 RX ADMIN — BUPROPION HYDROCHLORIDE 300 MG: 150 TABLET, FILM COATED, EXTENDED RELEASE ORAL at 08:05

## 2023-04-05 RX ADMIN — BUDESONIDE 0.5 MG: 0.5 SUSPENSION RESPIRATORY (INHALATION) at 01:14

## 2023-04-05 RX ADMIN — PANTOPRAZOLE SODIUM 40 MG: 40 TABLET, DELAYED RELEASE ORAL at 20:41

## 2023-04-05 RX ADMIN — PANTOPRAZOLE SODIUM 40 MG: 40 TABLET, DELAYED RELEASE ORAL at 09:42

## 2023-04-05 RX ADMIN — BUDESONIDE 0.5 MG: 0.5 SUSPENSION RESPIRATORY (INHALATION) at 20:50

## 2023-04-05 RX ADMIN — HYDROCODONE BITARTRATE AND ACETAMINOPHEN 1 TABLET: 7.5; 325 TABLET ORAL at 20:41

## 2023-04-05 RX ADMIN — FOLIC ACID 1 MG: 1 TABLET ORAL at 08:05

## 2023-04-05 NOTE — CONSULTS
MEDICAL ONCOLOGY CONSULTATION    Pt Name: Melly Cruz  MRN: [id number]  [id number]  YOB: 1955  Date of evaluation: 4/5/2023    Reason for Consultation: Lung mass    Requesting Physician: Dr. Tejas Lock     History Obtained From: The patient and EMR    HISTORY OF PRESENT ILLNESS:    Melly Cruz is a 67-year-old  female whom I have been asked to see regarding a right apical lung mass.      TUMOR HISTORY: Right apical lung mass  Melly was seen in initial medical oncology consultation as an inpatient at [facility] on 4/5/2023 at the request of the hospitalist, Dr. Tejas Lock.    Melly is a previous smoker, states that she quit in ~2018.  She smoked about 2 packs/day since about age 16.   Melly states that she has been followed with a right lung nodule for many years.    The lung nodule is being followed by Dr. Agusto Thompson and ENOC Rose with the cardiothoracic surgery department at [facility].  She was last seen by Dr. Agusto Thompson on 12/7/2022    Review imaging from [facility] is as follows:    CTA chest 10/11/2017: No PE.  Subtle patchy infiltrate RUL lung  Contrasted chest CT 1/21/2018: 4 mm subpleural nodule LLL, unchanged from 2010.  Stable 7 mm superior segment LLL nodule.  New from 2010, is a 10-11 mm spiculated RUL apex nodule (postinflammatory scarring related to pneumonia 10/2017 versus underlying malignancy)  PET scan 10/4/2018: 11 mm subsolid RUL apex nodule, SUV 1.5. 5 mm LLL subpleural nodule is stable from 2010  Contrasted chest CT 4/22/2019: 15 mm subsolid nodule posterior right apex, unchanged from 10/2017 with no hypermetabolic abnormality in this area on PET 10/2018.  The area has developed from 2010.  Continue annual surveillance to ensure stability.  Subpleural LLL nodule has been present since 2010 and is benign  CTA chest 4/29/2019: No PE.  Stable 1.5 cm RUL groundglass opacity similar to 1/21/2018 and 10/11/2017, considering differences in technique.   Follow-up chest CT in 1 year  Contrasted chest CT 6/16/2020: New, 0.9 cm lingula pleural-based pulmonary nodule versus atelectasis which previously appeared more triangular on 4/29/2019.  Recommend follow-up CT chest in 3 months.  Stable 1.5 cm RUL groundglass opacity  PET scan 6/30/2020: No FDG activity associated with the small, new 9 mm lingular nodule.  Stable 13 mm groundglass RUL nodule, no FDG uptake.  Stable, 3 mm subpleural LLL nodule.  A few, normal size lymph nodes in the inferior axilla bilaterally have low-level uptake, SUV 2.7-May be reactive.  Contrasted chest CT 12/16/2020: Stable 1.5 cm RUL groundglass opacity.  0.9 cm pulmonary nodule versus atelectasis in the lingula, similar when compared to 6/16/2020.  Stable 5 mm pleural-based LLL nodule when compared to 1/21/2018, which is 3 years of stability  Contrasted chest CT 12/8/2021: Stable RUL groundglass opacity, unchanged from 4/29/2019, adjacent subpleural nodule within the RUL, stable.  Unchanged focus of pleural-based nodularity within the inferior lingular segment of THI.  Stable subpleural soft tissue nodule within the left posterior costophrenic angle and lower lobe dating to 2019.  No new nodules  Noncontrast CT abdomen/pelvis 8/23/2022: 6 x 4 mm LLL subpleural lung nodule, unchanged for 2 years.  Indeterminate 15 x 17 mm lingular nodule.  Options include follow-up 3-month chest CT and PET scan     Contrasted chest CT 12/7/20[facility] at [facility]:  • Previously described groundglass opacity within the right upper lobeshows interval increase in size is now more elongate in appearance with increased soft tissue component. It does demonstrate linear extension to the pleural surface with some associated volume loss suggesting associated parenchymal scarring. This may simply represent some progressive scarring but given the change from the previous exam I would suggest PET/CT for further characterization. Additional previously described nodules are stable.  There are changes of centrilobular emphysema.  • No developing mediastinal or axillary lymphadenopathy.     PET scan 3/17/2023:  • Slowly enlarging hypermetabolic 3.3 x 1.0 x 2.9 cm subsolid RIGHT upper lobe lung mass, SUV 4.1. This is highly suspicious for a primary lung neoplasm, favoring adenocarcinoma.  • No evidence of metastatic disease.  • Focal nodular hypermetabolic activity in the sigmoid colon, SUV 4.4. Differential diagnosis includes focal inflamed diverticulum versus underlying polyp or neoplasm. Recommend correlation with colonoscopy.     CTA chest 4/4/2023:  • No CT evidence of pulmonary embolus. Main pulmonary artery segment borderline dilated at 3 cm. Atheromatous disease of the thoracic aorta and coronary arteries. Cardiomegaly.  • New lung infiltrates bilaterally most likely due to pneumonia. This is most dense in the left upper lobe.  • A mass in the right lung apex is again noted. This mass is PET positive and worrisome for primary lung carcinoma.  • Stable small nodule in the right middle lobe laterally.  • Indeterminate small right renal lesion measuring 1.6 cm with a Hounsfield unit measurement of 48.  • Fatty infiltration of the liver.  • Moderate size hiatal hernia.      On 3/29/2023, Dr. Thompson's office attempted to refer Melly to Dr. Mendoza or Dr. Dove for EBUS, possible navigational bronchoscopy to obtain a tissue diagnosis.  Unfortunately they are out of network for Mrs. Cruz's insurance.    Melly came to the the ED at [facility] on 4/4/2023 with a history of mid epigastric and substernal chest pain, pleuritic in nature with increasing dyspnea, temperature elevation to 100.6 °F, tachycardia and hypoxia.  She was found to have bilateral pneumonia.  She was admitted for management.    Medical oncology consultation requested with the above history.            Past Medical History:    Past Medical History:   Diagnosis Date   • Acid reflux    • Anemia    • Anxiety    • Arthritis    • Asthma      Mild COPD   • Curtis esophagus    • Bleeding disorder     Reflux acid overload   • Bunion Aug    Repaired with surgery   • Chest pain    • Chronic back pain    • Chronic neck pain    • Clotting disorder     Stomach bleeds from acid   • COPD (chronic obstructive pulmonary disease)    • Difficulty walking Dec 2022    After surgery wslking has become more and more difficult   • DVT (deep venous thrombosis)    • GI bleed    • Hammer toe Aug 22    Repaired surgery   • Hypothyroid    • Hypothyroidism    • Low back pain    • Migraine    • PE (pulmonary thromboembolism)    • Plantar fasciitis Years ago   • Renal lesion 4/5/2023   • Walter splints Years ago       Past Surgical History:    Past Surgical History:   Procedure Laterality Date   • BUNIONECTOMY     • CHOLECYSTECTOMY     • COLONOSCOPY     • CORRECTION HAMMER TOE     • DILATATION AND CURETTAGE     • FOOT FUSION Left 04/20/2022    Procedure: 1-2 Arthrodesis, Tarsal Metatarsal Joint 2 and 3 Arthrodesis;  Surgeon: Bud Maradiaga DPM;  Location:  [facility] OR;  Service: Podiatry;  Laterality: Left;   • HAMMER TOE REPAIR Left 04/20/2022    Procedure: Hammertoe Repair 2-5,;  Surgeon: Bud Maradiaga DPM;  Location:  [facility] OR;  Service: Podiatry;  Laterality: Left;   • HERNIA REPAIR     • HYSTERECTOMY     • JOINT REPLACEMENT     • OOPHORECTOMY     • REDUCTION MAMMAPLASTY  01/2021   • REPLACEMENT TOTAL KNEE      Right knee partial, left knee       Current Hospital Medications:      Current Facility-Administered Medications:   •  aluminum-magnesium hydroxide-simethicone (MAALOX MAX) 400-400-40 MG/5ML suspension 30 mL, 30 mL, Oral, Q6H PRN, Tejas Lock MD, 30 mL at 04/05/23 0521  •  budesonide (PULMICORT) nebulizer solution 0.5 mg, 0.5 mg, Nebulization, BID - RT, Tejas Lock MD, 0.5 mg at 04/05/23 0114  •  buPROPion XL (WELLBUTRIN XL) 24 hr tablet 300 mg, 300 mg, Oral, QAM AC, Tejas Lock MD, 300 mg at 04/05/23 0805  •  cyclobenzaprine (FLEXERIL)  tablet 10 mg, 10 mg, Oral, TID PRN, Tejas Lock MD, 10 mg at 04/05/23 0439  •  dicyclomine (BENTYL) capsule 10 mg, 10 mg, Oral, TID PRN, Tejas Lock MD, 10 mg at 04/05/23 1148  •  Enoxaparin Sodium (LOVENOX) syringe 40 mg, 40 mg, Subcutaneous, Q24H, Tejas Lock MD, 40 mg at 04/04/23 2255  •  fluticasone (FLONASE) 50 MCG/ACT nasal spray 2 spray, 2 spray, Nasal, Daily, Tejas Lock MD, 2 spray at 04/05/23 0804  •  folic acid (FOLVITE) tablet 1 mg, 1 mg, Oral, Daily, Tejas Lock MD, 1 mg at 04/05/23 0805  •  HYDROcodone-acetaminophen (NORCO) 7.5-325 MG per tablet 1 tablet, 1 tablet, Oral, Q8H PRN, Tejas Lock MD, 1 tablet at 04/05/23 0712  •  ipratropium-albuterol (DUO-NEB) nebulizer solution 3 mL, 3 mL, Nebulization, Q4H PRN, Tejas Lock MD  •  levoFLOXacin (LEVAQUIN) 750 mg/150 mL D5W (premix) (LEVAQUIN) 750 mg, 750 mg, Intravenous, Q24H, Tejas Lock MD, Stopped at 04/05/23 0135  •  levothyroxine (SYNTHROID, LEVOTHROID) tablet 112 mcg, 112 mcg, Oral, Q AM, Tejas Lock MD, 112 mcg at 04/05/23 0615  •  metoclopramide (REGLAN) tablet 5 mg, 5 mg, Oral, TID PRN, Tejas Lock MD, 5 mg at 04/05/23 0615  •  pantoprazole (PROTONIX) EC tablet 40 mg, 40 mg, Oral, BID, Volodymyr Caputo APRN, 40 mg at 04/05/23 0942  •  pramipexole (MIRAPEX) tablet 1 mg, 1 mg, Oral, BID, Volodymyr Caputo APRN, 1 mg at 04/05/23 0942  •  pregabalin (LYRICA) capsule 75 mg, 75 mg, Oral, BID PRN, Alba Murdock  •  prochlorperazine (COMPAZINE) injection 5 mg, 5 mg, Intravenous, Q6H PRN, Volodymyr Caputo APRN    Allergies:   Allergies   Allergen Reactions   • Erythromycin Swelling     Throat swells   • Hydromorphone Hcl Anaphylaxis and Other (See Comments)     Other reaction(s): Shock and/or Unconsciousness  Note: Anaphylaxis   • Ondansetron Hcl Other (See Comments)     Coded  Coded  Other reaction(s): Zofran  Note:  Allergic Reaction: Anaphylaxis   • Orphenadrine  Anaphylaxis   • Orphenadrine Citrate Anaphylaxis   • Meperidine Other (See Comments)     Makes her an angry person   • Codeine Rash   • Guaifenesin & Derivatives Nausea And Vomiting   • Guaifenesin Er Swelling     Throat swelling       Social History:    Social History     Socioeconomic History   • Marital status:    Tobacco Use   • Smoking status: Former     Packs/day: 1.50     Years: 46.00     Pack years: 69.00     Types: Cigarettes     Start date: 1972     Quit date: 2018     Years since quittin.0     Passive exposure: Past   • Smokeless tobacco: Never   • Tobacco comments:     Pt started at age 17, then quit at age 31. Pt restarted smoking at age [age] and quit smoking in 2018   Vaping Use   • Vaping Use: Never used   Substance and Sexual Activity   • Alcohol use: Not Currently     Alcohol/week: 1.0 standard drink     Types: 1 Drinks containing 0.5 oz of alcohol per week     Comment: A chocolate drink made with moonshine and creamer   • Drug use: No   • Sexual activity: Yes     Partners: Male     Birth control/protection: Surgical       Family History:   Family History   Problem Relation Age of Onset   • Breast cancer Paternal Aunt    • Heart disease Mother    • Osteoporosis Mother    • Thyroid disease Mother    • Cancer Father         Father’s entire family  from Cancer   • Diabetes Father    • Thyroid disease Father    • Thyroid disease Maternal Aunt    • Thyroid disease Maternal Uncle    • Thyroid disease Sister    • Ovarian cancer Neg Hx    • Uterine cancer Neg Hx    • Colon cancer Neg Hx        REVIEW OF SYSTEMS:    Constitutional: Malaise and fatigue  HEENT: no blurring of vision, no double vision, no hearing difficulty, no tinnitus,no ulceration, no dental caries, no dysphagia     Lungs: Dyspnea, shortness of breath and wheezing  CVS: no palpitation, no chest pain, no shortness of breath  GI: Epigastric discomfort nausea vomiting and diarrhea  LEE: no dysuria, frequency and urgency,  "no hematuria, no kidney stones  Musculoskeletal: no joint pain, swelling , stiffness  Endocrine: no polyuria, polydipsia, no cold or heat intolerance  Hematology: no anemia, no easy brusing or bleeding, no hx of clotting disorder  Dermatology: no skin rash, no eczema, no pruritus  Psychiatry: no depression, no anxiety,no panic attacks, no suicide ideation  Neurology: restless legg syndrome exacerbation     Vitals:    /78 (BP Location: Left arm, Patient Position: Sitting)   Pulse 75   Temp 98.7 °F (37.1 °C) (Oral)   Resp 18   Ht 162.6 cm (64.02\")   Wt 102 kg (225 lb 2 oz)   SpO2 92%   BMI 38.62 kg/m²     PHYSICAL EXAM:    CONSTITUTIONAL: Alert, appropriate, very uncomfortable   EYES: Nonicteric, EOM intact, pupils equal round   ENT: Mucous membranes moist, no oropharyngeal lesions   NECK: Supple, no masses   CHEST/LUNGS:bilateral christopher and wheezing  CARDIOVASCULAR: RRR, no murmurs  ABDOMEN: soft non-tender, active bowel sounds, no HSM  EXTREMITIES: warm, moves all fours  SKIN: warm, dry with no rashes or lesions  LYMPH: No cervical, clavicular, axillary, or inguinal lymphadenopathy  NEUROLOGIC: restless legg syndrome exacerbation   PSYCH: mood and affect appropriate    CBC  Results from last 7 days   Lab Units 04/05/23  0551 04/04/23  1546   WBC 10*3/mm3 15.17* 16.32*   HEMOGLOBIN g/dL 12.4 12.2   HEMATOCRIT % 42.8 39.6   PLATELETS 10*3/mm3 242 255         Lab Results   Component Value Date     04/05/2023    K 3.8 04/05/2023     04/05/2023    CO2 22.0 04/05/2023    BUN 21 04/05/2023    CREATININE 0.80 04/05/2023    GLUCOSE 87 04/05/2023    CALCIUM 8.3 (L) 04/05/2023    BILITOT 0.6 04/04/2023    ALKPHOS 97 04/04/2023    AST 29 04/04/2023    ALT 37 (H) 04/04/2023    AGRATIO 1.2 04/04/2023    GLOB 2.9 04/04/2023       Lab Results   Component Value Date    INR 1.00 01/24/2023    INR 1.11 07/11/2022    INR 1.04 07/10/2022    PROTIME 13.3 01/24/2023    PROTIME 14.3 07/11/2022    PROTIME 13.5 " 07/10/2022       Cultures:    Lab Results   Component Value Date    BLOODCX No growth at 5 days 01/21/2018    BLOODCX No growth at 5 days 01/21/2018     No components found for: URINCX    ASSESSMENT/PLAN:    #1  RUL Lung Mass  3.3 x 1.0 x 2.9 cm RIGHT upper lobe lung mass, SUV 4.1 on PET 3/17/2023    Melly Cruz is a 67-year-old  female whom I have been asked to see regarding a right apical lung mass.    Melly is a previous smoker, states that she quit in ~2018.  She smoked about 2 packs/day since about age 16.   Melly states that she has been followed with a right lung nodule for many years.    The lung nodule is being followed by Dr. Agusto Thompson and ENOC Rose with the cardiothoracic surgery department at [facility].  She was last seen by Dr. Agusto Thompson on 12/7/2022    Review imaging from [facility] is as follows:    CTA chest 10/11/2017: No PE.  Subtle patchy infiltrate RUL lung  Contrasted chest CT 1/21/2018: 4 mm subpleural nodule LLL, unchanged from 2010.  Stable 7 mm superior segment LLL nodule.  New from 2010, is a 10-11 mm spiculated RUL apex nodule (postinflammatory scarring related to pneumonia 10/2017 versus underlying malignancy)  PET scan 10/4/2018: 11 mm subsolid RUL apex nodule, SUV 1.5. 5 mm LLL subpleural nodule is stable from 2010  Contrasted chest CT 4/22/2019: 15 mm subsolid nodule posterior right apex, unchanged from 10/2017 with no hypermetabolic abnormality in this area on PET 10/2018.  The area has developed from 2010.  Continue annual surveillance to ensure stability.  Subpleural LLL nodule has been present since 2010 and is benign  CTA chest 4/29/2019: No PE.  Stable 1.5 cm RUL groundglass opacity similar to 1/21/2018 and 10/11/2017, considering differences in technique.  Follow-up chest CT in 1 year  Contrasted chest CT 6/16/2020: New, 0.9 cm lingula pleural-based pulmonary nodule versus atelectasis which previously appeared more triangular on 4/29/2019.  Recommend  follow-up CT chest in 3 months.  Stable 1.5 cm RUL groundglass opacity  PET scan 6/30/2020: No FDG activity associated with the small, new 9 mm lingular nodule.  Stable 13 mm groundglass RUL nodule, no FDG uptake.  Stable, 3 mm subpleural LLL nodule.  A few, normal size lymph nodes in the inferior axilla bilaterally have low-level uptake, SUV 2.7-May be reactive.  Contrasted chest CT 12/16/2020: Stable 1.5 cm RUL groundglass opacity.  0.9 cm pulmonary nodule versus atelectasis in the lingula, similar when compared to 6/16/2020.  Stable 5 mm pleural-based LLL nodule when compared to 1/21/2018, which is 3 years of stability  Contrasted chest CT 12/8/2021: Stable RUL groundglass opacity, unchanged from 4/29/2019, adjacent subpleural nodule within the RUL, stable.  Unchanged focus of pleural-based nodularity within the inferior lingular segment of THI.  Stable subpleural soft tissue nodule within the left posterior costophrenic angle and lower lobe dating to 2019.  No new nodules  Noncontrast CT abdomen/pelvis 8/23/2022: 6 x 4 mm LLL subpleural lung nodule, unchanged for 2 years.  Indeterminate 15 x 17 mm lingular nodule.  Options include follow-up 3-month chest CT and PET scan     Contrasted chest CT [facility]/7/20[facility] at [facility]:  • Previously described groundglass opacity within the right upper lobeshows interval increase in size is now more elongate in appearance with increased soft tissue component. It does demonstrate linear extension to the pleural surface with some associated volume loss suggesting associated parenchymal scarring. This may simply represent some progressive scarring but given the change from the previous exam I would suggest PET/CT for further characterization. Additional previously described nodules are stable. There are changes of centrilobular emphysema.  • No developing mediastinal or axillary lymphadenopathy.     PET scan 3/17/2023:  • Slowly enlarging hypermetabolic 3.3 x 1.0 x 2.9 cm subsolid RIGHT  upper lobe lung mass, SUV 4.1. This is highly suspicious for a primary lung neoplasm, favoring adenocarcinoma.  • No evidence of metastatic disease.  • Focal nodular hypermetabolic activity in the sigmoid colon, SUV 4.4. Differential diagnosis includes focal inflamed diverticulum versus underlying polyp or neoplasm. Recommend correlation with colonoscopy.     CTA chest 4/4/2023:  • No CT evidence of pulmonary embolus. Main pulmonary artery segment borderline dilated at 3 cm. Atheromatous disease of the thoracic aorta and coronary arteries. Cardiomegaly.  • New lung infiltrates bilaterally most likely due to pneumonia. This is most dense in the left upper lobe.  • A mass in the right lung apex is again noted. This mass is PET positive and worrisome for primary lung carcinoma.  • Stable small nodule in the right middle lobe laterally.  • Indeterminate small right renal lesion measuring 1.6 cm with a Hounsfield unit measurement of 48.  • Fatty infiltration of the liver.  • Moderate size hiatal hernia.      On 3/29/2023, Dr. Thompson's office attempted to refer Melly to Dr. Mendoza or Dr. Dove for EBUS, possible navigational bronchoscopy to obtain a tissue diagnosis.  Unfortunately they are out of network for Mrs. Cruz's insurance.    Melly came to the the ED at [facility] on 4/4/2023 with a history of mid epigastric and substernal chest pain, pleuritic in nature with increasing dyspnea, temperature elevation to 100.6 °F, tachycardia and hypoxia.  She was found to have bilateral pneumonia.  She was admitted for management.    Medical oncology consultation requested with the above history.    RECOMMEND:  Referral to Dr. Ole Esquivel at [facility] for navigational bronchoscopy.  I will defer the pulmonary referral to Dr. Agusto Thompson who is most acquainted with Ms. Cruz and has developed alternative approaches as outlined in his 3/29/2023 note.      #2  History of DVT/PE    Melly states that she has had  multiple episodes of DVT/PE over the course of many years.  She states that she cannot receive oral anticoagulation because of terrible reflux esophagitis with GI bleeding.  She states her most recent DVT was of the right calf about a year ago after foot surgery.    Melly states the recommendation has been and she is awaiting a Niesen fundoplication to resolve the reflux problem so she can take oral anticoagulation.      #3  Pneumonia  Bilateral lung infiltrates CT chest 4/4/2023  Receiving Levaquin 750 mg IV every 24 hours x5 days  Pulmicort nebulizer  Managed per attending       #4  Neutrophilic leukocytosis  Likely associated with pneumonia  Blood cultures x2 on 4/4/2023 in process  Max temp 100.6 upon admission  WBC 15.17, ANC 12.98     #5  Abnormal PET of sigmoid colon  PET 3/17/2023: Focal nodular hypermetabolic activity in the sigmoid colon, SUV 4.4. Differential diagnosis includes focal inflamed diverticulum versus underlying polyp or neoplasm. Recommend correlation with colonoscopy      Cedric Delarosa MD    04/05/23  15:32 CDT

## 2023-04-05 NOTE — H&P
[facility] Medicine Services  HISTORY AND PHYSICAL    Date of Admission: 4/4/2023  Primary Care Physician: Reynaldo Jeter DO Subjective   Primary Historian: Patient    Chief Complaint: SOB    History of Present Illness  Patient is a 67-year-old female with medical history of obesity, recently diagnosed right upper lobe lung nodule pending biopsy and work-up, hypothyroidism, GERD, presents to the emergency room with complaints of generalized weakness, episodes of lower abdominal pain with diarrhea and nausea and vomiting ongoing for the past few days.  She also has subjective fever and chills.  Patient developed worsening shortness of breath over the past 2 days with nonproductive cough.  Patient reports recently found right upper lobe nodule concerning for malignancy, reports that she was declined coverage by her insurance to go to [facility] for work-up of her lung cancer.  She is now awaiting reevaluation by Dr. Thompson and possibly going to  for treatment.  She initially attributed her GI symptoms to possible food poisoning but started having mid epigastrium and substernal chest pain that was pleuritic in nature with worsening shortness of breath.  She came to the emergency room for evaluation and found to have fever 100.6 °F, tachycardic and hypoxic needing to placed on oxygen supplementation.  CT chest concerning for bilateral pneumonia.  Patient admitted for further medical care.        Review of Systems   Otherwise complete ROS reviewed and negative except as mentioned in the HPI.    Past Medical History:   Past Medical History:   Diagnosis Date   • Acid reflux    • Anemia    • Anxiety    • Arthritis    • Asthma     Mild COPD   • Curtis esophagus    • Bleeding disorder     Reflux acid overload   • Bunion Aug    Repaired with surgery   • Chest pain    • Chronic back pain    • Chronic neck pain    • Clotting disorder     Stomach bleeds from acid    • COPD (chronic obstructive pulmonary disease)    • Difficulty walking Dec 2022    After surgery wslking has become more and more difficult   • DVT (deep venous thrombosis)    • GI bleed    • Hammer toe Aug 22    Repaired surgery   • Hypothyroid    • Hypothyroidism    • Low back pain    • Migraine    • PE (pulmonary thromboembolism)    • Plantar fasciitis Years ago   • Shin splints Years ago     Past Surgical History:  Past Surgical History:   Procedure Laterality Date   • BUNIONECTOMY     • CHOLECYSTECTOMY     • COLONOSCOPY     • CORRECTION HAMMER TOE     • DILATATION AND CURETTAGE     • FOOT FUSION Left 04/20/2022    Procedure: 1-2 Arthrodesis, Tarsal Metatarsal Joint 2 and 3 Arthrodesis;  Surgeon: Bud Maradiaga DPM;  Location:  [facility] OR;  Service: Podiatry;  Laterality: Left;   • HAMMER TOE REPAIR Left 04/20/2022    Procedure: Hammertoe Repair 2-5,;  Surgeon: Bud Maradiaga DPM;  Location:  [facility] OR;  Service: Podiatry;  Laterality: Left;   • HERNIA REPAIR     • HYSTERECTOMY     • JOINT REPLACEMENT     • OOPHORECTOMY     • REDUCTION MAMMAPLASTY  01/2021   • REPLACEMENT TOTAL KNEE      Right knee partial, left knee     Social History:  reports that she quit smoking about 5 years ago. Her smoking use included cigarettes. She started smoking about 51 years ago. She has a 69.00 pack-year smoking history. She has been exposed to tobacco smoke. She has never used smokeless tobacco. She reports that she does not currently use alcohol after a past usage of about 1.0 standard drink per week. She reports that she does not use drugs.    Family History: family history includes Breast cancer in her paternal aunt; Cancer in her father; Diabetes in her father; Heart disease in her mother; Osteoporosis in her mother; Thyroid disease in her father, maternal aunt, maternal uncle, mother, and sister.      Allergies:  Allergies   Allergen Reactions   • Erythromycin Swelling     Throat swells   • Hydromorphone Hcl  Anaphylaxis and Other (See Comments)     Other reaction(s): Shock and/or Unconsciousness  Note: Anaphylaxis   • Ondansetron Hcl Other (See Comments)     Coded  Coded  Other reaction(s): Zofran  Note:  Allergic Reaction: Anaphylaxis   • Orphenadrine Anaphylaxis   • Orphenadrine Citrate Anaphylaxis   • Meperidine Other (See Comments)     Makes her an angry person   • Codeine Rash   • Guaifenesin & Derivatives Nausea And Vomiting   • Guaifenesin Er Swelling     Throat swelling       Medications:  Prior to Admission medications    Medication Sig Start Date End Date Taking? Authorizing Provider   albuterol (PROVENTIL HFA;VENTOLIN HFA) 108 (90 Base) MCG/ACT inhaler Inhale 2 puffs 4 (Four) Times a Day. 10/11/17  Yes Cory Whittington PA-C   budesonide (Pulmicort Flexhaler) 90 MCG/ACT inhaler Inhale 2 puffs 2 (Two) Times a Day. Swallow, do not inhale 1/25/23  Yes Gualberto Calderón, DO   buPROPion XL (WELLBUTRIN XL) 300 MG 24 hr tablet Take 1 tablet by mouth Every Morning.   Yes Mitul Biggs MD   cyclobenzaprine (FLEXERIL) 10 MG tablet Take 1 tablet by mouth 3 (Three) Times a Day As Needed. 8/11/22  Yes Mitul Biggs MD   dicyclomine (BENTYL) 10 MG capsule Take 1 capsule by mouth 3 (Three) Times a Day As Needed (abdominal cramping). 8/23/22  Yes Neeta Barger MD   famotidine (PEPCID) 20 MG tablet Take 1 tablet by mouth 2 (Two) Times a Day As Needed. 11/29/21  Yes Mitul Biggs MD   folic acid (FOLVITE) 1 MG tablet Take 1 tablet by mouth Daily.   Yes Mitul Biggs MD   HYDROcodone-acetaminophen (NORCO) 7.5-325 MG per tablet Take 1 tablet by mouth Every 8 (Eight) Hours As Needed. 8/15/22  Yes Mitul Biggs MD   levothyroxine (SYNTHROID, LEVOTHROID) 112 MCG tablet Take 1 tablet by mouth Daily.   Yes Mitul Biggs MD   metoclopramide (REGLAN) 5 MG tablet Take 1 tablet by mouth 3 (Three) Times a Day As Needed (nausea and vomiting). 8/23/22  Yes Neeta Barger MD    omeprazole (priLOSEC) 40 MG capsule Take 1 capsule by mouth Daily.   Yes Mitul Biggs MD   pantoprazole (PROTONIX) 40 MG EC tablet Take 1 tablet by mouth 2 (Two) Times a Day. 9/25/19  Yes Mitul Biggs MD   pramipexole (MIRAPEX) 1 MG tablet Take 1 tablet by mouth 2 (Two) Times a Day. 8/5/22  Yes Mitul Biggs MD   sucralfate (CARAFATE) 1 GM/10ML suspension Take 10 mL by mouth 2 (Two) Times a Day. 9/25/19  Yes Mitul Biggs MD   tiZANidine (ZANAFLEX) 4 MG tablet Take 2-4 mg by mouth At Night As Needed for Muscle Spasms.   Yes Mitul Biggs MD   vitamin D (ERGOCALCIFEROL) 72706 units capsule capsule Take 1 capsule by mouth Every 7 (Seven) Days. Sunday 4/2/19  Yes Mitul Biggs MD   Cyanocobalamin (VITAMIN B-12 ER) 1000 MCG tablet controlled-release Take 1,000 mcg by mouth Daily.    Mitul Biggs MD   EPINEPHrine (EPIPEN) 0.3 MG/0.3ML solution auto-injector injection  6/7/22   Mitul Biggs MD   fluticasone (FLONASE) 50 MCG/ACT nasal spray 2 sprays into the nostril(s) as directed by provider Daily.    Mitul Biggs MD   lidocaine (XYLOCAINE) 5 % ointment Apply 1 application topically to the appropriate area as directed Every 2 (Two) Hours As Needed for Mild Pain. 11/11/22   Bud Maradiaga DPM   loratadine-pseudoephedrine (Allergy Relief D12) 5-120 MG per 12 hr tablet Take 1 tablet by mouth 2 (Two) Times a Day As Needed for Allergies.  Patient not taking: Reported on 3/29/2023    Mitul Biggs MD   nitroglycerin (NITROSTAT) 0.4 MG SL tablet Place 1 tablet under the tongue Every 5 (Five) Minutes As Needed for Chest Pain. Take no more than 3 doses in 15 minutes.    Mitul Biggs MD   pregabalin (LYRICA) 75 MG capsule Take 1 capsule by mouth 2 (Two) Times a Day.    Mitul Biggs MD   Semaglutide, 1 MG/DOSE, (Ozempic, 1 MG/DOSE,) 4 MG/3ML solution pen-injector Inject 1 mg under the skin into the appropriate area as  "directed 1 (One) Time Per Week. Friday  Patient not taking: Reported on 3/29/2023    Mitul Biggs MD   sertraline (ZOLOFT) 50 MG tablet  2/8/23   Mitul Biggs MD   dexamethasone (DECADRON) 4 MG tablet  3/24/23 4/4/23  Mitul Biggs MD   doxycycline (MONODOX) 100 MG capsule  3/24/23 4/4/23  ProviderMitul MD     I have utilized all available immediate resources to obtain, update, or review the patient's current medications (including all prescriptions, over-the-counter products, herbals, cannabis/cannabidiol products, and vitamin/mineral/dietary (nutritional) supplements).    Objective     Vital Signs: /76 (BP Location: Left arm, Patient Position: Sitting)   Pulse 98   Temp (!) 100.6 °F (38.1 °C) (Oral)   Resp 16   Ht 162.6 cm (64\")   Wt 101 kg (223 lb)   SpO2 90%   BMI 38.28 kg/m²   Physical Exam:   Temp:  [97.5 °F (36.4 °C)-100.6 °F (38.1 °C)] 99 °F (37.2 °C)  Heart Rate:  [] 79  Resp:  [16-24] 16  BP: (105-128)/(66-76) 128/66  Physical Exam    General: NC/AT, appears stated age, alert and oriented x3, on oxygen supplementation  HEENT: PERRLA, EOMI, no scleral icterus, no conjunctival injection,   NECK:  Neck is supple, no JVD, no supraclavicular lymphadenopathy  CV: S1 S2 RRR, no murmurs, no gallops, no heaves, pulses palpable.  LUNG: Bibasilar Rales and crackles.  ABD: Nondistended, nontender, bowel sounds present, no guarding or rebound, organomegaly not appreciated.   EXT: FROM, strength is intact, no pitting edema, no joint effusion, no calf tenderness.  Pulses palpable, left lower extremity boots  Neuro: CN 2-12, grossly intact,no  focal weakness appreciated  Skin: Warm and dry, no rash or lesions.      Results Reviewed:  Lab Results (last 24 hours)     Procedure Component Value Units Date/Time    High Sensitivity Troponin T 2Hr [[id number]] Collected: 04/04/23 1845    Specimen: Blood Updated: 04/04/23 1917     HS Troponin T <6 ng/L      Troponin T Delta -- " "    Comment: Unable to calculate.       Narrative:      High Sensitive Troponin T Reference Range:  <10.0 ng/L- Negative Female for AMI  <15.0 ng/L- Negative Male for AMI  >=10 - Abnormal Female indicating possible myocardial injury.  >=15 - Abnormal Male indicating possible myocardial injury.   Clinicians would have to utilize clinical acumen, EKG, Troponin, and serial changes to determine if it is an Acute Myocardial Infarction or myocardial injury due to an underlying chronic condition.         Blood Culture - Blood, Wrist, Right [[id number]] Collected: 04/04/23 1715    Specimen: Blood from Wrist, Right Updated: 04/04/23 1747    Blood Culture - Blood, Arm, Right [[id number]] Collected: 04/04/23 1630    Specimen: Blood from Arm, Right Updated: 04/04/23 1712    Lactic Acid, Plasma [[id number]]  (Normal) Collected: 04/04/23 1619    Specimen: Blood Updated: 04/04/23 1706     Lactate 1.7 mmol/L     Procalcitonin [[id number]]  (Abnormal) Collected: 04/04/23 1546    Specimen: Blood Updated: 04/04/23 1620     Procalcitonin 0.62 ng/mL     Narrative:      As a Marker for Sepsis (Non-Neonates):    1. <0.5 ng/mL represents a low risk of severe sepsis and/or septic shock.  2. >2 ng/mL represents a high risk of severe sepsis and/or septic shock.    As a Marker for Lower Respiratory Tract Infections that require antibiotic therapy:    PCT on Admission    Antibiotic Therapy       6-12 Hrs later    >0.5                Strongly Recommended  >0.25 - <0.5        Recommended   0.1 - 0.25          Discouraged              Remeasure/reassess PCT  <0.1                Strongly Discouraged     Remeasure/reassess PCT    As 28 day mortality risk marker: \"Change in Procalcitonin Result\" (>80% or <=80%) if Day 0 (or Day 1) and Day 4 values are available. Refer to http://www.[facility]-pct-calculator.com    Change in PCT <=80%  A decrease of PCT levels below or equal to 80% defines a positive change in PCT test result representing a higher risk for " 28-day all-cause mortality of patients diagnosed with severe sepsis for septic shock.    Change in PCT >80%  A decrease of PCT levels of more than 80% defines a negative change in PCT result representing a lower risk for 28-day all-cause mortality of patients diagnosed with severe sepsis or septic shock.       Comprehensive Metabolic Panel [[id number]]  (Abnormal) Collected: 04/04/23 1546    Specimen: Blood Updated: 04/04/23 1614     Glucose 125 mg/dL      BUN 19 mg/dL      Creatinine 0.76 mg/dL      Sodium 131 mmol/L      Potassium 3.7 mmol/L      Chloride 98 mmol/L      CO2 23.0 mmol/L      Calcium 7.9 mg/dL      Total Protein 6.5 g/dL      Albumin 3.6 g/dL      ALT (SGPT) 37 U/L      AST (SGOT) 29 U/L      Alkaline Phosphatase 97 U/L      Total Bilirubin 0.6 mg/dL      Globulin 2.9 gm/dL      A/G Ratio 1.2 g/dL      BUN/Creatinine Ratio 25.0     Anion Gap 10.0 mmol/L      eGFR 87.1 mL/min/1.73     Narrative:      GFR Normal >60  Chronic Kidney Disease <60  Kidney Failure <15      BNP [[id number]]  (Normal) Collected: 04/04/23 1546    Specimen: Blood Updated: 04/04/23 1610     proBNP 55.3 pg/mL     Narrative:      Among patients with dyspnea, NT-proBNP is highly sensitive for the detection of acute congestive heart failure. In addition NT-proBNP of <300 pg/ml effectively rules out acute congestive heart failure with 99% negative predictive value.    Results may be falsely decreased if patient taking Biotin.      D-dimer, Quantitative [[id number]]  (Abnormal) Collected: 04/04/23 1546    Specimen: Blood Updated: 04/04/23 1610     D-Dimer, Quantitative 1.26 MCGFEU/mL     Narrative:      According to the assay 's published package insert, a normal (<0.50 MCGFEU/mL) D-dimer result in conjunction with a non-high clinical probability assessment, excludes deep vein thrombosis (DVT) and pulmonary embolism (PE) with high sensitivity.    D-dimer values increase with age and this can make VTE exclusion of an older  "population difficult. To address this, the [employer], based on best available evidence and recent guidelines, recommends that clinicians use age-adjusted D-dimer thresholds in patients greater than 50 years of age with: a) a low probability of PE who do not meet all Pulmonary Embolism Rule Out Criteria, or b) in those with intermediate probability of PE.   The formula for an age-adjusted D-dimer cut-off is \"age/100\".  For example, a 60 year old patient would have an age-adjusted cut-off of 0.60 MCGFEU/mL and an 80 year old 0.80 MCGFEU/mL.    High Sensitivity Troponin T [[id number]]  (Normal) Collected: 04/04/23 1546    Specimen: Blood Updated: 04/04/23 1610     HS Troponin T <6 ng/L     Narrative:      High Sensitive Troponin T Reference Range:  <10.0 ng/L- Negative Female for AMI  <15.0 ng/L- Negative Male for AMI  >=10 - Abnormal Female indicating possible myocardial injury.  >=15 - Abnormal Male indicating possible myocardial injury.   Clinicians would have to utilize clinical acumen, EKG, Troponin, and serial changes to determine if it is an Acute Myocardial Infarction or myocardial injury due to an underlying chronic condition.         CBC & Differential [[id number]]  (Abnormal) Collected: 04/04/23 1546    Specimen: Blood Updated: 04/04/23 1554    Narrative:      The following orders were created for panel order CBC & Differential.  Procedure                               Abnormality         Status                     ---------                               -----------         ------                     CBC Auto Differential[[id number]]        Abnormal            Final result                 Please view results for these tests on the individual orders.    CBC Auto Differential [[id number]]  (Abnormal) Collected: 04/04/23 1546    Specimen: Blood Updated: 04/04/23 1554     WBC 16.32 10*3/mm3      RBC 4.74 10*6/mm3      Hemoglobin 12.2 g/dL      Hematocrit 39.6 %      MCV 83.5 fL      MCH 25.7 pg  "     MCHC 30.8 g/dL      RDW 15.9 %      RDW-SD 47.8 fl      MPV 9.0 fL      Platelets 255 10*3/mm3      Neutrophil % 90.8 %      Lymphocyte % 5.0 %      Monocyte % 3.2 %      Eosinophil % 0.2 %      Basophil % 0.2 %      Immature Grans % 0.6 %      Neutrophils, Absolute 14.83 10*3/mm3      Lymphocytes, Absolute 0.81 10*3/mm3      Monocytes, Absolute 0.52 10*3/mm3      Eosinophils, Absolute 0.03 10*3/mm3      Basophils, Absolute 0.04 10*3/mm3      Immature Grans, Absolute 0.09 10*3/mm3      nRBC 0.0 /100 WBC         Imaging Results (Last 24 Hours)     Procedure Component Value Units Date/Time    CT Angiogram Chest [[id number]] Collected: 04/04/23 1827     Updated: 04/04/23 1847    Narrative:      EXAMINATION:  CT ANGIOGRAM CHEST-  4/4/2023 6:07 PM CDT     HISTORY: Pulmonary embolism (PE) suspected, unknown D-dimer. Shortness  of air. COPD. Fever. Wheezing and decreased breath sounds. Lower  extremity edema.     COMPARISON : 03/21/2023.     DLP: 800 mGy-cm. Automated dosage reduction technique was utilized to  decrease patient dosage.     TECHNIQUE: CT angio was performed of the chest with IV contrast.  Coronal, sagittal and 3-D reconstruction were performed.     INDEPENDENT 3-D WORKSTATION UTILIZED FOR RECONSTRUCTION: Yes. A  radiologist was not present in the department.     MEDIASTINUM, HEART AND VASCULAR STRUCTURES: There is mild atheromatous  disease of the thoracic aorta and coronary arteries. The thoracic aorta  is normal caliber. There is cardiomegaly. The main pulmonary artery  segment measures 3 cm. There is no CT evidence of pulmonary embolus.     LUNGS: A mass in the right lung apex is again noted. There is no change  compared to the recent CT. However, this lesion has been increasing in  size and is PET positive. A subpleural 7 mm right middle lobe nodule  image 87 series 8 is stable. There are new bilateral parenchymal  infiltrates greatest in the left upper lobe. There are new infiltrates  in both lower  lobes. There is no significant pleural effusion.     UPPER ABDOMEN: There is fatty infiltration of the liver. There is a  moderate size hiatal hernia. There has been prior cholecystectomy. There  is a 1.6 cm right renal lesion with a Hounsfield unit measurement of 48.     BONES: No acute bony abnormality is seen.          Impression:      1. No CT evidence of pulmonary embolus. Main pulmonary artery segment  borderline dilated at 3 cm. Atheromatous disease of the thoracic aorta  and coronary arteries. Cardiomegaly.  2. New lung infiltrates bilaterally most likely due to pneumonia. This  is most dense in the left upper lobe.  3. A mass in the right lung apex is again noted. This mass is PET  positive and worrisome for primary lung carcinoma.  4. Stable small nodule in the right middle lobe laterally.  5. Indeterminate small right renal lesion measuring 1.6 cm with a  Hounsfield unit measurement of 48.  6. Fatty infiltration of the liver.  7. Moderate size hiatal hernia.     The full report of this exam was immediately signed and available to the  emergency room. The patient is currently in the emergency room.    This report was finalized on 04/04/2023 18:44 by Dr. Brandon Fenton MD.    XR Chest 1 View [[id number]] Collected: 04/04/23 1709     Updated: 04/04/23 1716    Narrative:      EXAMINATION: XR CHEST 1 VW- 4/4/2023 5:10 PM CDT     HISTORY: SOB.     REPORT: A frontal view of the chest was obtained.     COMPARISON: Chest x-ray 03/21/2023.     The lungs are hypoaerated as before, there is central basilar vascular  congestion with mild cardiomegaly, somewhat increased interstitial  opacities are noted in the left upper lobe. No lobar consolidation is  identified. There appears to be moderate emphysema. No pneumothorax is  identified. The osseous structures are unremarkable.       Impression:      Pulmonary hypoventilation with advanced COPD, mild increase  in central vascular congestion and increased interstitial  opacities in  the left upper lobe. This may be related to left greater than right  asymmetric interstitial pneumonitis, versus asymmetric pulmonary edema.  This report was finalized on 04/04/2023 17:13 by Dr. Tony Chen MD.        I have personally reviewed and interpreted the radiology studies and ECG obtained at time of admission.     Assessment / Plan   Assessment and plan:   67-year-old morbid obese female with medical history of hypothyroidism, GERD, COPD, and recent found right upper lobe nodule, presenting with worsening shortness of breath, pleuritic chest pain with finding of bilateral pneumonia right greater than left, low-grade fever and new hypoxic respiratory distress needing oxygen supplementation.  Patient is admitted for medical care.      Problem list:  Active Hospital Problems    Diagnosis    • **Pneumonia of both lungs due to infectious organism, unspecified part of lung    Right upper lobe nodule, malignancy  COPD exacerbation        Treatment Plan  The patient will be admitted to my service here at [facility].  -Admit to inpatient,  - Oxygen supplementation  - Telemetry monitoring  - Cardiac enzymes trended  - Breathing treatments with DuoNebs and budesonide  - Sputum Gram stain and culture  - Patient received a dose of cefepime and Rocephin in the ED, continue antibiotics coverage with Levaquin, patient allergic to azithromycin  - Obtain blood cultures  - Consult CT surgery and oncology for eval and continued work-up for her lung cancer.    - Review her home medications, restart those appropriate for management of other comorbid conditions.    Patient is a full code  Time spent on admission greater than 30 minutes.  Anticipated length of stay greater than 2 nights.    Medical Decision Making  As discussed above      Conditions and Status  Patient is clinically stable     MDM Data  External documents reviewed: As discussed above  Cardiac tracing (EKG, telemetry) interpretation:  As discussed above  Radiology interpretation: As discussed above  Labs reviewed: As discussed above         Care Planning  Patient is a full code    Disposition  Patient can discharge home after resolution of acute condition and returned to baseline.  No  needed identified at this time.            Electronically signed by Tejas Lock MD, 04/04/23, 19:58 CDT.

## 2023-04-05 NOTE — CASE MANAGEMENT/SOCIAL WORK
Discharge Planning Assessment  [facility]     Patient Name: Melly Cruz  MRN: [id number]  Today's Date: 4/5/2023    Admit Date: 4/4/2023        Discharge Needs Assessment     Row Name 04/05/23 1026       Living Environment    People in Home spouse    Name(s) of People in Home Carmelo    Current Living Arrangements home    Primary Care Provided by spouse/significant other    Provides Primary Care For no one    Family Caregiver if Needed spouse    Family Caregiver Names Carmelo    Able to Return to Prior Arrangements yes       Resource/Environmental Concerns    Resource/Environmental Concerns none       Transition Planning    Patient/Family Anticipates Transition to home with family    Transportation Anticipated family or friend will provide       Discharge Needs Assessment    Readmission Within the Last 30 Days no previous admission in last 30 days    Equipment Currently Used at Home none    Concerns to be Addressed no discharge needs identified    Equipment Needed After Discharge none    Discharge Coordination/Progress spoke to patient who lives with spouse; has RX coverage and PCP; independent at home prior to illness will follow for DC needs; has a walker and bsc that she doesn't have to use  [facility]               Discharge Plan     Row Name 04/05/23 0846       Plan    Final Discharge Disposition Code 01 - home or self-care              Continued Care and Services - Admitted Since 4/4/2023    Coordination has not been started for this encounter.          Demographic Summary    No documentation.                Functional Status    No documentation.                Psychosocial    No documentation.                Abuse/Neglect    No documentation.                Legal    No documentation.                Substance Abuse    No documentation.                Patient Forms    No documentation.                   Brandy Jaramillo RN

## 2023-04-05 NOTE — PROGRESS NOTES
[facility] Medicine Services  INPATIENT PROGRESS NOTE    Patient Name: Melly Cruz  Date of Admission: 4/4/2023  Today's Date: 04/05/23  Length of Stay: 0  Primary Care Physician: Reynaldo Jeter DO    Subjective   Chief Complaint: Nausea, vomiting, diarrhea  HPI   Patient examined sitting up in bed on 2 L nasal cannula.  She tells me she had episodes of vomiting last night.  She tells me her diarrhea is nearly continuous.  I ordered a GI PCR panel.  I explained we would have cardiothoracic surgery and oncology evaluate her while she is here.  Otherwise, she reports her shortness of breath has improved.  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  She is otherwise agreeable to her plan of care.    Review of Systems   All pertinent negatives and positives are as above. All other systems have been reviewed and are negative unless otherwise stated.     Objective    Temp:  [97.5 °F (36.4 °C)-100.6 °F (38.1 °C)] 98.4 °F (36.9 °C)  Heart Rate:  [] 83  Resp:  [16-24] 16  BP: (105-128)/(59-76) 110/63  Physical Exam  Vitals and nursing note reviewed.   Constitutional:       Appearance: She is obese.   HENT:      Head: Normocephalic and atraumatic.   Cardiovascular:      Rate and Rhythm: Normal rate and regular rhythm.      Pulses: Normal pulses.      Heart sounds: Normal heart sounds. No murmur heard.    No friction rub.   Pulmonary:      Breath sounds: Normal breath sounds. No stridor. No wheezing or rhonchi.      Comments: Diminished bilaterally with crackles and rails.  Abdominal:      General: Bowel sounds are normal. There is no distension.      Palpations: Abdomen is soft.      Tenderness: There is no abdominal tenderness. There is no guarding.      Comments: Nausea, vomiting, diarrhea.  Patient complains of indigestion.   Musculoskeletal:         General: No swelling or  deformity. Normal range of motion.      Cervical back: Normal range of motion and neck supple. No rigidity or tenderness.      Right lower leg: No edema.      Left lower leg: No edema.   Skin:     General: Skin is warm and dry.      Capillary Refill: Capillary refill takes less than 2 seconds.      Coloration: Skin is not pale.      Findings: No bruising or erythema.      Comments: Mild edema bilateral lower extremities.   Neurological:      General: No focal deficit present.      Mental Status: She is alert and oriented to person, place, and time. Mental status is at baseline.      Sensory: No sensory deficit.      Gait: Gait normal.   Psychiatric:         Mood and Affect: Mood normal.         Behavior: Behavior normal.         Thought Content: Thought content normal.         Judgment: Judgment normal.       Results Review:  I have reviewed the labs, radiology results, and diagnostic studies.    Laboratory Data:   Results from last 7 days   Lab Units 04/05/23  0551 04/04/23  1546   WBC 10*3/mm3 15.17* 16.32*   HEMOGLOBIN g/dL 12.4 12.2   HEMATOCRIT % 42.8 39.6   PLATELETS 10*3/mm3 242 255        Results from last 7 days   Lab Units 04/05/23  0551 04/04/23  1546   SODIUM mmol/L 138 131*   POTASSIUM mmol/L 3.8 3.7   CHLORIDE mmol/L 107 98   CO2 mmol/L 22.0 23.0   BUN mg/dL 21 19   CREATININE mg/dL 0.80 0.76   CALCIUM mg/dL 8.3* 7.9*   BILIRUBIN mg/dL  --  0.6   ALK PHOS U/L  --  97   ALT (SGPT) U/L  --  37*   AST (SGOT) U/L  --  29   GLUCOSE mg/dL 87 125*     Radiology Data:   Imaging Results (Last 24 Hours)     Procedure Component Value Units Date/Time    CT Angiogram Chest [[id number]] Collected: 04/04/23 1827     Updated: 04/04/23 1847    Narrative:      EXAMINATION:  CT ANGIOGRAM CHEST-  4/4/2023 6:07 PM CDT     HISTORY: Pulmonary embolism (PE) suspected, unknown D-dimer. Shortness  of air. COPD. Fever. Wheezing and decreased breath sounds. Lower  extremity edema.     COMPARISON : 03/21/2023.     DLP: 800 mGy-cm.  Automated dosage reduction technique was utilized to  decrease patient dosage.     TECHNIQUE: CT angio was performed of the chest with IV contrast.  Coronal, sagittal and 3-D reconstruction were performed.     INDEPENDENT 3-D WORKSTATION UTILIZED FOR RECONSTRUCTION: Yes. A  radiologist was not present in the department.     MEDIASTINUM, HEART AND VASCULAR STRUCTURES: There is mild atheromatous  disease of the thoracic aorta and coronary arteries. The thoracic aorta  is normal caliber. There is cardiomegaly. The main pulmonary artery  segment measures 3 cm. There is no CT evidence of pulmonary embolus.     LUNGS: A mass in the right lung apex is again noted. There is no change  compared to the recent CT. However, this lesion has been increasing in  size and is PET positive. A subpleural 7 mm right middle lobe nodule  image 87 series 8 is stable. There are new bilateral parenchymal  infiltrates greatest in the left upper lobe. There are new infiltrates  in both lower lobes. There is no significant pleural effusion.     UPPER ABDOMEN: There is fatty infiltration of the liver. There is a  moderate size hiatal hernia. There has been prior cholecystectomy. There  is a 1.6 cm right renal lesion with a Hounsfield unit measurement of 48.     BONES: No acute bony abnormality is seen.          Impression:      1. No CT evidence of pulmonary embolus. Main pulmonary artery segment  borderline dilated at 3 cm. Atheromatous disease of the thoracic aorta  and coronary arteries. Cardiomegaly.  2. New lung infiltrates bilaterally most likely due to pneumonia. This  is most dense in the left upper lobe.  3. A mass in the right lung apex is again noted. This mass is PET  positive and worrisome for primary lung carcinoma.  4. Stable small nodule in the right middle lobe laterally.  5. Indeterminate small right renal lesion measuring 1.6 cm with a  Hounsfield unit measurement of 48.  6. Fatty infiltration of the liver.  7. Moderate size  hiatal hernia.     The full report of this exam was immediately signed and available to the  emergency room. The patient is currently in the emergency room.    This report was finalized on 04/04/2023 18:44 by Dr. Brandon Fenton MD.    XR Chest 1 View [[id number]] Collected: 04/04/23 1709     Updated: 04/04/23 1716    Narrative:      EXAMINATION: XR CHEST 1 VW- 4/4/2023 5:10 PM CDT     HISTORY: SOB.     REPORT: A frontal view of the chest was obtained.     COMPARISON: Chest x-ray 03/21/2023.     The lungs are hypoaerated as before, there is central basilar vascular  congestion with mild cardiomegaly, somewhat increased interstitial  opacities are noted in the left upper lobe. No lobar consolidation is  identified. There appears to be moderate emphysema. No pneumothorax is  identified. The osseous structures are unremarkable.       Impression:      Pulmonary hypoventilation with advanced COPD, mild increase  in central vascular congestion and increased interstitial opacities in  the left upper lobe. This may be related to left greater than right  asymmetric interstitial pneumonitis, versus asymmetric pulmonary edema.  This report was finalized on 04/04/2023 17:13 by Dr. Tony Chen MD.          I have reviewed the patient's current medications.     Assessment/Plan   Assessment  Active Hospital Problems    Diagnosis    • **Pneumonia of both lungs due to infectious organism    • Nausea and vomiting    • Diarrhea    • Renal lesion    • Restless leg syndrome    • GERD (gastroesophageal reflux disease)    • Pulmonary nodule, right        Treatment Plan  Bilateral pneumonia with history of COPD-  CTA shows bilateral infiltrates consistent with pneumonia, more dense in the left upper lobe. COVID and flu negative. Patient allergic to guaifenesin. Continue incentive spirometer, OPEP,, Pulmicort, DuoNeb, IV Levaquin (patient allergic to azithromycin).  Blood culture x2 pending.  Sputum culture if obtainable.  No further  fever since arrival.  WBC 15.  2 L nasal cannula.    Nausea/vomiting/diarrhea-  3-day history of.  Patient states she originally thought she had food poisoning. 1 episode of vomiting overnight.  Patient denies black, red, maroon emesis.  She reports diarrhea continues.  Gastrointestinal PCR panel pending.    Right-sided pulmonary nodules-  1 nodule right middle lobe, mass in the lung apex.  Oncology and cardiothoracic surgery consulted and pending evaluation.    GERD with history of upper GI bleed-  Monitor in presence of vomiting.  Hemoglobin stable.  Continue twice daily Protonix.    Medical Decision Making  Number and Complexity of problems: 2 acute problems of moderate complexity and bilateral pneumonia and nausea, vomiting, diarrhea.  1 subacute on chronic problem and COPD.  1 acute problem of high complexity in right-sided pulmonary nodules and mass.  Differential Diagnosis: Bilateral pneumonia.  Bacterial pneumonia versus pneumonitis.  Bacterial/viral colitis versus irritable bowel syndrome    Conditions and Status  Status stable     MDM Data  External documents reviewed: None  Cardiac tracing (EKG, telemetry) interpretation: Telemetry reviewed  Radiology interpretation: CTA chest reviewed per radiologist interpretation  Labs reviewed: CBC, BMP  Any tests that were considered but not ordered: None     Decision rules/scores evaluated (example [id number], Wells, etc): None     Discussed with: Patient     Care Planning  Shared decision making: Plan of care discussed with patient, she is agreeable at this time  Code status and discussions: Full    Disposition  Social Determinants of Health that impact treatment or disposition: None  I expect the patient to be discharged to home eventually, timeline undeterminable.    Electronically signed by ENOC Adams, 04/05/23, 10:21 CDT.

## 2023-04-05 NOTE — PLAN OF CARE
Goal Outcome Evaluation:  Plan of Care Reviewed With: patient        Progress: improving  Outcome Evaluation: New admit overnight from ED. CXR shows bilateral pneumonia. Apparently had a diagnosis of lung cancer. Heme/Onc consulted. VSS. Afebrile. SR on tele. RA/2L NC. IV abx. Prn Norco for back pain. Safety maintained.

## 2023-04-06 LAB
ANION GAP SERPL CALCULATED.3IONS-SCNC: 11 MMOL/L (ref 5–15)
BASOPHILS # BLD AUTO: 0.02 10*3/MM3 (ref 0–0.2)
BASOPHILS NFR BLD AUTO: 0.2 % (ref 0–1.5)
BUN SERPL-MCNC: 15 MG/DL (ref 8–23)
BUN/CREAT SERPL: 20.5 (ref 7–25)
CALCIUM SPEC-SCNC: 7.9 MG/DL (ref 8.6–10.5)
CHLORIDE SERPL-SCNC: 106 MMOL/L (ref 98–107)
CO2 SERPL-SCNC: 21 MMOL/L (ref 22–29)
CREAT SERPL-MCNC: 0.73 MG/DL (ref 0.57–1)
DEPRECATED RDW RBC AUTO: 50 FL (ref 37–54)
EGFRCR SERPLBLD CKD-EPI 2021: 91.4 ML/MIN/1.73
EOSINOPHIL # BLD AUTO: 0.33 10*3/MM3 (ref 0–0.4)
EOSINOPHIL NFR BLD AUTO: 3.8 % (ref 0.3–6.2)
ERYTHROCYTE [DISTWIDTH] IN BLOOD BY AUTOMATED COUNT: 15.9 % (ref 12.3–15.4)
GLUCOSE SERPL-MCNC: 134 MG/DL (ref 65–99)
HCT VFR BLD AUTO: 37 % (ref 34–46.6)
HGB BLD-MCNC: 11 G/DL (ref 12–15.9)
IMM GRANULOCYTES # BLD AUTO: 0.02 10*3/MM3 (ref 0–0.05)
IMM GRANULOCYTES NFR BLD AUTO: 0.2 % (ref 0–0.5)
LYMPHOCYTES # BLD AUTO: 1.39 10*3/MM3 (ref 0.7–3.1)
LYMPHOCYTES NFR BLD AUTO: 15.8 % (ref 19.6–45.3)
MCH RBC QN AUTO: 25.9 PG (ref 26.6–33)
MCHC RBC AUTO-ENTMCNC: 29.7 G/DL (ref 31.5–35.7)
MCV RBC AUTO: 87.3 FL (ref 79–97)
MONOCYTES # BLD AUTO: 0.42 10*3/MM3 (ref 0.1–0.9)
MONOCYTES NFR BLD AUTO: 4.8 % (ref 5–12)
NEUTROPHILS NFR BLD AUTO: 6.62 10*3/MM3 (ref 1.7–7)
NEUTROPHILS NFR BLD AUTO: 75.2 % (ref 42.7–76)
NRBC BLD AUTO-RTO: 0 /100 WBC (ref 0–0.2)
PLATELET # BLD AUTO: 219 10*3/MM3 (ref 140–450)
PMV BLD AUTO: 9.5 FL (ref 6–12)
POTASSIUM SERPL-SCNC: 3.8 MMOL/L (ref 3.5–5.2)
RBC # BLD AUTO: 4.24 10*6/MM3 (ref 3.77–5.28)
SODIUM SERPL-SCNC: 138 MMOL/L (ref 136–145)
WBC NRBC COR # BLD: 8.8 10*3/MM3 (ref 3.4–10.8)

## 2023-04-06 PROCEDURE — 25010000002 LEVOFLOXACIN PER 250 MG: Performed by: INTERNAL MEDICINE

## 2023-04-06 PROCEDURE — 36415 COLL VENOUS BLD VENIPUNCTURE: CPT | Performed by: INTERNAL MEDICINE

## 2023-04-06 PROCEDURE — 82746 ASSAY OF FOLIC ACID SERUM: CPT | Performed by: NURSE PRACTITIONER

## 2023-04-06 PROCEDURE — 94799 UNLISTED PULMONARY SVC/PX: CPT

## 2023-04-06 PROCEDURE — 85025 COMPLETE CBC W/AUTO DIFF WBC: CPT | Performed by: INTERNAL MEDICINE

## 2023-04-06 PROCEDURE — 94761 N-INVAS EAR/PLS OXIMETRY MLT: CPT

## 2023-04-06 PROCEDURE — G0378 HOSPITAL OBSERVATION PER HR: HCPCS

## 2023-04-06 PROCEDURE — 80048 BASIC METABOLIC PNL TOTAL CA: CPT | Performed by: INTERNAL MEDICINE

## 2023-04-06 PROCEDURE — 82607 VITAMIN B-12: CPT | Performed by: NURSE PRACTITIONER

## 2023-04-06 PROCEDURE — 96366 THER/PROPH/DIAG IV INF ADDON: CPT

## 2023-04-06 PROCEDURE — 94664 DEMO&/EVAL PT USE INHALER: CPT

## 2023-04-06 RX ORDER — SODIUM CHLORIDE 0.9 % (FLUSH) 0.9 %
10 SYRINGE (ML) INJECTION AS NEEDED
Status: DISCONTINUED | OUTPATIENT
Start: 2023-04-06 | End: 2023-04-08 | Stop reason: HOSPADM

## 2023-04-06 RX ORDER — SODIUM CHLORIDE 0.9 % (FLUSH) 0.9 %
10 SYRINGE (ML) INJECTION EVERY 12 HOURS SCHEDULED
Status: DISCONTINUED | OUTPATIENT
Start: 2023-04-06 | End: 2023-04-08 | Stop reason: HOSPADM

## 2023-04-06 RX ORDER — SODIUM CHLORIDE 9 MG/ML
40 INJECTION, SOLUTION INTRAVENOUS AS NEEDED
Status: DISCONTINUED | OUTPATIENT
Start: 2023-04-06 | End: 2023-04-08 | Stop reason: HOSPADM

## 2023-04-06 RX ADMIN — HYDROCODONE BITARTRATE AND ACETAMINOPHEN 1 TABLET: 7.5; 325 TABLET ORAL at 10:10

## 2023-04-06 RX ADMIN — FLUTICASONE PROPIONATE 2 SPRAY: 50 SPRAY, METERED NASAL at 10:00

## 2023-04-06 RX ADMIN — LEVOTHYROXINE SODIUM 112 MCG: 112 TABLET ORAL at 05:20

## 2023-04-06 RX ADMIN — Medication 10 ML: at 20:58

## 2023-04-06 RX ADMIN — FOLIC ACID 1 MG: 1 TABLET ORAL at 09:59

## 2023-04-06 RX ADMIN — BUDESONIDE 0.5 MG: 0.5 SUSPENSION RESPIRATORY (INHALATION) at 20:00

## 2023-04-06 RX ADMIN — PRAMIPEXOLE DIHYDROCHLORIDE 1 MG: 1 TABLET ORAL at 09:59

## 2023-04-06 RX ADMIN — BUPROPION HYDROCHLORIDE 300 MG: 150 TABLET, FILM COATED, EXTENDED RELEASE ORAL at 07:52

## 2023-04-06 RX ADMIN — LEVOFLOXACIN 750 MG: 750 INJECTION, SOLUTION INTRAVENOUS at 00:18

## 2023-04-06 RX ADMIN — Medication 10 ML: at 10:20

## 2023-04-06 RX ADMIN — HYDROCODONE BITARTRATE AND ACETAMINOPHEN 1 TABLET: 7.5; 325 TABLET ORAL at 20:58

## 2023-04-06 RX ADMIN — BUDESONIDE 0.5 MG: 0.5 SUSPENSION RESPIRATORY (INHALATION) at 07:05

## 2023-04-06 RX ADMIN — PRAMIPEXOLE DIHYDROCHLORIDE 1 MG: 1 TABLET ORAL at 20:58

## 2023-04-06 RX ADMIN — PANTOPRAZOLE SODIUM 40 MG: 40 TABLET, DELAYED RELEASE ORAL at 09:58

## 2023-04-06 RX ADMIN — PANTOPRAZOLE SODIUM 40 MG: 40 TABLET, DELAYED RELEASE ORAL at 20:58

## 2023-04-06 NOTE — PLAN OF CARE
Goal Outcome Evaluation:           Progress: no change  Outcome Evaluation: Pt had back pain/pleural pain at beginning of shift, prn pain med given. pt refused PM lovenox dose due to vomiting blood night before after dose administered. pt running NSR at rate of 62-88 on telemetry.

## 2023-04-06 NOTE — PLAN OF CARE
Goal Outcome Evaluation: HDS, promote comfort/safety  Problem: Adult Inpatient Plan of Care  Goal: Plan of Care Review  Outcome: Ongoing, Progressing  Goal: Patient-Specific Goal (Individualized)  Outcome: Ongoing, Progressing  Goal: Absence of Hospital-Acquired Illness or Injury  Outcome: Ongoing, Progressing  Intervention: Identify and Manage Fall Risk  Recent Flowsheet Documentation  Taken 4/6/2023 0800 by Brooks Pina RN  Safety Promotion/Fall Prevention: safety round/check completed  Intervention: Prevent Skin Injury  Recent Flowsheet Documentation  Taken 4/6/2023 0800 by Brooks Pina RN  Body Position: position changed independently  Skin Protection: adhesive use limited  Intervention: Prevent and Manage VTE (Venous Thromboembolism) Risk  Recent Flowsheet Documentation  Taken 4/6/2023 0800 by Brooks Pina RN  Activity Management: activity encouraged  VTE Prevention/Management: sequential compression devices on  Range of Motion: active ROM (range of motion) encouraged  Taken 4/6/2023 0723 by Brooks Pina RN  VTE Prevention/Management: (mar) sequential compression devices on  Intervention: Prevent Infection  Recent Flowsheet Documentation  Taken 4/6/2023 0800 by Brooks Pina RN  Infection Prevention: single patient room provided  Goal: Optimal Comfort and Wellbeing  Outcome: Ongoing, Progressing  Intervention: Provide Person-Centered Care  Recent Flowsheet Documentation  Taken 4/6/2023 0800 by Brooks Pina RN  Trust Relationship/Rapport: care explained  Goal: Readiness for Transition of Care  Outcome: Ongoing, Progressing     Problem: Fall Injury Risk  Goal: Absence of Fall and Fall-Related Injury  Outcome: Ongoing, Progressing  Intervention: Identify and Manage Contributors  Recent Flowsheet Documentation  Taken 4/6/2023 0800 by Brooks Pina RN  Medication Review/Management: medications reviewed  Intervention: Promote Injury-Free Environment  Recent Flowsheet Documentation  Taken  4/6/2023 0800 by Brooks Pina, RN  Safety Promotion/Fall Prevention: safety round/check completed

## 2023-04-06 NOTE — PROGRESS NOTES
HEMATOLOGY AND MEDICAL ONCOLOGY PROGRESS NOTE          Pt Name: Melly Cruz  YOB: 1955  MRN: [id number]    Date of evaluation: 4/6/2023  History Obtained From:  History obtained from chart review and the patient.  PCP: Dr. Reynaldo Jeter    Subjective: Feeling better, breathing better    HISTORY OF PRESENT ILLNESS:    Melly was seen in initial medical oncology consultation as an inpatient at [facility] on 4/5/2023 at the request of the hospitalist, Dr. Tejas Lock.    Melly is a previous smoker, states that she quit in ~2018.  She smoked about 2 packs/day since about age 16.   Melly states that she has been followed with a right lung nodule for many years.    The lung nodule is being followed by Dr. Agusto Thompson and ENOC Rose with the cardiothoracic surgery department at [facility].  She was last seen by Dr. Agusto Thompson on 12/7/2022.     Imaging from [facility] documented findings as follows:     CTA chest 10/11/2017: No PE.  Subtle patchy infiltrate RUL lung  Contrasted chest CT 1/21/2018: 4 mm subpleural nodule LLL, unchanged from 2010.  Stable 7 mm superior segment LLL nodule.  New from 2010, is a 10-11 mm spiculated RUL apex nodule (postinflammatory scarring related to pneumonia 10/2017 versus underlying malignancy)  PET scan 10/4/2018: 11 mm subsolid RUL apex nodule, SUV 1.5. 5 mm LLL subpleural nodule is stable from 2010  Contrasted chest CT 4/22/2019: 15 mm subsolid nodule posterior right apex, unchanged from 10/2017 with no hypermetabolic abnormality in this area on PET 10/2018.  The area has developed from 2010.  Continue annual surveillance to ensure stability.  Subpleural LLL nodule has been present since 2010 and is benign  CTA chest 4/29/2019: No PE.  Stable 1.5 cm RUL groundglass opacity similar to 1/21/2018 and 10/11/2017, considering differences in technique.  Follow-up chest CT in 1 year  Contrasted chest CT 6/16/2020: New, 0.9 cm lingula pleural-based pulmonary nodule versus  atelectasis which previously appeared more triangular on 4/29/2019.  Recommend follow-up CT chest in 3 months.  Stable 1.5 cm RUL groundglass opacity  PET scan 6/30/2020: No FDG activity associated with the small, new 9 mm lingular nodule.  Stable 13 mm groundglass RUL nodule, no FDG uptake.  Stable, 3 mm subpleural LLL nodule.  A few, normal size lymph nodes in the inferior axilla bilaterally have low-level uptake, SUV 2.7-May be reactive.  Contrasted chest CT 12/16/2020: Stable 1.5 cm RUL groundglass opacity.  0.9 cm pulmonary nodule versus atelectasis in the lingula, similar when compared to 6/16/2020.  Stable 5 mm pleural-based LLL nodule when compared to 1/21/2018, which is 3 years of stability  Contrasted chest CT 12/8/2021: Stable RUL groundglass opacity, unchanged from 4/29/2019, adjacent subpleural nodule within the RUL, stable.  Unchanged focus of pleural-based nodularity within the inferior lingular segment of THI.  Stable subpleural soft tissue nodule within the left posterior costophrenic angle and lower lobe dating to 2019.  No new nodules  Noncontrast CT abdomen/pelvis 8/23/2022: 6 x 4 mm LLL subpleural lung nodule, unchanged for 2 years.  Indeterminate 15 x 17 mm lingular nodule.  Options include follow-up 3-month chest CT and PET scan     Contrasted chest CT 12/7/2022 at [facility]:  • Previously described groundglass opacity within the right upper lobeshows interval increase in size is now more elongate in appearance with increased soft tissue component. It does demonstrate linear extension to the pleural surface with some associated volume loss suggesting associated parenchymal scarring. This may simply represent some progressive scarring but given the change from the previous exam I would suggest PET/CT for further characterization. Additional previously described nodules are stable. There are changes of centrilobular emphysema.  • No developing mediastinal or axillary lymphadenopathy.     PET scan  3/17/2023:  • Slowly enlarging hypermetabolic 3.3 x 1.0 x 2.9 cm subsolid RIGHT upper lobe lung mass, SUV 4.1. This is highly suspicious for a primary lung neoplasm, favoring adenocarcinoma.  • No evidence of metastatic disease.  • Focal nodular hypermetabolic activity in the sigmoid colon, SUV 4.4. Differential diagnosis includes focal inflamed diverticulum versus underlying polyp or neoplasm. Recommend correlation with colonoscopy.     CTA chest 4/4/2023:  • No CT evidence of pulmonary embolus. Main pulmonary artery segment borderline dilated at 3 cm. Atheromatous disease of the thoracic aorta and coronary arteries. Cardiomegaly.  • New lung infiltrates bilaterally most likely due to pneumonia. This is most dense in the left upper lobe.  • A mass in the right lung apex is again noted. This mass is PET positive and worrisome for primary lung carcinoma.  • Stable small nodule in the right middle lobe laterally.  • Indeterminate small right renal lesion measuring 1.6 cm with a Hounsfield unit measurement of 48.  • Fatty infiltration of the liver.  • Moderate size hiatal hernia.       On 3/29/2023, Dr. Thompson's office attempted to refer Melly to Dr. Mendoza or Dr. Dove for EBUS, possible navigational bronchoscopy to obtain a tissue diagnosis. Unfortunately they are out of network for Mrs. Cruz's insurance.     Melly came to the the ED at [facility] on 4/4/2023 with a history of mid epigastric and substernal chest pain, pleuritic in nature with increasing dyspnea, temperature elevation to 100.6 °F, tachycardia and hypoxia.  She was found to have bilateral pneumonia.  She was admitted for management.     Medical oncology consultation requested with the above history.    Objective   PHYSICAL EXAM:  Physical Exam  Constitutional:       General: She is not in acute distress.     Appearance: She is well-developed. She is obese.   HENT:      Head: Normocephalic and atraumatic.      Right Ear: External ear normal.      Left Ear:  External ear normal.      Nose: Nose normal.   Eyes:      General: No scleral icterus.  Neck:      Trachea: No tracheal deviation.   Cardiovascular:      Rate and Rhythm: Normal rate and regular rhythm.   Pulmonary:      Effort: Pulmonary effort is normal. No respiratory distress.      Breath sounds: Normal breath sounds. No wheezing or rales.   Abdominal:      General: Bowel sounds are normal. There is no distension.      Palpations: Abdomen is soft. There is no mass.   Genitourinary:     Comments: Exam deferred.  Musculoskeletal:         General: No tenderness.      Cervical back: Neck supple.      Comments: Normal ROM to all 4 extremities.  Has a walking boot for the LLE, when out of bed.  Currently not wearing   Lymphadenopathy:      Comments:      Skin:     General: Skin is warm and dry.      Findings: No rash.   Neurological:      Mental Status: She is alert and oriented to person, place, and time.      Comments: Follows commands. Non-focal.   Psychiatric:         Behavior: Behavior normal.         Thought Content: Thought content normal.         Labs:  CBC  Results from last 7 days   Lab Units 04/05/23  0551 04/04/23  1546   WBC 10*3/mm3 15.17* 16.32*   HEMOGLOBIN g/dL 12.4 12.2   HEMATOCRIT % 42.8 39.6   PLATELETS 10*3/mm3 242 255       Lab Results   Component Value Date     04/05/2023    K 3.8 04/05/2023     04/05/2023    CO2 22.0 04/05/2023    BUN 21 04/05/2023    CREATININE 0.80 04/05/2023    GLUCOSE 87 04/05/2023    CALCIUM 8.3 (L) 04/05/2023    BILITOT 0.6 04/04/2023    ALKPHOS 97 04/04/2023    AST 29 04/04/2023    ALT 37 (H) 04/04/2023    AGRATIO 1.2 04/04/2023    GLOB 2.9 04/04/2023       Lab Results   Component Value Date    INR 1.00 01/24/2023    INR 1.11 07/11/2022    INR 1.04 07/10/2022    PROTIME 13.3 01/24/2023    PROTIME 14.3 07/11/2022    PROTIME 13.5 07/10/2022       Cultures:  Lab Results   Component Value Date    BLOODCX No growth at 24 hours 04/04/2023     No components found  for: URINCX      ASSESSMENT/PLAN:      #1  RUL Lung Mass  3.3 x 1.0 x 2.9 cm RIGHT upper lobe lung mass, SUV 4.1 on PET 3/17/2023  known right lung nodule, being followed outpatient by Dr. Agusto Thompson and ENOC Rose with the cardiothoracic surgery department at [facility].  She was last seen by Dr. Agusto Thompson on 12/7/2022     Recent imaging:     PET scan 3/17/2023:  • Slowly enlarging hypermetabolic 3.3 x 1.0 x 2.9 cm subsolid RIGHT upper lobe lung mass, SUV 4.1. This is highly suspicious for a primary lung neoplasm, favoring adenocarcinoma.  • No evidence of metastatic disease.  • Focal nodular hypermetabolic activity in the sigmoid colon, SUV 4.4. Differential diagnosis includes focal inflamed diverticulum versus underlying polyp or neoplasm. Recommend correlation with colonoscopy.     CTA chest 4/4/2023:  • No CT evidence of pulmonary embolus. Main pulmonary artery segment borderline dilated at 3 cm. Atheromatous disease of the thoracic aorta and coronary arteries. Cardiomegaly.  • New lung infiltrates bilaterally most likely due to pneumonia. This is most dense in the left upper lobe.  • A mass in the right lung apex is again noted. This mass is PET positive and worrisome for primary lung carcinoma.  • Stable small nodule in the right middle lobe laterally.  • Indeterminate small right renal lesion measuring 1.6 cm with a Hounsfield unit measurement of 48.  • Fatty infiltration of the liver.  • Moderate size hiatal hernia.       On 3/29/2023, Dr. Thompson's office attempted to refer Melly to Dr. Mendoza or Dr. Dove for EBUS, possible navigational bronchoscopy to obtain a tissue diagnosis.  Unfortunately they are out of network for Mrs. Cruz's insurance.     Melly came to the the ED at [facility] on 4/4/2023 with a history of mid epigastric and substernal chest pain, pleuritic in nature with increasing dyspnea, temperature elevation to 100.6 °F, tachycardia and hypoxia.  She was found to have bilateral  pneumonia.  She was admitted for management.     Medical oncology consultation requested with the above history.     RECOMMEND:  Referral to Dr. Ole Esquivel at [facility] for navigational bronchoscopy.  I will defer the pulmonary referral to Dr. Agusto Thompson who is most acquainted with Ms. Cruz and has developed alternative approaches as outlined in his 3/29/2023 note.     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  She states that she cannot receive oral anticoagulation because of terrible reflux esophagitis with GI bleeding.  She states her most recent DVT was of the right calf about a year ago after foot surgery.     Melly states the recommendation has been for Cory fundoplication to resolve the reflux problem so she can take oral anticoagulation.  She was scheduled for Cory fundoplication 4/7/2023 in [place].  She is currently receiving prophylactic dose Lovenox     #3  Pneumonia  Bilateral lung infiltrates CT chest 4/4/2023  Receiving Levaquin 750 mg IV every 24 hours x5 days  Pulmicort nebulizer  Managed per attending     #4  Neutrophilic leukocytosis  Likely associated with pneumonia  Blood cultures x2 on 4/4/2023 in process  Max temp 100.6 upon admission, afebrile since  WBC 15.17, ANC 12.98 on 4/5/2023     #5  Abnormal PET of sigmoid colon  PET 3/17/2023: Focal nodular hypermetabolic activity in the sigmoid colon, SUV 4.4. Differential diagnosis includes focal inflamed diverticulum versus underlying polyp or neoplasm. Recommend correlation with colonoscopy, which can be arranged as an outpatient, defer arrangements to PCP    #6  Indeterminate small right renal lesion measuring 1.6 cm with a Hounsfield unit measurement of 48, noted on CT chest 4/4/2023  No FDG avidity in this area on recent PET scan dated 3/17/2023  This can be followed as an outpatient by PCP, consider ultrasound    Plan of care discussed with  patient.  All questions answered.  Encouraged patient to follow-up with PCP posthospitalization to address other nonurgent findings noted on imaging studies as outlined above.  She reports last colonoscopy to be 1 year ago, by Dr. Pollock at [facility].    ENOC Medina  04/06/23  07:06 CDT    Physicians attestation and contribution:    I, Cedric Delarosa, personally and independently performed an evaluation on Melly Cruz  I have reviewed relevant medical information/data to include but not limited to the medication list, relevant appropriate lab work and imaging when applicable.  I reviewed other physician's notes, ancillary services and nurses assessments.  I have reviewed the above documentation completed by Brandy STUBBS  Please see my additional addended and/or modified contributions to the history of present illness, physical examination and assessment/medical decision-making and plan that reflects my findings and impressions.  I discussed the essential elements of the care plan with Brandy STUBBS and the patient.  I have encouraged and answered all the questions raised to the patient's understanding and satisfaction.  I concur with the above stated.    Subjective: Symptomatically much improved this morning.  Sitting up in bed with a smile and less uncomfortable.      Objective: Exam is stable, a bit less wheezing but with significant continued rales.     Assessment/plan:  RECOMMEND:  Referral to Dr. Ole Esquivel at [facility] for navigational bronchoscopy.  I will defer the pulmonary referral to Dr. Agusto Thompson who is most acquainted with Ms. Cruz and has developed alternative approaches as outlined in his 3/29/2023 note.    I will see an outpatient setting 5/17/2023 at 9:30 AM to follow-up on progress at obtaining a tissue diagnosis.    Other issues outlined above will be addressed by her PCP.      Cedric Delarosa MD  4/6/2023 08:22  CDT

## 2023-04-06 NOTE — PROGRESS NOTES
[facility] Medicine Services  INPATIENT PROGRESS NOTE    Patient Name: Melly Cruz  Date of Admission: 4/4/2023  Today's Date: 04/06/23  Length of Stay: 0  Primary Care Physician: Reynaldo Jeter DO    Subjective   Chief Complaint: Nausea, vomiting, diarrhea  HPI   Patient examined sitting up in bed on room air.  She tells me her vomiting and diarrhea have subsided over the past 24 hours.  She appears much improved today.  I discussed with her if she were to have another bowel movement, I would like to still obtain a GI PCR panel as I have some suspicion of an infectious colitis and would like to rule out bacterial causation.  We discussed potential discharge home tomorrow with oral antibiotics if she continues to improve.  She is pleased to hear this and tells me she will hopefully make it to her [facility] event on Saturday.  She is agreeable to her plan of care.    Review of Systems   Respiratory: Positive for shortness of breath.       All pertinent negatives and positives are as above. All other systems have been reviewed and are negative unless otherwise stated.     Objective    Temp:  [98.2 °F (36.8 °C)-99 °F (37.2 °C)] 98.4 °F (36.9 °C)  Heart Rate:  [71-85] 71  Resp:  [16-18] 18  BP: (122-131)/(55-78) 122/61  Physical Exam  Vitals and nursing note reviewed.   Constitutional:       Appearance: She is obese.   HENT:      Head: Normocephalic and atraumatic.   Cardiovascular:      Rate and Rhythm: Normal rate and regular rhythm.      Pulses: Normal pulses.      Heart sounds: Normal heart sounds. No murmur heard.    No friction rub.   Pulmonary:      Breath sounds: Normal breath sounds. No stridor. No wheezing or rhonchi.      Comments: Diminished bilaterally with mild rales.  Abdominal:      General: Bowel sounds are normal. There is no distension.      Palpations: Abdomen is soft.      Tenderness: There is no abdominal tenderness. There is no guarding.       Comments: Vomiting and diarrhea resolved.  Mild nausea remains.  Patient complains of indigestion and states this is chronic.   Musculoskeletal:         General: No swelling or deformity. Normal range of motion.      Cervical back: Normal range of motion and neck supple. No rigidity or tenderness.      Right lower leg: No edema.      Left lower leg: No edema.   Skin:     General: Skin is warm and dry.      Capillary Refill: Capillary refill takes less than 2 seconds.      Coloration: Skin is not pale.      Findings: No bruising or erythema.      Comments: Mild edema bilateral lower extremities.   Neurological:      General: No focal deficit present.      Mental Status: She is alert and oriented to person, place, and time. Mental status is at baseline.      Sensory: No sensory deficit.      Gait: Gait normal.   Psychiatric:         Mood and Affect: Mood normal.         Behavior: Behavior normal.         Thought Content: Thought content normal.         Judgment: Judgment normal.       Results Review:  I have reviewed the labs, radiology results, and diagnostic studies.    Laboratory Data:   Results from last 7 days   Lab Units 04/06/23  0905 04/05/23  0551 04/04/23  1546   WBC 10*3/mm3 8.80 15.17* 16.32*   HEMOGLOBIN g/dL 11.0* 12.4 12.2   HEMATOCRIT % 37.0 42.8 39.6   PLATELETS 10*3/mm3 219 242 255        Results from last 7 days   Lab Units 04/06/23  0905 04/05/23  0551 04/04/23  1546   SODIUM mmol/L 138 138 131*   POTASSIUM mmol/L 3.8 3.8 3.7   CHLORIDE mmol/L 106 107 98   CO2 mmol/L 21.0* 22.0 23.0   BUN mg/dL 15 21 19   CREATININE mg/dL 0.73 0.80 0.76   CALCIUM mg/dL 7.9* 8.3* 7.9*   BILIRUBIN mg/dL  --   --  0.6   ALK PHOS U/L  --   --  97   ALT (SGPT) U/L  --   --  37*   AST (SGOT) U/L  --   --  29   GLUCOSE mg/dL 134* 87 125*     I have reviewed the patient's current medications.     Assessment/Plan   Assessment  Active Hospital Problems    Diagnosis    • **Pneumonia of both lungs due to infectious organism     • Nausea and vomiting    • Diarrhea    • Renal lesion    • Restless leg syndrome    • GERD (gastroesophageal reflux disease)    • Pulmonary nodule, right        Treatment Plan  Bilateral pneumonia with history of COPD-  CTA shows bilateral infiltrates consistent with pneumonia, more dense in the left upper lobe. COVID and flu negative. Patient allergic to guaifenesin. Continue incentive spirometer, OPEP, Pulmicort, DuoNeb, IV Levaquin (patient allergic to azithromycin).  Blood culture x2 no growth at 24 hours.  Sputum culture unable to be collected.  No further fever since arrival.  WBC normalized.  Room air.    Nausea/vomiting/diarrhea-  3-day history of.  Patient states she originally thought she had food poisoning. 1 episode of vomiting overnight.  Patient denies black, red, maroon emesis.  She reports diarrhea continues.  Gastrointestinal PCR panel pending.    Right-sided pulmonary nodules-  1 nodule right middle lobe, mass in the lung apex.  Dr. Delarosa with oncology evaluated patient and will follow-up on outpatient basis.  Suggested follow-up with cardiothoracic surgery as well, patient previously followed with Dr. Thompson.    GERD with history of upper GI bleed-  No obvious signs/symptoms of bleeding.  Hemoglobin stable compared to a baseline hemoglobin varying between 10.5 and 12.5..  Continue twice daily Protonix.    SCDs for DVT prophylaxis    Medical Decision Making  Number and Complexity of problems: 2 acute problems of moderate complexity and bilateral pneumonia and nausea, vomiting, diarrhea.  1 subacute on chronic problem and COPD.  1 acute problem of high complexity in right-sided pulmonary nodules and mass.  Differential Diagnosis: Bilateral pneumonia.  Bacterial pneumonia versus pneumonitis.  Bacterial/viral colitis versus irritable bowel syndrome    Conditions and Status  Status stable     MDM Data  External documents reviewed: None  Cardiac tracing (EKG, telemetry) interpretation: Telemetry  reviewed  Radiology interpretation: CTA chest reviewed per radiologist interpretation  Labs reviewed: CBC, BMP  Any tests that were considered but not ordered: None     Decision rules/scores evaluated (example [id number], Wells, etc): None     Discussed with: Patient     Care Planning  Shared decision making: Plan of care discussed with patient, she is agreeable at this time  Code status and discussions: Full    Disposition  Social Determinants of Health that impact treatment or disposition: None  I expect the patient to be discharged to home in 1 to 2 days.    Electronically signed by ENOC Adams, 04/06/23, 11:25 CDT.

## 2023-04-07 LAB
ANION GAP SERPL CALCULATED.3IONS-SCNC: 7 MMOL/L (ref 5–15)
BASOPHILS # BLD AUTO: 0.02 10*3/MM3 (ref 0–0.2)
BASOPHILS NFR BLD AUTO: 0.3 % (ref 0–1.5)
BUN SERPL-MCNC: 16 MG/DL (ref 8–23)
BUN/CREAT SERPL: 20.8 (ref 7–25)
CALCIUM SPEC-SCNC: 8.3 MG/DL (ref 8.6–10.5)
CHLORIDE SERPL-SCNC: 106 MMOL/L (ref 98–107)
CO2 SERPL-SCNC: 24 MMOL/L (ref 22–29)
CREAT SERPL-MCNC: 0.77 MG/DL (ref 0.57–1)
DEPRECATED RDW RBC AUTO: 50.4 FL (ref 37–54)
EGFRCR SERPLBLD CKD-EPI 2021: 85.7 ML/MIN/1.73
EOSINOPHIL # BLD AUTO: 0.34 10*3/MM3 (ref 0–0.4)
EOSINOPHIL NFR BLD AUTO: 4.7 % (ref 0.3–6.2)
ERYTHROCYTE [DISTWIDTH] IN BLOOD BY AUTOMATED COUNT: 15.9 % (ref 12.3–15.4)
GLUCOSE SERPL-MCNC: 107 MG/DL (ref 65–99)
HCT VFR BLD AUTO: 33.7 % (ref 34–46.6)
HGB BLD-MCNC: 10.1 G/DL (ref 12–15.9)
IMM GRANULOCYTES # BLD AUTO: 0.02 10*3/MM3 (ref 0–0.05)
IMM GRANULOCYTES NFR BLD AUTO: 0.3 % (ref 0–0.5)
LYMPHOCYTES # BLD AUTO: 1.69 10*3/MM3 (ref 0.7–3.1)
LYMPHOCYTES NFR BLD AUTO: 23.2 % (ref 19.6–45.3)
MCH RBC QN AUTO: 26.2 PG (ref 26.6–33)
MCHC RBC AUTO-ENTMCNC: 30 G/DL (ref 31.5–35.7)
MCV RBC AUTO: 87.3 FL (ref 79–97)
MONOCYTES # BLD AUTO: 0.44 10*3/MM3 (ref 0.1–0.9)
MONOCYTES NFR BLD AUTO: 6 % (ref 5–12)
NEUTROPHILS NFR BLD AUTO: 4.78 10*3/MM3 (ref 1.7–7)
NEUTROPHILS NFR BLD AUTO: 65.5 % (ref 42.7–76)
NRBC BLD AUTO-RTO: 0 /100 WBC (ref 0–0.2)
PLATELET # BLD AUTO: 232 10*3/MM3 (ref 140–450)
PMV BLD AUTO: 9.7 FL (ref 6–12)
POTASSIUM SERPL-SCNC: 4.3 MMOL/L (ref 3.5–5.2)
RBC # BLD AUTO: 3.86 10*6/MM3 (ref 3.77–5.28)
SODIUM SERPL-SCNC: 137 MMOL/L (ref 136–145)
WBC NRBC COR # BLD: 7.29 10*3/MM3 (ref 3.4–10.8)

## 2023-04-07 PROCEDURE — 85025 COMPLETE CBC W/AUTO DIFF WBC: CPT | Performed by: INTERNAL MEDICINE

## 2023-04-07 PROCEDURE — 36415 COLL VENOUS BLD VENIPUNCTURE: CPT | Performed by: INTERNAL MEDICINE

## 2023-04-07 PROCEDURE — 94664 DEMO&/EVAL PT USE INHALER: CPT

## 2023-04-07 PROCEDURE — 94761 N-INVAS EAR/PLS OXIMETRY MLT: CPT

## 2023-04-07 PROCEDURE — 25010000002 LEVOFLOXACIN PER 250 MG: Performed by: INTERNAL MEDICINE

## 2023-04-07 PROCEDURE — 96366 THER/PROPH/DIAG IV INF ADDON: CPT

## 2023-04-07 PROCEDURE — 94799 UNLISTED PULMONARY SVC/PX: CPT

## 2023-04-07 PROCEDURE — G0378 HOSPITAL OBSERVATION PER HR: HCPCS

## 2023-04-07 PROCEDURE — 99222 1ST HOSP IP/OBS MODERATE 55: CPT | Performed by: INTERNAL MEDICINE

## 2023-04-07 PROCEDURE — 80048 BASIC METABOLIC PNL TOTAL CA: CPT | Performed by: INTERNAL MEDICINE

## 2023-04-07 RX ORDER — DIPHENHYDRAMINE HYDROCHLORIDE AND LIDOCAINE HYDROCHLORIDE AND ALUMINUM HYDROXIDE AND MAGNESIUM HYDRO
10 KIT EVERY 6 HOURS
Status: DISCONTINUED | OUTPATIENT
Start: 2023-04-07 | End: 2023-04-08 | Stop reason: HOSPADM

## 2023-04-07 RX ORDER — PANTOPRAZOLE SODIUM 40 MG/10ML
40 INJECTION, POWDER, LYOPHILIZED, FOR SOLUTION INTRAVENOUS
Status: DISCONTINUED | OUTPATIENT
Start: 2023-04-07 | End: 2023-04-08 | Stop reason: CLARIF

## 2023-04-07 RX ORDER — SUCRALFATE ORAL 1 G/10ML
1 SUSPENSION ORAL
Status: DISCONTINUED | OUTPATIENT
Start: 2023-04-07 | End: 2023-04-08 | Stop reason: HOSPADM

## 2023-04-07 RX ADMIN — FOLIC ACID 1 MG: 1 TABLET ORAL at 08:19

## 2023-04-07 RX ADMIN — Medication 10 ML: at 20:55

## 2023-04-07 RX ADMIN — LEVOTHYROXINE SODIUM 112 MCG: 112 TABLET ORAL at 05:02

## 2023-04-07 RX ADMIN — FLUTICASONE PROPIONATE 2 SPRAY: 50 SPRAY, METERED NASAL at 08:19

## 2023-04-07 RX ADMIN — BUDESONIDE 0.5 MG: 0.5 SUSPENSION RESPIRATORY (INHALATION) at 19:01

## 2023-04-07 RX ADMIN — Medication 10 ML: at 08:20

## 2023-04-07 RX ADMIN — PRAMIPEXOLE DIHYDROCHLORIDE 1 MG: 1 TABLET ORAL at 20:55

## 2023-04-07 RX ADMIN — PANTOPRAZOLE SODIUM 40 MG: 40 INJECTION, POWDER, FOR SOLUTION INTRAVENOUS at 16:34

## 2023-04-07 RX ADMIN — HYDROCODONE BITARTRATE AND ACETAMINOPHEN 1 TABLET: 7.5; 325 TABLET ORAL at 09:00

## 2023-04-07 RX ADMIN — Medication 10 ML: at 01:19

## 2023-04-07 RX ADMIN — DIPHENHYDRAMINE HYDROCHLORIDE AND LIDOCAINE HYDROCHLORIDE AND ALUMINUM HYDROXIDE AND MAGNESIUM HYDRO 10 ML: KIT at 20:55

## 2023-04-07 RX ADMIN — HYDROCODONE BITARTRATE AND ACETAMINOPHEN 1 TABLET: 7.5; 325 TABLET ORAL at 20:55

## 2023-04-07 RX ADMIN — BUPROPION HYDROCHLORIDE 300 MG: 150 TABLET, FILM COATED, EXTENDED RELEASE ORAL at 08:19

## 2023-04-07 RX ADMIN — LEVOFLOXACIN 750 MG: 750 INJECTION, SOLUTION INTRAVENOUS at 01:19

## 2023-04-07 RX ADMIN — DIPHENHYDRAMINE HYDROCHLORIDE AND LIDOCAINE HYDROCHLORIDE AND ALUMINUM HYDROXIDE AND MAGNESIUM HYDRO 10 ML: KIT at 14:44

## 2023-04-07 RX ADMIN — BUDESONIDE 0.5 MG: 0.5 SUSPENSION RESPIRATORY (INHALATION) at 06:04

## 2023-04-07 RX ADMIN — PRAMIPEXOLE DIHYDROCHLORIDE 1 MG: 1 TABLET ORAL at 08:19

## 2023-04-07 RX ADMIN — DIPHENHYDRAMINE HYDROCHLORIDE AND LIDOCAINE HYDROCHLORIDE AND ALUMINUM HYDROXIDE AND MAGNESIUM HYDRO 10 ML: KIT at 08:37

## 2023-04-07 RX ADMIN — SUCRALFATE 1 G: 1 SUSPENSION ORAL at 16:34

## 2023-04-07 RX ADMIN — PANTOPRAZOLE SODIUM 40 MG: 40 INJECTION, POWDER, FOR SOLUTION INTRAVENOUS at 08:37

## 2023-04-07 RX ADMIN — SUCRALFATE 1 G: 1 SUSPENSION ORAL at 08:38

## 2023-04-07 RX ADMIN — SUCRALFATE 1 G: 1 SUSPENSION ORAL at 11:19

## 2023-04-07 NOTE — CONSULTS
Ms. Cruz follows with Dr. Thompson for an increasing in size right upper lobe lung nodule.  She was last seen on March 29, 2023 with recommendations to proceed forward with EBUS and navigational bronchoscopy for tissue diagnosis then radiation given significant history of 5 different events of pulmonary emboli with contraindication to anticoagulation and history of significant GI bleeding with multiple blood transfusions.  Referral was placed to Dr. Dove and Dr. Mendoza at [facility].  Unfortunately, this was out of network with [employer].  We notified her of this earlier in the week.  In the interim, she was admitted and is undergoing treatment of pneumonia and infectious colitis. Dr. Delarosa has been consulted and recommended referral to Dr. Ole Esquivel at [facility] for navigational bronchoscopy and EBUS. I have placed this referral. Will continue to follow up outpatient with Dr. Thompson.     Dr. Pantoja and Dr. Delarosa discussed patient's plan of care.       Brandy Castillo, APRN  04/07/23  14:03 CDT

## 2023-04-07 NOTE — PROGRESS NOTES
"    [facility] Medicine Services  INPATIENT PROGRESS NOTE    Patient Name: Melly rCuz  Date of Admission: 4/4/2023  Today's Date: 04/07/23  Length of Stay: 0  Primary Care Physician: Reynaldo Jeter DO    Subjective   Chief Complaint: Nausea, vomiting, diarrhea  Shortness of Breath       Patient examined sitting up in bed on room air.  She denies any hematemesis lately.  She tells me overall she feels improved.  She describes her abdomen as bloated.  She also tells me her GERD has been \"flaring up\".  We discussed her previous episode of hematemesis and her subsequent hemoglobin decreased since.  From a pneumonia standpoint, we are preparing for discharge today.  However, after discussion with the patient, we will continue to monitor for further gastrointestinal bleed and have gastroenterology evaluate her.  She is agreeable to her plan of care.  Appreciate gastroenterology     Review of Systems   Respiratory: Positive for shortness of breath.       All pertinent negatives and positives are as above. All other systems have been reviewed and are negative unless otherwise stated.     Objective    Temp:  [97.9 °F (36.6 °C)-98.6 °F (37 °C)] 97.9 °F (36.6 °C)  Heart Rate:  [70-85] 77  Resp:  [16-18] 16  BP: (108-139)/(61-79) 110/79  Physical Exam  Vitals and nursing note reviewed.   Constitutional:       Appearance: She is obese.   HENT:      Head: Normocephalic and atraumatic.   Cardiovascular:      Rate and Rhythm: Normal rate and regular rhythm.      Pulses: Normal pulses.      Heart sounds: Normal heart sounds. No murmur heard.    No friction rub.   Pulmonary:      Breath sounds: Normal breath sounds. No stridor. No wheezing or rhonchi.      Comments: Diminished bilaterally with mild rales.  Abdominal:      General: Bowel sounds are normal. There is no distension.      Palpations: Abdomen is soft.      Tenderness: There is no abdominal tenderness. There is no guarding.     "  Comments: Vomiting and diarrhea resolved.  Complaints of indigestion and bloating.   Musculoskeletal:         General: No swelling or deformity. Normal range of motion.      Cervical back: Normal range of motion and neck supple. No rigidity or tenderness.      Right lower leg: No edema.      Left lower leg: No edema.   Skin:     General: Skin is warm and dry.      Capillary Refill: Capillary refill takes less than 2 seconds.      Coloration: Skin is not pale.      Findings: No bruising or erythema.      Comments: Mild edema bilateral lower extremities.   Neurological:      General: No focal deficit present.      Mental Status: She is alert and oriented to person, place, and time. Mental status is at baseline.      Sensory: No sensory deficit.      Gait: Gait normal.   Psychiatric:         Mood and Affect: Mood normal.         Behavior: Behavior normal.         Thought Content: Thought content normal.         Judgment: Judgment normal.       Results Review:  I have reviewed the labs, radiology results, and diagnostic studies.    Laboratory Data:   Results from last 7 days   Lab Units 04/07/23 0412 04/06/23  0905 04/05/23  0551   WBC 10*3/mm3 7.29 8.80 15.17*   HEMOGLOBIN g/dL 10.1* 11.0* 12.4   HEMATOCRIT % 33.7* 37.0 42.8   PLATELETS 10*3/mm3 232 219 242        Results from last 7 days   Lab Units 04/07/23  0412 04/06/23  0905 04/05/23  0551 04/04/23  1546   SODIUM mmol/L 137 138 138 131*   POTASSIUM mmol/L 4.3 3.8 3.8 3.7   CHLORIDE mmol/L 106 106 107 98   CO2 mmol/L 24.0 21.0* 22.0 23.0   BUN mg/dL 16 15 21 19   CREATININE mg/dL 0.77 0.73 0.80 0.76   CALCIUM mg/dL 8.3* 7.9* 8.3* 7.9*   BILIRUBIN mg/dL  --   --   --  0.6   ALK PHOS U/L  --   --   --  97   ALT (SGPT) U/L  --   --   --  37*   AST (SGOT) U/L  --   --   --  29   GLUCOSE mg/dL 107* 134* 87 125*     I have reviewed the patient's current medications.     Assessment/Plan   Assessment  Active Hospital Problems    Diagnosis    • **Pneumonia of both lungs  due to infectious organism    • Nausea and vomiting    • Diarrhea    • Renal lesion    • Restless leg syndrome    • GERD (gastroesophageal reflux disease)    • Pulmonary nodule, right        Treatment Plan  Bilateral pneumonia with history of COPD-  CTA shows bilateral infiltrates consistent with pneumonia, more dense in the left upper lobe. COVID and flu negative. Patient allergic to guaifenesin. Continue incentive spirometer, OPEP, Pulmicort, DuoNeb, IV Levaquin (patient allergic to azithromycin).  Blood culture x2 no growth at 2 days.  Sputum culture unable to be collected.  No further fever since arrival.  WBC normalized.  Room air.    GERD with history of upper GI bleed with anemia-  No obvious signs/symptoms of bleeding currently.  Hemoglobin has dropped over 2 g since arrival.  Current hemoglobin 10.1.  IV Protonix, Carafate.  Gastroenterology consult, appreciate their assistance.    Nausea/vomiting/diarrhea-  Patient states she originally thought she had food poisoning. 1 episode of hematemesis shortly after arrival.  No further vomiting.  Diarrhea resolved.  Gastrointestinal PCR panel pending.    Right-sided pulmonary nodules-  1 nodule right middle lobe, mass in the lung apex.  Dr. Delarosa with oncology evaluated patient and will follow-up on outpatient basis.  Suggested follow-up with cardiothoracic surgery as well, patient previously followed with Dr. Thompson.    SCDs for DVT prophylaxis    Medical Decision Making  Number and Complexity of problems: 2 acute problems of moderate complexity and bilateral pneumonia and nausea, vomiting, diarrhea.  1 subacute on chronic problem and COPD.  1 acute problem of high complexity in right-sided pulmonary nodules and mass.  Differential Diagnosis: Bilateral pneumonia.  Bacterial pneumonia versus pneumonitis.  Bacterial/viral colitis versus irritable bowel syndrome    Conditions and Status  Status stable     MDM Data  External documents reviewed: None  Cardiac tracing  (EKG, telemetry) interpretation: Telemetry reviewed  Radiology interpretation: CTA chest reviewed per radiologist interpretation  Labs reviewed: CBC, BMP  Any tests that were considered but not ordered: None     Decision rules/scores evaluated (example [id number], Wells, etc): None     Discussed with: Patient     Care Planning  Shared decision making: Plan of care discussed with patient, she is agreeable at this time  Code status and discussions: Full    Disposition  Social Determinants of Health that impact treatment or disposition: None  I expect the patient to be discharged to home with timeline depending on stabilization of hemoglobin.    Electronically signed by ENOC Adams, 04/07/23, 08:05 CDT.

## 2023-04-07 NOTE — PROGRESS NOTES
HEMATOLOGY AND MEDICAL ONCOLOGY PROGRESS NOTE          Pt Name: Melly Cruz  YOB: 1955  MRN: [id number]  Room: 460    Date of evaluation: 4/7/2023  History Obtained From:  History obtained from chart review and the patient.  PCP: Dr. Reynaldo Jeter    Subjective: Feeling better, denies diarrhea.  States no BM in the past 3 days.  Breathing better.  No wheezing.  Anticipating possible discharge home today.    HISTORY OF PRESENT ILLNESS:    Melly was seen in initial medical oncology consultation as an inpatient at [facility] on 4/5/2023 at the request of the hospitalist, Dr. Tejas Lock.    Melly is a previous smoker, states that she quit in ~2018.  She smoked about 2 packs/day since about age 16.   Melly states that she has been followed with a right lung nodule for many years.    The lung nodule is being followed by Dr. Agusto Thompson and ENOC Rose with the cardiothoracic surgery department at [facility].  She was last seen by Dr. Agusto Thompson on 12/7/2022.     Imaging from [facility] documented findings as follows:     CTA chest 10/11/2017: No PE.  Subtle patchy infiltrate RUL lung  Contrasted chest CT 1/21/2018: 4 mm subpleural nodule LLL, unchanged from 2010.  Stable 7 mm superior segment LLL nodule.  New from 2010, is a 10-11 mm spiculated RUL apex nodule (postinflammatory scarring related to pneumonia 10/2017 versus underlying malignancy)  PET scan 10/4/2018: 11 mm subsolid RUL apex nodule, SUV 1.5. 5 mm LLL subpleural nodule is stable from 2010  Contrasted chest CT 4/22/2019: 15 mm subsolid nodule posterior right apex, unchanged from 10/2017 with no hypermetabolic abnormality in this area on PET 10/2018.  The area has developed from 2010.  Continue annual surveillance to ensure stability.  Subpleural LLL nodule has been present since 2010 and is benign  CTA chest 4/29/2019: No PE.  Stable 1.5 cm RUL groundglass opacity similar to 1/21/2018 and 10/11/2017, considering differences in  technique.  Follow-up chest CT in 1 year  Contrasted chest CT 6/16/2020: New, 0.9 cm lingula pleural-based pulmonary nodule versus atelectasis which previously appeared more triangular on 4/29/2019.  Recommend follow-up CT chest in 3 months.  Stable 1.5 cm RUL groundglass opacity  PET scan 6/30/2020: No FDG activity associated with the small, new 9 mm lingular nodule.  Stable 13 mm groundglass RUL nodule, no FDG uptake.  Stable, 3 mm subpleural LLL nodule.  A few, normal size lymph nodes in the inferior axilla bilaterally have low-level uptake, SUV 2.7-May be reactive.  Contrasted chest CT 12/16/2020: Stable 1.5 cm RUL groundglass opacity.  0.9 cm pulmonary nodule versus atelectasis in the lingula, similar when compared to 6/16/2020.  Stable 5 mm pleural-based LLL nodule when compared to 1/21/2018, which is 3 years of stability  Contrasted chest CT 12/8/2021: Stable RUL groundglass opacity, unchanged from 4/29/2019, adjacent subpleural nodule within the RUL, stable.  Unchanged focus of pleural-based nodularity within the inferior lingular segment of THI.  Stable subpleural soft tissue nodule within the left posterior costophrenic angle and lower lobe dating to 2019.  No new nodules  Noncontrast CT abdomen/pelvis 8/23/2022: 6 x 4 mm LLL subpleural lung nodule, unchanged for 2 years.  Indeterminate 15 x 17 mm lingular nodule.  Options include follow-up 3-month chest CT and PET scan     Contrasted chest CT 12/7/2022 at [facility]:  • Previously described groundglass opacity within the right upper lobeshows interval increase in size is now more elongate in appearance with increased soft tissue component. It does demonstrate linear extension to the pleural surface with some associated volume loss suggesting associated parenchymal scarring. This may simply represent some progressive scarring but given the change from the previous exam I would suggest PET/CT for further characterization. Additional previously described nodules  are stable. There are changes of centrilobular emphysema.  • No developing mediastinal or axillary lymphadenopathy.     PET scan 3/17/2023:  • Slowly enlarging hypermetabolic 3.3 x 1.0 x 2.9 cm subsolid RIGHT upper lobe lung mass, SUV 4.1. This is highly suspicious for a primary lung neoplasm, favoring adenocarcinoma.  • No evidence of metastatic disease.  • Focal nodular hypermetabolic activity in the sigmoid colon, SUV 4.4. Differential diagnosis includes focal inflamed diverticulum versus underlying polyp or neoplasm. Recommend correlation with colonoscopy.     CTA chest 4/4/2023:  • No CT evidence of pulmonary embolus. Main pulmonary artery segment borderline dilated at 3 cm. Atheromatous disease of the thoracic aorta and coronary arteries. Cardiomegaly.  • New lung infiltrates bilaterally most likely due to pneumonia. This is most dense in the left upper lobe.  • A mass in the right lung apex is again noted. This mass is PET positive and worrisome for primary lung carcinoma.  • Stable small nodule in the right middle lobe laterally.  • Indeterminate small right renal lesion measuring 1.6 cm with a Hounsfield unit measurement of 48.  • Fatty infiltration of the liver.  • Moderate size hiatal hernia.       On 3/29/2023, Dr. Thompson's office attempted to refer Melly to Dr. Mendoza or Dr. Dove for EBUS, possible navigational bronchoscopy to obtain a tissue diagnosis. Unfortunately they are out of network for Mrs. Cruz's insurance.     Melly came to the the ED at [facility] on 4/4/2023 with a history of mid epigastric and substernal chest pain, pleuritic in nature with increasing dyspnea, temperature elevation to 100.6 °F, tachycardia and hypoxia.  She was found to have bilateral pneumonia.  She was admitted for management.     Medical oncology consultation requested with the above history.    Objective   PHYSICAL EXAM:  Physical Exam  Constitutional:       General: She is not in acute distress.     Appearance: She is  well-developed. She is obese.   HENT:      Head: Normocephalic and atraumatic.      Right Ear: External ear normal.      Left Ear: External ear normal.      Nose: Nose normal.   Eyes:      General: No scleral icterus.  Neck:      Trachea: No tracheal deviation.   Cardiovascular:      Rate and Rhythm: Normal rate and regular rhythm.   Pulmonary:      Effort: Pulmonary effort is normal. No respiratory distress.      Breath sounds: Normal breath sounds. No wheezing or rales.   Abdominal:      General: Bowel sounds are normal. There is no distension.      Palpations: Abdomen is soft. There is no mass.   Genitourinary:     Comments: Exam deferred.  Musculoskeletal:         General: No tenderness.      Cervical back: Neck supple.      Comments: Normal ROM to all 4 extremities.  Has a walking boot for the LLE, when out of bed.  Currently not wearing   Lymphadenopathy:      Comments:      Skin:     General: Skin is warm and dry.      Findings: No rash.   Neurological:      Mental Status: She is alert and oriented to person, place, and time.      Comments: Follows commands. Non-focal.   Psychiatric:         Behavior: Behavior normal.         Thought Content: Thought content normal.         Labs:  CBC  Results from last 7 days   Lab Units 04/07/23  0412 04/06/23  0905 04/05/23  0551   WBC 10*3/mm3 7.29 8.80 15.17*   HEMOGLOBIN g/dL 10.1* 11.0* 12.4   HEMATOCRIT % 33.7* 37.0 42.8   PLATELETS 10*3/mm3 232 219 242       Lab Results   Component Value Date     04/07/2023    K 4.3 04/07/2023     04/07/2023    CO2 24.0 04/07/2023    BUN 16 04/07/2023    CREATININE 0.77 04/07/2023    GLUCOSE 107 (H) 04/07/2023    CALCIUM 8.3 (L) 04/07/2023    BILITOT 0.6 04/04/2023    ALKPHOS 97 04/04/2023    AST 29 04/04/2023    ALT 37 (H) 04/04/2023    AGRATIO 1.2 04/04/2023    GLOB 2.9 04/04/2023       Lab Results   Component Value Date    INR 1.00 01/24/2023    INR 1.11 07/11/2022    INR 1.04 07/10/2022    PROTIME 13.3 01/24/2023     PROTIME 14.3 07/11/2022    PROTIME 13.5 07/10/2022       Cultures:  Lab Results   Component Value Date    BLOODCX No growth at 2 days 04/04/2023     No components found for: URINCX      ASSESSMENT/PLAN:      #1  RUL Lung Mass  3.3 x 1.0 x 2.9 cm RIGHT upper lobe lung mass, SUV 4.1 on PET 3/17/2023  known right lung nodule, being followed outpatient by Dr. Agusto Thompson and ENOC Rose with the cardiothoracic surgery department at [facility].  She was last seen by Dr. Agusto Thompson on 12/7/2022     Recent imaging:     PET scan 3/17/2023:  • Slowly enlarging hypermetabolic 3.3 x 1.0 x 2.9 cm subsolid RIGHT upper lobe lung mass, SUV 4.1. This is highly suspicious for a primary lung neoplasm, favoring adenocarcinoma.  • No evidence of metastatic disease.  • Focal nodular hypermetabolic activity in the sigmoid colon, SUV 4.4. Differential diagnosis includes focal inflamed diverticulum versus underlying polyp or neoplasm. Recommend correlation with colonoscopy.     CTA chest 4/4/2023:  • No CT evidence of pulmonary embolus. Main pulmonary artery segment borderline dilated at 3 cm. Atheromatous disease of the thoracic aorta and coronary arteries. Cardiomegaly.  • New lung infiltrates bilaterally most likely due to pneumonia. This is most dense in the left upper lobe.  • A mass in the right lung apex is again noted. This mass is PET positive and worrisome for primary lung carcinoma.  • Stable small nodule in the right middle lobe laterally.  • Indeterminate small right renal lesion measuring 1.6 cm with a Hounsfield unit measurement of 48.  • Fatty infiltration of the liver.  • Moderate size hiatal hernia.       On 3/29/2023, Dr. Thompson's office attempted to refer Melly to Dr. Mendoza or Dr. Dove for EBUS, possible navigational bronchoscopy to obtain a tissue diagnosis.  Unfortunately they are out of network for Mrs. Cruz's insurance.     Melly came to the the ED at [facility] on 4/4/2023 with a history of mid epigastric  and substernal chest pain, pleuritic in nature with increasing dyspnea, temperature elevation to 100.6 °F, tachycardia and hypoxia.  She was found to have bilateral pneumonia.  She was admitted for management.     Medical oncology consultation requested with the above history.     RECOMMEND:  Dr. Delarosa recommends referral to Dr. Ole Esquivel at [facility] for navigational bronchoscopy.  Defer the pulmonary referral to Dr. Agusto Thompson who is most acquainted with Ms. Cruz and has developed alternative approaches as outlined in his 3/29/2023 note.     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  She states that she cannot receive oral anticoagulation because of terrible reflux esophagitis with GI bleeding.  She states her most recent DVT was of the right calf about a year ago after foot surgery.     Melly states the recommendation has been for Cory fundoplication to resolve the reflux problem so she can take oral anticoagulation.  She was scheduled for Cory fundoplication 4/7/2023 in [place].  She is currently receiving prophylactic dose Lovenox     #3  Pneumonia  Bilateral lung infiltrates CT chest 4/4/2023  Receiving Levaquin 750 mg IV every 24 hours x5 days  Pulmicort nebulizer  Managed per attending     #4  Neutrophilic leukocytosis  Likely associated with pneumonia  Blood cultures x2 on 4/4/2023: no growth x2 days  Max temp 100.6 upon admission, afebrile since  WBC 15.17, ANC 12.98 on 4/5/2023  WBC normal 7.29, ANC 4.78 today, 4/7/2023     #5  Abnormal PET of sigmoid colon  PET 3/17/2023: Focal nodular hypermetabolic activity in the sigmoid colon, SUV 4.4. Differential diagnosis includes focal inflamed diverticulum versus underlying polyp or neoplasm. Recommend correlation with colonoscopy, which can be arranged as an outpatient, defer arrangements to PCP    #6  Indeterminate small right renal lesion measuring 1.6  "cm with a Hounsfield unit measurement of 48, noted on CT chest 4/4/2023  No FDG avidity in this area on recent PET scan dated 3/17/2023  This can be followed as an outpatient by PCP, consider ultrasound    Plan of care discussed with patient.  All questions answered.  Encouraged patient to follow-up with PCP post-hospitalization to address other nonurgent findings noted on imaging studies as outlined above.  She reports last colonoscopy to be 1 year ago, by Dr. Pollock at [facility].    Appointment with Dr. Delarosa 5/17/2023 at 9:30 AM to follow-up on progress at obtaining a tissue diagnosis.    Plan of care discussed with patient.  All questions answered.    ENOC Medina  04/07/23  06:46 CDT    Physicians attestation and contribution:    I, Cedric Delarosa, personally and independently performed an evaluation on Melly Cruz  I have reviewed relevant medical information/data to include but not limited to the medication list, relevant appropriate lab work and imaging when applicable.  I reviewed other physician's notes, ancillary services and nurses assessments.  I have reviewed the above documentation completed by Brandy STUBBS  Please see my additional addended and/or modified contributions to the history of present illness, physical examination and assessment/medical decision-making and plan that reflects my findings and impressions.  I discussed the essential elements of the care plan with Brandy STUBBS and the patient.  I have encouraged and answered all the questions raised to the patient's understanding and satisfaction.  I concur with the above stated.    Subjective: Respiratory wise she has improved.  Unfortunately she continues to have symptoms of GERD and epigastric abdominal discomfort and bloating.  Melly states that the vomiting and diarrhea that she had on admission have resolved.  Melly states that she was vomiting up \"handfuls of dark red blood \"during the first day " or 2 of admission.    Objective: Exam is relatively stable with clear lungs but with abdominal bloating    Assessment/plan:  Case discussed with Dr. Alba Murdock.  Hemoglobin dropped to 10.1 today from 12.4 on 4/5/2023  With her history significant GI bleed in the past and hematemesis this admission, agree with Dr. Murdock for GI evaluation.    FROM THE LUNG MASS STANDPOINT:  RECOMMEND:  Referral to Dr. Ole Esquivel at [facility] for navigational bronchoscopy.  I will defer the pulmonary referral to Dr. Agusto Thompson who is most acquainted with Ms. Cruz and has developed alternative approaches as outlined in his 3/29/2023 note.     I consulted thoracic surgery for continuity of care that they might be informed of recommendations and opinion requested.  This was also discussed with Dr. Murdock.       I will see an outpatient setting 5/17/2023 at 9:30 AM to follow-up on progress at obtaining a tissue diagnosis.      Cedric Delarosa MD  4/7/2023 09:41 CDT

## 2023-04-07 NOTE — CONSULTS
[facility] Gastroenterology  Inpatient Consult Note  Today's date:  04/07/23    Melly Cruz  1955       Referring Provider: No Known Provider  Primary Physician: Reynaldo Jeter DO     Date of Admission: 4/4/2023  Date of Service:  04/07/23    Reason for Consultation/Chief Complaint: Recent hematemesis.  Black bowel movement.  2 g hemoglobin drop.    History of present illness: 67-year-old woman who was just diagnosed with the lung cancer couple of weeks ago.  Patient was in her usual state of health up until 5 days ago when the patient developed acute onset of symptoms suggestive of infectious/viral gastroenterocolitis.  Specifically patient started to experience significant nausea, vomiting, crampy abdominal pain, chills and nonbloody diarrhea.  This persisted for 3 days and resolved spontaneously.  Over the last 2 days patient did not have any of the symptoms.  She actually did not have any bowel movements for 48 hours.  No more nausea or vomiting.  However on the third day of symptoms, after patient already had quite a few episodes of vomiting, she had at least 2 episodes of hematemesis.  Next day patient also had 1 bowel movement with black color loose stool after which her diarrhea has resolved.  Currently patient is asymptomatic GI wise.  Hungry, wants to eat.  Patient was admitted on April 4th.  On admission hemoglobin was 12.2, but subsequently it dropped to 11.0 yesterday and is 10.1 today.  White blood cell count which was elevated at 16,000 on admission normalized and currently 7.3 thousand.  Platelet count remained normal throughout the admission.  CMP was quite unremarkable on admission with exception of mild hyponatremia and hypocalcemia along with mildly elevated glucose at 125.  BUN and creatinine were normal at 19 and 2.76 respectively.  BUN/creatinine remain normal throughout the admission.  Hyponatremia resolved and calcium is up to 8.3 which is only mildly low.   Patient had several endoscopies in the past due to history of gastroesophageal reflux disease.  Last endoscopy was in July 2022, just 9 months ago and it revealed just small hiatal hernia and mild gastritis.  Last colonoscopy was less than 2 years ago and did not reveal any significant abnormalities.    Past Medical History:   Diagnosis Date   • Acid reflux    • Anemia    • Anxiety    • Arthritis    • Asthma     Mild COPD   • Curtis esophagus    • Bleeding disorder     Reflux acid overload   • Bunion Aug    Repaired with surgery   • Chest pain    • Chronic back pain    • Chronic neck pain    • Clotting disorder     Stomach bleeds from acid   • COPD (chronic obstructive pulmonary disease)    • Difficulty walking Dec 2022    After surgery wslking has become more and more difficult   • DVT (deep venous thrombosis)    • GI bleed    • Hammer toe Aug 22    Repaired surgery   • Hypothyroid    • Hypothyroidism    • Low back pain    • Migraine    • PE (pulmonary thromboembolism)    • Plantar fasciitis Years ago   • Renal lesion 4/5/2023   • Walter splints Years ago       Past Surgical History:   Procedure Laterality Date   • BUNIONECTOMY     • CHOLECYSTECTOMY     • COLONOSCOPY     • CORRECTION HAMMER TOE     • DILATATION AND CURETTAGE     • FOOT FUSION Left 04/20/2022    Procedure: 1-2 Arthrodesis, Tarsal Metatarsal Joint 2 and 3 Arthrodesis;  Surgeon: Bud Maradiaga DPM;  Location:  [facility] OR;  Service: Podiatry;  Laterality: Left;   • HAMMER TOE REPAIR Left 04/20/2022    Procedure: Hammertoe Repair 2-5,;  Surgeon: Bud Maradiaga DPM;  Location:  [facility] OR;  Service: Podiatry;  Laterality: Left;   • HERNIA REPAIR     • HYSTERECTOMY     • JOINT REPLACEMENT     • OOPHORECTOMY     • REDUCTION MAMMAPLASTY  01/2021   • REPLACEMENT TOTAL KNEE      Right knee partial, left knee        Allergies   Allergen Reactions   • Erythromycin Swelling     Throat swells   • Hydromorphone Hcl Anaphylaxis and Other (See Comments)     Other  reaction(s): Shock and/or Unconsciousness  Note: Anaphylaxis   • Ondansetron Hcl Other (See Comments)     Coded  Coded  Other reaction(s): Zofran  Note:  Allergic Reaction: Anaphylaxis   • Orphenadrine Anaphylaxis   • Orphenadrine Citrate Anaphylaxis   • Meperidine Other (See Comments)     Makes her an angry person   • Codeine Rash   • Guaifenesin & Derivatives Nausea And Vomiting   • Guaifenesin Er Swelling     Throat swelling       Medications Prior to Admission   Medication Sig Dispense Refill Last Dose   • albuterol (PROVENTIL HFA;VENTOLIN HFA) 108 (90 Base) MCG/ACT inhaler Inhale 2 puffs 4 (Four) Times a Day. 6.7 g 0 4/3/2023   • budesonide (Pulmicort Flexhaler) 90 MCG/ACT inhaler Inhale 2 puffs 2 (Two) Times a Day. Swallow, do not inhale  11 Past Week   • buPROPion XL (WELLBUTRIN XL) 300 MG 24 hr tablet Take 1 tablet by mouth Every Morning.   4/4/2023   • cyclobenzaprine (FLEXERIL) 10 MG tablet Take 1 tablet by mouth 3 (Three) Times a Day As Needed.   4/3/2023   • esomeprazole (nexIUM) 40 MG capsule Take 2 capsules by mouth Every Morning Before Breakfast. OTC      • fluticasone (FLONASE) 50 MCG/ACT nasal spray 2 sprays into the nostril(s) as directed by provider Daily.      • folic acid (FOLVITE) 1 MG tablet Take 1 tablet by mouth Daily.   Past Week   • HYDROcodone-acetaminophen (NORCO) 7.5-325 MG per tablet Take 1 tablet by mouth Every 8 (Eight) Hours As Needed.   4/4/2023   • levothyroxine (SYNTHROID, LEVOTHROID) 112 MCG tablet Take 1 tablet by mouth Daily.   4/4/2023   • metoclopramide (REGLAN) 5 MG tablet Take 1 tablet by mouth 3 (Three) Times a Day As Needed (nausea and vomiting). 10 tablet 0 Past Month   • pantoprazole (PROTONIX) 40 MG EC tablet Take 1 tablet by mouth 2 (Two) Times a Day.   4/4/2023   • pramipexole (MIRAPEX) 1 MG tablet Take 1 tablet by mouth 2 (Two) Times a Day.   4/4/2023   • tiZANidine (ZANAFLEX) 4 MG tablet Take 2-4 mg by mouth At Night As Needed for Muscle Spasms.   4/3/2023   •  vitamin D (ERGOCALCIFEROL) 36172 units capsule capsule Take 1 capsule by mouth Every 7 (Seven) Days. MIS  6 4/3/2023   • Cyanocobalamin (VITAMIN B-12 ER) 1000 MCG tablet controlled-release Take 1,000 mcg by mouth Daily.      • EPINEPHrine (EPIPEN) 0.3 MG/0.3ML solution auto-injector injection       • lidocaine (XYLOCAINE) 5 % ointment Apply 1 application topically to the appropriate area as directed Every 2 (Two) Hours As Needed for Mild Pain. (Patient taking differently: Apply 1 application topically to the appropriate area as directed Every 2 (Two) Hours As Needed for Mild Pain. USES ON FOOT) 2500 g 5    • nitroglycerin (NITROSTAT) 0.4 MG SL tablet Place 1 tablet under the tongue Every 5 (Five) Minutes As Needed for Chest Pain. Take no more than 3 doses in 15 minutes.   Unknown   • pregabalin (LYRICA) 75 MG capsule Take 1 capsule by mouth 2 (Two) Times a Day.      • Semaglutide, 1 MG/DOSE, (Ozempic, 1 MG/DOSE,) 4 MG/3ML solution pen-injector Inject 1 mg under the skin into the appropriate area as directed 1 (One) Time Per Week. Friday (Patient not taking: Reported on 3/29/2023)          Hospital Medications (active)       Dose Frequency Start End    aluminum-magnesium hydroxide-simethicone (MAALOX MAX) 400-400-40 MG/5ML suspension 30 mL 30 mL Every 6 Hours PRN 4/5/2023     Admin Instructions: Mix with viscous lidocaine in this panel    Route: Oral    budesonide (PULMICORT) nebulizer solution 0.5 mg 0.5 mg 2 Times Daily - RT 4/4/2023     Admin Instructions: Include Respiratory Treatment Education  Do not shake.  Protect from light.    Route: Nebulization    buPROPion XL (WELLBUTRIN XL) 24 hr tablet 300 mg 300 mg Every Morning Before Breakfast 4/5/2023     Admin Instructions: Caution: Look alike/sound alike drug alert. Swallow whole.  Do not crush, chew, or split tablet.    Route: Oral    cyclobenzaprine (FLEXERIL) tablet 10 mg 10 mg 3 Times Daily PRN 4/4/2023     Route: Oral    dicyclomine (BENTYL) capsule 10 mg  10 mg 3 Times Daily PRN 4/4/2023     Route: Oral    First Mouthwash (Magic Mouthwash) 10 mL 10 mL Every 6 Hours 4/7/2023     Admin Instructions: Shake Well Before Each Use. Stable 180 days at room temp after mixing.    Route: Swish & Spit    fluticasone (FLONASE) 50 MCG/ACT nasal spray 2 spray 2 spray Daily 4/5/2023     Route: Nasal    folic acid (FOLVITE) tablet 1 mg 1 mg Daily 4/5/2023     Route: Oral    HYDROcodone-acetaminophen (NORCO) 7.5-325 MG per tablet 1 tablet 1 tablet Every 8 Hours PRN 4/4/2023     Admin Instructions: Based on patient request - if ordered for moderate or severe pain, provider allows for administration of a medication prescribed for a lower pain scale.  [DANTE]    Do not exceed 4 grams of acetaminophen in a 24 hr period. Max dose of 2gm for AST/ALT greater than 120 units/L        If given for pain, use the following pain scale:   Mild Pain = Pain Score of 1-3, CPOT 1-2  Moderate Pain = Pain Score of 4-6, CPOT 3-4  Severe Pain = Pain Score of 7-10, CPOT 5-8    Route: Oral    ipratropium-albuterol (DUO-NEB) nebulizer solution 3 mL 3 mL Every 4 Hours PRN 4/4/2023     Admin Instructions: Include Respiratory Treatment Education    Route: Nebulization    levoFLOXacin (LEVAQUIN) 750 mg/150 mL D5W (premix) (LEVAQUIN) 750 mg 750 mg Every 24 Hours 4/5/2023 4/9/2023    Admin Instructions: Caution: Look alike/sound alike drug alert. Protect from light. Do NOT refrigerate.    Route: Intravenous    levothyroxine (SYNTHROID, LEVOTHROID) tablet 112 mcg 112 mcg Every Early Morning 4/5/2023     Admin Instructions: Take on empty stomach.    Route: Oral    metoclopramide (REGLAN) tablet 5 mg 5 mg 3 Times Daily PRN 4/4/2023     Admin Instructions:     Route: Oral    pantoprazole (PROTONIX) injection 40 mg 40 mg 2 Times Daily Before Meals 4/7/2023     Admin Instructions: Dilute with 10 mL of 0.9% NaCl and give IV push over 2 minutes.    Route: Intravenous    pramipexole (MIRAPEX) tablet 1 mg 1 mg 2 Times Daily  "2023     Route: Oral    pregabalin (LYRICA) capsule 75 mg 75 mg 2 Times Daily PRN 2023     Admin Instructions:     Route: Oral    prochlorperazine (COMPAZINE) injection 5 mg 5 mg Every 6 Hours PRN 2023     Admin Instructions: \"If multiple N/V medications ordered, use in the following order: Ondansetron, Prochlorperazine, Promethazine. Use PO unless patient refuses or patient unable to swallow.\"      Route: Intravenous    sodium chloride 0.9 % flush 10 mL 10 mL Every 12 Hours Scheduled 2023     Route: Intravenous    sodium chloride 0.9 % flush 10 mL 10 mL As Needed 2023     Route: Intravenous    sodium chloride 0.9 % infusion 40 mL 40 mL As Needed 2023     Admin Instructions: Following administration of an IV intermittent medication, flush line with 40mL NS at 100mL/hr.    Route: Intravenous    sucralfate (CARAFATE) 1 GM/10ML suspension 1 g 1 g 4 Times Daily Before Meals & Nightly 2023     Route: Oral          Social History     Tobacco Use   • Smoking status: Former     Packs/day: 1.50     Years: 46.00     Pack years: 69.00     Types: Cigarettes     Start date: 1972     Quit date: 2018     Years since quittin.0     Passive exposure: Past   • Smokeless tobacco: Never   • Tobacco comments:     Pt started at age 17, then quit at age 31. Pt restarted smoking at age 32-35 and quit smoking in 2018   Substance Use Topics   • Alcohol use: Not Currently     Alcohol/week: 1.0 standard drink     Types: 1 Drinks containing 0.5 oz of alcohol per week     Comment: A chocolate drink made with moonshine and creamer        Past Family History:  Family History   Problem Relation Age of Onset   • Breast cancer Paternal Aunt    • Heart disease Mother    • Osteoporosis Mother    • Thyroid disease Mother    • Cancer Father         Father’s entire family  from Cancer   • Diabetes Father    • Thyroid disease Father    • Thyroid disease Maternal Aunt    • Thyroid disease Maternal Uncle    • " Thyroid disease Sister    • Ovarian cancer Neg Hx    • Uterine cancer Neg Hx    • Colon cancer Neg Hx        Review of Systems:  Constitutional: No unexpected weight change, no fatigue, no unexplained fever, no sweats or chills.   HEENT: No icteric sclera.  No hearing or visual deficits.  No sore throat.  No chronic nasal discharge.  Pulmonary: No chronic cough.  No hemoptysis.  No shortness of breath.  Cardiovascular: No chest pain.  No palpitations.  No shortness of breath.  Gastrointestinal: As above.  Musculoskeletal/extremities: No peripheral edema.  No cyanosis.  No claudications.  No back pain.  Genitourinary: No dysuria.  No blood in stool.  No urethral discharges.  Neurologic: No seizures.  No headaches.  No dizziness.  No gait problems.  Skin: No rash.  No icterus.  Mental: No psychosis.  No confusions.  No hallucinations.      Physical Exam:  Temp:  [97.8 °F (36.6 °C)-98.6 °F (37 °C)] 97.8 °F (36.6 °C)  Heart Rate:  [65-85] 65  Resp:  [16-18] 16  BP: (108-139)/(59-79) 139/79  Body mass index is 38.12 kg/m².    Intake/Output Summary (Last 24 hours) at 4/7/2023 1314  Last data filed at 4/7/2023 0119  Gross per 24 hour   Intake 360 ml   Output 600 ml   Net -240 ml     No intake/output data recorded.    General appearance: 67-year-old woman who appears to be no acute distress.  Awake, alert and oriented x3.  HEENT: Nonicteric sclerae.  Moist oral mucosa.  PERRLA.  EOMI.  Clear pharynx.  Lungs: Clear to auscultation bilaterally.  No wheezing, rales or rhonchi.  Heart: Regular rate and rhythm.  Normal S1 and S2, no S3, S4 or murmur.  Abdomen: Soft, nondistended, nontender to palpation, with normoactive bowel sounds, no hepatosplenomegaly, no palpable masses. + Infra umbilical midline scar after resection of large ovarian cyst and small scars after laparoscopic cholecystectomy.  Extremities: No cyanosis, edema or pulse deficits.  Skin: No rash or jaundice.    Results Review:  Lab Results (last 24 hours)      Procedure Component Value Units Date/Time    Basic Metabolic Panel [[id number]]  (Abnormal) Collected: 04/07/23 0412    Specimen: Blood Updated: 04/07/23 0507     Glucose 107 mg/dL      BUN 16 mg/dL      Creatinine 0.77 mg/dL      Sodium 137 mmol/L      Potassium 4.3 mmol/L      Chloride 106 mmol/L      CO2 24.0 mmol/L      Calcium 8.3 mg/dL      BUN/Creatinine Ratio 20.8     Anion Gap 7.0 mmol/L      eGFR 85.7 mL/min/1.73     Narrative:      GFR Normal >60  Chronic Kidney Disease <60  Kidney Failure <15      CBC & Differential [[id number]]  (Abnormal) Collected: 04/07/23 0412    Specimen: Blood Updated: 04/07/23 0452    Narrative:      The following orders were created for panel order CBC & Differential.  Procedure                               Abnormality         Status                     ---------                               -----------         ------                     CBC Auto Differential[[id number]]        Abnormal            Final result                 Please view results for these tests on the individual orders.    CBC Auto Differential [[id number]]  (Abnormal) Collected: 04/07/23 0412    Specimen: Blood Updated: 04/07/23 0452     WBC 7.29 10*3/mm3      RBC 3.86 10*6/mm3      Hemoglobin 10.1 g/dL      Hematocrit 33.7 %      MCV 87.3 fL      MCH 26.2 pg      MCHC 30.0 g/dL      RDW 15.9 %      RDW-SD 50.4 fl      MPV 9.7 fL      Platelets 232 10*3/mm3      Neutrophil % 65.5 %      Lymphocyte % 23.2 %      Monocyte % 6.0 %      Eosinophil % 4.7 %      Basophil % 0.3 %      Immature Grans % 0.3 %      Neutrophils, Absolute 4.78 10*3/mm3      Lymphocytes, Absolute 1.69 10*3/mm3      Monocytes, Absolute 0.44 10*3/mm3      Eosinophils, Absolute 0.34 10*3/mm3      Basophils, Absolute 0.02 10*3/mm3      Immature Grans, Absolute 0.02 10*3/mm3      nRBC 0.0 /100 WBC     Blood Culture - Blood, Wrist, Right [[id number]]  (Normal) Collected: 04/04/23 1711    Specimen: Blood from Wrist, Right Updated: 04/06/23 1800      Blood Culture No growth at 2 days    Blood Culture - Blood, Arm, Right [[id number]]  (Normal) Collected: 04/04/23 1630    Specimen: Blood from Arm, Right Updated: 04/06/23 1715     Blood Culture No growth at 2 days          Radiology Review:  Imaging Results (Last 72 Hours)     Procedure Component Value Units Date/Time    CT Angiogram Chest [[id number]] Collected: 04/04/23 1827     Updated: 04/04/23 1847    Narrative:      EXAMINATION:  CT ANGIOGRAM CHEST-  4/4/2023 6:07 PM CDT     HISTORY: Pulmonary embolism (PE) suspected, unknown D-dimer. Shortness  of air. COPD. Fever. Wheezing and decreased breath sounds. Lower  extremity edema.     COMPARISON : 03/21/2023.     DLP: 800 mGy-cm. Automated dosage reduction technique was utilized to  decrease patient dosage.     TECHNIQUE: CT angio was performed of the chest with IV contrast.  Coronal, sagittal and 3-D reconstruction were performed.     INDEPENDENT 3-D WORKSTATION UTILIZED FOR RECONSTRUCTION: Yes. A  radiologist was not present in the department.     MEDIASTINUM, HEART AND VASCULAR STRUCTURES: There is mild atheromatous  disease of the thoracic aorta and coronary arteries. The thoracic aorta  is normal caliber. There is cardiomegaly. The main pulmonary artery  segment measures 3 cm. There is no CT evidence of pulmonary embolus.     LUNGS: A mass in the right lung apex is again noted. There is no change  compared to the recent CT. However, this lesion has been increasing in  size and is PET positive. A subpleural 7 mm right middle lobe nodule  image 87 series 8 is stable. There are new bilateral parenchymal  infiltrates greatest in the left upper lobe. There are new infiltrates  in both lower lobes. There is no significant pleural effusion.     UPPER ABDOMEN: There is fatty infiltration of the liver. There is a  moderate size hiatal hernia. There has been prior cholecystectomy. There  is a 1.6 cm right renal lesion with a Hounsfield unit measurement of 48.     BONES:  No acute bony abnormality is seen.          Impression:      1. No CT evidence of pulmonary embolus. Main pulmonary artery segment  borderline dilated at 3 cm. Atheromatous disease of the thoracic aorta  and coronary arteries. Cardiomegaly.  2. New lung infiltrates bilaterally most likely due to pneumonia. This  is most dense in the left upper lobe.  3. A mass in the right lung apex is again noted. This mass is PET  positive and worrisome for primary lung carcinoma.  4. Stable small nodule in the right middle lobe laterally.  5. Indeterminate small right renal lesion measuring 1.6 cm with a  Hounsfield unit measurement of 48.  6. Fatty infiltration of the liver.  7. Moderate size hiatal hernia.     The full report of this exam was immediately signed and available to the  emergency room. The patient is currently in the emergency room.    This report was finalized on 04/04/2023 18:44 by Dr. Brandon Fenton MD.    XR Chest 1 View [[id number]] Collected: 04/04/23 1709     Updated: 04/04/23 1716    Narrative:      EXAMINATION: XR CHEST 1 VW- 4/4/2023 5:10 PM CDT     HISTORY: SOB.     REPORT: A frontal view of the chest was obtained.     COMPARISON: Chest x-ray 03/21/2023.     The lungs are hypoaerated as before, there is central basilar vascular  congestion with mild cardiomegaly, somewhat increased interstitial  opacities are noted in the left upper lobe. No lobar consolidation is  identified. There appears to be moderate emphysema. No pneumothorax is  identified. The osseous structures are unremarkable.       Impression:      Pulmonary hypoventilation with advanced COPD, mild increase  in central vascular congestion and increased interstitial opacities in  the left upper lobe. This may be related to left greater than right  asymmetric interstitial pneumonitis, versus asymmetric pulmonary edema.  This report was finalized on 04/04/2023 17:13 by Dr. Tony Chen MD.          Impression/Plan: 67-year-old woman, recently  diagnosed with lung cancer, who was in her usual state of health up until 5 days ago when patient developed symptoms of infectious gastroenterocolitis, specifically nausea, vomiting, chills, abdominal cramping and nonbloody diarrhea.  After 2 days of the symptoms, patient had 2 episodes of hematemesis, then a day later 1 episode of black loose stool, but then all symptoms have resolved and over the last 24 hours patient did not experience neither nausea/vomiting, nor diarrhea.  Patient actually did not have any bowel movements already for 48 hours.  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  Considering quite unremarkable EGD just 9 months ago and no current signs of active GI bleeding, I do not see any strong indications for emergency endoscopy.  Management with the Protonix 40 mg twice daily and Carafate suspension would be sufficient as long as there is no hematemesis and no melena/hematochezia.  Okay to start full liquid diet.  Will follow.        Teddy Woods MD  04/07/23   13:14 CDT

## 2023-04-07 NOTE — PLAN OF CARE
Goal Outcome Evaluation:           Progress: improving  Outcome Evaluation: VSS. prn pain med given for pleural pain. R FA IV infiltrated, new IV started on L FA. NSR on tele with rate of 67-87

## 2023-04-08 VITALS
OXYGEN SATURATION: 92 % | DIASTOLIC BLOOD PRESSURE: 77 MMHG | SYSTOLIC BLOOD PRESSURE: 139 MMHG | HEIGHT: 64 IN | TEMPERATURE: 97.6 F | BODY MASS INDEX: 38.1 KG/M2 | RESPIRATION RATE: 16 BRPM | WEIGHT: 223.2 LBS | HEART RATE: 68 BPM

## 2023-04-08 PROBLEM — K92.2 UPPER GI BLEED: Status: ACTIVE | Noted: 2023-04-08

## 2023-04-08 LAB
ANION GAP SERPL CALCULATED.3IONS-SCNC: 7 MMOL/L (ref 5–15)
BASOPHILS # BLD AUTO: 0.03 10*3/MM3 (ref 0–0.2)
BASOPHILS NFR BLD AUTO: 0.5 % (ref 0–1.5)
BUN SERPL-MCNC: 13 MG/DL (ref 8–23)
BUN/CREAT SERPL: 15.9 (ref 7–25)
CALCIUM SPEC-SCNC: 8.6 MG/DL (ref 8.6–10.5)
CHLORIDE SERPL-SCNC: 105 MMOL/L (ref 98–107)
CO2 SERPL-SCNC: 25 MMOL/L (ref 22–29)
CREAT SERPL-MCNC: 0.82 MG/DL (ref 0.57–1)
DEPRECATED RDW RBC AUTO: 51.1 FL (ref 37–54)
EGFRCR SERPLBLD CKD-EPI 2021: 79.5 ML/MIN/1.73
EOSINOPHIL # BLD AUTO: 0.38 10*3/MM3 (ref 0–0.4)
EOSINOPHIL NFR BLD AUTO: 6.8 % (ref 0.3–6.2)
ERYTHROCYTE [DISTWIDTH] IN BLOOD BY AUTOMATED COUNT: 15.7 % (ref 12.3–15.4)
FERRITIN SERPL-MCNC: 155.3 NG/ML (ref 13–150)
FOLATE SERPL-MCNC: 10.9 NG/ML (ref 4.78–24.2)
GLUCOSE SERPL-MCNC: 113 MG/DL (ref 65–99)
HCT VFR BLD AUTO: 36.5 % (ref 34–46.6)
HGB BLD-MCNC: 10.5 G/DL (ref 12–15.9)
IMM GRANULOCYTES # BLD AUTO: 0.03 10*3/MM3 (ref 0–0.05)
IMM GRANULOCYTES NFR BLD AUTO: 0.5 % (ref 0–0.5)
IRON 24H UR-MRATE: 37 MCG/DL (ref 37–145)
IRON SATN MFR SERPL: 11 % (ref 20–50)
LYMPHOCYTES # BLD AUTO: 1.76 10*3/MM3 (ref 0.7–3.1)
LYMPHOCYTES NFR BLD AUTO: 31.4 % (ref 19.6–45.3)
MCH RBC QN AUTO: 25.6 PG (ref 26.6–33)
MCHC RBC AUTO-ENTMCNC: 28.8 G/DL (ref 31.5–35.7)
MCV RBC AUTO: 89 FL (ref 79–97)
MONOCYTES # BLD AUTO: 0.4 10*3/MM3 (ref 0.1–0.9)
MONOCYTES NFR BLD AUTO: 7.1 % (ref 5–12)
NEUTROPHILS NFR BLD AUTO: 3.01 10*3/MM3 (ref 1.7–7)
NEUTROPHILS NFR BLD AUTO: 53.7 % (ref 42.7–76)
NRBC BLD AUTO-RTO: 0 /100 WBC (ref 0–0.2)
PLATELET # BLD AUTO: 264 10*3/MM3 (ref 140–450)
PMV BLD AUTO: 9.6 FL (ref 6–12)
POTASSIUM SERPL-SCNC: 4.5 MMOL/L (ref 3.5–5.2)
RBC # BLD AUTO: 4.1 10*6/MM3 (ref 3.77–5.28)
RETICS # AUTO: 0.07 10*6/MM3 (ref 0.02–0.13)
RETICS/RBC NFR AUTO: 1.65 % (ref 0.7–1.9)
SODIUM SERPL-SCNC: 137 MMOL/L (ref 136–145)
TIBC SERPL-MCNC: 340 MCG/DL (ref 298–536)
TRANSFERRIN SERPL-MCNC: 228 MG/DL (ref 200–360)
VIT B12 BLD-MCNC: 302 PG/ML (ref 211–946)
WBC NRBC COR # BLD: 5.61 10*3/MM3 (ref 3.4–10.8)

## 2023-04-08 PROCEDURE — 84466 ASSAY OF TRANSFERRIN: CPT | Performed by: NURSE PRACTITIONER

## 2023-04-08 PROCEDURE — 94799 UNLISTED PULMONARY SVC/PX: CPT

## 2023-04-08 PROCEDURE — 80048 BASIC METABOLIC PNL TOTAL CA: CPT | Performed by: INTERNAL MEDICINE

## 2023-04-08 PROCEDURE — 85045 AUTOMATED RETICULOCYTE COUNT: CPT | Performed by: NURSE PRACTITIONER

## 2023-04-08 PROCEDURE — 25010000002 LEVOFLOXACIN PER 250 MG: Performed by: INTERNAL MEDICINE

## 2023-04-08 PROCEDURE — 83540 ASSAY OF IRON: CPT | Performed by: NURSE PRACTITIONER

## 2023-04-08 PROCEDURE — 94664 DEMO&/EVAL PT USE INHALER: CPT

## 2023-04-08 PROCEDURE — G0378 HOSPITAL OBSERVATION PER HR: HCPCS

## 2023-04-08 PROCEDURE — 96366 THER/PROPH/DIAG IV INF ADDON: CPT

## 2023-04-08 PROCEDURE — 36415 COLL VENOUS BLD VENIPUNCTURE: CPT | Performed by: INTERNAL MEDICINE

## 2023-04-08 PROCEDURE — 82728 ASSAY OF FERRITIN: CPT | Performed by: NURSE PRACTITIONER

## 2023-04-08 PROCEDURE — 85025 COMPLETE CBC W/AUTO DIFF WBC: CPT | Performed by: INTERNAL MEDICINE

## 2023-04-08 RX ORDER — ALUMINA, MAGNESIA, AND SIMETHICONE 2400; 2400; 240 MG/30ML; MG/30ML; MG/30ML
30 SUSPENSION ORAL EVERY 6 HOURS PRN
Start: 2023-04-08

## 2023-04-08 RX ORDER — LEVOFLOXACIN 750 MG/1
750 TABLET ORAL EVERY 24 HOURS
Status: DISCONTINUED | OUTPATIENT
Start: 2023-04-08 | End: 2023-04-08 | Stop reason: HOSPADM

## 2023-04-08 RX ORDER — SUCRALFATE ORAL 1 G/10ML
1 SUSPENSION ORAL
Qty: 414 ML | Refills: 0 | Status: SHIPPED | OUTPATIENT
Start: 2023-04-08

## 2023-04-08 RX ORDER — PANTOPRAZOLE SODIUM 40 MG/1
40 TABLET, DELAYED RELEASE ORAL
Status: DISCONTINUED | OUTPATIENT
Start: 2023-04-08 | End: 2023-04-08 | Stop reason: HOSPADM

## 2023-04-08 RX ORDER — LEVOFLOXACIN 750 MG/1
750 TABLET ORAL EVERY 24 HOURS
Qty: 5 TABLET | Refills: 0 | Status: SHIPPED | OUTPATIENT
Start: 2023-04-08

## 2023-04-08 RX ADMIN — LEVOFLOXACIN 750 MG: 750 INJECTION, SOLUTION INTRAVENOUS at 00:15

## 2023-04-08 RX ADMIN — LEVOTHYROXINE SODIUM 112 MCG: 112 TABLET ORAL at 05:32

## 2023-04-08 RX ADMIN — SUCRALFATE 1 G: 1 SUSPENSION ORAL at 08:00

## 2023-04-08 RX ADMIN — Medication 10 ML: at 08:01

## 2023-04-08 RX ADMIN — BUPROPION HYDROCHLORIDE 300 MG: 150 TABLET, FILM COATED, EXTENDED RELEASE ORAL at 08:01

## 2023-04-08 RX ADMIN — HYDROCODONE BITARTRATE AND ACETAMINOPHEN 1 TABLET: 7.5; 325 TABLET ORAL at 08:00

## 2023-04-08 RX ADMIN — FOLIC ACID 1 MG: 1 TABLET ORAL at 08:01

## 2023-04-08 RX ADMIN — PANTOPRAZOLE SODIUM 40 MG: 40 INJECTION, POWDER, FOR SOLUTION INTRAVENOUS at 08:00

## 2023-04-08 RX ADMIN — FLUTICASONE PROPIONATE 2 SPRAY: 50 SPRAY, METERED NASAL at 08:01

## 2023-04-08 RX ADMIN — BUDESONIDE 0.5 MG: 0.5 SUSPENSION RESPIRATORY (INHALATION) at 07:03

## 2023-04-08 RX ADMIN — DIPHENHYDRAMINE HYDROCHLORIDE AND LIDOCAINE HYDROCHLORIDE AND ALUMINUM HYDROXIDE AND MAGNESIUM HYDRO 10 ML: KIT at 08:01

## 2023-04-08 RX ADMIN — PRAMIPEXOLE DIHYDROCHLORIDE 1 MG: 1 TABLET ORAL at 08:01

## 2023-04-08 NOTE — DISCHARGE SUMMARY
[facility] Medicine Services  DISCHARGE SUMMARY       Date of Admission: 4/4/2023  Date of Discharge:  4/8/2023  Primary Care Physician: Reynaldo Jeter DO    Presenting Problem/History of Present Illness:  Shortness of breath    Final Discharge Diagnoses:  Pneumonia community-acquired  Nausea vomiting  Diarrhea  Renal lesion  Restless leg syndrome  Gastroesophageal reflux disease  Right pulmonary nodule  Chronic obstructive pulmonary disease    Consults:   Dr Delarosa Heme onc  Dr Thompson CTS  Dr Blake  GI    Procedures Performed:   none    Pertinent Test Results:   Imaging Results (Last 7 Days)     Procedure Component Value Units Date/Time    CT Angiogram Chest [[id number]] Collected: 04/04/23 1827     Updated: 04/04/23 1847    Narrative:      EXAMINATION:  CT ANGIOGRAM CHEST-  4/4/2023 6:07 PM CDT     HISTORY: Pulmonary embolism (PE) suspected, unknown D-dimer. Shortness  of air. COPD. Fever. Wheezing and decreased breath sounds. Lower  extremity edema.     COMPARISON : 03/21/2023.     DLP: 800 mGy-cm. Automated dosage reduction technique was utilized to  decrease patient dosage.     TECHNIQUE: CT angio was performed of the chest with IV contrast.  Coronal, sagittal and 3-D reconstruction were performed.     INDEPENDENT 3-D WORKSTATION UTILIZED FOR RECONSTRUCTION: Yes. A  radiologist was not present in the department.     MEDIASTINUM, HEART AND VASCULAR STRUCTURES: There is mild atheromatous  disease of the thoracic aorta and coronary arteries. The thoracic aorta  is normal caliber. There is cardiomegaly. The main pulmonary artery  segment measures 3 cm. There is no CT evidence of pulmonary embolus.     LUNGS: A mass in the right lung apex is again noted. There is no change  compared to the recent CT. However, this lesion has been increasing in  size and is PET positive. A subpleural 7 mm right middle lobe nodule  image 87 series 8 is stable. There are new bilateral  parenchymal  infiltrates greatest in the left upper lobe. There are new infiltrates  in both lower lobes. There is no significant pleural effusion.     UPPER ABDOMEN: There is fatty infiltration of the liver. There is a  moderate size hiatal hernia. There has been prior cholecystectomy. There  is a 1.6 cm right renal lesion with a Hounsfield unit measurement of 48.     BONES: No acute bony abnormality is seen.          Impression:      1. No CT evidence of pulmonary embolus. Main pulmonary artery segment  borderline dilated at 3 cm. Atheromatous disease of the thoracic aorta  and coronary arteries. Cardiomegaly.  2. New lung infiltrates bilaterally most likely due to pneumonia. This  is most dense in the left upper lobe.  3. A mass in the right lung apex is again noted. This mass is PET  positive and worrisome for primary lung carcinoma.  4. Stable small nodule in the right middle lobe laterally.  5. Indeterminate small right renal lesion measuring 1.6 cm with a  Hounsfield unit measurement of 48.  6. Fatty infiltration of the liver.  7. Moderate size hiatal hernia.     The full report of this exam was immediately signed and available to the  emergency room. The patient is currently in the emergency room.    This report was finalized on 04/04/2023 18:44 by Dr. Brandon Fenton MD.    XR Chest 1 View [[id number]] Collected: 04/04/23 1709     Updated: 04/04/23 1716    Narrative:      EXAMINATION: XR CHEST 1 VW- 4/4/2023 5:10 PM CDT     HISTORY: SOB.     REPORT: A frontal view of the chest was obtained.     COMPARISON: Chest x-ray 03/21/2023.     The lungs are hypoaerated as before, there is central basilar vascular  congestion with mild cardiomegaly, somewhat increased interstitial  opacities are noted in the left upper lobe. No lobar consolidation is  identified. There appears to be moderate emphysema. No pneumothorax is  identified. The osseous structures are unremarkable.       Impression:      Pulmonary  hypoventilation with advanced COPD, mild increase  in central vascular congestion and increased interstitial opacities in  the left upper lobe. This may be related to left greater than right  asymmetric interstitial pneumonitis, versus asymmetric pulmonary edema.  This report was finalized on 04/04/2023 17:13 by Dr. Tony Chen MD.        Lab Results (last 7 days)     Procedure Component Value Units Date/Time    Iron Profile [[id number]]  (Abnormal) Collected: 04/08/23 0539    Specimen: Blood Updated: 04/08/23 1004     Iron 37 mcg/dL      Iron Saturation 11 %      Transferrin 228 mg/dL      TIBC 340 mcg/dL     Ferritin [[id number]]  (Abnormal) Collected: 04/08/23 0539    Specimen: Blood Updated: 04/08/23 0922     Ferritin 155.30 ng/mL     Narrative:      Results may be falsely decreased if patient taking Biotin.      Reticulocytes [[id number]]  (Normal) Collected: 04/08/23 0539    Specimen: Blood Updated: 04/08/23 0901     Reticulocyte % 1.65 %      Reticulocyte Absolute 0.0673 10*6/mm3     Vitamin B12 [[id number]] Collected: 04/06/23 0905    Specimen: Blood Updated: 04/08/23 0858    Folate [[id number]] Collected: 04/06/23 0905    Specimen: Blood Updated: 04/08/23 0858    Basic Metabolic Panel [[id number]]  (Abnormal) Collected: 04/08/23 0539    Specimen: Blood Updated: 04/08/23 0638     Glucose 113 mg/dL      BUN 13 mg/dL      Creatinine 0.82 mg/dL      Sodium 137 mmol/L      Potassium 4.5 mmol/L      Chloride 105 mmol/L      CO2 25.0 mmol/L      Calcium 8.6 mg/dL      BUN/Creatinine Ratio 15.9     Anion Gap 7.0 mmol/L      eGFR 79.5 mL/min/1.73     Narrative:      GFR Normal >60  Chronic Kidney Disease <60  Kidney Failure <15      CBC & Differential [[id number]]  (Abnormal) Collected: 04/08/23 0539    Specimen: Blood Updated: 04/08/23 0617    Narrative:      The following orders were created for panel order CBC & Differential.  Procedure                               Abnormality         Status                      ---------                               -----------         ------                     CBC Auto Differential[[id number]]        Abnormal            Final result                 Please view results for these tests on the individual orders.    CBC Auto Differential [[id number]]  (Abnormal) Collected: 04/08/23 0539    Specimen: Blood Updated: 04/08/23 0617     WBC 5.61 10*3/mm3      RBC 4.10 10*6/mm3      Hemoglobin 10.5 g/dL      Hematocrit 36.5 %      MCV 89.0 fL      MCH 25.6 pg      MCHC 28.8 g/dL      RDW 15.7 %      RDW-SD 51.1 fl      MPV 9.6 fL      Platelets 264 10*3/mm3      Neutrophil % 53.7 %      Lymphocyte % 31.4 %      Monocyte % 7.1 %      Eosinophil % 6.8 %      Basophil % 0.5 %      Immature Grans % 0.5 %      Neutrophils, Absolute 3.01 10*3/mm3      Lymphocytes, Absolute 1.76 10*3/mm3      Monocytes, Absolute 0.40 10*3/mm3      Eosinophils, Absolute 0.38 10*3/mm3      Basophils, Absolute 0.03 10*3/mm3      Immature Grans, Absolute 0.03 10*3/mm3      nRBC 0.0 /100 WBC     Blood Culture - Blood, Wrist, Right [[id number]]  (Normal) Collected: 04/04/23 1715    Specimen: Blood from Wrist, Right Updated: 04/07/23 1800     Blood Culture No growth at 3 days    Blood Culture - Blood, Arm, Right [[id number]]  (Normal) Collected: 04/04/23 1630    Specimen: Blood from Arm, Right Updated: 04/07/23 1715     Blood Culture No growth at 3 days    Basic Metabolic Panel [[id number]]  (Abnormal) Collected: 04/07/23 0412    Specimen: Blood Updated: 04/07/23 0507     Glucose 107 mg/dL      BUN 16 mg/dL      Creatinine 0.77 mg/dL      Sodium 137 mmol/L      Potassium 4.3 mmol/L      Chloride 106 mmol/L      CO2 24.0 mmol/L      Calcium 8.3 mg/dL      BUN/Creatinine Ratio 20.8     Anion Gap 7.0 mmol/L      eGFR 85.7 mL/min/1.73     Narrative:      GFR Normal >60  Chronic Kidney Disease <60  Kidney Failure <15      CBC & Differential [[id number]]  (Abnormal) Collected: 04/07/23 0412    Specimen: Blood Updated: 04/07/23 0455     Narrative:      The following orders were created for panel order CBC & Differential.  Procedure                               Abnormality         Status                     ---------                               -----------         ------                     CBC Auto Differential[[id number]]        Abnormal            Final result                 Please view results for these tests on the individual orders.    CBC Auto Differential [[id number]]  (Abnormal) Collected: 04/07/23 0412    Specimen: Blood Updated: 04/07/23 0452     WBC 7.29 10*3/mm3      RBC 3.86 10*6/mm3      Hemoglobin 10.1 g/dL      Hematocrit 33.7 %      MCV 87.3 fL      MCH 26.2 pg      MCHC 30.0 g/dL      RDW 15.9 %      RDW-SD 50.4 fl      MPV 9.7 fL      Platelets 232 10*3/mm3      Neutrophil % 65.5 %      Lymphocyte % 23.2 %      Monocyte % 6.0 %      Eosinophil % 4.7 %      Basophil % 0.3 %      Immature Grans % 0.3 %      Neutrophils, Absolute 4.78 10*3/mm3      Lymphocytes, Absolute 1.69 10*3/mm3      Monocytes, Absolute 0.44 10*3/mm3      Eosinophils, Absolute 0.34 10*3/mm3      Basophils, Absolute 0.02 10*3/mm3      Immature Grans, Absolute 0.02 10*3/mm3      nRBC 0.0 /100 WBC     Basic Metabolic Panel [[id number]]  (Abnormal) Collected: 04/06/23 0905    Specimen: Blood Updated: 04/06/23 0944     Glucose 134 mg/dL      BUN 15 mg/dL      Creatinine 0.73 mg/dL      Sodium 138 mmol/L      Potassium 3.8 mmol/L      Chloride 106 mmol/L      CO2 21.0 mmol/L      Calcium 7.9 mg/dL      BUN/Creatinine Ratio 20.5     Anion Gap 11.0 mmol/L      eGFR 91.4 mL/min/1.73     Narrative:      GFR Normal >60  Chronic Kidney Disease <60  Kidney Failure <15      CBC & Differential [[id number]]  (Abnormal) Collected: 04/06/23 0905    Specimen: Blood Updated: 04/06/23 0926    Narrative:      The following orders were created for panel order CBC & Differential.  Procedure                               Abnormality         Status                     ---------                                -----------         ------                     CBC Auto Differential[[id number]]        Abnormal            Final result                 Please view results for these tests on the individual orders.    CBC Auto Differential [[id number]]  (Abnormal) Collected: 04/06/23 0905    Specimen: Blood Updated: 04/06/23 0926     WBC 8.80 10*3/mm3      RBC 4.24 10*6/mm3      Hemoglobin 11.0 g/dL      Hematocrit 37.0 %      MCV 87.3 fL      MCH 25.9 pg      MCHC 29.7 g/dL      RDW 15.9 %      RDW-SD 50.0 fl      MPV 9.5 fL      Platelets 219 10*3/mm3      Neutrophil % 75.2 %      Lymphocyte % 15.8 %      Monocyte % 4.8 %      Eosinophil % 3.8 %      Basophil % 0.2 %      Immature Grans % 0.2 %      Neutrophils, Absolute 6.62 10*3/mm3      Lymphocytes, Absolute 1.39 10*3/mm3      Monocytes, Absolute 0.42 10*3/mm3      Eosinophils, Absolute 0.33 10*3/mm3      Basophils, Absolute 0.02 10*3/mm3      Immature Grans, Absolute 0.02 10*3/mm3      nRBC 0.0 /100 WBC     COVID PRE-OP / PRE-PROCEDURE SCREENING ORDER (NO ISOLATION) - Swab, Nasopharynx [[id number]]  (Normal) Collected: 04/05/23 0803    Specimen: Swab from Nasopharynx Updated: 04/05/23 0838    Narrative:      The following orders were created for panel order COVID PRE-OP / PRE-PROCEDURE SCREENING ORDER (NO ISOLATION) - Swab, Nasopharynx.  Procedure                               Abnormality         Status                     ---------                               -----------         ------                     COVID-19 and FLU A/B PCR...[[id number]]  Normal              Final result                 Please view results for these tests on the individual orders.    COVID-19 and FLU A/B PCR - Swab, Nasopharynx [[id number]]  (Normal) Collected: 04/05/23 0803    Specimen: Swab from Nasopharynx Updated: 04/05/23 0838     COVID19 Not Detected     Influenza A PCR Not Detected     Influenza B PCR Not Detected    Narrative:      Fact sheet for providers:  https://www.fda.gov/media/108811/download    Fact sheet for patients: https://www.fda.gov/media/161886/download    Test performed by PCR.    Basic Metabolic Panel [[id number]]  (Abnormal) Collected: 04/05/23 0551    Specimen: Blood Updated: 04/05/23 0637     Glucose 87 mg/dL      BUN 21 mg/dL      Creatinine 0.80 mg/dL      Sodium 138 mmol/L      Potassium 3.8 mmol/L      Chloride 107 mmol/L      CO2 22.0 mmol/L      Calcium 8.3 mg/dL      BUN/Creatinine Ratio 26.3     Anion Gap 9.0 mmol/L      eGFR 81.9 mL/min/1.73     Narrative:      GFR Normal >60  Chronic Kidney Disease <60  Kidney Failure <15      High Sensitivity Troponin T [[id number]]  (Normal) Collected: 04/05/23 0551    Specimen: Blood Updated: 04/05/23 0637     HS Troponin T 7 ng/L     Narrative:      High Sensitive Troponin T Reference Range:  <10.0 ng/L- Negative Female for AMI  <15.0 ng/L- Negative Male for AMI  >=10 - Abnormal Female indicating possible myocardial injury.  >=15 - Abnormal Male indicating possible myocardial injury.   Clinicians would have to utilize clinical acumen, EKG, Troponin, and serial changes to determine if it is an Acute Myocardial Infarction or myocardial injury due to an underlying chronic condition.         CBC & Differential [[id number]]  (Abnormal) Collected: 04/05/23 0551    Specimen: Blood Updated: 04/05/23 0622    Narrative:      The following orders were created for panel order CBC & Differential.  Procedure                               Abnormality         Status                     ---------                               -----------         ------                     CBC Auto Differential[[id number]]        Abnormal            Final result                 Please view results for these tests on the individual orders.    CBC Auto Differential [[id number]]  (Abnormal) Collected: 04/05/23 0551    Specimen: Blood Updated: 04/05/23 0622     WBC 15.17 10*3/mm3      RBC 4.84 10*6/mm3      Hemoglobin 12.4 g/dL      Hematocrit  42.8 %      MCV 88.4 fL      MCH 25.6 pg      MCHC 29.0 g/dL      RDW 15.9 %      RDW-SD 51.7 fl      MPV 9.5 fL      Platelets 242 10*3/mm3      Neutrophil % 85.7 %      Lymphocyte % 9.6 %      Monocyte % 3.0 %      Eosinophil % 0.5 %      Basophil % 0.3 %      Immature Grans % 0.9 %      Neutrophils, Absolute 12.98 10*3/mm3      Lymphocytes, Absolute 1.46 10*3/mm3      Monocytes, Absolute 0.46 10*3/mm3      Eosinophils, Absolute 0.08 10*3/mm3      Basophils, Absolute 0.05 10*3/mm3      Immature Grans, Absolute 0.14 10*3/mm3      nRBC 0.0 /100 WBC     High Sensitivity Troponin T 2Hr [[id number]] Collected: 04/04/23 1845    Specimen: Blood Updated: 04/04/23 1917     HS Troponin T <6 ng/L      Troponin T Delta --     Comment: Unable to calculate.       Narrative:      High Sensitive Troponin T Reference Range:  <10.0 ng/L- Negative Female for AMI  <15.0 ng/L- Negative Male for AMI  >=10 - Abnormal Female indicating possible myocardial injury.  >=15 - Abnormal Male indicating possible myocardial injury.   Clinicians would have to utilize clinical acumen, EKG, Troponin, and serial changes to determine if it is an Acute Myocardial Infarction or myocardial injury due to an underlying chronic condition.         Lactic Acid, Plasma [[id number]]  (Normal) Collected: 04/04/23 1619    Specimen: Blood Updated: 04/04/23 1706     Lactate 1.7 mmol/L     Procalcitonin [[id number]]  (Abnormal) Collected: 04/04/23 1546    Specimen: Blood Updated: 04/04/23 1620     Procalcitonin 0.62 ng/mL     Narrative:      As a Marker for Sepsis (Non-Neonates):    1. <0.5 ng/mL represents a low risk of severe sepsis and/or septic shock.  2. >2 ng/mL represents a high risk of severe sepsis and/or septic shock.    As a Marker for Lower Respiratory Tract Infections that require antibiotic therapy:    PCT on Admission    Antibiotic Therapy       6-12 Hrs later    >0.5                Strongly Recommended  >0.25 - <0.5        Recommended   0.1 - 0.25        "   Discouraged              Remeasure/reassess PCT  <0.1                Strongly Discouraged     Remeasure/reassess PCT    As 28 day mortality risk marker: \"Change in Procalcitonin Result\" (>80% or <=80%) if Day 0 (or Day 1) and Day 4 values are available. Refer to http://www.[facility]-pct-calculator.com    Change in PCT <=80%  A decrease of PCT levels below or equal to 80% defines a positive change in PCT test result representing a higher risk for 28-day all-cause mortality of patients diagnosed with severe sepsis for septic shock.    Change in PCT >80%  A decrease of PCT levels of more than 80% defines a negative change in PCT result representing a lower risk for 28-day all-cause mortality of patients diagnosed with severe sepsis or septic shock.       Comprehensive Metabolic Panel [[id number]]  (Abnormal) Collected: 04/04/23 1546    Specimen: Blood Updated: 04/04/23 1614     Glucose 125 mg/dL      BUN 19 mg/dL      Creatinine 0.76 mg/dL      Sodium 131 mmol/L      Potassium 3.7 mmol/L      Chloride 98 mmol/L      CO2 23.0 mmol/L      Calcium 7.9 mg/dL      Total Protein 6.5 g/dL      Albumin 3.6 g/dL      ALT (SGPT) 37 U/L      AST (SGOT) 29 U/L      Alkaline Phosphatase 97 U/L      Total Bilirubin 0.6 mg/dL      Globulin 2.9 gm/dL      A/G Ratio 1.2 g/dL      BUN/Creatinine Ratio 25.0     Anion Gap 10.0 mmol/L      eGFR 87.1 mL/min/1.73     Narrative:      GFR Normal >60  Chronic Kidney Disease <60  Kidney Failure <15      BNP [[id number]]  (Normal) Collected: 04/04/23 1546    Specimen: Blood Updated: 04/04/23 1610     proBNP 55.3 pg/mL     Narrative:      Among patients with dyspnea, NT-proBNP is highly sensitive for the detection of acute congestive heart failure. In addition NT-proBNP of <300 pg/ml effectively rules out acute congestive heart failure with 99% negative predictive value.    Results may be falsely decreased if patient taking Biotin.      D-dimer, Quantitative [[id number]]  (Abnormal) Collected: " "04/04/23 1546    Specimen: Blood Updated: 04/04/23 1610     D-Dimer, Quantitative 1.26 MCGFEU/mL     Narrative:      According to the assay 's published package insert, a normal (<0.50 MCGFEU/mL) D-dimer result in conjunction with a non-high clinical probability assessment, excludes deep vein thrombosis (DVT) and pulmonary embolism (PE) with high sensitivity.    D-dimer values increase with age and this can make VTE exclusion of an older population difficult. To address this, the [employer], based on best available evidence and recent guidelines, recommends that clinicians use age-adjusted D-dimer thresholds in patients greater than 50 years of age with: a) a low probability of PE who do not meet all Pulmonary Embolism Rule Out Criteria, or b) in those with intermediate probability of PE.   The formula for an age-adjusted D-dimer cut-off is \"age/100\".  For example, a 60 year old patient would have an age-adjusted cut-off of 0.60 MCGFEU/mL and an 80 year old 0.80 MCGFEU/mL.    High Sensitivity Troponin T [[id number]]  (Normal) Collected: 04/04/23 1546    Specimen: Blood Updated: 04/04/23 1610     HS Troponin T <6 ng/L     Narrative:      High Sensitive Troponin T Reference Range:  <10.0 ng/L- Negative Female for AMI  <15.0 ng/L- Negative Male for AMI  >=10 - Abnormal Female indicating possible myocardial injury.  >=15 - Abnormal Male indicating possible myocardial injury.   Clinicians would have to utilize clinical acumen, EKG, Troponin, and serial changes to determine if it is an Acute Myocardial Infarction or myocardial injury due to an underlying chronic condition.         CBC & Differential [[id number]]  (Abnormal) Collected: 04/04/23 1546    Specimen: Blood Updated: 04/04/23 8594    Narrative:      The following orders were created for panel order CBC & Differential.  Procedure                               Abnormality         Status                     ---------                     "           -----------         ------                     CBC Auto Differential[[id number]]        Abnormal            Final result                 Please view results for these tests on the individual orders.    CBC Auto Differential [[id number]]  (Abnormal) Collected: 04/04/23 1546    Specimen: Blood Updated: 04/04/23 1554     WBC 16.32 10*3/mm3      RBC 4.74 10*6/mm3      Hemoglobin 12.2 g/dL      Hematocrit 39.6 %      MCV 83.5 fL      MCH 25.7 pg      MCHC 30.8 g/dL      RDW 15.9 %      RDW-SD 47.8 fl      MPV 9.0 fL      Platelets 255 10*3/mm3      Neutrophil % 90.8 %      Lymphocyte % 5.0 %      Monocyte % 3.2 %      Eosinophil % 0.2 %      Basophil % 0.2 %      Immature Grans % 0.6 %      Neutrophils, Absolute 14.83 10*3/mm3      Lymphocytes, Absolute 0.81 10*3/mm3      Monocytes, Absolute 0.52 10*3/mm3      Eosinophils, Absolute 0.03 10*3/mm3      Basophils, Absolute 0.04 10*3/mm3      Immature Grans, Absolute 0.09 10*3/mm3      nRBC 0.0 /100 WBC         Hospital Course:  The patient is a 67 y.o. female who presented to [facility] with    Complaints of being increased shortness of breath.  She is evaluated emergency room and found to have a pneumonia.  She is admitted to the hospital.  Started on IV antibiotics.  Patient also had some issues with diarrhea and vomiting.  Gastroenterology was consulted for this.  Recommendations were made.  He felt the the anemia and the blood loss were secondary to most probably Leydi-Sanz tear related to the nausea and vomiting.  Patient did have resolution of the diarrhea.  She had no further vomiting.  Breathing had improved.  Also consulted was Dr. Delarosa.  He did evaluate the patient and made recommendations for a place to follow-up for biopsy of her lesion.  He then in turn consulted Dr. Thompson who has been following this lung issue.  On day of discharge the patient is without complaint she is ready to go home.  She has no nausea or vomiting she has no  "diarrhea her breathing is much improved.  We have discussed the need for her to follow-up with her caregivers the primary care doctor in 5 to 7 days.  I have also recommended she call Dr. Thompson's office to follow-up regarding the recommendation Dr. Delarosa made.  She is in agreement with this.  Physical Exam on Discharge:  /77 (BP Location: Right arm, Patient Position: Sitting)   Pulse 68   Temp 97.6 °F (36.4 °C) (Oral)   Resp 16   Ht 162.6 cm (64.02\")   Wt 101 kg (223 lb 3.2 oz)   SpO2 92%   BMI 38.29 kg/m²   Physical Exam  Vitals and nursing note reviewed.   Constitutional:       Appearance: Normal appearance. She is well-developed.   HENT:      Head: Normocephalic and atraumatic.      Right Ear: External ear normal.      Left Ear: External ear normal.      Nose: Nose normal.      Mouth/Throat:      Mouth: Mucous membranes are moist.   Eyes:      Extraocular Movements: Extraocular movements intact.      Conjunctiva/sclera: Conjunctivae normal.      Pupils: Pupils are equal, round, and reactive to light.   Neck:      Thyroid: No thyromegaly.      Vascular: No JVD.      Trachea: No tracheal deviation.   Cardiovascular:      Rate and Rhythm: Normal rate and regular rhythm.      Pulses: Normal pulses.      Heart sounds: Normal heart sounds. No murmur heard.    No friction rub. No gallop.   Pulmonary:      Effort: Pulmonary effort is normal.      Breath sounds: Normal breath sounds.   Abdominal:      General: Bowel sounds are normal. There is no distension.      Palpations: Abdomen is soft.      Tenderness: There is no abdominal tenderness.   Musculoskeletal:         General: Normal range of motion.      Cervical back: Normal range of motion and neck supple.   Lymphadenopathy:      Cervical: No cervical adenopathy.   Skin:     General: Skin is warm and dry.      Capillary Refill: Capillary refill takes less than 2 seconds.   Neurological:      Mental Status: She is alert and oriented to person, place, and " time.      Cranial Nerves: No cranial nerve deficit.      Coordination: Coordination normal.   Psychiatric:         Mood and Affect: Mood normal.         Behavior: Behavior normal.           Condition on Discharge: Stable improved  Discharge Disposition:  Home or Self Care    Discharge Medications:     Discharge Medications      New Medications      Instructions Start Date   aluminum-magnesium hydroxide-simethicone 400-400-40 MG/5ML suspension  Commonly known as: MAALOX MAX   30 mL, Oral, Every 6 Hours PRN         Changes to Medications      Instructions Start Date   lidocaine 5 % ointment  Commonly known as: XYLOCAINE  What changed: additional instructions   1 application, Topical, Every 2 Hours PRN         Continue These Medications      Instructions Start Date   albuterol sulfate  (90 Base) MCG/ACT inhaler  Commonly known as: PROVENTIL HFA;VENTOLIN HFA;PROAIR HFA   2 puffs, Inhalation, 4 Times Daily - RT      buPROPion  MG 24 hr tablet  Commonly known as: WELLBUTRIN XL   300 mg, Oral, Every Morning      cyclobenzaprine 10 MG tablet  Commonly known as: FLEXERIL   10 mg, Oral, 3 Times Daily PRN      EPINEPHrine 0.3 MG/0.3ML solution auto-injector injection  Commonly known as: EPIPEN   No dose, route, or frequency recorded.      esomeprazole 40 MG capsule  Commonly known as: nexIUM   80 mg, Oral, Every Morning Before Breakfast, OTC      fluticasone 50 MCG/ACT nasal spray  Commonly known as: FLONASE   2 sprays, Nasal, Daily      folic acid 1 MG tablet  Commonly known as: FOLVITE   1 mg, Oral, Daily      HYDROcodone-acetaminophen 7.5-325 MG per tablet  Commonly known as: NORCO   1 tablet, Oral, Every 8 Hours PRN      levothyroxine 112 MCG tablet  Commonly known as: SYNTHROID, LEVOTHROID   112 mcg, Oral, Daily      metoclopramide 5 MG tablet  Commonly known as: REGLAN   5 mg, Oral, 3 Times Daily PRN      nitroglycerin 0.4 MG SL tablet  Commonly known as: NITROSTAT   0.4 mg, Sublingual, Every 5 Minutes PRN,  Take no more than 3 doses in 15 minutes.      Ozempic (1 MG/DOSE) 4 MG/3ML solution pen-injector  Generic drug: Semaglutide (1 MG/DOSE)   1 mg, Weekly      pramipexole 1 MG tablet  Commonly known as: MIRAPEX   1 mg, Oral, 2 Times Daily      pregabalin 75 MG capsule  Commonly known as: LYRICA   75 mg, Oral, 2 Times Daily      Pulmicort Flexhaler 90 MCG/ACT inhaler  Generic drug: budesonide   2 puffs, Inhalation, 2 Times Daily - RT, Swallow, do not inhale      tiZANidine 4 MG tablet  Commonly known as: ZANAFLEX   2-4 mg, Oral, Nightly PRN      Vitamin B-12 ER 1000 MCG tablet controlled-release   1,000 mcg, Oral, Daily      vitamin D 1.25 MG (14903 UT) capsule capsule  Commonly known as: ERGOCALCIFEROL   50,000 Units, Oral, Every 7 Days, SUNDAY         Stop These Medications    pantoprazole 40 MG EC tablet  Commonly known as: PROTONIX          Discharge Diet:   Diet Instructions     Diet: Regular/House Diet, Cardiac Diets; Low Sodium (2g); Regular Texture (IDDSI 7); Thin (IDDSI 0)      Discharge Diet:  Regular/House Diet  Cardiac Diets       Cardiac Diet: Low Sodium (2g)    Texture: Regular Texture (IDDSI 7)    Fluid Consistency: Thin (IDDSI 0)        Activity at Discharge:   Activity Instructions     Activity as Tolerated            Follow-up Appointments:   Future Appointments   Date Time Provider Department Center   4/26/2023 12:00 PM PAD BIC CT 1 BH PAD CT BI PAD   4/28/2023 10:45 AM Bud Maradiaga DPM [facility] POD PAD PAD   12/22/2023  1:30 PM PAD BIC [facility] 1 BH PAD MA BI PAD   12/27/2023  1:30 PM Beatrice Birmingham MD [facility] PAD PAD     Dr. Jeter 5 to 7 days  Dr. Thompson.  Patient is to call the office to get an appointment to see him regarding scheduling for biopsy of the lung nodule.    Test Results Pending at Discharge: None    Alba Murdock  04/08/23  11:29 CDT    Time: 30 minutes.

## 2023-04-08 NOTE — PLAN OF CARE
Goal Outcome Evaluation:  Plan of Care Reviewed With: patient        Progress: improving  Outcome Evaluation: VSS. PRN pain med given x 1 with relief noted. IV infiltrated in LFA when IV Levaquin was infusing, infusion stopped and pharmacy notified and said there was no interventions that needed to be done. Warm compress was applied and arm was elevated, much improved this AM. New IV placed in RFA. Hoping to be discharged today depending upon AM labs. Will continue to monitor.

## 2023-04-08 NOTE — PROGRESS NOTES
HEMATOLOGY AND MEDICAL ONCOLOGY PROGRESS NOTE          Pt Name: Melly Cruz  YOB: 1955  MRN: [id number]  Room: 460    Date of evaluation: 4/8/2023  History Obtained From:  History obtained from chart review and the patient.  PCP: Dr. Reynaldo Jeter    Subjective: Sitting up in bed and reports feeling better.  No new complaints this morning.  Left forearm IV infiltration site is improving, no significant discomfort.  Eager to be discharged home.    HISTORY OF PRESENT ILLNESS:    Melly was seen in initial medical oncology consultation as an inpatient at [facility] on 4/5/2023 at the request of the hospitalist, Dr. Tejas Lock.    Melly is a previous smoker, states that she quit in ~2018.  She smoked about 2 packs/day since about age 16.   Melly states that she has been followed with a right lung nodule for many years.    The lung nodule is being followed by Dr. Agusto Thompson and ENOC Rose with the cardiothoracic surgery department at [facility].  She was last seen by Dr. Agusto Thompson on 12/7/2022.     Imaging from [facility] documented findings as follows:     CTA chest 10/11/2017: No PE.  Subtle patchy infiltrate RUL lung  Contrasted chest CT 1/21/2018: 4 mm subpleural nodule LLL, unchanged from 2010.  Stable 7 mm superior segment LLL nodule.  New from 2010, is a 10-11 mm spiculated RUL apex nodule (postinflammatory scarring related to pneumonia 10/2017 versus underlying malignancy)  PET scan 10/4/2018: 11 mm subsolid RUL apex nodule, SUV 1.5. 5 mm LLL subpleural nodule is stable from 2010  Contrasted chest CT 4/22/2019: 15 mm subsolid nodule posterior right apex, unchanged from 10/2017 with no hypermetabolic abnormality in this area on PET 10/2018.  The area has developed from 2010.  Continue annual surveillance to ensure stability.  Subpleural LLL nodule has been present since 2010 and is benign  CTA chest 4/29/2019: No PE.  Stable 1.5 cm RUL groundglass opacity similar to 1/21/2018 and  10/11/2017, considering differences in technique.  Follow-up chest CT in 1 year  Contrasted chest CT 6/16/2020: New, 0.9 cm lingula pleural-based pulmonary nodule versus atelectasis which previously appeared more triangular on 4/29/2019.  Recommend follow-up CT chest in 3 months.  Stable 1.5 cm RUL groundglass opacity  PET scan 6/30/2020: No FDG activity associated with the small, new 9 mm lingular nodule.  Stable 13 mm groundglass RUL nodule, no FDG uptake.  Stable, 3 mm subpleural LLL nodule.  A few, normal size lymph nodes in the inferior axilla bilaterally have low-level uptake, SUV 2.7-May be reactive.  Contrasted chest CT 12/16/2020: Stable 1.5 cm RUL groundglass opacity.  0.9 cm pulmonary nodule versus atelectasis in the lingula, similar when compared to 6/16/2020.  Stable 5 mm pleural-based LLL nodule when compared to 1/21/2018, which is 3 years of stability  Contrasted chest CT 12/8/2021: Stable RUL groundglass opacity, unchanged from 4/29/2019, adjacent subpleural nodule within the RUL, stable.  Unchanged focus of pleural-based nodularity within the inferior lingular segment of THI.  Stable subpleural soft tissue nodule within the left posterior costophrenic angle and lower lobe dating to 2019.  No new nodules  Noncontrast CT abdomen/pelvis 8/23/2022: 6 x 4 mm LLL subpleural lung nodule, unchanged for 2 years.  Indeterminate 15 x 17 mm lingular nodule.  Options include follow-up 3-month chest CT and PET scan     Contrasted chest CT 12/7/2022 at [facility]:  • Previously described groundglass opacity within the right upper lobeshows interval increase in size is now more elongate in appearance with increased soft tissue component. It does demonstrate linear extension to the pleural surface with some associated volume loss suggesting associated parenchymal scarring. This may simply represent some progressive scarring but given the change from the previous exam I would suggest PET/CT for further characterization.  Additional previously described nodules are stable. There are changes of centrilobular emphysema.  • No developing mediastinal or axillary lymphadenopathy.     PET scan 3/17/2023:  • Slowly enlarging hypermetabolic 3.3 x 1.0 x 2.9 cm subsolid RIGHT upper lobe lung mass, SUV 4.1. This is highly suspicious for a primary lung neoplasm, favoring adenocarcinoma.  • No evidence of metastatic disease.  • Focal nodular hypermetabolic activity in the sigmoid colon, SUV 4.4. Differential diagnosis includes focal inflamed diverticulum versus underlying polyp or neoplasm. Recommend correlation with colonoscopy.     CTA chest 4/4/2023:  • No CT evidence of pulmonary embolus. Main pulmonary artery segment borderline dilated at 3 cm. Atheromatous disease of the thoracic aorta and coronary arteries. Cardiomegaly.  • New lung infiltrates bilaterally most likely due to pneumonia. This is most dense in the left upper lobe.  • A mass in the right lung apex is again noted. This mass is PET positive and worrisome for primary lung carcinoma.  • Stable small nodule in the right middle lobe laterally.  • Indeterminate small right renal lesion measuring 1.6 cm with a Hounsfield unit measurement of 48.  • Fatty infiltration of the liver.  • Moderate size hiatal hernia.       On 3/29/2023, Dr. Thompson's office attempted to refer Melly to Dr. Mendoza or Dr. Dove for EBUS, possible navigational bronchoscopy to obtain a tissue diagnosis. Unfortunately they are out of network for Mrs. Cruz's insurance.     Melly came to the the ED at [facility] on 4/4/2023 with a history of mid epigastric and substernal chest pain, pleuritic in nature with increasing dyspnea, temperature elevation to 100.6 °F, tachycardia and hypoxia.  She was found to have bilateral pneumonia.  She was admitted for management.     Medical oncology consultation requested with the above history.    Objective   PHYSICAL EXAM:  Physical Exam  Constitutional:       General: She is not in  acute distress.     Appearance: She is well-developed. She is obese.   HENT:      Head: Normocephalic and atraumatic.      Right Ear: External ear normal.      Left Ear: External ear normal.      Nose: Nose normal.   Eyes:      General: No scleral icterus.  Neck:      Trachea: No tracheal deviation.   Cardiovascular:      Rate and Rhythm: Normal rate and regular rhythm.   Pulmonary:      Effort: Pulmonary effort is normal. No respiratory distress.      Breath sounds: Normal breath sounds. No wheezing or rales.   Abdominal:      General: Bowel sounds are normal. There is no distension.      Palpations: Abdomen is soft. There is no mass.   Genitourinary:     Comments: Exam deferred.  Musculoskeletal:         General: No tenderness.      Cervical back: Neck supple.      Comments: Normal ROM to all 4 extremities.  Has a walking boot for the LLE, when out of bed.  Currently not wearing   Lymphadenopathy:      Comments:      Skin:     General: Skin is warm and dry.      Findings: No rash.      Comments: LFA IV infiltrate site warm to touch, reports significantly improved.  No significant erythremia.   Neurological:      Mental Status: She is alert and oriented to person, place, and time.      Comments: Follows commands. Non-focal.   Psychiatric:         Behavior: Behavior normal.         Thought Content: Thought content normal.         Labs:  CBC  Results from last 7 days   Lab Units 04/08/23  0539 04/07/23  0412 04/06/23  0905   WBC 10*3/mm3 5.61 7.29 8.80   HEMOGLOBIN g/dL 10.5* 10.1* 11.0*   HEMATOCRIT % 36.5 33.7* 37.0   PLATELETS 10*3/mm3 264 232 219       Lab Results   Component Value Date     04/08/2023    K 4.5 04/08/2023     04/08/2023    CO2 25.0 04/08/2023    BUN 13 04/08/2023    CREATININE 0.82 04/08/2023    GLUCOSE 113 (H) 04/08/2023    CALCIUM 8.6 04/08/2023    BILITOT 0.6 04/04/2023    ALKPHOS 97 04/04/2023    AST 29 04/04/2023    ALT 37 (H) 04/04/2023    AGRATIO 1.2 04/04/2023    GLOB 2.9  04/04/2023       Lab Results   Component Value Date    INR 1.00 01/24/2023    INR 1.11 07/11/2022    INR 1.04 07/10/2022    PROTIME 13.3 01/24/2023    PROTIME 14.3 07/11/2022    PROTIME 13.5 07/10/2022       Cultures:  Lab Results   Component Value Date    BLOODCX No growth at 3 days 04/04/2023     No components found for: URINCX      ASSESSMENT/PLAN:      #1  RUL Lung Mass  3.3 x 1.0 x 2.9 cm RIGHT upper lobe lung mass, SUV 4.1 on PET 3/17/2023  known right lung nodule, being followed outpatient by Dr. Agusto Thompson and ENOC Rose with the cardiothoracic surgery department at [facility].  She was last seen by Dr. Agusto Thompson on 12/7/2022     Recent imaging:     PET scan 3/17/2023:  • Slowly enlarging hypermetabolic 3.3 x 1.0 x 2.9 cm subsolid RIGHT upper lobe lung mass, SUV 4.1. This is highly suspicious for a primary lung neoplasm, favoring adenocarcinoma.  • No evidence of metastatic disease.  • Focal nodular hypermetabolic activity in the sigmoid colon, SUV 4.4. Differential diagnosis includes focal inflamed diverticulum versus underlying polyp or neoplasm. Recommend correlation with colonoscopy.     CTA chest 4/4/2023:  • No CT evidence of pulmonary embolus. Main pulmonary artery segment borderline dilated at 3 cm. Atheromatous disease of the thoracic aorta and coronary arteries. Cardiomegaly.  • New lung infiltrates bilaterally most likely due to pneumonia. This is most dense in the left upper lobe.  • A mass in the right lung apex is again noted. This mass is PET positive and worrisome for primary lung carcinoma.  • Stable small nodule in the right middle lobe laterally.  • Indeterminate small right renal lesion measuring 1.6 cm with a Hounsfield unit measurement of 48.  • Fatty infiltration of the liver.  • Moderate size hiatal hernia.       On 3/29/2023, Dr. Thompson's office attempted to refer Melly to Dr. Mendoza or Dr. Dove for EBUS, possible navigational bronchoscopy to obtain a tissue  diagnosis.  Unfortunately they are out of network for Mrs. Cruz's insurance.     Melly came to the the ED at [facility] on 4/4/2023 with a history of mid epigastric and substernal chest pain, pleuritic in nature with increasing dyspnea, temperature elevation to 100.6 °F, tachycardia and hypoxia.  She was found to have bilateral pneumonia.  She was admitted for management.      RECOMMEND:  Dr. Delarosa recommends referral to Dr. Ole Esquivel at [facility] for navigational bronchoscopy.  ENOC Callejas with Dr. Agusto Thompson made a referral to Dr. Ole Esquivel at [facility] for navigational bronchoscopy on 4/7/2028.    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  She states that she cannot receive oral anticoagulation because of terrible reflux esophagitis with GI bleeding.  She states her most recent DVT was of the right calf about a year ago after foot surgery.     Melly states the recommendation has been for Cory fundoplication to resolve the reflux problem so she can take oral anticoagulation.  She was scheduled for Cory fundoplication 4/7/2023 in [place].  She is currently receiving prophylactic dose Lovenox     #3  Pneumonia  Bilateral lung infiltrates CT chest 4/4/2023  Receiving Levaquin 750 mg IV every 24 hours x5 days  Pulmicort nebulizer  Managed per attending     #4  Neutrophilic leukocytosis  Likely associated with pneumonia  Blood cultures x2 on 4/4/2023: no growth x3 days  Max temp 100.6 upon admission, has remained afebrile since  WBC 15.17, ANC 12.98 on 4/5/2023  WBC normal 7.29, ANC 4.78 on 4/7/2023  WBC normal 5.61, ANC 1.76 today, 4/8/2023     #5  Abnormal PET of sigmoid colon  PET 3/17/2023: Focal nodular hypermetabolic activity in the sigmoid colon, SUV 4.4. Differential diagnosis includes focal inflamed diverticulum versus underlying polyp or neoplasm. Recommend correlation with colonoscopy,  which can be arranged as an outpatient, defer arrangements to PCP    #6  Indeterminate small right renal lesion measuring 1.6 cm with a Hounsfield unit measurement of 48, noted on CT chest 4/4/2023  No FDG avidity in this area on recent PET scan dated 3/17/2023  This can be followed as an outpatient by PCP, consider ultrasound      #7  Normocytic anemia  Hemoglobin 12.2 with hematocrit of 39.6 upon admission, 4/4/2023  Hemoglobin 10.1 with hematocrit of 33.7 on 4/7/2023  Hemoglobin 10.5 with a hematocrit of 36.5 today, 4/8/2023    GI was consulted for reported black bowel movement and 2 g drop in hemoglobin: Reported no current signs of active GI bleeding and do not recommend emergency endoscopy at this time.  Recommended Protonix 40 mg twice daily and Carafate for symptom management.    Labs from 7/11/2022      Will add anemia studies to lab specimen obtained this morning, noted a history of mild folate deficiency      Plan of care discussed with patient and significant other.  All questions answered.  Requested anemia studies.  Encouraged patient to follow-up with PCP post-hospitalization to address other nonurgent findings noted on imaging studies as outlined above.  She reports last colonoscopy to be 1 year ago, by Dr. Pollock at [facility].    Appointment with Dr. Delarosa 5/17/2023 at 9:30 AM to follow-up on progress at obtaining a tissue diagnosis.      ENOC Sterling  04/08/23  07:42 CDT      Physicians attestation and contribution:    I, Cedric Delarosa, personally and independently performed an evaluation on Melly Cruz  I have reviewed relevant medical information/data to include but not limited to the medication list, relevant appropriate lab work and imaging when applicable.  I reviewed other physician's notes, ancillary services and nurses assessments.  I have reviewed the above documentation completed by Kinjal STUBBS  Please see my additional addended and/or modified contributions to the  history of present illness, physical examination and assessment/medical decision-making and plan that reflects my findings and impressions.  I discussed the essential elements of the care plan with Kinjal STUBBS and the patient.  I have encouraged and answered all the questions raised to the patient's understanding and satisfaction.  I concur with the above stated.    Subjective: Symptomatically stable    Objective: No new objective findings    Assessment/plan:  Case discussed with Dr. Alba Murdock and Dr. Tavares Pantoja yesterday, 4/7/2023  Hemoglobin dropped to 10.1 4/7/2023 from 12.4 on 4/5/2023  With her history significant GI bleed in the past and hematemesis this admission, agree with Dr. Murdock for GI evaluation.  GI saw Ms. Cruz and did not recommend EGD this admission.  Further serology for anemia requested.  Hemoglobin stable today at 10.5.  I will defer further GI referral to the GI service or PCP.     FROM THE LUNG MASS STANDPOINT:  RECOMMEND:  Referral to Dr. Ole Esquivel at [facility] for navigational bronchoscopy.  I deferred the pulmonary referral to Dr. Agusto Thompson' team.  who is most acquainted with Ms. Cruz and has developed alternative approaches as outlined in his 3/29/2023 note.  ENOC Rose with thoracic surgery made the referral yesterday, 4/7/2023 to Dr. Ole Esquivel at [facility] for navigational bronchoscopy and EBUS.       I will see an outpatient setting 5/17/2023 at 9:30 AM to follow-up on progress at obtaining a tissue diagnosis.    Okay with me if discharged    Cedric Delarosa MD  4/8/2023 08:17 CDT

## 2023-04-09 ENCOUNTER — READMISSION MANAGEMENT (OUTPATIENT)
Dept: CALL CENTER | Facility: HOSPITAL | Age: 68
End: 2023-04-09
Payer: MEDICARE

## 2023-04-09 LAB
BACTERIA SPEC AEROBE CULT: NORMAL
BACTERIA SPEC AEROBE CULT: NORMAL

## 2023-04-09 NOTE — OUTREACH NOTE
Prep Survey    Flowsheet Row Responses   Denominational facility patient discharged from? [facility]   Is LACE score < 7 ? No   Eligibility Readm Mgmt   Discharge diagnosis Pneumonia community-acquired   Does the patient have one of the following disease processes/diagnoses(primary or secondary)? Pneumonia   Does the patient have Home health ordered? No   Is there a DME ordered? No   Prep survey completed? Yes          Marlene ELMORE - Registered Nurse

## 2023-04-13 ENCOUNTER — READMISSION MANAGEMENT (OUTPATIENT)
Dept: CALL CENTER | Facility: HOSPITAL | Age: 68
End: 2023-04-13
Payer: MEDICARE

## 2023-04-13 NOTE — OUTREACH NOTE
COPD/PN Week 1 Survey    Flowsheet Row Responses   [facility] patient discharged from? [facility]   Does the patient have one of the following disease processes/diagnoses(primary or secondary)? Pneumonia   Week 1 attempt successful? Yes   Call start time 1004   Call end time 1006   Discharge diagnosis Pneumonia community-acquired   Meds reviewed with patient/caregiver? Yes   Is the patient having any side effects they believe may be caused by any medication additions or changes? No   Does the patient have all medications ordered at discharge? Yes   Is the patient taking all medications as directed (includes completed medication regime)? Yes   Does the patient have a primary care provider?  Yes   Comments regarding PCP Patient is at her pcp office waiting to be seen for her follow up appointment.    Has the patient kept scheduled appointments due by today? Yes   Has home health visited the patient within 72 hours of discharge? N/A   Pulse Ox monitoring None   Psychosocial issues? No   Did the patient receive a copy of their discharge instructions? Yes   Nursing interventions Reviewed instructions with patient   What is the patient's perception of their health status since discharge? Improving   Nursing Interventions Nurse provided patient education   If the patient is a current smoker, are they able to teach back resources for cessation? Not a smoker   Is the patient/caregiver able to teach back the hierarchy of who to call/visit for symptoms/problems? PCP, Specialist, Home health nurse, Urgent Care, ED, 911 Yes   Is the patient/caregiver able to teach back signs and symptoms of worsening condition: Fever/chills, Shortness of breath, Chest pain   Is the patient/caregiver able to teach back importance of completing antibiotic course of treatment? Yes   Week 1 call completed? Yes          Kayla BARRETO - Licensed Nurse

## 2023-04-17 ENCOUNTER — TELEPHONE (OUTPATIENT)
Dept: CARDIAC SURGERY | Facility: CLINIC | Age: 68
End: 2023-04-17

## 2023-04-17 NOTE — TELEPHONE ENCOUNTER
Called pt and she states they contacted her 2 weeks ago and said the only day he has clinic there is on Tuesday and that they would get back to her but she has not heard anything since.  She has called twice and left messages but has not rec'd a call back.  Pt states she was going to call our office today to see if there is anything we can do to facilitate an appt/kahm

## 2023-04-17 NOTE — TELEPHONE ENCOUNTER
Can you call patient and see if she has appt with pulmonology at [facility] yet?

## 2023-04-18 NOTE — TELEPHONE ENCOUNTER
Called Dr Esquivel's office and spoke with Ondina. She states I would need to speak with his nurse, Cameron. She sent me to his . I left a detailed message re: this patient and MIGUEL ANGEL. ENDER for their office to return my call.

## 2023-04-18 NOTE — TELEPHONE ENCOUNTER
I did reach out to oncology office as well so they will update Dr. Delarosa when he returns tomorrow

## 2023-04-18 NOTE — TELEPHONE ENCOUNTER
Called and spoke with patient re: this. I advised we have reached out to Dr Esquivel's office as well. She states she still has yet to hear from them, but will let us know when she does.

## 2023-04-19 NOTE — TELEPHONE ENCOUNTER
"Called patient to see if she has figured anything out.  Says she has still not heard from pulmonologist and will hold.  Says she is very concerned that no one is getting back to her.  Advised her that we have spoken with Dr. Delarosa's office in hopes they have a contact to Dr. Esquivel's office for an appointment.  Advised patient to call her insurance company to see if there are any other providers within network that would cover endobronchial ultrasound bronchoscopy.  She will call tomorrow and let us know if there is anyone else to make a referral to.  She expresses appreciation for our efforts but says \" it is bad when the doctor's office will call the other doctors office back\".     Can someone call Dr. Delarosa's office tomorrow to see if they have heard anything as well?"

## 2023-04-20 NOTE — TELEPHONE ENCOUNTER
Pt calling back to report that she contacted her insurance co this morning and [facility] and [place] are both in network so if Dr Thompson knows of a physician or practice in those markets, her insurance should cover that./nadia

## 2023-04-20 NOTE — PROGRESS NOTES
[facility] - PODIATRY    Today's Date: 04/28/23    Patient Name: Melly Cruz  MRN: [id number]  CSN: [id number]  PCP: Reynaldo Jeter DO  Referring Provider: No ref. provider found    SUBJECTIVE     Chief Complaint   Patient presents with   • Follow-up     Reynaldo Jeter DO -03/17/2022 Return in about 1 month-pt states she is here today for one month fu on Lt foot pt presents in CAM boot-pt reports pain at 1/10 to 2/10     HPI: Melly Cruz, a 67 y.o.female, comes to clinic as a(n) established patient complaining of foot pain. Patient has h/o acid reflux, anxiety, arthritis, back pain, DVT, Hypothyroid, Migraine, PE. Patient presents with complaints of ongoing left foot.  She has been wearing her CAM.  Notes minimal pain when wearing her CAM.  She notes minimal pain if she uses a metatarsal pad.  Since last visit she has been dx with lung cancer and currently working towards a biopsy then treatment.  Had CT performed.   Admits pain at 1-2/10 level and described as burning, stabbing, shooting, aching and sharp. Relates previous treatment(s) including lidocaine ointment. Denies any constitutional symptoms. No other pedal complaints at this time.    Past Medical History:   Diagnosis Date   • Acid reflux    • Anemia    • Anxiety    • Arthritis    • Asthma     Mild COPD   • Curtis esophagus    • Bleeding disorder     Reflux acid overload   • Bunion Aug    Repaired with surgery   • Cancer 03/2023    Waiting on Biopsy to determine what kind and what stage   • Chest pain    • Chronic back pain    • Chronic neck pain    • Clotting disorder     Stomach bleeds from acid   • COPD (chronic obstructive pulmonary disease)    • Difficulty walking Dec 2022    After surgery wslking has become more and more difficult   • DVT (deep venous thrombosis)    • GI bleed    • Hammer toe Aug 22    Repaired surgery   • Hypothyroid    • Hypothyroidism    • Low back pain    • Migraine    • PE (pulmonary  thromboembolism)    • Plantar fasciitis Years ago   • Renal lesion 2023   • Walter splints Years ago     Past Surgical History:   Procedure Laterality Date   • BUNIONECTOMY     • CHOLECYSTECTOMY     • COLONOSCOPY     • CORRECTION HAMMER TOE     • DILATATION AND CURETTAGE     • FOOT FUSION Left 2022    Procedure: 1-2 Arthrodesis, Tarsal Metatarsal Joint 2 and 3 Arthrodesis;  Surgeon: Bud Maradiaga DPM;  Location:  [facility] OR;  Service: Podiatry;  Laterality: Left;   • HAMMER TOE REPAIR Left 2022    Procedure: Hammertoe Repair 2-5,;  Surgeon: Bud Maradiaga DPM;  Location:  [facility];  Service: Podiatry;  Laterality: Left;   • HERNIA REPAIR     • HYSTERECTOMY     • JOINT REPLACEMENT     • OOPHORECTOMY     • REDUCTION MAMMAPLASTY  2021   • REPLACEMENT TOTAL KNEE      Right knee partial, left knee     Family History   Problem Relation Age of Onset   • Breast cancer Paternal Aunt    • Heart disease Mother    • Osteoporosis Mother    • Thyroid disease Mother         Lukemia can’t remember which kind.   • Cancer Mother         Pacemaker at 90 years old.   • Cancer Father         Father’s entire family  from Cancer   • Diabetes Father    • Thyroid disease Father    • Thyroid disease Maternal Aunt         Blood disorder   • Thyroid disease Maternal Uncle    • Thyroid disease Sister         Thyroid   • Ovarian cancer Neg Hx    • Uterine cancer Neg Hx    • Colon cancer Neg Hx      Social History     Socioeconomic History   • Marital status:    Tobacco Use   • Smoking status: Former     Packs/day: 1.50     Years: 46.00     Pack years: 69.00     Types: Cigarettes     Start date: 1972     Quit date: 2018     Years since quittin.0     Passive exposure: Past   • Smokeless tobacco: Never   • Tobacco comments:     Pt started at age 17, then quit at age 31. Pt restarted smoking at age 32-33 and quit smoking in 2018   Vaping Use   • Vaping Use: Never used   Substance and Sexual Activity    • Alcohol use: Not Currently     Alcohol/week: 1.0 standard drink     Types: 1 Drinks containing 0.5 oz of alcohol per week     Comment: A chocolate drink made with moonshine and creamer   • Drug use: No   • Sexual activity: Yes     Partners: Male     Birth control/protection: Surgical     Allergies   Allergen Reactions   • Erythromycin Swelling     Throat swells   • Hydromorphone Hcl Anaphylaxis and Other (See Comments)     Other reaction(s): Shock and/or Unconsciousness  Note: Anaphylaxis   • Ondansetron Hcl Other (See Comments)     Coded  Coded  Other reaction(s): Zofran  Note:  Allergic Reaction: Anaphylaxis   • Orphenadrine Anaphylaxis   • Orphenadrine Citrate Anaphylaxis   • Meperidine Other (See Comments)     Makes her an angry person   • Codeine Rash   • Guaifenesin & Derivatives Nausea And Vomiting   • Guaifenesin Er Swelling     Throat swelling     Current Outpatient Medications   Medication Sig Dispense Refill   • albuterol (PROVENTIL HFA;VENTOLIN HFA) 108 (90 Base) MCG/ACT inhaler Inhale 2 puffs 4 (Four) Times a Day. 6.7 g 0   • aluminum-magnesium hydroxide-simethicone (MAALOX MAX) 400-400-40 MG/5ML suspension Take 30 mL by mouth Every 6 (Six) Hours As Needed for Indigestion or Heartburn.     • budesonide (Pulmicort Flexhaler) 90 MCG/ACT inhaler Inhale 2 puffs 2 (Two) Times a Day. Swallow, do not inhale  11   • buPROPion XL (WELLBUTRIN XL) 300 MG 24 hr tablet Take 1 tablet by mouth Every Morning.     • Cyanocobalamin (VITAMIN B-12 ER) 1000 MCG tablet controlled-release Take 1,000 mcg by mouth Daily.     • cyclobenzaprine (FLEXERIL) 10 MG tablet Take 1 tablet by mouth 3 (Three) Times a Day As Needed.     • EPINEPHrine (EPIPEN) 0.3 MG/0.3ML solution auto-injector injection      • esomeprazole (nexIUM) 40 MG capsule Take 2 capsules by mouth Every Morning Before Breakfast. OTC     • fluticasone (FLONASE) 50 MCG/ACT nasal spray 2 sprays into the nostril(s) as directed by provider Daily.     • folic acid  (FOLVITE) 1 MG tablet Take 1 tablet by mouth Daily.     • HYDROcodone-acetaminophen (NORCO) 7.5-325 MG per tablet Take 1 tablet by mouth Every 8 (Eight) Hours As Needed.     • levoFLOXacin (LEVAQUIN) 750 MG tablet Take 1 tablet by mouth Daily. Indications: Pneumonia 5 tablet 0   • levothyroxine (SYNTHROID, LEVOTHROID) 112 MCG tablet Take 1 tablet by mouth Daily.     • lidocaine (XYLOCAINE) 5 % ointment Apply 1 application topically to the appropriate area as directed Every 2 (Two) Hours As Needed for Mild Pain. (Patient taking differently: Apply 1 application topically to the appropriate area as directed Every 2 (Two) Hours As Needed for Mild Pain. USES ON FOOT) 2500 g 5   • metoclopramide (REGLAN) 5 MG tablet Take 1 tablet by mouth 3 (Three) Times a Day As Needed (nausea and vomiting). 10 tablet 0   • nitroglycerin (NITROSTAT) 0.4 MG SL tablet Place 1 tablet under the tongue Every 5 (Five) Minutes As Needed for Chest Pain. Take no more than 3 doses in 15 minutes.     • pramipexole (MIRAPEX) 1 MG tablet Take 1 tablet by mouth 2 (Two) Times a Day.     • pregabalin (LYRICA) 75 MG capsule Take 1 capsule by mouth 2 (Two) Times a Day.     • Semaglutide, 1 MG/DOSE, (Ozempic, 1 MG/DOSE,) 4 MG/3ML solution pen-injector Inject 1 mg under the skin into the appropriate area as directed 1 (One) Time Per Week. Friday     • sucralfate (CARAFATE) 1 GM/10ML suspension Take 10 mL by mouth 4 (Four) Times a Day Before Meals & at Bedtime. 414 mL 0   • tiZANidine (ZANAFLEX) 4 MG tablet Take 2-4 mg by mouth At Night As Needed for Muscle Spasms.     • vitamin D (ERGOCALCIFEROL) 82388 units capsule capsule Take 1 capsule by mouth Every 7 (Seven) Days. SUNDAY  6   • diclofenac (VOLTAREN) 50 MG EC tablet Take 1 tablet by mouth 2 (Two) Times a Day As Needed (pain). 60 tablet 0     No current facility-administered medications for this visit.     Review of Systems   Constitutional: Negative for chills and fever.   HENT: Negative for congestion.     Respiratory: Negative for shortness of breath.    Cardiovascular: Positive for leg swelling. Negative for chest pain.   Gastrointestinal: Negative for constipation, diarrhea, nausea and vomiting.   Musculoskeletal: Positive for arthralgias, back pain, gait problem and neck pain.        Foot pain   Skin: Negative for wound.       OBJECTIVE     Vitals:    04/28/23 1041   BP: 140/100   Pulse: 84   SpO2: 96%       PHYSICAL EXAM  GEN:   Accompanied by none.     Foot/Ankle Exam    GENERAL  Appearance:  appears stated age and obese  Orientation:  AAOx3  Affect:  appropriate  Gait:  antalgic  Assistance:  independent  Right shoe gear: casual shoe  Left shoe gear: CAM boot    VASCULAR     Right Foot Vascularity   Dorsalis pedis:  2+  Posterior tibial:  2+  Skin temperature:  warm  Edema grading:  None  CFT:  3  Pedal hair growth:  Present  Varicosities:  spider veins     Left Foot Vascularity   Dorsalis pedis:  2+  Posterior tibial:  2+  Skin temperature:  warm  Edema grading:  Trace  CFT:  3  Pedal hair growth:  Present  Varicosities:  spider veins     NEUROLOGIC     Right Foot Neurologic   Light touch sensation: diminished  Vibratory sensation: normal  Hot/Cold sensation: normal  Protective Sensation using Coden-Kerry Monofilament:   Sites intact: 10  Sites tested: 10     Left Foot Neurologic   Light touch sensation: diminished  Vibratory sensation: normal  Hot/Cold sensation:  normal  Protective Sensation using Coden-Kerry Monofilament:   Sites intact: 10  Sites tested: 10    MUSCULOSKELETAL     Right Foot Musculoskeletal   Ecchymosis:  none  Tenderness:  none    Arch:  Normal  Hammertoe:  Second toe, third toe, fourth toe and fifth toe     Left Foot Musculoskeletal   Ecchymosis:  none  Tenderness:  dorsal foot tenderness and metatarsals tenderness (Dorsal midfoot incision)  Arch:  Normal  Hammertoe:  Second toe    MUSCLE STRENGTH     Right Foot Muscle Strength   Foot dorsiflexion:  5  Foot plantar flexion:   5  Foot inversion:  5  Foot eversion:  5     Left Foot Muscle Strength   Foot dorsiflexion:  4+  Foot plantar flexion:  4+  Foot inversion:  4+  Foot eversion:  4+    RANGE OF MOTION     Right Foot Range of Motion   Foot and ankle ROM within normal limits       Left Foot Range of Motion   Foot and ankle ROM within normal limits      DERMATOLOGIC      Right Foot Dermatologic   Skin  Right foot skin is intact.      Left Foot Dermatologic   Skin  Left foot skin is intact.       RADIOLOGY/NUCLEAR:  XR Chest 1 View    Result Date: 4/4/2023  Narrative: EXAMINATION: XR CHEST 1 VW- 4/4/2023 5:10 PM CDT  HISTORY: SOB.  REPORT: A frontal view of the chest was obtained.  COMPARISON: Chest x-ray 03/21/2023.  The lungs are hypoaerated as before, there is central basilar vascular congestion with mild cardiomegaly, somewhat increased interstitial opacities are noted in the left upper lobe. No lobar consolidation is identified. There appears to be moderate emphysema. No pneumothorax is identified. The osseous structures are unremarkable.      Impression: Pulmonary hypoventilation with advanced COPD, mild increase in central vascular congestion and increased interstitial opacities in the left upper lobe. This may be related to left greater than right asymmetric interstitial pneumonitis, versus asymmetric pulmonary edema. This report was finalized on 04/04/2023 17:13 by Dr. Tony Chen MD.    CT foot left wo contrast    Result Date: 4/26/2023  Narrative: CT FOOT LEFT WO CONTRAST- 4/26/2023 11:00 AM CDT  HISTORY: Foot pain, chronic, osteoarthritis suspected; M79.672-Pain in left foot  COMPARISON: Plain films left foot 02/09/2023  DOSE LENGTH PRODUCT: 161 mGy cm. Automated exposure control was also utilized to decrease patient radiation dose.  TECHNIQUE: Axial images left foot are obtained with sagittal coronal reconstructions  FINDINGS:  Hardware arthrodesis of the first, second, and third tarsal metatarsal joints with bony fusion  across the fused joint spaces. Subchondral cystic changes of the cuneiform adjacent the arthritic cuneiform metatarsal joints. Mild first MTP joint space narrowing. Osteotomy of the great toe. Implants across the PIP joints of the second, third, fourth PIP joints. Questionable partial resection distal aspect of the proximal fifth toe. Mild lateral orientation of the DIP joint of the second toe. Calcaneal spurring. Lucencies of the posterior calcaneus unchanged, likely represent bony donor site.  No acute osseous pathology identified. Accessory ossicles inferior to the medial malleolus.  Mild soft tissue edema with no loculated fluid collection localized.      Impression: 1. Similar postoperative changes compared to the plain films left foot from 02/09/2023. Arthritic changes described above with minimal calcaneal spurring. No acute osseous pathology identified. This report was finalized on 04/26/2023 12:22 by Dr. Marisol Martino MD.    CT Angiogram Chest    Result Date: 4/4/2023  Narrative: EXAMINATION:  CT ANGIOGRAM CHEST-  4/4/2023 6:07 PM CDT  HISTORY: Pulmonary embolism (PE) suspected, unknown D-dimer. Shortness of air. COPD. Fever. Wheezing and decreased breath sounds. Lower extremity edema.  COMPARISON : 03/21/2023.  DLP: 800 mGy-cm. Automated dosage reduction technique was utilized to decrease patient dosage.  TECHNIQUE: CT angio was performed of the chest with IV contrast. Coronal, sagittal and 3-D reconstruction were performed.  INDEPENDENT 3-D WORKSTATION UTILIZED FOR RECONSTRUCTION: Yes. A radiologist was not present in the department.  MEDIASTINUM, HEART AND VASCULAR STRUCTURES: There is mild atheromatous disease of the thoracic aorta and coronary arteries. The thoracic aorta is normal caliber. There is cardiomegaly. The main pulmonary artery segment measures 3 cm. There is no CT evidence of pulmonary embolus.  LUNGS: A mass in the right lung apex is again noted. There is no change compared to the recent  CT. However, this lesion has been increasing in size and is PET positive. A subpleural 7 mm right middle lobe nodule image 87 series 8 is stable. There are new bilateral parenchymal infiltrates greatest in the left upper lobe. There are new infiltrates in both lower lobes. There is no significant pleural effusion.  UPPER ABDOMEN: There is fatty infiltration of the liver. There is a moderate size hiatal hernia. There has been prior cholecystectomy. There is a 1.6 cm right renal lesion with a Hounsfield unit measurement of 48.  BONES: No acute bony abnormality is seen.       Impression: 1. No CT evidence of pulmonary embolus. Main pulmonary artery segment borderline dilated at 3 cm. Atheromatous disease of the thoracic aorta and coronary arteries. Cardiomegaly. 2. New lung infiltrates bilaterally most likely due to pneumonia. This is most dense in the left upper lobe. 3. A mass in the right lung apex is again noted. This mass is PET positive and worrisome for primary lung carcinoma. 4. Stable small nodule in the right middle lobe laterally. 5. Indeterminate small right renal lesion measuring 1.6 cm with a Hounsfield unit measurement of 48. 6. Fatty infiltration of the liver. 7. Moderate size hiatal hernia.  The full report of this exam was immediately signed and available to the emergency room. The patient is currently in the emergency room.  This report was finalized on 04/04/2023 18:44 by Dr. Brandon Fenton MD.      LABORATORY/CULTURE RESULTS:      PATHOLOGY RESULTS:       ASSESSMENT/PLAN     Diagnoses and all orders for this visit:    1. Foot pain, left (Primary)    2. Metatarsalgia, left foot    Other orders  -     diclofenac (VOLTAREN) 50 MG EC tablet; Take 1 tablet by mouth 2 (Two) Times a Day As Needed (pain).  Dispense: 60 tablet; Refill: 0      Comprehensive lower extremity examination and evaluation was performed.  Discussed findings and treatment plan including risks, benefits, and treatment options with  patient in detail. Patient agreed with treatment plan.  CT reviewed.  Fusion sites stable. No fx.    Dispensed metatarsal pad.   Take NSAIDs PRN.   Advised to attempt transition back to regular shoe.   Patient to continue with cancer workup and then return if needed.   An After Visit Summary was printed and given to the patient at discharge, including (if requested) any available informative/educational handouts regarding diagnosis, treatment, or medications. All questions were answered to patient/family satisfaction. Should symptoms fail to improve or worsen they agree to call or return to clinic or to go to the Emergency Department. Discussed the importance of following up with any needed screening tests/labs/specialist appointments and any requested follow-up recommended by me today. Importance of maintaining follow-up discussed and patient accepts that missed appointments can delay diagnosis and potentially lead to worsening of conditions.  Return if symptoms worsen or fail to improve., or sooner if acute issues arise.    Lab Frequency Next Occurrence   CT chest w contrast Once 12/15/2022       This document has been electronically signed by Bud Maradiaga DPM on April 28, 2023 11:02 CDT

## 2023-04-21 NOTE — TELEPHONE ENCOUNTER
I discussed with Dr. Thompson yesterday and he said he would speak to other providers to see if there are contacts in those cities. Awaiting update.

## 2023-04-24 NOTE — TELEPHONE ENCOUNTER
Pt is okay with this facility. Providers being added to Epic to make referral. Waiting on this to be completed to create referral.

## 2023-04-24 NOTE — TELEPHONE ENCOUNTER
Spoke with Li at [facility] and she provided number for [facility] in [place] [phone]. Called and spoke with Kinjal at this facility and she confirmed that EBUS is a procedure provided by their facility. Referral can be faxed to [phone].

## 2023-04-25 DIAGNOSIS — R91.1 LUNG NODULE: Primary | ICD-10-CM

## 2023-04-26 ENCOUNTER — HOSPITAL ENCOUNTER (OUTPATIENT)
Dept: CT IMAGING | Facility: HOSPITAL | Age: 68
Discharge: HOME | End: 2023-04-26
Admitting: PODIATRIST
Payer: MEDICARE

## 2023-04-26 DIAGNOSIS — M79.672 FOOT PAIN, LEFT: ICD-10-CM

## 2023-04-26 PROCEDURE — 73700 CT LOWER EXTREMITY W/O DYE: CPT

## 2023-04-27 ENCOUNTER — TELEPHONE (OUTPATIENT)
Dept: PODIATRY | Facility: CLINIC | Age: 68
End: 2023-04-27
Payer: MEDICARE

## 2023-04-27 NOTE — TELEPHONE ENCOUNTER
Pt returned call and states that she has not been contacted by their office yet. Provided Dr. Marsh's office number for pt and she will call our office back with her appointment information.

## 2023-04-27 NOTE — TELEPHONE ENCOUNTER
Called patient regarding appt on 04/28/2023. Left message for patient to return call if any questions or concerns arise.

## 2023-04-27 NOTE — TELEPHONE ENCOUNTER
Called patient again to follow-up on when her appointment with Dr. Marsh is, no answer and no voicemail option

## 2023-04-27 NOTE — TELEPHONE ENCOUNTER
Pt called and spoke with Rhiannon. Dr. Marsh's office is reviewing faxed referral and notes and will call her with an appointment.

## 2023-04-28 ENCOUNTER — OFFICE VISIT (OUTPATIENT)
Dept: PODIATRY | Facility: CLINIC | Age: 68
End: 2023-04-28
Payer: MEDICARE

## 2023-04-28 VITALS
DIASTOLIC BLOOD PRESSURE: 100 MMHG | BODY MASS INDEX: 38.07 KG/M2 | HEIGHT: 64 IN | SYSTOLIC BLOOD PRESSURE: 140 MMHG | HEART RATE: 84 BPM | WEIGHT: 223 LBS | OXYGEN SATURATION: 96 %

## 2023-04-28 DIAGNOSIS — M77.42 METATARSALGIA, LEFT FOOT: ICD-10-CM

## 2023-04-28 DIAGNOSIS — M79.672 FOOT PAIN, LEFT: Primary | ICD-10-CM

## 2023-05-26 NOTE — TELEPHONE ENCOUNTER
Attempted to contact office and reach someone re: records. Unable to reach anyone. Will try again.

## 2023-05-31 NOTE — TELEPHONE ENCOUNTER
PFT & chest CT rec'd so far.  Will watch for add'l records and call back if no progress note comes through/kahm

## 2023-05-31 NOTE — TELEPHONE ENCOUNTER
Nothing else rec'd via fax so far.  Called again and left another voice mail thanking them for the test results and requesting an office note for this visit/nadia

## 2023-06-01 ENCOUNTER — TELEPHONE (OUTPATIENT)
Dept: CARDIAC SURGERY | Facility: CLINIC | Age: 68
End: 2023-06-01

## 2023-06-01 DIAGNOSIS — R91.1 PULMONARY NODULE, RIGHT: Primary | ICD-10-CM

## 2023-06-01 NOTE — TELEPHONE ENCOUNTER
Outside records reviewed with [facility] bronch performed yesterday and results are non diagnostic. Dr. Thompson presented case at tumor conference today and discussed with Dr. Delarosa. Called patient to discuss options of surgical resection with IVC filter placed prior to due to her history of PE and GI bleeding or referral to radiation therapy for stereotatic radiation to lung lesion. She was advised if she chooses radiation therapy that would be without a tissue diagnosis. She is eager to proceed with Nissen fundoplication surgery. Ultimately, she wishes to proceed with radiation therapy referral. This has been placed per her request. She will call back with questions if they arise.

## 2023-06-01 NOTE — TELEPHONE ENCOUNTER
Called Dr Delarosa's office and they have not seen pt in quite a while.  States they see a note stating pt had bronch with Dr Marsh on 5-31-23.  Went to Care Everywhere section of chart and pulled some records pertaining to this and office note.  Records printed and given to Brandy STUBBS/nadia

## 2023-06-01 NOTE — TELEPHONE ENCOUNTER
Can you call Dr. Delarosa's office and see if they have any updated info? I saw a message from their office that patient had procedure on 5/31.

## 2023-06-08 ENCOUNTER — APPOINTMENT (OUTPATIENT)
Dept: GENERAL RADIOLOGY | Facility: HOSPITAL | Age: 68
End: 2023-06-08
Payer: MEDICARE

## 2023-06-08 ENCOUNTER — APPOINTMENT (OUTPATIENT)
Dept: CT IMAGING | Facility: HOSPITAL | Age: 68
End: 2023-06-08
Payer: MEDICARE

## 2023-06-08 ENCOUNTER — HOSPITAL ENCOUNTER (EMERGENCY)
Facility: HOSPITAL | Age: 68
Discharge: HOME | End: 2023-06-09
Attending: INTERNAL MEDICINE
Payer: MEDICARE

## 2023-06-08 DIAGNOSIS — J90 PLEURAL EFFUSION: ICD-10-CM

## 2023-06-08 DIAGNOSIS — I51.7 CARDIOMEGALY: ICD-10-CM

## 2023-06-08 DIAGNOSIS — J18.9 PNEUMONITIS: Primary | ICD-10-CM

## 2023-06-08 DIAGNOSIS — J40 BRONCHITIS: ICD-10-CM

## 2023-06-08 PROBLEM — Z87.891 FORMER SMOKER: Status: ACTIVE | Noted: 2022-04-19

## 2023-06-08 LAB
ALBUMIN SERPL-MCNC: 4.1 G/DL (ref 3.5–5.2)
ALBUMIN/GLOB SERPL: 1.1 G/DL
ALP SERPL-CCNC: 109 U/L (ref 39–117)
ALT SERPL W P-5'-P-CCNC: 24 U/L (ref 1–33)
ANION GAP SERPL CALCULATED.3IONS-SCNC: 8 MMOL/L (ref 5–15)
AST SERPL-CCNC: 25 U/L (ref 1–32)
BACTERIA UR QL AUTO: ABNORMAL /HPF
BASOPHILS # BLD AUTO: 0.03 10*3/MM3 (ref 0–0.2)
BASOPHILS NFR BLD AUTO: 0.5 % (ref 0–1.5)
BILIRUB SERPL-MCNC: 0.3 MG/DL (ref 0–1.2)
BILIRUB UR QL STRIP: NEGATIVE
BUN SERPL-MCNC: 14 MG/DL (ref 8–23)
BUN/CREAT SERPL: 14.1 (ref 7–25)
CALCIUM SPEC-SCNC: 9.6 MG/DL (ref 8.6–10.5)
CHLORIDE SERPL-SCNC: 104 MMOL/L (ref 98–107)
CLARITY UR: CLEAR
CO2 SERPL-SCNC: 26 MMOL/L (ref 22–29)
COLOR UR: YELLOW
CREAT SERPL-MCNC: 0.99 MG/DL (ref 0.57–1)
D DIMER PPP FEU-MCNC: 0.87 MCGFEU/ML (ref 0–0.65)
DEPRECATED RDW RBC AUTO: 48.5 FL (ref 37–54)
EGFRCR SERPLBLD CKD-EPI 2021: 63.4 ML/MIN/1.73
EOSINOPHIL # BLD AUTO: 0.18 10*3/MM3 (ref 0–0.4)
EOSINOPHIL NFR BLD AUTO: 2.9 % (ref 0.3–6.2)
ERYTHROCYTE [DISTWIDTH] IN BLOOD BY AUTOMATED COUNT: 15.8 % (ref 12.3–15.4)
GLOBULIN UR ELPH-MCNC: 3.9 GM/DL
GLUCOSE SERPL-MCNC: 111 MG/DL (ref 65–99)
GLUCOSE UR STRIP-MCNC: NEGATIVE MG/DL
HCT VFR BLD AUTO: 38 % (ref 34–46.6)
HGB BLD-MCNC: 11.5 G/DL (ref 12–15.9)
HGB UR QL STRIP.AUTO: ABNORMAL
HOLD SPECIMEN: NORMAL
HYALINE CASTS UR QL AUTO: ABNORMAL /LPF
IMM GRANULOCYTES # BLD AUTO: 0.02 10*3/MM3 (ref 0–0.05)
IMM GRANULOCYTES NFR BLD AUTO: 0.3 % (ref 0–0.5)
KETONES UR QL STRIP: NEGATIVE
LEUKOCYTE ESTERASE UR QL STRIP.AUTO: NEGATIVE
LYMPHOCYTES # BLD AUTO: 1.61 10*3/MM3 (ref 0.7–3.1)
LYMPHOCYTES NFR BLD AUTO: 25.9 % (ref 19.6–45.3)
MCH RBC QN AUTO: 25.4 PG (ref 26.6–33)
MCHC RBC AUTO-ENTMCNC: 30.3 G/DL (ref 31.5–35.7)
MCV RBC AUTO: 84.1 FL (ref 79–97)
MONOCYTES # BLD AUTO: 0.48 10*3/MM3 (ref 0.1–0.9)
MONOCYTES NFR BLD AUTO: 7.7 % (ref 5–12)
NEUTROPHILS NFR BLD AUTO: 3.9 10*3/MM3 (ref 1.7–7)
NEUTROPHILS NFR BLD AUTO: 62.7 % (ref 42.7–76)
NITRITE UR QL STRIP: NEGATIVE
NRBC BLD AUTO-RTO: 0 /100 WBC (ref 0–0.2)
NT-PROBNP SERPL-MCNC: <36 PG/ML (ref 0–900)
PH UR STRIP.AUTO: 5.5 [PH] (ref 5–8)
PLATELET # BLD AUTO: 280 10*3/MM3 (ref 140–450)
PMV BLD AUTO: 9.4 FL (ref 6–12)
POTASSIUM SERPL-SCNC: 3.8 MMOL/L (ref 3.5–5.2)
PROT SERPL-MCNC: 8 G/DL (ref 6–8.5)
PROT UR QL STRIP: NEGATIVE
RBC # BLD AUTO: 4.52 10*6/MM3 (ref 3.77–5.28)
RBC # UR STRIP: ABNORMAL /HPF
REF LAB TEST METHOD: ABNORMAL
SODIUM SERPL-SCNC: 138 MMOL/L (ref 136–145)
SP GR UR STRIP: 1.02 (ref 1–1.03)
SQUAMOUS #/AREA URNS HPF: ABNORMAL /HPF
TROPONIN T SERPL HS-MCNC: <6 NG/L
UROBILINOGEN UR QL STRIP: ABNORMAL
WBC # UR STRIP: ABNORMAL /HPF
WBC NRBC COR # BLD: 6.22 10*3/MM3 (ref 3.4–10.8)
WHOLE BLOOD HOLD COAG: NORMAL
WHOLE BLOOD HOLD SPECIMEN: NORMAL

## 2023-06-08 PROCEDURE — 80053 COMPREHEN METABOLIC PANEL: CPT | Performed by: INTERNAL MEDICINE

## 2023-06-08 PROCEDURE — 74177 CT ABD & PELVIS W/CONTRAST: CPT

## 2023-06-08 PROCEDURE — 96374 THER/PROPH/DIAG INJ IV PUSH: CPT

## 2023-06-08 PROCEDURE — 74018 RADEX ABDOMEN 1 VIEW: CPT

## 2023-06-08 PROCEDURE — 25010000002 DIAZEPAM PER 5 MG: Performed by: INTERNAL MEDICINE

## 2023-06-08 PROCEDURE — 25510000001 IOPAMIDOL PER 1 ML: Performed by: STUDENT IN AN ORGANIZED HEALTH CARE EDUCATION/TRAINING PROGRAM

## 2023-06-08 PROCEDURE — 96375 TX/PRO/DX INJ NEW DRUG ADDON: CPT

## 2023-06-08 PROCEDURE — 71045 X-RAY EXAM CHEST 1 VIEW: CPT

## 2023-06-08 PROCEDURE — 99284 EMERGENCY DEPT VISIT MOD MDM: CPT

## 2023-06-08 PROCEDURE — 93010 ELECTROCARDIOGRAM REPORT: CPT | Performed by: INTERNAL MEDICINE

## 2023-06-08 PROCEDURE — 81001 URINALYSIS AUTO W/SCOPE: CPT | Performed by: INTERNAL MEDICINE

## 2023-06-08 PROCEDURE — 85025 COMPLETE CBC W/AUTO DIFF WBC: CPT | Performed by: INTERNAL MEDICINE

## 2023-06-08 PROCEDURE — 93005 ELECTROCARDIOGRAM TRACING: CPT | Performed by: INTERNAL MEDICINE

## 2023-06-08 PROCEDURE — 71275 CT ANGIOGRAPHY CHEST: CPT

## 2023-06-08 PROCEDURE — 85379 FIBRIN DEGRADATION QUANT: CPT | Performed by: INTERNAL MEDICINE

## 2023-06-08 PROCEDURE — 84484 ASSAY OF TROPONIN QUANT: CPT | Performed by: INTERNAL MEDICINE

## 2023-06-08 PROCEDURE — 83880 ASSAY OF NATRIURETIC PEPTIDE: CPT | Performed by: INTERNAL MEDICINE

## 2023-06-08 PROCEDURE — 36415 COLL VENOUS BLD VENIPUNCTURE: CPT

## 2023-06-08 PROCEDURE — 25010000002 MORPHINE PER 10 MG: Performed by: INTERNAL MEDICINE

## 2023-06-08 RX ORDER — TIZANIDINE 4 MG/1
4 TABLET ORAL ONCE
Status: DISCONTINUED | OUTPATIENT
Start: 2023-06-08 | End: 2023-06-09 | Stop reason: HOSPADM

## 2023-06-08 RX ORDER — SODIUM CHLORIDE 0.9 % (FLUSH) 0.9 %
10 SYRINGE (ML) INJECTION AS NEEDED
Status: DISCONTINUED | OUTPATIENT
Start: 2023-06-08 | End: 2023-06-09 | Stop reason: HOSPADM

## 2023-06-08 RX ORDER — DIAZEPAM 5 MG/ML
2.5 INJECTION, SOLUTION INTRAMUSCULAR; INTRAVENOUS ONCE
Status: COMPLETED | OUTPATIENT
Start: 2023-06-08 | End: 2023-06-08

## 2023-06-08 RX ORDER — MORPHINE SULFATE 2 MG/ML
2 INJECTION, SOLUTION INTRAMUSCULAR; INTRAVENOUS ONCE
Status: COMPLETED | OUTPATIENT
Start: 2023-06-08 | End: 2023-06-08

## 2023-06-08 RX ADMIN — MORPHINE SULFATE 2 MG: 2 INJECTION, SOLUTION INTRAMUSCULAR; INTRAVENOUS at 21:23

## 2023-06-08 RX ADMIN — DIAZEPAM 2.5 MG: 5 INJECTION, SOLUTION INTRAMUSCULAR; INTRAVENOUS at 22:43

## 2023-06-08 RX ADMIN — IOPAMIDOL 100 ML: 755 INJECTION, SOLUTION INTRAVENOUS at 23:53

## 2023-06-09 ENCOUNTER — CONSULT (OUTPATIENT)
Dept: RADIATION ONCOLOGY | Facility: HOSPITAL | Age: 68
End: 2023-06-09
Payer: MEDICARE

## 2023-06-09 VITALS
HEART RATE: 104 BPM | OXYGEN SATURATION: 97 % | DIASTOLIC BLOOD PRESSURE: 92 MMHG | WEIGHT: 221 LBS | BODY MASS INDEX: 37.73 KG/M2 | HEIGHT: 64 IN | SYSTOLIC BLOOD PRESSURE: 150 MMHG

## 2023-06-09 VITALS
TEMPERATURE: 98.4 F | HEART RATE: 89 BPM | RESPIRATION RATE: 16 BRPM | OXYGEN SATURATION: 99 % | WEIGHT: 224 LBS | DIASTOLIC BLOOD PRESSURE: 83 MMHG | HEIGHT: 64 IN | BODY MASS INDEX: 38.24 KG/M2 | SYSTOLIC BLOOD PRESSURE: 131 MMHG

## 2023-06-09 DIAGNOSIS — Z87.891 FORMER SMOKER: ICD-10-CM

## 2023-06-09 DIAGNOSIS — C34.11 MALIGNANT NEOPLASM OF UPPER LOBE OF RIGHT LUNG: Primary | ICD-10-CM

## 2023-06-09 LAB
HOLD SPECIMEN: NORMAL
QT INTERVAL: 364 MS
QTC INTERVAL: 442 MS

## 2023-06-09 PROCEDURE — 96376 TX/PRO/DX INJ SAME DRUG ADON: CPT

## 2023-06-09 PROCEDURE — 25010000002 MORPHINE PER 10 MG: Performed by: STUDENT IN AN ORGANIZED HEALTH CARE EDUCATION/TRAINING PROGRAM

## 2023-06-09 RX ORDER — LIDOCAINE 50 MG/G
1 PATCH TOPICAL
Status: DISCONTINUED | OUTPATIENT
Start: 2023-06-09 | End: 2023-06-09 | Stop reason: HOSPADM

## 2023-06-09 RX ORDER — METHYLPREDNISOLONE 4 MG/1
TABLET ORAL
Qty: 1 EACH | Refills: 0 | Status: SHIPPED | OUTPATIENT
Start: 2023-06-09

## 2023-06-09 RX ORDER — LIDOCAINE 50 MG/G
1 PATCH TOPICAL EVERY 24 HOURS
Qty: 7 PATCH | Refills: 0 | Status: SHIPPED | OUTPATIENT
Start: 2023-06-09 | End: 2023-06-16

## 2023-06-09 RX ORDER — ROPINIROLE 1 MG/1
1 TABLET, FILM COATED ORAL ONCE
Status: COMPLETED | OUTPATIENT
Start: 2023-06-09 | End: 2023-06-09

## 2023-06-09 RX ADMIN — MORPHINE SULFATE 4 MG: 4 INJECTION, SOLUTION INTRAMUSCULAR; INTRAVENOUS at 02:52

## 2023-06-09 RX ADMIN — ROPINIROLE HYDROCHLORIDE 1 MG: 1 TABLET, FILM COATED ORAL at 01:47

## 2023-06-09 NOTE — PROGRESS NOTES
[facility] MD      Gagan Stewart, APRN  ____________________________________________________________               [facility]  Department of Radiation Oncology  [address] [place] [id number]  Office:  [phone]  Fax: [phone]    DATE: 06/09/2023  PATIENT: Melly Cruz 1955  Medical record #: [id number]                                                    REASON FOR CONSULTATION:   Chief Complaint   Patient presents with    Lung Cancer     Melly Cruz is a 67 y.o. female that has been referred to our clinic to be evaluated for consideration of radiotherapy to the lung. Reports SOB. Denies activity change, appetite change, unexpected weight change, nasuea/vomiitng, diarrhea, light-headedness, weakness, and headaches. She follows .           HISTORY OF PRESENT ILLNESS    01/21/2018 - CT Chest with contrast:  No acute findings.  10-11 mm ill-defined nodule in the right apex, similar to 2017 but new from 2010. While this could represent postinflammatory scarring (related to pneumonia in October 2017) underlying malignancy is not excluded. Further evaluation with PET/CT or short interval 3 month follow-up CT could be obtained.    03/30/2018 - CT Angio Chest:  No evidence of pulmonary embolus.   1.2 cm subtle solid nodule in the right lung apex. This is immediately adjacent to subpleural scarring at the apex and may reflect scarring/post inflammatory change. Recommend three-month follow-up low dose CT to evaluate for stability. This would correlate with a Lung Rads category 4A lesion.   Mild to moderate underlying lung emphysema.     09/01/2018 - CT Angio Chest:  No pulmonary emboli.   Stable 1.2 cm right apical lung nodule, similar to the 3/30/2018 exam. Repeat 6 month follow-up CT chest recommended.   Emphysema. Basilar atelectasis.   Cardiomegaly. Vascular crowding.     10/04/2018 - PET Scan:  No significant uptake in the 11  mm right apical subsolid nodule which is stable in size for one year. While a benign/indolent process such as scar is possible, recommend yearly follow-up CTs given its subsolid appearance.     04/22/2019 - CT Chest with contrast:  Subsolid nodule in the right apex is unchanged from October 2017 and showed no significant metabolic activity on October 2018 PET. The nodule has developed from 2010. Focal benign scarring is most likely. May continue annual surveillance to ensure stability.    04/29/2019 - CT Angio Chest:  No evidence of pulmonary embolus.  Stable right upper lobe groundglass opacity compared to 10/11/2017. Recommend follow-up CT chest in one year or per pulmonology.  Coronary artery calcifications.    03/01/2020 - CT Abdomen/Pelvis with contrast:  There is a stable 1.7 cm enhancing lesion in the right hepatic lobe of the liver. It is stable compared back to 3/30/2018 although very difficult to see on that study due to the phase of contrast injection. This may represent an atypical hemangioma. Focal nodular hyperplasia is in the differential. Given relative stability, a more aggressive lesion is felt to be less likely.   Diffuse fatty infiltration of the liver.   Status post cholecystectomy and hysterectomy.   Small renal lesions on the right are probably cysts. The 9 mm lesion is too small to fully characterize. The 1.3 cm lesion measures fluid density with Hounsfield unit measurement.   Normal appendix. Diverticulosis of the colon. Other nonacute findings, as discussed above.     06/16/2020 - CT Chest with contrast:  New 0.9 cm lingula pleural-based pulmonary nodule versus atelectasis. This previously appeared more triangular on 4/29/2019. Therefore, recommend follow-up CT chest in 3 months.  Stable right upper lobe groundglass opacity measuring 1.5 cm, stable compared to 10/11/2017 considering differences in technique    06/30/2020 - PET Scan:  There is no abnormal FDG activity associated with the  small new nodule in the lingula, which is probably related to focal scarring or atelectasis. There is no abnormal FDG activity associated with the 15 mm groundglass nodule in the right upper lobe.  There are a few normal sized lymph nodes in the inferior axilla bilaterally, with low level FDG avidity, these may be reactive lymph nodes. Correlation with physical examination of both axilla is suggested. No measurable axillary or intrathoracic lymphadenopathy is identified.  No evidence of active neoplasm is identified.    03/09/2021 - CT Angio Chest:  No CT angiographic evidence of pulmonary embolus or acute aortic pathology.   Patchy airspace and linear opacities posterior right upper lobe more conspicuous compared to the previous examination. Acute on chronic infectious/inflammatory change considered. Neoplasm difficult to exclude. Correlate with patient presentation. Follow-up recommended.   5 mm pleural-based pulmonary nodule left lower lobe. Follow-up recommended.   Chronic lung changes, Remote granulomatous disease and pulmonary emphysema.   Hepatic steatosis.     12/08/2021 - CT Chest with contrast:  Stable pulmonary nodules within the lungs from the previous study with no new lesions present. Given the lack of change over a more than two-year period of time I feel benignity would BE suggested. No new lung lesions are present. There are changes of centrilobular emphysema.  Cardiomegaly with mild coronary calcifications present. The thoracic aorta is normal in caliber.  Fat-containing lesion within the upper left breast. This may be postoperative in nature versus posttraumatic fat necrosis versus a fatty containing mass. No overlying skin changes are observed.  Steatosis of the liver. A moderate size hiatal hernia is present.    12/07/2022 - CT Chest with contrast:  Previously described groundglass opacity within the right upper lobe shows interval increase in size is now more elongate in appearance with  increased soft tissue component. It does demonstrate linear extension to the pleural surface with some associated volume loss suggesting associated parenchymal scarring. This may simply represent some progressive scarring but given the change from the previous exam I would suggest PET/CT for further characterization. Additional previously described nodules are stable. There are changes of centrilobular emphysema.  No developing mediastinal or axillary lymphadenopathy.  Fat necrosis within the left breast.  Moderate size hiatal hernia.  Steatosis of the liver with suspected focal fatty sparing within the periphery of the right lobe of liver.    01/24/2023 - CT Chest Angiogram:  No evidence of pulmonary embolus.  Moderate emphysema. No consolidation, pleural effusion, or pneumothorax.  Continued increase in size of 2.5 cm part solid RIGHT upper lobe pulmonary nodule. This remains concerning for a primary lung neoplasm. Differential diagnosis also includes infectious or inflammatory process. Recommend PET CT for further evaluation.  Small to moderate size hiatal hernia.  Hepatic steatosis.    01/25/2023 - CT Abdomen/Pelvis without contrast:  Mild acute uncomplicated sigmoid diverticulitis.  Hepatic steatosis.    03/29/2023 - Appointment with :  Medical decision making/Recommendations/Plan:  I think at this point a malignancy certainly is high in her family's history.  In the differential, I would say certainly elevated in the higher key of a list for nodular finding.  She has no recent history of pneumonia.  I believe a PET scan is an order at this time.  Additionally, we will obtain pulmonary function test pre and post bronchodilator with DLCO and EBG.  I will have her come back to see me in 3 weeks to discuss the findings of those results and make further recommendations at that time.  She has been congratulated as to being a non-smoker.     03/17/2023 - PET Scan:  Slowly enlarging hypermetabolic 3.3 x 1.0 x  2.9 cm subsolid RIGHT upper lobe lung mass. This is highly suspicious for a primary lung neoplasm, favoring adenocarcinoma.  No evidence of metastatic disease.  Focal nodular hypermetabolic activity in the sigmoid colon. Differential diagnosis includes focal inflamed diverticulum versus underlying polyp or neoplasm. Recommend correlation with colonoscopy.    03/21/2023 - CT Angio Chest:  A limited diagnostic study due to poor opacification of the pulmonary arteries. No significant or large vessel embolus is noted. The smaller subsegmental lesion/emboli may not be evaluated or excluded.  Chronic emphysematous lung changes.  An enlarging mass in the right upper lobe. Of    03/29/2023 - Appointment with :  Medical decision making/Recommendations/Plan:  At this point, from a straightforward presence of a increasing right upper lobe lung lesion, no adenopathy, she certainly would be a candidate for resection based on pulmonary function tests with wedge resection and then if positive for lung cancer to proceed with lobectomy and mediastinal lymph node sampling.  Problematic is the following: She has had at least five different events of pulmonary emboli and she has also had several bouts of bleeding.  I have proposed two scenarios, the first options to proceed with a IVC filter placed prior to thoracic surgery and then to proceed with surgery as discussed.  The alternative option would be to proceed with a biopsy and sampling of the mediastinum as indicated with EBUS and navigational bronchoscopy.  Discussed that we have partnered with Dr. Mednoza and Dr. Dove for access.  Then would favor radiation then of this lesion.  Certainly, this would avoid the risks of bleeding complications as well as multiple pulmonary emboli.  We discussed the pros and cons of each option at large.  Melly Cruz will be added to the tumor conference list to be presented.  I have answered all questions to the best of my ability.  She  favors to proceed forward with the second option, therefore, we will make a referral to Dr. Mendoza and Dr. Dove for diagnosis and laurie sampling.  After which we will plan to refer to Dr. Jerry for his opinion.  Certainly, we could reconsider if the bulk of a clinical conference and Dr. Jerry's input that the proposed currently agreed option would be suboptimal.  Certainly, agree that this is not the standard first answer, but in light of this lady's comorbidities, perhaps may be a very reasonable answer.  She remains asymptomatic from a lung nodule point of view.  She is a non-smoker.  I will continue to keep you apprised of care as it ensues.    04/04/2023 - CT Angio Chest:  No CT evidence of pulmonary embolus. Main pulmonary artery segment borderline dilated at 3 cm. Atheromatous disease of the thoracic aorta and coronary arteries. Cardiomegaly.  New lung infiltrates bilaterally most likely due to pneumonia. This is most dense in the left upper lobe.  A mass in the right lung apex is again noted. This mass is PET positive and worrisome for primary lung carcinoma.  Stable small nodule in the right middle lobe laterally.  Indeterminate small right renal lesion measuring 1.6 cm with a Hounsfield unit measurement of 48.  Fatty infiltration of the liver.  Moderate size hiatal hernia.    05/16/2023 - Appointment with Ernesto Marsh Chi, MD - [facility] Pulmonary: Assessment and Plan  Melly Ritter is a 67 y.o. female with a history of prior tobacco use and family history of lung cancer referred for a growing right upper lobe nodule.  Based on her risk factors including age, prior tobacco use and family history of cancer, this growing right upper lobe nodule in a background of emphysema is highly suspicious for primary lung cancer. She warrants both bronchoscopy for staging of her mediastinum and hilum as well as an attempt to sample the lesion. We discussed this with her at length in clinic she  would like to proceed with bronchoscopy. We will obtain an [facility] protocol CT chest today to help with procedural planning and then schedule her for next availability for bronchoscopy with linear and radial EBUS with plans to sample the lesion itself as well as proceed with mediastinal staging.  Recommendations:  - we will schedule for bronchoscopy with flexible + robotic bronchoscopy and linear/radial EBUS  -- will plan to stage mediastinum and also sample lesion  We will plan to see the patient back TBD based on above.  Patient was seen with Dr. Marsh.    05/16/2023 - CT Chest without contrast - Fink:  No significant interval change in spiculated, predominantly solid right upper lobe lesion compared to recent CT dated 04/04/2023, which is concerning for primary lung cancer.   Interval resolution of left upper lobe and bilateral lower lobe groundglass opacities, in keeping with pneumonia.     05/16/2023 - PFTs - Fink:  Interpretation:   Breathing room air, SpO2 is adequate at rest and during exercise sufficient to increase pulse from 84 to 116 b/min, SpO2 falls but remains normoxemic .   On this basis, SpO2 is adequate at rest breathing room air and while walking breathing room air.   This level of exercise is associated with no significant change of FEV1.       05/31/2023 - Bronchoscopy with biopsy:   Lung, right upper lobe nodule, endobronchial ultrasound-guided fluroscopy assisted fine needle aspiration:   Non-small cell carcinoma, favor adenocarcinoma (see comment)   Comments: The aspirate smears and the cell block show rare clusters of malignant epithelial cells.  Immunohistochemical stains performed on the cell block (single antibody procedures with appropriately reactive controls) show the malignant cells to be positive for TTF-1 and Napsin A, and to be negative for p40.  These findings are compatible with nons-small cell carcinoma, and favor primary lung adenocarcinoma.  There are too few cells for  ancillary moleclular testing.   Lung, right upper lobe nodule, endobronchial biopsy:   Non-diagnostic for mass forming lesion; blood and fibrin only     06/01/2023 - Documentation by Brandy Castillo:  Outside records reviewed with [facility]rick bronch performed yesterday and results are non diagnostic. Dr. Thompson presented case at tumor conference today and discussed with Dr. Delarosa.   Called patient to discuss options of surgical resection with IVC filter placed prior to due to her history of PE and GI bleeding or referral to radiation therapy for stereotatic radiation to lung lesion.   She was advised if she chooses radiation therapy that would be without a tissue diagnosis.   She is eager to proceed with Nissen fundoplication surgery. Ultimately, she wishes to proceed with radiation therapy referral. This has been placed per her request.   She will call back with questions if they arise.      06/07/2023 - Appointment with :  RUL Lung Mass  3.3 x 1.0 x 2.9 cm RIGHT upper lobe lung mass, SUV 4.1 on PET 3/17/2023  known right lung nodule, being followed outpatient by Dr. Agusto Thompson and ENOC Rose with the cardiothoracic surgery department at [facility].  She was last seen by Dr. Agusto Thompson on 12/7/2022  PET scan 3/17/2023:  Slowly enlarging hypermetabolic 3.3 x 1.0 x 2.9 cm subsolid RIGHT upper lobe lung mass, SUV 4.1. This is highly suspicious for a primary lung neoplasm, favoring adenocarcinoma.  No evidence of metastatic disease.  Focal nodular hypermetabolic activity in the sigmoid colon, SUV 4.4. Differential diagnosis includes focal inflamed diverticulum versus underlying polyp or neoplasm. Recommend correlation with colonoscopy.  CTA chest 4/4/2023:  No CT evidence of pulmonary embolus. Main pulmonary artery segment borderline dilated at 3 cm. Atheromatous disease of the thoracic aorta and coronary arteries. Cardiomegaly.  New lung infiltrates bilaterally most likely due to pneumonia. This is most  dense in the left upper lobe.  A mass in the right lung apex is again noted. This mass is PET positive and worrisome for primary lung carcinoma.  Stable small nodule in the right middle lobe laterally.  Indeterminate small right renal lesion measuring 1.6 cm with a Hounsfield unit measurement of 48.  Fatty infiltration of the liver.  Moderate size hiatal hernia.  On 3/29/2023, Dr. Thompson's office attempted to refer Melly to Dr. Mendoza or Dr. Dove for EBUS, possible navigational bronchoscopy to obtain a tissue diagnosis. Unfortunately they are out of network for Mrs. Cruz's insurance.  Melly came to the the ED at [facility] on 4/4/2023 with a history of mid epigastric and substernal chest pain, pleuritic in nature with increasing dyspnea, temperature elevation to 100.6 °F, tachycardia and hypoxia. She was found to have bilateral pneumonia. She was admitted for management.  CBC today 6/7/2023 reveals a WBC of 4.72. Hgb is 11.6 with an MCV of 87.9 and platelet count of 273,000.  RECOMMEND:  Dr. Delarosa recommends referral to Dr. Ole Esquivel at [facility] for navigational bronchoscopy.  ENOC Callejas with Dr. Agusto Thompson made a referral to Dr. Ole Esquivel at [facility] for navigational bronchoscopy on 4/7/2023.  There were difficulties getting her set up for navigational bronchoscopy in [place] and therefore alternative arrangements were made in [place].  Call received from Dr Thompson regarding patient's recent bronch at [facility] on 5/31/23 was non diagnostic:  Lung, right upper lobe nodule, endobronchial biopsy 5/31/23 per Zechariah Piña MD at [facility]  - Non-diagnostic for mass forming lesion; blood and fibrin only  Plan moving forward , Dr Thompson contemplates a diagnostic and therapeutic resection of the lung nodule. After discussion with Melly, she has chosen empiric SBRT to the lung nodule rather than an intervention given her other comorbid associated  medical problems.  Melly has an appointment with Dr. Jeff Jerry on 6/9/2023 for SBRT to the RUL lung nodule.  A call was placed and I spoke to Dr. Thompson regarding all the above. We are all in accord with the patient's decision.  I will see her back in follow-up in 3 months for continued monitoring and review of the other heme-onc associated problems.  Return in about 3 months (around 9/7/2023) for Follow up with Dr. Delarosa.      History obtained from  PATIENT, FAMILY, and CHART    PAST MEDICAL HISTORY  Past Medical History:   Diagnosis Date    Acid reflux     Anemia     Anxiety     Arthritis     Asthma     Mild COPD    Curtis esophagus     Bleeding disorder     Reflux acid overload    Bunion Aug    Repaired with surgery    Cancer 03/2023    Waiting on Biopsy to determine what kind and what stage    Chest pain     Chronic back pain     Chronic neck pain     Clotting disorder     Stomach bleeds from acid    COPD (chronic obstructive pulmonary disease)     Difficulty walking Dec 2022    After surgery wslking has become more and more difficult    DVT (deep venous thrombosis)     GI bleed     Hammer toe Aug 22    Repaired surgery    Hypothyroid     Hypothyroidism     Low back pain     Lung cancer     Migraine     PE (pulmonary thromboembolism)     Plantar fasciitis Years ago    Renal lesion 04/05/2023    Shin splints Years ago      PAST SURGICAL HISTORY  Past Surgical History:   Procedure Laterality Date    BUNIONECTOMY      CHOLECYSTECTOMY      COLONOSCOPY      CORRECTION HAMMER TOE      DILATATION AND CURETTAGE      FOOT FUSION Left 04/20/2022    Procedure: 1-2 Arthrodesis, Tarsal Metatarsal Joint 2 and 3 Arthrodesis;  Surgeon: Bud Maradiaga DPM;  Location:  [facility] OR;  Service: Podiatry;  Laterality: Left;    HAMMER TOE REPAIR Left 04/20/2022    Procedure: Hammertoe Repair 2-5,;  Surgeon: Bud Maradiaga DPM;  Location:  [facility] OR;  Service: Podiatry;  Laterality: Left;    HERNIA REPAIR      HYSTERECTOMY       JOINT REPLACEMENT      OOPHORECTOMY      REDUCTION MAMMAPLASTY  2021    REPLACEMENT TOTAL KNEE      Right knee partial, left knee      FAMILY HISTORY  family history includes Breast cancer in her paternal aunt; Cancer in her father and mother; Diabetes in her father; Heart disease in her mother; Osteoporosis in her mother; Thyroid disease in her father, maternal aunt, maternal uncle, mother, and sister.    SOCIAL HISTORY  Social History     Tobacco Use    Smoking status: Former     Packs/day: 1.50     Years: 46.00     Pack years: 69.00     Types: Cigarettes     Start date: 1972     Quit date: 2018     Years since quittin.1     Passive exposure: Past    Smokeless tobacco: Never    Tobacco comments:     Pt started at age 17, then quit at age 31. Pt restarted smoking at age 32-35 and quit smoking in 2018   Vaping Use    Vaping Use: Never used   Substance Use Topics    Alcohol use: Not Currently     Alcohol/week: 1.0 standard drink     Types: 1 Drinks containing 0.5 oz of alcohol per week     Comment: A chocolate drink made with moonshine and creamer    Drug use: No      ALLERGIES  Erythromycin, Guaifenesin, Hydromorphone, Hydromorphone hcl, Ondansetron hcl, Orphenadrine, Orphenadrine citrate, Codeine, Meperidine, Guaifenesin & derivatives, and Guaifenesin er   MEDICATIONS  Current Outpatient Medications   Medication Sig Dispense Refill    albuterol (PROVENTIL HFA;VENTOLIN HFA) 108 (90 Base) MCG/ACT inhaler Inhale 2 puffs 4 (Four) Times a Day. 6.7 g 0    aluminum-magnesium hydroxide-simethicone (MAALOX MAX) 400-400-40 MG/5ML suspension Take 30 mL by mouth Every 6 (Six) Hours As Needed for Indigestion or Heartburn.      budesonide (Pulmicort Flexhaler) 90 MCG/ACT inhaler Inhale 2 puffs 2 (Two) Times a Day. Swallow, do not inhale  11    buPROPion XL (WELLBUTRIN XL) 300 MG 24 hr tablet Take 1 tablet by mouth Every Morning.      Cyanocobalamin (VITAMIN B-12 ER) 1000 MCG tablet controlled-release Take 1,000  mcg by mouth Daily.      cyclobenzaprine (FLEXERIL) 10 MG tablet Take 1 tablet by mouth 3 (Three) Times a Day As Needed.      diclofenac (VOLTAREN) 50 MG EC tablet Take 1 tablet by mouth 2 (Two) Times a Day As Needed (pain). 60 tablet 0    EPINEPHrine (EPIPEN) 0.3 MG/0.3ML solution auto-injector injection       esomeprazole (nexIUM) 40 MG capsule Take 2 capsules by mouth Every Morning Before Breakfast. OTC      fluticasone (FLONASE) 50 MCG/ACT nasal spray 2 sprays into the nostril(s) as directed by provider Daily.      folic acid (FOLVITE) 1 MG tablet Take 1 tablet by mouth Daily.      HYDROcodone-acetaminophen (NORCO) 7.5-325 MG per tablet Take 1 tablet by mouth Every 8 (Eight) Hours As Needed.      levothyroxine (SYNTHROID, LEVOTHROID) 112 MCG tablet Take 1 tablet by mouth Daily.      lidocaine (LIDODERM) 5 % Place 1 patch on the skin as directed by provider Daily for 7 days. Remove & Discard patch within 12 hours or as directed by MD 7 patch 0    lidocaine (XYLOCAINE) 5 % ointment Apply 1 application topically to the appropriate area as directed Every 2 (Two) Hours As Needed for Mild Pain. (Patient taking differently: Apply 1 application topically to the appropriate area as directed Every 2 (Two) Hours As Needed for Mild Pain. USES ON FOOT) 2500 g 5    methylPREDNISolone (MEDROL) 4 MG dose pack Take as directed on package instructions. 1 each 0    metoclopramide (REGLAN) 5 MG tablet Take 1 tablet by mouth 3 (Three) Times a Day As Needed (nausea and vomiting). 10 tablet 0    nitroglycerin (NITROSTAT) 0.4 MG SL tablet Place 1 tablet under the tongue Every 5 (Five) Minutes As Needed for Chest Pain. Take no more than 3 doses in 15 minutes.      pramipexole (MIRAPEX) 1 MG tablet Take 1 tablet by mouth 2 (Two) Times a Day.      pregabalin (LYRICA) 75 MG capsule Take 1 capsule by mouth 2 (Two) Times a Day.      Semaglutide, 1 MG/DOSE, (Ozempic, 1 MG/DOSE,) 4 MG/3ML solution pen-injector Inject 1 mg under the skin into  "the appropriate area as directed 1 (One) Time Per Week. Friday      sucralfate (CARAFATE) 1 GM/10ML suspension Take 10 mL by mouth 4 (Four) Times a Day Before Meals & at Bedtime. 414 mL 0    tiZANidine (ZANAFLEX) 4 MG tablet Take 2-4 mg by mouth At Night As Needed for Muscle Spasms.      vitamin D (ERGOCALCIFEROL) 48500 units capsule capsule Take 1 capsule by mouth Every 7 (Seven) Days. SUNDAY 6     No current facility-administered medications for this visit.     The following portions of the patient's history were reviewed and updated as appropriate: allergies, current medications, past family history, past medical history, past social history, past surgical history and problem list.    REVIEW OF SYSTEMS  Review of Systems   Constitutional: Negative.  Negative for appetite change and fatigue.   HENT:  Negative.     Respiratory:  Positive for shortness of breath. Negative for cough and hemoptysis.    Cardiovascular: Negative.  Negative for chest pain.   Gastrointestinal: Negative.    Genitourinary: Negative.     Musculoskeletal: Negative.    Skin: Negative.    Neurological: Negative.  Negative for dizziness and headaches.   Hematological: Negative.  Negative for adenopathy.   Psychiatric/Behavioral: Negative.     All other systems reviewed and are negative.  I have reviewed and confirmed the accuracy of the ROS as documented by the MA/LPN/RN Jeff Jerry III, MD    PHYSICAL EXAM  VITAL SIGNS:   Vitals:    06/09/23 1417   BP: 150/92   Pulse: 104   SpO2: 97%  Comment: room air   Weight: 100 kg (221 lb)   Height: 162.6 cm (64\")   PainSc: 7  Comment: 8 with movement   PainLoc: Back  Comment: right upper       Physical Exam      Performance Status: ECOG (0) Fully active, able to carry on all predisease performance without restriction    Clinical Quality Measures  -Pain Documented by Standardized Tool, FPS Melly Cruz reports a pain score of 7. Given her pain assessment as noted, treatment options were " discussed and the following options were decided upon as a follow-up plan to address the patient's pain: continuation of current treatment plan for pain and use of non-medical modalities (ice, heat, stretching and/or behavior modifications).  Pain Medications               buPROPion XL (WELLBUTRIN XL) 300 MG 24 hr tablet Take 1 tablet by mouth Every Morning.    cyclobenzaprine (FLEXERIL) 10 MG tablet Take 1 tablet by mouth 3 (Three) Times a Day As Needed.    diclofenac (VOLTAREN) 50 MG EC tablet Take 1 tablet by mouth 2 (Two) Times a Day As Needed (pain).    HYDROcodone-acetaminophen (NORCO) 7.5-325 MG per tablet Take 1 tablet by mouth Every 8 (Eight) Hours As Needed.    methylPREDNISolone (MEDROL) 4 MG dose pack Take as directed on package instructions.    pregabalin (LYRICA) 75 MG capsule Take 1 capsule by mouth 2 (Two) Times a Day.    tiZANidine (ZANAFLEX) 4 MG tablet Take 2-4 mg by mouth At Night As Needed for Muscle Spasms.          -Advanced Care Planning Advance Care Planning   ACP discussion was held with the patient during this visit. Patient does not have an advance directive, information provided.     -Body Mass Index Screening and Follow-Up Plan Class 2 Severe Obesity (BMI >=35 and <=39.9). Obesity-related health conditions include the following: none.     -Tobacco Use: Screening and Cessation Intervention Social History    Tobacco Use      Smoking status: Former        Packs/day: 1.50        Years: 46.00        Pack years: 69        Types: Cigarettes        Start date: 1972        Quit date: 2018        Years since quittin.1        Passive exposure: Past      Smokeless tobacco: Never      Tobacco comments: Pt started at age 17, then quit at age 31. Pt restarted smoking at age [age] and quit smoking in 2018    ASSESSMENT AND PLAN  1. Malignant neoplasm of upper lobe of right lung    2. Former smoker      No orders of the defined types were placed in this encounter.    RECOMMENDATIONS: Melly  Kwesi Cruz was diagnosed with stage IB (T2a, N0, cM0) adenocarcinoma of lung, RUL.     The indications and rationale of lung stereotactic/external beam radiation therapy according to the NCCN Guidelines has been discussed today. I have extensively reviewed the risks, benefits and alternatives of therapy with this diagnosis. The risks of radiation therapy includes but is not limited to radiation induced pulmonary fibrosis, progression of disease in spite of therapy with either local or systemic failure. I have seen, examined and reviewed this patient's medication list, appropriate labs and imaging studies as well as other physician notes. We discussed the goals and plans of care with the patient and family and answered all questions.     Pulmonary function tests completed on 05/16/2023.     Following this discussion and in consideration of the diagnostic data/evaluation of the patient, I recommended a course of stereotactic radiosurgery to the right upper lung nodule. Will simulate treatment fields today, 3D CT with MIPS to begin the planning process, final course pending. Continue ongoing management per primary care physician and other specialists. Thank you for allowing me to assist in this patients care.      There are no Patient Instructions on file for this visit.    Time Spent: I spent 54 minutes caring for Melly on this date of service. This time includes time spent by me in the following activities: preparing for the visit, reviewing tests, obtaining and/or reviewing a separately obtained history, performing a medically appropriate examination and/or evaluation, counseling and educating the patient/family/caregiver, ordering medications, tests, or procedures, referring and communicating with other health care professionals, documenting information in the medical record, independently interpreting results and communicating that information with the patient/family/caregiver and care coordination.   Jeff LEMUS  GRACE Jerry MD  06/09/2023

## 2023-06-09 NOTE — DISCHARGE INSTRUCTIONS
It was very nice to meet you, Melly. Thank you for allowing us to take care of you today at [facility].    Your evaluation today did not show any emergent findings or have any emergent indications for admission to the hospital.     Please understand that an ER evaluation is just the start of your evaluation. We will do what we can, but we are often unable to fully figure out what is causing your symptoms from one evaluation. Thus, our primary goal is to determine whether you need to be evaluated in the hospital or if it is safe for you to go home and see other doctors such as a primary care physician or a specialist on an outpatient basis.     Like we discussed, it is VERY IMPORTANT that you follow up with your primary care doctor (call them to set up an appointment) within the next few days or as soon as possible so that you can be re-evaluated for improvement in your symptoms or for any other questions.     A copy of your results should be included in your paperwork. If you were prescribed any medications, please take them as directed or call us back with any questions.    Please return to the emergency room within 12-48 hours if you experience fever, chills, chest pain or shortness of breath, pain with inspiration/expiration, pain that travels to your arms, neck or back, nausea, vomiting, severe headache, tearing pain in your chest, dizziness, feel as though you are about to pass out, have any worsening symptoms, or any other concerns.    *IMPORTANT INFORMATION REGARDING XRAYS AND CT SCANS*  Please keep in mind that at nighttime we do not have an in-house radiologist and use a tele-radiology service. This means that while they provide us with information on emergent findings or important acute findings, they may not report to us ALL of the other smaller, yet still important to know, findings that may be there on your imaging studies. While we will try our best to inform you about any incidental  findings or abnormal findings that require follow up, please follow up with your primary care provider to have your imaging studies reviewed formally and have any abnormal findings addressed.

## 2023-06-09 NOTE — ED PROVIDER NOTES
"Subjective   History of Present Illness  67-year-old female presents the emergency department with complaints of right-sided flank pain.  She was recently at a tertiary center, [place], for a lung biopsy.  She was diagnosed with a lung nodule.  She is following with our hematology and radiation oncology group.  She states she supposed to start radiation therapy soon.  She notes that the biopsy was inconclusive, but on her PET scan, it did show the possibility of adenocarcinoma.  The patient states since this procedure she has been coughing quite a bit.  She was recently admitted to our facility for what she calls \"double pneumonia.\"  She states she initially thought that she had fractured a rib due to cough, but went to her PCP office, and had a chest x-ray and this was negative.  The patient states that the pain is worsening.  It initially started 3 weeks ago.  She states that it is so uncomfortable that she is trying to sleep on the edge of her loveseat.  She does have home pain medication, but states that she has had to use so much in order to control her pain that she only has 4 pills left.  She notes no blood in her stool.  She notes no blood in her urine.  When we discussed the possibility of a kidney stone, she states that she does not believe so.  She has tried a muscle relaxer in between her pain medication and it has not helped.  The patient is tender to palpation in the T8 10 area on the right side.  On the skin, there is no noted rash.    3/9/23  PET scan:  IMPRESSION:     1.  Slowly enlarging hypermetabolic 3.3 x 1.0 x 2.9 cm subsolid RIGHT  upper lobe lung mass. This is highly suspicious for a primary lung  neoplasm, favoring adenocarcinoma.     2.  No evidence of metastatic disease.     3.  Focal nodular hypermetabolic activity in the sigmoid colon.  Differential diagnosis includes focal inflamed diverticulum versus  underlying polyp or neoplasm. Recommend correlation with colonoscopy.    Review of " "Systems   Respiratory:  Negative for cough and shortness of breath.    Cardiovascular:  Negative for chest pain and leg swelling.   Gastrointestinal:  Positive for abdominal distention. Negative for blood in stool, constipation and diarrhea.   Genitourinary:  Negative for dysuria, frequency and hematuria.   Musculoskeletal:  Positive for arthralgias and back pain.     Past Medical History:   Diagnosis Date   • Acid reflux    • Anemia    • Anxiety    • Arthritis    • Asthma     Mild COPD   • Curtis esophagus    • Bleeding disorder     Reflux acid overload   • Bunion Aug    Repaired with surgery   • Cancer 03/2023    Waiting on Biopsy to determine what kind and what stage   • Chest pain    • Chronic back pain    • Chronic neck pain    • Clotting disorder     Stomach bleeds from acid   • COPD (chronic obstructive pulmonary disease)    • Difficulty walking Dec 2022    After surgery wslking has become more and more difficult   • DVT (deep venous thrombosis)    • GI bleed    • Hammer toe Aug 22    Repaired surgery   • Hypothyroid    • Hypothyroidism    • Low back pain    • Lung cancer    • Migraine    • PE (pulmonary thromboembolism)    • Plantar fasciitis Years ago   • Renal lesion 04/05/2023   • Walter splints Years ago       Allergies   Allergen Reactions   • Erythromycin Swelling and Anaphylaxis     Throat swells   • Guaifenesin Anaphylaxis, Nausea And Vomiting and Swelling   • Hydromorphone Anaphylaxis and Other (See Comments)     \"Shock and/or Unconsciousness\"   • Hydromorphone Hcl Anaphylaxis and Other (See Comments)     Other reaction(s): Shock and/or Unconsciousness  Note: Anaphylaxis   • Ondansetron Hcl Other (See Comments)     Coded  Coded  Other reaction(s): Zofran  Note:  Allergic Reaction: Anaphylaxis   • Orphenadrine Anaphylaxis   • Orphenadrine Citrate Anaphylaxis   • Codeine Rash   • Meperidine Other (See Comments)     Makes her an angry person   • Guaifenesin & Derivatives Nausea And Vomiting   • " Guaifenesin Er Swelling     Throat swelling       Past Surgical History:   Procedure Laterality Date   • BUNIONECTOMY     • CHOLECYSTECTOMY     • COLONOSCOPY     • CORRECTION HAMMER TOE     • DILATATION AND CURETTAGE     • FOOT FUSION Left 2022    Procedure: 1-2 Arthrodesis, Tarsal Metatarsal Joint 2 and 3 Arthrodesis;  Surgeon: Bud Maradiaga DPM;  Location:  [facility] OR;  Service: Podiatry;  Laterality: Left;   • HAMMER TOE REPAIR Left 2022    Procedure: Hammertoe Repair 2-5,;  Surgeon: Bud Maradiaga DPM;  Location:  [facility] OR;  Service: Podiatry;  Laterality: Left;   • HERNIA REPAIR     • HYSTERECTOMY     • JOINT REPLACEMENT     • OOPHORECTOMY     • REDUCTION MAMMAPLASTY  2021   • REPLACEMENT TOTAL KNEE      Right knee partial, left knee       Family History   Problem Relation Age of Onset   • Breast cancer Paternal Aunt    • Heart disease Mother    • Osteoporosis Mother    • Thyroid disease Mother         Lukemia can’t remember which kind.   • Cancer Mother         Pacemaker at 90 years old.   • Cancer Father         Father’s entire family  from Cancer   • Diabetes Father    • Thyroid disease Father    • Thyroid disease Maternal Aunt         Blood disorder   • Thyroid disease Maternal Uncle    • Thyroid disease Sister         Thyroid   • Ovarian cancer Neg Hx    • Uterine cancer Neg Hx    • Colon cancer Neg Hx        Social History     Socioeconomic History   • Marital status:    Tobacco Use   • Smoking status: Former     Packs/day: 1.50     Years: 46.00     Pack years: 69.00     Types: Cigarettes     Start date: 1972     Quit date: 2018     Years since quittin.1     Passive exposure: Past   • Smokeless tobacco: Never   • Tobacco comments:     Pt started at age 17, then quit at age 31. Pt restarted smoking at age [age] and quit smoking in 2018   Vaping Use   • Vaping Use: Never used   Substance and Sexual Activity   • Alcohol use: Not Currently     Alcohol/week: 1.0  standard drink     Types: 1 Drinks containing 0.5 oz of alcohol per week     Comment: A chocolate drink made with moonshine and creamer   • Drug use: No   • Sexual activity: Yes     Partners: Male     Birth control/protection: Surgical           Objective   Physical Exam  Vitals reviewed.   Constitutional:       Appearance: She is ill-appearing.      Comments: The patient does appear to be in acute pain and has a difficult time rising from a lying to sitting position in the bed.   HENT:      Head: Normocephalic and atraumatic.      Right Ear: External ear normal.      Left Ear: External ear normal.      Nose: Nose normal.   Eyes:      General: No scleral icterus.     Conjunctiva/sclera: Conjunctivae normal.   Cardiovascular:      Rate and Rhythm: Normal rate and regular rhythm.      Heart sounds: Normal heart sounds.   Pulmonary:      Effort: Pulmonary effort is normal.      Breath sounds: Normal breath sounds.   Abdominal:      General: Bowel sounds are normal. There is no distension.      Palpations: Abdomen is soft.      Tenderness: There is no abdominal tenderness.   Musculoskeletal:         General: No swelling or tenderness.      Cervical back: Normal range of motion and neck supple.      Comments: The patient does have a palpable area around the T10 area on the right that is tender to palpation.   Skin:     General: Skin is warm and dry.      Findings: No erythema or rash.   Neurological:      Mental Status: She is alert and oriented to person, place, and time.      Cranial Nerves: No cranial nerve deficit.   Psychiatric:         Mood and Affect: Mood normal.         Behavior: Behavior normal.       Procedures           ED Course  ED Course as of 06/11/23 1920   Thu Jun 08, 2023 2158 Spoke with patient. On her left side, the pain is tolerable. When she lies on her back she states that the right side throbs like a tooth ache.  [AJ]   2224 DC. Zanaflex order and try IV Valium  [AJ]   9739 Slightly elevated  d-dimer. CTA order initiated.  [AJ]   Fri Jun 09, 2023   0233 Patient CT scan shows evidence of bronchitis with pneumonitis.  She is currently saturating well on room air.  CT scan of the abdomen does not show any acute abnormalities.  She does have some mild cardiomegaly.  We will have her follow-up with her primary care provider to have this followed up. [NP]      ED Course User Index  [AJ] Wendy Lawler DO  [NP] Giovany Quijano MD           Lab Results (last 24 hours)     ** No results found for the last 24 hours. **        CT Angiogram Chest   Final Result   1. No evidence of pulmonary embolism. No aortic aneurysm or dissection.   2. A spiculated lobulated oval nodule in the right upper lobe which   appears unchanged since the previous study. This may represent an   inflammatory or a neoplastic process. Further follow-up is suggested.   3. Resolution of the previously seen left upper lung inflammatory   process/infiltrate.   4. Small right basal pleural effusion was not present in the previous   study.       The above study was initially reviewed and reported by [facility]. I do not   find any discrepancies.                       This report was finalized on 06/09/2023 07:33 by Dr. Arsalan Narayan MD.      CT Abdomen Pelvis With Contrast   Final Result   1. No acute abnormality of the abdomen or pelvis, particularly, no   findings to explain patient's symptoms.   2. Incompletely evaluated low-density nodule in the right kidney. These   are not noted in the previous study due to lack of intravenous contrast   enhancement. A follow-up examination with renal mass protocol may be   obtained for further evaluation.   3. Diverticulosis of the distal colon. No evidence for diverticulitis.   4. A normal appendix. A prior cholecystectomy.                                       This report was finalized on 06/09/2023 07:15 by Dr. Arsalan Narayan MD.      XR Abdomen KUB   Final Result   1. A 3-4 mm right pelvic  calcification is a phlebolith on prior CT   01/25/2023.   2. No definite renal stones are appreciated.   3. Some of the right abdomen is not included on the study.           This report was finalized on 06/08/2023 21:41 by Dr. Brandon Fenton MD.      XR Chest 1 View   Final Result   No active disease is seen.           This report was finalized on 06/08/2023 21:18 by Dr. Brandon Fenton MD.                                        Medical Decision Making  Amount and/or Complexity of Data Reviewed  Labs: ordered.     Details: CBC/CMP/UA  Radiology: ordered.     Details: KUB and chest x-ray  ECG/medicine tests: ordered.    Risk  Prescription drug management.        Final diagnoses:   Pneumonitis   Bronchitis   Pleural effusion   Cardiomegaly       ED Disposition  ED Disposition     ED Disposition   Discharge    Condition   Stable    Comment   --             Reynaldo Jeter, DO  [address] [place] [id number]  [phone]    Call in 1 day  As needed, If symptoms worsen         Medication List      New Prescriptions    methylPREDNISolone 4 MG dose pack  Commonly known as: MEDROL  Take as directed on package instructions.        Changed    * lidocaine 5 % ointment  Commonly known as: XYLOCAINE  Apply 1 application topically to the appropriate area as directed Every 2 (Two) Hours As Needed for Mild Pain.  What changed: additional instructions     * lidocaine 5 %  Commonly known as: LIDODERM  Place 1 patch on the skin as directed by provider Daily for 7 days. Remove & Discard patch within 12 hours or as directed by MD  What changed: You were already taking a medication with the same name, and this prescription was added. Make sure you understand how and when to take each.         * This list has 2 medication(s) that are the same as other medications prescribed for you. Read the directions carefully, and ask your doctor or other care provider to review them with you.            Stop    levoFLOXacin 750 MG tablet  Commonly  known as: COLE           Where to Get Your Medications      These medications were sent to [id number] - [facility] [place] - [address] AT ACROSS FROM [address] - [phone] [facility] [phone]   [address] [facility] [address]    Phone: [phone]   · lidocaine 5 %  · methylPREDNISolone 4 MG dose pack            Wendy Lawler, DO  06/08/23 2123       Wendy Lawler, DO  06/11/23 1921

## 2023-06-27 ENCOUNTER — TELEPHONE (OUTPATIENT)
Dept: CARDIAC SURGERY | Facility: CLINIC | Age: 68
End: 2023-06-27

## 2023-06-27 NOTE — TELEPHONE ENCOUNTER
"Called patient to discuss. She reports shortness of breath and back pain. She has had 2 ER visits recently. She has seen PCP Dr. Jeter and reports he told her she may have a nerve pain.  There was mention of an MRI.  She is calling our office because she has seen Dr. Thompson previously and she is unsure who to call or what to do.  I asked her if she had talked with Dr. Delarosa yet and she said no.  I did review her ER records and it does appear a right pleural effusion was present and increasing.  We discussed going to the ER again, particularly given her shortness of breath but she declines this and says she was previously told there was \"nothing they could do\" and she feels they treat her as if she is seeking pain medication but says she is not.  She has seen Dr. Jerry and supposed to start radiation soon but has not notified him of recent events. Advised her to notify Dr. Delarosa and I would discuss with Dr. Thompson and call her back but again, we discussed ER evaluation. "

## 2023-06-27 NOTE — TELEPHONE ENCOUNTER
Per verbal instruction from Brandy STUBBS and Brandy STUBBS, called pt and appt sched for 6-28-23 at 1pm.  Pt voiced understanding/kahm

## 2023-06-27 NOTE — TELEPHONE ENCOUNTER
Caller: Melly Cruz    Relationship to patient: Self    Best call back number: [phone]     Chief complaint: PATIENT STATES THAT SHE HAS HAD UNBEARABLE BACK/SIDE PAIN THE LAST FEW MONTHS. ALL RECENT ER VISITS AND TESTING IN [facility].     Type of visit: FOLLOW UP    Requested date: ASAP     If rescheduling, when is the original appointment: NONE     Additional notes:PATIENT WOULD LIKE A CALL BACK TO SCHEDULE

## 2023-06-28 PROBLEM — E66.01 CLASS 2 SEVERE OBESITY WITH BODY MASS INDEX (BMI) OF 35 TO 39.9 WITH SERIOUS COMORBIDITY: Status: ACTIVE | Noted: 2023-06-28

## 2023-06-28 PROBLEM — E66.09 CLASS 2 OBESITY DUE TO EXCESS CALORIES WITH BODY MASS INDEX (BMI) OF 38.0 TO 38.9 IN ADULT: Status: RESOLVED | Noted: 2022-04-19 | Resolved: 2023-06-28

## 2023-06-28 PROBLEM — J90 PLEURAL EFFUSION, RIGHT: Status: ACTIVE | Noted: 2023-06-28

## 2023-06-28 PROBLEM — M54.9 BACK PAIN: Status: ACTIVE | Noted: 2023-06-28

## 2023-06-28 PROBLEM — E66.01 CLASS 2 SEVERE OBESITY DUE TO EXCESS CALORIES WITH SERIOUS COMORBIDITY AND BODY MASS INDEX (BMI) OF 35.0 TO 35.9 IN ADULT: Status: RESOLVED | Noted: 2022-08-18 | Resolved: 2023-06-28

## 2023-07-06 ENCOUNTER — HOSPITAL ENCOUNTER (OUTPATIENT)
Dept: RADIATION ONCOLOGY | Facility: HOSPITAL | Age: 68
Setting detail: RADIATION/ONCOLOGY SERIES
End: 2023-07-06
Payer: MEDICARE

## 2023-07-06 PROCEDURE — 77373 STRTCTC BDY RAD THER TX DLVR: CPT | Performed by: RADIOLOGY

## 2023-07-11 PROCEDURE — 77373 STRTCTC BDY RAD THER TX DLVR: CPT | Performed by: RADIOLOGY

## 2023-07-13 PROCEDURE — 77373 STRTCTC BDY RAD THER TX DLVR: CPT | Performed by: RADIOLOGY

## 2023-07-18 PROCEDURE — 77373 STRTCTC BDY RAD THER TX DLVR: CPT | Performed by: RADIOLOGY

## 2023-07-18 PROCEDURE — 77336 RADIATION PHYSICS CONSULT: CPT | Performed by: RADIOLOGY

## 2023-08-25 ENCOUNTER — APPOINTMENT (OUTPATIENT)
Dept: GENERAL RADIOLOGY | Facility: HOSPITAL | Age: 68
End: 2023-08-25
Payer: MEDICARE

## 2023-08-25 ENCOUNTER — HOSPITAL ENCOUNTER (EMERGENCY)
Facility: HOSPITAL | Age: 68
Discharge: HOME | End: 2023-08-25
Payer: MEDICARE

## 2023-08-25 VITALS
SYSTOLIC BLOOD PRESSURE: 149 MMHG | HEIGHT: 64 IN | DIASTOLIC BLOOD PRESSURE: 75 MMHG | RESPIRATION RATE: 18 BRPM | OXYGEN SATURATION: 93 % | WEIGHT: 204 LBS | HEART RATE: 62 BPM | TEMPERATURE: 98 F | BODY MASS INDEX: 34.83 KG/M2

## 2023-08-25 DIAGNOSIS — R07.81 RIB PAIN: Primary | ICD-10-CM

## 2023-08-25 DIAGNOSIS — Z85.118 HISTORY OF ADENOCARCINOMA OF LUNG: ICD-10-CM

## 2023-08-25 PROCEDURE — 96375 TX/PRO/DX INJ NEW DRUG ADDON: CPT

## 2023-08-25 PROCEDURE — 25010000002 MORPHINE PER 10 MG: Performed by: NURSE PRACTITIONER

## 2023-08-25 PROCEDURE — 99282 EMERGENCY DEPT VISIT SF MDM: CPT

## 2023-08-25 PROCEDURE — 71101 X-RAY EXAM UNILAT RIBS/CHEST: CPT

## 2023-08-25 PROCEDURE — 96374 THER/PROPH/DIAG INJ IV PUSH: CPT

## 2023-08-25 PROCEDURE — 25010000002 PROCHLORPERAZINE 10 MG/2ML SOLUTION: Performed by: NURSE PRACTITIONER

## 2023-08-25 RX ORDER — PROCHLORPERAZINE EDISYLATE 5 MG/ML
5 INJECTION INTRAMUSCULAR; INTRAVENOUS ONCE
Status: COMPLETED | OUTPATIENT
Start: 2023-08-25 | End: 2023-08-25

## 2023-08-25 RX ORDER — MORPHINE SULFATE 2 MG/ML
2 INJECTION, SOLUTION INTRAMUSCULAR; INTRAVENOUS ONCE
Status: COMPLETED | OUTPATIENT
Start: 2023-08-25 | End: 2023-08-25

## 2023-08-25 RX ADMIN — MORPHINE SULFATE 2 MG: 2 INJECTION, SOLUTION INTRAMUSCULAR; INTRAVENOUS at 19:11

## 2023-08-25 RX ADMIN — PROCHLORPERAZINE EDISYLATE 5 MG: 5 INJECTION INTRAMUSCULAR; INTRAVENOUS at 19:10

## 2023-08-26 NOTE — ED PROVIDER NOTES
Subjective   History of Present Illness  Patient is a 67-year-old female who presents to the ER with chief complaints of right-sided rib pain.  Patient has had this discomfort for several months.  She was diagnosed with non-small cell adenocarcinoma.  She is followed by Dr. Delarosa with oncology and Dr. Jerry with radiation oncology.  She states she had a nerve block performed this morning and now she feels as if pain is worse.  She is followed by Dr. Lopez with pain management.  She states the pain is in the same location and has been for the past several months.  She reports pain worse with deep breathing.  She has had no recorded fevers.  She denies any substernal chest pain.  She states the pain has been in the same location for many months.  Per review she has had CT scans for this issue.  Patient had a lung nodule that was followed for several years and on March 17, 2023 scan was suspicious for a lung neoplasm consistent with adenocarcinoma.  She has since been followed with specialist as described above.  Past medical history significant for anemia, anxiety, arthritis, COPD, hypothyroidism, migraine, PE      Review of Systems   Constitutional: Negative.  Negative for fever.   HENT: Negative.  Negative for congestion.    Respiratory:  Negative for cough and shortness of breath.         Positive for rib pain   Cardiovascular: Negative.  Negative for chest pain.   Gastrointestinal: Negative.  Negative for abdominal pain, diarrhea, nausea and vomiting.   Genitourinary: Negative.  Negative for dysuria.   Musculoskeletal:  Positive for back pain.   Skin: Negative.    All other systems reviewed and are negative.    Past Medical History:   Diagnosis Date    Acid reflux     Anemia     Anxiety     Arthritis     Asthma     Mild COPD    Curtis esophagus     Bleeding disorder     Reflux acid overload    Bunion Aug    Repaired with surgery    Cancer 03/2023    Waiting on Biopsy to determine what kind and what stage     "Chest pain     Chronic back pain     Chronic neck pain     Clotting disorder     Stomach bleeds from acid    COPD (chronic obstructive pulmonary disease)     Difficulty walking Dec 2022    After surgery wslking has become more and more difficult    DVT (deep venous thrombosis)     GI bleed     Hammer toe Aug 22    Repaired surgery    Hypothyroid     Hypothyroidism     Low back pain     Lung cancer     Migraine     PE (pulmonary thromboembolism)     Plantar fasciitis Years ago    Renal lesion 04/05/2023    Shin splints Years ago       Allergies   Allergen Reactions    Erythromycin Swelling and Anaphylaxis     Throat swells    Guaifenesin Anaphylaxis, Nausea And Vomiting and Swelling    Hydromorphone Anaphylaxis and Other (See Comments)     \"Shock and/or Unconsciousness\"    Hydromorphone Hcl Anaphylaxis and Other (See Comments)     Other reaction(s): Shock and/or Unconsciousness  Note: Anaphylaxis    Ondansetron Hcl Other (See Comments)     Coded  Coded  Other reaction(s): Zofran  Note:  Allergic Reaction: Anaphylaxis    Orphenadrine Anaphylaxis    Orphenadrine Citrate Anaphylaxis    Codeine Rash    Meperidine Other (See Comments)     Makes her an angry person    Guaifenesin & Derivatives Nausea And Vomiting    Guaifenesin Er Swelling     Throat swelling       Past Surgical History:   Procedure Laterality Date    BUNIONECTOMY      CHOLECYSTECTOMY      COLONOSCOPY      CORRECTION HAMMER TOE      DILATATION AND CURETTAGE      FOOT FUSION Left 04/20/2022    Procedure: 1-2 Arthrodesis, Tarsal Metatarsal Joint 2 and 3 Arthrodesis;  Surgeon: Bud Maradiaga DPM;  Location:  [facility];  Service: Podiatry;  Laterality: Left;    HAMMER TOE REPAIR Left 04/20/2022    Procedure: Hammertoe Repair 2-5,;  Surgeon: Bud Maradiaga DPM;  Location:  [facility];  Service: Podiatry;  Laterality: Left;    HERNIA REPAIR      HYSTERECTOMY      JOINT REPLACEMENT      OOPHORECTOMY      REDUCTION MAMMAPLASTY  01/2021    REPLACEMENT TOTAL " KNEE      Right knee partial, left knee       Family History   Problem Relation Age of Onset    Breast cancer Paternal Aunt     Heart disease Mother     Osteoporosis Mother     Thyroid disease Mother         Lukesoraya can't remember which kind.    Cancer Mother         Pacemaker at 90 years old.    Cancer Father         Father's entire family  from Cancer    Diabetes Father     Thyroid disease Father     Thyroid disease Maternal Aunt         Blood disorder    Thyroid disease Maternal Uncle     Thyroid disease Sister         Thyroid    Ovarian cancer Neg Hx     Uterine cancer Neg Hx     Colon cancer Neg Hx        Social History     Socioeconomic History    Marital status:    Tobacco Use    Smoking status: Former     Packs/day: 1.50     Years: 46.00     Pack years: 69.00     Types: Cigarettes     Start date: 1972     Quit date: 2018     Years since quittin.4     Passive exposure: Past    Smokeless tobacco: Never    Tobacco comments:     Pt started at age 17, then quit at age 31. Pt restarted smoking at age 32-35 and quit smoking in 2018   Vaping Use    Vaping Use: Never used   Substance and Sexual Activity    Alcohol use: Not Currently     Alcohol/week: 1.0 standard drink     Types: 1 Drinks containing 0.5 oz of alcohol per week     Comment: A chocolate drink made with moonshine and creamer    Drug use: No    Sexual activity: Yes     Partners: Male     Birth control/protection: Surgical           Objective   Physical Exam  Vitals and nursing note reviewed.   Constitutional:       Appearance: She is well-developed.   HENT:      Head: Normocephalic and atraumatic.      Right Ear: External ear normal.      Left Ear: External ear normal.      Nose: Nose normal.      Mouth/Throat:      Pharynx: Oropharynx is clear.   Eyes:      Extraocular Movements: Extraocular movements intact.      Conjunctiva/sclera: Conjunctivae normal.      Pupils: Pupils are equal, round, and reactive to light.   Cardiovascular:  "     Rate and Rhythm: Normal rate and regular rhythm.      Heart sounds: Normal heart sounds.   Pulmonary:      Effort: Pulmonary effort is normal.      Breath sounds: Normal breath sounds.      Comments: Patient has pain to palpation to the posterior aspect of the right rib as well as the lateral aspect of the right rib, no crepitus noted  Chest:      Chest wall: Tenderness present.   Abdominal:      General: Bowel sounds are normal.      Palpations: Abdomen is soft.   Musculoskeletal:         General: Normal range of motion.      Cervical back: Normal range of motion and neck supple.   Skin:     General: Skin is warm and dry.   Neurological:      Mental Status: She is alert and oriented to person, place, and time.   Psychiatric:         Mood and Affect: Mood normal.         Behavior: Behavior normal.         Thought Content: Thought content normal.         Judgment: Judgment normal.       Procedures           ED Course  ED Course as of 08/26/23 2224   Fri Aug 25, 2023   2041 Patient is sleeping on reexam.  She is feeling better on reexam.  We will discharge patient home shortly in stable condition.  She has chronic rib pain which she states has been described as, \"they say I have nerve pain from my cancer.\"  Patient will be discharged home shortly in stable condition however will need to follow-up with her physicians regarding her chronic rib pain. [TW]      ED Course User Index  [TW] Gladys Tucker APRN                                           Medical Decision Making  Patient is a 67-year-old female who presents to the ER with chief complaints of right-sided rib pain.  Patient has had this discomfort for several months.  She was diagnosed with non-small cell adenocarcinoma.  She is followed by Dr. Delarosa with oncology and Dr. Jerry with radiation oncology.  She states she had a nerve block performed this morning and now she feels as if pain is worse.  She is followed by Dr. Lopez with pain management.  " She states the pain is in the same location and has been for the past several months.  She reports pain worse with deep breathing.  She has had no recorded fevers.  She denies any substernal chest pain.  She states the pain has been in the same location for many months.  Per review she has had CT scans for this issue.  Patient had a lung nodule that was followed for several years and on March 17, 2023 scan was suspicious for a lung neoplasm consistent with adenocarcinoma.  She has since been followed with specialist as described above.  Past medical history significant for anemia, anxiety, arthritis, COPD, hypothyroidism, migraine, PE  Differential diagnosis: Lung neoplasm, rib fracture, pneumothorax, pneumonia, and other    XR Ribs Right With PA Chest   Final Result  No rib fractures noted on x-ray.  She has had this chronic rib pain since diagnosis of adenocarcinoma.  Please see previous notes and CT scans for details.  Patient is comfortable on reexam.  She will be discharged home shortly in stable condition.    Problems Addressed:  History of adenocarcinoma of lung: acute illness or injury     Details: Acute on chronic  Rib pain: chronic illness or injury    Amount and/or Complexity of Data Reviewed  Radiology: ordered. Decision-making details documented in ED Course.    Risk  Prescription drug management.        Final diagnoses:   Rib pain   History of adenocarcinoma of lung       ED Disposition  ED Disposition       ED Disposition   Discharge    Condition   Good    Comment   --               No follow-up provider specified.       Medication List        Changed      lidocaine 5 % ointment  Commonly known as: XYLOCAINE  Apply 1 application topically to the appropriate area as directed Every 2 (Two) Hours As Needed for Mild Pain.  What changed: additional instructions                 Gladys Tucker, APRN  08/26/23 9626

## 2023-08-27 ENCOUNTER — HOSPITAL ENCOUNTER (EMERGENCY)
Facility: HOSPITAL | Age: 68
Discharge: HOME | End: 2023-08-27
Admitting: STUDENT IN AN ORGANIZED HEALTH CARE EDUCATION/TRAINING PROGRAM
Payer: MEDICARE

## 2023-08-27 ENCOUNTER — APPOINTMENT (OUTPATIENT)
Dept: CT IMAGING | Facility: HOSPITAL | Age: 68
End: 2023-08-27
Payer: MEDICARE

## 2023-08-27 VITALS
HEIGHT: 64 IN | HEART RATE: 61 BPM | RESPIRATION RATE: 16 BRPM | BODY MASS INDEX: 34.83 KG/M2 | OXYGEN SATURATION: 97 % | SYSTOLIC BLOOD PRESSURE: 135 MMHG | DIASTOLIC BLOOD PRESSURE: 87 MMHG | TEMPERATURE: 97.2 F | WEIGHT: 204 LBS

## 2023-08-27 DIAGNOSIS — M54.6 CHRONIC RIGHT-SIDED THORACIC BACK PAIN: ICD-10-CM

## 2023-08-27 DIAGNOSIS — R91.8 LUNG NODULES: Primary | ICD-10-CM

## 2023-08-27 DIAGNOSIS — Z85.9 HISTORY OF CANCER: ICD-10-CM

## 2023-08-27 DIAGNOSIS — G89.29 CHRONIC RIGHT-SIDED THORACIC BACK PAIN: ICD-10-CM

## 2023-08-27 LAB
ALBUMIN SERPL-MCNC: 3.9 G/DL (ref 3.5–5.2)
ALBUMIN/GLOB SERPL: 1.3 G/DL
ALP SERPL-CCNC: 98 U/L (ref 39–117)
ALT SERPL W P-5'-P-CCNC: 15 U/L (ref 1–33)
ANION GAP SERPL CALCULATED.3IONS-SCNC: 9 MMOL/L (ref 5–15)
APTT PPP: 30.4 SECONDS (ref 24.1–35)
AST SERPL-CCNC: 15 U/L (ref 1–32)
BASOPHILS # BLD AUTO: 0.04 10*3/MM3 (ref 0–0.2)
BASOPHILS NFR BLD AUTO: 0.6 % (ref 0–1.5)
BILIRUB SERPL-MCNC: <0.2 MG/DL (ref 0–1.2)
BUN SERPL-MCNC: 18 MG/DL (ref 8–23)
BUN/CREAT SERPL: 22 (ref 7–25)
CALCIUM SPEC-SCNC: 8.9 MG/DL (ref 8.6–10.5)
CHLORIDE SERPL-SCNC: 106 MMOL/L (ref 98–107)
CO2 SERPL-SCNC: 24 MMOL/L (ref 22–29)
CREAT SERPL-MCNC: 0.82 MG/DL (ref 0.57–1)
DEPRECATED RDW RBC AUTO: 52.8 FL (ref 37–54)
EGFRCR SERPLBLD CKD-EPI 2021: 79.5 ML/MIN/1.73
EOSINOPHIL # BLD AUTO: 0.31 10*3/MM3 (ref 0–0.4)
EOSINOPHIL NFR BLD AUTO: 4.5 % (ref 0.3–6.2)
ERYTHROCYTE [DISTWIDTH] IN BLOOD BY AUTOMATED COUNT: 17.2 % (ref 12.3–15.4)
GLOBULIN UR ELPH-MCNC: 3.1 GM/DL
GLUCOSE SERPL-MCNC: 99 MG/DL (ref 65–99)
HCT VFR BLD AUTO: 33.5 % (ref 34–46.6)
HGB BLD-MCNC: 10.1 G/DL (ref 12–15.9)
IMM GRANULOCYTES # BLD AUTO: 0.01 10*3/MM3 (ref 0–0.05)
IMM GRANULOCYTES NFR BLD AUTO: 0.1 % (ref 0–0.5)
INR PPP: 0.99 (ref 0.91–1.09)
LYMPHOCYTES # BLD AUTO: 1.59 10*3/MM3 (ref 0.7–3.1)
LYMPHOCYTES NFR BLD AUTO: 23.1 % (ref 19.6–45.3)
MCH RBC QN AUTO: 25.4 PG (ref 26.6–33)
MCHC RBC AUTO-ENTMCNC: 30.1 G/DL (ref 31.5–35.7)
MCV RBC AUTO: 84.4 FL (ref 79–97)
MONOCYTES # BLD AUTO: 0.48 10*3/MM3 (ref 0.1–0.9)
MONOCYTES NFR BLD AUTO: 7 % (ref 5–12)
NEUTROPHILS NFR BLD AUTO: 4.46 10*3/MM3 (ref 1.7–7)
NEUTROPHILS NFR BLD AUTO: 64.7 % (ref 42.7–76)
NRBC BLD AUTO-RTO: 0 /100 WBC (ref 0–0.2)
PLATELET # BLD AUTO: 285 10*3/MM3 (ref 140–450)
PMV BLD AUTO: 9.2 FL (ref 6–12)
POTASSIUM SERPL-SCNC: 4.2 MMOL/L (ref 3.5–5.2)
PROT SERPL-MCNC: 7 G/DL (ref 6–8.5)
PROTHROMBIN TIME: 13.1 SECONDS (ref 11.8–14.8)
RBC # BLD AUTO: 3.97 10*6/MM3 (ref 3.77–5.28)
SODIUM SERPL-SCNC: 139 MMOL/L (ref 136–145)
TROPONIN T SERPL HS-MCNC: <6 NG/L
WBC NRBC COR # BLD: 6.89 10*3/MM3 (ref 3.4–10.8)

## 2023-08-27 PROCEDURE — 85610 PROTHROMBIN TIME: CPT | Performed by: NURSE PRACTITIONER

## 2023-08-27 PROCEDURE — 96374 THER/PROPH/DIAG INJ IV PUSH: CPT

## 2023-08-27 PROCEDURE — 25010000002 MORPHINE PER 10 MG: Performed by: NURSE PRACTITIONER

## 2023-08-27 PROCEDURE — 80053 COMPREHEN METABOLIC PANEL: CPT | Performed by: NURSE PRACTITIONER

## 2023-08-27 PROCEDURE — 96376 TX/PRO/DX INJ SAME DRUG ADON: CPT

## 2023-08-27 PROCEDURE — 93005 ELECTROCARDIOGRAM TRACING: CPT | Performed by: NURSE PRACTITIONER

## 2023-08-27 PROCEDURE — 36415 COLL VENOUS BLD VENIPUNCTURE: CPT

## 2023-08-27 PROCEDURE — 25510000001 IOPAMIDOL 61 % SOLUTION: Performed by: NURSE PRACTITIONER

## 2023-08-27 PROCEDURE — 85025 COMPLETE CBC W/AUTO DIFF WBC: CPT | Performed by: NURSE PRACTITIONER

## 2023-08-27 PROCEDURE — 96375 TX/PRO/DX INJ NEW DRUG ADDON: CPT

## 2023-08-27 PROCEDURE — 71260 CT THORAX DX C+: CPT

## 2023-08-27 PROCEDURE — 99285 EMERGENCY DEPT VISIT HI MDM: CPT

## 2023-08-27 PROCEDURE — 25010000002 PROCHLORPERAZINE 10 MG/2ML SOLUTION: Performed by: NURSE PRACTITIONER

## 2023-08-27 PROCEDURE — 85730 THROMBOPLASTIN TIME PARTIAL: CPT | Performed by: NURSE PRACTITIONER

## 2023-08-27 PROCEDURE — 84484 ASSAY OF TROPONIN QUANT: CPT | Performed by: NURSE PRACTITIONER

## 2023-08-27 PROCEDURE — 93010 ELECTROCARDIOGRAM REPORT: CPT | Performed by: INTERNAL MEDICINE

## 2023-08-27 RX ORDER — PROCHLORPERAZINE EDISYLATE 5 MG/ML
2.5 INJECTION INTRAMUSCULAR; INTRAVENOUS ONCE
Status: COMPLETED | OUTPATIENT
Start: 2023-08-27 | End: 2023-08-27

## 2023-08-27 RX ORDER — PROCHLORPERAZINE EDISYLATE 5 MG/ML
2.5 INJECTION INTRAMUSCULAR; INTRAVENOUS EVERY 6 HOURS PRN
Status: DISCONTINUED | OUTPATIENT
Start: 2023-08-27 | End: 2023-08-27 | Stop reason: HOSPADM

## 2023-08-27 RX ORDER — MORPHINE SULFATE 2 MG/ML
2 INJECTION, SOLUTION INTRAMUSCULAR; INTRAVENOUS ONCE
Status: COMPLETED | OUTPATIENT
Start: 2023-08-27 | End: 2023-08-27

## 2023-08-27 RX ORDER — SODIUM CHLORIDE 0.9 % (FLUSH) 0.9 %
10 SYRINGE (ML) INJECTION AS NEEDED
Status: DISCONTINUED | OUTPATIENT
Start: 2023-08-27 | End: 2023-08-27 | Stop reason: HOSPADM

## 2023-08-27 RX ADMIN — IOPAMIDOL 100 ML: 612 INJECTION, SOLUTION INTRAVENOUS at 17:47

## 2023-08-27 RX ADMIN — MORPHINE SULFATE 2 MG: 2 INJECTION, SOLUTION INTRAMUSCULAR; INTRAVENOUS at 17:13

## 2023-08-27 RX ADMIN — MORPHINE SULFATE 2 MG: 2 INJECTION, SOLUTION INTRAMUSCULAR; INTRAVENOUS at 19:10

## 2023-08-27 RX ADMIN — PROCHLORPERAZINE EDISYLATE 2.5 MG: 5 INJECTION, SOLUTION INTRAMUSCULAR; INTRAVENOUS at 19:10

## 2023-08-28 NOTE — ED PROVIDER NOTES
Subjective   History of Present Illness  Patient is a 67-year-old female who presents to the ER with chief complaints of back and rib pain.  She was just evaluated in this emergency department 2 days ago for the same pain.  Patient has history of lung cancer and is followed by Dr. Delarosa as well as Dr. Jerry with radiation oncology.  She is also followed by Dr. Lopez with pain management.  Patient has had a longstanding history of pain described as a burning pain to the right thoracic region with radiation to the right lateral rib.  2 days ago she had a nerve block which she feels exacerbated her pain.  Patient describes the pain is worsening in nature.  Patient's last radiation treatment was 6 weeks ago.  She is due to have a repeat CT scan for evaluation.  She complains of worsening pain and has been unable to get any relief.  She states that she is not able to sleep due to the pain.  Patient has had no cough or congestion.  She has had no fevers.  Past medical history significant for anemia, anxiety, arthritis, asthma, cancer, COPD, DVT, hypothyroidism, back pain, PE, Planter fasciitis      Review of Systems   Constitutional: Negative.  Negative for fever.   HENT: Negative.  Negative for congestion.    Respiratory:  Negative for cough and shortness of breath.         Positive for lung pain   Gastrointestinal: Negative.  Negative for abdominal pain, diarrhea, nausea and vomiting.   Genitourinary: Negative.  Negative for dysuria.   Musculoskeletal:  Positive for back pain.   Skin: Negative.  Negative for rash.   All other systems reviewed and are negative.    Past Medical History:   Diagnosis Date    Acid reflux     Anemia     Anxiety     Arthritis     Asthma     Mild COPD    Curtis esophagus     Bleeding disorder     Reflux acid overload    Bunion Aug    Repaired with surgery    Cancer 03/2023    Waiting on Biopsy to determine what kind and what stage    Chest pain     Chronic back pain     Chronic neck pain   "   Clotting disorder     Stomach bleeds from acid    COPD (chronic obstructive pulmonary disease)     Difficulty walking Dec 2022    After surgery wslking has become more and more difficult    DVT (deep venous thrombosis)     GI bleed     Hammer toe Aug 22    Repaired surgery    Hypothyroid     Hypothyroidism     Low back pain     Lung cancer     Migraine     PE (pulmonary thromboembolism)     Plantar fasciitis Years ago    Renal lesion 04/05/2023    Shin splints Years ago       Allergies   Allergen Reactions    Erythromycin Swelling and Anaphylaxis     Throat swells    Guaifenesin Anaphylaxis, Nausea And Vomiting and Swelling    Hydromorphone Anaphylaxis and Other (See Comments)     \"Shock and/or Unconsciousness\"    Hydromorphone Hcl Anaphylaxis and Other (See Comments)     Other reaction(s): Shock and/or Unconsciousness  Note: Anaphylaxis    Ondansetron Hcl Other (See Comments)     Coded  Coded  Other reaction(s): Zofran  Note:  Allergic Reaction: Anaphylaxis    Orphenadrine Anaphylaxis    Orphenadrine Citrate Anaphylaxis    Codeine Rash    Meperidine Other (See Comments)     Makes her an angry person    Guaifenesin & Derivatives Nausea And Vomiting    Guaifenesin Er Swelling     Throat swelling       Past Surgical History:   Procedure Laterality Date    BUNIONECTOMY      CHOLECYSTECTOMY      COLONOSCOPY      CORRECTION HAMMER TOE      DILATATION AND CURETTAGE      FOOT FUSION Left 04/20/2022    Procedure: 1-2 Arthrodesis, Tarsal Metatarsal Joint 2 and 3 Arthrodesis;  Surgeon: Bud Maradiaga DPM;  Location:  [facility];  Service: Podiatry;  Laterality: Left;    HAMMER TOE REPAIR Left 04/20/2022    Procedure: Hammertoe Repair 2-5,;  Surgeon: Bud Maradiaga DPM;  Location:  [facility];  Service: Podiatry;  Laterality: Left;    HERNIA REPAIR      HYSTERECTOMY      JOINT REPLACEMENT      OOPHORECTOMY      REDUCTION MAMMAPLASTY  01/2021    REPLACEMENT TOTAL KNEE      Right knee partial, left knee       Family " History   Problem Relation Age of Onset    Breast cancer Paternal Aunt     Heart disease Mother     Osteoporosis Mother     Thyroid disease Mother         Jae can’t remember which kind.    Cancer Mother         Pacemaker at 90 years old.    Cancer Father         Father’s entire family  from Cancer    Diabetes Father     Thyroid disease Father     Thyroid disease Maternal Aunt         Blood disorder    Thyroid disease Maternal Uncle     Thyroid disease Sister         Thyroid    Ovarian cancer Neg Hx     Uterine cancer Neg Hx     Colon cancer Neg Hx        Social History     Socioeconomic History    Marital status:    Tobacco Use    Smoking status: Former     Packs/day: 1.50     Years: 46.00     Pack years: 69.00     Types: Cigarettes     Start date: 1972     Quit date: 2018     Years since quittin.4     Passive exposure: Past    Smokeless tobacco: Never    Tobacco comments:     Pt started at age 17, then quit at age 31. Pt restarted smoking at age 32-35 and quit smoking in 2018   Vaping Use    Vaping Use: Never used   Substance and Sexual Activity    Alcohol use: Not Currently     Alcohol/week: 1.0 standard drink     Types: 1 Drinks containing 0.5 oz of alcohol per week     Comment: A chocolate drink made with moonshine and creamer    Drug use: No    Sexual activity: Yes     Partners: Male     Birth control/protection: Surgical           Objective   Physical Exam  Vitals and nursing note reviewed.   Constitutional:       General: She is in acute distress.      Appearance: She is well-developed.   HENT:      Head: Normocephalic and atraumatic.      Right Ear: External ear normal.      Left Ear: External ear normal.      Nose: Nose normal.      Mouth/Throat:      Pharynx: Oropharynx is clear.   Eyes:      Extraocular Movements: Extraocular movements intact.      Conjunctiva/sclera: Conjunctivae normal.      Pupils: Pupils are equal, round, and reactive to light.   Cardiovascular:      Rate  and Rhythm: Normal rate and regular rhythm.      Heart sounds: Normal heart sounds.   Pulmonary:      Effort: Pulmonary effort is normal.      Breath sounds: Normal breath sounds.   Abdominal:      General: Bowel sounds are normal.      Palpations: Abdomen is soft.   Musculoskeletal:         General: Normal range of motion.      Cervical back: Normal range of motion and neck supple.      Comments: Pain to palpation to the right side of the thoracic spine described as a burning sensation and worse with palpation, no mid vertebral tenderness, no step-off noted, patient is neurologically neurovascularly intact.   Skin:     General: Skin is warm and dry.   Neurological:      Mental Status: She is alert and oriented to person, place, and time.   Psychiatric:         Mood and Affect: Mood normal.         Behavior: Behavior normal.         Thought Content: Thought content normal.         Judgment: Judgment normal.       Procedures           ED Course                                           Medical Decision Making  Patient is a 67-year-old female who presents to the ER with chief complaints of back and rib pain.  She was just evaluated in this emergency department 2 days ago for the same pain.  Patient has history of lung cancer and is followed by Dr. Delarosa as well as Dr. Jerry with radiation oncology.  She is also followed by Dr. Lopez with pain management.  Patient has had a longstanding history of pain described as a burning pain to the right thoracic region with radiation to the right lateral rib.  2 days ago she had a nerve block which she feels exacerbated her pain.  Patient describes the pain is worsening in nature.  Patient's last radiation treatment was 6 weeks ago.  She is due to have a repeat CT scan for evaluation.  She complains of worsening pain and has been unable to get any relief.  She states that she is not able to sleep due to the pain.  Patient has had no cough or congestion.  She has had no fevers.   Past medical history significant for anemia, anxiety, arthritis, asthma, cancer, COPD, DVT, hypothyroidism, back pain, PE, Planter fasciitis  Differential diagnosis: Worsening cancer, pneumothorax, PE, and other    Labs Reviewed  CBC WITH AUTO DIFFERENTIAL - Abnormal; Notable for the following components:     Hemoglobin                    10.1 (*)               Hematocrit                    33.5 (*)               MCH                           25.4 (*)               MCHC                          30.1 (*)               RDW                           17.2 (*)            All other components within normal limits  APTT - Normal  PROTIME-INR - Normal  SINGLE HSTROPONIN T - Normal         Narrative: High Sensitive Troponin T Reference Range:                  <10.0 ng/L- Negative Female for AMI                  <15.0 ng/L- Negative Male for AMI                  >=10 - Abnormal Female indicating possible myocardial injury.                  >=15 - Abnormal Male indicating possible myocardial injury.                   Clinicians would have to utilize clinical acumen, EKG, Troponin, and serial changes to determine if it is an Acute Myocardial Infarction or myocardial injury due to an underlying chronic condition.                                       COMPREHENSIVE METABOLIC PANEL         Narrative: GFR Normal >60                  Chronic Kidney Disease <60                  Kidney Failure <15                    CBC AND DIFFERENTIAL   CT Chest With Contrast Diagnostic   Final Result    Summary:  1. History of lung cancer noted. Multiple new small nodules within the  right lung compatible with metastatic disease.  2. No pneumonia or pneumothorax to account for new right sided chest  pain/back pain.      Patient presents with chronic right upper back pain with radiating pain to the right lateral rib.  She has history of lung cancer.  She had an x-ray 2 days ago which was negative for pneumothorax.  No obvious cancer noted on x-ray.   She had nerve block performed 2 days ago by pain management to treat what was described as chronic nerve pain. This same pain has been documented in numerous notes previously. Please see other ER and office notes for details. Today labs are stable and unremarkable.  CTA of the chest is negative for pneumonia or pneumothorax.  She does however have new lung nodules that were not identified on previous study.  She will need to follow-up with her oncologist as well as her pain management physician regarding her chronic pain.  She will be discharged home shortly in stable condition.    Problems Addressed:  Chronic right-sided thoracic back pain: chronic illness or injury  History of cancer: acute illness or injury  Lung nodules: acute illness or injury    Amount and/or Complexity of Data Reviewed  Labs: ordered. Decision-making details documented in ED Course.  Radiology: ordered. Decision-making details documented in ED Course.  ECG/medicine tests: ordered. Decision-making details documented in ED Course.    Risk  Prescription drug management.        Final diagnoses:   Lung nodules   Chronic right-sided thoracic back pain   History of cancer       ED Disposition  ED Disposition       ED Disposition   Discharge    Condition   Stable    Comment   --               No follow-up provider specified.       Medication List        Changed      lidocaine 5 % ointment  Commonly known as: XYLOCAINE  Apply 1 application topically to the appropriate area as directed Every 2 (Two) Hours As Needed for Mild Pain.  What changed: additional instructions                 Gladys Tucker, APRN  08/28/23 0031

## 2023-08-29 LAB
QT INTERVAL: 406 MS
QTC INTERVAL: 429 MS

## 2023-08-30 ENCOUNTER — TRANSCRIBE ORDERS (OUTPATIENT)
Dept: ADMINISTRATIVE | Facility: HOSPITAL | Age: 68
End: 2023-08-30
Payer: MEDICARE

## 2023-08-30 ENCOUNTER — TELEPHONE (OUTPATIENT)
Dept: RADIATION ONCOLOGY | Facility: HOSPITAL | Age: 68
End: 2023-08-30
Payer: MEDICARE

## 2023-08-30 DIAGNOSIS — R91.8 MULTIPLE LUNG NODULES: Primary | ICD-10-CM

## 2023-08-30 NOTE — TELEPHONE ENCOUNTER
Patient called and stated that she had went to the ED 08/25 and 08/27 due to right sided rib pain. She was told that she needed to contact our office to see about pain medication because the pain was being caused by new lung nodules found on CT. Patient stated that she had a nerve block done on 08/25 by Dr. Lopez and that made the pain worse. She stated that she had not contacted Dr. Lopez to let them know that the injection made the pain worse. She was advised to call their office. She stated that she was told not to come back to the ED as they would not give her narcotics. Dr. Delarosa is ordering a PET scan. Explained to her that Dr. Jerry has been out of the office but a message had been sent to him to look at her scan. Patient is scheduled to see Dr. Jerry on 09/08 1pm. She verbalized understanding.

## 2023-08-31 ENCOUNTER — HOSPITAL ENCOUNTER (OUTPATIENT)
Dept: CT IMAGING | Facility: HOSPITAL | Age: 68
Discharge: HOME | End: 2023-08-31
Payer: MEDICARE

## 2023-08-31 DIAGNOSIS — R91.8 MULTIPLE LUNG NODULES: ICD-10-CM

## 2023-08-31 PROCEDURE — 78815 PET IMAGE W/CT SKULL-THIGH: CPT

## 2023-08-31 PROCEDURE — A9552 F18 FDG: HCPCS | Performed by: INTERNAL MEDICINE

## 2023-08-31 PROCEDURE — 0 FLUDEOXYGLUCOSE F18 SOLUTION: Performed by: INTERNAL MEDICINE

## 2023-08-31 RX ADMIN — FLUDEOXYGLUCOSE F18 1 DOSE: 300 INJECTION INTRAVENOUS at 08:38

## 2023-09-04 NOTE — PROGRESS NOTES
[facility] PAceSJAZIEL Jerry MD      Gagan Stewart, APRN  ____________________________________________________________               [facility]  Department of Radiation Oncology  [address] [place] [id number]  Office:  [phone]  Fax: [phone]    DATE: 09/05/2023  PATIENT: Melly Cruz  1955  Medical record #: [id number]                                                       REASON FOR CONSULTATION:   Chief Complaint   Patient presents with    Lung nodules     S/P SBRT to RUL 2023  Pain in ribcage     Melly Cruz is a 67 y.o. female that has been referred to our clinic to be evaluated for consideration of radiotherapy to the lung. Reports fatigue, SOB, and right chest pain. Denies appetite change, unexpected weight change, cough, hemoptysis, palpitations, nasuea/vomiting, diarrhea, light-headedness, weakness, and headaches. She follows .            HISTORY OF PRESENT ILLNESS:  Diagnosed with stage IB (T2a, N0, cM0) adenocarcinoma of lung, RUL. She completed 5000 cGy in 4 fractions to the RUL on 07/18/2023.     01/21/2018 - CT Chest with contrast:  No acute findings.  10-11 mm ill-defined nodule in the right apex, similar to 2017 but new from 2010. While this could represent postinflammatory scarring (related to pneumonia in October 2017) underlying malignancy is not excluded. Further evaluation with PET/CT or short interval 3 month follow-up CT could be obtained.    03/30/2018 - CT Angio Chest:  No evidence of pulmonary embolus.   1.2 cm subtle solid nodule in the right lung apex. This is immediately adjacent to subpleural scarring at the apex and may reflect scarring/post inflammatory change. Recommend three-month follow-up low dose CT to evaluate for stability. This would correlate with a Lung Rads category 4A lesion.   Mild to moderate underlying lung emphysema.     09/01/2018 - CT Angio Chest:  No pulmonary emboli.   Stable 1.2 cm right  apical lung nodule, similar to the 3/30/2018 exam. Repeat 6 month follow-up CT chest recommended.   Emphysema. Basilar atelectasis.   Cardiomegaly. Vascular crowding.     10/04/2018 - PET Scan:  No significant uptake in the 11 mm right apical subsolid nodule which is stable in size for one year. While a benign/indolent process such as scar is possible, recommend yearly follow-up CTs given its subsolid appearance.     04/22/2019 - CT Chest with contrast:  Subsolid nodule in the right apex is unchanged from October 2017 and showed no significant metabolic activity on October 2018 PET. The nodule has developed from 2010. Focal benign scarring is most likely. May continue annual surveillance to ensure stability.    04/29/2019 - CT Angio Chest:  No evidence of pulmonary embolus.  Stable right upper lobe groundglass opacity compared to 10/11/2017. Recommend follow-up CT chest in one year or per pulmonology.  Coronary artery calcifications.    03/01/2020 - CT Abdomen/Pelvis with contrast:  There is a stable 1.7 cm enhancing lesion in the right hepatic lobe of the liver. It is stable compared back to 3/30/2018 although very difficult to see on that study due to the phase of contrast injection. This may represent an atypical hemangioma. Focal nodular hyperplasia is in the differential. Given relative stability, a more aggressive lesion is felt to be less likely.   Diffuse fatty infiltration of the liver.   Status post cholecystectomy and hysterectomy.   Small renal lesions on the right are probably cysts. The 9 mm lesion is too small to fully characterize. The 1.3 cm lesion measures fluid density with Hounsfield unit measurement.   Normal appendix. Diverticulosis of the colon. Other nonacute findings, as discussed above.     06/16/2020 - CT Chest with contrast:  New 0.9 cm lingula pleural-based pulmonary nodule versus atelectasis. This previously appeared more triangular on 4/29/2019. Therefore, recommend follow-up CT chest  in 3 months.  Stable right upper lobe groundglass opacity measuring 1.5 cm, stable compared to 10/11/2017 considering differences in technique    06/30/2020 - PET Scan:  There is no abnormal FDG activity associated with the small new nodule in the lingula, which is probably related to focal scarring or atelectasis. There is no abnormal FDG activity associated with the 15 mm groundglass nodule in the right upper lobe.  There are a few normal sized lymph nodes in the inferior axilla bilaterally, with low level FDG avidity, these may be reactive lymph nodes. Correlation with physical examination of both axilla is suggested. No measurable axillary or intrathoracic lymphadenopathy is identified.  No evidence of active neoplasm is identified.    03/09/2021 - CT Angio Chest:  No CT angiographic evidence of pulmonary embolus or acute aortic pathology.   Patchy airspace and linear opacities posterior right upper lobe more conspicuous compared to the previous examination. Acute on chronic infectious/inflammatory change considered. Neoplasm difficult to exclude. Correlate with patient presentation. Follow-up recommended.   5 mm pleural-based pulmonary nodule left lower lobe. Follow-up recommended.   Chronic lung changes, Remote granulomatous disease and pulmonary emphysema.   Hepatic steatosis.     12/08/2021 - CT Chest with contrast:  Stable pulmonary nodules within the lungs from the previous study with no new lesions present. Given the lack of change over a more than two-year period of time I feel benignity would BE suggested. No new lung lesions are present. There are changes of centrilobular emphysema.  Cardiomegaly with mild coronary calcifications present. The thoracic aorta is normal in caliber.  Fat-containing lesion within the upper left breast. This may be postoperative in nature versus posttraumatic fat necrosis versus a fatty containing mass. No overlying skin changes are observed.  Steatosis of the liver. A  moderate size hiatal hernia is present.    12/07/2022 - CT Chest with contrast:  Previously described groundglass opacity within the right upper lobe shows interval increase in size is now more elongate in appearance with increased soft tissue component. It does demonstrate linear extension to the pleural surface with some associated volume loss suggesting associated parenchymal scarring. This may simply represent some progressive scarring but given the change from the previous exam I would suggest PET/CT for further characterization. Additional previously described nodules are stable. There are changes of centrilobular emphysema.  No developing mediastinal or axillary lymphadenopathy.  Fat necrosis within the left breast.  Moderate size hiatal hernia.  Steatosis of the liver with suspected focal fatty sparing within the periphery of the right lobe of liver.    01/24/2023 - CT Chest Angiogram:  No evidence of pulmonary embolus.  Moderate emphysema. No consolidation, pleural effusion, or pneumothorax.  Continued increase in size of 2.5 cm part solid RIGHT upper lobe pulmonary nodule. This remains concerning for a primary lung neoplasm. Differential diagnosis also includes infectious or inflammatory process. Recommend PET CT for further evaluation.  Small to moderate size hiatal hernia.  Hepatic steatosis.    01/25/2023 - CT Abdomen/Pelvis without contrast:  Mild acute uncomplicated sigmoid diverticulitis.  Hepatic steatosis.    03/29/2023 - Appointment with :  Medical decision making/Recommendations/Plan:  I think at this point a malignancy certainly is high in her family's history.  In the differential, I would say certainly elevated in the higher key of a list for nodular finding.  She has no recent history of pneumonia.  I believe a PET scan is an order at this time.  Additionally, we will obtain pulmonary function test pre and post bronchodilator with DLCO and EBG.  I will have her come back to see me in  3 weeks to discuss the findings of those results and make further recommendations at that time.  She has been congratulated as to being a non-smoker.     03/17/2023 - PET Scan:  Slowly enlarging hypermetabolic 3.3 x 1.0 x 2.9 cm subsolid RIGHT upper lobe lung mass. This is highly suspicious for a primary lung neoplasm, favoring adenocarcinoma.  No evidence of metastatic disease.  Focal nodular hypermetabolic activity in the sigmoid colon. Differential diagnosis includes focal inflamed diverticulum versus underlying polyp or neoplasm. Recommend correlation with colonoscopy.    03/21/2023 - CT Angio Chest:  A limited diagnostic study due to poor opacification of the pulmonary arteries. No significant or large vessel embolus is noted. The smaller subsegmental lesion/emboli may not be evaluated or excluded.  Chronic emphysematous lung changes.  An enlarging mass in the right upper lobe. Of    03/29/2023 - Appointment with :  Medical decision making/Recommendations/Plan:  At this point, from a straightforward presence of a increasing right upper lobe lung lesion, no adenopathy, she certainly would be a candidate for resection based on pulmonary function tests with wedge resection and then if positive for lung cancer to proceed with lobectomy and mediastinal lymph node sampling.  Problematic is the following: She has had at least five different events of pulmonary emboli and she has also had several bouts of bleeding.  I have proposed two scenarios, the first options to proceed with a IVC filter placed prior to thoracic surgery and then to proceed with surgery as discussed.  The alternative option would be to proceed with a biopsy and sampling of the mediastinum as indicated with EBUS and navigational bronchoscopy.  Discussed that we have partnered with Dr. Mendoza and Dr. Dove for access.  Then would favor radiation then of this lesion.  Certainly, this would avoid the risks of bleeding complications as well  as multiple pulmonary emboli.  We discussed the pros and cons of each option at large.  Melly Cruz will be added to the tumor conference list to be presented.  I have answered all questions to the best of my ability.  She favors to proceed forward with the second option, therefore, we will make a referral to Dr. Mendoza and Dr. Dove for diagnosis and laurie sampling.  After which we will plan to refer to Dr. Jerry for his opinion.  Certainly, we could reconsider if the bulk of a clinical conference and Dr. Jerry's input that the proposed currently agreed option would be suboptimal.  Certainly, agree that this is not the standard first answer, but in light of this lady's comorbidities, perhaps may be a very reasonable answer.  She remains asymptomatic from a lung nodule point of view.  She is a non-smoker.  I will continue to keep you apprised of care as it ensues.    04/04/2023 - CT Angio Chest:  No CT evidence of pulmonary embolus. Main pulmonary artery segment borderline dilated at 3 cm. Atheromatous disease of the thoracic aorta and coronary arteries. Cardiomegaly.  New lung infiltrates bilaterally most likely due to pneumonia. This is most dense in the left upper lobe.  A mass in the right lung apex is again noted. This mass is PET positive and worrisome for primary lung carcinoma.  Stable small nodule in the right middle lobe laterally.  Indeterminate small right renal lesion measuring 1.6 cm with a Hounsfield unit measurement of 48.  Fatty infiltration of the liver.  Moderate size hiatal hernia.    05/16/2023 - Appointment with Ernesto Marsh Chi, MD - [facility] Pulmonary: Assessment and Plan  Melly Ritter is a 67 y.o. female with a history of prior tobacco use and family history of lung cancer referred for a growing right upper lobe nodule.  Based on her risk factors including age, prior tobacco use and family history of cancer, this growing right upper lobe nodule in a background of  emphysema is highly suspicious for primary lung cancer. She warrants both bronchoscopy for staging of her mediastinum and hilum as well as an attempt to sample the lesion. We discussed this with her at length in clinic she would like to proceed with bronchoscopy. We will obtain an Ion protocol CT chest today to help with procedural planning and then schedule her for next availability for bronchoscopy with linear and radial EBUS with plans to sample the lesion itself as well as proceed with mediastinal staging.  Recommendations:  - we will schedule for bronchoscopy with flexible + robotic bronchoscopy and linear/radial EBUS  -- will plan to stage mediastinum and also sample lesion  We will plan to see the patient back TBD based on above.  Patient was seen with Dr. Marsh.    05/16/2023 - CT Chest without contrast - Fink:  No significant interval change in spiculated, predominantly solid right upper lobe lesion compared to recent CT dated 04/04/2023, which is concerning for primary lung cancer.   Interval resolution of left upper lobe and bilateral lower lobe groundglass opacities, in keeping with pneumonia.     05/16/2023 - PFTs - Fink:  Interpretation:   Breathing room air, SpO2 is adequate at rest and during exercise sufficient to increase pulse from 84 to 116 b/min, SpO2 falls but remains normoxemic .   On this basis, SpO2 is adequate at rest breathing room air and while walking breathing room air.   This level of exercise is associated with no significant change of FEV1.       05/31/2023 - Bronchoscopy with biopsy:   Lung, right upper lobe nodule, endobronchial ultrasound-guided fluroscopy assisted fine needle aspiration:   Non-small cell carcinoma, favor adenocarcinoma (see comment)   Comments: The aspirate smears and the cell block show rare clusters of malignant epithelial cells.  Immunohistochemical stains performed on the cell block (single antibody procedures with appropriately reactive controls) show the  malignant cells to be positive for TTF-1 and Napsin A, and to be negative for p40.  These findings are compatible with nons-small cell carcinoma, and favor primary lung adenocarcinoma.  There are too few cells for ancillary moleclular testing.   Lung, right upper lobe nodule, endobronchial biopsy:   Non-diagnostic for mass forming lesion; blood and fibrin only     06/01/2023 - Documentation by Brandy Castillo:  Outside records reviewed with patrick bronch performed yesterday and results are non diagnostic. Dr. Thompson presented case at tumor conference today and discussed with Dr. Delarosa.   Called patient to discuss options of surgical resection with IVC filter placed prior to due to her history of PE and GI bleeding or referral to radiation therapy for stereotatic radiation to lung lesion.   She was advised if she chooses radiation therapy that would be without a tissue diagnosis.   She is eager to proceed with Nissen fundoplication surgery. Ultimately, she wishes to proceed with radiation therapy referral. This has been placed per her request.   She will call back with questions if they arise.      06/07/2023 - Appointment with :  RUL Lung Mass  3.3 x 1.0 x 2.9 cm RIGHT upper lobe lung mass, SUV 4.1 on PET 3/17/2023  known right lung nodule, being followed outpatient by Dr. Agusto Thompson and ENOC Rose with the cardiothoracic surgery department at [facility].  She was last seen by Dr. Agusto Thompson on 12/7/2022  PET scan 3/17/2023:  Slowly enlarging hypermetabolic 3.3 x 1.0 x 2.9 cm subsolid RIGHT upper lobe lung mass, SUV 4.1. This is highly suspicious for a primary lung neoplasm, favoring adenocarcinoma.  No evidence of metastatic disease.  Focal nodular hypermetabolic activity in the sigmoid colon, SUV 4.4. Differential diagnosis includes focal inflamed diverticulum versus underlying polyp or neoplasm. Recommend correlation with colonoscopy.  CTA chest 4/4/2023:  No CT evidence of pulmonary embolus.  Main pulmonary artery segment borderline dilated at 3 cm. Atheromatous disease of the thoracic aorta and coronary arteries. Cardiomegaly.  New lung infiltrates bilaterally most likely due to pneumonia. This is most dense in the left upper lobe.  A mass in the right lung apex is again noted. This mass is PET positive and worrisome for primary lung carcinoma.  Stable small nodule in the right middle lobe laterally.  Indeterminate small right renal lesion measuring 1.6 cm with a Hounsfield unit measurement of 48.  Fatty infiltration of the liver.  Moderate size hiatal hernia.  On 3/29/2023, Dr. Thompson's office attempted to refer Melly to Dr. Mendoza or Dr. Dove for EBUS, possible navigational bronchoscopy to obtain a tissue diagnosis. Unfortunately they are out of network for Mrs. Cruz's insurance.  Melly came to the the ED at [facility] on 4/4/2023 with a history of mid epigastric and substernal chest pain, pleuritic in nature with increasing dyspnea, temperature elevation to 100.6 °F, tachycardia and hypoxia. She was found to have bilateral pneumonia. She was admitted for management.  CBC today 6/7/2023 reveals a WBC of 4.72. Hgb is 11.6 with an MCV of 87.9 and platelet count of 273,000.  RECOMMEND:  Dr. Delarosa recommends referral to Dr. Ole Esquivel at [facility] for navigational bronchoscopy.  ENOC Callejas with Dr. Agusto Thompson made a referral to Dr. Ole Esquivel at [facility] for navigational bronchoscopy on 4/7/2023.  There were difficulties getting her set up for navigational bronchoscopy in [place] and therefore alternative arrangements were made in [place].  Call received from Dr Thompson regarding patient's recent bronch at [facility] on 5/31/23 was non diagnostic:  Lung, right upper lobe nodule, endobronchial biopsy 5/31/23 per Zechariah Piña MD at [facility] in [facility]  - Non-diagnostic for mass forming lesion; blood and fibrin only  Plan moving forward , Dr Thompson  contemplates a diagnostic and therapeutic resection of the lung nodule. After discussion with Melly, she has chosen empiric SBRT to the lung nodule rather than an intervention given her other comorbid associated medical problems.  Melly has an appointment with Dr. Jeff Jerry on 6/9/2023 for SBRT to the RUL lung nodule.  A call was placed and I spoke to Dr. Thompson regarding all the above. We are all in accord with the patient's decision.  I will see her back in follow-up in 3 months for continued monitoring and review of the other heme-onc associated problems.    06/09/2023 - Consult with :  Following this discussion and in consideration of the diagnostic data/evaluation of the patient, I recommended a course of stereotactic radiosurgery to the right upper lung nodule.   Will simulate treatment fields today, 3D CT with MIPS to begin the planning process, final course pending.   Continue ongoing management per primary care physician and other specialists.     07/06/2023 - 07/18/2023 - Completed radiation course:  Received 5000 cGy in 4 fractions to the RUL.    08/27/2023 - CT chest with contrast:  History of lung cancer noted. Multiple new small nodules within the right lung compatible with metastatic disease.  No pneumonia or pneumothorax to account for new right sided chest pain/back pain.    08/31/2023 - PET Scan:  The right upper lobe primary neoplasm SUV max is decreased on today' study compared to the prior study. The size appears relatively stable.  There are pleural-based areas of nodularity in the right middle lobe demonstrating uptake on the PET images worrisome for metastatic disease. I suspect that there is metastatic pleural disease. There is a right pleural effusion and there are several pleural-based areas of focal activity. The patient also has multiple small subcentimeter lung nodules that do not demonstrate significant PET activity which may be related to their small size. The nodules are  reportedly new on CT.  Probable chest wall lesion posteriorly on the right that has some involvement of the posterolateral right 10th rib demonstrating abnormal PET activity.  Abnormal uptake in right cardiophrenic angle lymph nodes consistent with metastatic adenopathy.  No metastatic disease is seen in the neck, abdomen or pelvis.    History obtained from  PATIENT, FAMILY, and CHART    PAST MEDICAL HISTORY  Past Medical History:   Diagnosis Date    Acid reflux     Anemia     Anxiety     Arthritis     Asthma     Mild COPD    Curtis esophagus     Bleeding disorder     Reflux acid overload    Bunion Aug    Repaired with surgery    Cancer 03/2023    Waiting on Biopsy to determine what kind and what stage    Chest pain     Chronic back pain     Chronic neck pain     Clotting disorder     Stomach bleeds from acid    COPD (chronic obstructive pulmonary disease)     Difficulty walking Dec 2022    After surgery wslking has become more and more difficult    DVT (deep venous thrombosis)     GI bleed     Hammer toe Aug 22    Repaired surgery    Hypothyroid     Hypothyroidism     Low back pain     Lung cancer     Migraine     PE (pulmonary thromboembolism)     Plantar fasciitis Years ago    Renal lesion 04/05/2023    Shin splints Years ago      PAST SURGICAL HISTORY  Past Surgical History:   Procedure Laterality Date    BUNIONECTOMY      CHOLECYSTECTOMY      COLONOSCOPY      CORRECTION HAMMER TOE      DILATATION AND CURETTAGE      FOOT FUSION Left 04/20/2022    Procedure: 1-2 Arthrodesis, Tarsal Metatarsal Joint 2 and 3 Arthrodesis;  Surgeon: Bud Maradiaga DPM;  Location:  [facility] OR;  Service: Podiatry;  Laterality: Left;    HAMMER TOE REPAIR Left 04/20/2022    Procedure: Hammertoe Repair 2-5,;  Surgeon: Bud Maradiaga DPM;  Location:  [facility] OR;  Service: Podiatry;  Laterality: Left;    HERNIA REPAIR      HYSTERECTOMY      JOINT REPLACEMENT      OOPHORECTOMY      REDUCTION MAMMAPLASTY  01/2021    REPLACEMENT TOTAL  KNEE      Right knee partial, left knee      FAMILY HISTORY  family history includes Breast cancer in her paternal aunt; Cancer in her father and mother; Diabetes in her father; Heart disease in her mother; Osteoporosis in her mother; Thyroid disease in her father, maternal aunt, maternal uncle, mother, and sister.    SOCIAL HISTORY  Social History     Tobacco Use    Smoking status: Former     Packs/day: 1.50     Years: 46.00     Pack years: 69.00     Types: Cigarettes     Start date: 1972     Quit date: 2018     Years since quittin.4     Passive exposure: Past    Smokeless tobacco: Never    Tobacco comments:     Pt started at age 17, then quit at age 31. Pt restarted smoking at age 32-35 and quit smoking in 2018   Vaping Use    Vaping Use: Never used   Substance Use Topics    Alcohol use: Not Currently     Alcohol/week: 1.0 standard drink     Types: 1 Drinks containing 0.5 oz of alcohol per week     Comment: A chocolate drink made with moonshine and creamer    Drug use: No      ALLERGIES  Erythromycin, Guaifenesin, Hydromorphone, Hydromorphone hcl, Ondansetron hcl, Orphenadrine, Orphenadrine citrate, Codeine, Meperidine, Guaifenesin & derivatives, and Guaifenesin er     MEDICATIONS  Current Outpatient Medications   Medication Sig Dispense Refill    albuterol (PROVENTIL HFA;VENTOLIN HFA) 108 (90 Base) MCG/ACT inhaler Inhale 2 puffs 4 (Four) Times a Day. 6.7 g 0    budesonide (Pulmicort Flexhaler) 90 MCG/ACT inhaler Inhale 2 puffs 2 (Two) Times a Day. Swallow, do not inhale  11    buPROPion XL (WELLBUTRIN XL) 300 MG 24 hr tablet Take 1 tablet by mouth Every Morning.      folic acid (FOLVITE) 1 MG tablet Take 1 tablet by mouth Daily.      HYDROcodone-acetaminophen (NORCO)  MG per tablet Take 1 tablet by mouth Every 6 (Six) Hours As Needed.      levothyroxine (SYNTHROID, LEVOTHROID) 125 MCG tablet Take 1 tablet by mouth Daily.      lidocaine (XYLOCAINE) 5 % ointment Apply 1 application topically to  the appropriate area as directed Every 2 (Two) Hours As Needed for Mild Pain. (Patient taking differently: Apply 1 application  topically to the appropriate area as directed Every 2 (Two) Hours As Needed for Mild Pain. USES ON FOOT) 2500 g 5    pantoprazole (PROTONIX) 40 MG EC tablet Take 1 tablet by mouth Daily.      pramipexole (MIRAPEX) 1 MG tablet Take 1 tablet by mouth 2 (Two) Times a Day.      sucralfate (CARAFATE) 1 GM/10ML suspension Take 10 mL by mouth 4 (Four) Times a Day With Meals & at Bedtime.      tiZANidine (ZANAFLEX) 4 MG tablet Take 0.5-1 tablets by mouth At Night As Needed for Muscle Spasms.      aluminum-magnesium hydroxide-simethicone (MAALOX MAX) 400-400-40 MG/5ML suspension Take 30 mL by mouth Every 6 (Six) Hours As Needed for Indigestion or Heartburn. (Patient not taking: Reported on 9/5/2023)      Cyanocobalamin (VITAMIN B-12 ER) 1000 MCG tablet controlled-release Take 1,000 mcg by mouth Daily. (Patient not taking: Reported on 9/5/2023)      cyclobenzaprine (FLEXERIL) 10 MG tablet Take 1 tablet by mouth 3 (Three) Times a Day As Needed. (Patient not taking: Reported on 9/5/2023)      dexAMETHasone (DECADRON) 4 MG tablet Take 1 tablet by mouth Every 12 (Twelve) Hours. (Patient not taking: Reported on 9/5/2023)      diclofenac (VOLTAREN) 50 MG EC tablet Take 1 tablet by mouth 2 (Two) Times a Day As Needed (pain). (Patient not taking: Reported on 9/5/2023) 60 tablet 0    EPINEPHrine (EPIPEN) 0.3 MG/0.3ML solution auto-injector injection  (Patient not taking: Reported on 9/5/2023)      esomeprazole (nexIUM) 40 MG capsule Take 2 capsules by mouth Every Morning Before Breakfast. OTC (Patient not taking: Reported on 9/5/2023)      fluticasone (FLONASE) 50 MCG/ACT nasal spray 2 sprays into the nostril(s) as directed by provider Daily. (Patient not taking: Reported on 9/5/2023)      HYDROcodone-acetaminophen (NORCO) 7.5-325 MG per tablet Take 1 tablet by mouth Every 8 (Eight) Hours As Needed.       levothyroxine (SYNTHROID, LEVOTHROID) 112 MCG tablet Take 1 tablet by mouth Daily.      methylPREDNISolone (MEDROL) 4 MG dose pack Take as directed on package instructions. (Patient not taking: Reported on 9/5/2023) 1 each 0    metoclopramide (REGLAN) 5 MG tablet Take 1 tablet by mouth 3 (Three) Times a Day As Needed (nausea and vomiting). (Patient not taking: Reported on 9/5/2023) 10 tablet 0    Morphine (MS CONTIN) 15 MG 12 hr tablet Take 1 tablet by mouth. (Patient not taking: Reported on 9/5/2023)      nitroglycerin (NITROSTAT) 0.4 MG SL tablet Place 1 tablet under the tongue Every 5 (Five) Minutes As Needed for Chest Pain. Take no more than 3 doses in 15 minutes. (Patient not taking: Reported on 9/5/2023)      pregabalin (LYRICA) 75 MG capsule Take 1 capsule by mouth 2 (Two) Times a Day. (Patient not taking: Reported on 9/5/2023)      Semaglutide, 1 MG/DOSE, (Ozempic, 1 MG/DOSE,) 4 MG/3ML solution pen-injector Inject 1 mg under the skin into the appropriate area as directed 1 (One) Time Per Week. Friday (Patient not taking: Reported on 9/5/2023)      sucralfate (CARAFATE) 1 GM/10ML suspension Take 10 mL by mouth 4 (Four) Times a Day Before Meals & at Bedtime. 414 mL 0    vitamin D (ERGOCALCIFEROL) 43812 units capsule capsule Take 1 capsule by mouth Every 7 (Seven) Days. SUNDAY (Patient not taking: Reported on 9/5/2023)  6     No current facility-administered medications for this visit.     The following portions of the patient's history were reviewed and updated as appropriate: allergies, current medications, past family history, past medical history, past social history, past surgical history and problem list.    REVIEW OF SYSTEMS  Review of Systems   Constitutional:  Positive for fatigue. Negative for appetite change and unexpected weight change.   HENT:  Negative.     Respiratory:  Positive for shortness of breath. Negative for cough and hemoptysis.    Cardiovascular:  Positive for chest pain (right chest).  "Negative for palpitations.   Gastrointestinal: Negative.    Genitourinary: Negative.     Musculoskeletal:  Negative for arthralgias.   Skin: Negative.    Neurological: Negative.  Negative for dizziness and headaches.   Hematological: Negative.  Negative for adenopathy.   Psychiatric/Behavioral: Negative.     All other systems reviewed and are negative.    I have reviewed and confirmed the accuracy of the ROS as documented by the MA/LPN/RN Jeff Jerry III, MD    PHYSICAL EXAM  VITAL SIGNS:   Vitals:    09/05/23 1111   BP: 137/81   Pulse: 86   Resp: 18   SpO2: 96%   Weight: 92.1 kg (203 lb)   Height: 162.6 cm (64\")   PainSc:   8     Physical Exam  Vitals reviewed.   Constitutional:       Appearance: Normal appearance.   HENT:      Head: Normocephalic.   Eyes:      Pupils: Pupils are equal, round, and reactive to light.   Cardiovascular:      Rate and Rhythm: Normal rate and regular rhythm.      Pulses: Normal pulses.      Heart sounds: Normal heart sounds.   Pulmonary:      Effort: Pulmonary effort is normal.      Breath sounds: Normal breath sounds.   Abdominal:      General: Bowel sounds are normal.   Musculoskeletal:         General: Normal range of motion.      Cervical back: Normal range of motion and neck supple.   Lymphadenopathy:      Cervical: No cervical adenopathy.   Skin:     General: Skin is warm.      Capillary Refill: Capillary refill takes less than 2 seconds.   Neurological:      General: No focal deficit present.      Mental Status: She is alert and oriented to person, place, and time.   Psychiatric:         Mood and Affect: Mood normal.         Behavior: Behavior normal.       Performance Status: ECOG (0) Fully active, able to carry on all predisease performance without restriction    Clinical Quality Measures  -Pain Documented by Standardized Tool, FPS Melly Cruz reports a pain score of 8. Given her pain assessment as noted, treatment options were discussed and the following options " were decided upon as a follow-up plan to address the patient's pain: continuation of current treatment plan for pain and use of non-medical modalities (ice, heat, stretching and/or behavior modifications).   Pain Medications               buPROPion XL (WELLBUTRIN XL) 300 MG 24 hr tablet Take 1 tablet by mouth Every Morning.    HYDROcodone-acetaminophen (NORCO)  MG per tablet Take 1 tablet by mouth Every 6 (Six) Hours As Needed.    tiZANidine (ZANAFLEX) 4 MG tablet Take 0.5-1 tablets by mouth At Night As Needed for Muscle Spasms.    cyclobenzaprine (FLEXERIL) 10 MG tablet Take 1 tablet by mouth 3 (Three) Times a Day As Needed.    dexAMETHasone (DECADRON) 4 MG tablet Take 1 tablet by mouth Every 12 (Twelve) Hours.    diclofenac (VOLTAREN) 50 MG EC tablet Take 1 tablet by mouth 2 (Two) Times a Day As Needed (pain).    HYDROcodone-acetaminophen (NORCO) 7.5-325 MG per tablet Take 1 tablet by mouth Every 8 (Eight) Hours As Needed.    methylPREDNISolone (MEDROL) 4 MG dose pack Take as directed on package instructions.    Morphine (MS CONTIN) 15 MG 12 hr tablet Take 1 tablet by mouth.    pregabalin (LYRICA) 75 MG capsule Take 1 capsule by mouth 2 (Two) Times a Day.          -Advanced Care Planning Advance Care Planning   ACP discussion was held with the patient during this visit. Patient does not have an advance directive, information provided.     -Body Mass Index Screening and Follow-Up Plan BMI is >= 30 and <35. (Class 1 Obesity). The following options were offered after discussion;: none (medical contraindication)    -Tobacco Use: Screening and Cessation Intervention Social History    Tobacco Use      Smoking status: Former        Packs/day: 1.50        Years: 46.00        Pack years: 69        Types: Cigarettes        Start date: 1972        Quit date: 2018        Years since quittin.4        Passive exposure: Past      Smokeless tobacco: Never      Tobacco comments: Pt started at age 17, then quit at  age 31. Pt restarted smoking at age 32-33 and quit smoking in 2018     ASSESSMENT AND PLAN  1. Malignant neoplasm of upper lobe of right lung    2. History of radiation therapy    3. Former smoker      No orders of the defined types were placed in this encounter.    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    The indications and rationale of lung stereotactic/external beam radiation therapy according to the NCCN Guidelines has been discussed today. I have extensively reviewed the risks, benefits and alternatives of therapy with this diagnosis. The risks of radiation therapy includes but is not limited to radiation induced pulmonary fibrosis, progression of disease in spite of therapy with either local or systemic failure. I have seen, examined and reviewed this patient's medication list, appropriate labs and imaging studies as well as other physician notes. We discussed the goals and plans of care with the patient and family and answered all questions.     Following this discussion and in consideration of the diagnostic data/evaluation of the patient, I recommended a course of palliative external beam radiation, I anticipate 3250 cGy over 13 treatments, final course pending. Continue ongoing management per primary care physician and other specialists.     Thank you for allowing me to assist in this patients care.     There are no Patient Instructions on file for this visit.    Time Spent: I spent 58 minutes caring for Melly on this date of service. This time includes time spent by me in the following activities: preparing for the visit, reviewing tests, obtaining and/or reviewing a separately obtained history, performing a medically appropriate examination and/or evaluation, counseling and educating the patient/family/caregiver, ordering medications, tests, or procedures, referring and communicating with other health care professionals,  documenting information in the medical record, independently interpreting results and communicating that information with the patient/family/caregiver, and care coordination.   Jeff Jerry III, MD  09/05/2023

## 2023-09-05 ENCOUNTER — CONSULT (OUTPATIENT)
Dept: RADIATION ONCOLOGY | Facility: HOSPITAL | Age: 68
End: 2023-09-05
Payer: MEDICARE

## 2023-09-05 ENCOUNTER — HOSPITAL ENCOUNTER (OUTPATIENT)
Dept: RADIATION ONCOLOGY | Facility: HOSPITAL | Age: 68
Discharge: HOME | End: 2023-09-05
Payer: MEDICARE

## 2023-09-05 ENCOUNTER — HOSPITAL ENCOUNTER (OUTPATIENT)
Dept: RADIATION ONCOLOGY | Facility: HOSPITAL | Age: 68
Setting detail: RADIATION/ONCOLOGY SERIES
End: 2023-09-05
Payer: MEDICARE

## 2023-09-05 VITALS
SYSTOLIC BLOOD PRESSURE: 137 MMHG | HEIGHT: 64 IN | OXYGEN SATURATION: 96 % | WEIGHT: 203 LBS | BODY MASS INDEX: 34.66 KG/M2 | HEART RATE: 86 BPM | DIASTOLIC BLOOD PRESSURE: 81 MMHG | RESPIRATION RATE: 18 BRPM

## 2023-09-05 DIAGNOSIS — Z87.891 FORMER SMOKER: ICD-10-CM

## 2023-09-05 DIAGNOSIS — C34.11 MALIGNANT NEOPLASM OF UPPER LOBE OF RIGHT LUNG: Primary | ICD-10-CM

## 2023-09-05 DIAGNOSIS — Z92.3 HISTORY OF RADIATION THERAPY: ICD-10-CM

## 2023-09-05 LAB
RAD ONC ARIA COURSE END DATE: NORMAL
RAD ONC ARIA COURSE ID: NORMAL
RAD ONC ARIA COURSE LAST TREATMENT DATE: NORMAL
RAD ONC ARIA COURSE START DATE: NORMAL
RAD ONC ARIA COURSE TREATMENT ELAPSED DAYS: 12
RAD ONC ARIA FIRST TREATMENT DATE: NORMAL
RAD ONC ARIA PLAN FRACTIONS TREATED TO DATE: 4
RAD ONC ARIA PLAN ID: NORMAL
RAD ONC ARIA PLAN NAME: NORMAL
RAD ONC ARIA PLAN PRESCRIBED DOSE PER FRACTION: 12.5 GY
RAD ONC ARIA PLAN PRIMARY REFERENCE POINT: NORMAL
RAD ONC ARIA PLAN TOTAL FRACTIONS PRESCRIBED: 4
RAD ONC ARIA PLAN TOTAL PRESCRIBED DOSE: 5000 CGY
RAD ONC ARIA REFERENCE POINT DOSAGE GIVEN TO DATE: 50 GY
RAD ONC ARIA REFERENCE POINT ID: NORMAL

## 2023-09-05 PROCEDURE — 77290 THER RAD SIMULAJ FIELD CPLX: CPT | Performed by: RADIOLOGY

## 2023-09-05 PROCEDURE — 77334 RADIATION TREATMENT AID(S): CPT | Performed by: RADIOLOGY

## 2023-09-05 RX ORDER — LEVOTHYROXINE SODIUM 0.12 MG/1
125 TABLET ORAL DAILY
COMMUNITY
Start: 2023-08-16

## 2023-09-05 RX ORDER — PANTOPRAZOLE SODIUM 40 MG/1
1 TABLET, DELAYED RELEASE ORAL DAILY
COMMUNITY
Start: 2023-08-22

## 2023-09-05 RX ORDER — HYDROCODONE BITARTRATE AND ACETAMINOPHEN 10; 325 MG/1; MG/1
1 TABLET ORAL EVERY 6 HOURS PRN
COMMUNITY
Start: 2023-09-01 | End: 2023-10-02

## 2023-09-05 RX ORDER — MORPHINE SULFATE 15 MG/1
15 TABLET, FILM COATED, EXTENDED RELEASE ORAL
COMMUNITY
Start: 2023-09-01 | End: 2023-10-02

## 2023-09-05 RX ORDER — SUCRALFATE ORAL 1 G/10ML
1 SUSPENSION ORAL
COMMUNITY
Start: 2023-08-10

## 2023-09-05 RX ORDER — DEXAMETHASONE 4 MG/1
1 TABLET ORAL EVERY 12 HOURS SCHEDULED
COMMUNITY
Start: 2023-09-01

## 2023-09-08 PROCEDURE — 77295 3-D RADIOTHERAPY PLAN: CPT | Performed by: RADIOLOGY

## 2023-09-08 PROCEDURE — 77334 RADIATION TREATMENT AID(S): CPT | Performed by: RADIOLOGY

## 2023-09-08 PROCEDURE — 77300 RADIATION THERAPY DOSE PLAN: CPT | Performed by: RADIOLOGY

## 2023-09-08 PROCEDURE — 77470 SPECIAL RADIATION TREATMENT: CPT | Performed by: RADIOLOGY

## 2023-09-13 ENCOUNTER — HOSPITAL ENCOUNTER (OUTPATIENT)
Dept: RADIATION ONCOLOGY | Facility: HOSPITAL | Age: 68
Setting detail: RADIATION/ONCOLOGY SERIES
Discharge: HOME | End: 2023-09-13
Payer: MEDICARE

## 2023-09-13 ENCOUNTER — DOCUMENTATION (OUTPATIENT)
Dept: RADIATION ONCOLOGY | Facility: HOSPITAL | Age: 68
End: 2023-09-13
Payer: MEDICARE

## 2023-09-13 ENCOUNTER — OFFICE VISIT (OUTPATIENT)
Dept: CARDIAC SURGERY | Facility: CLINIC | Age: 68
End: 2023-09-13
Payer: MEDICARE

## 2023-09-13 VITALS
HEIGHT: 64 IN | SYSTOLIC BLOOD PRESSURE: 142 MMHG | OXYGEN SATURATION: 96 % | HEART RATE: 69 BPM | WEIGHT: 210.6 LBS | BODY MASS INDEX: 35.96 KG/M2 | DIASTOLIC BLOOD PRESSURE: 94 MMHG

## 2023-09-13 DIAGNOSIS — M54.6 CHRONIC MIDLINE THORACIC BACK PAIN: ICD-10-CM

## 2023-09-13 DIAGNOSIS — R91.8 MULTIPLE LUNG NODULES: Primary | ICD-10-CM

## 2023-09-13 DIAGNOSIS — R07.89 CHEST WALL PAIN: ICD-10-CM

## 2023-09-13 DIAGNOSIS — G89.29 CHRONIC MIDLINE THORACIC BACK PAIN: ICD-10-CM

## 2023-09-13 DIAGNOSIS — Z87.891 FORMER SMOKER: ICD-10-CM

## 2023-09-13 PROCEDURE — 77412 RADIATION TX DELIVERY LVL 3: CPT | Performed by: RADIOLOGY

## 2023-09-13 PROCEDURE — 3080F DIAST BP >= 90 MM HG: CPT | Performed by: THORACIC SURGERY (CARDIOTHORACIC VASCULAR SURGERY)

## 2023-09-13 PROCEDURE — 3077F SYST BP >= 140 MM HG: CPT | Performed by: THORACIC SURGERY (CARDIOTHORACIC VASCULAR SURGERY)

## 2023-09-13 PROCEDURE — 99214 OFFICE O/P EST MOD 30 MIN: CPT | Performed by: THORACIC SURGERY (CARDIOTHORACIC VASCULAR SURGERY)

## 2023-09-13 PROCEDURE — 1159F MED LIST DOCD IN RCRD: CPT | Performed by: THORACIC SURGERY (CARDIOTHORACIC VASCULAR SURGERY)

## 2023-09-13 PROCEDURE — 77417 THER RADIOLOGY PORT IMAGE(S): CPT | Performed by: RADIOLOGY

## 2023-09-13 PROCEDURE — 1160F RVW MEDS BY RX/DR IN RCRD: CPT | Performed by: THORACIC SURGERY (CARDIOTHORACIC VASCULAR SURGERY)

## 2023-09-13 NOTE — H&P (VIEW-ONLY)
Chief Complaint   Patient presents with    Pleural Effusion     Pt was re-referred to CT Surgery from Dr. Delarosa    Bone Lesion    Lung Mass         Subjective     History of Present Illness  Other history includes the patient has been seen by Dr. Jerry and he currently recommends palliative external beam radiation for 13 treatments with now stage IV lung cancer with the diagnosis of chest wall metastasis.    The patient is accompanied by an adult male.  Ms. Melly Cruz states she was treated for lung cancer with radiation.  She denies receiving chemotherapy.  She states she underwent a PET scan ordered by Dr. Jerry.  She states the pain is all on the right side.  She states she can not sleep due to  pain.  She states Dr. Jerry showed her that cancer is very present in her rib.  She states Dr. Jerry was treating her arthritis.  She reports beginning radiation today.  She states reports having a normal lung function test in [place].  The adult male reports applying for [place] to care for the patient.  She denies difficulty trouble breathing.  She states her surgery in [place] took approximately 3 hours.  She states she quit smoking 5.5 years ago.  She denies hemoptysis, pneumonia, cough, hoarseness of voice.  She does have chest and back pain.      Review of Systems   Constitutional:  Positive for activity change and fatigue. Negative for appetite change, chills, diaphoresis, fever and unexpected weight change.   HENT:  Negative for dental problem, hearing loss, nosebleeds, sore throat, trouble swallowing and voice change.    Eyes:  Negative for photophobia, redness and visual disturbance.   Respiratory:  Negative for apnea, cough, chest tightness, shortness of breath, wheezing and stridor.    Cardiovascular:  Positive for chest pain. Negative for palpitations and leg swelling.   Gastrointestinal:  Negative for abdominal distention, abdominal pain, blood in stool, constipation, diarrhea, nausea and  vomiting.   Endocrine: Negative for cold intolerance, heat intolerance, polyphagia and polyuria.   Genitourinary:  Negative for decreased urine volume, difficulty urinating, dysuria, flank pain, frequency, hematuria and urgency.   Musculoskeletal:  Positive for arthralgias and back pain. Negative for gait problem, joint swelling, myalgias and neck pain.   Skin:  Negative for pallor, rash and wound.   Allergic/Immunologic: Negative for immunocompromised state.   Neurological:  Negative for dizziness, tremors, seizures, syncope, speech difficulty, weakness, light-headedness, numbness and headaches.   Hematological:  Does not bruise/bleed easily.   Psychiatric/Behavioral:  Negative for confusion, sleep disturbance and suicidal ideas. The patient is not nervous/anxious.         Past Medical History:   Diagnosis Date    Acid reflux     Anemia     Anxiety     Arthritis     Asthma     Mild COPD    Curtis esophagus     Bleeding disorder     Reflux acid overload    Bunion Aug    Repaired with surgery    Cancer 03/2023    Waiting on Biopsy to determine what kind and what stage NOW BONE CANCER    Chest pain     Chronic back pain     Chronic neck pain     Clotting disorder     Stomach bleeds from acid    COPD (chronic obstructive pulmonary disease)     Difficulty walking Dec 2022    After surgery wslking has become more and more difficult    DVT (deep venous thrombosis)     GI bleed     Hammer toe Aug 22    Repaired surgery    Hypothyroid     Hypothyroidism     Low back pain     Lung cancer     Migraine     PE (pulmonary thromboembolism)     Plantar fasciitis Years ago    Renal lesion 04/05/2023    Shin splints Years ago     Past Surgical History:   Procedure Laterality Date    BUNIONECTOMY      CHOLECYSTECTOMY      COLONOSCOPY      CORRECTION HAMMER TOE      DILATATION AND CURETTAGE      FOOT FUSION Left 04/20/2022    Procedure: 1-2 Arthrodesis, Tarsal Metatarsal Joint 2 and 3 Arthrodesis;  Surgeon: Bud Maradiaga DPM;   Location:  [facility] OR;  Service: Podiatry;  Laterality: Left;    HAMMER TOE REPAIR Left 2022    Procedure: Hammertoe Repair 2-5,;  Surgeon: Bud Maradiaga DPM;  Location:  [facility];  Service: Podiatry;  Laterality: Left;    HERNIA REPAIR      HYSTERECTOMY      JOINT REPLACEMENT      OOPHORECTOMY      REDUCTION MAMMAPLASTY  2021    REPLACEMENT TOTAL KNEE      Right knee partial, left knee     Family History   Problem Relation Age of Onset    Breast cancer Paternal Aunt     Heart disease Mother     Osteoporosis Mother     Thyroid disease Mother         Lukemia can’t remember which kind.    Cancer Mother         Pacemaker at 90 years old.    Cancer Father         Father’s entire family  from Cancer    Diabetes Father     Thyroid disease Father     Thyroid disease Maternal Aunt         Blood disorder    Thyroid disease Maternal Uncle     Thyroid disease Sister         Thyroid    Ovarian cancer Neg Hx     Uterine cancer Neg Hx     Colon cancer Neg Hx      Social History     Tobacco Use    Smoking status: Former     Packs/day: 1.50     Years: 46.00     Pack years: 69.00     Types: Cigarettes     Start date: 1972     Quit date: 2018     Years since quittin.4     Passive exposure: Past    Smokeless tobacco: Never    Tobacco comments:     Pt started at age 17, then quit at age 31. Pt restarted smoking at age 32-33 and quit smoking in 2018   Vaping Use    Vaping Use: Never used   Substance Use Topics    Alcohol use: Not Currently     Alcohol/week: 1.0 standard drink     Types: 1 Drinks containing 0.5 oz of alcohol per week     Comment: A chocolate drink made with moonshine and creamer    Drug use: No     No current facility-administered medications for this visit.     No current outpatient medications on file.     Facility-Administered Medications Ordered in Other Visits   Medication Dose Route Frequency Provider Last Rate Last Admin    albuterol (PROVENTIL) nebulizer solution 0.042% 1.25 mg/3mL   1.25 mg Nebulization 4x Daily - RT Brandy Castillo APRN        dextrose (D50W) (25 g/50 mL) IV injection 12.5 g  12.5 g Intravenous PRN Alfredo Hinojosa MD        fentaNYL citrate (PF) (SUBLIMAZE) injection 25 mcg  25 mcg Intravenous Q5 Min PRN Alfredo Hinojosa MD   25 mcg at 09/26/23 1100    fentaNYL citrate (PF) (SUBLIMAZE) injection   Intravenous PRN Hesham Corona CRNA   50 mcg at 09/26/23 1145    lactated ringers infusion 1,000 mL  1,000 mL Intravenous Continuous Agusto Thompson MD 25 mL/hr at 09/26/23 1104 Currently Infusing at 09/26/23 1104    lactated ringers infusion 1,000 mL  1,000 mL Intravenous Continuous Agusto Thompson MD 25 mL/hr at 09/26/23 1104 Currently Infusing at 09/26/23 1104    lactated ringers infusion  9 mL/hr Intravenous Continuous Alfredo Hinojosa MD        lidocaine PF 1% (XYLOCAINE) injection 0.5 mL  0.5 mL Intradermal Once PRN Agusto Thompson MD        midazolam (VERSED) injection 0.5 mg  0.5 mg Intravenous Q10 Min PRN Alfredo Hinojosa MD        Phenylephrine HCl-NaCl 100 mcg/ml injection   Intravenous PRN Hesham Corona CRNA   100 mcg at 09/26/23 1151    Propofol (DIPRIVAN) injection   Intravenous PRN Hesham Corona CRNA   50 mg at 09/26/23 1143    sodium chloride 0.9 % flush 10 mL  10 mL Intravenous Q12H Brandy Castillo APRN        sodium chloride 0.9 % flush 10 mL  10 mL Intravenous PRN Brandy Castillo APRN        sodium chloride 0.9 % flush 10 mL  10 mL Intravenous PRN Agusto Thompson MD        sodium chloride 0.9 % flush 10 mL  10 mL Intravenous Q12H Alfredo Hinojosa MD        sodium chloride 0.9 % flush 10 mL  10 mL Intravenous PRN Alfredo Hinojosa MD        sodium chloride 0.9 % flush 3 mL  3 mL Intravenous PRN Agusto Thompson MD         Allergies:  Erythromycin, Guaifenesin, Hydromorphone hcl, Morphine, Ondansetron hcl, Orphenadrine, Codeine, and Meperidine    Objective   "    Vital Signs  Visit Vitals  /94 (BP Location: Right arm, Patient Position: Sitting, Cuff Size: Adult)   Pulse 69   Ht 162.6 cm (64\")   Wt 95.5 kg (210 lb 9.6 oz)   SpO2 96%   BMI 36.15 kg/m²       Physical Exam  Constitutional:       Appearance: She is well-developed. She is ill-appearing.      Comments: No acute distress, appropriate, alert.   HENT:      Head: Normocephalic and atraumatic.   Eyes:      Pupils: Pupils are equal, round, and reactive to light.   Neck:      Thyroid: No thyromegaly.      Vascular: No JVD.      Trachea: No tracheal deviation.   Cardiovascular:      Rate and Rhythm: Normal rate and regular rhythm.      Heart sounds: Normal heart sounds. No murmur heard.    No friction rub. No gallop.   Pulmonary:      Effort: Pulmonary effort is normal. No respiratory distress.      Breath sounds: Normal breath sounds. No wheezing or rales.   Chest:      Chest wall: No tenderness.   Abdominal:      General: There is no distension.      Palpations: Abdomen is soft.      Tenderness: There is no abdominal tenderness.   Musculoskeletal:         General: Normal range of motion.      Cervical back: Normal range of motion and neck supple.   Lymphadenopathy:      Cervical: No cervical adenopathy.   Skin:     General: Skin is warm and dry.   Neurological:      Mental Status: She is alert and oriented to person, place, and time.      Cranial Nerves: No cranial nerve deficit.       Results Review:   XR Ribs Right With PA Chest    Result Date: 8/25/2023  Narrative: EXAMINATION: XR RIBS RIGHT W PA CHEST-  8/25/2023 7:25 PM CDT  HISTORY: rib and chest pain; hx of cancer  Chest x-ray and right rib series.  Comparison is made with a chest x-ray from 06/27/2023.  Normal heart and mediastinum.  Chronic interstitial lung disease. Known right apical spiculated nodule.  No pneumothorax. One of the oblique rib images shows small amount of right pleural fluid and the appearance of some mild right basilar infiltrate or " atelectasis.  Oblique right rib images show no fracture and no bone lesion.  Summary: 1. Mild right basilar infiltrate or atelectasis and trace amount right pleural fluid. 2. No right rib fracture is seen. This report was finalized on 08/25/2023 20:20 by Dr. Jorge Willoughby MD.    CT Chest With Contrast Diagnostic    Result Date: 8/27/2023  Narrative: EXAMINATION: CT CHEST W CONTRAST DIAGNOSTIC-   8/27/2023 5:43 PM CDT  HISTORY: History of lung cancer and radiation therapy. Right chest pain and back pain.  In order to have a CT radiation dose as low as reasonably achievable Automated Exposure Control was utilized for adjustment of the mA and/or KV according to patient size.  DLP in mGycm= 211.  Chest CT with IV contrast. Axial, sagittal, and coronal sequences.  Comparison is made with 06/08/2023.  Normal heart size. Coronary artery calcification. Normal thoracic aorta.  Normal and symmetric appearance of the pulmonary arteries.  No mediastinal mass.  Fully expanded lungs. No pneumonia, pneumothorax, or pleural effusion.  A linear soft tissue focus within the right apex measures 23 x 11 mm compared with 33 x 12 mm. A new 10 x 7 mm nodule within the central right lung lies at the confluence of the minor and major fissure. There is an adjacent 5 mm nodule and scattered 3-4 mm nodules within the adjacent right middle lobe. 7 mm subpleural right middle lobe nodule previously measured 4 mm. New finding of 2 adjacent 10 mm subpleural nodules within the anterior medial right middle lobe.  No left lung abnormality is seen.  Summary: 1. History of lung cancer noted. Multiple new small nodules within the right lung compatible with metastatic disease. 2. No pneumonia or pneumothorax to account for new right sided chest pain/back pain.              This report was finalized on 08/27/2023 18:22 by Dr. Jorge Willoughby MD.    NM PET/CT Skull Base to Mid Thigh    Result Date: 8/31/2023  Narrative: EXAMINATION:  NM PET/CT SKULL BASE TO  MID THIGH-  8/31/2023 8:15 AM CDT  HISTORY: R91.8-Other nonspecific abnormal finding of lung field. Status post radiation therapy on a right lung nodule. A recent follow-up CT demonstrated multiple new nodules. Restaging examination.  COMPARISON: 03/17/2023.  TECHNIQUE: The patient was injected with 12.4 m-Ci of F-18-FDG intravenously. Blood glucose was 105 mg/dl. After an appropriate uptake time, CT and PET images were obtained from the skull base to the mid thighs. Image fusion was also performed. CT images were obtained for attenuation and localization purposes.  DLP: 996 mGy-cm. Automated exposure control was utilized to decrease patient dose.  HEAD AND NECK: No abnormal uptake is seen in the head and neck region on the PET images. CT images demonstrate no focal abnormality of the unenhanced brain. The parotid and submandibular glands are symmetric. The thyroid gland is either very small or absent. There are no pathologically enlarged lymph nodes identified on CT images. There is no airway asymmetry.  CHEST: Uptake in the right upper lobe mass is decreased on the current study, now having an SUV max of 3.5, previously 4.1. The overall size of the right upper lobe abnormality appears relatively stable. There is abnormal uptake in 2 of the anterior pleural-based pulmonary nodules in the right middle lobe anteriorly, one with an SUV max of 4.2 and the other with an SUV max of 3.6. There is abnormal uptake within lymph nodes in the right cardiophrenic angle fat pad. There are 2 lymph nodes with uptake in this region, one with an SUV max of 4.1 and the other with an SUV max of 2.9. In the extreme right lung base, there are 3 other small areas of focal pleural-based uptake posteriorly and one anteriorly likely related to metastatic pleural nodules. There is a small right pleural effusion. There is a larger area of abnormal uptake that appears to represent a mass in the posterior chest wall and involving the  posterolateral right 10th rib with an SUV max of 20. There is another small focus of activity anterior to the liver that appears to be in the lower anterior chest wall between the anterior left sixth and seventh ribs. CT images demonstrate cardiomegaly. There is mild atheromatous disease of the thoracic aorta and coronary arteries. There are multiple subcentimeter pulmonary nodules in the right lung that do not demonstrate significant had activity. There is an area of fat necrosis in the left breast.  ABDOMEN AND PELVIS: Physiologic uptake is seen within the solid organs, GI tract and urinary tract. An area of focal uptake described in the sigmoid colon on the prior study is not seen today. I do not see any definite evidence of metastatic disease in the abdomen or the pelvis. On CT images, there has been prior cholecystectomy. There is diverticulosis of the colon. The appendix is normal. There is atheromatous disease of the aortoiliac vessels.  VISUALIZED EXTREMITIES: There is some uptake in the right shoulder joint that is probably related to inflammatory change.       Impression: 1. The right upper lobe primary neoplasm SUV max is decreased on today's study compared to the prior study. The size appears relatively stable. 2. There are pleural-based areas of nodularity in the right middle lobe demonstrating uptake on the PET images worrisome for metastatic disease. I suspect that there is metastatic pleural disease. There is a right pleural effusion and there are several pleural-based areas of focal activity. The patient also has multiple small subcentimeter lung nodules that do not demonstrate significant PET activity which may be related to their small size. The nodules are reportedly new on CT. 3. Probable chest wall lesion posteriorly on the right that has some involvement of the posterolateral right 10th rib demonstrating abnormal PET activity. 4. Abnormal uptake in right cardiophrenic angle lymph nodes  consistent with metastatic adenopathy. 5. No metastatic disease is seen in the neck, abdomen or pelvis. This report was finalized on 08/31/2023 11:17 by Dr. Brandon Fenton MD.     I did review the 08/27/2023 CT scan of the chest myself. Her previously known right upper lobe linear nodularity is noted. There are new lung nodules throughout the right lung and are concerning for metastatic disease.    08/31/2023 PET scan reveals right upper lobe lung cancer primary SUV max has decreased compared to previous study, now measuring 3.5 compared to previous 4.1 in comparison. The lesion appears to be about the same size on PET. The nodules that are new, all are hypermetabolic. There appears to be a mass in the posterior chest wall along the posterolateral right 10th rib and this has a maximum of 20.      I reviewed the patient's new clinical results.  Discussed with patient and family.      Assessment & Plan     Diagnoses and all orders for this visit:    1. Multiple lung nodules (Primary)    2. Chest wall pain    3. Chronic midline thoracic back pain    4. Former smoker        Impression:  1. Stage IV presumed lung cancer, right upper lobe original.  2. Previous tobacco use.  3. History of DVT and PE.  4. Chest wall pain, highly concerning for metastasis, empiric radiation has been started.  5. Chronic pain syndrome, treated by Dr. Lopez.  6. Multiple lung nodules.     Medical decision making/Recommendations/Plan:  Many thanks Dr. Delarosa for the opportunity to aid in the care of your patient.  I did review imaging today with the patient.  Salient findings include a persistent right upper lobe lung nodule as well as multiple new right-sided lung nodules including chest wall metastasis.  We discussed at this point the working clinical impression of metastatic lung cancer.  Discussed at this point a true diagnosis remains elusive, although the clinical suspicion is high that this is a stage IV to chest wall and ipsilateral  lobes metastasis.  I did speak with Dr. Delarosa as well.  A tissue diagnosis remains elusive.  She has already undergone bronchoscopic efforts at [place], which was non-diagnostic.  Therefore, we discussed her options in detail.  Malignancy is high on the differential of course.  Options include palliative medicine consultation with empiric radiation as needed or with stereotactic radiation for palliation versus obtaining tissue and considering a systemic therapy including immune therapy.  The patient is strongly interested at this time to obtain a diagnosis and undergo treatment as indicated.  She also has questions about pain relief.  She has started radiation today.  Encouraged her to seek with Dr. Duarte to aid as an adjunct with pain medications indicated, but likely will have the best relief with radiation.  She should start noticing resolution improvement in her pain in the next couple of treatment courses.  To that end, discussed the recommendation to consider bronchoscopy, right thoracoscopy, wedge resection as indicated for diagnosis.  We discussed alternative option of CT guided needle biopsy.  The pros and cons of each were discussed at length.  Resection is favored.  We will discuss the operative expected postoperative course.  Discussed the risks of procedure to include but not limited to bleeding, infection, stroke, heart attack, need for additional procedures, new VTE event including a PE, organ dysfunction and/or failure including death.  All questions were answered to the best of my ability and she is eager to proceed forward as soon as possible. She has been congratulated.  She remains a non-smoker.  Many thanks again Dr. Delarosa for opportunity to aid in the care of your patient.  I will continue to keep you apprised of care as it ensues with rediscussion following a wedge resection for diagnosis acquisition.        Transcribed from ambient dictation for Agusto Thompson MD by Roxi  Greg.  09/13/23   12:14 CDT    Patient or patient representative verbalized consent to the visit recording.  I have personally performed the services described in this document as transcribed by the above individual, and it is both accurate and complete.

## 2023-09-13 NOTE — PROGRESS NOTES
MINA met with Mrs. Cruz who is here to start radiation treatment for malignant neoplasm of upper lobe of right lung. MINA introduced self and explained role and source of support. She is 68 years old and lives with her spouse. She has a strong support system which includes her spouse and friends. She said a large part of her support system comes through friends on her [employer]. She does not have any transportation concerns but states she is on a fixed income. She does meet criteria for assistance. She was feeling nervous about treatment due to past side effects of radiation. Emotional reassurance provided. She does see a counselor through [facility] she has been going there for about nine years. She sees her counselor every three months. MINA encouraged her to call if assistance is needed in the future.

## 2023-09-13 NOTE — PROGRESS NOTES
Chief Complaint   Patient presents with    Pleural Effusion     Pt was re-referred to CT Surgery from Dr. Delarosa    Bone Lesion    Lung Mass         Subjective     History of Present Illness  Other history includes the patient has been seen by Dr. Jerry and he currently recommends palliative external beam radiation for 13 treatments with now stage IV lung cancer with the diagnosis of chest wall metastasis.    The patient is accompanied by an adult male.  Ms. Melly Cruz states she was treated for lung cancer with radiation.  She denies receiving chemotherapy.  She states she underwent a PET scan ordered by Dr. Jerry.  She states the pain is all on the right side.  She states she can not sleep due to  pain.  She states Dr. Jerry showed her that cancer is very present in her rib.  She states Dr. Jerry was treating her arthritis.  She reports beginning radiation today.  She states reports having a normal lung function test in [place].  The adult male reports applying for [place] to care for the patient.  She denies difficulty trouble breathing.  She states her surgery in [place] took approximately 3 hours.  She states she quit smoking 5.5 years ago.  She denies hemoptysis, pneumonia, cough, hoarseness of voice.  She does have chest and back pain.      Review of Systems   Constitutional:  Positive for activity change and fatigue. Negative for appetite change, chills, diaphoresis, fever and unexpected weight change.   HENT:  Negative for dental problem, hearing loss, nosebleeds, sore throat, trouble swallowing and voice change.    Eyes:  Negative for photophobia, redness and visual disturbance.   Respiratory:  Negative for apnea, cough, chest tightness, shortness of breath, wheezing and stridor.    Cardiovascular:  Positive for chest pain. Negative for palpitations and leg swelling.   Gastrointestinal:  Negative for abdominal distention, abdominal pain, blood in stool, constipation, diarrhea, nausea and  vomiting.   Endocrine: Negative for cold intolerance, heat intolerance, polyphagia and polyuria.   Genitourinary:  Negative for decreased urine volume, difficulty urinating, dysuria, flank pain, frequency, hematuria and urgency.   Musculoskeletal:  Positive for arthralgias and back pain. Negative for gait problem, joint swelling, myalgias and neck pain.   Skin:  Negative for pallor, rash and wound.   Allergic/Immunologic: Negative for immunocompromised state.   Neurological:  Negative for dizziness, tremors, seizures, syncope, speech difficulty, weakness, light-headedness, numbness and headaches.   Hematological:  Does not bruise/bleed easily.   Psychiatric/Behavioral:  Negative for confusion, sleep disturbance and suicidal ideas. The patient is not nervous/anxious.         Past Medical History:   Diagnosis Date    Acid reflux     Anemia     Anxiety     Arthritis     Asthma     Mild COPD    Curtis esophagus     Bleeding disorder     Reflux acid overload    Bunion Aug    Repaired with surgery    Cancer 03/2023    Waiting on Biopsy to determine what kind and what stage NOW BONE CANCER    Chest pain     Chronic back pain     Chronic neck pain     Clotting disorder     Stomach bleeds from acid    COPD (chronic obstructive pulmonary disease)     Difficulty walking Dec 2022    After surgery wslking has become more and more difficult    DVT (deep venous thrombosis)     GI bleed     Hammer toe Aug 22    Repaired surgery    Hypothyroid     Hypothyroidism     Low back pain     Lung cancer     Migraine     PE (pulmonary thromboembolism)     Plantar fasciitis Years ago    Renal lesion 04/05/2023    Shin splints Years ago     Past Surgical History:   Procedure Laterality Date    BUNIONECTOMY      CHOLECYSTECTOMY      COLONOSCOPY      CORRECTION HAMMER TOE      DILATATION AND CURETTAGE      FOOT FUSION Left 04/20/2022    Procedure: 1-2 Arthrodesis, Tarsal Metatarsal Joint 2 and 3 Arthrodesis;  Surgeon: Bud Maradiaga DPM;   Location:  [facility];  Service: Podiatry;  Laterality: Left;    HAMMER TOE REPAIR Left 2022    Procedure: Hammertoe Repair 2-5,;  Surgeon: Bud Maradiaga DPM;  Location:  [facility];  Service: Podiatry;  Laterality: Left;    HERNIA REPAIR      HYSTERECTOMY      JOINT REPLACEMENT      OOPHORECTOMY      REDUCTION MAMMAPLASTY  2021    REPLACEMENT TOTAL KNEE      Right knee partial, left knee     Family History   Problem Relation Age of Onset    Breast cancer Paternal Aunt     Heart disease Mother     Osteoporosis Mother     Thyroid disease Mother         Lukemia can’t remember which kind.    Cancer Mother         Pacemaker at 90 years old.    Cancer Father         Father’s entire family  from Cancer    Diabetes Father     Thyroid disease Father     Thyroid disease Maternal Aunt         Blood disorder    Thyroid disease Maternal Uncle     Thyroid disease Sister         Thyroid    Ovarian cancer Neg Hx     Uterine cancer Neg Hx     Colon cancer Neg Hx      Social History     Tobacco Use    Smoking status: Former     Packs/day: 1.50     Years: 46.00     Pack years: 69.00     Types: Cigarettes     Start date: 1972     Quit date: 2018     Years since quittin.4     Passive exposure: Past    Smokeless tobacco: Never    Tobacco comments:     Pt started at age 17, then quit at age 31. Pt restarted smoking at age 32-33 and quit smoking in 2018   Vaping Use    Vaping Use: Never used   Substance Use Topics    Alcohol use: Not Currently     Alcohol/week: 1.0 standard drink     Types: 1 Drinks containing 0.5 oz of alcohol per week     Comment: A chocolate drink made with moonshine and creamer    Drug use: No     No current facility-administered medications for this visit.     No current outpatient medications on file.     Facility-Administered Medications Ordered in Other Visits   Medication Dose Route Frequency Provider Last Rate Last Admin    albuterol (PROVENTIL) nebulizer solution 0.042% 1.25 mg/3mL   1.25 mg Nebulization 4x Daily - RT Brandy Castillo APRN        dextrose (D50W) (25 g/50 mL) IV injection 12.5 g  12.5 g Intravenous PRN Alfredo Hinojosa MD        fentaNYL citrate (PF) (SUBLIMAZE) injection 25 mcg  25 mcg Intravenous Q5 Min PRN Alfredo Hinojosa MD   25 mcg at 09/26/23 1100    fentaNYL citrate (PF) (SUBLIMAZE) injection   Intravenous PRN Hesham Corona CRNA   50 mcg at 09/26/23 1145    lactated ringers infusion 1,000 mL  1,000 mL Intravenous Continuous Agusto Thompson MD 25 mL/hr at 09/26/23 1104 Currently Infusing at 09/26/23 1104    lactated ringers infusion 1,000 mL  1,000 mL Intravenous Continuous Agusto Thompson MD 25 mL/hr at 09/26/23 1104 Currently Infusing at 09/26/23 1104    lactated ringers infusion  9 mL/hr Intravenous Continuous Alfredo Hinojosa MD        lidocaine PF 1% (XYLOCAINE) injection 0.5 mL  0.5 mL Intradermal Once PRN Agusto Thompson MD        midazolam (VERSED) injection 0.5 mg  0.5 mg Intravenous Q10 Min PRN Alfredo Hinojosa MD        Phenylephrine HCl-NaCl 100 mcg/ml injection   Intravenous PRN Hesham Corona CRNA   100 mcg at 09/26/23 1151    Propofol (DIPRIVAN) injection   Intravenous PRN Hesham Corona CRNA   50 mg at 09/26/23 1143    sodium chloride 0.9 % flush 10 mL  10 mL Intravenous Q12H Brandy Castillo APRN        sodium chloride 0.9 % flush 10 mL  10 mL Intravenous PRN Brandy Castillo APRN        sodium chloride 0.9 % flush 10 mL  10 mL Intravenous PRN Agusto Thompson MD        sodium chloride 0.9 % flush 10 mL  10 mL Intravenous Q12H Alfredo Hinojosa MD        sodium chloride 0.9 % flush 10 mL  10 mL Intravenous PRN Alfredo Hinojosa MD        sodium chloride 0.9 % flush 3 mL  3 mL Intravenous PRN Agusto Thompson MD         Allergies:  Erythromycin, Guaifenesin, Hydromorphone hcl, Morphine, Ondansetron hcl, Orphenadrine, Codeine, and Meperidine    Objective   "    Vital Signs  Visit Vitals  /94 (BP Location: Right arm, Patient Position: Sitting, Cuff Size: Adult)   Pulse 69   Ht 162.6 cm (64\")   Wt 95.5 kg (210 lb 9.6 oz)   SpO2 96%   BMI 36.15 kg/m²       Physical Exam  Constitutional:       Appearance: She is well-developed. She is ill-appearing.      Comments: No acute distress, appropriate, alert.   HENT:      Head: Normocephalic and atraumatic.   Eyes:      Pupils: Pupils are equal, round, and reactive to light.   Neck:      Thyroid: No thyromegaly.      Vascular: No JVD.      Trachea: No tracheal deviation.   Cardiovascular:      Rate and Rhythm: Normal rate and regular rhythm.      Heart sounds: Normal heart sounds. No murmur heard.    No friction rub. No gallop.   Pulmonary:      Effort: Pulmonary effort is normal. No respiratory distress.      Breath sounds: Normal breath sounds. No wheezing or rales.   Chest:      Chest wall: No tenderness.   Abdominal:      General: There is no distension.      Palpations: Abdomen is soft.      Tenderness: There is no abdominal tenderness.   Musculoskeletal:         General: Normal range of motion.      Cervical back: Normal range of motion and neck supple.   Lymphadenopathy:      Cervical: No cervical adenopathy.   Skin:     General: Skin is warm and dry.   Neurological:      Mental Status: She is alert and oriented to person, place, and time.      Cranial Nerves: No cranial nerve deficit.       Results Review:   XR Ribs Right With PA Chest    Result Date: 8/25/2023  Narrative: EXAMINATION: XR RIBS RIGHT W PA CHEST-  8/25/2023 7:25 PM CDT  HISTORY: rib and chest pain; hx of cancer  Chest x-ray and right rib series.  Comparison is made with a chest x-ray from 06/27/2023.  Normal heart and mediastinum.  Chronic interstitial lung disease. Known right apical spiculated nodule.  No pneumothorax. One of the oblique rib images shows small amount of right pleural fluid and the appearance of some mild right basilar infiltrate or " atelectasis.  Oblique right rib images show no fracture and no bone lesion.  Summary: 1. Mild right basilar infiltrate or atelectasis and trace amount right pleural fluid. 2. No right rib fracture is seen. This report was finalized on 08/25/2023 20:20 by Dr. Jorge Willoughby MD.    CT Chest With Contrast Diagnostic    Result Date: 8/27/2023  Narrative: EXAMINATION: CT CHEST W CONTRAST DIAGNOSTIC-   8/27/2023 5:43 PM CDT  HISTORY: History of lung cancer and radiation therapy. Right chest pain and back pain.  In order to have a CT radiation dose as low as reasonably achievable Automated Exposure Control was utilized for adjustment of the mA and/or KV according to patient size.  DLP in mGycm= 211.  Chest CT with IV contrast. Axial, sagittal, and coronal sequences.  Comparison is made with 06/08/2023.  Normal heart size. Coronary artery calcification. Normal thoracic aorta.  Normal and symmetric appearance of the pulmonary arteries.  No mediastinal mass.  Fully expanded lungs. No pneumonia, pneumothorax, or pleural effusion.  A linear soft tissue focus within the right apex measures 23 x 11 mm compared with 33 x 12 mm. A new 10 x 7 mm nodule within the central right lung lies at the confluence of the minor and major fissure. There is an adjacent 5 mm nodule and scattered 3-4 mm nodules within the adjacent right middle lobe. 7 mm subpleural right middle lobe nodule previously measured 4 mm. New finding of 2 adjacent 10 mm subpleural nodules within the anterior medial right middle lobe.  No left lung abnormality is seen.  Summary: 1. History of lung cancer noted. Multiple new small nodules within the right lung compatible with metastatic disease. 2. No pneumonia or pneumothorax to account for new right sided chest pain/back pain.              This report was finalized on 08/27/2023 18:22 by Dr. Jorge Willoughby MD.    NM PET/CT Skull Base to Mid Thigh    Result Date: 8/31/2023  Narrative: EXAMINATION:  NM PET/CT SKULL BASE TO  MID THIGH-  8/31/2023 8:15 AM CDT  HISTORY: R91.8-Other nonspecific abnormal finding of lung field. Status post radiation therapy on a right lung nodule. A recent follow-up CT demonstrated multiple new nodules. Restaging examination.  COMPARISON: 03/17/2023.  TECHNIQUE: The patient was injected with 12.4 m-Ci of F-18-FDG intravenously. Blood glucose was 105 mg/dl. After an appropriate uptake time, CT and PET images were obtained from the skull base to the mid thighs. Image fusion was also performed. CT images were obtained for attenuation and localization purposes.  DLP: 996 mGy-cm. Automated exposure control was utilized to decrease patient dose.  HEAD AND NECK: No abnormal uptake is seen in the head and neck region on the PET images. CT images demonstrate no focal abnormality of the unenhanced brain. The parotid and submandibular glands are symmetric. The thyroid gland is either very small or absent. There are no pathologically enlarged lymph nodes identified on CT images. There is no airway asymmetry.  CHEST: Uptake in the right upper lobe mass is decreased on the current study, now having an SUV max of 3.5, previously 4.1. The overall size of the right upper lobe abnormality appears relatively stable. There is abnormal uptake in 2 of the anterior pleural-based pulmonary nodules in the right middle lobe anteriorly, one with an SUV max of 4.2 and the other with an SUV max of 3.6. There is abnormal uptake within lymph nodes in the right cardiophrenic angle fat pad. There are 2 lymph nodes with uptake in this region, one with an SUV max of 4.1 and the other with an SUV max of 2.9. In the extreme right lung base, there are 3 other small areas of focal pleural-based uptake posteriorly and one anteriorly likely related to metastatic pleural nodules. There is a small right pleural effusion. There is a larger area of abnormal uptake that appears to represent a mass in the posterior chest wall and involving the  posterolateral right 10th rib with an SUV max of 20. There is another small focus of activity anterior to the liver that appears to be in the lower anterior chest wall between the anterior left sixth and seventh ribs. CT images demonstrate cardiomegaly. There is mild atheromatous disease of the thoracic aorta and coronary arteries. There are multiple subcentimeter pulmonary nodules in the right lung that do not demonstrate significant had activity. There is an area of fat necrosis in the left breast.  ABDOMEN AND PELVIS: Physiologic uptake is seen within the solid organs, GI tract and urinary tract. An area of focal uptake described in the sigmoid colon on the prior study is not seen today. I do not see any definite evidence of metastatic disease in the abdomen or the pelvis. On CT images, there has been prior cholecystectomy. There is diverticulosis of the colon. The appendix is normal. There is atheromatous disease of the aortoiliac vessels.  VISUALIZED EXTREMITIES: There is some uptake in the right shoulder joint that is probably related to inflammatory change.       Impression: 1. The right upper lobe primary neoplasm SUV max is decreased on today's study compared to the prior study. The size appears relatively stable. 2. There are pleural-based areas of nodularity in the right middle lobe demonstrating uptake on the PET images worrisome for metastatic disease. I suspect that there is metastatic pleural disease. There is a right pleural effusion and there are several pleural-based areas of focal activity. The patient also has multiple small subcentimeter lung nodules that do not demonstrate significant PET activity which may be related to their small size. The nodules are reportedly new on CT. 3. Probable chest wall lesion posteriorly on the right that has some involvement of the posterolateral right 10th rib demonstrating abnormal PET activity. 4. Abnormal uptake in right cardiophrenic angle lymph nodes  consistent with metastatic adenopathy. 5. No metastatic disease is seen in the neck, abdomen or pelvis. This report was finalized on 08/31/2023 11:17 by Dr. Brandon Fenton MD.     I did review the 08/27/2023 CT scan of the chest myself. Her previously known right upper lobe linear nodularity is noted. There are new lung nodules throughout the right lung and are concerning for metastatic disease.    08/31/2023 PET scan reveals right upper lobe lung cancer primary SUV max has decreased compared to previous study, now measuring 3.5 compared to previous 4.1 in comparison. The lesion appears to be about the same size on PET. The nodules that are new, all are hypermetabolic. There appears to be a mass in the posterior chest wall along the posterolateral right 10th rib and this has a maximum of 20.      I reviewed the patient's new clinical results.  Discussed with patient and family.      Assessment & Plan     Diagnoses and all orders for this visit:    1. Multiple lung nodules (Primary)    2. Chest wall pain    3. Chronic midline thoracic back pain    4. Former smoker        Impression:  1. Stage IV presumed lung cancer, right upper lobe original.  2. Previous tobacco use.  3. History of DVT and PE.  4. Chest wall pain, highly concerning for metastasis, empiric radiation has been started.  5. Chronic pain syndrome, treated by Dr. Lopez.  6. Multiple lung nodules.     Medical decision making/Recommendations/Plan:  Many thanks Dr. Delarosa for the opportunity to aid in the care of your patient.  I did review imaging today with the patient.  Salient findings include a persistent right upper lobe lung nodule as well as multiple new right-sided lung nodules including chest wall metastasis.  We discussed at this point the working clinical impression of metastatic lung cancer.  Discussed at this point a true diagnosis remains elusive, although the clinical suspicion is high that this is a stage IV to chest wall and ipsilateral  lobes metastasis.  I did speak with Dr. Delarosa as well.  A tissue diagnosis remains elusive.  She has already undergone bronchoscopic efforts at [facility], which was non-diagnostic.  Therefore, we discussed her options in detail.  Malignancy is high on the differential of course.  Options include palliative medicine consultation with empiric radiation as needed or with stereotactic radiation for palliation versus obtaining tissue and considering a systemic therapy including immune therapy.  The patient is strongly interested at this time to obtain a diagnosis and undergo treatment as indicated.  She also has questions about pain relief.  She has started radiation today.  Encouraged her to seek with Dr. Duarte to aid as an adjunct with pain medications indicated, but likely will have the best relief with radiation.  She should start noticing resolution improvement in her pain in the next couple of treatment courses.  To that end, discussed the recommendation to consider bronchoscopy, right thoracoscopy, wedge resection as indicated for diagnosis.  We discussed alternative option of CT guided needle biopsy.  The pros and cons of each were discussed at length.  Resection is favored.  We will discuss the operative expected postoperative course.  Discussed the risks of procedure to include but not limited to bleeding, infection, stroke, heart attack, need for additional procedures, new VTE event including a PE, organ dysfunction and/or failure including death.  All questions were answered to the best of my ability and she is eager to proceed forward as soon as possible. She has been congratulated.  She remains a non-smoker.  Many thanks again Dr. Delarosa for opportunity to aid in the care of your patient.  I will continue to keep you apprised of care as it ensues with rediscussion following a wedge resection for diagnosis acquisition.        Transcribed from ambient dictation for Agusto Thompson MD by Roxi  Greg.  09/13/23   12:14 CDT    Patient or patient representative verbalized consent to the visit recording.  I have personally performed the services described in this document as transcribed by the above individual, and it is both accurate and complete.

## 2023-09-14 ENCOUNTER — HOSPITAL ENCOUNTER (OUTPATIENT)
Dept: RADIATION ONCOLOGY | Facility: HOSPITAL | Age: 68
Setting detail: RADIATION/ONCOLOGY SERIES
Discharge: HOME | End: 2023-09-14
Payer: MEDICARE

## 2023-09-14 LAB
RAD ONC ARIA COURSE ID: NORMAL
RAD ONC ARIA COURSE LAST TREATMENT DATE: NORMAL
RAD ONC ARIA COURSE START DATE: NORMAL
RAD ONC ARIA COURSE TREATMENT ELAPSED DAYS: 1
RAD ONC ARIA FIRST TREATMENT DATE: NORMAL
RAD ONC ARIA PLAN FRACTIONS TREATED TO DATE: 2
RAD ONC ARIA PLAN ID: NORMAL
RAD ONC ARIA PLAN PRESCRIBED DOSE PER FRACTION: 2.5 GY
RAD ONC ARIA PLAN PRIMARY REFERENCE POINT: NORMAL
RAD ONC ARIA PLAN TOTAL FRACTIONS PRESCRIBED: 13
RAD ONC ARIA PLAN TOTAL PRESCRIBED DOSE: 3250 CGY
RAD ONC ARIA REFERENCE POINT DOSAGE GIVEN TO DATE: 5 GY
RAD ONC ARIA REFERENCE POINT ID: NORMAL
RAD ONC ARIA REFERENCE POINT SESSION DOSAGE GIVEN: 2.5 GY

## 2023-09-14 PROCEDURE — 77417 THER RADIOLOGY PORT IMAGE(S): CPT | Performed by: RADIOLOGY

## 2023-09-14 PROCEDURE — 77412 RADIATION TX DELIVERY LVL 3: CPT | Performed by: RADIOLOGY

## 2023-09-15 ENCOUNTER — TELEPHONE (OUTPATIENT)
Dept: CARDIAC SURGERY | Facility: CLINIC | Age: 68
End: 2023-09-15

## 2023-09-15 ENCOUNTER — HOSPITAL ENCOUNTER (OUTPATIENT)
Dept: RADIATION ONCOLOGY | Facility: HOSPITAL | Age: 68
Setting detail: RADIATION/ONCOLOGY SERIES
Discharge: HOME | End: 2023-09-15
Payer: MEDICARE

## 2023-09-15 ENCOUNTER — PREP FOR SURGERY (OUTPATIENT)
Dept: OTHER | Facility: HOSPITAL | Age: 68
End: 2023-09-15
Payer: MEDICARE

## 2023-09-15 ENCOUNTER — TELEPHONE (OUTPATIENT)
Dept: RADIATION ONCOLOGY | Facility: HOSPITAL | Age: 68
End: 2023-09-15
Payer: MEDICARE

## 2023-09-15 ENCOUNTER — TELEPHONE (OUTPATIENT)
Dept: CARDIAC SURGERY | Facility: CLINIC | Age: 68
End: 2023-09-15
Payer: MEDICARE

## 2023-09-15 DIAGNOSIS — R06.02 BREATH SHORTNESS: ICD-10-CM

## 2023-09-15 DIAGNOSIS — C34.11 MALIGNANT NEOPLASM OF UPPER LOBE OF RIGHT LUNG: Primary | ICD-10-CM

## 2023-09-15 LAB
RAD ONC ARIA COURSE ID: NORMAL
RAD ONC ARIA COURSE LAST TREATMENT DATE: NORMAL
RAD ONC ARIA COURSE START DATE: NORMAL
RAD ONC ARIA COURSE TREATMENT ELAPSED DAYS: 2
RAD ONC ARIA FIRST TREATMENT DATE: NORMAL
RAD ONC ARIA PLAN FRACTIONS TREATED TO DATE: 3
RAD ONC ARIA PLAN ID: NORMAL
RAD ONC ARIA PLAN PRESCRIBED DOSE PER FRACTION: 2.5 GY
RAD ONC ARIA PLAN PRIMARY REFERENCE POINT: NORMAL
RAD ONC ARIA PLAN TOTAL FRACTIONS PRESCRIBED: 13
RAD ONC ARIA PLAN TOTAL PRESCRIBED DOSE: 3250 CGY
RAD ONC ARIA REFERENCE POINT DOSAGE GIVEN TO DATE: 7.5 GY
RAD ONC ARIA REFERENCE POINT ID: NORMAL
RAD ONC ARIA REFERENCE POINT SESSION DOSAGE GIVEN: 2.5 GY

## 2023-09-15 PROCEDURE — 77412 RADIATION TX DELIVERY LVL 3: CPT | Performed by: RADIOLOGY

## 2023-09-15 RX ORDER — METHYLPREDNISOLONE 4 MG/1
TABLET ORAL
Qty: 21 TABLET | Refills: 0 | Status: SHIPPED | OUTPATIENT
Start: 2023-09-15

## 2023-09-15 RX ORDER — SODIUM CHLORIDE 0.9 % (FLUSH) 0.9 %
10 SYRINGE (ML) INJECTION AS NEEDED
OUTPATIENT
Start: 2023-09-15

## 2023-09-15 RX ORDER — ALBUTEROL SULFATE 1.25 MG/3ML
1.25 SOLUTION RESPIRATORY (INHALATION)
OUTPATIENT
Start: 2023-09-15

## 2023-09-15 RX ORDER — SODIUM CHLORIDE 0.9 % (FLUSH) 0.9 %
10 SYRINGE (ML) INJECTION EVERY 12 HOURS SCHEDULED
OUTPATIENT
Start: 2023-09-15

## 2023-09-15 NOTE — TELEPHONE ENCOUNTER
Patient aware of prework date/time and OR date/arrival time 0800/npo/no meds. She confirms that she stopped Ozempic over a month ago.

## 2023-09-15 NOTE — TELEPHONE ENCOUNTER
Received voicemail from patient stating that she was having a hard time getting a deep breath, due to pain.  Discussed with Dr. Jerry.  Prescription for steroid sent in.  Patient notified.

## 2023-09-15 NOTE — TELEPHONE ENCOUNTER
MINA left voicemail and informed Mrs. rCuz that she did qualify for the [employer] to assist her with the utility bill.

## 2023-09-15 NOTE — TELEPHONE ENCOUNTER
[facility] staff attempted to follow warm transfer process and was unsuccessful     Caller: Melly Cruz    Relationship to patient: Self    Best call back number: [phone]    Patient is needing: PT RETURNING MISSED CALL TO [facility].

## 2023-09-15 NOTE — TELEPHONE ENCOUNTER
Called patient to offer surgery date of September 26.  No answer and left voicemail to return call.  She would need to arrive at 8 AM on September 26 for surgery time of 1030.  If she is still taking semaglutide/Ozempic she needs to discontinue this now and take no more. Will go ahead and place orders.

## 2023-09-18 ENCOUNTER — APPOINTMENT (OUTPATIENT)
Dept: GENERAL RADIOLOGY | Facility: HOSPITAL | Age: 68
End: 2023-09-18
Payer: MEDICARE

## 2023-09-18 ENCOUNTER — HOSPITAL ENCOUNTER (EMERGENCY)
Facility: HOSPITAL | Age: 68
Discharge: HOME | End: 2023-09-18
Admitting: EMERGENCY MEDICINE
Payer: MEDICARE

## 2023-09-18 ENCOUNTER — HOSPITAL ENCOUNTER (OUTPATIENT)
Dept: RADIATION ONCOLOGY | Facility: HOSPITAL | Age: 68
Setting detail: RADIATION/ONCOLOGY SERIES
Discharge: HOME | End: 2023-09-18
Payer: MEDICARE

## 2023-09-18 VITALS
HEIGHT: 64 IN | DIASTOLIC BLOOD PRESSURE: 76 MMHG | SYSTOLIC BLOOD PRESSURE: 135 MMHG | WEIGHT: 210 LBS | OXYGEN SATURATION: 98 % | TEMPERATURE: 98 F | HEART RATE: 78 BPM | BODY MASS INDEX: 35.85 KG/M2 | RESPIRATION RATE: 20 BRPM

## 2023-09-18 DIAGNOSIS — Z85.9 HISTORY OF CANCER: ICD-10-CM

## 2023-09-18 DIAGNOSIS — R07.81 RIB PAIN: Primary | ICD-10-CM

## 2023-09-18 LAB
RAD ONC ARIA COURSE ID: NORMAL
RAD ONC ARIA COURSE LAST TREATMENT DATE: NORMAL
RAD ONC ARIA COURSE START DATE: NORMAL
RAD ONC ARIA COURSE TREATMENT ELAPSED DAYS: 5
RAD ONC ARIA FIRST TREATMENT DATE: NORMAL
RAD ONC ARIA PLAN FRACTIONS TREATED TO DATE: 4
RAD ONC ARIA PLAN ID: NORMAL
RAD ONC ARIA PLAN PRESCRIBED DOSE PER FRACTION: 2.5 GY
RAD ONC ARIA PLAN PRIMARY REFERENCE POINT: NORMAL
RAD ONC ARIA PLAN TOTAL FRACTIONS PRESCRIBED: 13
RAD ONC ARIA PLAN TOTAL PRESCRIBED DOSE: 3250 CGY
RAD ONC ARIA REFERENCE POINT DOSAGE GIVEN TO DATE: 10 GY
RAD ONC ARIA REFERENCE POINT ID: NORMAL
RAD ONC ARIA REFERENCE POINT SESSION DOSAGE GIVEN: 2.5 GY

## 2023-09-18 PROCEDURE — 77412 RADIATION TX DELIVERY LVL 3: CPT | Performed by: RADIOLOGY

## 2023-09-18 PROCEDURE — 99283 EMERGENCY DEPT VISIT LOW MDM: CPT

## 2023-09-18 PROCEDURE — 96372 THER/PROPH/DIAG INJ SC/IM: CPT

## 2023-09-18 PROCEDURE — 71101 X-RAY EXAM UNILAT RIBS/CHEST: CPT

## 2023-09-18 PROCEDURE — 25010000002 MORPHINE PER 10 MG: Performed by: NURSE PRACTITIONER

## 2023-09-18 PROCEDURE — 63710000001 PROCHLORPERAZINE MALEATE PER 10 MG: Performed by: NURSE PRACTITIONER

## 2023-09-18 RX ORDER — MORPHINE SULFATE 2 MG/ML
2 INJECTION, SOLUTION INTRAMUSCULAR; INTRAVENOUS ONCE
Status: COMPLETED | OUTPATIENT
Start: 2023-09-18 | End: 2023-09-18

## 2023-09-18 RX ORDER — PROCHLORPERAZINE MALEATE 10 MG
5 TABLET ORAL ONCE
Status: COMPLETED | OUTPATIENT
Start: 2023-09-18 | End: 2023-09-18

## 2023-09-18 RX ADMIN — PROCHLORPERAZINE MALEATE 5 MG: 10 TABLET ORAL at 17:04

## 2023-09-18 RX ADMIN — MORPHINE SULFATE 2 MG: 2 INJECTION, SOLUTION INTRAMUSCULAR; INTRAVENOUS at 17:05

## 2023-09-18 NOTE — DISCHARGE INSTRUCTIONS
F/u with your oncologist or pcp regarding your inability to take pain medication that was prescribed

## 2023-09-19 ENCOUNTER — HOSPITAL ENCOUNTER (OUTPATIENT)
Dept: RADIATION ONCOLOGY | Facility: HOSPITAL | Age: 68
Setting detail: RADIATION/ONCOLOGY SERIES
Discharge: HOME | End: 2023-09-19
Payer: MEDICARE

## 2023-09-19 DIAGNOSIS — C34.11 MALIGNANT NEOPLASM OF UPPER LOBE OF RIGHT LUNG: Primary | ICD-10-CM

## 2023-09-19 DIAGNOSIS — G89.3 CANCER RELATED PAIN: ICD-10-CM

## 2023-09-19 LAB
RAD ONC ARIA COURSE ID: NORMAL
RAD ONC ARIA COURSE LAST TREATMENT DATE: NORMAL
RAD ONC ARIA COURSE START DATE: NORMAL
RAD ONC ARIA COURSE TREATMENT ELAPSED DAYS: 6
RAD ONC ARIA FIRST TREATMENT DATE: NORMAL
RAD ONC ARIA PLAN FRACTIONS TREATED TO DATE: 5
RAD ONC ARIA PLAN ID: NORMAL
RAD ONC ARIA PLAN PRESCRIBED DOSE PER FRACTION: 2.5 GY
RAD ONC ARIA PLAN PRIMARY REFERENCE POINT: NORMAL
RAD ONC ARIA PLAN TOTAL FRACTIONS PRESCRIBED: 13
RAD ONC ARIA PLAN TOTAL PRESCRIBED DOSE: 3250 CGY
RAD ONC ARIA REFERENCE POINT DOSAGE GIVEN TO DATE: 12.5 GY
RAD ONC ARIA REFERENCE POINT ID: NORMAL
RAD ONC ARIA REFERENCE POINT SESSION DOSAGE GIVEN: 2.5 GY

## 2023-09-19 PROCEDURE — 77412 RADIATION TX DELIVERY LVL 3: CPT | Performed by: RADIOLOGY

## 2023-09-19 RX ORDER — HYDROCODONE BITARTRATE AND ACETAMINOPHEN 10; 325 MG/1; MG/1
1 TABLET ORAL EVERY 4 HOURS PRN
Qty: 60 TABLET | Refills: 0 | Status: SHIPPED | OUTPATIENT
Start: 2023-09-19 | End: 2023-10-20

## 2023-09-19 NOTE — ED PROVIDER NOTES
"Subjective   History of Present Illness  Patient is a 68-year-old female who presents to the ER with chief complaints of right rib pain.  She is well-known to this provider due to recent ER visits.  Patient has history of lung cancer and has been evaluated in this emergency department numerous times for the same right rib pain.  Most recent CT scan was August 27, 2023 which was negative for pulmonary embolism or pneumonia.  Patient had radiation treatment today and states that she has scheduled surgery with Dr. Thompson next week for \"removal of her rib and tumor.\"  Patient is followed by Dr. Lopez with pain management.  She was recently prescribed morphine however states that she is unable to take this medication as she cannot take the extended release.  Patient is followed by Dr. Delarosa as well as Dr. Jerry for her history of cancer.  Patient has had no cough or recorded fevers.  She presents with her same right rib pain that is fairly chronic to her due to history of cancer.  See other ER notes and other work-ups for complete details.  Past medical history significant for anemia, anxiety, arthritis, asthma, chronic back and neck pain, clotting disorder, COPD, hypothyroidism, lung cancer, migraines, PE      Review of Systems   Constitutional: Negative.  Negative for fever.   HENT: Negative.  Negative for congestion.    Respiratory:  Negative for cough and shortness of breath.         Positive for right rib pain   Cardiovascular: Negative.  Negative for chest pain.   Gastrointestinal: Negative.  Negative for abdominal pain, diarrhea, nausea and vomiting.   Genitourinary: Negative.  Negative for dysuria.   Musculoskeletal: Negative.  Negative for back pain.   Skin: Negative.    All other systems reviewed and are negative.    Past Medical History:   Diagnosis Date    Acid reflux     Anemia     Anxiety     Arthritis     Asthma     Mild COPD    Curtis esophagus     Bleeding disorder     Reflux acid overload    Bunion " "Aug    Repaired with surgery    Cancer 03/2023    Waiting on Biopsy to determine what kind and what stage    Chest pain     Chronic back pain     Chronic neck pain     Clotting disorder     Stomach bleeds from acid    COPD (chronic obstructive pulmonary disease)     Difficulty walking Dec 2022    After surgery wslking has become more and more difficult    DVT (deep venous thrombosis)     GI bleed     Hammer toe Aug 22    Repaired surgery    Hypothyroid     Hypothyroidism     Low back pain     Lung cancer     Migraine     PE (pulmonary thromboembolism)     Plantar fasciitis Years ago    Renal lesion 04/05/2023    Shin splints Years ago       Allergies   Allergen Reactions    Erythromycin Swelling and Anaphylaxis     Throat swells    Guaifenesin Anaphylaxis, Nausea And Vomiting and Swelling    Hydromorphone Anaphylaxis and Other (See Comments)     \"Shock and/or Unconsciousness\"    Hydromorphone Hcl Anaphylaxis and Other (See Comments)     Other reaction(s): Shock and/or Unconsciousness  Note: Anaphylaxis    Morphine Other (See Comments)     Regular morphine is fine, but cannot have extended release morphine    Ondansetron Hcl Other (See Comments)     Coded  Coded  Other reaction(s): Zofran  Note:  Allergic Reaction: Anaphylaxis    Orphenadrine Anaphylaxis    Orphenadrine Citrate Anaphylaxis    Codeine Rash    Meperidine Other (See Comments)     Makes her an angry person    Guaifenesin & Derivatives Nausea And Vomiting    Guaifenesin Er Swelling     Throat swelling       Past Surgical History:   Procedure Laterality Date    BUNIONECTOMY      CHOLECYSTECTOMY      COLONOSCOPY      CORRECTION HAMMER TOE      DILATATION AND CURETTAGE      FOOT FUSION Left 04/20/2022    Procedure: 1-2 Arthrodesis, Tarsal Metatarsal Joint 2 and 3 Arthrodesis;  Surgeon: Bud Maradiaga DPM;  Location: [facility] OR;  Service: Podiatry;  Laterality: Left;    HAMMER TOE REPAIR Left 04/20/2022    Procedure: Hammertoe Repair 2-5,;  Surgeon: Ashwini, " Bud MAGALLON DPM;  Location:  [facility] OR;  Service: Podiatry;  Laterality: Left;    HERNIA REPAIR      HYSTERECTOMY      JOINT REPLACEMENT      OOPHORECTOMY      REDUCTION MAMMAPLASTY  2021    REPLACEMENT TOTAL KNEE      Right knee partial, left knee       Family History   Problem Relation Age of Onset    Breast cancer Paternal Aunt     Heart disease Mother     Osteoporosis Mother     Thyroid disease Mother         Lukemia can’t remember which kind.    Cancer Mother         Pacemaker at 90 years old.    Cancer Father         Father’s entire family  from Cancer    Diabetes Father     Thyroid disease Father     Thyroid disease Maternal Aunt         Blood disorder    Thyroid disease Maternal Uncle     Thyroid disease Sister         Thyroid    Ovarian cancer Neg Hx     Uterine cancer Neg Hx     Colon cancer Neg Hx        Social History     Socioeconomic History    Marital status:    Tobacco Use    Smoking status: Former     Packs/day: 1.50     Years: 46.00     Pack years: 69.00     Types: Cigarettes     Start date: 1972     Quit date: 2018     Years since quittin.4     Passive exposure: Past    Smokeless tobacco: Never    Tobacco comments:     Pt started at age 17, then quit at age 31. Pt restarted smoking at age 32-33 and quit smoking in 2018   Vaping Use    Vaping Use: Never used   Substance and Sexual Activity    Alcohol use: Not Currently     Alcohol/week: 1.0 standard drink     Types: 1 Drinks containing 0.5 oz of alcohol per week     Comment: A chocolate drink made with moonshine and creamer    Drug use: No    Sexual activity: Yes     Partners: Male     Birth control/protection: Surgical           Objective   Physical Exam  Vitals and nursing note reviewed.   Constitutional:       General: She is in acute distress.      Appearance: She is well-developed.   HENT:      Head: Normocephalic and atraumatic.      Right Ear: External ear normal.      Left Ear: External ear normal.      Nose: Nose  "normal.      Mouth/Throat:      Pharynx: Oropharynx is clear.   Eyes:      Extraocular Movements: Extraocular movements intact.      Conjunctiva/sclera: Conjunctivae normal.      Pupils: Pupils are equal, round, and reactive to light.   Cardiovascular:      Rate and Rhythm: Normal rate and regular rhythm.      Pulses: Normal pulses.      Heart sounds: Normal heart sounds.   Pulmonary:      Effort: Pulmonary effort is normal.      Breath sounds: Normal breath sounds.      Comments: Patient has pain to palpation along the right lateral rib/right thoracic region.  No crepitus noted.  Lungs are clear bilaterally.  Chest:      Chest wall: Tenderness present.   Abdominal:      General: Bowel sounds are normal.      Palpations: Abdomen is soft.   Musculoskeletal:         General: Normal range of motion.      Cervical back: Normal range of motion and neck supple.   Skin:     General: Skin is warm and dry.   Neurological:      Mental Status: She is alert and oriented to person, place, and time.   Psychiatric:         Mood and Affect: Mood normal.         Behavior: Behavior normal.         Thought Content: Thought content normal.         Judgment: Judgment normal.       Procedures           ED Course                                           Medical Decision Making  Patient is a 68-year-old female who presents to the ER with chief complaints of right rib pain.  She is well-known to this provider due to recent ER visits.  Patient has history of lung cancer and has been evaluated in this emergency department numerous times for the same right rib pain.  Most recent CT scan was August 27, 2023 which was negative for pulmonary embolism or pneumonia.  Patient had radiation treatment today and states that she has scheduled surgery with Dr. Thompson next week for \"removal of her rib and tumor.\"  Patient is followed by Dr. Lopez with pain management.  She was recently prescribed morphine however states that she is unable to take this " medication as she cannot take the extended release.  Patient is followed by Dr. Delarosa as well as Dr. Jerry for her history of cancer.  Patient has had no cough or recorded fevers.  She presents with her same right rib pain that is fairly chronic to her due to history of cancer.  See other ER notes and work-ups for complete details.  Past medical history significant for anemia, anxiety, arthritis, asthma, chronic back and neck pain, clotting disorder, COPD, hypothyroidism, lung cancer, migraines, PE  Differential diagnosis: Rib pain due to cancer, rib fracture, pneumonia, pneumothorax, PE, and other    XR Ribs Right With PA Chest   Final Result         No acute cardiopulmonary abnormality.         No displaced rib fracture.         This report was finalized on 09/18/2023 17:21 by  Tavares Miller DO.      X-rays negative for acute findings.  Patient has history of chronic lung cancer with chronic rib pain.  She was given a dose of pain medication in the emergency department.  She will need to follow-up with pain management regarding her pain control as well as Dr. Thompson for her scheduled surgery.  She will be discharged home shortly in stable condition.    Problems Addressed:  History of cancer: chronic illness or injury  Rib pain: chronic illness or injury     Details: Acute on chronic    Amount and/or Complexity of Data Reviewed  Radiology: ordered. Decision-making details documented in ED Course.    Risk  Prescription drug management.        Final diagnoses:   Rib pain   History of cancer       ED Disposition  ED Disposition       ED Disposition   Discharge    Condition   Good    Comment   --               No follow-up provider specified.       Medication List        Changed      lidocaine 5 % ointment  Commonly known as: XYLOCAINE  Apply 1 application topically to the appropriate area as directed Every 2 (Two) Hours As Needed for Mild Pain.  What changed: additional instructions                 Gladys Tucker  Елена, APRN  09/19/23 1345

## 2023-09-20 ENCOUNTER — HOSPITAL ENCOUNTER (OUTPATIENT)
Dept: RADIATION ONCOLOGY | Facility: HOSPITAL | Age: 68
Setting detail: RADIATION/ONCOLOGY SERIES
Discharge: HOME | End: 2023-09-20
Payer: MEDICARE

## 2023-09-20 LAB
RAD ONC ARIA COURSE ID: NORMAL
RAD ONC ARIA COURSE LAST TREATMENT DATE: NORMAL
RAD ONC ARIA COURSE START DATE: NORMAL
RAD ONC ARIA COURSE TREATMENT ELAPSED DAYS: 7
RAD ONC ARIA FIRST TREATMENT DATE: NORMAL
RAD ONC ARIA PLAN FRACTIONS TREATED TO DATE: 1
RAD ONC ARIA PLAN ID: NORMAL
RAD ONC ARIA PLAN PRESCRIBED DOSE PER FRACTION: 2.5 GY
RAD ONC ARIA PLAN PRIMARY REFERENCE POINT: NORMAL
RAD ONC ARIA PLAN TOTAL FRACTIONS PRESCRIBED: 3
RAD ONC ARIA PLAN TOTAL PRESCRIBED DOSE: 750 CGY
RAD ONC ARIA REFERENCE POINT DOSAGE GIVEN TO DATE: 15 GY
RAD ONC ARIA REFERENCE POINT ID: NORMAL
RAD ONC ARIA REFERENCE POINT SESSION DOSAGE GIVEN: 2.5 GY

## 2023-09-20 PROCEDURE — 77412 RADIATION TX DELIVERY LVL 3: CPT | Performed by: RADIOLOGY

## 2023-09-20 PROCEDURE — 77336 RADIATION PHYSICS CONSULT: CPT | Performed by: RADIOLOGY

## 2023-09-21 ENCOUNTER — HOSPITAL ENCOUNTER (OUTPATIENT)
Dept: RADIATION ONCOLOGY | Facility: HOSPITAL | Age: 68
Setting detail: RADIATION/ONCOLOGY SERIES
Discharge: HOME | End: 2023-09-21
Payer: MEDICARE

## 2023-09-21 LAB
RAD ONC ARIA COURSE ID: NORMAL
RAD ONC ARIA COURSE LAST TREATMENT DATE: NORMAL
RAD ONC ARIA COURSE START DATE: NORMAL
RAD ONC ARIA COURSE TREATMENT ELAPSED DAYS: 8
RAD ONC ARIA FIRST TREATMENT DATE: NORMAL
RAD ONC ARIA PLAN FRACTIONS TREATED TO DATE: 2
RAD ONC ARIA PLAN ID: NORMAL
RAD ONC ARIA PLAN PRESCRIBED DOSE PER FRACTION: 2.5 GY
RAD ONC ARIA PLAN PRIMARY REFERENCE POINT: NORMAL
RAD ONC ARIA PLAN TOTAL FRACTIONS PRESCRIBED: 3
RAD ONC ARIA PLAN TOTAL PRESCRIBED DOSE: 750 CGY
RAD ONC ARIA REFERENCE POINT DOSAGE GIVEN TO DATE: 17.5 GY
RAD ONC ARIA REFERENCE POINT ID: NORMAL
RAD ONC ARIA REFERENCE POINT SESSION DOSAGE GIVEN: 2.5 GY

## 2023-09-21 PROCEDURE — 77412 RADIATION TX DELIVERY LVL 3: CPT | Performed by: RADIOLOGY

## 2023-09-21 PROCEDURE — 77417 THER RADIOLOGY PORT IMAGE(S): CPT | Performed by: RADIOLOGY

## 2023-09-22 ENCOUNTER — HOSPITAL ENCOUNTER (OUTPATIENT)
Dept: RADIATION ONCOLOGY | Facility: HOSPITAL | Age: 68
Setting detail: RADIATION/ONCOLOGY SERIES
Discharge: HOME | End: 2023-09-22
Payer: MEDICARE

## 2023-09-22 LAB
RAD ONC ARIA COURSE ID: NORMAL
RAD ONC ARIA COURSE LAST TREATMENT DATE: NORMAL
RAD ONC ARIA COURSE START DATE: NORMAL
RAD ONC ARIA COURSE TREATMENT ELAPSED DAYS: 9
RAD ONC ARIA FIRST TREATMENT DATE: NORMAL
RAD ONC ARIA PLAN FRACTIONS TREATED TO DATE: 3
RAD ONC ARIA PLAN ID: NORMAL
RAD ONC ARIA PLAN PRESCRIBED DOSE PER FRACTION: 2.5 GY
RAD ONC ARIA PLAN PRIMARY REFERENCE POINT: NORMAL
RAD ONC ARIA PLAN TOTAL FRACTIONS PRESCRIBED: 3
RAD ONC ARIA PLAN TOTAL PRESCRIBED DOSE: 750 CGY
RAD ONC ARIA REFERENCE POINT DOSAGE GIVEN TO DATE: 20 GY
RAD ONC ARIA REFERENCE POINT ID: NORMAL
RAD ONC ARIA REFERENCE POINT SESSION DOSAGE GIVEN: 2.5 GY

## 2023-09-22 PROCEDURE — 77412 RADIATION TX DELIVERY LVL 3: CPT | Performed by: RADIOLOGY

## 2023-09-25 ENCOUNTER — PRE-ADMISSION TESTING (OUTPATIENT)
Dept: PREADMISSION TESTING | Facility: HOSPITAL | Age: 68
DRG: 164 | End: 2023-09-25
Payer: MEDICARE

## 2023-09-25 ENCOUNTER — HOSPITAL ENCOUNTER (OUTPATIENT)
Dept: GENERAL RADIOLOGY | Facility: HOSPITAL | Age: 68
Discharge: HOME | End: 2023-09-25
Payer: MEDICARE

## 2023-09-25 VITALS
RESPIRATION RATE: 18 BRPM | HEART RATE: 90 BPM | HEIGHT: 63 IN | WEIGHT: 213.85 LBS | BODY MASS INDEX: 37.89 KG/M2 | OXYGEN SATURATION: 95 % | DIASTOLIC BLOOD PRESSURE: 82 MMHG | SYSTOLIC BLOOD PRESSURE: 145 MMHG

## 2023-09-25 DIAGNOSIS — C34.11 MALIGNANT NEOPLASM OF UPPER LOBE OF RIGHT LUNG: ICD-10-CM

## 2023-09-25 DIAGNOSIS — R06.02 BREATH SHORTNESS: ICD-10-CM

## 2023-09-25 LAB
ABO GROUP BLD: NORMAL
ALBUMIN SERPL-MCNC: 3.3 G/DL (ref 3.5–5.2)
ALBUMIN/GLOB SERPL: 0.9 G/DL
ALP SERPL-CCNC: 128 U/L (ref 39–117)
ALT SERPL W P-5'-P-CCNC: 17 U/L (ref 1–33)
ANION GAP SERPL CALCULATED.3IONS-SCNC: 11 MMOL/L (ref 5–15)
APTT PPP: 32.2 SECONDS (ref 24.5–36)
AST SERPL-CCNC: 12 U/L (ref 1–32)
BASOPHILS # BLD AUTO: 0.03 10*3/MM3 (ref 0–0.2)
BASOPHILS NFR BLD AUTO: 0.4 % (ref 0–1.5)
BILIRUB SERPL-MCNC: <0.2 MG/DL (ref 0–1.2)
BLD GP AB SCN SERPL QL: NEGATIVE
BUN SERPL-MCNC: 16 MG/DL (ref 8–23)
BUN/CREAT SERPL: 21.9 (ref 7–25)
CALCIUM SPEC-SCNC: 9.4 MG/DL (ref 8.6–10.5)
CHLORIDE SERPL-SCNC: 101 MMOL/L (ref 98–107)
CO2 SERPL-SCNC: 24 MMOL/L (ref 22–29)
CREAT SERPL-MCNC: 0.73 MG/DL (ref 0.57–1)
DEPRECATED RDW RBC AUTO: 58.4 FL (ref 37–54)
EGFRCR SERPLBLD CKD-EPI 2021: 90.8 ML/MIN/1.73
EOSINOPHIL # BLD AUTO: 0.33 10*3/MM3 (ref 0–0.4)
EOSINOPHIL NFR BLD AUTO: 4.4 % (ref 0.3–6.2)
ERYTHROCYTE [DISTWIDTH] IN BLOOD BY AUTOMATED COUNT: 18 % (ref 12.3–15.4)
GLOBULIN UR ELPH-MCNC: 3.7 GM/DL
GLUCOSE SERPL-MCNC: 118 MG/DL (ref 65–99)
HCT VFR BLD AUTO: 29.7 % (ref 34–46.6)
HGB BLD-MCNC: 8.7 G/DL (ref 12–15.9)
IMM GRANULOCYTES # BLD AUTO: 0.04 10*3/MM3 (ref 0–0.05)
IMM GRANULOCYTES NFR BLD AUTO: 0.5 % (ref 0–0.5)
INR PPP: 0.98 (ref 0.91–1.09)
LYMPHOCYTES # BLD AUTO: 1.01 10*3/MM3 (ref 0.7–3.1)
LYMPHOCYTES NFR BLD AUTO: 13.3 % (ref 19.6–45.3)
MCH RBC QN AUTO: 25.7 PG (ref 26.6–33)
MCHC RBC AUTO-ENTMCNC: 29.3 G/DL (ref 31.5–35.7)
MCV RBC AUTO: 87.6 FL (ref 79–97)
MONOCYTES # BLD AUTO: 0.37 10*3/MM3 (ref 0.1–0.9)
MONOCYTES NFR BLD AUTO: 4.9 % (ref 5–12)
NEUTROPHILS NFR BLD AUTO: 5.8 10*3/MM3 (ref 1.7–7)
NEUTROPHILS NFR BLD AUTO: 76.5 % (ref 42.7–76)
NRBC BLD AUTO-RTO: 0 /100 WBC (ref 0–0.2)
PLATELET # BLD AUTO: 391 10*3/MM3 (ref 140–450)
PMV BLD AUTO: 8.9 FL (ref 6–12)
POTASSIUM SERPL-SCNC: 4.5 MMOL/L (ref 3.5–5.2)
PROT SERPL-MCNC: 7 G/DL (ref 6–8.5)
PROTHROMBIN TIME: 13.1 SECONDS (ref 11.8–14.8)
RBC # BLD AUTO: 3.39 10*6/MM3 (ref 3.77–5.28)
RH BLD: NEGATIVE
SODIUM SERPL-SCNC: 136 MMOL/L (ref 136–145)
T&S EXPIRATION DATE: NORMAL
WBC NRBC COR # BLD: 7.58 10*3/MM3 (ref 3.4–10.8)

## 2023-09-25 PROCEDURE — 36415 COLL VENOUS BLD VENIPUNCTURE: CPT

## 2023-09-25 PROCEDURE — 86850 RBC ANTIBODY SCREEN: CPT

## 2023-09-25 PROCEDURE — 71046 X-RAY EXAM CHEST 2 VIEWS: CPT

## 2023-09-25 PROCEDURE — 86900 BLOOD TYPING SEROLOGIC ABO: CPT

## 2023-09-25 PROCEDURE — 85730 THROMBOPLASTIN TIME PARTIAL: CPT

## 2023-09-25 PROCEDURE — 93010 ELECTROCARDIOGRAM REPORT: CPT | Performed by: INTERNAL MEDICINE

## 2023-09-25 PROCEDURE — 86901 BLOOD TYPING SEROLOGIC RH(D): CPT

## 2023-09-25 PROCEDURE — 80053 COMPREHEN METABOLIC PANEL: CPT

## 2023-09-25 PROCEDURE — 85025 COMPLETE CBC W/AUTO DIFF WBC: CPT

## 2023-09-25 PROCEDURE — 85610 PROTHROMBIN TIME: CPT

## 2023-09-25 PROCEDURE — 93005 ELECTROCARDIOGRAM TRACING: CPT

## 2023-09-26 ENCOUNTER — ANESTHESIA (OUTPATIENT)
Dept: PERIOP | Facility: HOSPITAL | Age: 68
DRG: 164 | End: 2023-09-26
Payer: MEDICARE

## 2023-09-26 ENCOUNTER — ANESTHESIA EVENT (OUTPATIENT)
Dept: PERIOP | Facility: HOSPITAL | Age: 68
DRG: 164 | End: 2023-09-26
Payer: MEDICARE

## 2023-09-26 ENCOUNTER — APPOINTMENT (OUTPATIENT)
Dept: GENERAL RADIOLOGY | Facility: HOSPITAL | Age: 68
DRG: 164 | End: 2023-09-26
Payer: MEDICARE

## 2023-09-26 ENCOUNTER — HOSPITAL ENCOUNTER (INPATIENT)
Facility: HOSPITAL | Age: 68
LOS: 2 days | Discharge: HOME | DRG: 164 | End: 2023-09-28
Attending: THORACIC SURGERY (CARDIOTHORACIC VASCULAR SURGERY) | Admitting: THORACIC SURGERY (CARDIOTHORACIC VASCULAR SURGERY)
Payer: MEDICARE

## 2023-09-26 DIAGNOSIS — C34.91 PRIMARY MALIGNANT NEOPLASM OF RIGHT LUNG METASTATIC TO OTHER SITE: Primary | ICD-10-CM

## 2023-09-26 DIAGNOSIS — C34.11 MALIGNANT NEOPLASM OF UPPER LOBE OF RIGHT LUNG: ICD-10-CM

## 2023-09-26 PROBLEM — C78.01 MALIGNANT NEOPLASM METASTATIC TO RIGHT LUNG: Status: ACTIVE | Noted: 2023-09-26

## 2023-09-26 PROBLEM — R91.8 MULTIPLE LUNG NODULES: Status: ACTIVE | Noted: 2023-09-26

## 2023-09-26 PROBLEM — C34.90 METASTATIC LUNG CANCER (METASTASIS FROM LUNG TO OTHER SITE): Status: ACTIVE | Noted: 2023-09-26

## 2023-09-26 LAB
ANION GAP SERPL CALCULATED.3IONS-SCNC: 11 MMOL/L (ref 5–15)
BUN SERPL-MCNC: 12 MG/DL (ref 8–23)
BUN/CREAT SERPL: 14.8 (ref 7–25)
CALCIUM SPEC-SCNC: 8.9 MG/DL (ref 8.6–10.5)
CHLORIDE SERPL-SCNC: 99 MMOL/L (ref 98–107)
CO2 SERPL-SCNC: 25 MMOL/L (ref 22–29)
CREAT SERPL-MCNC: 0.81 MG/DL (ref 0.57–1)
DEPRECATED RDW RBC AUTO: 57.6 FL (ref 37–54)
EGFRCR SERPLBLD CKD-EPI 2021: 80.2 ML/MIN/1.73
ERYTHROCYTE [DISTWIDTH] IN BLOOD BY AUTOMATED COUNT: 18.1 % (ref 12.3–15.4)
GLUCOSE BLDC GLUCOMTR-MCNC: 131 MG/DL (ref 70–130)
GLUCOSE SERPL-MCNC: 131 MG/DL (ref 65–99)
HCT VFR BLD AUTO: 31.3 % (ref 34–46.6)
HGB BLD-MCNC: 8.9 G/DL (ref 12–15.9)
MCH RBC QN AUTO: 24.9 PG (ref 26.6–33)
MCHC RBC AUTO-ENTMCNC: 28.4 G/DL (ref 31.5–35.7)
MCV RBC AUTO: 87.7 FL (ref 79–97)
PLATELET # BLD AUTO: 434 10*3/MM3 (ref 140–450)
PMV BLD AUTO: 8.6 FL (ref 6–12)
POTASSIUM SERPL-SCNC: 4.5 MMOL/L (ref 3.5–5.2)
QT INTERVAL: 380 MS
QTC INTERVAL: 421 MS
RBC # BLD AUTO: 3.57 10*6/MM3 (ref 3.77–5.28)
SODIUM SERPL-SCNC: 135 MMOL/L (ref 136–145)
WBC NRBC COR # BLD: 7.45 10*3/MM3 (ref 3.4–10.8)

## 2023-09-26 PROCEDURE — 25010000002 MORPHINE PER 10 MG: Performed by: NURSE ANESTHETIST, CERTIFIED REGISTERED

## 2023-09-26 PROCEDURE — 88305 TISSUE EXAM BY PATHOLOGIST: CPT | Performed by: THORACIC SURGERY (CARDIOTHORACIC VASCULAR SURGERY)

## 2023-09-26 PROCEDURE — 80048 BASIC METABOLIC PNL TOTAL CA: CPT | Performed by: THORACIC SURGERY (CARDIOTHORACIC VASCULAR SURGERY)

## 2023-09-26 PROCEDURE — 25010000002 CEFAZOLIN PER 500 MG: Performed by: THORACIC SURGERY (CARDIOTHORACIC VASCULAR SURGERY)

## 2023-09-26 PROCEDURE — 25010000002 FENTANYL CITRATE (PF) 50 MCG/ML SOLUTION: Performed by: ANESTHESIOLOGY

## 2023-09-26 PROCEDURE — 25010000002 DROPERIDOL PER 5 MG: Performed by: ANESTHESIOLOGY

## 2023-09-26 PROCEDURE — 82948 REAGENT STRIP/BLOOD GLUCOSE: CPT

## 2023-09-26 PROCEDURE — 25010000002 PROPOFOL 10 MG/ML EMULSION: Performed by: NURSE ANESTHETIST, CERTIFIED REGISTERED

## 2023-09-26 PROCEDURE — 32650 THORACOSCOPY W/PLEURODESIS: CPT | Performed by: THORACIC SURGERY (CARDIOTHORACIC VASCULAR SURGERY)

## 2023-09-26 PROCEDURE — 71045 X-RAY EXAM CHEST 1 VIEW: CPT

## 2023-09-26 PROCEDURE — 94761 N-INVAS EAR/PLS OXIMETRY MLT: CPT

## 2023-09-26 PROCEDURE — 88341 IMHCHEM/IMCYTCHM EA ADD ANTB: CPT | Performed by: THORACIC SURGERY (CARDIOTHORACIC VASCULAR SURGERY)

## 2023-09-26 PROCEDURE — 88307 TISSUE EXAM BY PATHOLOGIST: CPT | Performed by: THORACIC SURGERY (CARDIOTHORACIC VASCULAR SURGERY)

## 2023-09-26 PROCEDURE — 85027 COMPLETE CBC AUTOMATED: CPT | Performed by: THORACIC SURGERY (CARDIOTHORACIC VASCULAR SURGERY)

## 2023-09-26 PROCEDURE — 88342 IMHCHEM/IMCYTCHM 1ST ANTB: CPT | Performed by: THORACIC SURGERY (CARDIOTHORACIC VASCULAR SURGERY)

## 2023-09-26 PROCEDURE — 25010000002 MORPHINE SULFATE (PF) 2 MG/ML SOLUTION: Performed by: NURSE ANESTHETIST, CERTIFIED REGISTERED

## 2023-09-26 PROCEDURE — 0BJ08ZZ INSPECTION OF TRACHEOBRONCHIAL TREE, VIA NATURAL OR ARTIFICIAL OPENING ENDOSCOPIC: ICD-10-PCS | Performed by: THORACIC SURGERY (CARDIOTHORACIC VASCULAR SURGERY)

## 2023-09-26 PROCEDURE — 88331 PATH CONSLTJ SURG 1 BLK 1SPC: CPT | Performed by: THORACIC SURGERY (CARDIOTHORACIC VASCULAR SURGERY)

## 2023-09-26 PROCEDURE — 25010000002 SUGAMMADEX 200 MG/2ML SOLUTION: Performed by: NURSE ANESTHETIST, CERTIFIED REGISTERED

## 2023-09-26 PROCEDURE — 94640 AIRWAY INHALATION TREATMENT: CPT

## 2023-09-26 PROCEDURE — 25010000002 FENTANYL CITRATE (PF) 50 MCG/ML SOLUTION: Performed by: NURSE ANESTHETIST, CERTIFIED REGISTERED

## 2023-09-26 PROCEDURE — 25010000002 CEFAZOLIN PER 500 MG: Performed by: NURSE PRACTITIONER

## 2023-09-26 PROCEDURE — C1729 CATH, DRAINAGE: HCPCS | Performed by: THORACIC SURGERY (CARDIOTHORACIC VASCULAR SURGERY)

## 2023-09-26 PROCEDURE — 94799 UNLISTED PULMONARY SVC/PX: CPT

## 2023-09-26 PROCEDURE — 0BBN4ZX EXCISION OF RIGHT PLEURA, PERCUTANEOUS ENDOSCOPIC APPROACH, DIAGNOSTIC: ICD-10-PCS | Performed by: THORACIC SURGERY (CARDIOTHORACIC VASCULAR SURGERY)

## 2023-09-26 PROCEDURE — 3E0L4GC INTRODUCTION OF OTHER THERAPEUTIC SUBSTANCE INTO PLEURAL CAVITY, PERCUTANEOUS ENDOSCOPIC APPROACH: ICD-10-PCS | Performed by: THORACIC SURGERY (CARDIOTHORACIC VASCULAR SURGERY)

## 2023-09-26 PROCEDURE — 0BBF4ZZ EXCISION OF RIGHT LOWER LUNG LOBE, PERCUTANEOUS ENDOSCOPIC APPROACH: ICD-10-PCS | Performed by: THORACIC SURGERY (CARDIOTHORACIC VASCULAR SURGERY)

## 2023-09-26 DEVICE — ENDOPATH ECHELON ENDOSCOPIC LINEAR CUTTER RELOADS, GREEN, 60MM
Type: IMPLANTABLE DEVICE | Site: LUNG | Status: FUNCTIONAL
Brand: ECHELON ENDOPATH

## 2023-09-26 RX ORDER — ALBUTEROL SULFATE 1.25 MG/3ML
1.25 SOLUTION RESPIRATORY (INHALATION)
Status: DISCONTINUED | OUTPATIENT
Start: 2023-09-26 | End: 2023-09-26 | Stop reason: HOSPADM

## 2023-09-26 RX ORDER — DICLOFENAC SODIUM AND MISOPROSTOL 75; 200 MG/1; UG/1
1 TABLET, DELAYED RELEASE ORAL 2 TIMES DAILY
Status: ON HOLD | COMMUNITY
End: 2023-09-26

## 2023-09-26 RX ORDER — SUCRALFATE ORAL 1 G/10ML
1 SUSPENSION ORAL
Status: DISCONTINUED | OUTPATIENT
Start: 2023-09-26 | End: 2023-09-28 | Stop reason: HOSPADM

## 2023-09-26 RX ORDER — PRAMIPEXOLE DIHYDROCHLORIDE 1 MG/1
1 TABLET ORAL 2 TIMES DAILY
Status: DISCONTINUED | OUTPATIENT
Start: 2023-09-26 | End: 2023-09-28 | Stop reason: HOSPADM

## 2023-09-26 RX ORDER — ROCURONIUM BROMIDE 10 MG/ML
INJECTION, SOLUTION INTRAVENOUS AS NEEDED
Status: DISCONTINUED | OUTPATIENT
Start: 2023-09-26 | End: 2023-09-26 | Stop reason: SURG

## 2023-09-26 RX ORDER — PROPOFOL 10 MG/ML
VIAL (ML) INTRAVENOUS AS NEEDED
Status: DISCONTINUED | OUTPATIENT
Start: 2023-09-26 | End: 2023-09-26 | Stop reason: SURG

## 2023-09-26 RX ORDER — SODIUM CHLORIDE 0.9 % (FLUSH) 0.9 %
10 SYRINGE (ML) INJECTION EVERY 12 HOURS SCHEDULED
Status: DISCONTINUED | OUTPATIENT
Start: 2023-09-26 | End: 2023-09-26 | Stop reason: HOSPADM

## 2023-09-26 RX ORDER — SODIUM CHLORIDE 0.9 % (FLUSH) 0.9 %
10 SYRINGE (ML) INJECTION AS NEEDED
Status: DISCONTINUED | OUTPATIENT
Start: 2023-09-26 | End: 2023-09-26 | Stop reason: HOSPADM

## 2023-09-26 RX ORDER — DROPERIDOL 2.5 MG/ML
0.62 INJECTION, SOLUTION INTRAMUSCULAR; INTRAVENOUS ONCE AS NEEDED
Status: COMPLETED | OUTPATIENT
Start: 2023-09-26 | End: 2023-09-26

## 2023-09-26 RX ORDER — FENTANYL CITRATE 50 UG/ML
25 INJECTION, SOLUTION INTRAMUSCULAR; INTRAVENOUS
Status: DISCONTINUED | OUTPATIENT
Start: 2023-09-26 | End: 2023-09-26 | Stop reason: HOSPADM

## 2023-09-26 RX ORDER — FLUMAZENIL 0.1 MG/ML
0.2 INJECTION INTRAVENOUS AS NEEDED
Status: DISCONTINUED | OUTPATIENT
Start: 2023-09-26 | End: 2023-09-26 | Stop reason: HOSPADM

## 2023-09-26 RX ORDER — METOCLOPRAMIDE 5 MG/1
5 TABLET ORAL 3 TIMES DAILY PRN
Status: DISCONTINUED | OUTPATIENT
Start: 2023-09-26 | End: 2023-09-26

## 2023-09-26 RX ORDER — HYDROCODONE BITARTRATE AND ACETAMINOPHEN 7.5; 325 MG/1; MG/1
2 TABLET ORAL EVERY 4 HOURS PRN
Status: DISCONTINUED | OUTPATIENT
Start: 2023-09-26 | End: 2023-09-28 | Stop reason: HOSPADM

## 2023-09-26 RX ORDER — BISACODYL 10 MG
10 SUPPOSITORY, RECTAL RECTAL DAILY PRN
Status: DISCONTINUED | OUTPATIENT
Start: 2023-09-26 | End: 2023-09-28 | Stop reason: HOSPADM

## 2023-09-26 RX ORDER — NITROGLYCERIN 0.4 MG/1
0.4 TABLET SUBLINGUAL
Status: DISCONTINUED | OUTPATIENT
Start: 2023-09-26 | End: 2023-09-28 | Stop reason: HOSPADM

## 2023-09-26 RX ORDER — SODIUM CHLORIDE, SODIUM LACTATE, POTASSIUM CHLORIDE, CALCIUM CHLORIDE 600; 310; 30; 20 MG/100ML; MG/100ML; MG/100ML; MG/100ML
9 INJECTION, SOLUTION INTRAVENOUS CONTINUOUS
Status: DISCONTINUED | OUTPATIENT
Start: 2023-09-26 | End: 2023-09-26

## 2023-09-26 RX ORDER — MORPHINE SULFATE 2 MG/ML
2 INJECTION, SOLUTION INTRAMUSCULAR; INTRAVENOUS
Status: COMPLETED | OUTPATIENT
Start: 2023-09-26 | End: 2023-09-26

## 2023-09-26 RX ORDER — BUPROPION HYDROCHLORIDE 150 MG/1
300 TABLET ORAL DAILY
Status: DISCONTINUED | OUTPATIENT
Start: 2023-09-27 | End: 2023-09-28 | Stop reason: HOSPADM

## 2023-09-26 RX ORDER — DICLOFENAC SODIUM 75 MG/1
75 TABLET, DELAYED RELEASE ORAL 2 TIMES DAILY
COMMUNITY

## 2023-09-26 RX ORDER — ONDANSETRON 2 MG/ML
4 INJECTION INTRAMUSCULAR; INTRAVENOUS
Status: CANCELLED | OUTPATIENT
Start: 2023-09-26

## 2023-09-26 RX ORDER — HYDROCODONE BITARTRATE AND ACETAMINOPHEN 5; 325 MG/1; MG/1
1 TABLET ORAL EVERY 4 HOURS PRN
Status: DISCONTINUED | OUTPATIENT
Start: 2023-09-26 | End: 2023-09-28 | Stop reason: HOSPADM

## 2023-09-26 RX ORDER — SODIUM CHLORIDE 9 MG/ML
80 INJECTION, SOLUTION INTRAVENOUS CONTINUOUS
Status: DISCONTINUED | OUTPATIENT
Start: 2023-09-26 | End: 2023-09-27

## 2023-09-26 RX ORDER — ALBUTEROL SULFATE 1.25 MG/3ML
1.25 SOLUTION RESPIRATORY (INHALATION)
Status: DISCONTINUED | OUTPATIENT
Start: 2023-09-26 | End: 2023-09-28 | Stop reason: HOSPADM

## 2023-09-26 RX ORDER — HYDROCODONE BITARTRATE AND ACETAMINOPHEN 10; 325 MG/1; MG/1
1 TABLET ORAL ONCE
Status: COMPLETED | OUTPATIENT
Start: 2023-09-26 | End: 2023-09-26

## 2023-09-26 RX ORDER — NALOXONE HCL 0.4 MG/ML
0.04 VIAL (ML) INJECTION AS NEEDED
Status: DISCONTINUED | OUTPATIENT
Start: 2023-09-26 | End: 2023-09-26 | Stop reason: HOSPADM

## 2023-09-26 RX ORDER — FENTANYL CITRATE 50 UG/ML
25 INJECTION, SOLUTION INTRAMUSCULAR; INTRAVENOUS
Status: COMPLETED | OUTPATIENT
Start: 2023-09-26 | End: 2023-09-26

## 2023-09-26 RX ORDER — MIDAZOLAM HYDROCHLORIDE 1 MG/ML
0.5 INJECTION INTRAMUSCULAR; INTRAVENOUS
Status: DISCONTINUED | OUTPATIENT
Start: 2023-09-26 | End: 2023-09-26 | Stop reason: HOSPADM

## 2023-09-26 RX ORDER — ACETAMINOPHEN 325 MG/1
650 TABLET ORAL EVERY 4 HOURS PRN
Status: DISCONTINUED | OUTPATIENT
Start: 2023-09-26 | End: 2023-09-28 | Stop reason: HOSPADM

## 2023-09-26 RX ORDER — MAGNESIUM HYDROXIDE 1200 MG/15ML
LIQUID ORAL AS NEEDED
Status: DISCONTINUED | OUTPATIENT
Start: 2023-09-26 | End: 2023-09-26 | Stop reason: HOSPADM

## 2023-09-26 RX ORDER — SODIUM CHLORIDE 0.9 % (FLUSH) 0.9 %
3 SYRINGE (ML) INJECTION AS NEEDED
Status: DISCONTINUED | OUTPATIENT
Start: 2023-09-26 | End: 2023-09-26 | Stop reason: HOSPADM

## 2023-09-26 RX ORDER — PANTOPRAZOLE SODIUM 40 MG/1
40 TABLET, DELAYED RELEASE ORAL 2 TIMES DAILY
Status: DISCONTINUED | OUTPATIENT
Start: 2023-09-26 | End: 2023-09-28 | Stop reason: HOSPADM

## 2023-09-26 RX ORDER — IBUPROFEN 600 MG/1
600 TABLET ORAL ONCE AS NEEDED
Status: DISCONTINUED | OUTPATIENT
Start: 2023-09-26 | End: 2023-09-26 | Stop reason: HOSPADM

## 2023-09-26 RX ORDER — FENTANYL CITRATE 50 UG/ML
INJECTION, SOLUTION INTRAMUSCULAR; INTRAVENOUS AS NEEDED
Status: DISCONTINUED | OUTPATIENT
Start: 2023-09-26 | End: 2023-09-26 | Stop reason: SURG

## 2023-09-26 RX ORDER — FENTANYL CITRATE 50 UG/ML
50 INJECTION, SOLUTION INTRAMUSCULAR; INTRAVENOUS ONCE
Status: COMPLETED | OUTPATIENT
Start: 2023-09-26 | End: 2023-09-26

## 2023-09-26 RX ORDER — LIDOCAINE HYDROCHLORIDE 10 MG/ML
0.5 INJECTION, SOLUTION EPIDURAL; INFILTRATION; INTRACAUDAL; PERINEURAL ONCE AS NEEDED
Status: DISCONTINUED | OUTPATIENT
Start: 2023-09-26 | End: 2023-09-26 | Stop reason: HOSPADM

## 2023-09-26 RX ORDER — ACETYLCYSTEINE 200 MG/ML
3 SOLUTION ORAL; RESPIRATORY (INHALATION)
Status: DISPENSED | OUTPATIENT
Start: 2023-09-26 | End: 2023-09-28

## 2023-09-26 RX ORDER — ENOXAPARIN SODIUM 100 MG/ML
40 INJECTION SUBCUTANEOUS DAILY
Status: DISCONTINUED | OUTPATIENT
Start: 2023-09-27 | End: 2023-09-28 | Stop reason: HOSPADM

## 2023-09-26 RX ORDER — IPRATROPIUM BROMIDE AND ALBUTEROL SULFATE 2.5; .5 MG/3ML; MG/3ML
3 SOLUTION RESPIRATORY (INHALATION)
Status: DISPENSED | OUTPATIENT
Start: 2023-09-26 | End: 2023-09-28

## 2023-09-26 RX ORDER — LEVOTHYROXINE SODIUM 0.12 MG/1
125 TABLET ORAL
Status: DISCONTINUED | OUTPATIENT
Start: 2023-09-27 | End: 2023-09-28 | Stop reason: HOSPADM

## 2023-09-26 RX ORDER — HYDROMORPHONE HYDROCHLORIDE 1 MG/ML
0.5 INJECTION, SOLUTION INTRAMUSCULAR; INTRAVENOUS; SUBCUTANEOUS
Status: CANCELLED | OUTPATIENT
Start: 2023-09-26

## 2023-09-26 RX ORDER — DEXTROSE MONOHYDRATE 25 G/50ML
12.5 INJECTION, SOLUTION INTRAVENOUS AS NEEDED
Status: DISCONTINUED | OUTPATIENT
Start: 2023-09-26 | End: 2023-09-26 | Stop reason: HOSPADM

## 2023-09-26 RX ORDER — OXYCODONE AND ACETAMINOPHEN 10; 325 MG/1; MG/1
1 TABLET ORAL ONCE AS NEEDED
Status: DISCONTINUED | OUTPATIENT
Start: 2023-09-26 | End: 2023-09-26 | Stop reason: HOSPADM

## 2023-09-26 RX ORDER — DOCUSATE SODIUM 100 MG/1
100 CAPSULE, LIQUID FILLED ORAL 2 TIMES DAILY
Status: DISCONTINUED | OUTPATIENT
Start: 2023-09-26 | End: 2023-09-28 | Stop reason: HOSPADM

## 2023-09-26 RX ORDER — POLYETHYLENE GLYCOL 3350 17 G/17G
17 POWDER, FOR SOLUTION ORAL DAILY
Status: DISCONTINUED | OUTPATIENT
Start: 2023-09-26 | End: 2023-09-28 | Stop reason: HOSPADM

## 2023-09-26 RX ORDER — LABETALOL HYDROCHLORIDE 5 MG/ML
5 INJECTION, SOLUTION INTRAVENOUS
Status: DISCONTINUED | OUTPATIENT
Start: 2023-09-26 | End: 2023-09-26 | Stop reason: HOSPADM

## 2023-09-26 RX ORDER — SODIUM CHLORIDE, SODIUM LACTATE, POTASSIUM CHLORIDE, CALCIUM CHLORIDE 600; 310; 30; 20 MG/100ML; MG/100ML; MG/100ML; MG/100ML
1000 INJECTION, SOLUTION INTRAVENOUS CONTINUOUS
Status: DISCONTINUED | OUTPATIENT
Start: 2023-09-26 | End: 2023-09-26 | Stop reason: HOSPADM

## 2023-09-26 RX ORDER — BUPIVACAINE HCL/0.9 % NACL/PF 0.125 %
PLASTIC BAG, INJECTION (ML) EPIDURAL AS NEEDED
Status: DISCONTINUED | OUTPATIENT
Start: 2023-09-26 | End: 2023-09-26 | Stop reason: SURG

## 2023-09-26 RX ADMIN — FENTANYL CITRATE 50 MCG: 50 INJECTION, SOLUTION INTRAMUSCULAR; INTRAVENOUS at 12:03

## 2023-09-26 RX ADMIN — FENTANYL CITRATE 100 MCG: 50 INJECTION, SOLUTION INTRAMUSCULAR; INTRAVENOUS at 11:11

## 2023-09-26 RX ADMIN — PROPOFOL INJECTABLE EMULSION 125 MG: 10 INJECTION, EMULSION INTRAVENOUS at 11:12

## 2023-09-26 RX ADMIN — MORPHINE SULFATE 2 MG: 2 INJECTION, SOLUTION INTRAMUSCULAR; INTRAVENOUS at 14:30

## 2023-09-26 RX ADMIN — SUGAMMADEX 200 MG: 100 INJECTION, SOLUTION INTRAVENOUS at 13:33

## 2023-09-26 RX ADMIN — FENTANYL CITRATE 50 MCG: 50 INJECTION, SOLUTION INTRAMUSCULAR; INTRAVENOUS at 11:05

## 2023-09-26 RX ADMIN — FENTANYL CITRATE 25 MCG: 50 INJECTION, SOLUTION INTRAMUSCULAR; INTRAVENOUS at 11:00

## 2023-09-26 RX ADMIN — PROPOFOL INJECTABLE EMULSION 50 MG: 10 INJECTION, EMULSION INTRAVENOUS at 11:43

## 2023-09-26 RX ADMIN — CEFAZOLIN 2 MG: 2 INJECTION, POWDER, FOR SOLUTION INTRAMUSCULAR; INTRAVENOUS at 11:14

## 2023-09-26 RX ADMIN — FENTANYL CITRATE 50 MCG: 50 INJECTION, SOLUTION INTRAMUSCULAR; INTRAVENOUS at 11:45

## 2023-09-26 RX ADMIN — FENTANYL CITRATE 25 MCG: 50 INJECTION, SOLUTION INTRAMUSCULAR; INTRAVENOUS at 14:55

## 2023-09-26 RX ADMIN — ALBUTEROL SULFATE 1.25 MG: 1.25 SOLUTION RESPIRATORY (INHALATION) at 19:40

## 2023-09-26 RX ADMIN — HYDROCODONE BITARTRATE AND ACETAMINOPHEN 1 TABLET: 10; 325 TABLET ORAL at 09:49

## 2023-09-26 RX ADMIN — SODIUM CHLORIDE, POTASSIUM CHLORIDE, SODIUM LACTATE AND CALCIUM CHLORIDE 1000 ML: 600; 310; 30; 20 INJECTION, SOLUTION INTRAVENOUS at 08:27

## 2023-09-26 RX ADMIN — SODIUM CHLORIDE 80 ML/HR: 9 INJECTION, SOLUTION INTRAVENOUS at 15:56

## 2023-09-26 RX ADMIN — HYDROCODONE BITARTRATE AND ACETAMINOPHEN 2 TABLET: 7.5; 325 TABLET ORAL at 19:38

## 2023-09-26 RX ADMIN — FENTANYL CITRATE 25 MCG: 50 INJECTION, SOLUTION INTRAMUSCULAR; INTRAVENOUS at 14:25

## 2023-09-26 RX ADMIN — DOCUSATE SODIUM 100 MG: 100 CAPSULE, LIQUID FILLED ORAL at 21:15

## 2023-09-26 RX ADMIN — DROPERIDOL 0.62 MG: 2.5 INJECTION, SOLUTION INTRAMUSCULAR; INTRAVENOUS at 14:35

## 2023-09-26 RX ADMIN — MORPHINE SULFATE 2 MG: 2 INJECTION, SOLUTION INTRAMUSCULAR; INTRAVENOUS at 14:20

## 2023-09-26 RX ADMIN — SUCRALFATE 1 G: 1 SUSPENSION ORAL at 21:15

## 2023-09-26 RX ADMIN — MORPHINE SULFATE 2 MG: 2 INJECTION, SOLUTION INTRAMUSCULAR; INTRAVENOUS at 14:50

## 2023-09-26 RX ADMIN — PANTOPRAZOLE SODIUM 40 MG: 40 TABLET, DELAYED RELEASE ORAL at 21:15

## 2023-09-26 RX ADMIN — FENTANYL CITRATE 100 MCG: 50 INJECTION, SOLUTION INTRAMUSCULAR; INTRAVENOUS at 12:26

## 2023-09-26 RX ADMIN — SUCRALFATE 1 G: 1 SUSPENSION ORAL at 17:32

## 2023-09-26 RX ADMIN — CEFAZOLIN 2000 MG: 2 INJECTION, POWDER, FOR SOLUTION INTRAMUSCULAR; INTRAVENOUS at 21:14

## 2023-09-26 RX ADMIN — FENTANYL CITRATE 25 MCG: 50 INJECTION, SOLUTION INTRAMUSCULAR; INTRAVENOUS at 10:54

## 2023-09-26 RX ADMIN — MORPHINE SULFATE 2 MG: 2 INJECTION, SOLUTION INTRAMUSCULAR; INTRAVENOUS at 14:40

## 2023-09-26 RX ADMIN — Medication 100 MCG: at 11:51

## 2023-09-26 RX ADMIN — ROCURONIUM BROMIDE 30 MG: 10 SOLUTION INTRAVENOUS at 12:29

## 2023-09-26 RX ADMIN — FENTANYL CITRATE 25 MCG: 50 INJECTION, SOLUTION INTRAMUSCULAR; INTRAVENOUS at 14:45

## 2023-09-26 RX ADMIN — PRAMIPEXOLE DIHYDROCHLORIDE 1 MG: 1 TABLET ORAL at 21:15

## 2023-09-26 RX ADMIN — ROCURONIUM BROMIDE 50 MG: 10 SOLUTION INTRAVENOUS at 11:12

## 2023-09-26 RX ADMIN — MORPHINE SULFATE 2 MG: 2 INJECTION, SOLUTION INTRAMUSCULAR; INTRAVENOUS at 15:00

## 2023-09-26 RX ADMIN — FENTANYL CITRATE 50 MCG: 50 INJECTION, SOLUTION INTRAMUSCULAR; INTRAVENOUS at 10:43

## 2023-09-26 RX ADMIN — FENTANYL CITRATE 100 MCG: 50 INJECTION, SOLUTION INTRAMUSCULAR; INTRAVENOUS at 13:31

## 2023-09-26 RX ADMIN — FENTANYL CITRATE 25 MCG: 50 INJECTION, SOLUTION INTRAMUSCULAR; INTRAVENOUS at 14:35

## 2023-09-26 NOTE — ANESTHESIA PROCEDURE NOTES
Arterial Line    Pre-sedation assessment completed: 9/26/2023 10:59 AM    Patient reassessed immediately prior to procedure    Patient location during procedure: pre-op  Start time: 9/26/2023 10:59 AM  Stop Time:9/26/2023 10:59 AM       Line placed for hemodynamic monitoring and ABGs/Labs/ISTAT.  Performed By   Anesthesiologist: Ubaldo Wilkes MD   Preanesthetic Checklist  Completed: patient identified, IV checked, site marked, risks and benefits discussed, surgical consent, monitors and equipment checked, pre-op evaluation and timeout performed  Arterial Line Prep    Sterile Tech: cap, gloves and sterile barriers  Prep: ChloraPrep  Patient monitoring: EKG, continuous pulse oximetry and blood pressure monitoring  Arterial Line Procedure   Laterality:right  Location:  radial artery  Catheter size: 20 G   Guidance: ultrasound guided  PROCEDURE NOTE/ULTRASOUND INTERPRETATION.  Using ultrasound guidance the potential vascular sites for insertion of the catheter were visualized to determine the patency of the vessel to be used for vascular access.  After selecting the appropriate site for insertion, the needle was visualized under ultrasound being inserted into the radial artery, followed by ultrasound confirmation of wire and catheter placement. There were no abnormalities seen on ultrasound; an image was taken; and the patient tolerated the procedure with no complications.   Number of attempts: 1  Successful placement: yes Images: still images not obtained  Post Assessment   Dressing Type: wrist guard applied, secured with tape and occlusive dressing applied.   Complications no  Circ/Move/Sens Assessment: normal and unchanged.   Patient Tolerance: patient tolerated the procedure well with no apparent complications  Additional Notes  Poor pulsatility on left-->right a-line placed to ensure accurate representation

## 2023-09-26 NOTE — PLAN OF CARE
Goal Outcome Evaluation:  Plan of Care Reviewed With: patient        Progress: no change  Outcome Evaluation: Pt admitted from PACU post-op Bronchoscopy, Thoracotomy, mulitple pleural biopsies, Talc Pleuradesis and RLL wedge resection. Pt is drowsy on arrival, but awakens to voice. Oriented x 4. PIV x 2, IVF infusing. CT to right chest -20 dry suction with small amt. of bloody drainage. Loyd to BSD with clear yellow urine. Pt c/o moderate pain. Pain management plan discussed with pt, recently medicated in PACU, pt falls alseep during conversation. SCD's. Safety maintained.

## 2023-09-26 NOTE — ANESTHESIA POSTPROCEDURE EVALUATION
"Patient: Melly Cruz    Procedure Summary       Date: 09/26/23 Room / Location:  [facility] OR 15 /  [facility] OR    Anesthesia Start: 1104 Anesthesia Stop: 1411    Procedures:       BRONCHOSCOPY,THORACOSCOPY, MULTIPLE PLEURAL BIOPSY, RIGHT LUNG LOWER LOBE WEDGE RESECTION, TALC PLEURODESIS ([place])      THORACOSCOPY with WEDGE RESECTION (Right: Chest) Diagnosis:       Malignant neoplasm of upper lobe of right lung      (Malignant neoplasm of upper lobe of right lung [C34.11])    Surgeons: Agusto Thompson MD Provider: Hesham Corona CRNA    Anesthesia Type: general, Radha ASA Status: 3            Anesthesia Type: general, Radha    Vitals  Vitals Value Taken Time   /75 09/26/23 1520   Temp 97.1 °F (36.2 °C) 09/26/23 1406   Pulse 71 09/26/23 1529   Resp 12 09/26/23 1515   SpO2 96 % 09/26/23 1527   Vitals shown include unvalidated device data.        Post Anesthesia Care and Evaluation    Patient location during evaluation: PACU  Patient participation: complete - patient participated  Level of consciousness: awake and alert  Pain management: adequate    Airway patency: patent  Anesthetic complications: No anesthetic complications    Cardiovascular status: acceptable  Respiratory status: acceptable  Hydration status: acceptable    Comments: Blood pressure 131/88, pulse 66, temperature 97.1 °F (36.2 °C), temperature source Temporal, resp. rate 12, height 160 cm (62.99\"), weight 96 kg (211 lb 10.3 oz), SpO2 96 %.    Pt discharged from PACU based on umu score >8    "

## 2023-09-26 NOTE — NURSING NOTE
Pt chest tube noted to have drained 60 mL of thin serosanguinous drainage to the collection cannister of the [facility] dry suction water seal.

## 2023-09-26 NOTE — BRIEF OP NOTE
Melly Cruz  9/26/2023    Pre-op Diagnosis:   Multiple lung nodules  Chest wall pain  Clinical stage IV lung cancer       Post-Op Diagnosis Codes:  Metastatic non small cell lung cancer favor adenocarcinoma  pT3,cN0,pM1 Stage IV    Procedure(s):  BRONCHOSCOPY  RIGHT THORACOSCOPY  MULTIPLE PLEURAL BIOPSY  RIGHT LUNG LOWER LOBE WEDGE RESECTION  TALC PLEURODESIS        Surgeon(s):  Agusto Thompson MD    Anesthesia: General    Staff:   Circulator: Rosio Jovel RN  Scrub Person: Caridad Prater; Ny Agarwal         Estimated Blood Loss: minimal    Urine Voided: * No values recorded between 9/26/2023 11:04 AM and 9/26/2023  1:33 PM *    Specimens:                Specimens       ID Source Type Tests Collected By Collected At Frozen?    A Lung, R Tissue TISSUE PATHOLOGY EXAM   Agusto Thompson MD 9/26/23 1237 Yes    Description: pleural biopsy 1    B Lung, R Tissue TISSUE PATHOLOGY EXAM   Agusto Thompson MD 9/26/23 1241 Yes    Description: pleural biopsy 2    C Lung, R Tissue TISSUE PATHOLOGY EXAM   Agusto Thompson MD 9/26/23 1244 Yes    Description: pleural biopsy 3    D Lung, R Tissue TISSUE PATHOLOGY EXAM   Agusto Thompson MD 9/26/23 1250 Yes    Description: pleural biopsy 4    E Lung, Right Lower Lobe Tissue TISSUE PATHOLOGY EXAM   Agusto Thompson MD 9/26/23 1303 No    Description: wedge resection    Comment: Diagnosis, no need for margin                   Drains:   Chest Tube 1 Right Midaxillary (Active)   Dressing Type Gauze;Other (Comment) 09/26/23 1338   Dressing Status Clean;Dry 09/26/23 1338   Dressing Intervention New dressing 09/26/23 1338   Securement tubing anchored to body distal to insertion site with tape 09/26/23 1338       Urethral Catheter Silicone;Temperature probe 16 Fr. (Active)       Findings: metastatic non small cell carcinoma.  Pleural effusion        Complications: none          Agusto Thompson MD     Date: 9/26/2023  Time: 13:51 CDT

## 2023-09-26 NOTE — ANESTHESIA PROCEDURE NOTES
Arterial Line    Pre-sedation assessment completed: 9/26/2023 10:54 AM    Patient reassessed immediately prior to procedure    Patient location during procedure: pre-op  Start time: 9/26/2023 10:54 AM  Stop Time:9/26/2023 10:54 AM       Line placed for hemodynamic monitoring and ABGs/Labs/ISTAT.  Performed By   Anesthesiologist: Ubaldo Wilkes MD   Preanesthetic Checklist  Completed: patient identified, IV checked, site marked, risks and benefits discussed, surgical consent, monitors and equipment checked, pre-op evaluation and timeout performed  Arterial Line Prep    Sterile Tech: cap, gloves and sterile barriers  Prep: ChloraPrep  Patient monitoring: EKG, continuous pulse oximetry and blood pressure monitoring  Arterial Line Procedure   Laterality:left  Location:  radial artery  Catheter size: 20 G   Guidance: ultrasound guided  PROCEDURE NOTE/ULTRASOUND INTERPRETATION.  Using ultrasound guidance the potential vascular sites for insertion of the catheter were visualized to determine the patency of the vessel to be used for vascular access.  After selecting the appropriate site for insertion, the needle was visualized under ultrasound being inserted into the radial artery, followed by ultrasound confirmation of wire and catheter placement. There were no abnormalities seen on ultrasound; an image was taken; and the patient tolerated the procedure with no complications.   Number of attempts: 1  Successful placement: yes Images: still images not obtained  Post Assessment   Dressing Type: wrist guard applied, secured with tape and occlusive dressing applied.   Complications no  Circ/Move/Sens Assessment: normal and unchanged.   Patient Tolerance: patient tolerated the procedure well with no apparent complications

## 2023-09-26 NOTE — ANESTHESIA PROCEDURE NOTES
Airway  Urgency: elective    Date/Time: 9/26/2023 11:18 AM  Airway not difficult    General Information and Staff    Patient location during procedure: OR  CRNA/CAA: Hesham Corona CRNA    Indications and Patient Condition  Indications for airway management: airway protection    Preoxygenated: yes  MILS maintained throughout  Mask difficulty assessment: 1 - vent by mask    Final Airway Details  Final airway type: endotracheal airway      Successful airway: ETT and EBT - double lumen left  Cuffed: yes   Successful intubation technique: direct laryngoscopy  Endotracheal tube insertion site: oral  Blade: Mendoza  Blade size: 3  EBT DL size (fr): 37  Cormack-Lehane Classification: grade I - full view of glottis  Placement verified by: chest auscultation and capnometry   Cuff volume (mL): 5  Measured from: gums  Number of attempts at approach: 1  Assessment: lips, teeth, and gum same as pre-op and atraumatic intubation    Additional Comments  Placement confirmed by bronch

## 2023-09-26 NOTE — ANESTHESIA PREPROCEDURE EVALUATION
Anesthesia Evaluation     Patient summary reviewed   no history of anesthetic complications:   NPO Solid Status: > 8 hours             Airway   Mallampati: II  TM distance: >3 FB  Neck ROM: full  Dental    (+) upper dentures and lower dentures    Pulmonary    (+) pleural effusion, a smoker Former, lung cancer, COPD,  (-) asthma, sleep apnea  Cardiovascular   Exercise tolerance: good (4-7 METS)    (+) DVT  (-) pacemaker, past MI, angina, cardiac stents      Neuro/Psych  (-) seizures, TIA, CVA  GI/Hepatic/Renal/Endo    (+) obesity, GERD, thyroid problem hypothyroidism  (-) liver disease, no renal disease, diabetes    Musculoskeletal     (+) chronic pain  Abdominal    Substance History      OB/GYN          Other                        Anesthesia Plan    ASA 3     general and [place]     intravenous induction     Anesthetic plan, risks, benefits, and alternatives have been provided, discussed and informed consent has been obtained with: patient.    Use of blood products discussed with patient  Consented to blood products.        CODE STATUS:

## 2023-09-27 ENCOUNTER — APPOINTMENT (OUTPATIENT)
Dept: GENERAL RADIOLOGY | Facility: HOSPITAL | Age: 68
DRG: 164 | End: 2023-09-27
Payer: MEDICARE

## 2023-09-27 PROBLEM — C78.01 MALIGNANT NEOPLASM METASTATIC TO RIGHT LUNG: Status: RESOLVED | Noted: 2023-09-26 | Resolved: 2023-09-27

## 2023-09-27 PROBLEM — C34.90 METASTATIC LUNG CANCER (METASTASIS FROM LUNG TO OTHER SITE): Status: RESOLVED | Noted: 2023-09-26 | Resolved: 2023-09-27

## 2023-09-27 PROBLEM — E11.9 TYPE 2 DIABETES MELLITUS, WITHOUT LONG-TERM CURRENT USE OF INSULIN: Status: RESOLVED | Noted: 2023-01-24 | Resolved: 2023-09-27

## 2023-09-27 PROBLEM — E66.09 CLASS 2 OBESITY DUE TO EXCESS CALORIES WITH BODY MASS INDEX (BMI) OF 37.0 TO 37.9 IN ADULT: Status: ACTIVE | Noted: 2023-09-27

## 2023-09-27 LAB
ANION GAP SERPL CALCULATED.3IONS-SCNC: 12 MMOL/L (ref 5–15)
BUN SERPL-MCNC: 9 MG/DL (ref 8–23)
BUN/CREAT SERPL: 12.5 (ref 7–25)
CALCIUM SPEC-SCNC: 8.4 MG/DL (ref 8.6–10.5)
CHLORIDE SERPL-SCNC: 100 MMOL/L (ref 98–107)
CO2 SERPL-SCNC: 23 MMOL/L (ref 22–29)
CREAT SERPL-MCNC: 0.72 MG/DL (ref 0.57–1)
CYTO UR: NORMAL
DEPRECATED RDW RBC AUTO: 55.1 FL (ref 37–54)
EGFRCR SERPLBLD CKD-EPI 2021: 92.3 ML/MIN/1.73
ERYTHROCYTE [DISTWIDTH] IN BLOOD BY AUTOMATED COUNT: 17.8 % (ref 12.3–15.4)
GLUCOSE SERPL-MCNC: 130 MG/DL (ref 65–99)
HCT VFR BLD AUTO: 29 % (ref 34–46.6)
HGB BLD-MCNC: 8.8 G/DL (ref 12–15.9)
LAB AP CASE REPORT: NORMAL
Lab: NORMAL
Lab: NORMAL
MCH RBC QN AUTO: 25.6 PG (ref 26.6–33)
MCHC RBC AUTO-ENTMCNC: 30.3 G/DL (ref 31.5–35.7)
MCV RBC AUTO: 84.3 FL (ref 79–97)
PATH REPORT.FINAL DX SPEC: NORMAL
PATH REPORT.GROSS SPEC: NORMAL
PLATELET # BLD AUTO: 373 10*3/MM3 (ref 140–450)
PMV BLD AUTO: 8.8 FL (ref 6–12)
POTASSIUM SERPL-SCNC: 3.8 MMOL/L (ref 3.5–5.2)
RBC # BLD AUTO: 3.44 10*6/MM3 (ref 3.77–5.28)
SODIUM SERPL-SCNC: 135 MMOL/L (ref 136–145)
WBC NRBC COR # BLD: 11.5 10*3/MM3 (ref 3.4–10.8)

## 2023-09-27 PROCEDURE — 25010000002 CEFAZOLIN PER 500 MG: Performed by: THORACIC SURGERY (CARDIOTHORACIC VASCULAR SURGERY)

## 2023-09-27 PROCEDURE — 80048 BASIC METABOLIC PNL TOTAL CA: CPT | Performed by: THORACIC SURGERY (CARDIOTHORACIC VASCULAR SURGERY)

## 2023-09-27 PROCEDURE — 94799 UNLISTED PULMONARY SVC/PX: CPT

## 2023-09-27 PROCEDURE — 94664 DEMO&/EVAL PT USE INHALER: CPT

## 2023-09-27 PROCEDURE — 25010000002 ENOXAPARIN PER 10 MG: Performed by: THORACIC SURGERY (CARDIOTHORACIC VASCULAR SURGERY)

## 2023-09-27 PROCEDURE — 71045 X-RAY EXAM CHEST 1 VIEW: CPT

## 2023-09-27 PROCEDURE — 85027 COMPLETE CBC AUTOMATED: CPT | Performed by: THORACIC SURGERY (CARDIOTHORACIC VASCULAR SURGERY)

## 2023-09-27 PROCEDURE — 25010000002 FUROSEMIDE PER 20 MG: Performed by: NURSE PRACTITIONER

## 2023-09-27 PROCEDURE — 99024 POSTOP FOLLOW-UP VISIT: CPT | Performed by: THORACIC SURGERY (CARDIOTHORACIC VASCULAR SURGERY)

## 2023-09-27 PROCEDURE — 25010000002 METOCLOPRAMIDE PER 10 MG: Performed by: SURGERY

## 2023-09-27 RX ORDER — FUROSEMIDE 10 MG/ML
20 INJECTION INTRAMUSCULAR; INTRAVENOUS
Status: DISCONTINUED | OUTPATIENT
Start: 2023-09-27 | End: 2023-09-28 | Stop reason: HOSPADM

## 2023-09-27 RX ORDER — METOCLOPRAMIDE HYDROCHLORIDE 5 MG/ML
10 INJECTION INTRAMUSCULAR; INTRAVENOUS EVERY 6 HOURS SCHEDULED
Status: DISCONTINUED | OUTPATIENT
Start: 2023-09-27 | End: 2023-09-28 | Stop reason: HOSPADM

## 2023-09-27 RX ORDER — POTASSIUM CHLORIDE 750 MG/1
20 CAPSULE, EXTENDED RELEASE ORAL 2 TIMES DAILY WITH MEALS
Status: DISCONTINUED | OUTPATIENT
Start: 2023-09-27 | End: 2023-09-28 | Stop reason: HOSPADM

## 2023-09-27 RX ADMIN — HYDROCODONE BITARTRATE AND ACETAMINOPHEN 2 TABLET: 7.5; 325 TABLET ORAL at 23:12

## 2023-09-27 RX ADMIN — IPRATROPIUM BROMIDE AND ALBUTEROL SULFATE 3 ML: .5; 3 SOLUTION RESPIRATORY (INHALATION) at 23:00

## 2023-09-27 RX ADMIN — ENOXAPARIN SODIUM 40 MG: 100 INJECTION SUBCUTANEOUS at 08:20

## 2023-09-27 RX ADMIN — IPRATROPIUM BROMIDE AND ALBUTEROL SULFATE 3 ML: .5; 3 SOLUTION RESPIRATORY (INHALATION) at 06:46

## 2023-09-27 RX ADMIN — METOCLOPRAMIDE HYDROCHLORIDE 10 MG: 5 INJECTION INTRAMUSCULAR; INTRAVENOUS at 05:08

## 2023-09-27 RX ADMIN — DOCUSATE SODIUM 100 MG: 100 CAPSULE, LIQUID FILLED ORAL at 21:24

## 2023-09-27 RX ADMIN — ACETYLCYSTEINE 3 ML: 200 SOLUTION ORAL; RESPIRATORY (INHALATION) at 23:00

## 2023-09-27 RX ADMIN — CEFAZOLIN 2000 MG: 2 INJECTION, POWDER, FOR SOLUTION INTRAMUSCULAR; INTRAVENOUS at 03:50

## 2023-09-27 RX ADMIN — HYDROCODONE BITARTRATE AND ACETAMINOPHEN 2 TABLET: 7.5; 325 TABLET ORAL at 01:15

## 2023-09-27 RX ADMIN — HYDROCODONE BITARTRATE AND ACETAMINOPHEN 2 TABLET: 7.5; 325 TABLET ORAL at 05:34

## 2023-09-27 RX ADMIN — METOCLOPRAMIDE HYDROCHLORIDE 10 MG: 5 INJECTION INTRAMUSCULAR; INTRAVENOUS at 23:12

## 2023-09-27 RX ADMIN — ALBUTEROL SULFATE 1.25 MG: 1.25 SOLUTION RESPIRATORY (INHALATION) at 14:42

## 2023-09-27 RX ADMIN — SUCRALFATE 1 G: 1 SUSPENSION ORAL at 17:39

## 2023-09-27 RX ADMIN — SUCRALFATE 1 G: 1 SUSPENSION ORAL at 21:24

## 2023-09-27 RX ADMIN — METOCLOPRAMIDE HYDROCHLORIDE 10 MG: 5 INJECTION INTRAMUSCULAR; INTRAVENOUS at 11:24

## 2023-09-27 RX ADMIN — SODIUM CHLORIDE 80 ML/HR: 9 INJECTION, SOLUTION INTRAVENOUS at 03:51

## 2023-09-27 RX ADMIN — LEVOTHYROXINE SODIUM 125 MCG: 0.12 TABLET ORAL at 05:37

## 2023-09-27 RX ADMIN — SUCRALFATE 1 G: 1 SUSPENSION ORAL at 11:24

## 2023-09-27 RX ADMIN — POTASSIUM CHLORIDE 20 MEQ: 10 CAPSULE, COATED, EXTENDED RELEASE ORAL at 11:24

## 2023-09-27 RX ADMIN — ALBUTEROL SULFATE 1.25 MG: 1.25 SOLUTION RESPIRATORY (INHALATION) at 10:57

## 2023-09-27 RX ADMIN — ALBUTEROL SULFATE 1.25 MG: 1.25 SOLUTION RESPIRATORY (INHALATION) at 06:46

## 2023-09-27 RX ADMIN — PRAMIPEXOLE DIHYDROCHLORIDE 1 MG: 1 TABLET ORAL at 21:24

## 2023-09-27 RX ADMIN — ACETYLCYSTEINE 3 ML: 200 SOLUTION ORAL; RESPIRATORY (INHALATION) at 14:43

## 2023-09-27 RX ADMIN — POLYETHYLENE GLYCOL 3350 17 G: 17 POWDER, FOR SOLUTION ORAL at 08:20

## 2023-09-27 RX ADMIN — FUROSEMIDE 20 MG: 10 INJECTION, SOLUTION INTRAMUSCULAR; INTRAVENOUS at 17:39

## 2023-09-27 RX ADMIN — SUCRALFATE 1 G: 1 SUSPENSION ORAL at 08:21

## 2023-09-27 RX ADMIN — FUROSEMIDE 20 MG: 10 INJECTION, SOLUTION INTRAMUSCULAR; INTRAVENOUS at 11:24

## 2023-09-27 RX ADMIN — ACETYLCYSTEINE 3 ML: 200 SOLUTION ORAL; RESPIRATORY (INHALATION) at 06:46

## 2023-09-27 RX ADMIN — DOCUSATE SODIUM 100 MG: 100 CAPSULE, LIQUID FILLED ORAL at 08:20

## 2023-09-27 RX ADMIN — BUPROPION HYDROCHLORIDE 300 MG: 150 TABLET, EXTENDED RELEASE ORAL at 08:20

## 2023-09-27 RX ADMIN — PRAMIPEXOLE DIHYDROCHLORIDE 1 MG: 1 TABLET ORAL at 08:20

## 2023-09-27 RX ADMIN — IPRATROPIUM BROMIDE AND ALBUTEROL SULFATE 3 ML: .5; 3 SOLUTION RESPIRATORY (INHALATION) at 14:42

## 2023-09-27 RX ADMIN — HYDROCODONE BITARTRATE AND ACETAMINOPHEN 2 TABLET: 7.5; 325 TABLET ORAL at 09:51

## 2023-09-27 RX ADMIN — POTASSIUM CHLORIDE 20 MEQ: 10 CAPSULE, COATED, EXTENDED RELEASE ORAL at 17:39

## 2023-09-27 RX ADMIN — METOCLOPRAMIDE HYDROCHLORIDE 10 MG: 5 INJECTION INTRAMUSCULAR; INTRAVENOUS at 17:39

## 2023-09-27 RX ADMIN — PANTOPRAZOLE SODIUM 40 MG: 40 TABLET, DELAYED RELEASE ORAL at 08:21

## 2023-09-27 RX ADMIN — HYDROCODONE BITARTRATE AND ACETAMINOPHEN 2 TABLET: 7.5; 325 TABLET ORAL at 14:42

## 2023-09-27 RX ADMIN — METOCLOPRAMIDE HYDROCHLORIDE 10 MG: 5 INJECTION INTRAMUSCULAR; INTRAVENOUS at 00:53

## 2023-09-27 RX ADMIN — PANTOPRAZOLE SODIUM 40 MG: 40 TABLET, DELAYED RELEASE ORAL at 21:58

## 2023-09-27 RX ADMIN — HYDROCODONE BITARTRATE AND ACETAMINOPHEN 2 TABLET: 7.5; 325 TABLET ORAL at 18:43

## 2023-09-27 NOTE — PLAN OF CARE
Goal Outcome Evaluation:         Ct to -20 suction, minimal output. IVF dc'd. C/o intermittent ct site pain. VSS. Denies any needs at this time.

## 2023-09-27 NOTE — CONSULTS
Adult Nutrition  Assessment/PES    Patient Name:  Melly Cruz  YOB: 1955  MRN: [id number]  Admit Date:  9/26/2023    Assessment Date:  9/27/2023    NTN consult. Pt was sitting in chair with  present at time of visit. Pt is seen by Dr. Jerry for XRT. Pt had BRONCHOSCOPY,THORACOSCOPY, MULTIPLE PLEURAL BIOPSY, RIGHT LUNG LOWER LOBE WEDGE RESECTION, TALC PLEURODESIS ([place]) on 9/26. During visit, provided education and handouts that included optimizing nutrition for disease and the importance of protein intake at this time. Pt stated she does consume Ensure Plus 2-3x daily. Advised to continue this practice. Provided exact protein goal to pt. Ordering Impact Advance Recovery due to pt's elevated protein needs. Pt reports of fair appetite and is tolerating meals. No reports of nausea or vomiting. Last BM 9/27, per pt. Will continue to follow per protocol.     Reason for Assessment       Row Name 09/27/23 1237          Reason for Assessment    Reason For Assessment physician consult     Diagnosis cancer diagnosis/related complications                    Nutrition/Diet History       Row Name 09/27/23 1237          Nutrition/Diet History    Typical Intake (Food/Fluid/EN/PN) Seen by Dr. 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 Pt is seen by Dr. Jerry for XRT. Pt had BRONCHOSCOPY,THORACOSCOPY, MULTIPLE PLEURAL BIOPSY, RIGHT LUNG LOWER LOBE WEDGE RESECTION, TALC PLEURODESIS ([place]) on 9/26. During visit, educated pt on optimizing nutrition for disease and encouraged pt to increase protein intake due to elevated needs. Pt stated she does consume Ensure Plus 2-3x daily. Advised to continue this practice. Provided exact protein goal to pt. Ordering Impact Advance Recovery due to pt’s elevated protein needs. Pt reports of fair appetite and is tolerating meals. No reports of nausea or vomiting. Last BM 9/27,  per pt.     Supplemental Drinks/Foods/Additives Ensure Plus 2-3x daily at home     Factors Affecting Nutritional Intake appetite                    Labs/Tests/Procedures/Meds       Row Name 09/27/23 1238          Labs/Procedures/Meds    Lab Results Reviewed reviewed     Lab Results Comments Na 135, Glu 130, Ca 8.4        Diagnostic Tests/Procedures    Diagnostic Test/Procedure Reviewed reviewed     Diagnostic Test/Procedures Comments BRONCHOSCOPY,THORACOSCOPY, MULTIPLE PLEURAL BIOPSY, RIGHT LUNG LOWER LOBE WEDGE RESECTION, TALC PLEURODESIS ([place])      THORACOSCOPY with WEDGE RESECTION (Right: Chest)  on 9/26        Medications    Pertinent Medications Reviewed reviewed     Pertinent Medications Comments see MAR                    Physical Findings       Row Name 09/27/23 1238          Physical Findings    Overall Physical Appearance Teeth missing. BR 20. Wound-right lower mid axillary (incision). Chest Tube.                    Estimated/Assessed Needs - Anthropometrics       Row Name 09/27/23 1239          Anthropometrics    Weight for Calculation 96 kg (211 lb 10.3 oz)        Estimated/Assessed Needs    Additional Documentation Fluid Requirements (Group);KCAL/KG (Group);Protein Requirements (Group)        KCAL/KG    KCAL/KG 20 Kcal/Kg (kcal);25 Kcal/Kg (kcal)     20 Kcal/Kg (kcal) 1920     25 Kcal/Kg (kcal) 2400        Protein Requirements    Weight Used For Protein Calculations 96 kg (211 lb 10.3 oz)     Est Protein Requirement Amount (gms/kg) 1.5 gm protein     Estimated Protein Requirements (gms/day) 144        Fluid Requirements    Fluid Requirements (mL/day) 2400     Estimated Fluid Requirement Method other (see comments)  25 mL/kg     RDA Method (mL) 2400                    Nutrition Prescription Ordered       Row Name 09/27/23 1240          Nutrition Prescription PO    Current PO Diet Regular     Fluid Consistency Thin                    Evaluation of Received Nutrient/Fluid Intake       Row Name  09/27/23 1240          Nutrient/Fluid Evaluation    Number of Days Evaluated 1 day     Additional Documentation Fluid Intake Evaluation (Group);Intake Assessment (Group)        Fluid Intake Evaluation    Other Fluid (mL) 2800        PO Evaluation    Number of Days PO Intake Evaluated Insufficient Data                       Problem/Interventions:   Problem 1       Row Name 09/27/23 1306          Nutrition Diagnoses Problem 1    Problem 1 Increased Nutrient Needs     Macronutrient Kcal;Protein     Etiology (related to) Medical Diagnosis     Oncology Lung cancer     Skin Surgical wound     Signs/Symptoms (evidenced by) Report/Observation     Other Comment Stage IV cancer w/ dx chest wall mestastasis; Wound-right lower mid axillary incision                          Intervention Goal       Row Name 09/27/23 1307          Intervention Goal    General Provide information regarding MNT for treatment/condition;Reduce/improve symptoms;Meet nutritional needs for age/condition;Disease management/therapy     PO Increase intake;Meet estimated needs     Weight No significant weight loss                    Nutrition Intervention       Row Name 09/27/23 1307          Nutrition Intervention    RD/Tech Action Care plan reviewd;Recommend/ordered     Recommended/Ordered Supplement                    Nutrition Prescription       Row Name 09/27/23 1307          Nutrition Prescription PO    PO Prescription Begin/change supplement     Supplement Impact Advanced Recovery     Supplement Frequency 2 times a day     New PO Prescription Ordered? Yes                    Education/Evaluation       Row Name 09/27/23 1307          Education    Education Advised regarding habits/behavior;Provided education regarding;Education topics     Provided education regarding Avoidance/improvement of symptoms;Avoidance of associated complications     Education Topics Basic nutrition     Advised Regarding Habits/Behavior Use supplement;Increased nutrient density         Monitor/Evaluation    Monitor Per protocol                     Electronically signed by:  Lorenzo Myers RD  09/27/23 13:08 CDT

## 2023-09-27 NOTE — PROGRESS NOTES
"Bronchoscopy, right thoracoscopy, multiple pleural biopsy, right lower lobe wedge resection with talc pleurodesis    POD 1    Up in the chair. On 2LNC with SPO2 94%. Has walked within room but not hallway. Rates pain 5/10 presently. Due for pain pill. Needs IS. Poor appetite, did eat eggs and drink coffee this morning.     IV gtts: IVF    Visit Vitals  /52 (BP Location: Left arm, Patient Position: Lying)   Pulse 89   Temp 98.7 °F (37.1 °C) (Oral)   Resp 18   Ht 160 cm (62.99\")   Wt 96 kg (211 lb 10.3 oz)   SpO2 90%   BMI 37.50 kg/m²       Intake/Output Summary (Last 24 hours) at 9/27/2023 0915  Last data filed at 9/27/2023 0900  Gross per 24 hour   Intake 2920 ml   Output 1850 ml   Net 1070 ml     CT output: 150 ml/24 hour, serosanguinous fluid, no air leak        Chest x-ray: Right chest tube in place, no appreciable pneumothorax, right greater than left basilar atelectasis    Physical Exam:  General: No apparent distress. In good spirits. Up in the chair.   Cardiovascular: Regular rate and rhythm without murmur, rubs, or gallops.    Pulmonary: Clear to auscultation bilaterally without wheezing, rubs, or rales. Diminished bases.   Chest: Right thoracic incisions clean and dry. Right chest tube to 20 cm H2O suction. No air leak. Fluid is serosanguineous.   Abdomen: Soft, nondistended and nontender.  Extremities: Warm, moves all extremities. No edema.   Neurologic: Grossly intact with no focal deficits.       Impression:  Metastatic non-small cell lung cancer, favor adenocarcinoma  Multiple lung nodules  Chest wall pain  Class 2 severe obesity      Plan:  DC IVF, start diuresis   Encourage pulmonary toilet and ambulation. Nurse to provide IS.   Continue chest tube suction and drainage  Await final pathology  Continue pain control strategies  Routine post thoracic surgery regimen  Discussed with patient, family and nursing  "

## 2023-09-27 NOTE — PLAN OF CARE
Goal Outcome Evaluation:      Patient resting overnight. Complaints of pain medicated per MAR. Chest tube with 200 out overnight. No air leak. Loyd d/c at 0500. Due to void. O2 @2. Nausea medicated per MAR. No orders for nausea. Called On call provider. See MAR. Mother at bedside. Safety maintained

## 2023-09-27 NOTE — CASE MANAGEMENT/SOCIAL WORK
Discharge Planning Assessment  [facility]     Patient Name: Melly Rahman  MRN: [id number]  Today's Date: 9/27/2023    Admit Date: 9/26/2023        Discharge Needs Assessment       Row Name 09/27/23 0909       Living Environment    People in Home spouse    Name(s) of People in Home Carmelo Rahman - spouse    Current Living Arrangements home    Potentially Unsafe Housing Conditions none    Primary Care Provided by self    Provides Primary Care For no one    Family Caregiver if Needed spouse    Quality of Family Relationships helpful;involved;supportive    Able to Return to Prior Arrangements yes       Resource/Environmental Concerns    Resource/Environmental Concerns none    Transportation Concerns none       Food Insecurity    Within the past 12 months, you worried that your food would run out before you got the money to buy more. Never true    Within the past 12 months, the food you bought just didn't last and you didn't have money to get more. Never true       Transition Planning    Patient/Family Anticipates Transition to home with family    Transportation Anticipated family or friend will provide       Discharge Needs Assessment    Readmission Within the Last 30 Days no previous admission in last 30 days    Equipment Currently Used at Home none    Concerns to be Addressed discharge planning;denies needs/concerns at this time    Anticipated Changes Related to Illness none    Discharge Coordination/Progress Pt reports living with her spouse who is present at [facility].  Denies any DME use at home, states she is independent and drives, has a PCP and RX coverage, anticipates DC home when ready and currently denies any DC needs.                   Discharge Plan    No documentation.                 Continued Care and Services - Admitted Since 9/26/2023    Coordination has not been started for this encounter.          Demographic Summary    No documentation.                  Functional Status    No documentation.                   Psychosocial    No documentation.                  Abuse/Neglect    No documentation.                  Legal    No documentation.                  Substance Abuse    No documentation.                  Patient Forms    No documentation.                     Yudy Hansen RN

## 2023-09-27 NOTE — PAYOR COMM NOTE
"Melly Cruz (68 y.o. Female)    [id number]    Admit 9/26   [facility] phone   fax      [facility] facility       Date of Birth   1955    Social Security Number       Address   [address] [facility] [address]    Home Phone   [phone]    MRN   [id number]       Hoahaoism   Non-Nondenominational    Marital Status                               Admission Date   9/26/23    Admission Type   Elective    Admitting Provider   Agusto Thompson MD    Attending Provider   Agusto Thompson MD    Department, Room/Bed   [facility] 3C, 380/1       Discharge Date       Discharge Disposition       Discharge Destination                                 Attending Provider: Agusto Thompson MD    Allergies: Erythromycin, Guaifenesin, Hydromorphone Hcl, Morphine, Ondansetron Hcl, Orphenadrine, Codeine, Meperidine    Isolation: None   Infection: None   Code Status: CPR    Ht: 160 cm (62.99\")   Wt: 96 kg (211 lb 10.3 oz)    Admission Cmt: None   Principal Problem: Malignant neoplasm of upper lobe of right lung [C34.11]                   Active Insurance as of 9/26/2023       Primary Coverage       Payor Plan Insurance Group Employer/Plan Group    ANTHEM MEDICARE REPLACEMENT ANTHEM MEDICARE ADVANTAGE [id number]       Payor Plan Address Payor Plan Phone Number Payor Plan Fax Number Effective Dates    [address] [phone]  11/1/2022 - None Entered    [facility] [id number]         Subscriber Name Subscriber Birth Date Member ID       MELLY CRUZ 1955 [id number]                     Emergency Contacts        (Rel.) Home Phone Work Phone Mobile Phone    Carmelo Cruz (Spouse) [phone] -- [phone]                 History & Physical        Agusto Thompson MD at 09/26/23 1055            H&P reviewed. The patient was examined and there are no changes to the H&P.          Electronically signed by Agusto Thompson MD at " 09/26/23 1208   Source Note              Chief Complaint   Patient presents with    Pleural Effusion     Pt was re-referred to CT Surgery from Dr. Delarosa    Bone Lesion    Lung Mass       Subjective    History of Present Illness  Other history includes the patient has been seen by Dr. Jerry and he currently recommends palliative external beam radiation for 13 treatments with now stage IV lung cancer with the diagnosis of chest wall metastasis.    The patient is accompanied by an adult male.  Ms. Melly Cruz states she was treated for lung cancer with radiation.  She denies receiving chemotherapy.  She states she underwent a PET scan ordered by Dr. Jerry.  She states the pain is all on the right side.  She states she can not sleep due to  pain.  She states Dr. Jerry showed her that cancer is very present in her rib.  She states Dr. Jerry was treating her arthritis.  She reports beginning radiation today.  She states reports having a normal lung function test in [place].  The adult male reports applying for FMLA to care for the patient.  She denies difficulty trouble breathing.  She states her surgery in [place] took approximately 3 hours.  She states she quit smoking 5.5 years ago.  She denies hemoptysis, pneumonia, cough, hoarseness of voice.  She does have chest and back pain.      Review of Systems   Constitutional:  Positive for activity change and fatigue. Negative for appetite change, chills, diaphoresis, fever and unexpected weight change.   HENT:  Negative for dental problem, hearing loss, nosebleeds, sore throat, trouble swallowing and voice change.    Eyes:  Negative for photophobia, redness and visual disturbance.   Respiratory:  Negative for apnea, cough, chest tightness, shortness of breath, wheezing and stridor.    Cardiovascular:  Positive for chest pain. Negative for palpitations and leg swelling.   Gastrointestinal:  Negative for abdominal distention, abdominal pain, blood in stool,  constipation, diarrhea, nausea and vomiting.   Endocrine: Negative for cold intolerance, heat intolerance, polyphagia and polyuria.   Genitourinary:  Negative for decreased urine volume, difficulty urinating, dysuria, flank pain, frequency, hematuria and urgency.   Musculoskeletal:  Positive for arthralgias and back pain. Negative for gait problem, joint swelling, myalgias and neck pain.   Skin:  Negative for pallor, rash and wound.   Allergic/Immunologic: Negative for immunocompromised state.   Neurological:  Negative for dizziness, tremors, seizures, syncope, speech difficulty, weakness, light-headedness, numbness and headaches.   Hematological:  Does not bruise/bleed easily.   Psychiatric/Behavioral:  Negative for confusion, sleep disturbance and suicidal ideas. The patient is not nervous/anxious.         Past Medical History:   Diagnosis Date    Acid reflux     Anemia     Anxiety     Arthritis     Asthma     Mild COPD    Curtis esophagus     Bleeding disorder     Reflux acid overload    Bunion Aug    Repaired with surgery    Cancer 03/2023    Waiting on Biopsy to determine what kind and what stage NOW BONE CANCER    Chest pain     Chronic back pain     Chronic neck pain     Clotting disorder     Stomach bleeds from acid    COPD (chronic obstructive pulmonary disease)     Difficulty walking Dec 2022    After surgery wslking has become more and more difficult    DVT (deep venous thrombosis)     GI bleed     Hammer toe Aug 22    Repaired surgery    Hypothyroid     Hypothyroidism     Low back pain     Lung cancer     Migraine     PE (pulmonary thromboembolism)     Plantar fasciitis Years ago    Renal lesion 04/05/2023    Shin splints Years ago     Past Surgical History:   Procedure Laterality Date    BUNIONECTOMY      CHOLECYSTECTOMY      COLONOSCOPY      CORRECTION HAMMER TOE      DILATATION AND CURETTAGE      FOOT FUSION Left 04/20/2022    Procedure: 1-2 Arthrodesis, Tarsal Metatarsal Joint 2 and 3 Arthrodesis;   Surgeon: Bud Maradiaga DPM;  Location:  [facility] OR;  Service: Podiatry;  Laterality: Left;    HAMMER TOE REPAIR Left 2022    Procedure: Hammertoe Repair 2-5,;  Surgeon: Bud Maradiaga DPM;  Location:  [facility] OR;  Service: Podiatry;  Laterality: Left;    HERNIA REPAIR      HYSTERECTOMY      JOINT REPLACEMENT      OOPHORECTOMY      REDUCTION MAMMAPLASTY  2021    REPLACEMENT TOTAL KNEE      Right knee partial, left knee     Family History   Problem Relation Age of Onset    Breast cancer Paternal Aunt     Heart disease Mother     Osteoporosis Mother     Thyroid disease Mother         Lukemia can’t remember which kind.    Cancer Mother         Pacemaker at 90 years old.    Cancer Father         Father’s entire family  from Cancer    Diabetes Father     Thyroid disease Father     Thyroid disease Maternal Aunt         Blood disorder    Thyroid disease Maternal Uncle     Thyroid disease Sister         Thyroid    Ovarian cancer Neg Hx     Uterine cancer Neg Hx     Colon cancer Neg Hx      Social History     Tobacco Use    Smoking status: Former     Packs/day: 1.50     Years: 46.00     Pack years: 69.00     Types: Cigarettes     Start date: 1972     Quit date: 2018     Years since quittin.4     Passive exposure: Past    Smokeless tobacco: Never    Tobacco comments:     Pt started at age 17, then quit at age 31. Pt restarted smoking at age 32-33 and quit smoking in 2018   Vaping Use    Vaping Use: Never used   Substance Use Topics    Alcohol use: Not Currently     Alcohol/week: 1.0 standard drink     Types: 1 Drinks containing 0.5 oz of alcohol per week     Comment: A chocolate drink made with moonshine and creamer    Drug use: No     No current facility-administered medications for this visit.     No current outpatient medications on file.     Facility-Administered Medications Ordered in Other Visits   Medication Dose Route Frequency Provider Last Rate Last Admin    albuterol (PROVENTIL)  nebulizer solution 0.042% 1.25 mg/3mL  1.25 mg Nebulization 4x Daily - RT Brandy Castillo APRN        dextrose (D50W) (25 g/50 mL) IV injection 12.5 g  12.5 g Intravenous PRN Alfredo Hinojosa MD        fentaNYL citrate (PF) (SUBLIMAZE) injection 25 mcg  25 mcg Intravenous Q5 Min PRN Alfredo Hinojosa MD   25 mcg at 09/26/23 1100    fentaNYL citrate (PF) (SUBLIMAZE) injection   Intravenous PRN Hesham Corona CRNA   50 mcg at 09/26/23 1145    lactated ringers infusion 1,000 mL  1,000 mL Intravenous Continuous Agusto Thompson MD 25 mL/hr at 09/26/23 1104 Currently Infusing at 09/26/23 1104    lactated ringers infusion 1,000 mL  1,000 mL Intravenous Continuous Agusto Thompson MD 25 mL/hr at 09/26/23 1104 Currently Infusing at 09/26/23 1104    lactated ringers infusion  9 mL/hr Intravenous Continuous Alfredo Hinojosa MD        lidocaine PF 1% (XYLOCAINE) injection 0.5 mL  0.5 mL Intradermal Once PRN Agusto Thompson MD        midazolam (VERSED) injection 0.5 mg  0.5 mg Intravenous Q10 Min PRN Alfredo Hinojosa MD        Phenylephrine HCl-NaCl 100 mcg/ml injection   Intravenous PRN Hesham Corona CRNA   100 mcg at 09/26/23 1151    Propofol (DIPRIVAN) injection   Intravenous PRN Hesham Corona CRNA   50 mg at 09/26/23 1143    sodium chloride 0.9 % flush 10 mL  10 mL Intravenous Q12H Brandy Castillo APRN        sodium chloride 0.9 % flush 10 mL  10 mL Intravenous PRN Brandy Castillo APRN        sodium chloride 0.9 % flush 10 mL  10 mL Intravenous PRN Agusto Thompson MD        sodium chloride 0.9 % flush 10 mL  10 mL Intravenous Q12H Alfredo Hinojosa MD        sodium chloride 0.9 % flush 10 mL  10 mL Intravenous PRN Alfredo Hinojosa MD        sodium chloride 0.9 % flush 3 mL  3 mL Intravenous PRN Agusto Thompson MD       Allergies:  Erythromycin, Guaifenesin, Hydromorphone hcl, Morphine, Ondansetron hcl, Orphenadrine,  "Codeine, and Meperidine    Objective     Vital Signs  Visit Vitals  /94 (BP Location: Right arm, Patient Position: Sitting, Cuff Size: Adult)   Pulse 69   Ht 162.6 cm (64\")   Wt 95.5 kg (210 lb 9.6 oz)   SpO2 96%   BMI 36.15 kg/m²     Physical Exam  Constitutional:       Appearance: She is well-developed. She is ill-appearing.      Comments: No acute distress, appropriate, alert.   HENT:      Head: Normocephalic and atraumatic.   Eyes:      Pupils: Pupils are equal, round, and reactive to light.   Neck:      Thyroid: No thyromegaly.      Vascular: No JVD.      Trachea: No tracheal deviation.   Cardiovascular:      Rate and Rhythm: Normal rate and regular rhythm.      Heart sounds: Normal heart sounds. No murmur heard.    No friction rub. No gallop.   Pulmonary:      Effort: Pulmonary effort is normal. No respiratory distress.      Breath sounds: Normal breath sounds. No wheezing or rales.   Chest:      Chest wall: No tenderness.   Abdominal:      General: There is no distension.      Palpations: Abdomen is soft.      Tenderness: There is no abdominal tenderness.   Musculoskeletal:         General: Normal range of motion.      Cervical back: Normal range of motion and neck supple.   Lymphadenopathy:      Cervical: No cervical adenopathy.   Skin:     General: Skin is warm and dry.   Neurological:      Mental Status: She is alert and oriented to person, place, and time.      Cranial Nerves: No cranial nerve deficit.       Results Review:   XR Ribs Right With PA Chest    Result Date: 8/25/2023  Narrative: EXAMINATION: XR RIBS RIGHT W PA CHEST-  8/25/2023 7:25 PM CDT  HISTORY: rib and chest pain; hx of cancer  Chest x-ray and right rib series.  Comparison is made with a chest x-ray from 06/27/2023.  Normal heart and mediastinum.  Chronic interstitial lung disease. Known right apical spiculated nodule.  No pneumothorax. One of the oblique rib images shows small amount of right pleural fluid and the appearance of " some mild right basilar infiltrate or atelectasis.  Oblique right rib images show no fracture and no bone lesion.  Summary: 1. Mild right basilar infiltrate or atelectasis and trace amount right pleural fluid. 2. No right rib fracture is seen. This report was finalized on 08/25/2023 20:20 by Dr. Jorge Willoughby MD.    CT Chest With Contrast Diagnostic    Result Date: 8/27/2023  Narrative: EXAMINATION: CT CHEST W CONTRAST DIAGNOSTIC-   8/27/2023 5:43 PM CDT  HISTORY: History of lung cancer and radiation therapy. Right chest pain and back pain.  In order to have a CT radiation dose as low as reasonably achievable Automated Exposure Control was utilized for adjustment of the mA and/or KV according to patient size.  DLP in mGycm= 211.  Chest CT with IV contrast. Axial, sagittal, and coronal sequences.  Comparison is made with 06/08/2023.  Normal heart size. Coronary artery calcification. Normal thoracic aorta.  Normal and symmetric appearance of the pulmonary arteries.  No mediastinal mass.  Fully expanded lungs. No pneumonia, pneumothorax, or pleural effusion.  A linear soft tissue focus within the right apex measures 23 x 11 mm compared with 33 x 12 mm. A new 10 x 7 mm nodule within the central right lung lies at the confluence of the minor and major fissure. There is an adjacent 5 mm nodule and scattered 3-4 mm nodules within the adjacent right middle lobe. 7 mm subpleural right middle lobe nodule previously measured 4 mm. New finding of 2 adjacent 10 mm subpleural nodules within the anterior medial right middle lobe.  No left lung abnormality is seen.  Summary: 1. History of lung cancer noted. Multiple new small nodules within the right lung compatible with metastatic disease. 2. No pneumonia or pneumothorax to account for new right sided chest pain/back pain.              This report was finalized on 08/27/2023 18:22 by Dr. Jorge Willoughby MD.    NM PET/CT Skull Base to Mid Thigh    Result Date: 8/31/2023  Narrative:  EXAMINATION:  NM PET/CT SKULL BASE TO MID THIGH-  8/31/2023 8:15 AM CDT  HISTORY: R91.8-Other nonspecific abnormal finding of lung field. Status post radiation therapy on a right lung nodule. A recent follow-up CT demonstrated multiple new nodules. Restaging examination.  COMPARISON: 03/17/2023.  TECHNIQUE: The patient was injected with 12.4 m-Ci of F-18-FDG intravenously. Blood glucose was 105 mg/dl. After an appropriate uptake time, CT and PET images were obtained from the skull base to the mid thighs. Image fusion was also performed. CT images were obtained for attenuation and localization purposes.  DLP: 996 mGy-cm. Automated exposure control was utilized to decrease patient dose.  HEAD AND NECK: No abnormal uptake is seen in the head and neck region on the PET images. CT images demonstrate no focal abnormality of the unenhanced brain. The parotid and submandibular glands are symmetric. The thyroid gland is either very small or absent. There are no pathologically enlarged lymph nodes identified on CT images. There is no airway asymmetry.  CHEST: Uptake in the right upper lobe mass is decreased on the current study, now having an SUV max of 3.5, previously 4.1. The overall size of the right upper lobe abnormality appears relatively stable. There is abnormal uptake in 2 of the anterior pleural-based pulmonary nodules in the right middle lobe anteriorly, one with an SUV max of 4.2 and the other with an SUV max of 3.6. There is abnormal uptake within lymph nodes in the right cardiophrenic angle fat pad. There are 2 lymph nodes with uptake in this region, one with an SUV max of 4.1 and the other with an SUV max of 2.9. In the extreme right lung base, there are 3 other small areas of focal pleural-based uptake posteriorly and one anteriorly likely related to metastatic pleural nodules. There is a small right pleural effusion. There is a larger area of abnormal uptake that appears to represent a mass in the posterior  chest wall and involving the posterolateral right 10th rib with an SUV max of 20. There is another small focus of activity anterior to the liver that appears to be in the lower anterior chest wall between the anterior left sixth and seventh ribs. CT images demonstrate cardiomegaly. There is mild atheromatous disease of the thoracic aorta and coronary arteries. There are multiple subcentimeter pulmonary nodules in the right lung that do not demonstrate significant had activity. There is an area of fat necrosis in the left breast.  ABDOMEN AND PELVIS: Physiologic uptake is seen within the solid organs, GI tract and urinary tract. An area of focal uptake described in the sigmoid colon on the prior study is not seen today. I do not see any definite evidence of metastatic disease in the abdomen or the pelvis. On CT images, there has been prior cholecystectomy. There is diverticulosis of the colon. The appendix is normal. There is atheromatous disease of the aortoiliac vessels.  VISUALIZED EXTREMITIES: There is some uptake in the right shoulder joint that is probably related to inflammatory change.       Impression: 1. The right upper lobe primary neoplasm SUV max is decreased on today's study compared to the prior study. The size appears relatively stable. 2. There are pleural-based areas of nodularity in the right middle lobe demonstrating uptake on the PET images worrisome for metastatic disease. I suspect that there is metastatic pleural disease. There is a right pleural effusion and there are several pleural-based areas of focal activity. The patient also has multiple small subcentimeter lung nodules that do not demonstrate significant PET activity which may be related to their small size. The nodules are reportedly new on CT. 3. Probable chest wall lesion posteriorly on the right that has some involvement of the posterolateral right 10th rib demonstrating abnormal PET activity. 4. Abnormal uptake in right  cardiophrenic angle lymph nodes consistent with metastatic adenopathy. 5. No metastatic disease is seen in the neck, abdomen or pelvis. This report was finalized on 08/31/2023 11:17 by Dr. Brandon Fenton MD.     I did review the 08/27/2023 CT scan of the chest myself. Her previously known right upper lobe linear nodularity is noted. There are new lung nodules throughout the right lung and are concerning for metastatic disease.    08/31/2023 PET scan reveals right upper lobe lung cancer primary SUV max has decreased compared to previous study, now measuring 3.5 compared to previous 4.1 in comparison. The lesion appears to be about the same size on PET. The nodules that are new, all are hypermetabolic. There appears to be a mass in the posterior chest wall along the posterolateral right 10th rib and this has a maximum of 20.      I reviewed the patient's new clinical results.  Discussed with patient and family.      Assessment & Plan    Diagnoses and all orders for this visit:    1. Multiple lung nodules (Primary)    2. Chest wall pain    3. Chronic midline thoracic back pain    4. Former smoker        Impression:  1. Stage IV presumed lung cancer, right upper lobe original.  2. Previous tobacco use.  3. History of DVT and PE.  4. Chest wall pain, highly concerning for metastasis, empiric radiation has been started.  5. Chronic pain syndrome, treated by Dr. Lopez.  6. Multiple lung nodules.     Medical decision making/Recommendations/Plan:  Many thanks Dr. Delarosa for the opportunity to aid in the care of your patient.  I did review imaging today with the patient.  Salient findings include a persistent right upper lobe lung nodule as well as multiple new right-sided lung nodules including chest wall metastasis.  We discussed at this point the working clinical impression of metastatic lung cancer.  Discussed at this point a true diagnosis remains elusive, although the clinical suspicion is high that this is a stage IV  to chest wall and ipsilateral lobes metastasis.  I did speak with Dr. Delarosa as well.  A tissue diagnosis remains elusive.  She has already undergone bronchoscopic efforts at [place], which was non-diagnostic.  Therefore, we discussed her options in detail.  Malignancy is high on the differential of course.  Options include palliative medicine consultation with empiric radiation as needed or with stereotactic radiation for palliation versus obtaining tissue and considering a systemic therapy including immune therapy.  The patient is strongly interested at this time to obtain a diagnosis and undergo treatment as indicated.  She also has questions about pain relief.  She has started radiation today.  Encouraged her to seek with Dr. Duarte to aid as an adjunct with pain medications indicated, but likely will have the best relief with radiation.  She should start noticing resolution improvement in her pain in the next couple of treatment courses.  To that end, discussed the recommendation to consider bronchoscopy, right thoracoscopy, wedge resection as indicated for diagnosis.  We discussed alternative option of CT guided needle biopsy.  The pros and cons of each were discussed at length.  Resection is favored.  We will discuss the operative expected postoperative course.  Discussed the risks of procedure to include but not limited to bleeding, infection, stroke, heart attack, need for additional procedures, new VTE event including a PE, organ dysfunction and/or failure including death.  All questions were answered to the best of my ability and she is eager to proceed forward as soon as possible. She has been congratulated.  She remains a non-smoker.  Many thanks again Dr. Delarosa for opportunity to aid in the care of your patient.  I will continue to keep you apprised of care as it ensues with rediscussion following a wedge resection for diagnosis acquisition.        Transcribed from ambient dictation for Agusto BRITO  MD Jay by Roxi Garza.  09/13/23   12:14 CDT    Patient or patient representative verbalized consent to the visit recording.  I have personally performed the services described in this document as transcribed by the above individual, and it is both accurate and complete.      Electronically signed by Agusto Thompson MD at 09/26/23 1208                 Agusto Thompson MD at 09/13/23 0900              Chief Complaint   Patient presents with    Pleural Effusion     Pt was re-referred to CT Surgery from Dr. Delarosa    Bone Lesion    Lung Mass         Subjective    History of Present Illness  Other history includes the patient has been seen by Dr. Jerry and he currently recommends palliative external beam radiation for 13 treatments with now stage IV lung cancer with the diagnosis of chest wall metastasis.    The patient is accompanied by an adult male.  Ms. Melly Cruz states she was treated for lung cancer with radiation.  She denies receiving chemotherapy.  She states she underwent a PET scan ordered by Dr. Jerry.  She states the pain is all on the right side.  She states she can not sleep due to  pain.  She states Dr. Jerry showed her that cancer is very present in her rib.  She states Dr. Jerry was treating her arthritis.  She reports beginning radiation today.  She states reports having a normal lung function test in [place].  The adult male reports applying for [place] to care for the patient.  She denies difficulty trouble breathing.  She states her surgery in [place] took approximately 3 hours.  She states she quit smoking 5.5 years ago.  She denies hemoptysis, pneumonia, cough, hoarseness of voice.  She does have chest and back pain.      Review of Systems   Constitutional:  Positive for activity change and fatigue. Negative for appetite change, chills, diaphoresis, fever and unexpected weight change.   HENT:  Negative for dental problem, hearing loss, nosebleeds, sore throat, trouble  swallowing and voice change.    Eyes:  Negative for photophobia, redness and visual disturbance.   Respiratory:  Negative for apnea, cough, chest tightness, shortness of breath, wheezing and stridor.    Cardiovascular:  Positive for chest pain. Negative for palpitations and leg swelling.   Gastrointestinal:  Negative for abdominal distention, abdominal pain, blood in stool, constipation, diarrhea, nausea and vomiting.   Endocrine: Negative for cold intolerance, heat intolerance, polyphagia and polyuria.   Genitourinary:  Negative for decreased urine volume, difficulty urinating, dysuria, flank pain, frequency, hematuria and urgency.   Musculoskeletal:  Positive for arthralgias and back pain. Negative for gait problem, joint swelling, myalgias and neck pain.   Skin:  Negative for pallor, rash and wound.   Allergic/Immunologic: Negative for immunocompromised state.   Neurological:  Negative for dizziness, tremors, seizures, syncope, speech difficulty, weakness, light-headedness, numbness and headaches.   Hematological:  Does not bruise/bleed easily.   Psychiatric/Behavioral:  Negative for confusion, sleep disturbance and suicidal ideas. The patient is not nervous/anxious.         Past Medical History:   Diagnosis Date    Acid reflux     Anemia     Anxiety     Arthritis     Asthma     Mild COPD    Curtis esophagus     Bleeding disorder     Reflux acid overload    Bunion Aug    Repaired with surgery    Cancer 03/2023    Waiting on Biopsy to determine what kind and what stage NOW BONE CANCER    Chest pain     Chronic back pain     Chronic neck pain     Clotting disorder     Stomach bleeds from acid    COPD (chronic obstructive pulmonary disease)     Difficulty walking Dec 2022    After surgery wslking has become more and more difficult    DVT (deep venous thrombosis)     GI bleed     Hammer toe Aug 22    Repaired surgery    Hypothyroid     Hypothyroidism     Low back pain     Lung cancer     Migraine     PE (pulmonary  thromboembolism)     Plantar fasciitis Years ago    Renal lesion 2023    Shin splints Years ago     Past Surgical History:   Procedure Laterality Date    BUNIONECTOMY      CHOLECYSTECTOMY      COLONOSCOPY      CORRECTION HAMMER TOE      DILATATION AND CURETTAGE      FOOT FUSION Left 2022    Procedure: 1-2 Arthrodesis, Tarsal Metatarsal Joint 2 and 3 Arthrodesis;  Surgeon: Bud Maradiaga DPM;  Location:  [facility];  Service: Podiatry;  Laterality: Left;    HAMMER TOE REPAIR Left 2022    Procedure: Hammertoe Repair 2-5,;  Surgeon: Bud Maradiaga DPM;  Location:  [facility];  Service: Podiatry;  Laterality: Left;    HERNIA REPAIR      HYSTERECTOMY      JOINT REPLACEMENT      OOPHORECTOMY      REDUCTION MAMMAPLASTY  2021    REPLACEMENT TOTAL KNEE      Right knee partial, left knee     Family History   Problem Relation Age of Onset    Breast cancer Paternal Aunt     Heart disease Mother     Osteoporosis Mother     Thyroid disease Mother         Lukemia can’t remember which kind.    Cancer Mother         Pacemaker at 90 years old.    Cancer Father         Father’s entire family  from Cancer    Diabetes Father     Thyroid disease Father     Thyroid disease Maternal Aunt         Blood disorder    Thyroid disease Maternal Uncle     Thyroid disease Sister         Thyroid    Ovarian cancer Neg Hx     Uterine cancer Neg Hx     Colon cancer Neg Hx      Social History     Tobacco Use    Smoking status: Former     Packs/day: 1.50     Years: 46.00     Pack years: 69.00     Types: Cigarettes     Start date: 1972     Quit date: 2018     Years since quittin.4     Passive exposure: Past    Smokeless tobacco: Never    Tobacco comments:     Pt started at age 17, then quit at age 31. Pt restarted smoking at age 32-33 and quit smoking in 2018   Vaping Use    Vaping Use: Never used   Substance Use Topics    Alcohol use: Not Currently     Alcohol/week: 1.0 standard drink     Types: 1 Drinks  containing 0.5 oz of alcohol per week     Comment: A chocolate drink made with moonshine and creamer    Drug use: No     No current facility-administered medications for this visit.     No current outpatient medications on file.     Facility-Administered Medications Ordered in Other Visits   Medication Dose Route Frequency Provider Last Rate Last Admin    albuterol (PROVENTIL) nebulizer solution 0.042% 1.25 mg/3mL  1.25 mg Nebulization 4x Daily - RT Brandy Castillo APRN        dextrose (D50W) (25 g/50 mL) IV injection 12.5 g  12.5 g Intravenous PRN AshishAlfredo sorensen MD        fentaNYL citrate (PF) (SUBLIMAZE) injection 25 mcg  25 mcg Intravenous Q5 Min PRN Alfredo Hinojosa MD   25 mcg at 09/26/23 1100    fentaNYL citrate (PF) (SUBLIMAZE) injection   Intravenous PRN Hesham Corona CRNA   50 mcg at 09/26/23 1145    lactated ringers infusion 1,000 mL  1,000 mL Intravenous Continuous Agusto Thompson MD 25 mL/hr at 09/26/23 1104 Currently Infusing at 09/26/23 1104    lactated ringers infusion 1,000 mL  1,000 mL Intravenous Continuous Agusto Thompson MD 25 mL/hr at 09/26/23 1104 Currently Infusing at 09/26/23 1104    lactated ringers infusion  9 mL/hr Intravenous Continuous Alfredo Hinojosa MD        lidocaine PF 1% (XYLOCAINE) injection 0.5 mL  0.5 mL Intradermal Once PRN Agusto Thompson MD        midazolam (VERSED) injection 0.5 mg  0.5 mg Intravenous Q10 Min PRN Alfredo Hinojosa MD        Phenylephrine HCl-NaCl 100 mcg/ml injection   Intravenous PRN Hesham Corona CRNA   100 mcg at 09/26/23 1151    Propofol (DIPRIVAN) injection   Intravenous PRN Hesham Corona CRNA   50 mg at 09/26/23 1143    sodium chloride 0.9 % flush 10 mL  10 mL Intravenous Q12H Brandy Castillo APRN        sodium chloride 0.9 % flush 10 mL  10 mL Intravenous PRN Brandy Castillo APRN        sodium chloride 0.9 % flush 10 mL  10 mL Intravenous PRN Agusto Thompson MD      "   sodium chloride 0.9 % flush 10 mL  10 mL Intravenous Q12H Alfredo Hinojosa MD        sodium chloride 0.9 % flush 10 mL  10 mL Intravenous PRN Alfredo Hinojosa MD        sodium chloride 0.9 % flush 3 mL  3 mL Intravenous PRN Agusto Thompson MD         Allergies:  Erythromycin, Guaifenesin, Hydromorphone hcl, Morphine, Ondansetron hcl, Orphenadrine, Codeine, and Meperidine    Objective     Vital Signs  Visit Vitals  /94 (BP Location: Right arm, Patient Position: Sitting, Cuff Size: Adult)   Pulse 69   Ht 162.6 cm (64\")   Wt 95.5 kg (210 lb 9.6 oz)   SpO2 96%   BMI 36.15 kg/m²       Physical Exam  Constitutional:       Appearance: She is well-developed. She is ill-appearing.      Comments: No acute distress, appropriate, alert.   HENT:      Head: Normocephalic and atraumatic.   Eyes:      Pupils: Pupils are equal, round, and reactive to light.   Neck:      Thyroid: No thyromegaly.      Vascular: No JVD.      Trachea: No tracheal deviation.   Cardiovascular:      Rate and Rhythm: Normal rate and regular rhythm.      Heart sounds: Normal heart sounds. No murmur heard.    No friction rub. No gallop.   Pulmonary:      Effort: Pulmonary effort is normal. No respiratory distress.      Breath sounds: Normal breath sounds. No wheezing or rales.   Chest:      Chest wall: No tenderness.   Abdominal:      General: There is no distension.      Palpations: Abdomen is soft.      Tenderness: There is no abdominal tenderness.   Musculoskeletal:         General: Normal range of motion.      Cervical back: Normal range of motion and neck supple.   Lymphadenopathy:      Cervical: No cervical adenopathy.   Skin:     General: Skin is warm and dry.   Neurological:      Mental Status: She is alert and oriented to person, place, and time.      Cranial Nerves: No cranial nerve deficit.       Results Review:   XR Ribs Right With PA Chest    Result Date: 8/25/2023  Narrative: EXAMINATION: XR RIBS RIGHT W PA CHEST- "  8/25/2023 7:25 PM CDT  HISTORY: rib and chest pain; hx of cancer  Chest x-ray and right rib series.  Comparison is made with a chest x-ray from 06/27/2023.  Normal heart and mediastinum.  Chronic interstitial lung disease. Known right apical spiculated nodule.  No pneumothorax. One of the oblique rib images shows small amount of right pleural fluid and the appearance of some mild right basilar infiltrate or atelectasis.  Oblique right rib images show no fracture and no bone lesion.  Summary: 1. Mild right basilar infiltrate or atelectasis and trace amount right pleural fluid. 2. No right rib fracture is seen. This report was finalized on 08/25/2023 20:20 by Dr. Jorge Willoughby MD.    CT Chest With Contrast Diagnostic    Result Date: 8/27/2023  Narrative: EXAMINATION: CT CHEST W CONTRAST DIAGNOSTIC-   8/27/2023 5:43 PM CDT  HISTORY: History of lung cancer and radiation therapy. Right chest pain and back pain.  In order to have a CT radiation dose as low as reasonably achievable Automated Exposure Control was utilized for adjustment of the mA and/or KV according to patient size.  DLP in mGycm= 211.  Chest CT with IV contrast. Axial, sagittal, and coronal sequences.  Comparison is made with 06/08/2023.  Normal heart size. Coronary artery calcification. Normal thoracic aorta.  Normal and symmetric appearance of the pulmonary arteries.  No mediastinal mass.  Fully expanded lungs. No pneumonia, pneumothorax, or pleural effusion.  A linear soft tissue focus within the right apex measures 23 x 11 mm compared with 33 x 12 mm. A new 10 x 7 mm nodule within the central right lung lies at the confluence of the minor and major fissure. There is an adjacent 5 mm nodule and scattered 3-4 mm nodules within the adjacent right middle lobe. 7 mm subpleural right middle lobe nodule previously measured 4 mm. New finding of 2 adjacent 10 mm subpleural nodules within the anterior medial right middle lobe.  No left lung abnormality is seen.   Summary: 1. History of lung cancer noted. Multiple new small nodules within the right lung compatible with metastatic disease. 2. No pneumonia or pneumothorax to account for new right sided chest pain/back pain.              This report was finalized on 08/27/2023 18:22 by Dr. Jorge Willoughby MD.    NM PET/CT Skull Base to Mid Thigh    Result Date: 8/31/2023  Narrative: EXAMINATION:  NM PET/CT SKULL BASE TO MID THIGH-  8/31/2023 8:15 AM CDT  HISTORY: R91.8-Other nonspecific abnormal finding of lung field. Status post radiation therapy on a right lung nodule. A recent follow-up CT demonstrated multiple new nodules. Restaging examination.  COMPARISON: 03/17/2023.  TECHNIQUE: The patient was injected with 12.4 m-Ci of F-18-FDG intravenously. Blood glucose was 105 mg/dl. After an appropriate uptake time, CT and PET images were obtained from the skull base to the mid thighs. Image fusion was also performed. CT images were obtained for attenuation and localization purposes.  DLP: 996 mGy-cm. Automated exposure control was utilized to decrease patient dose.  HEAD AND NECK: No abnormal uptake is seen in the head and neck region on the PET images. CT images demonstrate no focal abnormality of the unenhanced brain. The parotid and submandibular glands are symmetric. The thyroid gland is either very small or absent. There are no pathologically enlarged lymph nodes identified on CT images. There is no airway asymmetry.  CHEST: Uptake in the right upper lobe mass is decreased on the current study, now having an SUV max of 3.5, previously 4.1. The overall size of the right upper lobe abnormality appears relatively stable. There is abnormal uptake in 2 of the anterior pleural-based pulmonary nodules in the right middle lobe anteriorly, one with an SUV max of 4.2 and the other with an SUV max of 3.6. There is abnormal uptake within lymph nodes in the right cardiophrenic angle fat pad. There are 2 lymph nodes with uptake in this  region, one with an SUV max of 4.1 and the other with an SUV max of 2.9. In the extreme right lung base, there are 3 other small areas of focal pleural-based uptake posteriorly and one anteriorly likely related to metastatic pleural nodules. There is a small right pleural effusion. There is a larger area of abnormal uptake that appears to represent a mass in the posterior chest wall and involving the posterolateral right 10th rib with an SUV max of 20. There is another small focus of activity anterior to the liver that appears to be in the lower anterior chest wall between the anterior left sixth and seventh ribs. CT images demonstrate cardiomegaly. There is mild atheromatous disease of the thoracic aorta and coronary arteries. There are multiple subcentimeter pulmonary nodules in the right lung that do not demonstrate significant had activity. There is an area of fat necrosis in the left breast.  ABDOMEN AND PELVIS: Physiologic uptake is seen within the solid organs, GI tract and urinary tract. An area of focal uptake described in the sigmoid colon on the prior study is not seen today. I do not see any definite evidence of metastatic disease in the abdomen or the pelvis. On CT images, there has been prior cholecystectomy. There is diverticulosis of the colon. The appendix is normal. There is atheromatous disease of the aortoiliac vessels.  VISUALIZED EXTREMITIES: There is some uptake in the right shoulder joint that is probably related to inflammatory change.       Impression: 1. The right upper lobe primary neoplasm SUV max is decreased on today's study compared to the prior study. The size appears relatively stable. 2. There are pleural-based areas of nodularity in the right middle lobe demonstrating uptake on the PET images worrisome for metastatic disease. I suspect that there is metastatic pleural disease. There is a right pleural effusion and there are several pleural-based areas of focal activity. The  patient also has multiple small subcentimeter lung nodules that do not demonstrate significant PET activity which may be related to their small size. The nodules are reportedly new on CT. 3. Probable chest wall lesion posteriorly on the right that has some involvement of the posterolateral right 10th rib demonstrating abnormal PET activity. 4. Abnormal uptake in right cardiophrenic angle lymph nodes consistent with metastatic adenopathy. 5. No metastatic disease is seen in the neck, abdomen or pelvis. This report was finalized on 08/31/2023 11:17 by Dr. Brandon Fenton MD.     I did review the 08/27/2023 CT scan of the chest myself. Her previously known right upper lobe linear nodularity is noted. There are new lung nodules throughout the right lung and are concerning for metastatic disease.    08/31/2023 PET scan reveals right upper lobe lung cancer primary SUV max has decreased compared to previous study, now measuring 3.5 compared to previous 4.1 in comparison. The lesion appears to be about the same size on PET. The nodules that are new, all are hypermetabolic. There appears to be a mass in the posterior chest wall along the posterolateral right 10th rib and this has a maximum of 20.      I reviewed the patient's new clinical results.  Discussed with patient and family.      Assessment & Plan    Diagnoses and all orders for this visit:    1. Multiple lung nodules (Primary)    2. Chest wall pain    3. Chronic midline thoracic back pain    4. Former smoker        Impression:  1. Stage IV presumed lung cancer, right upper lobe original.  2. Previous tobacco use.  3. History of DVT and PE.  4. Chest wall pain, highly concerning for metastasis, empiric radiation has been started.  5. Chronic pain syndrome, treated by Dr. Lopez.  6. Multiple lung nodules.     Medical decision making/Recommendations/Plan:  Many thanks Dr. Delarosa for the opportunity to aid in the care of your patient.  I did review imaging today with  the patient.  Salient findings include a persistent right upper lobe lung nodule as well as multiple new right-sided lung nodules including chest wall metastasis.  We discussed at this point the working clinical impression of metastatic lung cancer.  Discussed at this point a true diagnosis remains elusive, although the clinical suspicion is high that this is a stage IV to chest wall and ipsilateral lobes metastasis.  I did speak with Dr. Delarosa as well.  A tissue diagnosis remains elusive.  She has already undergone bronchoscopic efforts at [place], which was non-diagnostic.  Therefore, we discussed her options in detail.  Malignancy is high on the differential of course.  Options include palliative medicine consultation with empiric radiation as needed or with stereotactic radiation for palliation versus obtaining tissue and considering a systemic therapy including immune therapy.  The patient is strongly interested at this time to obtain a diagnosis and undergo treatment as indicated.  She also has questions about pain relief.  She has started radiation today.  Encouraged her to seek with Dr. Duarte to aid as an adjunct with pain medications indicated, but likely will have the best relief with radiation.  She should start noticing resolution improvement in her pain in the next couple of treatment courses.  To that end, discussed the recommendation to consider bronchoscopy, right thoracoscopy, wedge resection as indicated for diagnosis.  We discussed alternative option of CT guided needle biopsy.  The pros and cons of each were discussed at length.  Resection is favored.  We will discuss the operative expected postoperative course.  Discussed the risks of procedure to include but not limited to bleeding, infection, stroke, heart attack, need for additional procedures, new VTE event including a PE, organ dysfunction and/or failure including death.  All questions were answered to the best of my ability and she is  eager to proceed forward as soon as possible. She has been congratulated.  She remains a non-smoker.  Many thanks again Dr. Delarosa for opportunity to aid in the care of your patient.  I will continue to keep you apprised of care as it ensues with rediscussion following a wedge resection for diagnosis acquisition.        Transcribed from ambient dictation for Agusto Thompson MD by Roxi Garza.  09/13/23   12:14 CDT    Patient or patient representative verbalized consent to the visit recording.  I have personally performed the services described in this document as transcribed by the above individual, and it is both accurate and complete.      Electronically signed by Agusto Thompson MD at 09/26/23 1208       Vital Signs (last day)       Date/Time Temp Temp src Pulse Resp BP Patient Position SpO2    09/27/23 0802 98.7 (37.1) Oral 89 18 120/52 Lying 90    09/27/23 0646 -- -- 78 18 -- -- 94    09/27/23 0508 98.4 (36.9) Oral 90 18 129/64 Lying 94    09/27/23 0018 99.9 (37.7) Axillary 89 16 122/61 Lying 95    09/26/23 2007 99.9 (37.7) Oral 94 14 124/62 Lying 95    09/26/23 1950 -- -- 90 14 -- -- 100    09/26/23 1940 -- -- 87 16 -- -- 98    09/26/23 1645 98.4 (36.9) Oral 75 14 139/65 Lying 96    09/26/23 1615 98.7 (37.1) Oral 78 14 130/68 Lying 98    09/26/23 1545 98.8 (37.1) Oral 77 14 128/69 Lying 98    09/26/23 1515 -- -- 66 12 131/88 -- 96    09/26/23 1500 -- -- 67 10 134/79 -- --    09/26/23 1445 -- -- 77 11 126/95 -- 97    09/26/23 1440 -- -- -- 10 -- -- --    09/26/23 1435 -- -- -- 15 -- -- --    09/26/23 1430 -- -- 75 22 130/84 -- 100    09/26/23 1425 -- -- 73 15 137/77 -- --    09/26/23 1420 -- -- 78 17 129/63 -- 93    09/26/23 1415 -- -- 73 20 132/76 -- 99    09/26/23 1410 -- -- 72 18 140/79 -- --    09/26/23 1406 97.1 (36.2) Temporal 75 18 140/79 Lying 97    09/26/23 1044 -- -- 81 -- -- -- 93    09/26/23 0803 97.4 (36.3) Temporal 80 16 127/84 Sitting 98          Current Facility-Administered  Medications   Medication Dose Route Frequency Provider Last Rate Last Admin    acetaminophen (TYLENOL) tablet 650 mg  650 mg Oral Q4H PRN Agusto Thompson MD        acetylcysteine (MUCOMYST) 20 % nebulizer solution 3 mL  3 mL Nebulization Q8H - RT Agusto Thompson MD   3 mL at 09/27/23 0646    albuterol (PROVENTIL) nebulizer solution 0.042% 1.25 mg/3mL  1.25 mg Nebulization 4x Daily - RT Brandy Castillo, ENOC   1.25 mg at 09/27/23 0646    bisacodyl (DULCOLAX) suppository 10 mg  10 mg Rectal Daily PRN Agusto Thompson MD        buPROPion XL (WELLBUTRIN XL) 24 hr tablet 300 mg  300 mg Oral Daily Agusto Thompson MD   300 mg at 09/27/23 0820    docusate sodium (COLACE) capsule 100 mg  100 mg Oral BID Agusto Thompson MD   100 mg at 09/27/23 0820    Enoxaparin Sodium (LOVENOX) syringe 40 mg  40 mg Subcutaneous Daily Agusto Thompson MD   40 mg at 09/27/23 0820    furosemide (LASIX) injection 20 mg  20 mg Intravenous BID AC Brandy Castillo, APRN        HYDROcodone-acetaminophen (NORCO) 5-325 MG per tablet 1 tablet  1 tablet Oral Q4H PRN Agusto Thompson MD        HYDROcodone-acetaminophen (NORCO) 7.5-325 MG per tablet 2 tablet  2 tablet Oral Q4H PRN Agusto Thompson MD   2 tablet at 09/27/23 0951    ipratropium-albuterol (DUO-NEB) nebulizer solution 3 mL  3 mL Nebulization Q8H - RT Agusto Thompson MD   3 mL at 09/27/23 0646    levothyroxine (SYNTHROID, LEVOTHROID) tablet 125 mcg  125 mcg Oral Q AM Agusto Thompson MD   125 mcg at 09/27/23 0537    metoclopramide (REGLAN) injection 10 mg  10 mg Intravenous Q6H Tavares Pantoja MD   10 mg at 09/27/23 0508    nitroglycerin (NITROSTAT) SL tablet 0.4 mg  0.4 mg Sublingual Q5 Min PRN Agusto Thompson MD        pantoprazole (PROTONIX) EC tablet 40 mg  40 mg Oral BID Agusto Thompson MD   40 mg at 09/27/23 0821    polyethylene glycol (MIRALAX) packet 17 g  17 g Oral Daily Agusto Thompson MD   17 g at 09/27/23 0820    potassium chloride  (MICRO-K/KLOR-CON) CR capsule  20 mEq Oral BID With Meals Brandy Castillo APRN        pramipexole (MIRAPEX) tablet 1 mg  1 mg Oral BID Agusto Thompson MD   1 mg at 09/27/23 0820    sodium chloride 0.9 % bolus 500 mL  500 mL Intravenous Once Agusto Thompson MD        sucralfate (CARAFATE) 1 GM/10ML suspension 1 g  1 g Oral 4x Daily AC & at Bedtime Agusto Thompson MD   1 g at 09/27/23 0821        Operative/Procedure Notes (last 24 hours)        Agusto Thompson MD at 09/26/23 1221              Melly Cruz  9/26/2023    Pre-op Diagnosis:   Multiple lung nodules  Chest wall pain  Clinical stage IV lung cancer       Post-Op Diagnosis Codes:  Metastatic non small cell lung cancer favor adenocarcinoma  pT3,cN0,pM1 Stage IV    Procedure(s):  BRONCHOSCOPY  RIGHT THORACOSCOPY  MULTIPLE PLEURAL BIOPSY  RIGHT LUNG LOWER LOBE WEDGE RESECTION  TALC PLEURODESIS        Surgeon(s):  Agusto Thompson MD    Anesthesia: General    Staff:   Circulator: Rosio Jovel RN  Scrub Person: Caridad Prater; Ny Agarwal         Estimated Blood Loss: minimal    Urine Voided: * No values recorded between 9/26/2023 11:04 AM and 9/26/2023  1:33 PM *    Specimens:                Specimens       ID Source Type Tests Collected By Collected At Frozen?    A Lung, R Tissue TISSUE PATHOLOGY EXAM   Agusto Thompson MD 9/26/23 1237 Yes    Description: pleural biopsy 1    B Lung, R Tissue TISSUE PATHOLOGY EXAM   Agusto Thompson MD 9/26/23 1241 Yes    Description: pleural biopsy 2    C Lung, R Tissue TISSUE PATHOLOGY EXAM   Agusto Thompson MD 9/26/23 1244 Yes    Description: pleural biopsy 3    D Lung, R Tissue TISSUE PATHOLOGY EXAM   Agusto Thompson MD 9/26/23 1250 Yes    Description: pleural biopsy 4    E Lung, Right Lower Lobe Tissue TISSUE PATHOLOGY EXAM   Agusto Thompson MD 9/26/23 1303 No    Description: wedge resection    Comment: Diagnosis, no need for margin                   Drains:   Chest Tube 1 Right  "Midaxillary (Active)   Dressing Type Gauze;Other (Comment) 09/26/23 1338   Dressing Status Clean;Dry 09/26/23 1338   Dressing Intervention New dressing 09/26/23 1338   Securement tubing anchored to body distal to insertion site with tape 09/26/23 1338       Urethral Catheter Silicone;Temperature probe 16 Fr. (Active)       Findings: metastatic non small cell carcinoma.  Pleural effusion        Complications: none          Agusto Thompson MD     Date: 9/26/2023  Time: 13:51 CDT          Electronically signed by Agusto Thompson MD at 09/26/23 1359          Physician Progress Notes (last 24 hours)        Brandy Castillo, APRN at 09/27/23 0914          Bronchoscopy, right thoracoscopy, multiple pleural biopsy, right lower lobe wedge resection with talc pleurodesis    POD 1    Up in the chair. On 2LNC with SPO2 94%. Has walked within room but not hallway. Rates pain 5/10 presently. Due for pain pill. Needs IS. Poor appetite, did eat eggs and drink coffee this morning.     IV gtts: IVF    Visit Vitals  /52 (BP Location: Left arm, Patient Position: Lying)   Pulse 89   Temp 98.7 °F (37.1 °C) (Oral)   Resp 18   Ht 160 cm (62.99\")   Wt 96 kg (211 lb 10.3 oz)   SpO2 90%   BMI 37.50 kg/m²       Intake/Output Summary (Last 24 hours) at 9/27/2023 0915  Last data filed at 9/27/2023 0900  Gross per 24 hour   Intake 2920 ml   Output 1850 ml   Net 1070 ml     CT output: 150 ml/24 hour, serosanguinous fluid, no air leak        Chest x-ray: Right chest tube in place, no appreciable pneumothorax, right greater than left basilar atelectasis    Physical Exam:  General: No apparent distress. In good spirits. Up in the chair.   Cardiovascular: Regular rate and rhythm without murmur, rubs, or gallops.    Pulmonary: Clear to auscultation bilaterally without wheezing, rubs, or rales. Diminished bases.   Chest: Right thoracic incisions clean and dry. Right chest tube to 20 cm H2O suction. No air leak. Fluid is serosanguineous. "   Abdomen: Soft, nondistended and nontender.  Extremities: Warm, moves all extremities. No edema.   Neurologic: Grossly intact with no focal deficits.       Impression:  Metastatic non-small cell lung cancer, favor adenocarcinoma  Multiple lung nodules  Chest wall pain  Class 2 severe obesity      Plan:  DC IVF, start diuresis   Encourage pulmonary toilet and ambulation. Nurse to provide IS.   Continue chest tube suction and drainage  Await final pathology  Continue pain control strategies  Routine post thoracic surgery regimen  Discussed with patient, family and nursing    Electronically signed by Brandy Castillo, ENOC at 09/27/23 1010       Complaints of pain medicated per MAR. Chest tube with 200 out overnight. No air leak. Loyd d/c at 0500. Due to void. O2 @2. Nausea medicated per MAR.

## 2023-09-28 ENCOUNTER — APPOINTMENT (OUTPATIENT)
Dept: GENERAL RADIOLOGY | Facility: HOSPITAL | Age: 68
DRG: 164 | End: 2023-09-28
Payer: MEDICARE

## 2023-09-28 ENCOUNTER — READMISSION MANAGEMENT (OUTPATIENT)
Dept: CALL CENTER | Facility: HOSPITAL | Age: 68
End: 2023-09-28
Payer: MEDICARE

## 2023-09-28 VITALS
OXYGEN SATURATION: 95 % | BODY MASS INDEX: 37.5 KG/M2 | DIASTOLIC BLOOD PRESSURE: 64 MMHG | HEART RATE: 92 BPM | WEIGHT: 211.64 LBS | TEMPERATURE: 98.5 F | HEIGHT: 63 IN | SYSTOLIC BLOOD PRESSURE: 110 MMHG | RESPIRATION RATE: 16 BRPM

## 2023-09-28 PROCEDURE — 25010000002 FUROSEMIDE PER 20 MG: Performed by: NURSE PRACTITIONER

## 2023-09-28 PROCEDURE — 94664 DEMO&/EVAL PT USE INHALER: CPT

## 2023-09-28 PROCEDURE — 99024 POSTOP FOLLOW-UP VISIT: CPT | Performed by: THORACIC SURGERY (CARDIOTHORACIC VASCULAR SURGERY)

## 2023-09-28 PROCEDURE — 71045 X-RAY EXAM CHEST 1 VIEW: CPT

## 2023-09-28 PROCEDURE — 94799 UNLISTED PULMONARY SVC/PX: CPT

## 2023-09-28 PROCEDURE — 71046 X-RAY EXAM CHEST 2 VIEWS: CPT

## 2023-09-28 PROCEDURE — 94761 N-INVAS EAR/PLS OXIMETRY MLT: CPT

## 2023-09-28 PROCEDURE — 25010000002 ENOXAPARIN PER 10 MG: Performed by: THORACIC SURGERY (CARDIOTHORACIC VASCULAR SURGERY)

## 2023-09-28 PROCEDURE — 25010000002 METOCLOPRAMIDE PER 10 MG: Performed by: SURGERY

## 2023-09-28 RX ORDER — LIDOCAINE 50 MG/G
1 PATCH TOPICAL
Status: DISCONTINUED | OUTPATIENT
Start: 2023-09-28 | End: 2023-09-28 | Stop reason: HOSPADM

## 2023-09-28 RX ADMIN — FUROSEMIDE 20 MG: 10 INJECTION, SOLUTION INTRAMUSCULAR; INTRAVENOUS at 09:00

## 2023-09-28 RX ADMIN — LEVOTHYROXINE SODIUM 125 MCG: 0.12 TABLET ORAL at 06:01

## 2023-09-28 RX ADMIN — POTASSIUM CHLORIDE 20 MEQ: 10 CAPSULE, COATED, EXTENDED RELEASE ORAL at 17:48

## 2023-09-28 RX ADMIN — BUPROPION HYDROCHLORIDE 300 MG: 150 TABLET, EXTENDED RELEASE ORAL at 08:56

## 2023-09-28 RX ADMIN — FUROSEMIDE 20 MG: 10 INJECTION, SOLUTION INTRAMUSCULAR; INTRAVENOUS at 17:48

## 2023-09-28 RX ADMIN — HYDROCODONE BITARTRATE AND ACETAMINOPHEN 2 TABLET: 7.5; 325 TABLET ORAL at 12:07

## 2023-09-28 RX ADMIN — DOCUSATE SODIUM 100 MG: 100 CAPSULE, LIQUID FILLED ORAL at 08:59

## 2023-09-28 RX ADMIN — ALBUTEROL SULFATE 1.25 MG: 1.25 SOLUTION RESPIRATORY (INHALATION) at 10:43

## 2023-09-28 RX ADMIN — METOCLOPRAMIDE HYDROCHLORIDE 10 MG: 5 INJECTION INTRAMUSCULAR; INTRAVENOUS at 11:01

## 2023-09-28 RX ADMIN — ALBUTEROL SULFATE 1.25 MG: 1.25 SOLUTION RESPIRATORY (INHALATION) at 14:41

## 2023-09-28 RX ADMIN — HYDROCODONE BITARTRATE AND ACETAMINOPHEN 2 TABLET: 7.5; 325 TABLET ORAL at 18:37

## 2023-09-28 RX ADMIN — HYDROCODONE BITARTRATE AND ACETAMINOPHEN 1 TABLET: 5; 325 TABLET ORAL at 06:00

## 2023-09-28 RX ADMIN — ENOXAPARIN SODIUM 40 MG: 100 INJECTION SUBCUTANEOUS at 08:59

## 2023-09-28 RX ADMIN — LIDOCAINE 1 PATCH: 700 PATCH TOPICAL at 11:02

## 2023-09-28 RX ADMIN — POTASSIUM CHLORIDE 20 MEQ: 10 CAPSULE, COATED, EXTENDED RELEASE ORAL at 08:57

## 2023-09-28 RX ADMIN — ACETYLCYSTEINE 3 ML: 200 SOLUTION ORAL; RESPIRATORY (INHALATION) at 06:42

## 2023-09-28 RX ADMIN — METOCLOPRAMIDE HYDROCHLORIDE 10 MG: 5 INJECTION INTRAMUSCULAR; INTRAVENOUS at 17:48

## 2023-09-28 RX ADMIN — SUCRALFATE 1 G: 1 SUSPENSION ORAL at 17:48

## 2023-09-28 RX ADMIN — PRAMIPEXOLE DIHYDROCHLORIDE 1 MG: 1 TABLET ORAL at 08:58

## 2023-09-28 RX ADMIN — HYDROCODONE BITARTRATE AND ACETAMINOPHEN 2 TABLET: 7.5; 325 TABLET ORAL at 03:18

## 2023-09-28 RX ADMIN — PANTOPRAZOLE SODIUM 40 MG: 40 TABLET, DELAYED RELEASE ORAL at 09:01

## 2023-09-28 RX ADMIN — METOCLOPRAMIDE HYDROCHLORIDE 10 MG: 5 INJECTION INTRAMUSCULAR; INTRAVENOUS at 06:00

## 2023-09-28 RX ADMIN — SUCRALFATE 1 G: 1 SUSPENSION ORAL at 11:01

## 2023-09-28 RX ADMIN — SUCRALFATE 1 G: 1 SUSPENSION ORAL at 09:00

## 2023-09-28 RX ADMIN — IPRATROPIUM BROMIDE AND ALBUTEROL SULFATE 3 ML: .5; 3 SOLUTION RESPIRATORY (INHALATION) at 06:43

## 2023-09-28 NOTE — NURSING NOTE
DC instructions reviewed with pt.  Questions invited and answered. VSS. Denies any needs at this time.

## 2023-09-28 NOTE — PLAN OF CARE
Goal Outcome Evaluation:      Chest tube removed today. CXR @ 1600 then possible Dc home. VSS. Pain improved once chest tube removed. Ambulating in schaefer. Denies any needs at this time.

## 2023-09-28 NOTE — OP NOTE
Pre-op diagnosis:   1.         Multiple lung nodules; suspect metastatic lung cancer clinically stage IV lung cancer  2.         Chest wall pain- working impression of metastatic lung cancer  3.         History of DVT and PE  4.  History of GI bleeding    Post-op diagnosis:   Same     Procedure:  1.         Bronchoscopy  2.         Right thoracoscopy  3.         Pleural biopsy- multiple  4. Wedge resection of right lower lobe  5. Talc pleurodesis      Surgeon: Agusto Thompson M.D.  Anesthesia: GETA- double lumen  EBL: minimal  Drains: 28 Albanian straight pleural tube      Specimens:   Specimens         ID Source Type Tests Collected By Collected At Frozen?     A Lung, R Tissue TISSUE PATHOLOGY EXAM    Agusto Thompson MD 9/26/23 1237 Yes     Description: pleural biopsy 1     B Lung, R Tissue TISSUE PATHOLOGY EXAM    Agusto Thompson MD 9/26/23 1241 Yes     Description: pleural biopsy 2     C Lung, R Tissue TISSUE PATHOLOGY EXAM    Agusto Thompson MD 9/26/23 1244 Yes     Description: pleural biopsy 3     D Lung, R Tissue TISSUE PATHOLOGY EXAM    Agusto Thompson MD 9/26/23 1250 Yes     Description: pleural biopsy 4     E Lung, Right Lower Lobe Tissue TISSUE PATHOLOGY EXAM    Agusto Thompson MD 9/26/23 1303 No     Description: wedge resection     Comment: Diagnosis, no need for margin               This procedure is for diagnosis and palliation.       Operative findings:  Simple pleural effusion.  Multiple pleural implants most heavily burdened along the diaphragm and at site of known rib site.  Multiple lung nodules in all three lobes. Wedge selected of lower lobe with clinical impression of the lung cancer primary in the upper lobe.  Approximately 5 gm Talc was administered.  Pleural biopsies were confirmed to be metastatic lung cancer favor non small call lung carcinoma.  #4 pleural biopsy and wedge resection were deferred for permanent and anticipated additional testing.       Operative description  in detail:  The patient was taken to the operative suite where she was placed in a supine position. Induction of general anesthesia and placement of a double lumen endotracheal tube was placed by anesthesia. A timeout was performed. The double lumen was positioned appropriately. With that, she was positioned in left lateral decubitus position.  All pressure points were protected. Single lung ventilation was initiated following confirmation of a satisfactory position of the double lumen endotracheal tube once again. she was then prepped and draped in the usual and sterile fashion .       The pleural cavity was accessed at approximately the 8th intercostal space.  A thoracoport and then subsequently the thoracoscope were placed.  Pleural fluid was evacuated.  There were large and multiple at some point contiguous tumor implants on the diaphragm.  Additional implants evident throughout the chest. Good single lung isolation was accomplished. Additional port site was placed under direct visualization.   Pleural biopsies were performed.  Electrocautery was used to accomplish hemostasis.  A wedge resection was performed after palpating all three lobes using Ethicon 60 mm green staple loads.      Anesthesia did ventilate the operative lung with expansion of the lung in its entirety.  Pleural biopsies were confirmed to be metastatic lung cancer favor non small call lung carcinoma. Decision making made to proceed with talc pleurodesis using approximately 5 grams.  This was distributed throughout the chest using an insufflation technique using CO2.    I placed a chest tube to appropriate position via the original port site.  The deep musculature was re-approximated.  The other site was closed in layers.  Instruments, sharps, and sponge counts were reported as correct at the completion of the case. Surgical hemostasis was secured.     Complications: None     Disposition: Transfer to PACU extubated and in stable condition.

## 2023-09-28 NOTE — DISCHARGE SUMMARY
[facility] Cardiothoracic Surgery  DISCHARGE SUMMARY        Date of Admission: 9/26/2023  Date of Discharge:  9/28/2023  Primary Care Physician: Reynaldo Jeter DO    Discharge Diagnoses:  Active Hospital Problems    Diagnosis     **Malignant neoplasm of upper lobe of right lung     Class 2 obesity due to excess calories with body mass index (BMI) of 37.0 to 37.9 in adult     Chest wall pain     Hypothyroidism (acquired)     Former heavy tobacco smoker     GERD (gastroesophageal reflux disease)     HTN (hypertension)        Procedures Performed: Bronchoscopy, Right thoracoscopy, multiple pleural biopsies, wedge resection of the right lower lobe, talc pleurodesis performed on 9/26/2023 by Dr. Thompson.     HPI:  Ms. Melly Cruz is a 68 y.o. female followed by Dr. Delarosa and Dr. Jerry for presumed stage IV lung cancer with right upper lobe nodule, former heavy tobacco use, history of DVT and PE, chronic pain syndrome followed by pain management services, chest wall pain with concern for metastatic disease currently undergoing empiric radiation.  She has undergone bronchoscopic efforts at [facility] but unfortunately these were nondiagnostic.  She has been referred to cardiothoracic surgery services to aid with diagnosis.  She been recommended to undergo bronchoscopy, right thoracoscopy with wedge resection.  She is agreeable to proceed.    Hospital Course: Ms. Cruz was admitted on the morning of surgery on September 26, 2023.  Please see a separate op note by Dr. Thompson.  Operative findings were remarkable for a simple pleural effusion, multiple pleural implants along the diaphragm and at site of known rib site, multiple lung nodules in all 3 lobes, with biopsies confirmed to be metastatic lung cancer favoring a non-small cell lung carcinoma on the frozen section.  Following surgery, she was extubated and transferred to PACU in stable guarded condition.  After meeting PACU  discharge criteria, she was transferred to  for continued recovery.  On postop day 1, she was up to the chair and ambulating in the hallway.  Time was permitted for chest tube suction and drainage.  On postop day 2, she had minimal chest tube output.  No airleak.  She met criteria for chest tube removal and this was performed without remark.  Follow-up chest x-ray showed no pneumothorax.  She reports improved pain control after removal of chest tube.  She  is eating well and ambulating without remark.  She is saturating appropriately on room air.  She meets criteria for discharge home on the afternoon of postop day 2 and is agreeable to do so with her spouse.    Final pathology:  Final Diagnosis   1.  Pleural biopsy #1, excision:  Metastatic adenocarcinoma consistent with lung primary.     2.  Pleural biopsy #2, excision:  Metastatic adenocarcinoma consistent with lung primary.     3.  Pleural biopsy #3, excision:  Metastatic adenocarcinoma consistent with lung primary.     4.  Pleural biopsy #4, excision:  Metastatic adenocarcinoma consistent with lung primary.     5.  Right lower lobe lung, wedge resection:  Multiple pleural foci of metastatic adenocarcinoma.   Electronically signed by Wesley German MD on 9/27/2023 at 1351         Condition on Discharge:  Neurologically intact and has good pain control.  She is eating well and has demonstrable good bowel function.  All thoracic incisions are healing nicely without evidence of thoracic hernia.  The heart is in normal sinus rhythm.         Discharge Disposition:  Home or Self Care [1]    Discharge Medications:     Discharge Medications        Changes to Medications        Instructions Start Date   Pulmicort Flexhaler 90 MCG/ACT inhaler  Generic drug: budesonide  What changed:   when to take this  reasons to take this   2 puffs, Inhalation, 2 Times Daily - RT, Swallow, do not inhale      sucralfate 1 GM/10ML suspension  Commonly known as: CARAFATE  What  changed:   when to take this  reasons to take this   1 g, Oral, 4 Times Daily Before Meals & Nightly             Continue These Medications        Instructions Start Date   albuterol sulfate  (90 Base) MCG/ACT inhaler  Commonly known as: PROVENTIL HFA;VENTOLIN HFA;PROAIR HFA   2 puffs, Inhalation, 4 Times Daily - RT      aluminum-magnesium hydroxide-simethicone 400-400-40 MG/5ML suspension  Commonly known as: MAALOX MAX   30 mL, Oral, Every 6 Hours PRN      buPROPion  MG 24 hr tablet  Commonly known as: WELLBUTRIN XL   300 mg, Oral, Every Morning      cyclobenzaprine 10 MG tablet  Commonly known as: FLEXERIL   10 mg, Oral, 3 Times Daily PRN      diclofenac 75 MG EC tablet  Commonly known as: VOLTAREN   75 mg, Oral, 2 Times Daily      EPINEPHrine 0.3 MG/0.3ML solution auto-injector injection  Commonly known as: EPIPEN   0.3 mg, Intramuscular, Once As Needed      folic acid 1 MG tablet  Commonly known as: FOLVITE   1 mg, Oral, Daily      HYDROcodone-acetaminophen  MG per tablet  Commonly known as: NORCO   1 tablet, Oral, Every 4 Hours PRN      levothyroxine 125 MCG tablet  Commonly known as: SYNTHROID, LEVOTHROID   125 mcg, Oral, Daily      lidocaine 5 % ointment  Commonly known as: XYLOCAINE   1 application , Topical, Every 2 Hours PRN      nitroglycerin 0.4 MG SL tablet  Commonly known as: NITROSTAT   0.4 mg, Sublingual, Every 5 Minutes PRN, Take no more than 3 doses in 15 minutes.      pantoprazole 40 MG EC tablet  Commonly known as: PROTONIX   1 tablet, Oral, Daily      pramipexole 1 MG tablet  Commonly known as: MIRAPEX   1 mg, Oral, 2 Times Daily      tiZANidine 4 MG tablet  Commonly known as: ZANAFLEX   2-4 mg, Oral, Nightly PRN      Vitamin B-12 ER 1000 MCG tablet controlled-release   1,000 mcg, Oral, Daily      vitamin D 1.25 MG (67479 UT) capsule capsule  Commonly known as: ERGOCALCIFEROL   50,000 Units, Oral, Every 7 Days, SUNDAY               Discharge Diet: Regular diet     Discharge  Care Plan / Instructions:   Keep incisions clean and open to air.  May wash with soap and water.  Chest tube sites with dressings to keep on for 48 hours.  Ok to reapply dressing as needed after removal.      Activity at Discharge:   No heavy lifting greater than 5 pounds or a gallon of milk and refrain from driving until cleared.      Tobacco: The patient does not use tobacco products and therefore does not need tobacco cessation education.    BMI:  Body mass index is 37.5 kg/m². Continue to follow with primary care to establish a weight acceptable for age and height.     Follow-up Appointments: Melly Cruz  is requested to see Reynaldo Jeter DO within 1-2 weeks from time of discharge, to follow-up with Dr. Thompson in 4 weeks with CXR.  She is to follow-up with Dr. Delarosa of the oncology service in 4 weeks.

## 2023-09-28 NOTE — PROGRESS NOTES
"Bronchoscopy, right thoracoscopy, multiple pleural biopsy, right lower lobe wedge resection with talc pleurodesis    POD 2    Resting in bed.  On room air.  Reaching thousand 1500 mL on incentive spirometer.  Requesting pain medication. (+) BM. Walking well in hallway.  Her mother is at the bedside.    Visit Vitals  /65 (BP Location: Right arm, Patient Position: Lying)   Pulse 86   Temp 98.9 °F (37.2 °C) (Oral)   Resp 16   Ht 160 cm (62.99\")   Wt 96 kg (211 lb 10.3 oz)   SpO2 95%   BMI 37.50 kg/m²       Intake/Output Summary (Last 24 hours) at 9/28/2023 0732  Last data filed at 9/27/2023 1800  Gross per 24 hour   Intake 582 ml   Output 40 ml   Net 542 ml     CT output: 40 ml/24 hour, serosanguinous fluid, no air leak    Lab holiday    Pathology:  Final Diagnosis   1.  Pleural biopsy #1, excision:  Metastatic adenocarcinoma consistent with lung primary.     2.  Pleural biopsy #2, excision:  Metastatic adenocarcinoma consistent with lung primary.     3.  Pleural biopsy #3, excision:  Metastatic adenocarcinoma consistent with lung primary.     4.  Pleural biopsy #4, excision:  Metastatic adenocarcinoma consistent with lung primary.     5.  Right lower lobe lung, wedge resection:  Multiple pleural foci of metastatic adenocarcinoma.   Electronically signed by Wesley German MD on 9/27/2023 at 1351       Chest x-ray: Right chest tube in place, no appreciable pneumothorax, right greater than left basilar atelectasis    Physical Exam:  General: No apparent distress. In good spirits.   Cardiovascular: Regular rate and rhythm without murmur, rubs, or gallops.    Pulmonary: Clear to auscultation bilaterally without wheezing, rubs, or rales. Diminished bases.   Chest: Right thoracic incisions clean and dry. Right chest tube to 20 cm H2O suction. No air leak. Fluid is serosanguineous.   Abdomen: Soft, nondistended and nontender.  Extremities: Warm, moves all extremities. No edema.   Neurologic: Grossly intact with no " focal deficits.       Impression:  Metastatic adenocarcinoma lung cancer  Multiple lung nodules  Chest wall pain  Class 2 severe obesity      Plan:  Will add lidocaine patch to help with pain control   Encourage pulmonary toilet and ambulation  Continue chest tube suction and drainage, reassess for chest tube removal later today  Routine post thoracic surgery regimen  Discussed with patient, family and nursing  Anticipate DC home soon

## 2023-09-28 NOTE — PLAN OF CARE
Goal Outcome Evaluation:  Plan of Care Reviewed With: patient        Progress: improving  Outcome Evaluation: C/o pain see MAR; no acute distress; ambulating with standby assistance; pleasant and cooperative; Chest tube to suction

## 2023-09-29 ENCOUNTER — TELEPHONE (OUTPATIENT)
Dept: CARDIAC SURGERY | Facility: CLINIC | Age: 68
End: 2023-09-29
Payer: MEDICARE

## 2023-09-29 NOTE — TELEPHONE ENCOUNTER
Called patient back and advised her that we did not send her home with pain medication because she already has a prescription by Dr. Lopez for Norco 10/325 every 4 hours as needed.  She reports this prescription will run out in 2 days.  She was told by their office that they could no longer provide pain medication while patient was under our care-please reach out to their office today and find out more information please

## 2023-09-29 NOTE — TELEPHONE ENCOUNTER
Pt informed & voiced understanding.  She will await further instructions after Dr Thompson is able to speak with pain mgmt next week/nadia

## 2023-09-29 NOTE — TELEPHONE ENCOUNTER
Reviewed ROSETTE with pain medications sent in on 9/19, 9/12, and 9/1 by Jessica Hebert and Con. Discussed with Dr. Thompson-he would need to speak with pain management Dr. Loepz prior to prescribing pain medication. She may continue taking remaining hydrocodone tablets she has, using tylenol and NSAIDs, or over the counter lidocaine patches. Please advise.

## 2023-09-29 NOTE — PAYOR COMM NOTE
"REF: [id number]  [facility]  FAX  [phone]       Melly Cruz (68 y.o. Female)       Date of Birth   1955    Social Security Number       Address   [address] [place] [address]    Home Phone   [phone]    MRN   [id number]       [employer]ian   Non-Tenriism    Marital Status                               Admission Date   9/26/23    Admission Type   Elective    Admitting Provider   Agusto Thompson MD    Attending Provider       Department, Room/Bed   [facility] 3C, 380/1       Discharge Date   9/28/2023    Discharge Disposition   Home or Self Care    Discharge Destination                                 Attending Provider: (none)   Allergies: Erythromycin, Guaifenesin, Hydromorphone Hcl, Morphine, Ondansetron Hcl, Orphenadrine, Codeine, Meperidine    Isolation: None   Infection: None   Code Status: Prior    Ht: 160 cm (62.99\")   Wt: 96 kg (211 lb 10.3 oz)    Admission Cmt: None   Principal Problem: Malignant neoplasm of upper lobe of right lung [C34.11]                   Active Insurance as of 9/26/2023       Primary Coverage       Payor Plan Insurance Group Employer/Plan Group    ANTHEM MEDICARE REPLACEMENT ANTHEM MEDICARE ADVANTAGE [id number]       Payor Plan Address Payor Plan Phone Number Payor Plan Fax Number Effective Dates    [address] [phone]  11/1/2022 - None Entered    [facility] [id number]         Subscriber Name Subscriber Birth Date Member ID       MELLY CRUZ 1955 [id number]                     Emergency Contacts        (Rel.) Home Phone Work Phone Mobile Phone    Carmelo Cruz (Spouse) [phone] -- [phone]                     Discharge Order (From admission, onward)       Start     Ordered    09/28/23 1850  Discharge patient  Once        Expected Discharge Date: 09/28/23   Discharge Disposition: Home or Self Care   Physician of Record for Attribution - Please select from Treatment Team: ERNESTINA" CHRISTEL ELMORE [[id number]]   Review needed by CMO to determine Physician of Record: No      Question Answer Comment   Physician of Record for Attribution - Please select from Treatment Team CHRISTEL DO    Review needed by CMO to determine Physician of Record No        09/28/23 0165

## 2023-09-29 NOTE — OUTREACH NOTE
Prep Survey      Flowsheet Row Responses   Tenriism facility patient discharged from? [place]   Is LACE score < 7 ? No   Eligibility Readm Mgmt   Discharge diagnosis multiple lung nodules, s/p Bronchoscopy, right thoracoscopy, multiple pleural biopsy, right lower lobe wedge resection with talc pleurodesis   Does the patient have one of the following disease processes/diagnoses(primary or secondary)? General Surgery   Does the patient have Home health ordered? No   Is there a DME ordered? No   Prep survey completed? Yes            Marlene ELMORE - Registered Nurse

## 2023-09-29 NOTE — TELEPHONE ENCOUNTER
Per verbal instruction from Brandy STUBBS, called pt to sched her post op hosp follow up.  Pt states she had also been instructed to call us this morning to ask about pain meds as the nurse told her they did not have a script for her.  Pt calling to see if she can get rx for this or what to do.  States she only has enough med currently for today and tomorrow.  Pt uses [facility].  Can reach pt at #[phone]/nadai

## 2023-09-29 NOTE — TELEPHONE ENCOUNTER
Attempted to call Dr Lopez's office re: this but they are not in the office today and no one else in the office is able to address any questions re: his pt.  They suggest we call back on Monday/kahm

## 2023-10-02 ENCOUNTER — READMISSION MANAGEMENT (OUTPATIENT)
Dept: CALL CENTER | Facility: HOSPITAL | Age: 68
End: 2023-10-02
Payer: MEDICARE

## 2023-10-02 NOTE — OUTREACH NOTE
General Surgery Week 1 Survey      Flowsheet Row Responses   [facility] patient discharged from? [place]   Does the patient have one of the following disease processes/diagnoses(primary or secondary)? General Surgery   Week 1 attempt successful? Yes   Call start time 1838   Call end time 1845   Discharge diagnosis multiple lung nodules, s/p Bronchoscopy, right thoracoscopy, multiple pleural biopsy, right lower lobe wedge resection with talc pleurodesis   Is the patient taking all medications as directed (includes completed medication regime)? Yes   Does the patient have a follow up appointment scheduled with their surgeon? Yes   Has the patient kept scheduled appointments due by today? N/A   Psychosocial issues? No   Did the patient receive a copy of their discharge instructions? Yes   Nursing interventions Reviewed instructions with patient   What is the patient's perception of their health status since discharge? Improving   Nursing interventions Nurse provided patient education   Is the patient /caregiver able to teach back basic post-op care? Practice 'cough and deep breath', Continue use of incentive spirometry at least 1 week post discharge   Is the patient/caregiver able to teach back signs and symptoms of incisional infection? Increased redness, swelling or pain at the incisonal site, Increased drainage or bleeding, Incisional warmth, Pus or odor from incision, Fever   Is the patient/caregiver able to teach back steps to recovery at home? Eat a well-balance diet, Rest and rebuild strength, gradually increase activity, Set small, achievable goals for return to baseline health   If the patient is a current smoker, are they able to teach back resources for cessation? Not a smoker   Is the patient/caregiver able to teach back the hierarchy of who to call/visit for symptoms/problems? PCP, Specialist, Home health nurse, Urgent Care, ED, 911 Yes   Additional teach back comments States she has had some pain  but is doing well.  She is doing her breathing exercises.  She is currently staying with her 92 year old mother and states she is in better health than her.  She contacted Dr. Delarosa's office to get appt to start chemo due to having stage 4 cancer but has not heard back.  She is going to try again first thing in the morning.   Week 1 call completed? Yes   Wrap up additional comments Pt is going to contact oncologist to set up appt to start chemo   Call end time [phone]            Lexis KERNS - Licensed Nurse

## 2023-10-03 ENCOUNTER — HOSPITAL ENCOUNTER (OUTPATIENT)
Dept: GENERAL RADIOLOGY | Facility: HOSPITAL | Age: 68
Discharge: HOME | End: 2023-10-03
Admitting: NURSE PRACTITIONER
Payer: MEDICARE

## 2023-10-03 DIAGNOSIS — C34.91 PRIMARY MALIGNANT NEOPLASM OF RIGHT LUNG METASTATIC TO OTHER SITE: ICD-10-CM

## 2023-10-03 PROCEDURE — 71046 X-RAY EXAM CHEST 2 VIEWS: CPT

## 2023-10-09 DIAGNOSIS — C34.11 MALIGNANT NEOPLASM OF UPPER LOBE OF RIGHT LUNG: Primary | ICD-10-CM

## 2023-10-09 NOTE — PROGRESS NOTES
Patient was due to have this chest x-ray performed day of office visit with Dr. Thompson on November 1.  I have reordered another x-ray.  Please inform her to arrive early and have this done same day but before visit

## 2023-10-11 ENCOUNTER — READMISSION MANAGEMENT (OUTPATIENT)
Dept: CALL CENTER | Facility: HOSPITAL | Age: 68
End: 2023-10-11
Payer: MEDICARE

## 2023-10-11 NOTE — OUTREACH NOTE
General Surgery Week 2 Survey      Flowsheet Row Responses   [facility] facility patient discharged from? [place]   Does the patient have one of the following disease processes/diagnoses(primary or secondary)? General Surgery   Week 2 attempt successful? No   Unsuccessful attempts Attempt 1            Pauline PACK - Registered Nurse

## 2023-10-12 ENCOUNTER — TRANSCRIBE ORDERS (OUTPATIENT)
Dept: ADMINISTRATIVE | Facility: HOSPITAL | Age: 68
End: 2023-10-12
Payer: MEDICARE

## 2023-10-12 ENCOUNTER — TELEPHONE (OUTPATIENT)
Dept: RADIATION ONCOLOGY | Facility: HOSPITAL | Age: 68
End: 2023-10-12
Payer: MEDICARE

## 2023-10-12 DIAGNOSIS — Z92.3 S/P RADIATION THERAPY 4-12 WKS AGO: ICD-10-CM

## 2023-10-12 DIAGNOSIS — G89.3 CANCER RELATED PAIN: Primary | ICD-10-CM

## 2023-10-12 DIAGNOSIS — C79.9 METASTATIC ADENOCARCINOMA: ICD-10-CM

## 2023-10-13 ENCOUNTER — HOSPITAL ENCOUNTER (OUTPATIENT)
Dept: RADIATION ONCOLOGY | Facility: HOSPITAL | Age: 68
Setting detail: RADIATION/ONCOLOGY SERIES
End: 2023-10-13
Payer: MEDICARE

## 2023-10-15 NOTE — PROGRESS NOTES
[facility] PAceSJAZIEL Jerry MD      Gagan Stewart APRN  ________________________________________  [facility]  Department of Radiation Oncology  [address] [place] [id number]  Office:  [phone]  Fax: [phone]    DATE: 10/16/2023  PATIENT:  Melly Cruz 1955                 MEDICAL RECORD #:  [id number]                                                       REASON FOR VISIT  Chief Complaint   Patient presents with    Lung Cancer     Melly Cruz is a very pleasant female that has been referred to our office for radiotherapy considerations. Reports fatigue, SOB, and chest pain. Denies appetite change, unexpected weight change, chest tightness, cough, hemoptysis, nasuea/vomiting, diarrhea, light-headedness, weakness, and headaches. She follows .     History of Present Illness:  Stage IB (T2a, N0, cM0) adenocarcinoma of lung, RUL. She completed 5000 cGy in 4 fractions to the RUL on 07/18/2023.   Diagnosed with stage IV right lung carcinoma with chest wall metastasis. She completed 2000 cGy in 8 fractions to the right chest wall on 09/22/2023.     01/21/2018 - CT Chest with contrast:  No acute findings.  10-11 mm ill-defined nodule in the right apex, similar to 2017 but new from 2010. While this could represent postinflammatory scarring (related to pneumonia in October 2017) underlying malignancy is not excluded. Further evaluation with PET/CT or short interval 3 month follow-up CT could be obtained.    03/30/2018 - CT Angio Chest:  No evidence of pulmonary embolus.   1.2 cm subtle solid nodule in the right lung apex. This is immediately adjacent to subpleural scarring at the apex and may reflect scarring/post inflammatory change. Recommend three-month follow-up low dose CT to evaluate for stability. This would correlate with a Lung Rads category 4A lesion.   Mild to moderate underlying lung emphysema.     09/01/2018 - CT Angio Chest:  No  pulmonary emboli.   Stable 1.2 cm right apical lung nodule, similar to the 3/30/2018 exam. Repeat 6 month follow-up CT chest recommended.   Emphysema. Basilar atelectasis.   Cardiomegaly. Vascular crowding.     10/04/2018 - PET Scan:  No significant uptake in the 11 mm right apical subsolid nodule which is stable in size for one year. While a benign/indolent process such as scar is possible, recommend yearly follow-up CTs given its subsolid appearance.     04/22/2019 - CT Chest with contrast:  Subsolid nodule in the right apex is unchanged from October 2017 and showed no significant metabolic activity on October 2018 PET. The nodule has developed from 2010. Focal benign scarring is most likely. May continue annual surveillance to ensure stability.    04/29/2019 - CT Angio Chest:  No evidence of pulmonary embolus.  Stable right upper lobe groundglass opacity compared to 10/11/2017. Recommend follow-up CT chest in one year or per pulmonology.  Coronary artery calcifications.    03/01/2020 - CT Abdomen/Pelvis with contrast:  There is a stable 1.7 cm enhancing lesion in the right hepatic lobe of the liver. It is stable compared back to 3/30/2018 although very difficult to see on that study due to the phase of contrast injection. This may represent an atypical hemangioma. Focal nodular hyperplasia is in the differential. Given relative stability, a more aggressive lesion is felt to be less likely.   Diffuse fatty infiltration of the liver.   Status post cholecystectomy and hysterectomy.   Small renal lesions on the right are probably cysts. The 9 mm lesion is too small to fully characterize. The 1.3 cm lesion measures fluid density with Hounsfield unit measurement.   Normal appendix. Diverticulosis of the colon. Other nonacute findings, as discussed above.     06/16/2020 - CT Chest with contrast:  New 0.9 cm lingula pleural-based pulmonary nodule versus atelectasis. This previously appeared more triangular on 4/29/2019.  Therefore, recommend follow-up CT chest in 3 months.  Stable right upper lobe groundglass opacity measuring 1.5 cm, stable compared to 10/11/2017 considering differences in technique    06/30/2020 - PET Scan:  There is no abnormal FDG activity associated with the small new nodule in the lingula, which is probably related to focal scarring or atelectasis. There is no abnormal FDG activity associated with the 15 mm groundglass nodule in the right upper lobe.  There are a few normal sized lymph nodes in the inferior axilla bilaterally, with low level FDG avidity, these may be reactive lymph nodes. Correlation with physical examination of both axilla is suggested. No measurable axillary or intrathoracic lymphadenopathy is identified.  No evidence of active neoplasm is identified.    03/09/2021 - CT Angio Chest:  No CT angiographic evidence of pulmonary embolus or acute aortic pathology.   Patchy airspace and linear opacities posterior right upper lobe more conspicuous compared to the previous examination. Acute on chronic infectious/inflammatory change considered. Neoplasm difficult to exclude. Correlate with patient presentation. Follow-up recommended.   5 mm pleural-based pulmonary nodule left lower lobe. Follow-up recommended.   Chronic lung changes, Remote granulomatous disease and pulmonary emphysema.   Hepatic steatosis.     12/08/2021 - CT Chest with contrast:  Stable pulmonary nodules within the lungs from the previous study with no new lesions present. Given the lack of change over a more than two-year period of time I feel benignity would BE suggested. No new lung lesions are present. There are changes of centrilobular emphysema.  Cardiomegaly with mild coronary calcifications present. The thoracic aorta is normal in caliber.  Fat-containing lesion within the upper left breast. This may be postoperative in nature versus posttraumatic fat necrosis versus a fatty containing mass. No overlying skin changes are  observed.  Steatosis of the liver. A moderate size hiatal hernia is present.    12/07/2022 - CT Chest with contrast:  Previously described groundglass opacity within the right upper lobe shows interval increase in size is now more elongate in appearance with increased soft tissue component. It does demonstrate linear extension to the pleural surface with some associated volume loss suggesting associated parenchymal scarring. This may simply represent some progressive scarring but given the change from the previous exam I would suggest PET/CT for further characterization. Additional previously described nodules are stable. There are changes of centrilobular emphysema.  No developing mediastinal or axillary lymphadenopathy.  Fat necrosis within the left breast.  Moderate size hiatal hernia.  Steatosis of the liver with suspected focal fatty sparing within the periphery of the right lobe of liver.    01/24/2023 - CT Chest Angiogram:  No evidence of pulmonary embolus.  Moderate emphysema. No consolidation, pleural effusion, or pneumothorax.  Continued increase in size of 2.5 cm part solid RIGHT upper lobe pulmonary nodule. This remains concerning for a primary lung neoplasm. Differential diagnosis also includes infectious or inflammatory process. Recommend PET CT for further evaluation.  Small to moderate size hiatal hernia.  Hepatic steatosis.    01/25/2023 - CT Abdomen/Pelvis without contrast:  Mild acute uncomplicated sigmoid diverticulitis.  Hepatic steatosis.    03/29/2023 - Appointment with :  Medical decision making/Recommendations/Plan:  I think at this point a malignancy certainly is high in her family's history.  In the differential, I would say certainly elevated in the higher key of a list for nodular finding.  She has no recent history of pneumonia.  I believe a PET scan is an order at this time.  Additionally, we will obtain pulmonary function test pre and post bronchodilator with DLCO and EBG.  I  will have her come back to see me in 3 weeks to discuss the findings of those results and make further recommendations at that time.  She has been congratulated as to being a non-smoker.     03/17/2023 - PET Scan:  Slowly enlarging hypermetabolic 3.3 x 1.0 x 2.9 cm subsolid RIGHT upper lobe lung mass. This is highly suspicious for a primary lung neoplasm, favoring adenocarcinoma.  No evidence of metastatic disease.  Focal nodular hypermetabolic activity in the sigmoid colon. Differential diagnosis includes focal inflamed diverticulum versus underlying polyp or neoplasm. Recommend correlation with colonoscopy.    03/21/2023 - CT Angio Chest:  A limited diagnostic study due to poor opacification of the pulmonary arteries. No significant or large vessel embolus is noted. The smaller subsegmental lesion/emboli may not be evaluated or excluded.  Chronic emphysematous lung changes.  An enlarging mass in the right upper lobe. Of    03/29/2023 - Appointment with :  Medical decision making/Recommendations/Plan:  At this point, from a straightforward presence of a increasing right upper lobe lung lesion, no adenopathy, she certainly would be a candidate for resection based on pulmonary function tests with wedge resection and then if positive for lung cancer to proceed with lobectomy and mediastinal lymph node sampling.  Problematic is the following: She has had at least five different events of pulmonary emboli and she has also had several bouts of bleeding.  I have proposed two scenarios, the first options to proceed with a IVC filter placed prior to thoracic surgery and then to proceed with surgery as discussed.  The alternative option would be to proceed with a biopsy and sampling of the mediastinum as indicated with EBUS and navigational bronchoscopy.  Discussed that we have partnered with Dr. Mendoza and Dr. Dove for access.  Then would favor radiation then of this lesion.  Certainly, this would avoid the  risks of bleeding complications as well as multiple pulmonary emboli.  We discussed the pros and cons of each option at large.  Melly Cruz will be added to the tumor conference list to be presented.  I have answered all questions to the best of my ability.  She favors to proceed forward with the second option, therefore, we will make a referral to Dr. Mendoza and Dr. Dove for diagnosis and laurie sampling.  After which we will plan to refer to Dr. Jerry for his opinion.  Certainly, we could reconsider if the bulk of a clinical conference and Dr. Jerry's input that the proposed currently agreed option would be suboptimal.  Certainly, agree that this is not the standard first answer, but in light of this lady's comorbidities, perhaps may be a very reasonable answer.  She remains asymptomatic from a lung nodule point of view.  She is a non-smoker.  I will continue to keep you apprised of care as it ensues.    04/04/2023 - CT Angio Chest:  No CT evidence of pulmonary embolus. Main pulmonary artery segment borderline dilated at 3 cm. Atheromatous disease of the thoracic aorta and coronary arteries. Cardiomegaly.  New lung infiltrates bilaterally most likely due to pneumonia. This is most dense in the left upper lobe.  A mass in the right lung apex is again noted. This mass is PET positive and worrisome for primary lung carcinoma.  Stable small nodule in the right middle lobe laterally.  Indeterminate small right renal lesion measuring 1.6 cm with a Hounsfield unit measurement of 48.  Fatty infiltration of the liver.  Moderate size hiatal hernia.    05/16/2023 - Appointment with Ernesto Marsh Chi, MD - [facility] Pulmonary: Assessment and Plan  Melly Ritter is a 67 y.o. female with a history of prior tobacco use and family history of lung cancer referred for a growing right upper lobe nodule.  Based on her risk factors including age, prior tobacco use and family history of cancer, this growing right  upper lobe nodule in a background of emphysema is highly suspicious for primary lung cancer. She warrants both bronchoscopy for staging of her mediastinum and hilum as well as an attempt to sample the lesion. We discussed this with her at length in clinic she would like to proceed with bronchoscopy. We will obtain an Ion protocol CT chest today to help with procedural planning and then schedule her for next availability for bronchoscopy with linear and radial EBUS with plans to sample the lesion itself as well as proceed with mediastinal staging.  Recommendations:  - we will schedule for bronchoscopy with flexible + robotic bronchoscopy and linear/radial EBUS  -- will plan to stage mediastinum and also sample lesion  We will plan to see the patient back TBD based on above.  Patient was seen with Dr. Marsh.    05/16/2023 - CT Chest without contrast - Fink:  No significant interval change in spiculated, predominantly solid right upper lobe lesion compared to recent CT dated 04/04/2023, which is concerning for primary lung cancer.   Interval resolution of left upper lobe and bilateral lower lobe groundglass opacities, in keeping with pneumonia.     05/16/2023 - PFTs - Fink:  Interpretation:   Breathing room air, SpO2 is adequate at rest and during exercise sufficient to increase pulse from 84 to 116 b/min, SpO2 falls but remains normoxemic .   On this basis, SpO2 is adequate at rest breathing room air and while walking breathing room air.   This level of exercise is associated with no significant change of FEV1.       05/31/2023 - Bronchoscopy with biopsy:   Lung, right upper lobe nodule, endobronchial ultrasound-guided fluroscopy assisted fine needle aspiration:   Non-small cell carcinoma, favor adenocarcinoma (see comment)   Comments: The aspirate smears and the cell block show rare clusters of malignant epithelial cells.  Immunohistochemical stains performed on the cell block (single antibody procedures with  appropriately reactive controls) show the malignant cells to be positive for TTF-1 and Napsin A, and to be negative for p40.  These findings are compatible with nons-small cell carcinoma, and favor primary lung adenocarcinoma.  There are too few cells for ancillary moleclular testing.   Lung, right upper lobe nodule, endobronchial biopsy:   Non-diagnostic for mass forming lesion; blood and fibrin only     06/01/2023 - Documentation by Brandy Castillo:  Outside records reviewed with patrick bronch performed yesterday and results are non diagnostic. Dr. Thompson presented case at tumor conference today and discussed with Dr. Delarosa.   Called patient to discuss options of surgical resection with IVC filter placed prior to due to her history of PE and GI bleeding or referral to radiation therapy for stereotatic radiation to lung lesion.   She was advised if she chooses radiation therapy that would be without a tissue diagnosis.   She is eager to proceed with Nissen fundoplication surgery. Ultimately, she wishes to proceed with radiation therapy referral. This has been placed per her request.   She will call back with questions if they arise.      06/07/2023 - Appointment with :  ASSESSMENT AND PLAN:  RUL Lung Mass  3.3 x 1.0 x 2.9 cm RIGHT upper lobe lung mass, SUV 4.1 on PET 3/17/2023  known right lung nodule, being followed outpatient by Dr. Agusto Thompson and ENOC Rose with the cardiothoracic surgery department at [facility].  She was last seen by Dr. Agusto Thompson on 12/7/2022  PET scan 3/17/2023:  Slowly enlarging hypermetabolic 3.3 x 1.0 x 2.9 cm subsolid RIGHT upper lobe lung mass, SUV 4.1. This is highly suspicious for a primary lung neoplasm, favoring adenocarcinoma.  No evidence of metastatic disease.  Focal nodular hypermetabolic activity in the sigmoid colon, SUV 4.4. Differential diagnosis includes focal inflamed diverticulum versus underlying polyp or neoplasm. Recommend correlation with  colonoscopy.  CTA chest 4/4/2023:  No CT evidence of pulmonary embolus. Main pulmonary artery segment borderline dilated at 3 cm. Atheromatous disease of the thoracic aorta and coronary arteries. Cardiomegaly.  New lung infiltrates bilaterally most likely due to pneumonia. This is most dense in the left upper lobe.  A mass in the right lung apex is again noted. This mass is PET positive and worrisome for primary lung carcinoma.  Stable small nodule in the right middle lobe laterally.  Indeterminate small right renal lesion measuring 1.6 cm with a Hounsfield unit measurement of 48.  Fatty infiltration of the liver.  Moderate size hiatal hernia.  On 3/29/2023, Dr. Thompson's office attempted to refer Melly to Dr. Mendoza or Dr. Dove for EBUS, possible navigational bronchoscopy to obtain a tissue diagnosis. Unfortunately they are out of network for Mrs. Cruz's insurance.  Melly came to the the ED at [facility] on 4/4/2023 with a history of mid epigastric and substernal chest pain, pleuritic in nature with increasing dyspnea, temperature elevation to 100.6 °F, tachycardia and hypoxia. She was found to have bilateral pneumonia. She was admitted for management.  CBC today 6/7/2023 reveals a WBC of 4.72. Hgb is 11.6 with an MCV of 87.9 and platelet count of 273,000.  RECOMMEND:  Dr. Delarosa recommends referral to Dr. Ole Esquivel at [facility] for navigational bronchoscopy.  ENOC Callejas with Dr. Agusto Thompson made a referral to Dr. Ole Esquivel at [facility] for navigational bronchoscopy on 4/7/2023.  There were difficulties getting her set up for navigational bronchoscopy in [place] and therefore alternative arrangements were made in [place].  Call received from Dr Thompson regarding patient's recent bronch at [facility] on 5/31/23 was non diagnostic:  Lung, right upper lobe nodule, endobronchial biopsy 5/31/23 per Zechariah Piña MD at [facility]  - Non-diagnostic for mass  forming lesion; blood and fibrin only  Plan moving forward , Dr Thompson contemplates a diagnostic and therapeutic resection of the lung nodule. After discussion with Melly, she has chosen empiric SBRT to the lung nodule rather than an intervention given her other comorbid associated medical problems.  Melly has an appointment with Dr. Jeff Jerry on 6/9/2023 for SBRT to the RUL lung nodule.  A call was placed and I spoke to Dr. Thompson regarding all the above. We are all in accord with the patient's decision.  I will see her back in follow-up in 3 months for continued monitoring and review of the other heme-onc associated problems.    06/09/2023 - Consult with :  Following this discussion and in consideration of the diagnostic data/evaluation of the patient, I recommended a course of stereotactic radiosurgery to the right upper lung nodule.   Will simulate treatment fields today, 3D CT with MIPS to begin the planning process, final course pending.   Continue ongoing management per primary care physician and other specialists.     07/06/2023 - 07/18/2023 - Completed radiation course:  Received 5000 cGy in 4 fractions to the RUL.    08/27/2023 - CT chest with contrast:  History of lung cancer noted. Multiple new small nodules within the right lung compatible with metastatic disease.  No pneumonia or pneumothorax to account for new right sided chest pain/back pain.    08/31/2023 - PET Scan:  The right upper lobe primary neoplasm SUV max is decreased on today' study compared to the prior study. The size appears relatively stable.  There are pleural-based areas of nodularity in the right middle lobe demonstrating uptake on the PET images worrisome for metastatic disease. I suspect that there is metastatic pleural disease. There is a right pleural effusion and there are several pleural-based areas of focal activity. The patient also has multiple small subcentimeter lung nodules that do not demonstrate significant  PET activity which may be related to their small size. The nodules are reportedly new on CT.  Probable chest wall lesion posteriorly on the right that has some involvement of the posterolateral right 10th rib demonstrating abnormal PET activity.  Abnormal uptake in right cardiophrenic angle lymph nodes consistent with metastatic adenopathy.  No metastatic disease is seen in the neck, abdomen or pelvis.    09/05/2023 - Consult with :  Following this discussion and in consideration of the diagnostic data/evaluation of the patient, I recommended a course of palliative external beam radiation, I anticipate 3250 cGy over 13 treatments, final course pending.   Continue ongoing management per primary care physician and other specialists.     09/13/2023 - 09/22/2023 - Completed Radiation course:  Received 2000 cGy in 8 fractions to right chest wall via External Beam Radiation - EBRT.    09/18/2023 - X-ray Right Ribs:  No acute cardiopulmonary abnormality.  No displaced rib fracture    09/25/2023 - Chest X-ray:  Mildly increased RIGHT lower lung interstitial disease and small amount of RIGHT pleural fluid.  No pneumothorax or overt heart failure.  Known RIGHT apex lung nodule.    09/26/2023 -Right lower lobe lung wedge resection per :  Pleural biopsy #1, excision:  Metastatic adenocarcinoma consistent with lung primary.  Pleural biopsy #2, excision:  Metastatic adenocarcinoma consistent with lung primary.  Pleural biopsy #3, excision:  Metastatic adenocarcinoma consistent with lung primary.  Pleural biopsy #4, excision:  Metastatic adenocarcinoma consistent with lung primary.  Right lower lobe lung, wedge resection:  Multiple pleural foci of metastatic adenocarcinoma.    10/11/2023 - Mediport placement    10/12/2023 - Appointment with :  Melly Cruz is a pleasant 67-year-old  female who is followed with primary and secondary diagnoses as outlined:  Stage IV metastatic adenocarcinoma  of the lung 9/26/2023  History of DVT/PE in 1986 and after surgical procedures, none over the past 20 years  History of dyspepsia, reflux and possible GI bleeding in the past under stressful situations  Abnormal PET of sigmoid colon  Stereotactic XRT delivered to RUL was initiated on 7/11/2023, and the fourth dose of 1250 cGy was completed on 7/18/2023 for total of 5000 cGy  Palliative XRT to the right chest wall at the area of the 10th rib was initiated 9/13/2023. Plan was for a total of 3250 cGy over 13 treatment fractions however, she only received 5 treatment fractions for total of 1250 cGy that was completed on 9/19/2023.  Bronchoscopy, right thoracoscopy, multiple pleural biopsies and RLL lung wedge resection, talc pleurodesis by Dr. Thompson at [facility] on 9/26/2023. All biopsies positive for adenocarcinoma consistent with lung primary.  Having obtained a tissue diagnosis, I have coordinated with other subspecialists arranging for a port placement (10/11/2023), molecular pathology testing on the 9/26/2023 specimen and liquid biopsy.  Melly presents today with her  Carmelo to go over all of the information that we have so far and to address a definitive systemic therapy treatment plan and for symptom management of increasing cancer associated pain.  Stage IV metastatic adenocarcinoma of the lung 9/26/2023 originating as a 3.3 x 1.0 x 2.9 cm RUL Lung Mass  TARGET LUNG CANCER SITES:  3.3 x 1.0 x 2.9 cm RUL lung primary  Multiple right pleural-based nodules, all positive on biopsy 9/26/2023  Right cardiophrenic lymphadenopathy  Mass in the posterior chest wall involving the posterolateral right 10th rib-SUV 20  Stereotactic XRT delivered to RUL was initiated on 7/11/2023, and the fourth dose of 1250 cGy was completed on 7/18/2023 for total of 5000 cGy  CT CHEST W CONTRAST DIAGNOSTIC on 8/27/2023 at [facility], compared to 6/8/23  A linear soft tissue focus within the right apex measures 23 x 11 mm compared with 33 x 12  mm.  A new 10 x 7 mm nodule within the central right lung lies at the confluence of the minor and major fissure.  There is an adjacent 5 mm nodule and scattered 3-4 mm nodules within the adjacent right middle lobe.  7 mm subpleural right middle lobe nodule previously measured 4 mm.  New finding of 2 adjacent 10 mm subpleural nodules within the anterior medial right middle lobe  No pneumonia or pneumothorax to account for new right sided chest  pain/back pain.  NM PET/CT SKULL BASE TO MID THIGH on 8/31/2023 at [facility], compared to 3/17/23  Uptake in the right upper lobe mass is decreased on the current study, now having an SUV max of 3.5, previously 4.1. The overall size of the right upper lobe abnormality appears relatively stable  abnormal uptake in 2 of the anterior pleural-based pulmonary nodules in  the right middle lobe anteriorly, one with an SUV max of 4.2 and the  other with an SUV max of 3.6. abnormal uptake within lymph nodes in the right cardiophrenic angle fat pad  2 lymph nodes with uptake in this region, one with an SUV max of 4.1 and the other  with an SUV max of 2.9 In the extreme right lung base, there are 3 other small areas of focal pleural-based uptake posteriorly and one anteriorly likely related to metastatic pleural nodules  There is a small right pleural effusion.  There is a larger area of abnormal uptake that appears to represent a mass in the posterior chest wall and involving the posterolateral right 10th rib with an SUV max of 20.  another small focus of activity anterior to the liver that appears to be in the lower anterior chest wall between the anterior left sixth and seventh ribs  No definite evidence of metastatic disease in the abdomen or the pelvis  Call placed to Jacqui SKELTON at Dr Jerry's office on 8/31/2023 regarding recent PET scan. Request made for Dr Jerry to review imaging to determine if xrt is an option for palliative pain control.  Consult with Dr Jeff Jerry ([facility]) on  9/5/2023  Stage IV right lung carcinoma with chest wall metastasis. New lung nodules, pain in ribcage; s/p SBRT to RUL (5000 cGy in 4 fractions) on 7/18/23. Following discussion and in consideration of the diagnostic data/evaluation of the patient, I recommended a course of palliative external beam radiation, I recommended a course of palliative external beam radiation. I anticipate 3250 cGy over 13 treatments, final course pending. Continue ongoing management per primary care physician and other specialists.  Palliative XRT to the right chest wall at the area of the 10th rib was initiated 9/13/2023. Plan was for a total of 3250 cGy over 13 treatment fractions however, she only received 5 treatment fractions for total of 1250 cGy that was completed on 9/19/2023.  OV Dr Thompson, ([facility] CARDIOTHORACIC SURGERY) on 9/13/23  discussed the recommendation to consider bronchoscopy, right thoracoscopy, wedge resection as indicated for diagnosis.  We discussed alternative option of CT guided needle biopsy.  The pros and cons of each were discussed at length.  Resection is favored.  BRONCHOSCOPY, RIGHT THORACOSCOPY, MULTIPLE PLEURAL BIOPSY, RIGHT LUNG LOWER LOBE WEDGE RESECTION, TALC PLEURODESIS on 9/26/23 per Dr Thompson, [facility] cardiothoracic surgery  Final Diagnosis  1. Pleural biopsy #1, excision:  Metastatic adenocarcinoma consistent with lung primary.  2. Pleural biopsy #2, excision:  Metastatic adenocarcinoma consistent with lung primary.  3. Pleural biopsy #3, excision:  Metastatic adenocarcinoma consistent with lung primary.  4. Pleural biopsy #4, excision:  Metastatic adenocarcinoma consistent with lung primary.  5. Right lower lobe lung, wedge resection:  Multiple pleural foci of metastatic adenocarcinoma.  Right single lumen mediport placement per Dr Yeh, [facility] surgery, on 10/11/2023  I had an extensive office visit reviewing records from multiple physicians offices, treatments that she has received in the interim outlining  her clinical situation with Melly and her  Carmelo. All their questions were answered to their understanding and satisfaction. She desires to receive systemic therapy.  We are awaiting the results of the molecular testing in case she can receive targeted therapy. Otherwise she will receive systemic therapy with Carboplatin/Alimta/Keytruda.  Completion of the metastatic work-up will include an MRI of the brain to rule out brain metastases  PLAN:  MRI of the brain with and without contrast  Follow-up on molecular testing to Temp both tissue from 9/26/2023 and liquid biopsy 10/3/2023 to evaluate for targetable mutations  Arrange for approval of systemic chemotherapy with  Follow-up in 1 week to consider systemic therapy with Carboplatin/Alimta/Keytruda..    10/16/2023 - MRI Brain:      History obtained from  PATIENT, FAMILY, and CHART    PAST MEDICAL HISTORY  Past Medical History:   Diagnosis Date    Acid reflux     Anemia     Anxiety     Arthritis     Asthma     Mild COPD    Curtis esophagus     Bleeding disorder     Reflux acid overload    Bunion Aug    Repaired with surgery    Cancer 03/2023    Waiting on Biopsy to determine what kind and what stage NOW BONE CANCER    Chest pain     Chronic back pain     Chronic neck pain     Clotting disorder     Stomach bleeds from acid    COPD (chronic obstructive pulmonary disease)     Difficulty walking Dec 2022    After surgery wslking has become more and more difficult    DVT (deep venous thrombosis)     GI bleed     Hammer toe Aug 22    Repaired surgery    Hypothyroid     Hypothyroidism     Low back pain     Lung cancer     Migraine     PE (pulmonary thromboembolism)     Plantar fasciitis Years ago    Renal lesion 04/05/2023    Shin splints Years ago      PAST SURGICAL HISTORY  Past Surgical History:   Procedure Laterality Date    BRONCHOSCOPY N/A 9/26/2023    Procedure: BRONCHOSCOPY,THORACOSCOPY, MULTIPLE PLEURAL BIOPSY, RIGHT LUNG LOWER LOBE WEDGE RESECTION, TALC  PLEURODESIS;  Surgeon: Agusto Thompson MD;  Location:  [facility] OR;  Service: Cardiothoracic;  Laterality: N/A;    BUNIONECTOMY      CHOLECYSTECTOMY      COLONOSCOPY      CORRECTION HAMMER TOE      DILATATION AND CURETTAGE      FOOT FUSION Left 2022    Procedure: 1-2 Arthrodesis, Tarsal Metatarsal Joint 2 and 3 Arthrodesis;  Surgeon: Bud Maradiaga DPM;  Location:  [facility] OR;  Service: Podiatry;  Laterality: Left;    HAMMER TOE REPAIR Left 2022    Procedure: Hammertoe Repair 2-5,;  Surgeon: Bud Maradiaga DPM;  Location:  [facility] OR;  Service: Podiatry;  Laterality: Left;    HERNIA REPAIR      HYSTERECTOMY      JOINT REPLACEMENT      OOPHORECTOMY      REDUCTION MAMMAPLASTY  2021    REPLACEMENT TOTAL KNEE      Right knee partial, left knee    THORACOSCOPY Right 2023    Procedure: THORACOSCOPY with WEDGE RESECTION;  Surgeon: Agusto Thompson MD;  Location:  [facility] OR;  Service: Cardiothoracic;  Laterality: Right;      FAMILY HISTORY  family history includes Breast cancer in her paternal aunt; Cancer in her father and mother; Diabetes in her father; Heart disease in her mother; Osteoporosis in her mother; Thyroid disease in her father, maternal aunt, maternal uncle, mother, and sister.    SOCIAL HISTORY  Social History     Tobacco Use    Smoking status: Former     Packs/day: 1.50     Years: 46.00     Additional pack years: 0.00     Total pack years: 69.00     Types: Cigarettes     Start date: 1972     Quit date: 2018     Years since quittin.5     Passive exposure: Past    Smokeless tobacco: Never    Tobacco comments:     Pt started at age 17, then quit at age 31. Pt restarted smoking at age [age] and quit smoking in 2018   Vaping Use    Vaping Use: Never used   Substance Use Topics    Alcohol use: Not Currently     Alcohol/week: 1.0 standard drink of alcohol     Types: 1 Drinks containing 0.5 oz of alcohol per week     Comment: A chocolate drink made with moonshine and creamer     Drug use: No     ALLERGIES  Erythromycin, Guaifenesin, Hydromorphone hcl, Ondansetron hcl, Orphenadrine, Codeine, and Meperidine     MEDICATIONS    Current Outpatient Medications:     albuterol (PROVENTIL HFA;VENTOLIN HFA) 108 (90 Base) MCG/ACT inhaler, Inhale 2 puffs 4 (Four) Times a Day., Disp: 6.7 g, Rfl: 0    aluminum-magnesium hydroxide-simethicone (MAALOX MAX) 400-400-40 MG/5ML suspension, Take 30 mL by mouth Every 6 (Six) Hours As Needed for Indigestion or Heartburn., Disp: , Rfl:     budesonide (Pulmicort Flexhaler) 90 MCG/ACT inhaler, Inhale 2 puffs 2 (Two) Times a Day. Swallow, do not inhale, Disp: , Rfl: 11    buPROPion XL (WELLBUTRIN XL) 300 MG 24 hr tablet, Take 1 tablet by mouth Every Morning., Disp: , Rfl:     Cyanocobalamin (VITAMIN B-12 ER) 1000 MCG tablet controlled-release, Take 1,000 mcg by mouth Daily., Disp: , Rfl:     cyclobenzaprine (FLEXERIL) 10 MG tablet, Take 1 tablet by mouth 3 (Three) Times a Day As Needed for Muscle Spasms., Disp: , Rfl:     diclofenac (VOLTAREN) 75 MG EC tablet, Take 1 tablet by mouth 2 (Two) Times a Day., Disp: , Rfl:     EPINEPHrine (EPIPEN) 0.3 MG/0.3ML solution auto-injector injection, Inject 0.3 mL into the appropriate muscle as directed by prescriber 1 (One) Time As Needed (allergic reaction)., Disp: , Rfl:     folic acid (FOLVITE) 1 MG tablet, Take 1 tablet by mouth Daily., Disp: , Rfl:     HYDROcodone-acetaminophen (NORCO)  MG per tablet, Take 1 tablet by mouth Every 4 (Four) Hours As Needed for Moderate Pain or Severe Pain for up to 31 days., Disp: 60 tablet, Rfl: 0    levothyroxine (SYNTHROID, LEVOTHROID) 125 MCG tablet, Take 1 tablet by mouth Daily., Disp: , Rfl:     lidocaine (XYLOCAINE) 5 % ointment, Apply 1 application topically to the appropriate area as directed Every 2 (Two) Hours As Needed for Mild Pain., Disp: 2500 g, Rfl: 5    Morphine (MS CONTIN) 15 MG 12 hr tablet, Take 1 tablet by mouth 2 (Two) Times a Day., Disp: , Rfl:     nitroglycerin  (NITROSTAT) 0.4 MG SL tablet, Place 1 tablet under the tongue Every 5 (Five) Minutes As Needed for Chest Pain. Take no more than 3 doses in 15 minutes., Disp: , Rfl:     pantoprazole (PROTONIX) 40 MG EC tablet, Take 1 tablet by mouth Daily., Disp: , Rfl:     pramipexole (MIRAPEX) 1 MG tablet, Take 1 tablet by mouth 2 (Two) Times a Day., Disp: , Rfl:     sucralfate (CARAFATE) 1 GM/10ML suspension, Take 10 mL by mouth 4 (Four) Times a Day Before Meals & at Bedtime., Disp: 414 mL, Rfl: 0    tiZANidine (ZANAFLEX) 4 MG tablet, Take 0.5-1 tablets by mouth At Night As Needed for Muscle Spasms., Disp: , Rfl:     vitamin D (ERGOCALCIFEROL) 53546 units capsule capsule, Take 1 capsule by mouth Every 7 (Seven) Days. SUNDAY, Disp: , Rfl: 6    oxyCODONE-acetaminophen (Percocet)  MG per tablet, Take 1 tablet by mouth Every 4 (Four) Hours As Needed for Moderate Pain or Severe Pain., Disp: 60 tablet, Rfl: 0  No current facility-administered medications for this visit.    Current outpatient and discharge medications have been reconciled for the patient.  Reviewed by: Jeff Jerry III, MD    The following portions of the patient's history were reviewed and updated as appropriate: allergies, current medications, past family history, past medical history, past social history, past surgical history and problem list.    REVIEW OF SYSTEMS  Review of Systems   Constitutional:  Positive for fatigue. Negative for appetite change and unexpected weight change.   HENT:  Negative.     Respiratory:  Positive for shortness of breath. Negative for chest tightness, cough and hemoptysis.    Cardiovascular:  Positive for chest pain (right lateral chest wall).   Gastrointestinal: Negative.    Genitourinary: Negative.     Musculoskeletal: Negative.    Skin: Negative.    Neurological:  Negative for dizziness.   Hematological: Negative.  Negative for adenopathy.   Psychiatric/Behavioral: Negative.     All other systems reviewed and are  "negative.    I have reviewed and confirmed the accuracy of the ROS as documented by the MA/LPN/RN Jeff Jerry III, MD     PHYSICAL EXAM  Vital Signs:   Vitals:    10/16/23 1228   BP: 140/69   Weight: 90.7 kg (200 lb)   Height: 162.6 cm (64\")   PainSc:   8   PainLoc: Chest  Comment: \"hurts to breathe\"     Physical Exam  Vitals reviewed.   Constitutional:       Appearance: Normal appearance.   HENT:      Head: Normocephalic.   Cardiovascular:      Rate and Rhythm: Normal rate and regular rhythm.      Pulses: Normal pulses.      Heart sounds: Normal heart sounds.   Pulmonary:      Effort: Pulmonary effort is normal.      Breath sounds: Normal breath sounds.   Abdominal:      General: Bowel sounds are normal.   Musculoskeletal:         General: Normal range of motion.      Cervical back: Normal range of motion and neck supple.   Lymphadenopathy:      Cervical: No cervical adenopathy.   Skin:     General: Skin is warm.      Capillary Refill: Capillary refill takes less than 2 seconds.   Neurological:      General: No focal deficit present.      Mental Status: She is alert and oriented to person, place, and time.   Psychiatric:         Mood and Affect: Mood normal.         Behavior: Behavior normal.         Performance Status: ECOG (0) Fully active, able to carry on all predisease performance without restriction    Clinical Quality Measures  -Pain Documented by Standardized Tool, FPS  Melly Cruz reports a pain score of 8.  Given her pain assessment as noted, treatment options were discussed and the following options were decided upon as a follow-up plan to address the patient's pain: continuation of current treatment plan for pain and use of non-medical modalities (ice, heat, stretching and/or behavior modifications).  Pain Medications               buPROPion XL (WELLBUTRIN XL) 300 MG 24 hr tablet Take 1 tablet by mouth Every Morning.    cyclobenzaprine (FLEXERIL) 10 MG tablet Take 1 tablet by mouth 3 (Three) " Times a Day As Needed for Muscle Spasms.    diclofenac (VOLTAREN) 75 MG EC tablet Take 1 tablet by mouth 2 (Two) Times a Day.    HYDROcodone-acetaminophen (NORCO)  MG per tablet Take 1 tablet by mouth Every 4 (Four) Hours As Needed for Moderate Pain or Severe Pain for up to 31 days.    Morphine (MS CONTIN) 15 MG 12 hr tablet Take 1 tablet by mouth 2 (Two) Times a Day.    tiZANidine (ZANAFLEX) 4 MG tablet Take 0.5-1 tablets by mouth At Night As Needed for Muscle Spasms.          -Advanced Care Planning   Advance Care Planning   ACP discussion was held with the patient during this visit. Patient does not have an advance directive, information provided.     -Body Mass Index Screening and Follow-Up Plan Body mass index is 34.33 kg/m².    -Tobacco Use: Screening and Cessation Intervention Social History    Tobacco Use      Smoking status: Former        Packs/day: 1.50        Years: 46.00        Additional pack years: 0.00        Total pack years: 69.00        Types: Cigarettes        Start date: 1972        Quit date: 2018        Years since quittin.5        Passive exposure: Past      Smokeless tobacco: Never      Tobacco comments: Pt started at age 17, then quit at age 31. Pt restarted smoking at age [age] and quit smoking in 2018     ASSESSMENT AND PLAN  1. Malignant neoplasm of upper lobe of right lung    2. History of radiation therapy    3. Former smoker      No orders of the defined types were placed in this encounter.     RECOMMENDATIONS: Melly Cruz has been referred to our office today to discuss radiotherapy recommendations for Stage IV metastatic adenocarcinoma of the lung.    We have discussed the indications and rationale of radiation therapy according to the NCCN Guidelines. I have extensively reviewed the risks, benefits and alternatives of therapy and progression of disease in spite of therapy with either local or systemic failure.  The option of definitive surgery has also been  reviewed as well as surveillance.  I have seen, examined and reviewed her medication list, appropriate labs and imaging studies as well as other physician notes. We discussed the goals/plans of care and answered all questions.     After consideration of the diagnostic data and evaluation of the patient, I have recommended to treat the right lateral chest wall with palliative radiation therapy, I anticipate a dose of 2766-0327 cGy over 10-13 fractions, final course pending completion of planning.    The patient and her family verbalize understanding of this discussion, voice no further questions and wish to proceed with recommendations. We will simulate treatment fields today to begin the treatment planning. Continue ongoing management per primary care physician and other specialists.    Thank you for allowing me to assist in this patients care.    Patient Instructions   1) Plan on 10-13 daily treatments.     Time Spent: I spent 58 minutes caring for Melly on this date of service. This time includes time spent by me in the following activities: preparing for the visit, reviewing tests, obtaining and/or reviewing a separately obtained history, performing a medically appropriate examination and/or evaluation, counseling and educating the patient/family/caregiver, ordering medications, tests, or procedures, referring and communicating with other health care professionals, documenting information in the medical record, independently interpreting results and communicating that information with the patient/family/caregiver, and care coordination.   Jeff Jerry III, MD   10/16/2023

## 2023-10-16 ENCOUNTER — CONSULT (OUTPATIENT)
Dept: RADIATION ONCOLOGY | Facility: HOSPITAL | Age: 68
End: 2023-10-16
Payer: MEDICARE

## 2023-10-16 ENCOUNTER — HOSPITAL ENCOUNTER (OUTPATIENT)
Dept: MRI IMAGING | Facility: HOSPITAL | Age: 68
Discharge: HOME | End: 2023-10-16
Admitting: INTERNAL MEDICINE
Payer: MEDICARE

## 2023-10-16 VITALS
WEIGHT: 200 LBS | SYSTOLIC BLOOD PRESSURE: 140 MMHG | DIASTOLIC BLOOD PRESSURE: 69 MMHG | BODY MASS INDEX: 34.15 KG/M2 | HEIGHT: 64 IN

## 2023-10-16 DIAGNOSIS — Z92.3 S/P RADIATION THERAPY 4-12 WKS AGO: ICD-10-CM

## 2023-10-16 DIAGNOSIS — C79.9 METASTATIC ADENOCARCINOMA: ICD-10-CM

## 2023-10-16 DIAGNOSIS — G89.3 CANCER RELATED PAIN: ICD-10-CM

## 2023-10-16 DIAGNOSIS — Z92.3 HISTORY OF RADIATION THERAPY: ICD-10-CM

## 2023-10-16 DIAGNOSIS — C34.11 MALIGNANT NEOPLASM OF UPPER LOBE OF RIGHT LUNG: Primary | ICD-10-CM

## 2023-10-16 DIAGNOSIS — Z87.891 FORMER SMOKER: ICD-10-CM

## 2023-10-16 LAB — CREAT BLDA-MCNC: 0.8 MG/DL (ref 0.6–1.3)

## 2023-10-16 PROCEDURE — A9577 INJ MULTIHANCE: HCPCS | Performed by: INTERNAL MEDICINE

## 2023-10-16 PROCEDURE — 0 GADOBENATE DIMEGLUMINE 529 MG/ML SOLUTION: Performed by: INTERNAL MEDICINE

## 2023-10-16 PROCEDURE — 77334 RADIATION TREATMENT AID(S): CPT | Performed by: RADIOLOGY

## 2023-10-16 PROCEDURE — 82565 ASSAY OF CREATININE: CPT

## 2023-10-16 PROCEDURE — G0463 HOSPITAL OUTPT CLINIC VISIT: HCPCS

## 2023-10-16 PROCEDURE — 70553 MRI BRAIN STEM W/O & W/DYE: CPT

## 2023-10-16 RX ORDER — OXYCODONE AND ACETAMINOPHEN 10; 325 MG/1; MG/1
1 TABLET ORAL EVERY 4 HOURS PRN
Qty: 60 TABLET | Refills: 0 | Status: SHIPPED | OUTPATIENT
Start: 2023-10-16

## 2023-10-16 RX ORDER — MORPHINE SULFATE 15 MG/1
15 TABLET, FILM COATED, EXTENDED RELEASE ORAL 2 TIMES DAILY
COMMUNITY

## 2023-10-16 RX ADMIN — GADOBENATE DIMEGLUMINE 18 ML: 529 INJECTION, SOLUTION INTRAVENOUS at 11:31

## 2023-10-17 ENCOUNTER — HOSPITAL ENCOUNTER (OUTPATIENT)
Dept: CT IMAGING | Facility: HOSPITAL | Age: 68
Discharge: HOME | End: 2023-10-17
Payer: MEDICARE

## 2023-10-17 DIAGNOSIS — Z92.3 HISTORY OF RADIATION THERAPY: ICD-10-CM

## 2023-10-17 DIAGNOSIS — C34.11 MALIGNANT NEOPLASM OF UPPER LOBE OF RIGHT LUNG: ICD-10-CM

## 2023-10-17 LAB — CREAT BLDA-MCNC: 0.8 MG/DL (ref 0.6–1.3)

## 2023-10-17 PROCEDURE — 25510000001 IOPAMIDOL 61 % SOLUTION

## 2023-10-17 PROCEDURE — 82565 ASSAY OF CREATININE: CPT

## 2023-10-17 PROCEDURE — 71260 CT THORAX DX C+: CPT

## 2023-10-17 RX ADMIN — IOPAMIDOL 100 ML: 612 INJECTION, SOLUTION INTRAVENOUS at 14:53

## 2023-10-23 LAB
CYTO UR: NORMAL
LAB AP CASE REPORT: NORMAL
Lab: NORMAL
Lab: NORMAL
PATH REPORT.FINAL DX SPEC: NORMAL
PATH REPORT.GROSS SPEC: NORMAL

## 2023-10-30 ENCOUNTER — APPOINTMENT (OUTPATIENT)
Dept: GENERAL RADIOLOGY | Facility: HOSPITAL | Age: 68
End: 2023-10-30
Payer: MEDICARE

## 2023-10-30 ENCOUNTER — HOSPITAL ENCOUNTER (EMERGENCY)
Facility: HOSPITAL | Age: 68
Discharge: HOME | End: 2023-10-30
Attending: EMERGENCY MEDICINE | Admitting: EMERGENCY MEDICINE
Payer: MEDICARE

## 2023-10-30 ENCOUNTER — APPOINTMENT (OUTPATIENT)
Dept: CT IMAGING | Facility: HOSPITAL | Age: 68
End: 2023-10-30
Payer: MEDICARE

## 2023-10-30 VITALS
WEIGHT: 200 LBS | HEIGHT: 64 IN | TEMPERATURE: 97.9 F | SYSTOLIC BLOOD PRESSURE: 121 MMHG | OXYGEN SATURATION: 93 % | BODY MASS INDEX: 34.15 KG/M2 | DIASTOLIC BLOOD PRESSURE: 67 MMHG | HEART RATE: 79 BPM | RESPIRATION RATE: 20 BRPM

## 2023-10-30 DIAGNOSIS — I26.94 MULTIPLE SUBSEGMENTAL PULMONARY EMBOLI WITHOUT ACUTE COR PULMONALE: Primary | ICD-10-CM

## 2023-10-30 DIAGNOSIS — C34.90 MALIGNANT NEOPLASM OF LUNG, UNSPECIFIED LATERALITY, UNSPECIFIED PART OF LUNG: ICD-10-CM

## 2023-10-30 DIAGNOSIS — J90 PLEURAL EFFUSION: ICD-10-CM

## 2023-10-30 DIAGNOSIS — R06.00 DYSPNEA, UNSPECIFIED TYPE: ICD-10-CM

## 2023-10-30 LAB
ALBUMIN SERPL-MCNC: 3.7 G/DL (ref 3.5–5.2)
ALBUMIN/GLOB SERPL: 1 G/DL
ALP SERPL-CCNC: 118 U/L (ref 39–117)
ALT SERPL W P-5'-P-CCNC: 22 U/L (ref 1–33)
ANION GAP SERPL CALCULATED.3IONS-SCNC: 9 MMOL/L (ref 5–15)
ARTERIAL PATENCY WRIST A: ABNORMAL
AST SERPL-CCNC: 21 U/L (ref 1–32)
ATMOSPHERIC PRESS: 756 MMHG
BASE EXCESS BLDA CALC-SCNC: -0.7 MMOL/L (ref 0–2)
BASOPHILS # BLD AUTO: 0.01 10*3/MM3 (ref 0–0.2)
BASOPHILS NFR BLD AUTO: 0.2 % (ref 0–1.5)
BDY SITE: ABNORMAL
BILIRUB SERPL-MCNC: 0.3 MG/DL (ref 0–1.2)
BODY TEMPERATURE: 37 C
BUN SERPL-MCNC: 15 MG/DL (ref 8–23)
BUN/CREAT SERPL: 20.5 (ref 7–25)
CALCIUM SPEC-SCNC: 9 MG/DL (ref 8.6–10.5)
CHLORIDE SERPL-SCNC: 99 MMOL/L (ref 98–107)
CO2 SERPL-SCNC: 24 MMOL/L (ref 22–29)
CREAT SERPL-MCNC: 0.73 MG/DL (ref 0.57–1)
D DIMER PPP FEU-MCNC: 4.86 MCGFEU/ML (ref 0–0.66)
D-LACTATE SERPL-SCNC: 0.7 MMOL/L (ref 0.5–2)
DEPRECATED RDW RBC AUTO: 49.4 FL (ref 37–54)
EGFRCR SERPLBLD CKD-EPI 2021: 90.8 ML/MIN/1.73
EOSINOPHIL # BLD AUTO: 0.21 10*3/MM3 (ref 0–0.4)
EOSINOPHIL NFR BLD AUTO: 4.3 % (ref 0.3–6.2)
ERYTHROCYTE [DISTWIDTH] IN BLOOD BY AUTOMATED COUNT: 16.1 % (ref 12.3–15.4)
GLOBULIN UR ELPH-MCNC: 3.8 GM/DL
GLUCOSE SERPL-MCNC: 108 MG/DL (ref 65–99)
HCO3 BLDA-SCNC: 22.9 MMOL/L (ref 20–26)
HCT VFR BLD AUTO: 30.2 % (ref 34–46.6)
HGB BLD-MCNC: 8.9 G/DL (ref 12–15.9)
IMM GRANULOCYTES # BLD AUTO: 0.01 10*3/MM3 (ref 0–0.05)
IMM GRANULOCYTES NFR BLD AUTO: 0.2 % (ref 0–0.5)
INR PPP: 1.24 (ref 0.91–1.09)
LYMPHOCYTES # BLD AUTO: 1.04 10*3/MM3 (ref 0.7–3.1)
LYMPHOCYTES NFR BLD AUTO: 21.4 % (ref 19.6–45.3)
Lab: ABNORMAL
MCH RBC QN AUTO: 24.8 PG (ref 26.6–33)
MCHC RBC AUTO-ENTMCNC: 29.5 G/DL (ref 31.5–35.7)
MCV RBC AUTO: 84.1 FL (ref 79–97)
MODALITY: ABNORMAL
MONOCYTES # BLD AUTO: 0.08 10*3/MM3 (ref 0.1–0.9)
MONOCYTES NFR BLD AUTO: 1.6 % (ref 5–12)
NEUTROPHILS NFR BLD AUTO: 3.52 10*3/MM3 (ref 1.7–7)
NEUTROPHILS NFR BLD AUTO: 72.3 % (ref 42.7–76)
NRBC BLD AUTO-RTO: 0 /100 WBC (ref 0–0.2)
NT-PROBNP SERPL-MCNC: 72.9 PG/ML (ref 0–900)
PCO2 BLDA: 32.6 MM HG (ref 35–45)
PCO2 TEMP ADJ BLD: 32.6 MM HG (ref 35–45)
PH BLDA: 7.45 PH UNITS (ref 7.35–7.45)
PH, TEMP CORRECTED: 7.45 PH UNITS (ref 7.35–7.45)
PLATELET # BLD AUTO: 274 10*3/MM3 (ref 140–450)
PMV BLD AUTO: 9.6 FL (ref 6–12)
PO2 BLDA: 80.5 MM HG (ref 83–108)
PO2 TEMP ADJ BLD: 80.5 MM HG (ref 83–108)
POTASSIUM SERPL-SCNC: 4 MMOL/L (ref 3.5–5.2)
PROCALCITONIN SERPL-MCNC: 0.88 NG/ML (ref 0–0.25)
PROT SERPL-MCNC: 7.5 G/DL (ref 6–8.5)
PROTHROMBIN TIME: 15.8 SECONDS (ref 11.8–14.8)
RBC # BLD AUTO: 3.59 10*6/MM3 (ref 3.77–5.28)
SAO2 % BLDCOA: 97.2 % (ref 94–99)
SODIUM SERPL-SCNC: 132 MMOL/L (ref 136–145)
TROPONIN T SERPL HS-MCNC: 8 NG/L
VENTILATOR MODE: ABNORMAL
WBC NRBC COR # BLD: 4.87 10*3/MM3 (ref 3.4–10.8)

## 2023-10-30 PROCEDURE — 25010000002 METOCLOPRAMIDE PER 10 MG: Performed by: FAMILY MEDICINE

## 2023-10-30 PROCEDURE — 96372 THER/PROPH/DIAG INJ SC/IM: CPT

## 2023-10-30 PROCEDURE — 25510000001 IOPAMIDOL PER 1 ML: Performed by: FAMILY MEDICINE

## 2023-10-30 PROCEDURE — 36600 WITHDRAWAL OF ARTERIAL BLOOD: CPT

## 2023-10-30 PROCEDURE — 71275 CT ANGIOGRAPHY CHEST: CPT

## 2023-10-30 PROCEDURE — 94799 UNLISTED PULMONARY SVC/PX: CPT

## 2023-10-30 PROCEDURE — 83880 ASSAY OF NATRIURETIC PEPTIDE: CPT | Performed by: EMERGENCY MEDICINE

## 2023-10-30 PROCEDURE — 25010000002 ENOXAPARIN PER 10 MG: Performed by: FAMILY MEDICINE

## 2023-10-30 PROCEDURE — 96365 THER/PROPH/DIAG IV INF INIT: CPT

## 2023-10-30 PROCEDURE — 84145 PROCALCITONIN (PCT): CPT | Performed by: EMERGENCY MEDICINE

## 2023-10-30 PROCEDURE — 96375 TX/PRO/DX INJ NEW DRUG ADDON: CPT

## 2023-10-30 PROCEDURE — 85379 FIBRIN DEGRADATION QUANT: CPT | Performed by: EMERGENCY MEDICINE

## 2023-10-30 PROCEDURE — 80053 COMPREHEN METABOLIC PANEL: CPT | Performed by: EMERGENCY MEDICINE

## 2023-10-30 PROCEDURE — 85025 COMPLETE CBC W/AUTO DIFF WBC: CPT | Performed by: EMERGENCY MEDICINE

## 2023-10-30 PROCEDURE — 84484 ASSAY OF TROPONIN QUANT: CPT | Performed by: EMERGENCY MEDICINE

## 2023-10-30 PROCEDURE — 93005 ELECTROCARDIOGRAM TRACING: CPT | Performed by: EMERGENCY MEDICINE

## 2023-10-30 PROCEDURE — 82803 BLOOD GASES ANY COMBINATION: CPT

## 2023-10-30 PROCEDURE — 71045 X-RAY EXAM CHEST 1 VIEW: CPT

## 2023-10-30 PROCEDURE — 25010000002 CEFTRIAXONE PER 250 MG: Performed by: EMERGENCY MEDICINE

## 2023-10-30 PROCEDURE — 93010 ELECTROCARDIOGRAM REPORT: CPT | Performed by: HOSPITALIST

## 2023-10-30 PROCEDURE — 99285 EMERGENCY DEPT VISIT HI MDM: CPT

## 2023-10-30 PROCEDURE — 87040 BLOOD CULTURE FOR BACTERIA: CPT | Performed by: EMERGENCY MEDICINE

## 2023-10-30 PROCEDURE — 83605 ASSAY OF LACTIC ACID: CPT | Performed by: EMERGENCY MEDICINE

## 2023-10-30 PROCEDURE — 85610 PROTHROMBIN TIME: CPT | Performed by: EMERGENCY MEDICINE

## 2023-10-30 PROCEDURE — 93005 ELECTROCARDIOGRAM TRACING: CPT

## 2023-10-30 PROCEDURE — 25010000002 MORPHINE PER 10 MG: Performed by: FAMILY MEDICINE

## 2023-10-30 PROCEDURE — 94640 AIRWAY INHALATION TREATMENT: CPT

## 2023-10-30 RX ORDER — OXYCODONE AND ACETAMINOPHEN 10; 325 MG/1; MG/1
1 TABLET ORAL ONCE
Status: COMPLETED | OUTPATIENT
Start: 2023-10-30 | End: 2023-10-30

## 2023-10-30 RX ORDER — METOCLOPRAMIDE HYDROCHLORIDE 5 MG/ML
10 INJECTION INTRAMUSCULAR; INTRAVENOUS ONCE
Status: COMPLETED | OUTPATIENT
Start: 2023-10-30 | End: 2023-10-30

## 2023-10-30 RX ORDER — ALBUTEROL SULFATE 2.5 MG/3ML
2.5 SOLUTION RESPIRATORY (INHALATION) ONCE
Status: COMPLETED | OUTPATIENT
Start: 2023-10-30 | End: 2023-10-30

## 2023-10-30 RX ORDER — ENOXAPARIN SODIUM 100 MG/ML
1 INJECTION SUBCUTANEOUS ONCE
Status: COMPLETED | OUTPATIENT
Start: 2023-10-30 | End: 2023-10-30

## 2023-10-30 RX ORDER — MORPHINE SULFATE 2 MG/ML
2 INJECTION, SOLUTION INTRAMUSCULAR; INTRAVENOUS ONCE
Status: COMPLETED | OUTPATIENT
Start: 2023-10-30 | End: 2023-10-30

## 2023-10-30 RX ORDER — SODIUM CHLORIDE 0.9 % (FLUSH) 0.9 %
10 SYRINGE (ML) INJECTION AS NEEDED
Status: DISCONTINUED | OUTPATIENT
Start: 2023-10-30 | End: 2023-10-30 | Stop reason: HOSPADM

## 2023-10-30 RX ORDER — IPRATROPIUM BROMIDE AND ALBUTEROL SULFATE 2.5; .5 MG/3ML; MG/3ML
3 SOLUTION RESPIRATORY (INHALATION) ONCE
Status: COMPLETED | OUTPATIENT
Start: 2023-10-30 | End: 2023-10-30

## 2023-10-30 RX ADMIN — MORPHINE SULFATE 2 MG: 2 INJECTION, SOLUTION INTRAMUSCULAR; INTRAVENOUS at 20:17

## 2023-10-30 RX ADMIN — ENOXAPARIN SODIUM 90 MG: 100 INJECTION SUBCUTANEOUS at 20:18

## 2023-10-30 RX ADMIN — OXYCODONE HYDROCHLORIDE AND ACETAMINOPHEN 1 TABLET: 10; 325 TABLET ORAL at 19:28

## 2023-10-30 RX ADMIN — IPRATROPIUM BROMIDE AND ALBUTEROL SULFATE 3 ML: .5; 3 SOLUTION RESPIRATORY (INHALATION) at 16:58

## 2023-10-30 RX ADMIN — ALBUTEROL SULFATE 2.5 MG: 2.5 SOLUTION RESPIRATORY (INHALATION) at 16:58

## 2023-10-30 RX ADMIN — METOCLOPRAMIDE HYDROCHLORIDE 10 MG: 5 INJECTION INTRAMUSCULAR; INTRAVENOUS at 20:17

## 2023-10-30 RX ADMIN — IOPAMIDOL 72 ML: 755 INJECTION, SOLUTION INTRAVENOUS at 18:14

## 2023-10-30 RX ADMIN — CEFTRIAXONE SODIUM 2000 MG: 2 INJECTION, POWDER, FOR SOLUTION INTRAMUSCULAR; INTRAVENOUS at 19:11

## 2023-10-30 NOTE — ED PROVIDER NOTES
Subjective   History of Present Illness  Patient is a 8-year-old or shortness of breath got history of non-small cell lung cancer    Shortness of Breath  Severity:  Moderate  Onset quality:  Gradual  Duration:  3 days  Timing:  Constant  Progression:  Worsening  Chronicity:  New  Context: not activity, not animal exposure, not emotional upset, not occupational exposure, not pollens, not strong odors and not URI    Relieved by:  Nothing  Worsened by:  Nothing  Ineffective treatments:  None tried  Associated symptoms: wheezing    Associated symptoms: no abdominal pain, no chest pain, no claudication, no cough, no diaphoresis, no ear pain, no fever, no headaches, no hemoptysis, no neck pain, no PND, no rash, no sore throat, no sputum production and no vomiting    Risk factors: hx of cancer and obesity    Risk factors: no recent alcohol use and no family hx of DVT        Review of Systems   Constitutional: Negative.  Negative for activity change, appetite change, chills, diaphoresis, fatigue and fever.   HENT:  Negative for congestion, drooling, ear pain, facial swelling, hearing loss, sinus pressure and sore throat.    Eyes: Negative.  Negative for discharge.   Respiratory:  Positive for shortness of breath and wheezing. Negative for cough, hemoptysis and sputum production.    Cardiovascular:  Negative for chest pain, claudication and PND.   Gastrointestinal:  Negative for abdominal distention, abdominal pain, blood in stool, diarrhea, nausea and vomiting.   Endocrine: Negative.  Negative for cold intolerance, heat intolerance, polydipsia, polyphagia and polyuria.   Genitourinary: Negative.  Negative for dysuria, flank pain and urgency.   Musculoskeletal: Negative.  Negative for arthralgias, back pain, myalgias, neck pain and neck stiffness.   Skin: Negative.  Negative for color change, pallor and rash.   Allergic/Immunologic: Negative.    Neurological: Negative.  Negative for dizziness, seizures, speech difficulty,  weakness, numbness and headaches.   Hematological: Negative.  Negative for adenopathy.   All other systems reviewed and are negative.      Past Medical History:   Diagnosis Date    Acid reflux     Anemia     Anxiety     Arthritis     Asthma     Mild COPD    Curtis esophagus     Bleeding disorder     Reflux acid overload    Bunion Aug    Repaired with surgery    Cancer 03/2023    Waiting on Biopsy to determine what kind and what stage NOW BONE CANCER    Chest pain     Chronic back pain     Chronic neck pain     Clotting disorder     Stomach bleeds from acid    COPD (chronic obstructive pulmonary disease)     Difficulty walking Dec 2022    After surgery wslking has become more and more difficult    DVT (deep venous thrombosis)     GI bleed     Hammer toe Aug 22    Repaired surgery    Hypothyroid     Hypothyroidism     Low back pain     Lung cancer     Migraine     PE (pulmonary thromboembolism)     Plantar fasciitis Years ago    Renal lesion 04/05/2023    Shin splints Years ago       Allergies   Allergen Reactions    Erythromycin Swelling and Anaphylaxis     Throat swells    Guaifenesin Anaphylaxis, Nausea And Vomiting and Swelling    Hydromorphone Hcl Anaphylaxis and Other (See Comments)     Other reaction(s): Shock and/or Unconsciousness  Note: Anaphylaxis    Ondansetron Hcl Other (See Comments)     Coded  Coded  Other reaction(s): Zofran  Note:  Allergic Reaction: Anaphylaxis    Orphenadrine Anaphylaxis    Codeine Rash    Meperidine Other (See Comments)     Makes her an angry person       Past Surgical History:   Procedure Laterality Date    BRONCHOSCOPY N/A 9/26/2023    Procedure: BRONCHOSCOPY,THORACOSCOPY, MULTIPLE PLEURAL BIOPSY, RIGHT LUNG LOWER LOBE WEDGE RESECTION, TALC PLEURODESIS;  Surgeon: Agusto Thompson MD;  Location: [facility];  Service: Cardiothoracic;  Laterality: N/A;    BUNIONECTOMY      CHOLECYSTECTOMY      COLONOSCOPY      CORRECTION HAMMER TOE      DILATATION AND CURETTAGE      FOOT FUSION  Left 2022    Procedure: 1-2 Arthrodesis, Tarsal Metatarsal Joint 2 and 3 Arthrodesis;  Surgeon: Bud Maradiaga DPM;  Location:  [facility];  Service: Podiatry;  Laterality: Left;    HAMMER TOE REPAIR Left 2022    Procedure: Hammertoe Repair 2-5,;  Surgeon: Bud Maradiaga DPM;  Location:  [facility];  Service: Podiatry;  Laterality: Left;    HERNIA REPAIR      HYSTERECTOMY      JOINT REPLACEMENT      OOPHORECTOMY      REDUCTION MAMMAPLASTY  2021    REPLACEMENT TOTAL KNEE      Right knee partial, left knee    THORACOSCOPY Right 2023    Procedure: THORACOSCOPY with WEDGE RESECTION;  Surgeon: Agusto Thompson MD;  Location:  [facility];  Service: Cardiothoracic;  Laterality: Right;       Family History   Problem Relation Age of Onset    Breast cancer Paternal Aunt     Heart disease Mother     Osteoporosis Mother     Thyroid disease Mother         Lukemia can’t remember which kind.    Cancer Mother         Pacemaker at 90 years old.    Cancer Father         Father’s entire family  from Cancer    Diabetes Father     Thyroid disease Father     Thyroid disease Maternal Aunt         Blood disorder    Thyroid disease Maternal Uncle     Thyroid disease Sister         Thyroid    Ovarian cancer Neg Hx     Uterine cancer Neg Hx     Colon cancer Neg Hx        Social History     Socioeconomic History    Marital status:    Tobacco Use    Smoking status: Former     Packs/day: 1.50     Years: 46.00     Additional pack years: 0.00     Total pack years: 69.00     Types: Cigarettes     Start date: 1972     Quit date: 2018     Years since quittin.5     Passive exposure: Past    Smokeless tobacco: Never    Tobacco comments:     Pt started at age 17, then quit at age 31. Pt restarted smoking at age 32-33 and quit smoking in 2018   Vaping Use    Vaping Use: Never used   Substance and Sexual Activity    Alcohol use: Not Currently     Alcohol/week: 1.0 standard drink of alcohol     Types: 1 Drinks  containing 0.5 oz of alcohol per week     Comment: A chocolate drink made with moonshine and creamer    Drug use: No    Sexual activity: Yes     Partners: Male     Birth control/protection: Surgical           Objective   Physical Exam  Vitals and nursing note reviewed. Exam conducted with a chaperone present.   Constitutional:       General: She is not in acute distress.     Appearance: Normal appearance. She is well-developed. She is not toxic-appearing or diaphoretic.   HENT:      Head: Normocephalic and atraumatic.      Right Ear: External ear normal.      Nose: Nose normal.      Mouth/Throat:      Mouth: Mucous membranes are moist.   Eyes:      Conjunctiva/sclera: Conjunctivae normal.      Pupils: Pupils are equal, round, and reactive to light.   Cardiovascular:      Rate and Rhythm: Normal rate and regular rhythm.      Chest Wall: PMI is not displaced.      Pulses: Normal pulses. No decreased pulses.      Heart sounds: Normal heart sounds. No murmur heard.  Pulmonary:      Effort: Pulmonary effort is normal. Tachypnea present. No accessory muscle usage or respiratory distress.      Breath sounds: Normal breath sounds. No stridor. Examination of the right-lower field reveals rhonchi. Examination of the left-lower field reveals rhonchi. No decreased breath sounds, wheezing, rhonchi or rales.   Chest:      Chest wall: No tenderness, crepitus or edema.   Abdominal:      General: Bowel sounds are normal. There is no distension.      Palpations: Abdomen is soft.      Tenderness: There is no abdominal tenderness.   Musculoskeletal:         General: No swelling or tenderness. Normal range of motion.      Cervical back: Normal range of motion and neck supple. No rigidity.      Right lower leg: No edema.      Left lower leg: No edema.      Comments: Lower extremity exam bilaterally is unremarkable.  There is no right or left calf tenderness .  There is no palpable venous cord.  No obvious difference in the size of the  legs.  No pitting edema.  The dorsalis pedis and posterior tibial femoral and popliteal pulses are palpable and +2 bilaterally.  Homans sign is negative   Skin:     General: Skin is warm.      Capillary Refill: Capillary refill takes less than 2 seconds.      Coloration: Skin is not jaundiced.      Findings: No erythema or rash.   Neurological:      General: No focal deficit present.      Mental Status: She is alert and oriented to person, place, and time. Mental status is at baseline.      Cranial Nerves: No cranial nerve deficit.      Motor: No weakness.      Coordination: Coordination normal.      Deep Tendon Reflexes: Reflexes are normal and symmetric. Reflexes normal.   Psychiatric:         Mood and Affect: Mood normal.         Procedures           ED Course  ED Course as of 10/30/23 1714   Mon Oct 30, 2023   1624 Well score 6.5 [TS]   1714 Chest x-ray findings hiatal hernia  The patient [TS]   1714 Case were discussed with the patient and case discussed Dr. Smith pending reports lab work-up and CT scans. [TS]      ED Course User Index  [TS] Jeremy Segura MD                                           Medical Decision Making  Differential Diagnosis:  I considered pulmonary etiology, asthma, chronic obstructive pulmonary disease, pneumonia, pulmonary embolism, adult respiratory distress syndrome, pneumothorax, pleural effusion, pulmonary fibrosis, cardiac etiology, congestive heart failure, myocardial infarction, metabolic etiology, diabetic ketoacidosis, uremia, acidosis, sepsis, anemia, drug related etiology, hyperventilation and CNS disease as a possible cause of dyspnea in this patient. This is a partial list of diagnoses considered.           Problems Addressed:  Dyspnea, unspecified type: complicated acute illness or injury     Details: Patient got history of pulmonary neoplasm came to the ER coming increasing shortness of breath cough and congestion we will get a CT of the chest rule out PE.  Malignant  neoplasm of lung, unspecified laterality, unspecified part of lung: complicated acute illness or injury  Pleural effusion: complicated acute illness or injury    Amount and/or Complexity of Data Reviewed  Labs: ordered.  Radiology: ordered.  ECG/medicine tests: ordered.    Risk  Prescription drug management.        Final diagnoses:   Malignant neoplasm of lung, unspecified laterality, unspecified part of lung   Pleural effusion   Dyspnea, unspecified type       ED Disposition  ED Disposition       None            No follow-up provider specified.       Medication List      No changes were made to your prescriptions during this visit.            Jeremy Segura MD  10/30/23 1623       Jeremy Segura MD  10/30/23 1649       Jeremy Segura MD  10/30/23 1711

## 2023-10-31 ENCOUNTER — TELEPHONE (OUTPATIENT)
Dept: CARDIAC SURGERY | Facility: CLINIC | Age: 68
End: 2023-10-31
Payer: MEDICARE

## 2023-10-31 NOTE — TELEPHONE ENCOUNTER
"Called pt to confirm her appt for 11/1/23 with Dr Thompson.  Pt states she will be at the appt but reports she was in the ER recently and now has \"blood clots\" and states Dr Thompson may want to review this.  Informed pt I would pass the message to him/nadia  "

## 2023-10-31 NOTE — ED PROVIDER NOTES
COURSE & MEDICAL DECISION MAKING     Assumed care of the patient from Dr. Segura awaiting the results of the patient's CT of the chest.    Patient CT scans of the chest shows pulmonary emboli.  Patient's oxygenation is 99% on room air.  Patient has has a history of bleeding in the stomach in the past however she is also been on blood thinners in the past for previous pulmonary emboli.  We will administer Lovenox.  Patient has an appointment with her oncologist and hematologist on Wednesday.  Advised the patient to call Dr. Delarosa her hematologist oncologist tomorrow to determine what medications she should be on for her pulmonary emboli.    Patient's level of risk: High        CRITICAL CARE    CRITICAL CARE: No    CRITICAL CARE TIME: None      Recent Results (from the past 24 hour(s))   ECG 12 Lead Dyspnea    Collection Time: 10/30/23  3:24 PM   Result Value Ref Range    QT Interval 338 ms    QTC Interval 404 ms   Comprehensive Metabolic Panel    Collection Time: 10/30/23  4:51 PM    Specimen: Blood   Result Value Ref Range    Glucose 108 (H) 65 - 99 mg/dL    BUN 15 8 - 23 mg/dL    Creatinine 0.73 0.57 - 1.00 mg/dL    Sodium 132 (L) 136 - 145 mmol/L    Potassium 4.0 3.5 - 5.2 mmol/L    Chloride 99 98 - 107 mmol/L    CO2 24.0 22.0 - 29.0 mmol/L    Calcium 9.0 8.6 - 10.5 mg/dL    Total Protein 7.5 6.0 - 8.5 g/dL    Albumin 3.7 3.5 - 5.2 g/dL    ALT (SGPT) 22 1 - 33 U/L    AST (SGOT) 21 1 - 32 U/L    Alkaline Phosphatase 118 (H) 39 - 117 U/L    Total Bilirubin 0.3 0.0 - 1.2 mg/dL    Globulin 3.8 gm/dL    A/G Ratio 1.0 g/dL    BUN/Creatinine Ratio 20.5 7.0 - 25.0    Anion Gap 9.0 5.0 - 15.0 mmol/L    eGFR 90.8 >60.0 mL/min/1.73   Protime-INR    Collection Time: 10/30/23  4:51 PM    Specimen: Blood   Result Value Ref Range    Protime 15.8 (H) 11.8 - 14.8 Seconds    INR 1.24 (H) 0.91 - 1.09   BNP    Collection Time: 10/30/23  4:51 PM    Specimen: Blood   Result Value Ref Range    proBNP 72.9 0.0 - 900.0 pg/mL   D-dimer,  Quantitative    Collection Time: 10/30/23  4:51 PM    Specimen: Blood   Result Value Ref Range    D-Dimer, Quantitative 4.86 (H) 0.00 - 0.66 MCGFEU/mL   Single High Sensitivity Troponin T    Collection Time: 10/30/23  4:51 PM    Specimen: Blood   Result Value Ref Range    HS Troponin T 8 <10 ng/L   CBC Auto Differential    Collection Time: 10/30/23  4:51 PM    Specimen: Blood   Result Value Ref Range    WBC 4.87 3.40 - 10.80 10*3/mm3    RBC 3.59 (L) 3.77 - 5.28 10*6/mm3    Hemoglobin 8.9 (L) 12.0 - 15.9 g/dL    Hematocrit 30.2 (L) 34.0 - 46.6 %    MCV 84.1 79.0 - 97.0 fL    MCH 24.8 (L) 26.6 - 33.0 pg    MCHC 29.5 (L) 31.5 - 35.7 g/dL    RDW 16.1 (H) 12.3 - 15.4 %    RDW-SD 49.4 37.0 - 54.0 fl    MPV 9.6 6.0 - 12.0 fL    Platelets 274 140 - 450 10*3/mm3    Neutrophil % 72.3 42.7 - 76.0 %    Lymphocyte % 21.4 19.6 - 45.3 %    Monocyte % 1.6 (L) 5.0 - 12.0 %    Eosinophil % 4.3 0.3 - 6.2 %    Basophil % 0.2 0.0 - 1.5 %    Immature Grans % 0.2 0.0 - 0.5 %    Neutrophils, Absolute 3.52 1.70 - 7.00 10*3/mm3    Lymphocytes, Absolute 1.04 0.70 - 3.10 10*3/mm3    Monocytes, Absolute 0.08 (L) 0.10 - 0.90 10*3/mm3    Eosinophils, Absolute 0.21 0.00 - 0.40 10*3/mm3    Basophils, Absolute 0.01 0.00 - 0.20 10*3/mm3    Immature Grans, Absolute 0.01 0.00 - 0.05 10*3/mm3    nRBC 0.0 0.0 - 0.2 /100 WBC   Lactic Acid, Plasma    Collection Time: 10/30/23  4:51 PM    Specimen: Blood   Result Value Ref Range    Lactate 0.7 0.5 - 2.0 mmol/L   Procalcitonin    Collection Time: 10/30/23  4:51 PM    Specimen: Blood   Result Value Ref Range    Procalcitonin 0.88 (H) 0.00 - 0.25 ng/mL   Blood Gas, Arterial -    Collection Time: 10/30/23  4:57 PM    Specimen: Arterial Blood   Result Value Ref Range    Site Left Brachial     Etienne's Test N/A     pH, Arterial 7.454 (H) 7.350 - 7.450 pH units    pCO2, Arterial 32.6 (L) 35.0 - 45.0 mm Hg    pO2, Arterial 80.5 (L) 83.0 - 108.0 mm Hg    HCO3, Arterial 22.9 20.0 - 26.0 mmol/L    Base Excess, Arterial  -0.7 (L) 0.0 - 2.0 mmol/L    O2 Saturation, Arterial 97.2 94.0 - 99.0 %    Temperature 37.0 C    Barometric Pressure for Blood Gas 756 mmHg    Modality Room Air     Ventilator Mode NA     Collected by [id number]     pCO2, Temperature Corrected 32.6 (L) 35 - 45 mm Hg    pH, Temp Corrected 7.454 (H) 7.350 - 7.450 pH Units    pO2, Temperature Corrected 80.5 (L) 83 - 108 mm Hg            The patient's last clinical visit to PCP was reviewed by me:       Old charts were reviewed per [facility] EMR.  Pertinent details are summarized above.  All laboratory, radiologic, and EKG studies that were performed in the Emergency Department were a necessary part of the evaluation needed to exclude unstable or  emergent medical conditions.     Patient was hemodynamically and neurologically stable in the ED.   Pertinent studies were reviewed as above.     The patient received:  Medications   sodium chloride 0.9 % flush 10 mL (has no administration in time range)   albuterol (PROVENTIL) nebulizer solution 0.083% 2.5 mg/3mL (2.5 mg Nebulization Given 10/30/23 1658)   ipratropium-albuterol (DUO-NEB) nebulizer solution 3 mL (3 mL Nebulization Given 10/30/23 1658)   cefTRIAXone (ROCEPHIN) 2,000 mg in sodium chloride 0.9 % 100 mL IVPB (2,000 mg Intravenous New Bag 10/30/23 1911)   iopamidol (ISOVUE-370) 76 % injection 100 mL (72 mL Intravenous Given 10/30/23 1814)   oxyCODONE-acetaminophen (PERCOCET)  MG per tablet 1 tablet (1 tablet Oral Given 10/30/23 1928)   Enoxaparin Sodium (LOVENOX) syringe 90 mg (90 mg Subcutaneous Given 10/30/23 2018)   Morphine sulfate (PF) injection 2 mg (2 mg Intravenous Given 10/30/23 2017)   metoclopramide (REGLAN) injection 10 mg (10 mg Intravenous Given 10/30/23 2017)       ED Course as of 10/30/23 2047   Mon Oct 30, 2023   1624 Well score 6.5 [TS]   1714 Chest x-ray findings hiatal hernia  The patient [TS]   1714 Case were discussed with the patient and case discussed Dr. Smith pending reports lab work-up and  CT scans. [TS]      ED Course User Index  [TS] Jeremy Segura MD       ED Disposition       None              Recent Results (from the past 24 hour(s))   ECG 12 Lead Dyspnea    Collection Time: 10/30/23  3:24 PM   Result Value Ref Range    QT Interval 338 ms    QTC Interval 404 ms   Comprehensive Metabolic Panel    Collection Time: 10/30/23  4:51 PM    Specimen: Blood   Result Value Ref Range    Glucose 108 (H) 65 - 99 mg/dL    BUN 15 8 - 23 mg/dL    Creatinine 0.73 0.57 - 1.00 mg/dL    Sodium 132 (L) 136 - 145 mmol/L    Potassium 4.0 3.5 - 5.2 mmol/L    Chloride 99 98 - 107 mmol/L    CO2 24.0 22.0 - 29.0 mmol/L    Calcium 9.0 8.6 - 10.5 mg/dL    Total Protein 7.5 6.0 - 8.5 g/dL    Albumin 3.7 3.5 - 5.2 g/dL    ALT (SGPT) 22 1 - 33 U/L    AST (SGOT) 21 1 - 32 U/L    Alkaline Phosphatase 118 (H) 39 - 117 U/L    Total Bilirubin 0.3 0.0 - 1.2 mg/dL    Globulin 3.8 gm/dL    A/G Ratio 1.0 g/dL    BUN/Creatinine Ratio 20.5 7.0 - 25.0    Anion Gap 9.0 5.0 - 15.0 mmol/L    eGFR 90.8 >60.0 mL/min/1.73   Protime-INR    Collection Time: 10/30/23  4:51 PM    Specimen: Blood   Result Value Ref Range    Protime 15.8 (H) 11.8 - 14.8 Seconds    INR 1.24 (H) 0.91 - 1.09   BNP    Collection Time: 10/30/23  4:51 PM    Specimen: Blood   Result Value Ref Range    proBNP 72.9 0.0 - 900.0 pg/mL   D-dimer, Quantitative    Collection Time: 10/30/23  4:51 PM    Specimen: Blood   Result Value Ref Range    D-Dimer, Quantitative 4.86 (H) 0.00 - 0.66 MCGFEU/mL   Single High Sensitivity Troponin T    Collection Time: 10/30/23  4:51 PM    Specimen: Blood   Result Value Ref Range    HS Troponin T 8 <10 ng/L   CBC Auto Differential    Collection Time: 10/30/23  4:51 PM    Specimen: Blood   Result Value Ref Range    WBC 4.87 3.40 - 10.80 10*3/mm3    RBC 3.59 (L) 3.77 - 5.28 10*6/mm3    Hemoglobin 8.9 (L) 12.0 - 15.9 g/dL    Hematocrit 30.2 (L) 34.0 - 46.6 %    MCV 84.1 79.0 - 97.0 fL    MCH 24.8 (L) 26.6 - 33.0 pg    MCHC 29.5 (L) 31.5 - 35.7 g/dL     RDW 16.1 (H) 12.3 - 15.4 %    RDW-SD 49.4 37.0 - 54.0 fl    MPV 9.6 6.0 - 12.0 fL    Platelets 274 140 - 450 10*3/mm3    Neutrophil % 72.3 42.7 - 76.0 %    Lymphocyte % 21.4 19.6 - 45.3 %    Monocyte % 1.6 (L) 5.0 - 12.0 %    Eosinophil % 4.3 0.3 - 6.2 %    Basophil % 0.2 0.0 - 1.5 %    Immature Grans % 0.2 0.0 - 0.5 %    Neutrophils, Absolute 3.52 1.70 - 7.00 10*3/mm3    Lymphocytes, Absolute 1.04 0.70 - 3.10 10*3/mm3    Monocytes, Absolute 0.08 (L) 0.10 - 0.90 10*3/mm3    Eosinophils, Absolute 0.21 0.00 - 0.40 10*3/mm3    Basophils, Absolute 0.01 0.00 - 0.20 10*3/mm3    Immature Grans, Absolute 0.01 0.00 - 0.05 10*3/mm3    nRBC 0.0 0.0 - 0.2 /100 WBC   Lactic Acid, Plasma    Collection Time: 10/30/23  4:51 PM    Specimen: Blood   Result Value Ref Range    Lactate 0.7 0.5 - 2.0 mmol/L   Procalcitonin    Collection Time: 10/30/23  4:51 PM    Specimen: Blood   Result Value Ref Range    Procalcitonin 0.88 (H) 0.00 - 0.25 ng/mL   Blood Gas, Arterial -    Collection Time: 10/30/23  4:57 PM    Specimen: Arterial Blood   Result Value Ref Range    Site Left Brachial     Etienne's Test N/A     pH, Arterial 7.454 (H) 7.350 - 7.450 pH units    pCO2, Arterial 32.6 (L) 35.0 - 45.0 mm Hg    pO2, Arterial 80.5 (L) 83.0 - 108.0 mm Hg    HCO3, Arterial 22.9 20.0 - 26.0 mmol/L    Base Excess, Arterial -0.7 (L) 0.0 - 2.0 mmol/L    O2 Saturation, Arterial 97.2 94.0 - 99.0 %    Temperature 37.0 C    Barometric Pressure for Blood Gas 756 mmHg    Modality Room Air     Ventilator Mode NA     Collected by [id number]     pCO2, Temperature Corrected 32.6 (L) 35 - 45 mm Hg    pH, Temp Corrected 7.454 (H) 7.350 - 7.450 pH Units    pO2, Temperature Corrected 80.5 (L) 83 - 108 mm Hg       The patient received:      Dragon disclaimer:  Part of this note may be an electronic transcription/translation of spoken language to printed text using the Dragon Dictation System.       I have reviewed the patient’s prescription history via a prescription  monitoring program.  This information is consistent with my knowledge of the patient’s controlled substance use history.    Patient evaluate during Coronavirus Pandemic. Isolation practices followed according to [facility] policy.    FINAL IMPRESSION   Diagnosis Plan   1. Malignant neoplasm of lung, unspecified laterality, unspecified part of lung        2. Pleural effusion        3. Dyspnea, unspecified type              MD Luis Munoz Jr, Thomas Mark Jr., MD  10/30/23 2050

## 2023-11-01 ENCOUNTER — HOSPITAL ENCOUNTER (OUTPATIENT)
Dept: GENERAL RADIOLOGY | Facility: HOSPITAL | Age: 68
Discharge: HOME | End: 2023-11-01
Admitting: NURSE PRACTITIONER
Payer: MEDICARE

## 2023-11-01 ENCOUNTER — HOSPITAL ENCOUNTER (OUTPATIENT)
Dept: RADIATION ONCOLOGY | Facility: HOSPITAL | Age: 68
Setting detail: RADIATION/ONCOLOGY SERIES
End: 2023-11-01
Payer: MEDICARE

## 2023-11-01 ENCOUNTER — OFFICE VISIT (OUTPATIENT)
Dept: CARDIAC SURGERY | Facility: CLINIC | Age: 68
End: 2023-11-01
Payer: MEDICARE

## 2023-11-01 VITALS
SYSTOLIC BLOOD PRESSURE: 123 MMHG | BODY MASS INDEX: 33.19 KG/M2 | WEIGHT: 194.4 LBS | OXYGEN SATURATION: 95 % | DIASTOLIC BLOOD PRESSURE: 80 MMHG | HEART RATE: 103 BPM | HEIGHT: 64 IN

## 2023-11-01 DIAGNOSIS — R91.8 MULTIPLE LUNG NODULES: ICD-10-CM

## 2023-11-01 DIAGNOSIS — C34.11 MALIGNANT NEOPLASM OF UPPER LOBE OF RIGHT LUNG: Primary | ICD-10-CM

## 2023-11-01 DIAGNOSIS — C34.11 MALIGNANT NEOPLASM OF UPPER LOBE OF RIGHT LUNG: ICD-10-CM

## 2023-11-01 LAB
QT INTERVAL: 338 MS
QTC INTERVAL: 404 MS

## 2023-11-01 PROCEDURE — 1160F RVW MEDS BY RX/DR IN RCRD: CPT | Performed by: THORACIC SURGERY (CARDIOTHORACIC VASCULAR SURGERY)

## 2023-11-01 PROCEDURE — 71046 X-RAY EXAM CHEST 2 VIEWS: CPT

## 2023-11-01 PROCEDURE — 1159F MED LIST DOCD IN RCRD: CPT | Performed by: THORACIC SURGERY (CARDIOTHORACIC VASCULAR SURGERY)

## 2023-11-01 PROCEDURE — 3079F DIAST BP 80-89 MM HG: CPT | Performed by: THORACIC SURGERY (CARDIOTHORACIC VASCULAR SURGERY)

## 2023-11-01 PROCEDURE — 3074F SYST BP LT 130 MM HG: CPT | Performed by: THORACIC SURGERY (CARDIOTHORACIC VASCULAR SURGERY)

## 2023-11-01 PROCEDURE — 99024 POSTOP FOLLOW-UP VISIT: CPT | Performed by: THORACIC SURGERY (CARDIOTHORACIC VASCULAR SURGERY)

## 2023-11-01 RX ORDER — ENOXAPARIN SODIUM 100 MG/ML
80 INJECTION SUBCUTANEOUS
COMMUNITY
Start: 2023-10-31 | End: 2023-12-01

## 2023-11-01 NOTE — PROGRESS NOTES
Subjective   Patient ID: Melly Cruz is a 68 y.o. female. The patient is here for follow-up having had bronchoscopy, right thoracoscopy, pleural biopsy multiple, wedge resection of the right lower lobe, entalic pleurodesis performed at [place] on 09/26/2023.      History of Present Illness  Ms. Melly Cruz underwent wedge resection and pleural biopsies for diagnosis. She was found to have metastatic lung cancer. Her postop course of care was fairly unremarkable and she met criteria for discharge home on postop day 2. She has been seen as an outpatient with Dr. Jerry who continues to treat her chest wall metastasis with radiation. No further intervention was advised at that time. She did get seen by our emergency department where the patient was found to have pulmonary emboli. She has been started on Lovenox 0.03 mg.    The patient is accompanied by an adult male.    - The patient states she is doing well.  - She reports having blood clots when her port was placed.  - She notes she was placed on Lovenox.  - She has had 1 chemotherapy treatment with Dr. Delarosa.  -The patient confirms Dr. Delarosa had recommended Keytruda.  - She will be receiving 4 to 6 chemotherapy treatments.  - She states if the chemotherapy goes good, they will do maintenance and if not, they will go ahead and do 6 chemotherapy treatments.  - The adult male notes the patient had a chest x-ray on 10/30/2023.  - She reports she had her first chemotherapy session last week and everything came out good.  - She denies feeling sick or nauseous.  - The patient states she had received iron infusions.  - She inquires if she can go hunting.  - She notes that she has been sore.   - The adult male states her oxygen levels have been good.  - The patient is not sure what her PD-L1 receptor was.  - She adds that she is needing to get back on radiation, but it was placed on hold due to the port.        The following portions of the patient's history were  "reviewed and updated as appropriate: allergies, current medications, past family history, past medical history, past social history, past surgical history and problem list.        Objective   Visit Vitals  /80 (BP Location: Right arm, Patient Position: Sitting, Cuff Size: Adult)   Pulse 103   Ht 162.6 cm (64\")   Wt 88.2 kg (194 lb 6.4 oz)   SpO2 95%   BMI 33.37 kg/m²       Physical Exam  No acute distress, appropriate.  Heart is regular rate and rhythm without murmurs, rubs, or gallops.  Pulmonary clear to auscultation bilaterally. No wheezing, rubs, or rales. Right chest side shows well healed thoracic incisions. No thoracic hernia.  Abdomen soft, nondistended, nontender, obese.  Extremity, no clubbing, cyanosis, no edema. No JVD on her neck exam.    XR Chest 2 View    Result Date: 11/1/2023  Narrative: EXAMINATION: XR CHEST 2 VW-  11/1/2023 2:10 PM CDT  HISTORY: Metastatic lung cancer; C34.11-Malignant neoplasm of upper lobe, right bronchus or lung  COMPARISON: 10/30/2023 1 view chest x-ray.  2 view chest x-ray.  Stable heart and mediastinum. Unchanged RIGHT chest wall port.  Persistent RIGHT basilar infiltrate and either fluid or pleural thickening.  There is underlying chronic interstitial disease and scattered granulomas though overall the lungs are better expanded and clear now as compared with 2 days ago.      Impression: 1. Patchy RIGHT basilar infiltrate or atelectasis. 2. Improved lung expansion and clearing compared with 2 days ago.  This report was signed and finalized on 11/1/2023 3:07 PM CDT by Dr. Jorge Willoughby MD.      CT Angiogram Chest    Result Date: 10/30/2023  Narrative: CT ANGIOGRAM CHEST- 10/30/2023 6:04 PM CDT  HISTORY: Pulmonary embolism (PE) suspected, unknown D-dimer; C34.90-Malignant neoplasm of unspecified part of unspecified bronchus or lung; S26-Eagiqqk effusion, not elsewhere classified; R06.00-Dyspnea, unspecified  COMPARISON: CT scan dated 10/17/2023  DOSE LENGTH PRODUCT: 492 " mGy cm. Automated exposure control was also utilized to decrease patient radiation dose.  TECHNIQUE: Helical tomographic images of the chest were obtained after the administration of intravenous contrast following angiogram protocol. Additionally, 3D and multiplanar reformatted images were provided.   FINDINGS:  Pulmonary arteries: There is adequate enhancement of the pulmonary arteries to evaluate for central and segmental pulmonary emboli. Pulmonary embolus identified in the right pulmonary artery extending out into the right middle and right lower lobe pulmonary arteries as well as the segmental and subsegmental pulmonary arteries. There is additional segmental and subsegmental pulmonary embolus identified in the left lower lobe. Main pulmonary artery is nondilated.  AORTA AND GREAT VESSELS: Thoracic aorta is normal in caliber. No dissection identified. No flow-limiting stenosis identified at the great vessel origins.  VISUALIZED NECK BASE: The imaged portion of the base of the neck appears unremarkable.  LUNGS: Underlying centrilobular emphysema. Multiple right-sided pulmonary nodules as well as evidence for a right-sided pleural metastatic disease. Extent of disease does not appear significantly changed when compared to the recent October 17th comparison exam. Lung volumes are stable. Airways are clear.  HEART: The heart is normal in size. There is no pericardial effusion. Mild coronary atheromatous calcification.  MEDIASTINUM AND LYMPH NODEs: There is a small hiatal hernia. Stable retrocrural laurie metastasis on the right.  SKELETAL AND SOFT TISSUES: Right chest wall [id number]. Surgical changes in the left breast. Thoracic spine degenerative change.  UPPER ABDOMEN: The imaged portion of the upper abdomen demonstrates no acute process.      Impression: 1.  Multifocal pulmonary embolus identified in the right middle, right lower and left lower lobes. Central pulmonary arteries are nondilated. No strong CT  evidence for right heart strain. 2.  Known pulmonary and pleural metastatic disease on the right, extent of disease not significantly changed from the recent October 17th exam. There is only a trace malignant pleural effusion on the right.   This report was signed and finalized on 10/30/2023 6:59 PM CDT by Dr Reed Mari.      XR Chest 1 View    Result Date: 10/30/2023  Narrative: EXAMINATION:  XR CHEST 1 VW-  10/30/2023 4:30 PM CDT  HISTORY: Shortness of air.  COMPARISON: 10/3/2023.  TECHNIQUE: Single view AP image.  FINDINGS: There is a small right pleural effusion with stable opacities in the right lung base. There has been wedge resection in this area. The inspiration is not very deep. There is vascular crowding. The left lung is essentially clear. The heart size is within normal limits. There is a moderate size hiatal hernia in the retrocardiac space. There are degenerative changes of the spine. There is a new port catheter on the right side.       Impression: 1. Prior wedge resection of the right lung base. Pleural and parenchymal opacities in the right lung base appear relatively stable compared to 10/3/2023. 2. Moderate size hiatal hernia hernia.    This report was signed and finalized on 10/30/2023 4:47 PM CDT by Dr. Brandon Fenton MD.      CT Chest With Contrast Diagnostic    Result Date: 10/17/2023  Narrative: CT CHEST W CONTRAST DIAGNOSTIC- 10/17/2023 2:38 PM CDT  HISTORY: Non-small cell lung cancer (NSCLC), non-metastatic, assess treatment response; C34.11-Malignant neoplasm of upper lobe, right bronchus or lung; Z92.3-Personal history of irradiation  COMPARISON: 8/27/2023  DOSE LENGTH PRODUCT: 492 mGy cm. Automated exposure control was also utilized to decrease patient radiation dose.  TECHNIQUE: Axial images of the chest are performed following IV contrast  FINDINGS: Increasing right anterior inferior mediastinal lymphadenopathy now measuring up to 1.3 cm in short axis. There is dramatic increase in  the diffuse right pleural thickening and nodularity, greatest within the mid and right lower hemithorax, worrisome for progressive metastatic pleural disease. Hyperdensities of the right pleura may indicate interval pleurodesis. Only very small volume right lobulated volume pleural fluid visualized. No left pleural effusion or nodularity visualized.  Fat necrosis again seen within the superior left breast. Right internal jugular port in place with tip in the SVC. No thoracic aortic aneurysm or dissection. Similar mild cardiomegaly. Small to moderate size hiatal hernia.  Images the upper abdomen demonstrate no suspicious adrenal nodules. Hepatic steatosis. Cholecystectomy clips.  Similar right apical lung scarring. Mild to moderate emphysema. Pleural nodularity increased along the right minor fissure now measuring up to 1.5 cm, increased from the previous 1 cm measurement. Pleural nodularity of the anterior right pleura adjacent the right middle lobe measuring 2.3 x 1.4 cm. Similar 5 mm subpleural left lower lobe pulmonary nodule image 117. No new left-sided pulmonary nodules identified. No pneumothorax. No discrete endobronchial lesion.  No focal aggressive regional bony lesions.      Impression: 1. Findings consistent with progressive metastatic right pleural disease with increasing size and number of right pleural nodules. Only very small volume right lobulated pleural fluid. Anterior inferior mediastinal lymphadenopathy.    This report was signed and finalized on 10/17/2023 3:33 PM CDT by Dr. Marisol Martino MD.      MRI Brain With & Without Contrast    Result Date: 10/16/2023  Narrative: EXAMINATION: MRI BRAIN W WO CONTRAST-  10/16/2023 11:00 AM CDT  HISTORY: G89.3; G89.3-Neoplasm related pain (acute) (chronic); Z92.3-Personal history of irradiation; C79.9-Secondary malignant neoplasm of unspecified site  COMPARISON: None.  Brain MRI without and with IV gadolinium contrast. Axial, sagittal, and coronal  sequences.  No acute infarct. FLAIR signal represents mild periventricular white matter and subcortical white matter small vessel disease.  Normal ventricle size. Mild cortical atrophy.  Essentially clear paranasal sinuses.  No brain hemorrhage or abnormal calcification.  Postcontrast imaging shows no abnormal brain enhancement. There is no brain mass. No sign of vasculitis.  The dural venous sinuses are patent.      Impression: 1. Mild atrophy and small vessel disease. 2. No acute intracranial abnormality is seen.  This report was signed and finalized on 10/16/2023 11:54 AM CDT by Dr. Jorge Willoughby MD.      XR Chest 2 View    Result Date: 10/3/2023  Narrative: EXAM: XR CHEST 2 VW- 10/3/2023 4:29 PM CDT  HISTORY: lung cancer with mets; C34.91-Malignant neoplasm of unspecified part of right bronchus or lung   COMPARISON: 9/28/2023.  TECHNIQUE: Frontal and lateral radiographs of the chest was obtained.  FINDINGS:  Support Devices: None.  Cardiac and Mediastinal Silhouettes: Unchanged  Lungs/Pleura: Similar-appearing right basilar pleural-parenchymal opacities. No visible pneumothorax.  Osseous structures: No acute osseous finding.  Other: None.      Impression:  Similar-appearing small right pleural effusion with overlying parenchymal opacities, likely atelectasis.   This report was signed and finalized on 10/3/2023 4:34 PM CDT by Tavares Miller.         Assessment & Plan       Diagnoses and all orders for this visit:    1. Malignant neoplasm of upper lobe of right lung (Primary)    2. Multiple lung nodules      Impression:  1. Metastatic lung cancer,   2. Stage IV.  3. Recent pulmonary emboli.    Medical decision making/Recommendations/Plan:  - Maintain skin for the option of aid in the care.  - Ms. Cruz overall, I think she is doing quite well.  - Plan, from my end, she has no further restrictions at this time.  - She has asked about hunting season.  - Certainly from a thoracoscopy point view, she has no further  restrictions.  - That being said, she does have a port on her right side and she is right handed, so the question will be about the stock going into her right shoulder.  - If that is okay to have the port in place, I asked her to contact Dr. Riley in that regard.  - If I can be of any further service in the future, please hesitate to contact me.  - Many thanks again, Dr. Delarosa for the opportunity    She is a non-smoker.        Transcribed from ambient dictation for Agusto Thompson MD by Danisha Antonio.  11/01/23   17:26 CDT    Patient or patient representative verbalized consent to the visit recording.  I have personally performed the services described in this document as transcribed by the above individual, and it is both accurate and complete.

## 2023-11-04 LAB
BACTERIA SPEC AEROBE CULT: NORMAL
BACTERIA SPEC AEROBE CULT: NORMAL

## 2023-11-07 PROCEDURE — 77334 RADIATION TREATMENT AID(S): CPT | Performed by: RADIOLOGY

## 2023-11-07 PROCEDURE — 77300 RADIATION THERAPY DOSE PLAN: CPT | Performed by: RADIOLOGY

## 2023-11-07 PROCEDURE — 77470 SPECIAL RADIATION TREATMENT: CPT | Performed by: RADIOLOGY

## 2023-11-07 PROCEDURE — 77295 3-D RADIOTHERAPY PLAN: CPT | Performed by: RADIOLOGY

## 2023-11-13 ENCOUNTER — HOSPITAL ENCOUNTER (OUTPATIENT)
Dept: RADIATION ONCOLOGY | Facility: HOSPITAL | Age: 68
Discharge: HOME | End: 2023-11-13

## 2023-11-13 ENCOUNTER — DOCUMENTATION (OUTPATIENT)
Dept: RADIATION ONCOLOGY | Facility: HOSPITAL | Age: 68
End: 2023-11-13
Payer: MEDICARE

## 2023-11-13 LAB
RAD ONC ARIA COURSE ID: NORMAL
RAD ONC ARIA COURSE LAST TREATMENT DATE: NORMAL
RAD ONC ARIA COURSE START DATE: NORMAL
RAD ONC ARIA COURSE TREATMENT ELAPSED DAYS: 0
RAD ONC ARIA FIRST TREATMENT DATE: NORMAL
RAD ONC ARIA PLAN FRACTIONS TREATED TO DATE: 1
RAD ONC ARIA PLAN ID: NORMAL
RAD ONC ARIA PLAN PRESCRIBED DOSE PER FRACTION: 2.5 GY
RAD ONC ARIA PLAN PRIMARY REFERENCE POINT: NORMAL
RAD ONC ARIA PLAN TOTAL FRACTIONS PRESCRIBED: 13
RAD ONC ARIA PLAN TOTAL PRESCRIBED DOSE: 3250 CGY
RAD ONC ARIA REFERENCE POINT DOSAGE GIVEN TO DATE: 2.5 GY
RAD ONC ARIA REFERENCE POINT ID: NORMAL
RAD ONC ARIA REFERENCE POINT SESSION DOSAGE GIVEN: 2.5 GY

## 2023-11-13 PROCEDURE — 77417 THER RADIOLOGY PORT IMAGE(S): CPT | Performed by: RADIOLOGY

## 2023-11-13 PROCEDURE — 77412 RADIATION TX DELIVERY LVL 3: CPT | Performed by: RADIOLOGY

## 2023-11-13 NOTE — PROGRESS NOTES
MINA met with Mrs. Cruz who is starting radiation treatment today. She is 68 years old and lives with her spouse. She has a strong support system which includes her spouse and friends. Currently, she does not have any transportation or financial concerns. Mrs. Cruz states she becomes short of breath easily and does not do karaoke as often as she did. She does see a counselor through [facility] she has been going there for about nine years. She sees her counselor every three months. MINA encouraged her to call if assistance is needed in the future.

## 2023-11-14 ENCOUNTER — HOSPITAL ENCOUNTER (OUTPATIENT)
Dept: RADIATION ONCOLOGY | Facility: HOSPITAL | Age: 68
Setting detail: RADIATION/ONCOLOGY SERIES
Discharge: HOME | End: 2023-11-14
Payer: MEDICARE

## 2023-11-14 PROCEDURE — 77412 RADIATION TX DELIVERY LVL 3: CPT | Performed by: RADIOLOGY

## 2023-11-15 ENCOUNTER — HOSPITAL ENCOUNTER (OUTPATIENT)
Dept: RADIATION ONCOLOGY | Facility: HOSPITAL | Age: 68
Setting detail: RADIATION/ONCOLOGY SERIES
Discharge: HOME | End: 2023-11-15
Payer: MEDICARE

## 2023-11-15 PROCEDURE — 77336 RADIATION PHYSICS CONSULT: CPT | Performed by: RADIOLOGY

## 2023-11-15 PROCEDURE — 77412 RADIATION TX DELIVERY LVL 3: CPT | Performed by: RADIOLOGY

## 2023-11-16 ENCOUNTER — HOSPITAL ENCOUNTER (OUTPATIENT)
Dept: RADIATION ONCOLOGY | Facility: HOSPITAL | Age: 68
Setting detail: RADIATION/ONCOLOGY SERIES
Discharge: HOME | End: 2023-11-16
Payer: MEDICARE

## 2023-11-16 LAB
RAD ONC ARIA COURSE ID: NORMAL
RAD ONC ARIA COURSE LAST TREATMENT DATE: NORMAL
RAD ONC ARIA COURSE START DATE: NORMAL
RAD ONC ARIA COURSE TREATMENT ELAPSED DAYS: 3
RAD ONC ARIA FIRST TREATMENT DATE: NORMAL
RAD ONC ARIA PLAN FRACTIONS TREATED TO DATE: 4
RAD ONC ARIA PLAN ID: NORMAL
RAD ONC ARIA PLAN PRESCRIBED DOSE PER FRACTION: 2.5 GY
RAD ONC ARIA PLAN PRIMARY REFERENCE POINT: NORMAL
RAD ONC ARIA PLAN TOTAL FRACTIONS PRESCRIBED: 13
RAD ONC ARIA PLAN TOTAL PRESCRIBED DOSE: 3250 CGY
RAD ONC ARIA REFERENCE POINT DOSAGE GIVEN TO DATE: 10 GY
RAD ONC ARIA REFERENCE POINT ID: NORMAL
RAD ONC ARIA REFERENCE POINT SESSION DOSAGE GIVEN: 2.5 GY

## 2023-11-16 PROCEDURE — 77412 RADIATION TX DELIVERY LVL 3: CPT | Performed by: RADIOLOGY

## 2023-11-17 ENCOUNTER — HOSPITAL ENCOUNTER (OUTPATIENT)
Dept: RADIATION ONCOLOGY | Facility: HOSPITAL | Age: 68
Setting detail: RADIATION/ONCOLOGY SERIES
Discharge: HOME | End: 2023-11-17
Payer: MEDICARE

## 2023-11-17 LAB
RAD ONC ARIA COURSE ID: NORMAL
RAD ONC ARIA COURSE LAST TREATMENT DATE: NORMAL
RAD ONC ARIA COURSE START DATE: NORMAL
RAD ONC ARIA COURSE TREATMENT ELAPSED DAYS: 4
RAD ONC ARIA FIRST TREATMENT DATE: NORMAL
RAD ONC ARIA PLAN FRACTIONS TREATED TO DATE: 5
RAD ONC ARIA PLAN ID: NORMAL
RAD ONC ARIA PLAN PRESCRIBED DOSE PER FRACTION: 2.5 GY
RAD ONC ARIA PLAN PRIMARY REFERENCE POINT: NORMAL
RAD ONC ARIA PLAN TOTAL FRACTIONS PRESCRIBED: 13
RAD ONC ARIA PLAN TOTAL PRESCRIBED DOSE: 3250 CGY
RAD ONC ARIA REFERENCE POINT DOSAGE GIVEN TO DATE: 12.5 GY
RAD ONC ARIA REFERENCE POINT ID: NORMAL
RAD ONC ARIA REFERENCE POINT SESSION DOSAGE GIVEN: 2.5 GY

## 2023-11-17 PROCEDURE — 77412 RADIATION TX DELIVERY LVL 3: CPT | Performed by: RADIOLOGY

## 2023-11-20 ENCOUNTER — HOSPITAL ENCOUNTER (OUTPATIENT)
Dept: RADIATION ONCOLOGY | Facility: HOSPITAL | Age: 68
Discharge: HOME | End: 2023-11-20
Payer: MEDICARE

## 2023-11-20 LAB
RAD ONC ARIA COURSE ID: NORMAL
RAD ONC ARIA COURSE LAST TREATMENT DATE: NORMAL
RAD ONC ARIA COURSE START DATE: NORMAL
RAD ONC ARIA COURSE TREATMENT ELAPSED DAYS: 7
RAD ONC ARIA FIRST TREATMENT DATE: NORMAL
RAD ONC ARIA PLAN FRACTIONS TREATED TO DATE: 6
RAD ONC ARIA PLAN ID: NORMAL
RAD ONC ARIA PLAN PRESCRIBED DOSE PER FRACTION: 2.5 GY
RAD ONC ARIA PLAN PRIMARY REFERENCE POINT: NORMAL
RAD ONC ARIA PLAN TOTAL FRACTIONS PRESCRIBED: 13
RAD ONC ARIA PLAN TOTAL PRESCRIBED DOSE: 3250 CGY
RAD ONC ARIA REFERENCE POINT DOSAGE GIVEN TO DATE: 15 GY
RAD ONC ARIA REFERENCE POINT ID: NORMAL
RAD ONC ARIA REFERENCE POINT SESSION DOSAGE GIVEN: 2.5 GY

## 2023-11-20 PROCEDURE — 77412 RADIATION TX DELIVERY LVL 3: CPT | Performed by: RADIOLOGY

## 2023-11-20 PROCEDURE — 77417 THER RADIOLOGY PORT IMAGE(S): CPT | Performed by: RADIOLOGY

## 2023-11-21 ENCOUNTER — HOSPITAL ENCOUNTER (OUTPATIENT)
Dept: RADIATION ONCOLOGY | Facility: HOSPITAL | Age: 68
Setting detail: RADIATION/ONCOLOGY SERIES
Discharge: HOME | End: 2023-11-21
Payer: MEDICARE

## 2023-11-21 LAB
RAD ONC ARIA COURSE ID: NORMAL
RAD ONC ARIA COURSE LAST TREATMENT DATE: NORMAL
RAD ONC ARIA COURSE START DATE: NORMAL
RAD ONC ARIA COURSE TREATMENT ELAPSED DAYS: 8
RAD ONC ARIA FIRST TREATMENT DATE: NORMAL
RAD ONC ARIA PLAN FRACTIONS TREATED TO DATE: 7
RAD ONC ARIA PLAN ID: NORMAL
RAD ONC ARIA PLAN PRESCRIBED DOSE PER FRACTION: 2.5 GY
RAD ONC ARIA PLAN PRIMARY REFERENCE POINT: NORMAL
RAD ONC ARIA PLAN TOTAL FRACTIONS PRESCRIBED: 13
RAD ONC ARIA PLAN TOTAL PRESCRIBED DOSE: 3250 CGY
RAD ONC ARIA REFERENCE POINT DOSAGE GIVEN TO DATE: 17.5 GY
RAD ONC ARIA REFERENCE POINT ID: NORMAL
RAD ONC ARIA REFERENCE POINT SESSION DOSAGE GIVEN: 2.5 GY

## 2023-11-21 PROCEDURE — 77412 RADIATION TX DELIVERY LVL 3: CPT | Performed by: RADIOLOGY

## 2023-11-22 ENCOUNTER — HOSPITAL ENCOUNTER (OUTPATIENT)
Dept: RADIATION ONCOLOGY | Facility: HOSPITAL | Age: 68
Setting detail: RADIATION/ONCOLOGY SERIES
Discharge: HOME | End: 2023-11-22
Payer: MEDICARE

## 2023-11-22 LAB
RAD ONC ARIA COURSE ID: NORMAL
RAD ONC ARIA COURSE LAST TREATMENT DATE: NORMAL
RAD ONC ARIA COURSE START DATE: NORMAL
RAD ONC ARIA COURSE TREATMENT ELAPSED DAYS: 9
RAD ONC ARIA FIRST TREATMENT DATE: NORMAL
RAD ONC ARIA PLAN FRACTIONS TREATED TO DATE: 8
RAD ONC ARIA PLAN ID: NORMAL
RAD ONC ARIA PLAN PRESCRIBED DOSE PER FRACTION: 2.5 GY
RAD ONC ARIA PLAN PRIMARY REFERENCE POINT: NORMAL
RAD ONC ARIA PLAN TOTAL FRACTIONS PRESCRIBED: 13
RAD ONC ARIA PLAN TOTAL PRESCRIBED DOSE: 3250 CGY
RAD ONC ARIA REFERENCE POINT DOSAGE GIVEN TO DATE: 20 GY
RAD ONC ARIA REFERENCE POINT ID: NORMAL
RAD ONC ARIA REFERENCE POINT SESSION DOSAGE GIVEN: 2.5 GY

## 2023-11-22 PROCEDURE — 77412 RADIATION TX DELIVERY LVL 3: CPT | Performed by: RADIOLOGY

## 2023-11-22 PROCEDURE — 77336 RADIATION PHYSICS CONSULT: CPT | Performed by: RADIOLOGY

## 2023-11-24 ENCOUNTER — HOSPITAL ENCOUNTER (EMERGENCY)
Facility: HOSPITAL | Age: 68
Discharge: HOME | End: 2023-11-24
Attending: EMERGENCY MEDICINE
Payer: MEDICARE

## 2023-11-24 ENCOUNTER — APPOINTMENT (OUTPATIENT)
Dept: CT IMAGING | Facility: HOSPITAL | Age: 68
End: 2023-11-24
Payer: MEDICARE

## 2023-11-24 VITALS
WEIGHT: 192 LBS | HEIGHT: 64 IN | SYSTOLIC BLOOD PRESSURE: 120 MMHG | RESPIRATION RATE: 20 BRPM | OXYGEN SATURATION: 98 % | DIASTOLIC BLOOD PRESSURE: 76 MMHG | HEART RATE: 78 BPM | TEMPERATURE: 98.8 F | BODY MASS INDEX: 32.78 KG/M2

## 2023-11-24 DIAGNOSIS — K44.9 HIATAL HERNIA: ICD-10-CM

## 2023-11-24 DIAGNOSIS — R10.9 ABDOMINAL PAIN, UNSPECIFIED ABDOMINAL LOCATION: Primary | ICD-10-CM

## 2023-11-24 DIAGNOSIS — R11.0 NAUSEA: ICD-10-CM

## 2023-11-24 LAB
ALBUMIN SERPL-MCNC: 3.8 G/DL (ref 3.5–5.2)
ALBUMIN/GLOB SERPL: 1.2 G/DL
ALP SERPL-CCNC: 86 U/L (ref 39–117)
ALT SERPL W P-5'-P-CCNC: 24 U/L (ref 1–33)
ANION GAP SERPL CALCULATED.3IONS-SCNC: 10 MMOL/L (ref 5–15)
AST SERPL-CCNC: 25 U/L (ref 1–32)
BACTERIA UR QL AUTO: ABNORMAL /HPF
BASOPHILS # BLD AUTO: 0 10*3/MM3 (ref 0–0.2)
BASOPHILS NFR BLD AUTO: 0 % (ref 0–1.5)
BILIRUB SERPL-MCNC: 0.4 MG/DL (ref 0–1.2)
BILIRUB UR QL STRIP: NEGATIVE
BUN SERPL-MCNC: 18 MG/DL (ref 8–23)
BUN/CREAT SERPL: 27.3 (ref 7–25)
CALCIUM SPEC-SCNC: 9.5 MG/DL (ref 8.6–10.5)
CHLORIDE SERPL-SCNC: 100 MMOL/L (ref 98–107)
CLARITY UR: CLEAR
CO2 SERPL-SCNC: 25 MMOL/L (ref 22–29)
COLOR UR: YELLOW
CREAT SERPL-MCNC: 0.66 MG/DL (ref 0.57–1)
DEPRECATED RDW RBC AUTO: 64.8 FL (ref 37–54)
EGFRCR SERPLBLD CKD-EPI 2021: 96.9 ML/MIN/1.73
EOSINOPHIL # BLD AUTO: 0.04 10*3/MM3 (ref 0–0.4)
EOSINOPHIL NFR BLD AUTO: 0.4 % (ref 0.3–6.2)
ERYTHROCYTE [DISTWIDTH] IN BLOOD BY AUTOMATED COUNT: 20.9 % (ref 12.3–15.4)
GLOBULIN UR ELPH-MCNC: 3.2 GM/DL
GLUCOSE SERPL-MCNC: 91 MG/DL (ref 65–99)
GLUCOSE UR STRIP-MCNC: NEGATIVE MG/DL
HCT VFR BLD AUTO: 33.9 % (ref 34–46.6)
HGB BLD-MCNC: 9.9 G/DL (ref 12–15.9)
HGB UR QL STRIP.AUTO: NEGATIVE
HYALINE CASTS UR QL AUTO: ABNORMAL /LPF
IMM GRANULOCYTES # BLD AUTO: 0.02 10*3/MM3 (ref 0–0.05)
IMM GRANULOCYTES NFR BLD AUTO: 0.2 % (ref 0–0.5)
KETONES UR QL STRIP: NEGATIVE
LEUKOCYTE ESTERASE UR QL STRIP.AUTO: ABNORMAL
LIPASE SERPL-CCNC: 37 U/L (ref 13–60)
LYMPHOCYTES # BLD AUTO: 1.19 10*3/MM3 (ref 0.7–3.1)
LYMPHOCYTES NFR BLD AUTO: 12.3 % (ref 19.6–45.3)
MCH RBC QN AUTO: 25.5 PG (ref 26.6–33)
MCHC RBC AUTO-ENTMCNC: 29.2 G/DL (ref 31.5–35.7)
MCV RBC AUTO: 87.4 FL (ref 79–97)
MONOCYTES # BLD AUTO: 0.08 10*3/MM3 (ref 0.1–0.9)
MONOCYTES NFR BLD AUTO: 0.8 % (ref 5–12)
NEUTROPHILS NFR BLD AUTO: 8.33 10*3/MM3 (ref 1.7–7)
NEUTROPHILS NFR BLD AUTO: 86.3 % (ref 42.7–76)
NITRITE UR QL STRIP: NEGATIVE
NRBC BLD AUTO-RTO: 0 /100 WBC (ref 0–0.2)
PH UR STRIP.AUTO: 5.5 [PH] (ref 5–8)
PLATELET # BLD AUTO: 300 10*3/MM3 (ref 140–450)
PMV BLD AUTO: 9.5 FL (ref 6–12)
POTASSIUM SERPL-SCNC: 4.1 MMOL/L (ref 3.5–5.2)
PROT SERPL-MCNC: 7 G/DL (ref 6–8.5)
PROT UR QL STRIP: NEGATIVE
RBC # BLD AUTO: 3.88 10*6/MM3 (ref 3.77–5.28)
RBC # UR STRIP: ABNORMAL /HPF
REF LAB TEST METHOD: ABNORMAL
SODIUM SERPL-SCNC: 135 MMOL/L (ref 136–145)
SP GR UR STRIP: >1.03 (ref 1–1.03)
SQUAMOUS #/AREA URNS HPF: ABNORMAL /HPF
UROBILINOGEN UR QL STRIP: ABNORMAL
WBC # UR STRIP: ABNORMAL /HPF
WBC NRBC COR # BLD AUTO: 9.66 10*3/MM3 (ref 3.4–10.8)

## 2023-11-24 PROCEDURE — 80053 COMPREHEN METABOLIC PANEL: CPT | Performed by: EMERGENCY MEDICINE

## 2023-11-24 PROCEDURE — 85025 COMPLETE CBC W/AUTO DIFF WBC: CPT | Performed by: EMERGENCY MEDICINE

## 2023-11-24 PROCEDURE — 36415 COLL VENOUS BLD VENIPUNCTURE: CPT

## 2023-11-24 PROCEDURE — 74177 CT ABD & PELVIS W/CONTRAST: CPT

## 2023-11-24 PROCEDURE — 99285 EMERGENCY DEPT VISIT HI MDM: CPT

## 2023-11-24 PROCEDURE — 81001 URINALYSIS AUTO W/SCOPE: CPT | Performed by: EMERGENCY MEDICINE

## 2023-11-24 PROCEDURE — 0 DIATRIZOATE MEGLUMINE & SODIUM PER 1 ML: Performed by: EMERGENCY MEDICINE

## 2023-11-24 PROCEDURE — 83690 ASSAY OF LIPASE: CPT | Performed by: EMERGENCY MEDICINE

## 2023-11-24 PROCEDURE — 71250 CT THORAX DX C-: CPT

## 2023-11-24 PROCEDURE — 25510000001 IOPAMIDOL 61 % SOLUTION: Performed by: EMERGENCY MEDICINE

## 2023-11-24 RX ORDER — SODIUM CHLORIDE 0.9 % (FLUSH) 0.9 %
10 SYRINGE (ML) INJECTION AS NEEDED
Status: DISCONTINUED | OUTPATIENT
Start: 2023-11-24 | End: 2023-11-24 | Stop reason: HOSPADM

## 2023-11-24 RX ORDER — PROMETHAZINE HYDROCHLORIDE 25 MG/1
25 TABLET ORAL EVERY 6 HOURS PRN
Qty: 20 TABLET | Refills: 0 | Status: SHIPPED | OUTPATIENT
Start: 2023-11-24

## 2023-11-24 RX ORDER — CIPROFLOXACIN 500 MG/1
500 TABLET, FILM COATED ORAL 2 TIMES DAILY
Qty: 6 TABLET | Refills: 0 | Status: SHIPPED | OUTPATIENT
Start: 2023-11-24 | End: 2023-11-27

## 2023-11-24 RX ADMIN — IOPAMIDOL 100 ML: 612 INJECTION, SOLUTION INTRAVENOUS at 15:41

## 2023-11-24 RX ADMIN — DIATRIZOATE MEGLUMINE AND DIATRIZOATE SODIUM 45 ML: 660; 100 LIQUID ORAL; RECTAL at 15:42

## 2023-11-24 NOTE — ED PROVIDER NOTES
Subjective   History of Present Illness  Patient is a 68-year-old who came the ER complaining of abdominal pain and discomfort she is on Lovenox also having some difficulty swallowing but no drooling.  History of cancer Lovenox because of PEs    Nausea  The primary symptoms include abdominal pain and nausea. Primary symptoms do not include fever, weight loss, fatigue, vomiting, diarrhea, jaundice, hematochezia or rash. The illness began 2 days ago.   The illness is also significant for chills and dysphagia. The illness does not include anorexia, odynophagia, bloating, constipation, tenesmus or back pain. Associated medical issues do not include GERD, liver disease, bowel resection, irritable bowel syndrome or hemorrhoids.   Chills  Associated symptoms: abdominal pain and nausea    Associated symptoms: no diarrhea, no fatigue, no fever, no rash and no vomiting    Abdominal Pain  Associated symptoms: chills and nausea    Associated symptoms: no anorexia, no constipation, no diarrhea, no fatigue, no fever, no hematochezia and no vomiting        Review of Systems   Constitutional: Negative.  Positive for chills. Negative for fatigue, fever and weight loss.   HENT: Negative.     Eyes: Negative.    Respiratory: Negative.     Cardiovascular: Negative.    Gastrointestinal:  Positive for abdominal pain, dysphagia and nausea. Negative for anorexia, bloating, constipation, diarrhea, hematochezia, jaundice and vomiting.   Endocrine: Negative.    Genitourinary: Negative.    Musculoskeletal:  Negative for back pain.   Skin: Negative.  Negative for rash.   Neurological: Negative.    Hematological: Negative.    All other systems reviewed and are negative.      Past Medical History:   Diagnosis Date    Acid reflux     Anemia     Anxiety     Arthritis     Asthma     Mild COPD    Curtis esophagus     Bleeding disorder     Reflux acid overload    Bunion Aug    Repaired with surgery    Cancer 03/2023    Waiting on Biopsy to determine  what kind and what stage NOW BONE CANCER    Chest pain     Chronic back pain     Chronic neck pain     Clotting disorder     Stomach bleeds from acid    COPD (chronic obstructive pulmonary disease)     Difficulty walking Dec 2022    After surgery wslking has become more and more difficult    DVT (deep venous thrombosis)     GI bleed     Hammer toe Aug 22    Repaired surgery    HTN (hypertension) 12/19/2018    Hypothyroid     Hypothyroidism     Low back pain     Lung cancer     Migraine     Nausea and vomiting 04/05/2023    PE (pulmonary thromboembolism)     Plantar fasciitis Years ago    Renal lesion 04/05/2023    Shin splints Years ago       Allergies   Allergen Reactions    Erythromycin Swelling and Anaphylaxis     Throat swells    Guaifenesin Anaphylaxis, Nausea And Vomiting and Swelling    Hydromorphone Hcl Anaphylaxis and Other (See Comments)     Other reaction(s): Shock and/or Unconsciousness  Note: Anaphylaxis    Ondansetron Hcl Other (See Comments)     Coded  Coded  Other reaction(s): Zofran  Note:  Allergic Reaction: Anaphylaxis    Orphenadrine Anaphylaxis    Codeine Rash    Meperidine Other (See Comments)     Makes her an angry person       Past Surgical History:   Procedure Laterality Date    BRONCHOSCOPY N/A 9/26/2023    Procedure: BRONCHOSCOPY,THORACOSCOPY, MULTIPLE PLEURAL BIOPSY, RIGHT LUNG LOWER LOBE WEDGE RESECTION, TALC PLEURODESIS;  Surgeon: Agusto Thompson MD;  Location:  [facility];  Service: Cardiothoracic;  Laterality: N/A;    BUNIONECTOMY      CHOLECYSTECTOMY      COLONOSCOPY      CORRECTION HAMMER TOE      DILATATION AND CURETTAGE      FOOT FUSION Left 04/20/2022    Procedure: 1-2 Arthrodesis, Tarsal Metatarsal Joint 2 and 3 Arthrodesis;  Surgeon: Bud Maradiaga DPM;  Location:  [facility];  Service: Podiatry;  Laterality: Left;    HAMMER TOE REPAIR Left 04/20/2022    Procedure: Hammertoe Repair 2-5,;  Surgeon: Bud Maradiaga DPM;  Location:  [facility];  Service: Podiatry;  Laterality:  Left;    HERNIA REPAIR      HYSTERECTOMY      JOINT REPLACEMENT      OOPHORECTOMY      REDUCTION MAMMAPLASTY  2021    REPLACEMENT TOTAL KNEE      Right knee partial, left knee    THORACOSCOPY Right 2023    Procedure: THORACOSCOPY with WEDGE RESECTION;  Surgeon: Agusto Thompson MD;  Location: [facility];  Service: Cardiothoracic;  Laterality: Right;       Family History   Problem Relation Age of Onset    Breast cancer Paternal Aunt     Heart disease Mother     Osteoporosis Mother     Thyroid disease Mother         Lukemia can’t remember which kind.    Cancer Mother         Pacemaker at 90 years old.    Cancer Father         Father’s entire family  from Cancer    Diabetes Father     Thyroid disease Father     Thyroid disease Maternal Aunt         Blood disorder    Thyroid disease Maternal Uncle     Thyroid disease Sister         Thyroid    Ovarian cancer Neg Hx     Uterine cancer Neg Hx     Colon cancer Neg Hx        Social History     Socioeconomic History    Marital status:    Tobacco Use    Smoking status: Former     Packs/day: 1.50     Years: 46.00     Additional pack years: 0.00     Total pack years: 69.00     Types: Cigarettes     Start date: 1972     Quit date: 2018     Years since quittin.6     Passive exposure: Past    Smokeless tobacco: Never    Tobacco comments:     Pt started at age 17, then quit at age 31. Pt restarted smoking at age 32-33 and quit smoking in 2018   Vaping Use    Vaping Use: Never used   Substance and Sexual Activity    Alcohol use: Not Currently     Alcohol/week: 1.0 standard drink of alcohol     Types: 1 Drinks containing 0.5 oz of alcohol per week     Comment: A chocolate drink made with moonshine and creamer    Drug use: No    Sexual activity: Yes     Partners: Male     Birth control/protection: Surgical           Objective   Physical Exam  Vitals and nursing note reviewed. Exam conducted with a chaperone present.   Constitutional:       General: She  is awake. She is not in acute distress.     Appearance: Normal appearance. She is well-developed. She is not toxic-appearing.   HENT:      Head: Normocephalic and atraumatic.      Nose:      Right Nostril: No epistaxis.      Left Nostril: No epistaxis.   Eyes:      General: Lids are normal. No scleral icterus.     Conjunctiva/sclera: Conjunctivae normal.      Pupils: Pupils are equal, round, and reactive to light.   Neck:      Vascular: No hepatojugular reflux or JVD.   Cardiovascular:      Rate and Rhythm: Normal rate and regular rhythm.      Chest Wall: PMI is not displaced.      Pulses: Normal pulses. No decreased pulses.      Heart sounds: Normal heart sounds. No murmur heard.  Pulmonary:      Effort: Pulmonary effort is normal. No accessory muscle usage or respiratory distress.      Breath sounds: Normal breath sounds. No decreased breath sounds or wheezing.   Abdominal:      General: Abdomen is flat. Bowel sounds are normal. There is no distension or abdominal bruit.      Palpations: Abdomen is soft. There is no shifting dullness, fluid wave, mass or pulsatile mass.      Tenderness: There is abdominal tenderness in the right lower quadrant, epigastric area, suprapubic area and left lower quadrant. There is no right CVA tenderness, left CVA tenderness, guarding or rebound.      Hernia: No hernia is present.   Musculoskeletal:         General: Normal range of motion.      Cervical back: Normal range of motion and neck supple. No rigidity.      Right lower leg: No edema.      Left lower leg: No edema.   Skin:     General: Skin is warm and dry.      Capillary Refill: Capillary refill takes less than 2 seconds.      Coloration: Skin is not cyanotic, jaundiced, mottled or pale.   Neurological:      General: No focal deficit present.      Mental Status: She is alert and oriented to person, place, and time. Mental status is at baseline.      GCS: GCS eye subscore is 4. GCS verbal subscore is 5. GCS motor subscore is  6.      Cranial Nerves: No cranial nerve deficit.      Sensory: Sensation is intact.      Motor: Motor function is intact.      Deep Tendon Reflexes: Reflexes are normal and symmetric.   Psychiatric:         Behavior: Behavior normal. Behavior is cooperative.         Procedures           ED Course  ED Course as of 11/24/23 1709 Fri Nov 24, 2023   1650 No evidence of esophageal perforation or visible esophageal filling  defect.     Decreased pulmonary and pleural metastatic disease on the right.     Resolved right pleural effusion.     Small to moderate hiatal hernia.  This has been discussed with the patient [TS]   1702 She has got a history of lung cancer and is on anticoagulation for PEs came into the ER today with nausea and abdominal discomfort.  No chest pain or shortness of breath she does complain of some dysphagia therefore esophagram was done which is essentially negative patient has got a hiatal hernia.  CT of the abdomen pelvis performed which shows no acute abnormity pathology.  I have discussed this case with the patient at length.  She is got trace amount of UTI we can treat that with antibiotics she is agreeable with this plan and plan of care is going be discharged home. [TS]      ED Course User Index  [TS] Jeremy Segura MD                                           Medical Decision Making  DD   mesenteric lymphadenitis, diverticulitis, intussusception, small bowel obstruction, adhesions, bowel ischemia,intraabdominal abscess, spontaneous bacterial peritonitis, hernia, urinary tract infection, ureterolithiasis,acute appendicitis, abdominal aortic aneurysm, pneumonia, porphyria and diabetic ketoacidosis as a possible cause of abdominal pain in this patient. This is a partial list of diagnoses considered.    Also having some difficulty swallowing but no obstruction    Problems Addressed:  Abdominal pain, unspecified abdominal location: acute illness or injury     Details: Patient with some  nonspecific Jori pain with negative CT of the abdomen pelvis has got hiatal hernia.  Hiatal hernia: chronic illness or injury  Nausea: complicated acute illness or injury     Details: Treated with antiemetics    Amount and/or Complexity of Data Reviewed  Labs: ordered.     Details: Lab work-up was reviewed.  Radiology: ordered.     Details: X-ray was reviewed    Risk  Prescription drug management.  Risk Details: This patient presents with abdominal pain arrived hemodynamically stable.  Presentation not consistent with other acute, emergent causes of abdominal pain at this time.  Low suspicion for dissection there is no widened mediastinum, hypotension, pulses deficits, and no pain tearing through to the back.  Low suspicion for nephrolithiasis without flank pain radiating to the groin, hematuria, or any history of kidney stones.  Low suspicion for viscus perforation without evidence of guarding, rebound, or rigidity that would be concerning for an acute abdomen.  Low concern for appendicitis as pain did not radiate from the umbilicus to the right lower quadrant, negative Rovsing's sign, no severe constipation on exam.  Low concern for cholecystitis with negative Gibbs sign, no history of gallstones, and no recent pale stools or pain after eating.  Low concern for ulcerative disease as pain is not consistent with eating  foods and is not relieved with patient refraining from eatingand there is no hematemesis or melena. Low suspicion for GI bleeding as there is no melena hematemesis or hematochezia and patient's hemodynamics are stable and hemoglobin is at its baseline.  Low suspicion for gastroenteritis without evidence of diarrhea, fevers, or recent uncooked food exposure.  There is low concern for ischemic bowel with no significant abdominal pain normal vitals and no acidosis no history of peripheral vascular disease or cardiac dysrhythmias.                Final diagnoses:   Abdominal pain, unspecified  abdominal location   Hiatal hernia   Nausea       ED Disposition  ED Disposition       ED Disposition   Discharge    Condition   Stable    Comment   --               Reynaldo Jeter, DO  [address] [facility] [id number] [phone]               Medication List        New Prescriptions      ciprofloxacin 500 MG tablet  Commonly known as: CIPRO  Take 1 tablet by mouth 2 (Two) Times a Day for 3 days.     promethazine 25 MG tablet  Commonly known as: PHENERGAN  Take 1 tablet by mouth Every 6 (Six) Hours As Needed for Nausea or Vomiting for up to 20 doses.               Where to Get Your Medications        These medications were sent to [facility] - [place], [place] - [address] - [phone] [facility] [phone]   [address] [facility] [address]      Phone: [phone]   ciprofloxacin 500 MG tablet  promethazine 25 MG tablet            Jeremy Segura MD  11/24/23 5546

## 2023-11-27 ENCOUNTER — HOSPITAL ENCOUNTER (OUTPATIENT)
Dept: RADIATION ONCOLOGY | Facility: HOSPITAL | Age: 68
Discharge: HOME | End: 2023-11-27
Payer: MEDICARE

## 2023-11-27 LAB
RAD ONC ARIA COURSE ID: NORMAL
RAD ONC ARIA COURSE LAST TREATMENT DATE: NORMAL
RAD ONC ARIA COURSE START DATE: NORMAL
RAD ONC ARIA COURSE TREATMENT ELAPSED DAYS: 14
RAD ONC ARIA FIRST TREATMENT DATE: NORMAL
RAD ONC ARIA PLAN FRACTIONS TREATED TO DATE: 9
RAD ONC ARIA PLAN ID: NORMAL
RAD ONC ARIA PLAN PRESCRIBED DOSE PER FRACTION: 2.5 GY
RAD ONC ARIA PLAN PRIMARY REFERENCE POINT: NORMAL
RAD ONC ARIA PLAN TOTAL FRACTIONS PRESCRIBED: 13
RAD ONC ARIA PLAN TOTAL PRESCRIBED DOSE: 3250 CGY
RAD ONC ARIA REFERENCE POINT DOSAGE GIVEN TO DATE: 22.5 GY
RAD ONC ARIA REFERENCE POINT ID: NORMAL
RAD ONC ARIA REFERENCE POINT SESSION DOSAGE GIVEN: 2.5 GY

## 2023-11-27 PROCEDURE — 77412 RADIATION TX DELIVERY LVL 3: CPT | Performed by: RADIOLOGY

## 2023-11-28 ENCOUNTER — HOSPITAL ENCOUNTER (OUTPATIENT)
Dept: RADIATION ONCOLOGY | Facility: HOSPITAL | Age: 68
Setting detail: RADIATION/ONCOLOGY SERIES
Discharge: HOME | End: 2023-11-28
Payer: MEDICARE

## 2023-11-28 LAB
RAD ONC ARIA COURSE ID: NORMAL
RAD ONC ARIA COURSE LAST TREATMENT DATE: NORMAL
RAD ONC ARIA COURSE START DATE: NORMAL
RAD ONC ARIA COURSE TREATMENT ELAPSED DAYS: 15
RAD ONC ARIA FIRST TREATMENT DATE: NORMAL
RAD ONC ARIA PLAN FRACTIONS TREATED TO DATE: 10
RAD ONC ARIA PLAN ID: NORMAL
RAD ONC ARIA PLAN PRESCRIBED DOSE PER FRACTION: 2.5 GY
RAD ONC ARIA PLAN PRIMARY REFERENCE POINT: NORMAL
RAD ONC ARIA PLAN TOTAL FRACTIONS PRESCRIBED: 13
RAD ONC ARIA PLAN TOTAL PRESCRIBED DOSE: 3250 CGY
RAD ONC ARIA REFERENCE POINT DOSAGE GIVEN TO DATE: 25 GY
RAD ONC ARIA REFERENCE POINT ID: NORMAL
RAD ONC ARIA REFERENCE POINT SESSION DOSAGE GIVEN: 2.5 GY

## 2023-11-28 PROCEDURE — 77412 RADIATION TX DELIVERY LVL 3: CPT | Performed by: RADIOLOGY

## 2023-11-29 ENCOUNTER — HOSPITAL ENCOUNTER (OUTPATIENT)
Dept: RADIATION ONCOLOGY | Facility: HOSPITAL | Age: 68
Setting detail: RADIATION/ONCOLOGY SERIES
Discharge: HOME | End: 2023-11-29
Payer: MEDICARE

## 2023-11-29 LAB
RAD ONC ARIA COURSE ID: NORMAL
RAD ONC ARIA COURSE LAST TREATMENT DATE: NORMAL
RAD ONC ARIA COURSE START DATE: NORMAL
RAD ONC ARIA COURSE TREATMENT ELAPSED DAYS: 16
RAD ONC ARIA FIRST TREATMENT DATE: NORMAL
RAD ONC ARIA PLAN FRACTIONS TREATED TO DATE: 11
RAD ONC ARIA PLAN ID: NORMAL
RAD ONC ARIA PLAN PRESCRIBED DOSE PER FRACTION: 2.5 GY
RAD ONC ARIA PLAN PRIMARY REFERENCE POINT: NORMAL
RAD ONC ARIA PLAN TOTAL FRACTIONS PRESCRIBED: 13
RAD ONC ARIA PLAN TOTAL PRESCRIBED DOSE: 3250 CGY
RAD ONC ARIA REFERENCE POINT DOSAGE GIVEN TO DATE: 27.5 GY
RAD ONC ARIA REFERENCE POINT ID: NORMAL
RAD ONC ARIA REFERENCE POINT SESSION DOSAGE GIVEN: 2.5 GY

## 2023-11-29 PROCEDURE — 77417 THER RADIOLOGY PORT IMAGE(S): CPT | Performed by: RADIOLOGY

## 2023-11-29 PROCEDURE — 77412 RADIATION TX DELIVERY LVL 3: CPT | Performed by: RADIOLOGY

## 2023-11-30 ENCOUNTER — HOSPITAL ENCOUNTER (OUTPATIENT)
Dept: RADIATION ONCOLOGY | Facility: HOSPITAL | Age: 68
Setting detail: RADIATION/ONCOLOGY SERIES
Discharge: HOME | End: 2023-11-30
Payer: MEDICARE

## 2023-11-30 LAB
RAD ONC ARIA COURSE ID: NORMAL
RAD ONC ARIA COURSE LAST TREATMENT DATE: NORMAL
RAD ONC ARIA COURSE START DATE: NORMAL
RAD ONC ARIA COURSE TREATMENT ELAPSED DAYS: 17
RAD ONC ARIA FIRST TREATMENT DATE: NORMAL
RAD ONC ARIA PLAN FRACTIONS TREATED TO DATE: 12
RAD ONC ARIA PLAN ID: NORMAL
RAD ONC ARIA PLAN PRESCRIBED DOSE PER FRACTION: 2.5 GY
RAD ONC ARIA PLAN PRIMARY REFERENCE POINT: NORMAL
RAD ONC ARIA PLAN TOTAL FRACTIONS PRESCRIBED: 13
RAD ONC ARIA PLAN TOTAL PRESCRIBED DOSE: 3250 CGY
RAD ONC ARIA REFERENCE POINT DOSAGE GIVEN TO DATE: 30 GY
RAD ONC ARIA REFERENCE POINT ID: NORMAL
RAD ONC ARIA REFERENCE POINT SESSION DOSAGE GIVEN: 2.5 GY

## 2023-11-30 PROCEDURE — 77412 RADIATION TX DELIVERY LVL 3: CPT | Performed by: RADIOLOGY

## 2023-12-01 ENCOUNTER — HOSPITAL ENCOUNTER (OUTPATIENT)
Dept: RADIATION ONCOLOGY | Facility: HOSPITAL | Age: 68
Discharge: HOME | End: 2023-12-01

## 2023-12-01 ENCOUNTER — HOSPITAL ENCOUNTER (OUTPATIENT)
Dept: RADIATION ONCOLOGY | Facility: HOSPITAL | Age: 68
Setting detail: RADIATION/ONCOLOGY SERIES
End: 2023-12-01
Payer: MEDICARE

## 2023-12-01 LAB
RAD ONC ARIA COURSE ID: NORMAL
RAD ONC ARIA COURSE LAST TREATMENT DATE: NORMAL
RAD ONC ARIA COURSE START DATE: NORMAL
RAD ONC ARIA COURSE TREATMENT ELAPSED DAYS: 18
RAD ONC ARIA FIRST TREATMENT DATE: NORMAL
RAD ONC ARIA PLAN FRACTIONS TREATED TO DATE: 13
RAD ONC ARIA PLAN ID: NORMAL
RAD ONC ARIA PLAN PRESCRIBED DOSE PER FRACTION: 2.5 GY
RAD ONC ARIA PLAN PRIMARY REFERENCE POINT: NORMAL
RAD ONC ARIA PLAN TOTAL FRACTIONS PRESCRIBED: 13
RAD ONC ARIA PLAN TOTAL PRESCRIBED DOSE: 3250 CGY
RAD ONC ARIA REFERENCE POINT DOSAGE GIVEN TO DATE: 32.5 GY
RAD ONC ARIA REFERENCE POINT ID: NORMAL
RAD ONC ARIA REFERENCE POINT SESSION DOSAGE GIVEN: 2.5 GY

## 2023-12-01 PROCEDURE — 77412 RADIATION TX DELIVERY LVL 3: CPT | Performed by: RADIOLOGY

## 2023-12-01 PROCEDURE — 77336 RADIATION PHYSICS CONSULT: CPT | Performed by: RADIOLOGY

## 2023-12-10 ENCOUNTER — APPOINTMENT (OUTPATIENT)
Dept: CT IMAGING | Facility: HOSPITAL | Age: 68
End: 2023-12-10
Payer: MEDICARE

## 2023-12-10 ENCOUNTER — HOSPITAL ENCOUNTER (INPATIENT)
Facility: HOSPITAL | Age: 68
LOS: 2 days | Discharge: HOME | End: 2023-12-12
Attending: STUDENT IN AN ORGANIZED HEALTH CARE EDUCATION/TRAINING PROGRAM | Admitting: SURGERY
Payer: MEDICARE

## 2023-12-10 ENCOUNTER — APPOINTMENT (OUTPATIENT)
Dept: GENERAL RADIOLOGY | Facility: HOSPITAL | Age: 68
End: 2023-12-10
Payer: MEDICARE

## 2023-12-10 DIAGNOSIS — N73.9 PELVIC ABSCESS IN FEMALE: Primary | ICD-10-CM

## 2023-12-10 PROBLEM — D50.8 IRON DEFICIENCY ANEMIA SECONDARY TO INADEQUATE DIETARY IRON INTAKE: Status: ACTIVE | Noted: 2018-12-19

## 2023-12-10 PROBLEM — R30.0 DYSURIA: Status: ACTIVE | Noted: 2023-12-10

## 2023-12-10 PROBLEM — F33.42 RECURRENT MAJOR DEPRESSIVE DISORDER, IN FULL REMISSION: Chronic | Status: ACTIVE | Noted: 2023-12-10

## 2023-12-10 PROBLEM — U07.1 COVID-19 VIRUS DETECTED: Status: ACTIVE | Noted: 2023-12-10

## 2023-12-10 PROBLEM — K57.20 ABSCESS OF SIGMOID COLON DUE TO DIVERTICULITIS: Status: ACTIVE | Noted: 2023-01-26

## 2023-12-10 PROBLEM — M17.0 PRIMARY OSTEOARTHRITIS OF BOTH KNEES: Chronic | Status: ACTIVE | Noted: 2023-12-10

## 2023-12-10 LAB
ALBUMIN SERPL-MCNC: 3.5 G/DL (ref 3.5–5.2)
ALBUMIN/GLOB SERPL: 1 G/DL
ALP SERPL-CCNC: 93 U/L (ref 39–117)
ALT SERPL W P-5'-P-CCNC: 18 U/L (ref 1–33)
ANION GAP SERPL CALCULATED.3IONS-SCNC: 10 MMOL/L (ref 5–15)
ANION GAP SERPL CALCULATED.3IONS-SCNC: 9 MMOL/L (ref 5–15)
AST SERPL-CCNC: 20 U/L (ref 1–32)
BASOPHILS # BLD AUTO: 0.02 10*3/MM3 (ref 0–0.2)
BASOPHILS NFR BLD AUTO: 0.5 % (ref 0–1.5)
BILIRUB SERPL-MCNC: 0.2 MG/DL (ref 0–1.2)
BILIRUB UR QL STRIP: NEGATIVE
BUN SERPL-MCNC: 6 MG/DL (ref 8–23)
BUN SERPL-MCNC: 6 MG/DL (ref 8–23)
BUN/CREAT SERPL: 7.1 (ref 7–25)
BUN/CREAT SERPL: 7.4 (ref 7–25)
CALCIUM SPEC-SCNC: 8.9 MG/DL (ref 8.6–10.5)
CALCIUM SPEC-SCNC: 9 MG/DL (ref 8.6–10.5)
CHLORIDE SERPL-SCNC: 101 MMOL/L (ref 98–107)
CHLORIDE SERPL-SCNC: 102 MMOL/L (ref 98–107)
CLARITY UR: CLEAR
CO2 SERPL-SCNC: 23 MMOL/L (ref 22–29)
CO2 SERPL-SCNC: 24 MMOL/L (ref 22–29)
COLOR UR: YELLOW
CREAT SERPL-MCNC: 0.81 MG/DL (ref 0.57–1)
CREAT SERPL-MCNC: 0.85 MG/DL (ref 0.57–1)
DEPRECATED RDW RBC AUTO: 76.5 FL (ref 37–54)
DEPRECATED RDW RBC AUTO: 77.1 FL (ref 37–54)
EGFRCR SERPLBLD CKD-EPI 2021: 75.7 ML/MIN/1.73
EGFRCR SERPLBLD CKD-EPI 2021: 80.2 ML/MIN/1.73
EOSINOPHIL # BLD AUTO: 0.09 10*3/MM3 (ref 0–0.4)
EOSINOPHIL NFR BLD AUTO: 2.4 % (ref 0.3–6.2)
ERYTHROCYTE [DISTWIDTH] IN BLOOD BY AUTOMATED COUNT: 24 % (ref 12.3–15.4)
ERYTHROCYTE [DISTWIDTH] IN BLOOD BY AUTOMATED COUNT: 24.2 % (ref 12.3–15.4)
FLUAV RNA RESP QL NAA+PROBE: NOT DETECTED
FLUBV RNA RESP QL NAA+PROBE: NOT DETECTED
GLOBULIN UR ELPH-MCNC: 3.6 GM/DL
GLUCOSE SERPL-MCNC: 114 MG/DL (ref 65–99)
GLUCOSE SERPL-MCNC: 121 MG/DL (ref 65–99)
GLUCOSE UR STRIP-MCNC: NEGATIVE MG/DL
HCT VFR BLD AUTO: 25.8 % (ref 34–46.6)
HCT VFR BLD AUTO: 27.1 % (ref 34–46.6)
HGB BLD-MCNC: 7.9 G/DL (ref 12–15.9)
HGB BLD-MCNC: 8.2 G/DL (ref 12–15.9)
HGB UR QL STRIP.AUTO: NEGATIVE
HOLD SPECIMEN: NORMAL
IMM GRANULOCYTES # BLD AUTO: 0.02 10*3/MM3 (ref 0–0.05)
IMM GRANULOCYTES NFR BLD AUTO: 0.5 % (ref 0–0.5)
KETONES UR QL STRIP: NEGATIVE
LEUKOCYTE ESTERASE UR QL STRIP.AUTO: NEGATIVE
LYMPHOCYTES # BLD AUTO: 0.72 10*3/MM3 (ref 0.7–3.1)
LYMPHOCYTES NFR BLD AUTO: 19 % (ref 19.6–45.3)
MAGNESIUM SERPL-MCNC: 2.1 MG/DL (ref 1.6–2.4)
MCH RBC QN AUTO: 27.3 PG (ref 26.6–33)
MCH RBC QN AUTO: 27.4 PG (ref 26.6–33)
MCHC RBC AUTO-ENTMCNC: 30.3 G/DL (ref 31.5–35.7)
MCHC RBC AUTO-ENTMCNC: 30.6 G/DL (ref 31.5–35.7)
MCV RBC AUTO: 89.6 FL (ref 79–97)
MCV RBC AUTO: 90.3 FL (ref 79–97)
MONOCYTES # BLD AUTO: 0.48 10*3/MM3 (ref 0.1–0.9)
MONOCYTES NFR BLD AUTO: 12.7 % (ref 5–12)
NEUTROPHILS NFR BLD AUTO: 2.46 10*3/MM3 (ref 1.7–7)
NEUTROPHILS NFR BLD AUTO: 64.9 % (ref 42.7–76)
NITRITE UR QL STRIP: NEGATIVE
NRBC BLD AUTO-RTO: 0 /100 WBC (ref 0–0.2)
NT-PROBNP SERPL-MCNC: 483 PG/ML (ref 0–900)
PH UR STRIP.AUTO: 7 [PH] (ref 5–8)
PHOSPHATE SERPL-MCNC: 3.1 MG/DL (ref 2.5–4.5)
PLATELET # BLD AUTO: 223 10*3/MM3 (ref 140–450)
PLATELET # BLD AUTO: 233 10*3/MM3 (ref 140–450)
PMV BLD AUTO: 9.1 FL (ref 6–12)
PMV BLD AUTO: 9.2 FL (ref 6–12)
POTASSIUM SERPL-SCNC: 3.7 MMOL/L (ref 3.5–5.2)
POTASSIUM SERPL-SCNC: 3.7 MMOL/L (ref 3.5–5.2)
PROT SERPL-MCNC: 7.1 G/DL (ref 6–8.5)
PROT UR QL STRIP: NEGATIVE
QT INTERVAL: 354 MS
QTC INTERVAL: 425 MS
RBC # BLD AUTO: 2.88 10*6/MM3 (ref 3.77–5.28)
RBC # BLD AUTO: 3 10*6/MM3 (ref 3.77–5.28)
RSV RNA NPH QL NAA+NON-PROBE: NOT DETECTED
SARS-COV-2 RNA RESP QL NAA+PROBE: DETECTED
SODIUM SERPL-SCNC: 133 MMOL/L (ref 136–145)
SODIUM SERPL-SCNC: 136 MMOL/L (ref 136–145)
SP GR UR STRIP: 1.01 (ref 1–1.03)
TROPONIN T SERPL HS-MCNC: 7 NG/L
UROBILINOGEN UR QL STRIP: NORMAL
WBC NRBC COR # BLD AUTO: 3.79 10*3/MM3 (ref 3.4–10.8)
WBC NRBC COR # BLD AUTO: 3.84 10*3/MM3 (ref 3.4–10.8)
WHOLE BLOOD HOLD COAG: NORMAL
WHOLE BLOOD HOLD SPECIMEN: NORMAL

## 2023-12-10 PROCEDURE — 83880 ASSAY OF NATRIURETIC PEPTIDE: CPT | Performed by: STUDENT IN AN ORGANIZED HEALTH CARE EDUCATION/TRAINING PROGRAM

## 2023-12-10 PROCEDURE — 93005 ELECTROCARDIOGRAM TRACING: CPT | Performed by: STUDENT IN AN ORGANIZED HEALTH CARE EDUCATION/TRAINING PROGRAM

## 2023-12-10 PROCEDURE — 36415 COLL VENOUS BLD VENIPUNCTURE: CPT | Performed by: SURGERY

## 2023-12-10 PROCEDURE — 94799 UNLISTED PULMONARY SVC/PX: CPT

## 2023-12-10 PROCEDURE — 99285 EMERGENCY DEPT VISIT HI MDM: CPT

## 2023-12-10 PROCEDURE — 84100 ASSAY OF PHOSPHORUS: CPT | Performed by: SURGERY

## 2023-12-10 PROCEDURE — 99223 1ST HOSP IP/OBS HIGH 75: CPT | Performed by: SURGERY

## 2023-12-10 PROCEDURE — 93005 ELECTROCARDIOGRAM TRACING: CPT

## 2023-12-10 PROCEDURE — 87637 SARSCOV2&INF A&B&RSV AMP PRB: CPT | Performed by: STUDENT IN AN ORGANIZED HEALTH CARE EDUCATION/TRAINING PROGRAM

## 2023-12-10 PROCEDURE — 80053 COMPREHEN METABOLIC PANEL: CPT | Performed by: STUDENT IN AN ORGANIZED HEALTH CARE EDUCATION/TRAINING PROGRAM

## 2023-12-10 PROCEDURE — 25010000002 CEFEPIME PER 500 MG: Performed by: SURGERY

## 2023-12-10 PROCEDURE — 25510000001 IOPAMIDOL PER 1 ML: Performed by: STUDENT IN AN ORGANIZED HEALTH CARE EDUCATION/TRAINING PROGRAM

## 2023-12-10 PROCEDURE — 71046 X-RAY EXAM CHEST 2 VIEWS: CPT

## 2023-12-10 PROCEDURE — 84484 ASSAY OF TROPONIN QUANT: CPT | Performed by: STUDENT IN AN ORGANIZED HEALTH CARE EDUCATION/TRAINING PROGRAM

## 2023-12-10 PROCEDURE — 83735 ASSAY OF MAGNESIUM: CPT | Performed by: SURGERY

## 2023-12-10 PROCEDURE — 25010000002 ENOXAPARIN PER 10 MG: Performed by: SURGERY

## 2023-12-10 PROCEDURE — 94640 AIRWAY INHALATION TREATMENT: CPT

## 2023-12-10 PROCEDURE — 85027 COMPLETE CBC AUTOMATED: CPT | Performed by: SURGERY

## 2023-12-10 PROCEDURE — 71275 CT ANGIOGRAPHY CHEST: CPT

## 2023-12-10 PROCEDURE — 81003 URINALYSIS AUTO W/O SCOPE: CPT | Performed by: STUDENT IN AN ORGANIZED HEALTH CARE EDUCATION/TRAINING PROGRAM

## 2023-12-10 PROCEDURE — 25010000002 METRONIDAZOLE 500 MG/100ML SOLUTION: Performed by: SURGERY

## 2023-12-10 PROCEDURE — 74177 CT ABD & PELVIS W/CONTRAST: CPT

## 2023-12-10 PROCEDURE — 85025 COMPLETE CBC W/AUTO DIFF WBC: CPT | Performed by: STUDENT IN AN ORGANIZED HEALTH CARE EDUCATION/TRAINING PROGRAM

## 2023-12-10 RX ORDER — BUPROPION HYDROCHLORIDE 150 MG/1
300 TABLET ORAL DAILY
Status: DISCONTINUED | OUTPATIENT
Start: 2023-12-10 | End: 2023-12-12 | Stop reason: HOSPADM

## 2023-12-10 RX ORDER — MORPHINE SULFATE 15 MG/1
15 TABLET, FILM COATED, EXTENDED RELEASE ORAL 2 TIMES DAILY
Status: DISCONTINUED | OUTPATIENT
Start: 2023-12-10 | End: 2023-12-12 | Stop reason: HOSPADM

## 2023-12-10 RX ORDER — HYDROCODONE BITARTRATE AND ACETAMINOPHEN 5; 325 MG/1; MG/1
1 TABLET ORAL EVERY 6 HOURS PRN
Status: DISCONTINUED | OUTPATIENT
Start: 2023-12-10 | End: 2023-12-12 | Stop reason: HOSPADM

## 2023-12-10 RX ORDER — NYSTATIN 100000 U/G
1 CREAM TOPICAL EVERY 12 HOURS SCHEDULED
Status: DISCONTINUED | OUTPATIENT
Start: 2023-12-10 | End: 2023-12-12 | Stop reason: HOSPADM

## 2023-12-10 RX ORDER — ENOXAPARIN SODIUM 100 MG/ML
1 INJECTION SUBCUTANEOUS EVERY 12 HOURS
Status: DISCONTINUED | OUTPATIENT
Start: 2023-12-10 | End: 2023-12-12 | Stop reason: HOSPADM

## 2023-12-10 RX ORDER — LEVOTHYROXINE SODIUM 0.12 MG/1
125 TABLET ORAL
Status: DISCONTINUED | OUTPATIENT
Start: 2023-12-11 | End: 2023-12-12 | Stop reason: HOSPADM

## 2023-12-10 RX ORDER — PROMETHAZINE HYDROCHLORIDE 25 MG/1
25 TABLET ORAL EVERY 6 HOURS PRN
Status: DISCONTINUED | OUTPATIENT
Start: 2023-12-10 | End: 2023-12-12 | Stop reason: HOSPADM

## 2023-12-10 RX ORDER — SODIUM CHLORIDE 0.9 % (FLUSH) 0.9 %
10 SYRINGE (ML) INJECTION AS NEEDED
Status: DISCONTINUED | OUTPATIENT
Start: 2023-12-10 | End: 2023-12-12 | Stop reason: HOSPADM

## 2023-12-10 RX ORDER — SUCRALFATE ORAL 1 G/10ML
1 SUSPENSION ORAL
Status: DISCONTINUED | OUTPATIENT
Start: 2023-12-10 | End: 2023-12-12 | Stop reason: HOSPADM

## 2023-12-10 RX ORDER — PRAMIPEXOLE DIHYDROCHLORIDE 1 MG/1
1 TABLET ORAL 2 TIMES DAILY
Status: DISCONTINUED | OUTPATIENT
Start: 2023-12-10 | End: 2023-12-12 | Stop reason: HOSPADM

## 2023-12-10 RX ORDER — SODIUM CHLORIDE 0.9 % (FLUSH) 0.9 %
10 SYRINGE (ML) INJECTION EVERY 12 HOURS SCHEDULED
Status: DISCONTINUED | OUTPATIENT
Start: 2023-12-10 | End: 2023-12-12 | Stop reason: HOSPADM

## 2023-12-10 RX ORDER — BISACODYL 5 MG/1
5 TABLET, DELAYED RELEASE ORAL DAILY PRN
Status: DISCONTINUED | OUTPATIENT
Start: 2023-12-10 | End: 2023-12-12

## 2023-12-10 RX ORDER — BISACODYL 10 MG
10 SUPPOSITORY, RECTAL RECTAL DAILY PRN
Status: DISCONTINUED | OUTPATIENT
Start: 2023-12-10 | End: 2023-12-12

## 2023-12-10 RX ORDER — SODIUM CHLORIDE 9 MG/ML
40 INJECTION, SOLUTION INTRAVENOUS AS NEEDED
Status: DISCONTINUED | OUTPATIENT
Start: 2023-12-10 | End: 2023-12-12 | Stop reason: HOSPADM

## 2023-12-10 RX ORDER — NITROGLYCERIN 0.4 MG/1
0.4 TABLET SUBLINGUAL
Status: DISCONTINUED | OUTPATIENT
Start: 2023-12-10 | End: 2023-12-12 | Stop reason: HOSPADM

## 2023-12-10 RX ORDER — PANTOPRAZOLE SODIUM 40 MG/1
40 TABLET, DELAYED RELEASE ORAL DAILY
Status: DISCONTINUED | OUTPATIENT
Start: 2023-12-10 | End: 2023-12-12 | Stop reason: HOSPADM

## 2023-12-10 RX ORDER — DICYCLOMINE HCL 20 MG
20 TABLET ORAL ONCE
Status: COMPLETED | OUTPATIENT
Start: 2023-12-10 | End: 2023-12-10

## 2023-12-10 RX ORDER — POLYETHYLENE GLYCOL 3350 17 G/17G
17 POWDER, FOR SOLUTION ORAL DAILY PRN
Status: DISCONTINUED | OUTPATIENT
Start: 2023-12-10 | End: 2023-12-12

## 2023-12-10 RX ORDER — ALBUTEROL SULFATE 90 UG/1
2 AEROSOL, METERED RESPIRATORY (INHALATION)
Status: DISCONTINUED | OUTPATIENT
Start: 2023-12-10 | End: 2023-12-12 | Stop reason: HOSPADM

## 2023-12-10 RX ORDER — METRONIDAZOLE 500 MG/100ML
500 INJECTION, SOLUTION INTRAVENOUS EVERY 8 HOURS
Qty: 2100 ML | Refills: 0 | Status: DISCONTINUED | OUTPATIENT
Start: 2023-12-10 | End: 2023-12-12

## 2023-12-10 RX ORDER — AMOXICILLIN 250 MG
2 CAPSULE ORAL 2 TIMES DAILY
Status: DISCONTINUED | OUTPATIENT
Start: 2023-12-10 | End: 2023-12-12

## 2023-12-10 RX ADMIN — ENOXAPARIN SODIUM 90 MG: 100 INJECTION SUBCUTANEOUS at 17:53

## 2023-12-10 RX ADMIN — PRAMIPEXOLE DIHYDROCHLORIDE 1 MG: 1 TABLET ORAL at 20:03

## 2023-12-10 RX ADMIN — MORPHINE SULFATE 15 MG: 15 TABLET, FILM COATED, EXTENDED RELEASE ORAL at 20:03

## 2023-12-10 RX ADMIN — SUCRALFATE 1 G: 1 SUSPENSION ORAL at 20:03

## 2023-12-10 RX ADMIN — METRONIDAZOLE 500 MG: 500 INJECTION, SOLUTION INTRAVENOUS at 17:58

## 2023-12-10 RX ADMIN — ALBUTEROL SULFATE 2 PUFF: 90 AEROSOL, METERED RESPIRATORY (INHALATION) at 19:19

## 2023-12-10 RX ADMIN — BUPROPION HYDROCHLORIDE 300 MG: 150 TABLET, EXTENDED RELEASE ORAL at 17:53

## 2023-12-10 RX ADMIN — DOCUSATE SODIUM 50 MG AND SENNOSIDES 8.6 MG 2 TABLET: 8.6; 5 TABLET, FILM COATED ORAL at 20:03

## 2023-12-10 RX ADMIN — CEFEPIME 2000 MG: 2 INJECTION, POWDER, FOR SOLUTION INTRAVENOUS at 17:51

## 2023-12-10 RX ADMIN — Medication 10 ML: at 20:04

## 2023-12-10 RX ADMIN — NYSTATIN 1 APPLICATION: 100000 CREAM TOPICAL at 20:04

## 2023-12-10 RX ADMIN — DICYCLOMINE HYDROCHLORIDE 20 MG: 20 TABLET ORAL at 12:36

## 2023-12-10 RX ADMIN — IOPAMIDOL 100 ML: 755 INJECTION, SOLUTION INTRAVENOUS at 13:35

## 2023-12-10 NOTE — PLAN OF CARE
Goal Outcome Evaluation:               Here d/t covid/diverticulitis. Oriented to room upon arrival. C/o mild abd pain. VSS. Denies any needs at this time.  at bedside.

## 2023-12-10 NOTE — PROGRESS NOTES
"Pharmacy Dosing Service  Anticoagulation  Enoxaparin    Assessment:  Current order: Enoxaparin 90 mg (1 mg/kg) SQ every 12 hours for PE.  Dose is appropriate based on indication and patient factors (age, weight, and renal function).  See pharmacy consult under indications for details.  No changes at this time.  Pharmacy will continue to monitor and evaluate.    Plan:  Continue current dosage. Continue routine follow-up evaluation.       Subjective:  Melly rCuz is a 68 y.o. female on enoxaparin for PE.    No diagnosis found.    Objective:  [Ht: 162.6 cm (64\"); Wt: 89.1 kg (196 lb 8 oz); BMI: Body mass index is 33.73 kg/m².]  Estimated Creatinine Clearance: 70.4 mL/min (by C-G formula based on SCr of 0.85 mg/dL).   Creatinine   Date Value Ref Range Status   12/10/2023 0.85 0.57 - 1.00 mg/dL Final   11/24/2023 0.66 0.57 - 1.00 mg/dL Final   10/30/2023 0.73 0.57 - 1.00 mg/dL Final   10/17/2023 0.80 0.60 - 1.30 mg/dL Final     Comment:     Serial Number: [id number]:  [id number]   10/16/2023 0.80 0.60 - 1.30 mg/dL Final     Comment:     Serial Number: [id number]:  [id number]   12/07/2022 0.90 0.60 - 1.30 mg/dL Final     Comment:     Serial Number: [id number]:  [id number]   07/12/2022 0.8 0.5 - 0.9 mg/dL Final   07/10/2022 0.9 0.5 - 0.9 mg/dL Final   04/10/2022 0.8 0.5 - 0.9 mg/dL Final     Lab Results   Component Value Date    INR 1.24 (H) 10/30/2023    INR 0.98 09/25/2023    INR 0.99 08/27/2023    PROTIME 15.8 (H) 10/30/2023    PROTIME 13.1 09/25/2023    PROTIME 13.1 08/27/2023      Lab Results   Component Value Date    HGB 8.2 (L) 12/10/2023    HGB 9.9 (L) 11/24/2023    HGB 9.3 (L) 11/20/2023      Lab Results   Component Value Date    HCT 27.1 (L) 12/10/2023    HCT 33.9 (L) 11/24/2023    HCT 30.3 (L) 11/20/2023      Lab Results   Component Value Date     12/10/2023     11/24/2023     11/20/2023      Lab Results   Component Value Date    DDIMER 0.51 (H) 03/09/2021     COVID19   Date Value " Ref Range Status   12/10/2023 Detected (C) Not Detected - Ref. Range Final         Arnaldo ArtD  12/10/23 15:11 CST

## 2023-12-10 NOTE — H&P
Patient Name:  Melly Cruz  YOB: 1955  [id number]       Patient Care Team:  Reynaldo Jeter DO as PCP - General (Internal Medicine)  Reynaldo Jeter DO as Referring Physician (Internal Medicine)  Michael Wei MD as Cardiologist (Cardiology)  Alfredo Gant APRN as Nurse Practitioner (Nurse Practitioner)  Brenden Granados MD as Surgeon (Neurosurgery)  Gualberto Lopez DO as Consulting Physician (Pain Medicine)  Agusto Thompson MD as Surgeon (Cardiothoracic Surgery)  Beatrice Birmingham MD as Surgeon (General Surgery)  Cedric Delarosa MD as Consulting Physician (Oncology)      General Surgery Consult Note     Date of Consultation: 12/10/23    Consulting Physician : Giovany Quijano MD    Reason for Consult : Complicated sigmoid diverticulitis with 3cm abscess behind the bladder and vaginal wall    Subjective     I have been asked to see  Melly Cruz , a 68 y.o. female in consultation for complicated sigmoid diverticulitis with 3cm abscess behind the bladder and vaginal wall.  68-year-old lady with stage IV lung cancer [right lung] status post bronchoscopy, right lower lobe wedge lobectomy in 9/2023, completed 5 rounds of palliative radiation therapy, completed 3 rounds of chemotherapy most recently 4 weeks ago, being followed by medical oncologist Cedric Delarosa MD at [facility], presents for several day duration of dysuria.    She denies having any hematuria, fecaluria, or pneumaturia.  She admits to having bilateral mild lower quadrant pain.  Denies having any fevers or chills, worsening shortness of air or wheezing.  She has baseline exertional dyspnea, known stage IV right-sided lung cancer.    Her prior history of sigmoid diverticulitis episodes include once in her 20s and another 1 in January 2023.  Both were treated nonsurgically.    ER workup:  Afebrile, vital signs stable.  WBC 3.8, hemoglobin 8.2, creatinine 0.85, UA negative, COVID test positive.  CT  PE is notable for nearly resolved pulmonary emboli in the posterior basilar segment of the right lower lobe, notable known pulmonary and pleural metastatic disease on the right without interval changes.  CT abdomen pelvis is noted for a 3.1 cm abscess posterior to the posterior vaginal cuff and bladder wall and adjacent to the sigmoid colon.  Radiology stating that it is not amenable to percutaneous drainage at this time.    Abdominal surgeries: Laparoscopic cholecystectomy, open ELKE/BSO for menorrhagia and ovarian cyst, open right inguinal hernia repair with mesh x 2    Colonoscopy: 2 months ago at [facility] with known diverticulosis    Notable medical history: Prior history of DVT/PE previously on TLOV, former smoker with COPD, stage IV lung cancer, GERD, depression, restless leg syndrome, hypothyroidism, BMI 33.7    Allergy:   Allergies   Allergen Reactions    Erythromycin Swelling and Anaphylaxis     Throat swells    Guaifenesin Anaphylaxis, Nausea And Vomiting and Swelling    Hydromorphone Hcl Anaphylaxis and Other (See Comments)     Other reaction(s): Shock and/or Unconsciousness  Note: Anaphylaxis    Ondansetron Hcl Other (See Comments)     Coded  Coded  Other reaction(s): Zofran  Note:  Allergic Reaction: Anaphylaxis    Orphenadrine Anaphylaxis    Codeine Rash    Meperidine Other (See Comments)     Makes her an angry person       Medications:  albuterol sulfate HFA, 2 puff, Inhalation, 4x Daily - RT  [START ON 12/11/2023] buPROPion XL, 300 mg, Oral, QAM  cefepime, 2,000 mg, Intravenous, Once  cefepime, 2,000 mg, Intravenous, Q8H  levothyroxine, 125 mcg, Oral, Daily  metroNIDAZOLE, 500 mg, Intravenous, Q8H  Morphine, 15 mg, Oral, BID  nystatin, 1 application , Topical, Q12H  pantoprazole, 40 mg, Oral, Daily  pramipexole, 1 mg, Oral, BID  senna-docusate sodium, 2 tablet, Oral, BID  sodium chloride, 10 mL, Intravenous, Q12H  sucralfate, 1 g, Oral, 4x Daily AC & at Bedtime      Pharmacy to Dose enoxaparin  (LOVENOX),       No current facility-administered medications on file prior to encounter.     Current Outpatient Medications on File Prior to Encounter   Medication Sig    albuterol (PROVENTIL HFA;VENTOLIN HFA) 108 (90 Base) MCG/ACT inhaler Inhale 2 puffs 4 (Four) Times a Day.    aluminum-magnesium hydroxide-simethicone (MAALOX MAX) 400-400-40 MG/5ML suspension Take 30 mL by mouth Every 6 (Six) Hours As Needed for Indigestion or Heartburn.    budesonide (Pulmicort Flexhaler) 90 MCG/ACT inhaler Inhale 2 puffs 2 (Two) Times a Day. Swallow, do not inhale    buPROPion XL (WELLBUTRIN XL) 300 MG 24 hr tablet Take 1 tablet by mouth Every Morning.    Cyanocobalamin (VITAMIN B-12 ER) 1000 MCG tablet controlled-release Take 1,000 mcg by mouth Daily.    cyclobenzaprine (FLEXERIL) 10 MG tablet Take 1 tablet by mouth 3 (Three) Times a Day As Needed for Muscle Spasms.    diclofenac (VOLTAREN) 75 MG EC tablet Take 1 tablet by mouth 2 (Two) Times a Day.    EPINEPHrine (EPIPEN) 0.3 MG/0.3ML solution auto-injector injection Inject 0.3 mL into the appropriate muscle as directed by prescriber 1 (One) Time As Needed (allergic reaction).    folic acid (FOLVITE) 1 MG tablet Take 1 tablet by mouth Daily.    levothyroxine (SYNTHROID, LEVOTHROID) 125 MCG tablet Take 1 tablet by mouth Daily.    lidocaine (XYLOCAINE) 5 % ointment Apply 1 application topically to the appropriate area as directed Every 2 (Two) Hours As Needed for Mild Pain.    Morphine (MS CONTIN) 15 MG 12 hr tablet Take 1 tablet by mouth 2 (Two) Times a Day.    nitroglycerin (NITROSTAT) 0.4 MG SL tablet Place 1 tablet under the tongue Every 5 (Five) Minutes As Needed for Chest Pain. Take no more than 3 doses in 15 minutes.    oxyCODONE-acetaminophen (Percocet)  MG per tablet Take 1 tablet by mouth Every 4 (Four) Hours As Needed for Moderate Pain or Severe Pain.    pantoprazole (PROTONIX) 40 MG EC tablet Take 1 tablet by mouth Daily.    pramipexole (MIRAPEX) 1 MG tablet  Take 1 tablet by mouth 2 (Two) Times a Day.    promethazine (PHENERGAN) 25 MG tablet Take 1 tablet by mouth Every 6 (Six) Hours As Needed for Nausea or Vomiting for up to 20 doses.    sucralfate (CARAFATE) 1 GM/10ML suspension Take 10 mL by mouth 4 (Four) Times a Day Before Meals & at Bedtime.    tiZANidine (ZANAFLEX) 4 MG tablet Take 0.5-1 tablets by mouth At Night As Needed for Muscle Spasms.    vitamin D (ERGOCALCIFEROL) 25498 units capsule capsule Take 1 capsule by mouth Every 7 (Seven) Days. SUNDAY       PMHx:   Past Medical History:   Diagnosis Date    Acid reflux     Anemia     Anxiety     Arthritis     Asthma     Mild COPD    Curtis esophagus     Bleeding disorder     Reflux acid overload    Bunion Aug    Repaired with surgery    Cancer 03/2023    Waiting on Biopsy to determine what kind and what stage NOW BONE CANCER    Chest pain     Chronic back pain     Chronic neck pain     Clotting disorder     Stomach bleeds from acid    COPD (chronic obstructive pulmonary disease)     Difficulty walking Dec 2022    After surgery wslking has become more and more difficult    DVT (deep venous thrombosis)     GI bleed     Hammer toe Aug 22    Repaired surgery    HTN (hypertension) 12/19/2018    Hypothyroid     Hypothyroidism     Low back pain     Lung cancer     Migraine     Nausea and vomiting 04/05/2023    PE (pulmonary thromboembolism)     Plantar fasciitis Years ago    Renal lesion 04/05/2023    Shin splints Years ago       Past Surgical History:  Past Surgical History:   Procedure Laterality Date    BRONCHOSCOPY N/A 9/26/2023    Procedure: BRONCHOSCOPY,THORACOSCOPY, MULTIPLE PLEURAL BIOPSY, RIGHT LUNG LOWER LOBE WEDGE RESECTION, TALC PLEURODESIS;  Surgeon: Agusto Thompson MD;  Location: [facility];  Service: Cardiothoracic;  Laterality: N/A;    BUNIONECTOMY      CHOLECYSTECTOMY      COLONOSCOPY      CORRECTION HAMMER TOE      DILATATION AND CURETTAGE      FOOT FUSION Left 04/20/2022    Procedure: 1-2  Arthrodesis, Tarsal Metatarsal Joint 2 and 3 Arthrodesis;  Surgeon: Bud Maradiaga DPM;  Location:  [facility];  Service: Podiatry;  Laterality: Left;    HAMMER TOE REPAIR Left 2022    Procedure: Hammertoe Repair 2-5,;  Surgeon: Bud Maradiaga DPM;  Location:  [facility];  Service: Podiatry;  Laterality: Left;    HERNIA REPAIR      HYSTERECTOMY      JOINT REPLACEMENT      OOPHORECTOMY      REDUCTION MAMMAPLASTY  2021    REPLACEMENT TOTAL KNEE      Right knee partial, left knee    THORACOSCOPY Right 2023    Procedure: THORACOSCOPY with WEDGE RESECTION;  Surgeon: Agusto Thompson MD;  Location:  [facility];  Service: Cardiothoracic;  Laterality: Right;        Family History:   Family History   Problem Relation Age of Onset    Breast cancer Paternal Aunt     Heart disease Mother     Osteoporosis Mother     Thyroid disease Mother         Lukemia can’t remember which kind.    Cancer Mother         Pacemaker at 90 years old.    Cancer Father         Father’s entire family  from Cancer    Diabetes Father     Thyroid disease Father     Thyroid disease Maternal Aunt         Blood disorder    Thyroid disease Maternal Uncle     Thyroid disease Sister         Thyroid    Ovarian cancer Neg Hx     Uterine cancer Neg Hx     Colon cancer Neg Hx        Social History:   Social History     Socioeconomic History    Marital status:    Tobacco Use    Smoking status: Former     Packs/day: 1.50     Years: 46.00     Additional pack years: 0.00     Total pack years: 69.00     Types: Cigarettes     Start date: 1972     Quit date: 2018     Years since quittin.6     Passive exposure: Past    Smokeless tobacco: Never    Tobacco comments:     Pt started at age 17, then quit at age 31. Pt restarted smoking at age 32-35 and quit smoking in 2018   Vaping Use    Vaping Use: Never used   Substance and Sexual Activity    Alcohol use: Not Currently     Alcohol/week: 1.0 standard drink of alcohol     Types: 1  "Drinks containing 0.5 oz of alcohol per week     Comment: A chocolate drink made with moonshine and creamer    Drug use: No    Sexual activity: Yes     Partners: Male     Birth control/protection: Surgical        Review of Systems   Pertinent items are noted in HPI     Constitutional: No fevers, chills or malaise   Eyes: Denies visual changes    Cardiovascular: Denies chest pain, palpitations   Pulmonary: See HPI   Abdominal/ GI: See HPI    Genitourinary: See HPI   Musculoskeletal: Chronic osteoarthritis   Psychiatric: No recent mood changes   Neurologic: No paresthesias or loss of function        Objective     Physical Exam:    Vital Signs  /88   Pulse 92   Temp 98.9 °F (37.2 °C)   Resp 18   Ht 162.6 cm (64\")   Wt 89.1 kg (196 lb 8 oz)   SpO2 96%   BMI 33.73 kg/m²   No intake or output data in the 24 hours ending 12/10/23 1503    Physical Exam:    Head: Normocephalic, atraumatic.   Eyes: Pupils equal, round, react to light and accommodation.   Mouth: Oral mucosa without lesions  Neck: No masses, lymphadenopathy or carotid bruits bilaterally   CV: Rhythm and rate, regular, no murmurs, rubs or gallops  Lungs: Clear to auscultation bilaterally   Abdomen: Bowel sounds positive, soft, nondistended, nonperitoneal, periumbilical fungal cutaneous infection, bilateral lower quadrant mild tenderness to palpation, suprapubic tenderness to palpation, lower midline incisional scar about hernia  Groin : No obvious hernias bilaterally   Extremities:  No cyanosis, clubbing or edema bilaterally   Lymphatics: No abnormal lymphadenopathy appreciated   Neurologic: No gross deficits     Results Review: I have personally reviewed all of the recent lab and imaging results available at this time.   Lab Results (last 48 hours)       Procedure Component Value Units Date/Time    [place] Urine - Urine, Clean Catch [[id number]] Collected: 12/10/23 1200    Specimen: Urine, Clean Catch Updated: 12/10/23 1315     Extra Tube Hold for " add-ons.     Comment: Auto resulted.       COVID PRE-OP / PRE-PROCEDURE SCREENING ORDER (NO ISOLATION) - Swab, Nasopharynx [[id number]]  (Abnormal) Collected: 12/10/23 1200    Specimen: Swab from Nasopharynx Updated: 12/10/23 1259    Narrative:      The following orders were created for panel order COVID PRE-OP / PRE-PROCEDURE SCREENING ORDER (NO ISOLATION) - Swab, Nasopharynx.  Procedure                               Abnormality         Status                     ---------                               -----------         ------                     COVID-19, FLU A/B, RSV P...[[id number]]  Abnormal            Final result                 Please view results for these tests on the individual orders.    COVID-19, FLU A/B, RSV PCR 1 HR TAT - Swab, Nasopharynx [[id number]]  (Abnormal) Collected: 12/10/23 1200    Specimen: Swab from Nasopharynx Updated: 12/10/23 1259     COVID19 Detected     Influenza A PCR Not Detected     Influenza B PCR Not Detected     RSV, PCR Not Detected    Narrative:      Fact sheet for providers: https://www.fda.gov/media/490066/download    Fact sheet for patients: https://www.fda.gov/media/168058/download    Test performed by PCR.    [place] Draw [[id number]] Collected: 12/10/23 1113    Specimen: Blood Updated: 12/10/23 1217    Narrative:      The following orders were created for panel order [place] Draw.  Procedure                               Abnormality         Status                     ---------                               -----------         ------                     Green Top (Gel)[[id number]]                                  Final result               Lavender Top[[id number]]                                     Final result               Red Top[[id number]]                                          Final result               Light Blue Top[[id number]]                                   Final result                 Please view results for these tests on the individual orders.    Green Top  (Gel) [[id number]] Collected: 12/10/23 1113    Specimen: Blood Updated: 12/10/23 1217     Extra Tube Hold for add-ons.     Comment: Auto resulted.       Lavender Top [[id number]] Collected: 12/10/23 1113    Specimen: Blood Updated: 12/10/23 1217     Extra Tube hold for add-on     Comment: Auto resulted       Red Top [[id number]] Collected: 12/10/23 1113    Specimen: Blood Updated: 12/10/23 1217     Extra Tube Hold for add-ons.     Comment: Auto resulted.       Light Blue Top [[id number]] Collected: 12/10/23 1113    Specimen: Blood Updated: 12/10/23 1217     Extra Tube Hold for add-ons.     Comment: Auto resulted       Urinalysis With Culture If Indicated - Urine, Clean Catch [[id number]]  (Normal) Collected: 12/10/23 1200    Specimen: Urine, Clean Catch Updated: 12/10/23 1215     Color, UA Yellow     Appearance, UA Clear     pH, UA 7.0     Specific Gravity, UA 1.010     Glucose, UA Negative     Ketones, UA Negative     Bilirubin, UA Negative     Blood, UA Negative     Protein, UA Negative     Leuk Esterase, UA Negative     Nitrite, UA Negative     Urobilinogen, UA 1.0 E.U./dL    Narrative:      In absence of clinical symptoms, the presence of pyuria, bacteria, and/or nitrites on the urinalysis result does not correlate with infection.  Urine microscopic not indicated.    Comprehensive Metabolic Panel [[id number]]  (Abnormal) Collected: 12/10/23 1113    Specimen: Blood Updated: 12/10/23 1147     Glucose 121 mg/dL      BUN 6 mg/dL      Creatinine 0.85 mg/dL      Sodium 136 mmol/L      Potassium 3.7 mmol/L      Chloride 102 mmol/L      CO2 24.0 mmol/L      Calcium 9.0 mg/dL      Total Protein 7.1 g/dL      Albumin 3.5 g/dL      ALT (SGPT) 18 U/L      AST (SGOT) 20 U/L      Alkaline Phosphatase 93 U/L      Total Bilirubin 0.2 mg/dL      Globulin 3.6 gm/dL      A/G Ratio 1.0 g/dL      BUN/Creatinine Ratio 7.1     Anion Gap 10.0 mmol/L      eGFR 75.7 mL/min/1.73     Narrative:      GFR Normal >60  Chronic Kidney Disease  <60  Kidney Failure <15      BNP [[id number]]  (Normal) Collected: 12/10/23 1113    Specimen: Blood Updated: 12/10/23 1144     proBNP 483.0 pg/mL     Narrative:      This assay is used as an aid in the diagnosis of individuals suspected of having heart failure. It can be used as an aid in the diagnosis of acute decompensated heart failure (ADHF) in patients presenting with signs and symptoms of ADHF to the emergency department (ED). In addition, NT-proBNP of <300 pg/mL indicates ADHF is not likely.    Age Range Result Interpretation  NT-proBNP Concentration (pg/mL:      <50             Positive            >450                   Gray                 300-450                    Negative             <300    50-75           Positive            >900                  Gray                300-900                  Negative            <300      >75             Positive            >1800                  Gray                300-1800                  Negative            <300    Single High Sensitivity Troponin T [[id number]]  (Normal) Collected: 12/10/23 1113    Specimen: Blood Updated: 12/10/23 1143     HS Troponin T 7 ng/L     Narrative:      High Sensitive Troponin T Reference Range:  <14.0 ng/L- Negative Female for AMI  <22.0 ng/L- Negative Male for AMI  >=14 - Abnormal Female indicating possible myocardial injury.  >=22 - Abnormal Male indicating possible myocardial injury.   Clinicians would have to utilize clinical acumen, EKG, Troponin, and serial changes to determine if it is an Acute Myocardial Infarction or myocardial injury due to an underlying chronic condition.         CBC & Differential [[id number]]  (Abnormal) Collected: 12/10/23 1113    Specimen: Blood Updated: 12/10/23 1125    Narrative:      The following orders were created for panel order CBC & Differential.  Procedure                               Abnormality         Status                     ---------                               -----------         ------                      CBC Auto Differential[[id number]]        Abnormal            Final result                 Please view results for these tests on the individual orders.    CBC Auto Differential [[id number]]  (Abnormal) Collected: 12/10/23 1113    Specimen: Blood Updated: 12/10/23 1125     WBC 3.79 10*3/mm3      RBC 3.00 10*6/mm3      Hemoglobin 8.2 g/dL      Hematocrit 27.1 %      MCV 90.3 fL      MCH 27.3 pg      MCHC 30.3 g/dL      RDW 24.2 %      RDW-SD 77.1 fl      MPV 9.1 fL      Platelets 223 10*3/mm3      Neutrophil % 64.9 %      Lymphocyte % 19.0 %      Monocyte % 12.7 %      Eosinophil % 2.4 %      Basophil % 0.5 %      Immature Grans % 0.5 %      Neutrophils, Absolute 2.46 10*3/mm3      Lymphocytes, Absolute 0.72 10*3/mm3      Monocytes, Absolute 0.48 10*3/mm3      Eosinophils, Absolute 0.09 10*3/mm3      Basophils, Absolute 0.02 10*3/mm3      Immature Grans, Absolute 0.02 10*3/mm3      nRBC 0.0 /100 WBC                  Assessment & Plan     Assessment and Plan:    Problem List Items Addressed This Visit    None       Active Hospital Problems    Diagnosis  POA    **Abscess of sigmoid colon due to diverticulitis [K57.20]  Yes    Dysuria [R30.0]  Yes    Pelvic abscess in female [N73.9]  Yes    Restless leg syndrome [G25.81]  Yes    Adult BMI 33.0-33.9 kg/sq m [Z68.33]  Not Applicable    History of pulmonary embolism [Z86.711]  Yes    Curtis's esophagus with dysplasia sees [facility] [K22.719]  Yes    Hypothyroidism (acquired) [E03.9]  Yes    Chronic midline thoracic back pain [M54.6, G89.29]  Yes    History of DVT in adulthood  2018 [Z86.718]  Not Applicable    Former heavy tobacco smoker [Z87.891]  Not Applicable    GERD (gastroesophageal reflux disease) [K21.9]  Yes    HTN (hypertension) [I10]  Yes    Iron deficiency anemia [D50.9]  Yes    Malignant neoplasm of upper lobe of right lung [C34.11]  Yes      Resolved Hospital Problems   No resolved problems to display.      68-year-old lady with stage IV lung  cancer status post palliative radiation therapy and chemotherapy 4 weeks ago, DVT/PE previously on therapeutic Lovenox, presenting with positive COVID detection and suspected complicated sigmoid diverticulitis with subsequent symptoms of dysuria    # Complicated sigmoid diverticulitis with dysuria  Admit to general surgery service  IV antibiotics with goal duration of 7 days  Multimodal pain control  Regular diet    # COVID detection  Satting well on room air without tachypnea  Employ COVID precautions    #Anemia, chronic  Continue to monitor    Chronic medical issues present on arrival, stable  # Depression: Wellbutrin  # Residual PE: TLOV  # GERD: PPI, famotidine, sucralfate  # Restless leg syndrome: Pramipexole  # Hypothyroidism: Synthroid  # COPD: Nebulizers as needed  # Stage IV lung cancer with metastatic disease to the right chest wall and chronic pain: Norco as needed, MS Contin 15 mg twice daily    DVT prophylaxis: SCD, ambulate, will be on therapeutic Lovenox  Status: Full code  Discharge disposition: 3-5 hospital days      I discussed the patient's findings and my recommendations with the patient and/or family, as well as the primary team.    Pablo Pascual Jr, MD  12/10/23  15:03 CST

## 2023-12-11 LAB
ANION GAP SERPL CALCULATED.3IONS-SCNC: 12 MMOL/L (ref 5–15)
BUN SERPL-MCNC: 6 MG/DL (ref 8–23)
BUN/CREAT SERPL: 7.1 (ref 7–25)
CALCIUM SPEC-SCNC: 8.9 MG/DL (ref 8.6–10.5)
CHLORIDE SERPL-SCNC: 101 MMOL/L (ref 98–107)
CO2 SERPL-SCNC: 18 MMOL/L (ref 22–29)
CREAT SERPL-MCNC: 0.84 MG/DL (ref 0.57–1)
DEPRECATED RDW RBC AUTO: 87.1 FL (ref 37–54)
EGFRCR SERPLBLD CKD-EPI 2021: 76.8 ML/MIN/1.73
ERYTHROCYTE [DISTWIDTH] IN BLOOD BY AUTOMATED COUNT: 25.1 % (ref 12.3–15.4)
GLUCOSE SERPL-MCNC: 102 MG/DL (ref 65–99)
HCT VFR BLD AUTO: 29.8 % (ref 34–46.6)
HGB BLD-MCNC: 8.2 G/DL (ref 12–15.9)
MAGNESIUM SERPL-MCNC: 2 MG/DL (ref 1.6–2.4)
MCH RBC QN AUTO: 27.2 PG (ref 26.6–33)
MCHC RBC AUTO-ENTMCNC: 27.5 G/DL (ref 31.5–35.7)
MCV RBC AUTO: 99 FL (ref 79–97)
PHOSPHATE SERPL-MCNC: 3.8 MG/DL (ref 2.5–4.5)
PLATELET # BLD AUTO: 218 10*3/MM3 (ref 140–450)
PMV BLD AUTO: 9.1 FL (ref 6–12)
POTASSIUM SERPL-SCNC: 3.9 MMOL/L (ref 3.5–5.2)
RBC # BLD AUTO: 3.01 10*6/MM3 (ref 3.77–5.28)
SODIUM SERPL-SCNC: 131 MMOL/L (ref 136–145)
WBC NRBC COR # BLD AUTO: 3.03 10*3/MM3 (ref 3.4–10.8)

## 2023-12-11 PROCEDURE — 99232 SBSQ HOSP IP/OBS MODERATE 35: CPT | Performed by: SURGERY

## 2023-12-11 PROCEDURE — 94799 UNLISTED PULMONARY SVC/PX: CPT

## 2023-12-11 PROCEDURE — 25010000002 CEFEPIME PER 500 MG: Performed by: SURGERY

## 2023-12-11 PROCEDURE — 25010000002 METRONIDAZOLE 500 MG/100ML SOLUTION: Performed by: SURGERY

## 2023-12-11 PROCEDURE — 80048 BASIC METABOLIC PNL TOTAL CA: CPT | Performed by: SURGERY

## 2023-12-11 PROCEDURE — 85027 COMPLETE CBC AUTOMATED: CPT | Performed by: SURGERY

## 2023-12-11 PROCEDURE — 94664 DEMO&/EVAL PT USE INHALER: CPT

## 2023-12-11 PROCEDURE — 83735 ASSAY OF MAGNESIUM: CPT | Performed by: SURGERY

## 2023-12-11 PROCEDURE — 25010000002 ENOXAPARIN PER 10 MG: Performed by: SURGERY

## 2023-12-11 PROCEDURE — 84100 ASSAY OF PHOSPHORUS: CPT | Performed by: SURGERY

## 2023-12-11 RX ORDER — AZELASTINE 1 MG/ML
1 SPRAY, METERED NASAL DAILY
Status: DISCONTINUED | OUTPATIENT
Start: 2023-12-11 | End: 2023-12-12 | Stop reason: HOSPADM

## 2023-12-11 RX ORDER — HYDROCODONE BITARTRATE AND ACETAMINOPHEN 10; 325 MG/1; MG/1
1 TABLET ORAL EVERY 6 HOURS PRN
COMMUNITY

## 2023-12-11 RX ORDER — MEGESTROL ACETATE 40 MG/ML
200 SUSPENSION ORAL DAILY
COMMUNITY

## 2023-12-11 RX ORDER — DICLOFENAC SODIUM 75 MG/1
75 TABLET, DELAYED RELEASE ORAL 2 TIMES DAILY PRN
COMMUNITY

## 2023-12-11 RX ORDER — LEVOTHYROXINE SODIUM 0.15 MG/1
150 TABLET ORAL
COMMUNITY

## 2023-12-11 RX ORDER — ECHINACEA PURPUREA EXTRACT 125 MG
1 TABLET ORAL AS NEEDED
Status: DISCONTINUED | OUTPATIENT
Start: 2023-12-11 | End: 2023-12-12 | Stop reason: HOSPADM

## 2023-12-11 RX ORDER — FLUTICASONE PROPIONATE 50 MCG
1 SPRAY, SUSPENSION (ML) NASAL 2 TIMES DAILY
COMMUNITY

## 2023-12-11 RX ADMIN — BUPROPION HYDROCHLORIDE 300 MG: 150 TABLET, EXTENDED RELEASE ORAL at 10:14

## 2023-12-11 RX ADMIN — HYDROCODONE BITARTRATE AND ACETAMINOPHEN 1 TABLET: 5; 325 TABLET ORAL at 00:24

## 2023-12-11 RX ADMIN — NYSTATIN 1 APPLICATION: 100000 CREAM TOPICAL at 22:04

## 2023-12-11 RX ADMIN — Medication 10 ML: at 22:05

## 2023-12-11 RX ADMIN — HYDROCODONE BITARTRATE AND ACETAMINOPHEN 1 TABLET: 5; 325 TABLET ORAL at 15:47

## 2023-12-11 RX ADMIN — SALINE NASAL SPRAY 1 SPRAY: 1.5 SOLUTION NASAL at 17:29

## 2023-12-11 RX ADMIN — METRONIDAZOLE 500 MG: 500 INJECTION, SOLUTION INTRAVENOUS at 17:28

## 2023-12-11 RX ADMIN — HYDROCODONE BITARTRATE AND ACETAMINOPHEN 1 TABLET: 5; 325 TABLET ORAL at 06:32

## 2023-12-11 RX ADMIN — ALBUTEROL SULFATE 2 PUFF: 90 AEROSOL, METERED RESPIRATORY (INHALATION) at 10:13

## 2023-12-11 RX ADMIN — Medication 10 ML: at 10:20

## 2023-12-11 RX ADMIN — ENOXAPARIN SODIUM 90 MG: 100 INJECTION SUBCUTANEOUS at 06:27

## 2023-12-11 RX ADMIN — SUCRALFATE 1 G: 1 SUSPENSION ORAL at 17:28

## 2023-12-11 RX ADMIN — CEFEPIME 2000 MG: 2 INJECTION, POWDER, FOR SOLUTION INTRAVENOUS at 15:48

## 2023-12-11 RX ADMIN — LEVOTHYROXINE SODIUM 125 MCG: 125 TABLET ORAL at 06:27

## 2023-12-11 RX ADMIN — PANTOPRAZOLE SODIUM 40 MG: 40 TABLET, DELAYED RELEASE ORAL at 10:13

## 2023-12-11 RX ADMIN — DOCUSATE SODIUM 50 MG AND SENNOSIDES 8.6 MG 2 TABLET: 8.6; 5 TABLET, FILM COATED ORAL at 22:00

## 2023-12-11 RX ADMIN — METRONIDAZOLE 500 MG: 500 INJECTION, SOLUTION INTRAVENOUS at 11:18

## 2023-12-11 RX ADMIN — MORPHINE SULFATE 15 MG: 15 TABLET, FILM COATED, EXTENDED RELEASE ORAL at 10:13

## 2023-12-11 RX ADMIN — SUCRALFATE 1 G: 1 SUSPENSION ORAL at 12:45

## 2023-12-11 RX ADMIN — CEFEPIME 2000 MG: 2 INJECTION, POWDER, FOR SOLUTION INTRAVENOUS at 00:24

## 2023-12-11 RX ADMIN — ALBUTEROL SULFATE 2 PUFF: 90 AEROSOL, METERED RESPIRATORY (INHALATION) at 05:45

## 2023-12-11 RX ADMIN — SUCRALFATE 1 G: 1 SUSPENSION ORAL at 10:13

## 2023-12-11 RX ADMIN — SUCRALFATE 1 G: 1 SUSPENSION ORAL at 22:01

## 2023-12-11 RX ADMIN — HYDROCODONE BITARTRATE AND ACETAMINOPHEN 1 TABLET: 5; 325 TABLET ORAL at 22:00

## 2023-12-11 RX ADMIN — PRAMIPEXOLE DIHYDROCHLORIDE 1 MG: 1 TABLET ORAL at 10:13

## 2023-12-11 RX ADMIN — PRAMIPEXOLE DIHYDROCHLORIDE 1 MG: 1 TABLET ORAL at 22:01

## 2023-12-11 RX ADMIN — ENOXAPARIN SODIUM 90 MG: 100 INJECTION SUBCUTANEOUS at 17:28

## 2023-12-11 RX ADMIN — METRONIDAZOLE 500 MG: 500 INJECTION, SOLUTION INTRAVENOUS at 01:22

## 2023-12-11 RX ADMIN — ALBUTEROL SULFATE 2 PUFF: 90 AEROSOL, METERED RESPIRATORY (INHALATION) at 18:34

## 2023-12-11 RX ADMIN — MORPHINE SULFATE 15 MG: 15 TABLET, FILM COATED, EXTENDED RELEASE ORAL at 22:00

## 2023-12-11 RX ADMIN — ALBUTEROL SULFATE 2 PUFF: 90 AEROSOL, METERED RESPIRATORY (INHALATION) at 14:05

## 2023-12-11 RX ADMIN — CEFEPIME 2000 MG: 2 INJECTION, POWDER, FOR SOLUTION INTRAVENOUS at 10:15

## 2023-12-11 RX ADMIN — AZELASTINE HYDROCHLORIDE 1 SPRAY: 137 SPRAY, METERED NASAL at 17:29

## 2023-12-11 RX ADMIN — NYSTATIN 1 APPLICATION: 100000 CREAM TOPICAL at 10:20

## 2023-12-11 NOTE — PLAN OF CARE
Goal Outcome Evaluation:      No change overnight. Patient up adlib. IVF/ABX infusing. Safety maintained.

## 2023-12-11 NOTE — PAYOR COMM NOTE
"Melly Brownlee (68 y.o. Female)  [id number]   Admit  12/10    [facility] phone    fax          Date of Birth   1955    Social Security Number       Address   [address] [facility] [address]    Home Phone   [phone]    MRN   [id number]       Adventism   Non-Caodaism    Marital Status                               Admission Date   12/10/23    Admission Type   Emergency    Admitting Provider   Pablo Pascual Jr., MD    Attending Provider   Pablo Pascual Jr., MD    Department, Room/Bed   [facility] 3C, 365/1       Discharge Date       Discharge Disposition       Discharge Destination                                 Attending Provider: Pablo Pascual Jr., MD    Allergies: Erythromycin, Guaifenesin, Hydromorphone Hcl, Ondansetron Hcl, Orphenadrine, Codeine, Meperidine    Isolation: Enh Drop/Con   Infection: COVID (confirmed) (12/10/23)   Code Status: CPR    Ht: 162.6 cm (64\")   Wt: 89.1 kg (196 lb 8 oz)    Admission Cmt: None   Principal Problem: Abscess of sigmoid colon due to diverticulitis [K57.20]                   Active Insurance as of 12/10/2023       Primary Coverage       Payor Plan Insurance Group Employer/Plan Group    ANTHEM MEDICARE REPLACEMENT ANTHEM MEDICARE ADVANTAGE [id number]       Payor Plan Address Payor Plan Phone Number Payor Plan Fax Number Effective Dates    [address] [phone]  11/1/2022 - None Entered    [facility] [id number]         Subscriber Name Subscriber Birth Date Member ID       MELLY BROWNLEE 1955 [id number]                     Emergency Contacts        (Rel.) Home Phone Work Phone Mobile Phone    Carmelo Brownlee (Spouse) [phone] -- --                 History & Physical        Pablo Pascual Jr., MD at 12/10/23 1503          Patient Name:  Melly Brownlee  YOB: 1955  [id number]       Patient Care " Team:  Reynaldo Jeter DO as PCP - General (Internal Medicine)  Reynaldo Jeter DO as Referring Physician (Internal Medicine)  Michael Wei MD as Cardiologist (Cardiology)  Alfredo Gant APRN as Nurse Practitioner (Nurse Practitioner)  Brenden Granados MD as Surgeon (Neurosurgery)  Gualberto Lopez DO as Consulting Physician (Pain Medicine)  Agusto Thompson MD as Surgeon (Cardiothoracic Surgery)  Beatrice Birmingham MD as Surgeon (General Surgery)  Cedric Delarosa MD as Consulting Physician (Oncology)      General Surgery Consult Note     Date of Consultation: 12/10/23    Consulting Physician : Giovany Quijano MD    Reason for Consult : Complicated sigmoid diverticulitis with 3cm abscess behind the bladder and vaginal wall    Subjective    I have been asked to see  Melly Cruz , a 68 y.o. female in consultation for complicated sigmoid diverticulitis with 3cm abscess behind the bladder and vaginal wall.  68-year-old lady with stage IV lung cancer [right lung] status post bronchoscopy, right lower lobe wedge lobectomy in 9/2023, completed 5 rounds of palliative radiation therapy, completed 3 rounds of chemotherapy most recently 4 weeks ago, being followed by medical oncologist Cedric Delarosa MD at [facility], presents for several day duration of dysuria.    She denies having any hematuria, fecaluria, or pneumaturia.  She admits to having bilateral mild lower quadrant pain.  Denies having any fevers or chills, worsening shortness of air or wheezing.  She has baseline exertional dyspnea, known stage IV right-sided lung cancer.    Her prior history of sigmoid diverticulitis episodes include once in her 20s and another 1 in January 2023.  Both were treated nonsurgically.    ER workup:  Afebrile, vital signs stable.  WBC 3.8, hemoglobin 8.2, creatinine 0.85, UA negative, COVID test positive.  CT PE is notable for nearly resolved pulmonary emboli in the posterior basilar segment of the  right lower lobe, notable known pulmonary and pleural metastatic disease on the right without interval changes.  CT abdomen pelvis is noted for a 3.1 cm abscess posterior to the posterior vaginal cuff and bladder wall and adjacent to the sigmoid colon.  Radiology stating that it is not amenable to percutaneous drainage at this time.    Abdominal surgeries: Laparoscopic cholecystectomy, open ELKE/BSO for menorrhagia and ovarian cyst, open right inguinal hernia repair with mesh x 2    Colonoscopy: 2 months ago at [facility] with known diverticulosis    Notable medical history: Prior history of DVT/PE previously on TLOV, former smoker with COPD, stage IV lung cancer, GERD, depression, restless leg syndrome, hypothyroidism, BMI 33.7    Allergy:   Allergies   Allergen Reactions    Erythromycin Swelling and Anaphylaxis     Throat swells    Guaifenesin Anaphylaxis, Nausea And Vomiting and Swelling    Hydromorphone Hcl Anaphylaxis and Other (See Comments)     Other reaction(s): Shock and/or Unconsciousness  Note: Anaphylaxis    Ondansetron Hcl Other (See Comments)     Coded  Coded  Other reaction(s): Zofran  Note:  Allergic Reaction: Anaphylaxis    Orphenadrine Anaphylaxis    Codeine Rash    Meperidine Other (See Comments)     Makes her an angry person       Medications:  albuterol sulfate HFA, 2 puff, Inhalation, 4x Daily - RT  [START ON 12/11/2023] buPROPion XL, 300 mg, Oral, QAM  cefepime, 2,000 mg, Intravenous, Once  cefepime, 2,000 mg, Intravenous, Q8H  levothyroxine, 125 mcg, Oral, Daily  metroNIDAZOLE, 500 mg, Intravenous, Q8H  Morphine, 15 mg, Oral, BID  nystatin, 1 application , Topical, Q12H  pantoprazole, 40 mg, Oral, Daily  pramipexole, 1 mg, Oral, BID  senna-docusate sodium, 2 tablet, Oral, BID  sodium chloride, 10 mL, Intravenous, Q12H  sucralfate, 1 g, Oral, 4x Daily AC & at Bedtime      Pharmacy to Dose enoxaparin (LOVENOX),       No current facility-administered medications on file prior to encounter.      Current Outpatient Medications on File Prior to Encounter   Medication Sig    albuterol (PROVENTIL HFA;VENTOLIN HFA) 108 (90 Base) MCG/ACT inhaler Inhale 2 puffs 4 (Four) Times a Day.    aluminum-magnesium hydroxide-simethicone (MAALOX MAX) 400-400-40 MG/5ML suspension Take 30 mL by mouth Every 6 (Six) Hours As Needed for Indigestion or Heartburn.    budesonide (Pulmicort Flexhaler) 90 MCG/ACT inhaler Inhale 2 puffs 2 (Two) Times a Day. Swallow, do not inhale    buPROPion XL (WELLBUTRIN XL) 300 MG 24 hr tablet Take 1 tablet by mouth Every Morning.    Cyanocobalamin (VITAMIN B-12 ER) 1000 MCG tablet controlled-release Take 1,000 mcg by mouth Daily.    cyclobenzaprine (FLEXERIL) 10 MG tablet Take 1 tablet by mouth 3 (Three) Times a Day As Needed for Muscle Spasms.    diclofenac (VOLTAREN) 75 MG EC tablet Take 1 tablet by mouth 2 (Two) Times a Day.    EPINEPHrine (EPIPEN) 0.3 MG/0.3ML solution auto-injector injection Inject 0.3 mL into the appropriate muscle as directed by prescriber 1 (One) Time As Needed (allergic reaction).    folic acid (FOLVITE) 1 MG tablet Take 1 tablet by mouth Daily.    levothyroxine (SYNTHROID, LEVOTHROID) 125 MCG tablet Take 1 tablet by mouth Daily.    lidocaine (XYLOCAINE) 5 % ointment Apply 1 application topically to the appropriate area as directed Every 2 (Two) Hours As Needed for Mild Pain.    Morphine (MS CONTIN) 15 MG 12 hr tablet Take 1 tablet by mouth 2 (Two) Times a Day.    nitroglycerin (NITROSTAT) 0.4 MG SL tablet Place 1 tablet under the tongue Every 5 (Five) Minutes As Needed for Chest Pain. Take no more than 3 doses in 15 minutes.    oxyCODONE-acetaminophen (Percocet)  MG per tablet Take 1 tablet by mouth Every 4 (Four) Hours As Needed for Moderate Pain or Severe Pain.    pantoprazole (PROTONIX) 40 MG EC tablet Take 1 tablet by mouth Daily.    pramipexole (MIRAPEX) 1 MG tablet Take 1 tablet by mouth 2 (Two) Times a Day.    promethazine (PHENERGAN) 25 MG tablet Take 1  tablet by mouth Every 6 (Six) Hours As Needed for Nausea or Vomiting for up to 20 doses.    sucralfate (CARAFATE) 1 GM/10ML suspension Take 10 mL by mouth 4 (Four) Times a Day Before Meals & at Bedtime.    tiZANidine (ZANAFLEX) 4 MG tablet Take 0.5-1 tablets by mouth At Night As Needed for Muscle Spasms.    vitamin D (ERGOCALCIFEROL) 58732 units capsule capsule Take 1 capsule by mouth Every 7 (Seven) Days. SUNDAY       PMHx:   Past Medical History:   Diagnosis Date    Acid reflux     Anemia     Anxiety     Arthritis     Asthma     Mild COPD    Curtis esophagus     Bleeding disorder     Reflux acid overload    Bunion Aug    Repaired with surgery    Cancer 03/2023    Waiting on Biopsy to determine what kind and what stage NOW BONE CANCER    Chest pain     Chronic back pain     Chronic neck pain     Clotting disorder     Stomach bleeds from acid    COPD (chronic obstructive pulmonary disease)     Difficulty walking Dec 2022    After surgery wslking has become more and more difficult    DVT (deep venous thrombosis)     GI bleed     Hammer toe Aug 22    Repaired surgery    HTN (hypertension) 12/19/2018    Hypothyroid     Hypothyroidism     Low back pain     Lung cancer     Migraine     Nausea and vomiting 04/05/2023    PE (pulmonary thromboembolism)     Plantar fasciitis Years ago    Renal lesion 04/05/2023    Shin splints Years ago       Past Surgical History:  Past Surgical History:   Procedure Laterality Date    BRONCHOSCOPY N/A 9/26/2023    Procedure: BRONCHOSCOPY,THORACOSCOPY, MULTIPLE PLEURAL BIOPSY, RIGHT LUNG LOWER LOBE WEDGE RESECTION, TALC PLEURODESIS;  Surgeon: Agusto Thompson MD;  Location: [facility];  Service: Cardiothoracic;  Laterality: N/A;    BUNIONECTOMY      CHOLECYSTECTOMY      COLONOSCOPY      CORRECTION HAMMER TOE      DILATATION AND CURETTAGE      FOOT FUSION Left 04/20/2022    Procedure: 1-2 Arthrodesis, Tarsal Metatarsal Joint 2 and 3 Arthrodesis;  Surgeon: Bud Maradiaga DPM;  Location:   [facility];  Service: Podiatry;  Laterality: Left;    HAMMER TOE REPAIR Left 2022    Procedure: Hammertoe Repair 2-5,;  Surgeon: Bud Maradiaga DPM;  Location:  [facility];  Service: Podiatry;  Laterality: Left;    HERNIA REPAIR      HYSTERECTOMY      JOINT REPLACEMENT      OOPHORECTOMY      REDUCTION MAMMAPLASTY  2021    REPLACEMENT TOTAL KNEE      Right knee partial, left knee    THORACOSCOPY Right 2023    Procedure: THORACOSCOPY with WEDGE RESECTION;  Surgeon: Agusto Thompson MD;  Location:  [facility];  Service: Cardiothoracic;  Laterality: Right;        Family History:   Family History   Problem Relation Age of Onset    Breast cancer Paternal Aunt     Heart disease Mother     Osteoporosis Mother     Thyroid disease Mother         Lukemia can’t remember which kind.    Cancer Mother         Pacemaker at 90 years old.    Cancer Father         Father’s entire family  from Cancer    Diabetes Father     Thyroid disease Father     Thyroid disease Maternal Aunt         Blood disorder    Thyroid disease Maternal Uncle     Thyroid disease Sister         Thyroid    Ovarian cancer Neg Hx     Uterine cancer Neg Hx     Colon cancer Neg Hx        Social History:   Social History     Socioeconomic History    Marital status:    Tobacco Use    Smoking status: Former     Packs/day: 1.50     Years: 46.00     Additional pack years: 0.00     Total pack years: 69.00     Types: Cigarettes     Start date: 1972     Quit date: 2018     Years since quittin.6     Passive exposure: Past    Smokeless tobacco: Never    Tobacco comments:     Pt started at age 17, then quit at age 31. Pt restarted smoking at age 32-35 and quit smoking in 2018   Vaping Use    Vaping Use: Never used   Substance and Sexual Activity    Alcohol use: Not Currently     Alcohol/week: 1.0 standard drink of alcohol     Types: 1 Drinks containing 0.5 oz of alcohol per week     Comment: A chocolate drink made with moonshine and  "creamer    Drug use: No    Sexual activity: Yes     Partners: Male     Birth control/protection: Surgical        Review of Systems   Pertinent items are noted in HPI     Constitutional: No fevers, chills or malaise   Eyes: Denies visual changes    Cardiovascular: Denies chest pain, palpitations   Pulmonary: See HPI   Abdominal/ GI: See HPI    Genitourinary: See HPI   Musculoskeletal: Chronic osteoarthritis   Psychiatric: No recent mood changes   Neurologic: No paresthesias or loss of function        Objective    Physical Exam:    Vital Signs  /88   Pulse 92   Temp 98.9 °F (37.2 °C)   Resp 18   Ht 162.6 cm (64\")   Wt 89.1 kg (196 lb 8 oz)   SpO2 96%   BMI 33.73 kg/m²   No intake or output data in the 24 hours ending 12/10/23 1503    Physical Exam:    Head: Normocephalic, atraumatic.   Eyes: Pupils equal, round, react to light and accommodation.   Mouth: Oral mucosa without lesions  Neck: No masses, lymphadenopathy or carotid bruits bilaterally   CV: Rhythm and rate, regular, no murmurs, rubs or gallops  Lungs: Clear to auscultation bilaterally   Abdomen: Bowel sounds positive, soft, nondistended, nonperitoneal, periumbilical fungal cutaneous infection, bilateral lower quadrant mild tenderness to palpation, suprapubic tenderness to palpation, lower midline incisional scar about hernia  Groin : No obvious hernias bilaterally   Extremities:  No cyanosis, clubbing or edema bilaterally   Lymphatics: No abnormal lymphadenopathy appreciated   Neurologic: No gross deficits     Results Review: I have personally reviewed all of the recent lab and imaging results available at this time.   Lab Results (last 48 hours)       Procedure Component Value Units Date/Time    [place] Urine - Urine, Clean Catch [[id number]] Collected: 12/10/23 1200    Specimen: Urine, Clean Catch Updated: 12/10/23 1315     Extra Tube Hold for add-ons.     Comment: Auto resulted.       COVID PRE-OP / PRE-PROCEDURE SCREENING ORDER (NO ISOLATION) " - Swab, Nasopharynx [[id number]]  (Abnormal) Collected: 12/10/23 1200    Specimen: Swab from Nasopharynx Updated: 12/10/23 1259    Narrative:      The following orders were created for panel order COVID PRE-OP / PRE-PROCEDURE SCREENING ORDER (NO ISOLATION) - Swab, Nasopharynx.  Procedure                               Abnormality         Status                     ---------                               -----------         ------                     COVID-19, FLU A/B, RSV P...[[id number]]  Abnormal            Final result                 Please view results for these tests on the individual orders.    COVID-19, FLU A/B, RSV PCR 1 HR TAT - Swab, Nasopharynx [[id number]]  (Abnormal) Collected: 12/10/23 1200    Specimen: Swab from Nasopharynx Updated: 12/10/23 1259     COVID19 Detected     Influenza A PCR Not Detected     Influenza B PCR Not Detected     RSV, PCR Not Detected    Narrative:      Fact sheet for providers: https://www.fda.gov/media/599806/download    Fact sheet for patients: https://www.fda.gov/media/770206/download    Test performed by PCR.    [place] Draw [[id number]] Collected: 12/10/23 1113    Specimen: Blood Updated: 12/10/23 1217    Narrative:      The following orders were created for panel order [place] Draw.  Procedure                               Abnormality         Status                     ---------                               -----------         ------                     Green Top (Gel)[[id number]]                                  Final result               Lavender Top[[id number]]                                     Final result               Red Top[[id number]]                                          Final result               Light Blue Top[[id number]]                                   Final result                 Please view results for these tests on the individual orders.    Green Top (Gel) [[id number]] Collected: 12/10/23 1113    Specimen: Blood Updated: 12/10/23 1217     Extra Tube Hold  for add-ons.     Comment: Auto resulted.       Lavender Top [[id number]] Collected: 12/10/23 1113    Specimen: Blood Updated: 12/10/23 1217     Extra Tube hold for add-on     Comment: Auto resulted       Red Top [[id number]] Collected: 12/10/23 1113    Specimen: Blood Updated: 12/10/23 1217     Extra Tube Hold for add-ons.     Comment: Auto resulted.       Light Blue Top [[id number]] Collected: 12/10/23 1113    Specimen: Blood Updated: 12/10/23 1217     Extra Tube Hold for add-ons.     Comment: Auto resulted       Urinalysis With Culture If Indicated - Urine, Clean Catch [[id number]]  (Normal) Collected: 12/10/23 1200    Specimen: Urine, Clean Catch Updated: 12/10/23 1215     Color, UA Yellow     Appearance, UA Clear     pH, UA 7.0     Specific Gravity, UA 1.010     Glucose, UA Negative     Ketones, UA Negative     Bilirubin, UA Negative     Blood, UA Negative     Protein, UA Negative     Leuk Esterase, UA Negative     Nitrite, UA Negative     Urobilinogen, UA 1.0 E.U./dL    Narrative:      In absence of clinical symptoms, the presence of pyuria, bacteria, and/or nitrites on the urinalysis result does not correlate with infection.  Urine microscopic not indicated.    Comprehensive Metabolic Panel [[id number]]  (Abnormal) Collected: 12/10/23 1113    Specimen: Blood Updated: 12/10/23 1147     Glucose 121 mg/dL      BUN 6 mg/dL      Creatinine 0.85 mg/dL      Sodium 136 mmol/L      Potassium 3.7 mmol/L      Chloride 102 mmol/L      CO2 24.0 mmol/L      Calcium 9.0 mg/dL      Total Protein 7.1 g/dL      Albumin 3.5 g/dL      ALT (SGPT) 18 U/L      AST (SGOT) 20 U/L      Alkaline Phosphatase 93 U/L      Total Bilirubin 0.2 mg/dL      Globulin 3.6 gm/dL      A/G Ratio 1.0 g/dL      BUN/Creatinine Ratio 7.1     Anion Gap 10.0 mmol/L      eGFR 75.7 mL/min/1.73     Narrative:      GFR Normal >60  Chronic Kidney Disease <60  Kidney Failure <15      BNP [[id number]]  (Normal) Collected: 12/10/23 1113    Specimen: Blood Updated:  12/10/23 1144     proBNP 483.0 pg/mL     Narrative:      This assay is used as an aid in the diagnosis of individuals suspected of having heart failure. It can be used as an aid in the diagnosis of acute decompensated heart failure (ADHF) in patients presenting with signs and symptoms of ADHF to the emergency department (ED). In addition, NT-proBNP of <300 pg/mL indicates ADHF is not likely.    Age Range Result Interpretation  NT-proBNP Concentration (pg/mL:      <50             Positive            >450                   Gray                 300-450                    Negative             <300    50-75           Positive            >900                  Gray                300-900                  Negative            <300      >75             Positive            >1800                  Gray                300-1800                  Negative            <300    Single High Sensitivity Troponin T [[id number]]  (Normal) Collected: 12/10/23 1113    Specimen: Blood Updated: 12/10/23 1143     HS Troponin T 7 ng/L     Narrative:      High Sensitive Troponin T Reference Range:  <14.0 ng/L- Negative Female for AMI  <22.0 ng/L- Negative Male for AMI  >=14 - Abnormal Female indicating possible myocardial injury.  >=22 - Abnormal Male indicating possible myocardial injury.   Clinicians would have to utilize clinical acumen, EKG, Troponin, and serial changes to determine if it is an Acute Myocardial Infarction or myocardial injury due to an underlying chronic condition.         CBC & Differential [[id number]]  (Abnormal) Collected: 12/10/23 1113    Specimen: Blood Updated: 12/10/23 1125    Narrative:      The following orders were created for panel order CBC & Differential.  Procedure                               Abnormality         Status                     ---------                               -----------         ------                     CBC Auto Differential[[id number]]        Abnormal            Final result                  Please view results for these tests on the individual orders.    CBC Auto Differential [[id number]]  (Abnormal) Collected: 12/10/23 1113    Specimen: Blood Updated: 12/10/23 1125     WBC 3.79 10*3/mm3      RBC 3.00 10*6/mm3      Hemoglobin 8.2 g/dL      Hematocrit 27.1 %      MCV 90.3 fL      MCH 27.3 pg      MCHC 30.3 g/dL      RDW 24.2 %      RDW-SD 77.1 fl      MPV 9.1 fL      Platelets 223 10*3/mm3      Neutrophil % 64.9 %      Lymphocyte % 19.0 %      Monocyte % 12.7 %      Eosinophil % 2.4 %      Basophil % 0.5 %      Immature Grans % 0.5 %      Neutrophils, Absolute 2.46 10*3/mm3      Lymphocytes, Absolute 0.72 10*3/mm3      Monocytes, Absolute 0.48 10*3/mm3      Eosinophils, Absolute 0.09 10*3/mm3      Basophils, Absolute 0.02 10*3/mm3      Immature Grans, Absolute 0.02 10*3/mm3      nRBC 0.0 /100 WBC                  Assessment & Plan    Assessment and Plan:    Problem List Items Addressed This Visit    None       Active Hospital Problems    Diagnosis  POA    **Abscess of sigmoid colon due to diverticulitis [K57.20]  Yes    Dysuria [R30.0]  Yes    Pelvic abscess in female [N73.9]  Yes    Restless leg syndrome [G25.81]  Yes    Adult BMI 33.0-33.9 kg/sq m [Z68.33]  Not Applicable    History of pulmonary embolism [Z86.711]  Yes    Curtis's esophagus with dysplasia sees [facility] [K22.719]  Yes    Hypothyroidism (acquired) [E03.9]  Yes    Chronic midline thoracic back pain [M54.6, G89.29]  Yes    History of DVT in adulthood  2018 [Z86.718]  Not Applicable    Former heavy tobacco smoker [Z87.891]  Not Applicable    GERD (gastroesophageal reflux disease) [K21.9]  Yes    HTN (hypertension) [I10]  Yes    Iron deficiency anemia [D50.9]  Yes    Malignant neoplasm of upper lobe of right lung [C34.11]  Yes      Resolved Hospital Problems   No resolved problems to display.      68-year-old lady with stage IV lung cancer status post palliative radiation therapy and chemotherapy 4 weeks ago, DVT/PE previously on  therapeutic Lovenox, presenting with positive COVID detection and suspected complicated sigmoid diverticulitis with subsequent symptoms of dysuria    # Complicated sigmoid diverticulitis with dysuria  Admit to general surgery service  IV antibiotics with goal duration of 7 days  Multimodal pain control  Regular diet    # COVID detection  Satting well on room air without tachypnea  Employ COVID precautions    #Anemia, chronic  Continue to monitor    Chronic medical issues present on arrival, stable  # Depression: Wellbutrin  # Residual PE: TLOV  # GERD: PPI, famotidine, sucralfate  # Restless leg syndrome: Pramipexole  # Hypothyroidism: Synthroid  # COPD: Nebulizers as needed  # Stage IV lung cancer with metastatic disease to the right chest wall and chronic pain: Norco as needed, MS Contin 15 mg twice daily    DVT prophylaxis: SCD, ambulate, will be on therapeutic Lovenox  Status: Full code  Discharge disposition: 3-5 hospital days      I discussed the patient's findings and my recommendations with the patient and/or family, as well as the primary team.    Pablo Pascual Jr, MD  12/10/23  15:03 CST          Electronically signed by Pablo Pascual Jr., MD at 12/10/23 1517          Emergency Department Notes        Patricia Hoffman, RN at 12/10/23 1350          Pt took her 15 mg Extended release Morphine from home that she has. ED physician approved.     Electronically signed by Patricia Hoffman RN at 12/10/23 1353       Vital Signs (last day)       Date/Time Temp Temp src Pulse Resp BP Patient Position SpO2    12/11/23 0751 98.3 (36.8) Oral 88 18 117/63 Sitting 96    12/11/23 0545 -- -- 84 18 -- -- 96    12/11/23 0448 98.2 (36.8) Oral 83 20 123/72 Lying 98    12/10/23 2347 99.4 (37.4) Oral 97 20 124/71 Lying 90    12/10/23 2043 99.2 (37.3) Oral 88 20 109/57 Lying 95    12/10/23 1919 -- -- 93 20 -- -- 98    12/10/23 1700 99.3 (37.4) Oral 91 20 144/85 -- 96    12/10/23 1602 98.2 (36.8) -- 88 20  116/69 -- 94    12/10/23 1351 -- -- -- -- -- -- 96    12/10/23 1350 -- -- 92 18 133/88 -- --    12/10/23 1216 -- -- 84 -- 116/72 -- 97    12/10/23 1201 -- -- 85 -- 122/71 -- 96    12/10/23 1146 -- -- 82 -- 110/67 -- 96    12/10/23 1143 -- -- 83 -- 112/67 -- 97    12/10/23 1105 98.9 (37.2) -- -- -- -- -- --    12/10/23 1103 -- -- 101 21 152/85 -- 96          Current Facility-Administered Medications   Medication Dose Route Frequency Provider Last Rate Last Admin    albuterol sulfate HFA (PROVENTIL HFA;VENTOLIN HFA;PROAIR HFA) inhaler 2 puff  2 puff Inhalation 4x Daily - RT Pablo Pascual Jr., MD   2 puff at 12/11/23 0545    sennosides-docusate (PERICOLACE) 8.6-50 MG per tablet 2 tablet  2 tablet Oral BID Pablo Pascual Jr., MD   2 tablet at 12/10/23 2003    And    polyethylene glycol (MIRALAX) packet 17 g  17 g Oral Daily PRN Pablo Pascual Jr., MD        And    bisacodyl (DULCOLAX) EC tablet 5 mg  5 mg Oral Daily PRN Pablo Pascual Jr., MD        And    bisacodyl (DULCOLAX) suppository 10 mg  10 mg Rectal Daily PRN Pablo Pascual Jr., MD        buPROPion XL (WELLBUTRIN XL) 24 hr tablet 300 mg  300 mg Oral Daily Pablo Pascual Jr., MD   300 mg at 12/10/23 1753    Calcium Replacement - Follow Nurse / BPA Driven Protocol   Does not apply PRN Pablo Pascual Jr., MD        cefepime 2 gm IVPB in 100 ml NS (MBP)  2,000 mg Intravenous Q8H Pablo Pascual Jr., MD   2,000 mg at 12/11/23 0024    Enoxaparin Sodium (LOVENOX) syringe 90 mg  1 mg/kg Subcutaneous Q12H Pablo Pascual Jr., MD   90 mg at 12/11/23 0627    HYDROcodone-acetaminophen (NORCO) 5-325 MG per tablet 1 tablet  1 tablet Oral Q6H PRN Pablo Pascual Jr., MD   1 tablet at 12/11/23 0632    levothyroxine (SYNTHROID, LEVOTHROID) tablet 125 mcg  125 mcg Oral Q AM Pablo Pascual Jr., MD   125 mcg at 12/11/23 0627    Magnesium Standard Dose Replacement - Follow  Nurse / BPA Driven Protocol   Does not apply PRN Pablo Pascual Jr., MD        metroNIDAZOLE (FLAGYL) IVPB 500 mg  500 mg Intravenous Q8H Pablo Pascual Jr.,  mL/hr at 12/11/23 0122 500 mg at 12/11/23 0122    Morphine (MS CONTIN) 12 hr tablet 15 mg  15 mg Oral BID Pablo Pascual Jr., MD   15 mg at 12/10/23 2003    nitroglycerin (NITROSTAT) SL tablet 0.4 mg  0.4 mg Sublingual Q5 Min PRN Pablo Pascual Jr., MD        nystatin (MYCOSTATIN) 716824 UNIT/GM cream 1 application   1 application  Topical Q12H Pablo Pascual Jr., MD   1 application  at 12/10/23 2004    pantoprazole (PROTONIX) EC tablet 40 mg  40 mg Oral Daily Pablo Pascual Jr., MD        Pharmacy to Dose enoxaparin (LOVENOX)   Does not apply Continuous PRN Pablo Pascual Jr., MD        Phosphorus Replacement - Follow Nurse / BPA Driven Protocol   Does not apply PRN Pablo Pascual Jr., MD        Potassium Replacement - Follow Nurse / BPA Driven Protocol   Does not apply Pablo Tejada Jr., MD        pramipexole (MIRAPEX) tablet 1 mg  1 mg Oral BID Pablo Pascual Jr., MD   1 mg at 12/10/23 2003    promethazine (PHENERGAN) tablet 25 mg  25 mg Oral Q6H PRN Pablo Pascual Jr., MD        sodium chloride 0.9 % flush 10 mL  10 mL Intravenous PRN Giovany Quijano MD        sodium chloride 0.9 % flush 10 mL  10 mL Intravenous Q12H Pablo Pascual Jr., MD   10 mL at 12/10/23 2004    sodium chloride 0.9 % flush 10 mL  10 mL Intravenous PRN Pablo Pascual Jr., MD        sodium chloride 0.9 % infusion 40 mL  40 mL Intravenous PRN Pablo Pascual Jr., MD        sucralfate (CARAFATE) 1 GM/10ML suspension 1 g  1 g Oral 4x Daily AC & at Bedtime Pablo Pascual Jr., MD   1 g at 12/10/23 2003        Physician Progress Notes (last 24 hours)        Pablo Pascual Jr., MD at 12/11/23 0807          Patient Name:  Melly  "Kwesi Cruz  YOB: 1955  [id number]    Surgery Progress Note    Date of visit: 12/11/2023    Subjective   Subjective: NAEO, AF, VSS. Pt states that she is feeling much improved. Still has some suprapubic pain when bearing down.      Objective     Objective:     /63 (BP Location: Left arm, Patient Position: Sitting)   Pulse 88   Temp 98.3 °F (36.8 °C) (Oral)   Resp 18   Ht 162.6 cm (64\")   Wt 89.1 kg (196 lb 8 oz)   SpO2 96%   BMI 33.73 kg/m²   No intake or output data in the 24 hours ending 12/11/23 0807    CV:  Rhythm regular and rate regular   Pulm: Symmetrical chest rise without respiratory distress  Abd:  Soft, obese abd, mild suprapubic TTP - much improved compared to last night  Ext:  No cyanosis, clubbing, edema    Recent labs that are back at this time have been reviewed.   Lab Results (last 48 hours)       Procedure Component Value Units Date/Time    Basic Metabolic Panel [[id number]]  (Abnormal) Collected: 12/11/23 0240    Specimen: Blood Updated: 12/11/23 0316     Glucose 102 mg/dL      BUN 6 mg/dL      Creatinine 0.84 mg/dL      Sodium 131 mmol/L      Potassium 3.9 mmol/L      Chloride 101 mmol/L      CO2 18.0 mmol/L      Calcium 8.9 mg/dL      BUN/Creatinine Ratio 7.1     Anion Gap 12.0 mmol/L      eGFR 76.8 mL/min/1.73     Narrative:      GFR Normal >60  Chronic Kidney Disease <60  Kidney Failure <15      Phosphorus [[id number]]  (Normal) Collected: 12/11/23 0240    Specimen: Blood Updated: 12/11/23 0316     Phosphorus 3.8 mg/dL     Magnesium [[id number]]  (Normal) Collected: 12/11/23 0240    Specimen: Blood Updated: 12/11/23 0316     Magnesium 2.0 mg/dL     CBC (No Diff) [[id number]]  (Abnormal) Collected: 12/11/23 0240    Specimen: Blood Updated: 12/11/23 0303     WBC 3.03 10*3/mm3      RBC 3.01 10*6/mm3      Hemoglobin 8.2 g/dL      Hematocrit 29.8 %      MCV 99.0 fL      MCH 27.2 pg      MCHC 27.5 g/dL      RDW 25.1 %      RDW-SD 87.1 fl      MPV 9.1 fL      Platelets 218 " 10*3/mm3     Basic Metabolic Panel [[id number]]  (Abnormal) Collected: 12/10/23 1747    Specimen: Blood Updated: 12/10/23 1826     Glucose 114 mg/dL      BUN 6 mg/dL      Creatinine 0.81 mg/dL      Sodium 133 mmol/L      Potassium 3.7 mmol/L      Chloride 101 mmol/L      CO2 23.0 mmol/L      Calcium 8.9 mg/dL      BUN/Creatinine Ratio 7.4     Anion Gap 9.0 mmol/L      eGFR 80.2 mL/min/1.73     Narrative:      GFR Normal >60  Chronic Kidney Disease <60  Kidney Failure <15      Phosphorus [[id number]]  (Normal) Collected: 12/10/23 1747    Specimen: Blood Updated: 12/10/23 1826     Phosphorus 3.1 mg/dL     Magnesium [[id number]]  (Normal) Collected: 12/10/23 1747    Specimen: Blood Updated: 12/10/23 1826     Magnesium 2.1 mg/dL     CBC (No Diff) [[id number]]  (Abnormal) Collected: 12/10/23 1747    Specimen: Blood Updated: 12/10/23 1809     WBC 3.84 10*3/mm3      RBC 2.88 10*6/mm3      Hemoglobin 7.9 g/dL      Hematocrit 25.8 %      MCV 89.6 fL      MCH 27.4 pg      MCHC 30.6 g/dL      RDW 24.0 %      RDW-SD 76.5 fl      MPV 9.2 fL      Platelets 233 10*3/mm3     [place] Urine - Urine, Clean Catch [[id number]] Collected: 12/10/23 1200    Specimen: Urine, Clean Catch Updated: 12/10/23 1315     Extra Tube Hold for add-ons.     Comment: Auto resulted.       COVID PRE-OP / PRE-PROCEDURE SCREENING ORDER (NO ISOLATION) - Swab, Nasopharynx [[id number]]  (Abnormal) Collected: 12/10/23 1200    Specimen: Swab from Nasopharynx Updated: 12/10/23 1259    Narrative:      The following orders were created for panel order COVID PRE-OP / PRE-PROCEDURE SCREENING ORDER (NO ISOLATION) - Swab, Nasopharynx.  Procedure                               Abnormality         Status                     ---------                               -----------         ------                     COVID-19, FLU A/B, RSV P...[[id number]]  Abnormal            Final result                 Please view results for these tests on the individual orders.    COVID-19, FLU  A/B, RSV PCR 1 HR TAT - Swab, Nasopharynx [[id number]]  (Abnormal) Collected: 12/10/23 1200    Specimen: Swab from Nasopharynx Updated: 12/10/23 1259     COVID19 Detected     Influenza A PCR Not Detected     Influenza B PCR Not Detected     RSV, PCR Not Detected    Narrative:      Fact sheet for providers: https://www.fda.gov/media/969669/download    Fact sheet for patients: https://www.fda.gov/media/436416/download    Test performed by PCR.    [place] Draw [[id number]] Collected: 12/10/23 1113    Specimen: Blood Updated: 12/10/23 1217    Narrative:      The following orders were created for panel order [place] Draw.  Procedure                               Abnormality         Status                     ---------                               -----------         ------                     Green Top (Gel)[[id number]]                                  Final result               Lavender Top[[id number]]                                     Final result               Red Top[[id number]]                                          Final result               Light Blue Top[[id number]]                                   Final result                 Please view results for these tests on the individual orders.    Green Top (Gel) [[id number]] Collected: 12/10/23 1113    Specimen: Blood Updated: 12/10/23 1217     Extra Tube Hold for add-ons.     Comment: Auto resulted.       Lavender Top [[id number]] Collected: 12/10/23 1113    Specimen: Blood Updated: 12/10/23 1217     Extra Tube hold for add-on     Comment: Auto resulted       Red Top [[id number]] Collected: 12/10/23 1113    Specimen: Blood Updated: 12/10/23 1217     Extra Tube Hold for add-ons.     Comment: Auto resulted.       Light Blue Top [[id number]] Collected: 12/10/23 1113    Specimen: Blood Updated: 12/10/23 1217     Extra Tube Hold for add-ons.     Comment: Auto resulted       Urinalysis With Culture If Indicated - Urine, Clean Catch [[id number]]  (Normal) Collected: 12/10/23 1200     Specimen: Urine, Clean Catch Updated: 12/10/23 1215     Color, UA Yellow     Appearance, UA Clear     pH, UA 7.0     Specific Gravity, UA 1.010     Glucose, UA Negative     Ketones, UA Negative     Bilirubin, UA Negative     Blood, UA Negative     Protein, UA Negative     Leuk Esterase, UA Negative     Nitrite, UA Negative     Urobilinogen, UA 1.0 E.U./dL    Narrative:      In absence of clinical symptoms, the presence of pyuria, bacteria, and/or nitrites on the urinalysis result does not correlate with infection.  Urine microscopic not indicated.    Comprehensive Metabolic Panel [[id number]]  (Abnormal) Collected: 12/10/23 1113    Specimen: Blood Updated: 12/10/23 1147     Glucose 121 mg/dL      BUN 6 mg/dL      Creatinine 0.85 mg/dL      Sodium 136 mmol/L      Potassium 3.7 mmol/L      Chloride 102 mmol/L      CO2 24.0 mmol/L      Calcium 9.0 mg/dL      Total Protein 7.1 g/dL      Albumin 3.5 g/dL      ALT (SGPT) 18 U/L      AST (SGOT) 20 U/L      Alkaline Phosphatase 93 U/L      Total Bilirubin 0.2 mg/dL      Globulin 3.6 gm/dL      A/G Ratio 1.0 g/dL      BUN/Creatinine Ratio 7.1     Anion Gap 10.0 mmol/L      eGFR 75.7 mL/min/1.73     Narrative:      GFR Normal >60  Chronic Kidney Disease <60  Kidney Failure <15      BNP [[id number]]  (Normal) Collected: 12/10/23 1113    Specimen: Blood Updated: 12/10/23 1144     proBNP 483.0 pg/mL     Narrative:      This assay is used as an aid in the diagnosis of individuals suspected of having heart failure. It can be used as an aid in the diagnosis of acute decompensated heart failure (ADHF) in patients presenting with signs and symptoms of ADHF to the emergency department (ED). In addition, NT-proBNP of <300 pg/mL indicates ADHF is not likely.    Age Range Result Interpretation  NT-proBNP Concentration (pg/mL:      <50             Positive            >450                   Gray                 300-450                    Negative             <300    50-75            Positive            >900                  Gray                300-900                  Negative            <300      >75             Positive            >1800                  Gray                300-1800                  Negative            <300    Single High Sensitivity Troponin T [[id number]]  (Normal) Collected: 12/10/23 1113    Specimen: Blood Updated: 12/10/23 1143     HS Troponin T 7 ng/L     Narrative:      High Sensitive Troponin T Reference Range:  <14.0 ng/L- Negative Female for AMI  <22.0 ng/L- Negative Male for AMI  >=14 - Abnormal Female indicating possible myocardial injury.  >=22 - Abnormal Male indicating possible myocardial injury.   Clinicians would have to utilize clinical acumen, EKG, Troponin, and serial changes to determine if it is an Acute Myocardial Infarction or myocardial injury due to an underlying chronic condition.         CBC & Differential [[id number]]  (Abnormal) Collected: 12/10/23 1113    Specimen: Blood Updated: 12/10/23 1125    Narrative:      The following orders were created for panel order CBC & Differential.  Procedure                               Abnormality         Status                     ---------                               -----------         ------                     CBC Auto Differential[[id number]]        Abnormal            Final result                 Please view results for these tests on the individual orders.    CBC Auto Differential [[id number]]  (Abnormal) Collected: 12/10/23 1113    Specimen: Blood Updated: 12/10/23 1125     WBC 3.79 10*3/mm3      RBC 3.00 10*6/mm3      Hemoglobin 8.2 g/dL      Hematocrit 27.1 %      MCV 90.3 fL      MCH 27.3 pg      MCHC 30.3 g/dL      RDW 24.2 %      RDW-SD 77.1 fl      MPV 9.1 fL      Platelets 223 10*3/mm3      Neutrophil % 64.9 %      Lymphocyte % 19.0 %      Monocyte % 12.7 %      Eosinophil % 2.4 %      Basophil % 0.5 %      Immature Grans % 0.5 %      Neutrophils, Absolute 2.46 10*3/mm3      Lymphocytes, Absolute  0.72 10*3/mm3      Monocytes, Absolute 0.48 10*3/mm3      Eosinophils, Absolute 0.09 10*3/mm3      Basophils, Absolute 0.02 10*3/mm3      Immature Grans, Absolute 0.02 10*3/mm3      nRBC 0.0 /100 WBC                 Assessment & Plan     Assessment/ Plan:    Problem List Items Addressed This Visit    None       Active Hospital Problems    Diagnosis  POA    **Abscess of sigmoid colon due to diverticulitis [K57.20]  Yes    Dysuria [R30.0]  Yes    Pelvic abscess in female [N73.9]  Yes    Primary osteoarthritis of both knees [M17.0]  Yes    Recurrent major depressive disorder, in full remission [F33.42]  Yes    COVID-19 virus detected [U07.1]  Yes    Restless leg syndrome [G25.81]  Yes    Adult BMI 33.0-33.9 kg/sq m [Z68.33]  Not Applicable    History of pulmonary embolism [Z86.711]  Yes    Curtis's esophagus with dysplasia sees [facility] [K22.719]  Yes    Hypothyroidism (acquired) [E03.9]  Yes    Chronic midline thoracic back pain [M54.6, G89.29]  Yes    History of DVT in adulthood  2018 [Z86.718]  Not Applicable    Former heavy tobacco smoker [Z87.891]  Not Applicable    GERD (gastroesophageal reflux disease) [K21.9]  Yes    HTN (hypertension) [I10]  Yes    Iron deficiency anemia secondary to inadequate dietary iron intake [D50.8]  Yes    Malignant neoplasm of upper lobe of right lung [C34.11]  Yes      Resolved Hospital Problems   No resolved problems to display.      68F presented with complicated sigmoid diverticulitis with 3.1cm abscess posterior to the bladder and in a location not amenable to IR drainage, clinically improving, WBC 3.0 from 3.8    Continue IV Abx  Discussed with pt that we may need to keep her another day after tomorrow if her WBC continues to downtrend  Multimodal pain ctrl  Suppository 2023 if no BM by then    Discharge plannin-2 days home with Abx    # COVID detection - asymptomatic  Continues to sat well on room air without tachypnea  COVID precautions in place     #Anemia,  chronic - stable, asymptomatic  Continue to monitor     Chronic medical issues present on arrival, stable  # Depression: Wellbutrin  # Residual PE: TLOV  # GERD: PPI, famotidine, sucralfate  # Restless leg syndrome: Pramipexole  # Hypothyroidism: Synthroid  # COPD: Nebulizers as needed  # Stage IV lung cancer with metastatic disease to the right chest wall and chronic pain: Norco as needed, MS Contin 15 mg twice daily     DVT prophylaxis: SCD, ambulate, will be on therapeutic Lovenox  Status: Full code    Pablo Pascual Jr, MD  12/11/2023  08:07 CST      Electronically signed by Pablo Pascual Jr., MD at 12/11/23 0810

## 2023-12-11 NOTE — CASE MANAGEMENT/SOCIAL WORK
Discharge Planning Assessment  [facility]     Patient Name: Melly Cruz  MRN: [id number]  Today's Date: 12/11/2023    Admit Date: 12/10/2023        Discharge Needs Assessment       Row Name 12/11/23 0859       Discharge Needs Assessment    Discharge Coordination/Progress Tried to reach pt spouse Carmelo [phone]; no answer and no voicemail box set up yet.  Pt is COVID + and unable to go into pt room.  Also tried calling pt cell phone and got a voicemail.  Will attempt to call spouse again later today.                   Discharge Plan    No documentation.                 Continued Care and Services - Admitted Since 12/10/2023    Coordination has not been started for this encounter.       Expected Discharge Date and Time       Expected Discharge Date Expected Discharge Time    Dec 15, 2023            Demographic Summary    No documentation.                  Functional Status    No documentation.                  Psychosocial    No documentation.                  Abuse/Neglect    No documentation.                  Legal    No documentation.                  Substance Abuse    No documentation.                  Patient Forms    No documentation.                     Yudy Hansen RN

## 2023-12-11 NOTE — PROGRESS NOTES
"Patient Name:  Melly Cruz  YOB: 1955  [id number]    Surgery Progress Note    Date of visit: 12/11/2023    Subjective   Subjective: NAEO, AF, VSS. Pt states that she is feeling much improved. Still has some suprapubic pain when bearing down.       Objective     Objective:     /63 (BP Location: Left arm, Patient Position: Sitting)   Pulse 88   Temp 98.3 °F (36.8 °C) (Oral)   Resp 18   Ht 162.6 cm (64\")   Wt 89.1 kg (196 lb 8 oz)   SpO2 96%   BMI 33.73 kg/m²   No intake or output data in the 24 hours ending 12/11/23 0807    CV:  Rhythm regular and rate regular   Pulm: Symmetrical chest rise without respiratory distress  Abd:  Soft, obese abd, mild suprapubic TTP - much improved compared to last night  Ext:  No cyanosis, clubbing, edema    Recent labs that are back at this time have been reviewed.   Lab Results (last 48 hours)       Procedure Component Value Units Date/Time    Basic Metabolic Panel [[id number]]  (Abnormal) Collected: 12/11/23 0240    Specimen: Blood Updated: 12/11/23 0316     Glucose 102 mg/dL      BUN 6 mg/dL      Creatinine 0.84 mg/dL      Sodium 131 mmol/L      Potassium 3.9 mmol/L      Chloride 101 mmol/L      CO2 18.0 mmol/L      Calcium 8.9 mg/dL      BUN/Creatinine Ratio 7.1     Anion Gap 12.0 mmol/L      eGFR 76.8 mL/min/1.73     Narrative:      GFR Normal >60  Chronic Kidney Disease <60  Kidney Failure <15      Phosphorus [[id number]]  (Normal) Collected: 12/11/23 0240    Specimen: Blood Updated: 12/11/23 0316     Phosphorus 3.8 mg/dL     Magnesium [[id number]]  (Normal) Collected: 12/11/23 0240    Specimen: Blood Updated: 12/11/23 0316     Magnesium 2.0 mg/dL     CBC (No Diff) [[id number]]  (Abnormal) Collected: 12/11/23 0240    Specimen: Blood Updated: 12/11/23 0303     WBC 3.03 10*3/mm3      RBC 3.01 10*6/mm3      Hemoglobin 8.2 g/dL      Hematocrit 29.8 %      MCV 99.0 fL      MCH 27.2 pg      MCHC 27.5 g/dL      RDW 25.1 %      RDW-SD 87.1 fl      MPV 9.1 fL "      Platelets 218 10*3/mm3     Basic Metabolic Panel [[id number]]  (Abnormal) Collected: 12/10/23 1747    Specimen: Blood Updated: 12/10/23 1826     Glucose 114 mg/dL      BUN 6 mg/dL      Creatinine 0.81 mg/dL      Sodium 133 mmol/L      Potassium 3.7 mmol/L      Chloride 101 mmol/L      CO2 23.0 mmol/L      Calcium 8.9 mg/dL      BUN/Creatinine Ratio 7.4     Anion Gap 9.0 mmol/L      eGFR 80.2 mL/min/1.73     Narrative:      GFR Normal >60  Chronic Kidney Disease <60  Kidney Failure <15      Phosphorus [[id number]]  (Normal) Collected: 12/10/23 1747    Specimen: Blood Updated: 12/10/23 1826     Phosphorus 3.1 mg/dL     Magnesium [[id number]]  (Normal) Collected: 12/10/23 1747    Specimen: Blood Updated: 12/10/23 1826     Magnesium 2.1 mg/dL     CBC (No Diff) [[id number]]  (Abnormal) Collected: 12/10/23 1747    Specimen: Blood Updated: 12/10/23 1809     WBC 3.84 10*3/mm3      RBC 2.88 10*6/mm3      Hemoglobin 7.9 g/dL      Hematocrit 25.8 %      MCV 89.6 fL      MCH 27.4 pg      MCHC 30.6 g/dL      RDW 24.0 %      RDW-SD 76.5 fl      MPV 9.2 fL      Platelets 233 10*3/mm3     [place] Urine - Urine, Clean Catch [[id number]] Collected: 12/10/23 1200    Specimen: Urine, Clean Catch Updated: 12/10/23 1315     Extra Tube Hold for add-ons.     Comment: Auto resulted.       COVID PRE-OP / PRE-PROCEDURE SCREENING ORDER (NO ISOLATION) - Swab, Nasopharynx [[id number]]  (Abnormal) Collected: 12/10/23 1200    Specimen: Swab from Nasopharynx Updated: 12/10/23 1259    Narrative:      The following orders were created for panel order COVID PRE-OP / PRE-PROCEDURE SCREENING ORDER (NO ISOLATION) - Swab, Nasopharynx.  Procedure                               Abnormality         Status                     ---------                               -----------         ------                     COVID-19, FLU A/B, RSV P...[[id number]]  Abnormal            Final result                 Please view results for these tests on the individual orders.     COVID-19, FLU A/B, RSV PCR 1 HR TAT - Swab, Nasopharynx [[id number]]  (Abnormal) Collected: 12/10/23 1200    Specimen: Swab from Nasopharynx Updated: 12/10/23 1259     COVID19 Detected     Influenza A PCR Not Detected     Influenza B PCR Not Detected     RSV, PCR Not Detected    Narrative:      Fact sheet for providers: https://www.fda.gov/media/852225/download    Fact sheet for patients: https://www.fda.gov/media/849157/download    Test performed by PCR.    [place] Draw [[id number]] Collected: 12/10/23 1113    Specimen: Blood Updated: 12/10/23 1217    Narrative:      The following orders were created for panel order [place] Draw.  Procedure                               Abnormality         Status                     ---------                               -----------         ------                     Green Top (Gel)[[id number]]                                  Final result               Lavender Top[[id number]]                                     Final result               Red Top[[id number]]                                          Final result               Light Blue Top[[id number]]                                   Final result                 Please view results for these tests on the individual orders.    Green Top (Gel) [[id number]] Collected: 12/10/23 1113    Specimen: Blood Updated: 12/10/23 1217     Extra Tube Hold for add-ons.     Comment: Auto resulted.       L[place]ender Top [[id number]] Collected: 12/10/23 1113    Specimen: Blood Updated: 12/10/23 1217     Extra Tube hold for add-on     Comment: Auto resulted       Red Top [[id number]] Collected: 12/10/23 1113    Specimen: Blood Updated: 12/10/23 1217     Extra Tube Hold for add-ons.     Comment: Auto resulted.       Light Blue Top [[id number]] Collected: 12/10/23 1113    Specimen: Blood Updated: 12/10/23 1217     Extra Tube Hold for add-ons.     Comment: Auto resulted       Urinalysis With Culture If Indicated - Urine, Clean Catch [[id number]]  (Normal)  Collected: 12/10/23 1200    Specimen: Urine, Clean Catch Updated: 12/10/23 1215     Color, UA Yellow     Appearance, UA Clear     pH, UA 7.0     Specific Gravity, UA 1.010     Glucose, UA Negative     Ketones, UA Negative     Bilirubin, UA Negative     Blood, UA Negative     Protein, UA Negative     Leuk Esterase, UA Negative     Nitrite, UA Negative     Urobilinogen, UA 1.0 E.U./dL    Narrative:      In absence of clinical symptoms, the presence of pyuria, bacteria, and/or nitrites on the urinalysis result does not correlate with infection.  Urine microscopic not indicated.    Comprehensive Metabolic Panel [[id number]]  (Abnormal) Collected: 12/10/23 1113    Specimen: Blood Updated: 12/10/23 1147     Glucose 121 mg/dL      BUN 6 mg/dL      Creatinine 0.85 mg/dL      Sodium 136 mmol/L      Potassium 3.7 mmol/L      Chloride 102 mmol/L      CO2 24.0 mmol/L      Calcium 9.0 mg/dL      Total Protein 7.1 g/dL      Albumin 3.5 g/dL      ALT (SGPT) 18 U/L      AST (SGOT) 20 U/L      Alkaline Phosphatase 93 U/L      Total Bilirubin 0.2 mg/dL      Globulin 3.6 gm/dL      A/G Ratio 1.0 g/dL      BUN/Creatinine Ratio 7.1     Anion Gap 10.0 mmol/L      eGFR 75.7 mL/min/1.73     Narrative:      GFR Normal >60  Chronic Kidney Disease <60  Kidney Failure <15      BNP [[id number]]  (Normal) Collected: 12/10/23 1113    Specimen: Blood Updated: 12/10/23 1144     proBNP 483.0 pg/mL     Narrative:      This assay is used as an aid in the diagnosis of individuals suspected of having heart failure. It can be used as an aid in the diagnosis of acute decompensated heart failure (ADHF) in patients presenting with signs and symptoms of ADHF to the emergency department (ED). In addition, NT-proBNP of <300 pg/mL indicates ADHF is not likely.    Age Range Result Interpretation  NT-proBNP Concentration (pg/mL:      <50             Positive            >450                   Gray                 300-450                    Negative              <300    50-75           Positive            >900                  Gray                300-900                  Negative            <300      >75             Positive            >1800                  Gray                300-1800                  Negative            <300    Single High Sensitivity Troponin T [[id number]]  (Normal) Collected: 12/10/23 1113    Specimen: Blood Updated: 12/10/23 1143     HS Troponin T 7 ng/L     Narrative:      High Sensitive Troponin T Reference Range:  <14.0 ng/L- Negative Female for AMI  <22.0 ng/L- Negative Male for AMI  >=14 - Abnormal Female indicating possible myocardial injury.  >=22 - Abnormal Male indicating possible myocardial injury.   Clinicians would have to utilize clinical acumen, EKG, Troponin, and serial changes to determine if it is an Acute Myocardial Infarction or myocardial injury due to an underlying chronic condition.         CBC & Differential [[id number]]  (Abnormal) Collected: 12/10/23 1113    Specimen: Blood Updated: 12/10/23 1125    Narrative:      The following orders were created for panel order CBC & Differential.  Procedure                               Abnormality         Status                     ---------                               -----------         ------                     CBC Auto Differential[[id number]]        Abnormal            Final result                 Please view results for these tests on the individual orders.    CBC Auto Differential [[id number]]  (Abnormal) Collected: 12/10/23 1113    Specimen: Blood Updated: 12/10/23 1125     WBC 3.79 10*3/mm3      RBC 3.00 10*6/mm3      Hemoglobin 8.2 g/dL      Hematocrit 27.1 %      MCV 90.3 fL      MCH 27.3 pg      MCHC 30.3 g/dL      RDW 24.2 %      RDW-SD 77.1 fl      MPV 9.1 fL      Platelets 223 10*3/mm3      Neutrophil % 64.9 %      Lymphocyte % 19.0 %      Monocyte % 12.7 %      Eosinophil % 2.4 %      Basophil % 0.5 %      Immature Grans % 0.5 %      Neutrophils, Absolute 2.46 10*3/mm3       Lymphocytes, Absolute 0.72 10*3/mm3      Monocytes, Absolute 0.48 10*3/mm3      Eosinophils, Absolute 0.09 10*3/mm3      Basophils, Absolute 0.02 10*3/mm3      Immature Grans, Absolute 0.02 10*3/mm3      nRBC 0.0 /100 WBC                  Assessment & Plan     Assessment/ Plan:    Problem List Items Addressed This Visit    None       Active Hospital Problems    Diagnosis  POA    **Abscess of sigmoid colon due to diverticulitis [K57.20]  Yes    Dysuria [R30.0]  Yes    Pelvic abscess in female [N73.9]  Yes    Primary osteoarthritis of both knees [M17.0]  Yes    Recurrent major depressive disorder, in full remission [F33.42]  Yes    COVID-19 virus detected [U07.1]  Yes    Restless leg syndrome [G25.81]  Yes    Adult BMI 33.0-33.9 kg/sq m [Z68.33]  Not Applicable    History of pulmonary embolism [Z86.711]  Yes    Curits's esophagus with dysplasia sees [facility] [K22.719]  Yes    Hypothyroidism (acquired) [E03.9]  Yes    Chronic midline thoracic back pain [M54.6, G89.29]  Yes    History of DVT in adulthood  2018 [Z86.718]  Not Applicable    Former heavy tobacco smoker [Z87.891]  Not Applicable    GERD (gastroesophageal reflux disease) [K21.9]  Yes    HTN (hypertension) [I10]  Yes    Iron deficiency anemia secondary to inadequate dietary iron intake [D50.8]  Yes    Malignant neoplasm of upper lobe of right lung [C34.11]  Yes      Resolved Hospital Problems   No resolved problems to display.      68F presented with complicated sigmoid diverticulitis with 3.1cm abscess posterior to the bladder and in a location not amenable to IR drainage, clinically improving, WBC 3.0 from 3.8    Continue IV Abx  Discussed with pt that we may need to keep her another day after tomorrow if her WBC continues to downtrend  Multimodal pain ctrl  Suppository 2023 if no BM by then    Discharge plannin-2 days home with Abx    # COVID detection - asymptomatic  Continues to sat well on room air without tachypnea  COVID precautions in  place     #Anemia, chronic - stable, asymptomatic  Continue to monitor     Chronic medical issues present on arrival, stable  # Depression: Wellbutrin  # Residual PE: TLOV  # GERD: PPI, famotidine, sucralfate  # Restless leg syndrome: Pramipexole  # Hypothyroidism: Synthroid  # COPD: Nebulizers as needed  # Stage IV lung cancer with metastatic disease to the right chest wall and chronic pain: Norco as needed, MS Contin 15 mg twice daily     DVT prophylaxis: SCD, ambulate, will be on therapeutic Lovenox  Status: Full code    Pablo Pascual Jr, MD  12/11/2023  08:07 CST

## 2023-12-12 ENCOUNTER — READMISSION MANAGEMENT (OUTPATIENT)
Dept: CALL CENTER | Facility: HOSPITAL | Age: 68
End: 2023-12-12
Payer: MEDICARE

## 2023-12-12 VITALS
HEART RATE: 87 BPM | DIASTOLIC BLOOD PRESSURE: 66 MMHG | OXYGEN SATURATION: 96 % | TEMPERATURE: 97.9 F | SYSTOLIC BLOOD PRESSURE: 125 MMHG | RESPIRATION RATE: 18 BRPM | BODY MASS INDEX: 33.55 KG/M2 | HEIGHT: 64 IN | WEIGHT: 196.5 LBS

## 2023-12-12 LAB
ANION GAP SERPL CALCULATED.3IONS-SCNC: 10 MMOL/L (ref 5–15)
BUN SERPL-MCNC: 5 MG/DL (ref 8–23)
BUN/CREAT SERPL: 6 (ref 7–25)
CALCIUM SPEC-SCNC: 8.9 MG/DL (ref 8.6–10.5)
CHLORIDE SERPL-SCNC: 105 MMOL/L (ref 98–107)
CO2 SERPL-SCNC: 21 MMOL/L (ref 22–29)
CREAT SERPL-MCNC: 0.83 MG/DL (ref 0.57–1)
DEPRECATED RDW RBC AUTO: 78.9 FL (ref 37–54)
EGFRCR SERPLBLD CKD-EPI 2021: 77.9 ML/MIN/1.73
ERYTHROCYTE [DISTWIDTH] IN BLOOD BY AUTOMATED COUNT: 24.6 % (ref 12.3–15.4)
GLUCOSE SERPL-MCNC: 105 MG/DL (ref 65–99)
HCT VFR BLD AUTO: 27 % (ref 34–46.6)
HGB BLD-MCNC: 7.9 G/DL (ref 12–15.9)
MAGNESIUM SERPL-MCNC: 2.1 MG/DL (ref 1.6–2.4)
MCH RBC QN AUTO: 26.2 PG (ref 26.6–33)
MCHC RBC AUTO-ENTMCNC: 29.3 G/DL (ref 31.5–35.7)
MCV RBC AUTO: 89.4 FL (ref 79–97)
NCCN CRITERIA FLAG: NORMAL
PHOSPHATE SERPL-MCNC: 3 MG/DL (ref 2.5–4.5)
PLATELET # BLD AUTO: 250 10*3/MM3 (ref 140–450)
PMV BLD AUTO: 9.1 FL (ref 6–12)
POTASSIUM SERPL-SCNC: 3.8 MMOL/L (ref 3.5–5.2)
RBC # BLD AUTO: 3.02 10*6/MM3 (ref 3.77–5.28)
SODIUM SERPL-SCNC: 136 MMOL/L (ref 136–145)
TYRER CUZICK SCORE: 5.3
WBC NRBC COR # BLD AUTO: 2.28 10*3/MM3 (ref 3.4–10.8)

## 2023-12-12 PROCEDURE — 25010000002 ENOXAPARIN PER 10 MG: Performed by: SURGERY

## 2023-12-12 PROCEDURE — 94799 UNLISTED PULMONARY SVC/PX: CPT

## 2023-12-12 PROCEDURE — 36415 COLL VENOUS BLD VENIPUNCTURE: CPT | Performed by: SURGERY

## 2023-12-12 PROCEDURE — 83735 ASSAY OF MAGNESIUM: CPT | Performed by: SURGERY

## 2023-12-12 PROCEDURE — 25010000002 METRONIDAZOLE 500 MG/100ML SOLUTION: Performed by: SURGERY

## 2023-12-12 PROCEDURE — 85027 COMPLETE CBC AUTOMATED: CPT | Performed by: SURGERY

## 2023-12-12 PROCEDURE — 99238 HOSP IP/OBS DSCHRG MGMT 30/<: CPT | Performed by: SURGERY

## 2023-12-12 PROCEDURE — 94664 DEMO&/EVAL PT USE INHALER: CPT

## 2023-12-12 PROCEDURE — 25010000002 CEFEPIME PER 500 MG: Performed by: SURGERY

## 2023-12-12 PROCEDURE — 80048 BASIC METABOLIC PNL TOTAL CA: CPT | Performed by: SURGERY

## 2023-12-12 PROCEDURE — 84100 ASSAY OF PHOSPHORUS: CPT | Performed by: SURGERY

## 2023-12-12 RX ORDER — AMOXICILLIN AND CLAVULANATE POTASSIUM 875; 125 MG/1; MG/1
1 TABLET, FILM COATED ORAL EVERY 12 HOURS SCHEDULED
Status: DISCONTINUED | OUTPATIENT
Start: 2023-12-12 | End: 2023-12-12 | Stop reason: HOSPADM

## 2023-12-12 RX ORDER — AZELASTINE 1 MG/ML
1 SPRAY, METERED NASAL DAILY
Qty: 30 ML | Refills: 0 | Status: SHIPPED | OUTPATIENT
Start: 2023-12-12 | End: 2024-01-06

## 2023-12-12 RX ORDER — AMOXICILLIN AND CLAVULANATE POTASSIUM 875; 125 MG/1; MG/1
1 TABLET, FILM COATED ORAL EVERY 12 HOURS SCHEDULED
Qty: 12 TABLET | Refills: 0 | Status: SHIPPED | OUTPATIENT
Start: 2023-12-12 | End: 2023-12-18

## 2023-12-12 RX ORDER — ECHINACEA PURPUREA EXTRACT 125 MG
1 TABLET ORAL AS NEEDED
Qty: 44 ML | Refills: 0 | Status: SHIPPED | OUTPATIENT
Start: 2023-12-12

## 2023-12-12 RX ADMIN — ALBUTEROL SULFATE 2 PUFF: 90 AEROSOL, METERED RESPIRATORY (INHALATION) at 05:58

## 2023-12-12 RX ADMIN — AMOXICILLIN AND CLAVULANATE POTASSIUM 1 TABLET: 875; 125 TABLET, FILM COATED ORAL at 08:34

## 2023-12-12 RX ADMIN — Medication 10 ML: at 08:35

## 2023-12-12 RX ADMIN — AZELASTINE HYDROCHLORIDE 1 SPRAY: 137 SPRAY, METERED NASAL at 08:36

## 2023-12-12 RX ADMIN — MORPHINE SULFATE 15 MG: 15 TABLET, FILM COATED, EXTENDED RELEASE ORAL at 08:34

## 2023-12-12 RX ADMIN — NYSTATIN 1 APPLICATION: 100000 CREAM TOPICAL at 08:36

## 2023-12-12 RX ADMIN — SUCRALFATE 1 G: 1 SUSPENSION ORAL at 08:33

## 2023-12-12 RX ADMIN — CEFEPIME 2000 MG: 2 INJECTION, POWDER, FOR SOLUTION INTRAVENOUS at 00:45

## 2023-12-12 RX ADMIN — PRAMIPEXOLE DIHYDROCHLORIDE 1 MG: 1 TABLET ORAL at 08:34

## 2023-12-12 RX ADMIN — PANTOPRAZOLE SODIUM 40 MG: 40 TABLET, DELAYED RELEASE ORAL at 08:34

## 2023-12-12 RX ADMIN — LEVOTHYROXINE SODIUM 125 MCG: 125 TABLET ORAL at 04:59

## 2023-12-12 RX ADMIN — METRONIDAZOLE 500 MG: 500 INJECTION, SOLUTION INTRAVENOUS at 00:51

## 2023-12-12 RX ADMIN — ENOXAPARIN SODIUM 90 MG: 100 INJECTION SUBCUTANEOUS at 04:59

## 2023-12-12 RX ADMIN — BUPROPION HYDROCHLORIDE 300 MG: 150 TABLET, EXTENDED RELEASE ORAL at 08:34

## 2023-12-12 NOTE — PAYOR COMM NOTE
"REF:    [id number]     [facility]  FAX  [phone]     Melly Brownlee (68 y.o. Female)       Date of Birth   1955    Social Security Number       Address   [address] [facility] [address]    Home Phone   [phone]    MRN   [id number]       Moravian   Non-Cheondoism    Marital Status                               Admission Date   12/10/23    Admission Type   Emergency    Admitting Provider   Pablo Pascual Jr., MD    Attending Provider       Department, Room/Bed   [facility] 3C, 365/1       Discharge Date   12/12/2023    Discharge Disposition   Home or Self Care    Discharge Destination                                 Attending Provider: (none)   Allergies: Erythromycin, Guaifenesin, Hydromorphone Hcl, Ondansetron Hcl, Orphenadrine, Codeine, Meperidine    Isolation: Enh Drop/Con   Infection: COVID (confirmed) (12/10/23)   Code Status: Prior    Ht: 162.6 cm (64\")   Wt: 89.1 kg (196 lb 8 oz)    Admission Cmt: None   Principal Problem: Abscess of sigmoid colon due to diverticulitis [K57.20]                   Active Insurance as of 12/10/2023       Primary Coverage       Payor Plan Insurance Group Employer/Plan Group    ANTH MEDICARE REPLACEMENT ANTHEM MEDICARE ADVANTAGE [id number]       Payor Plan Address Payor Plan Phone Number Payor Plan Fax Number Effective Dates    [address] [phone]  11/1/2022 - None Entered    [facility] [id number]         Subscriber Name Subscriber Birth Date Member ID       MELLY BROWNLEE 1955 [id number]                     Emergency Contacts        (Rel.) Home Phone Work Phone Mobile Phone    Carmelo Brownlee (Spouse) [phone] -- --                 Discharge Summary        Pablo Pascual Jr., MD at 12/12/23 0816          Discharge Summary    Patient Name:  Melly Brownlee  YOB: 1955  [id number]    DATE OF ADMISSION: 12/10/2023    DATE OF DISCHARGE: 12/12/2023   "   FINAL DIAGNOSES:   Patient Active Problem List   Diagnosis    Malignant neoplasm of upper lobe of right lung    Hypothyroidism (acquired)    Chronic midline thoracic back pain    History of DVT in adulthood  2018    ALLISON (dyspnea on exertion)    Former heavy tobacco smoker    Arthritis of knee    Family history- stomach cancer    GERD (gastroesophageal reflux disease)    Hematemesis    HTN (hypertension)    Hyperglycemia    Iron deficiency anemia secondary to inadequate dietary iron intake    Slow transit constipation    Curtis's esophagus with dysplasia sees [facility]    History of pulmonary embolism    Arthritis of midfoot    Hallux valgus, left    Hammer toe of left foot    Former smoker    Degeneration of lumbar or lumbosacral intervertebral disc    Chronic bilateral low back pain without sciatica    Adult BMI 33.0-33.9 kg/sq m    Chest wall pain    Abscess of sigmoid colon due to diverticulitis    Pneumonia of both lungs due to infectious organism    Nausea and vomiting    Diarrhea    Renal lesion    Restless leg syndrome    Upper GI bleed    Class 2 severe obesity with body mass index (BMI) of 35 to 39.9 with serious comorbidity    Pleural effusion, right    Back pain    Multiple lung nodules    Class 2 obesity due to excess calories with body mass index (BMI) of 37.0 to 37.9 in adult    Dysuria    Pelvic abscess in female    Primary osteoarthritis of both knees    Recurrent major depressive disorder, in full remission    COVID-19 virus detected       CONSULTING SERVICES: None      PROCEDURES PERFORMED:   None    HISTORY:   68 y.o. female in consultation for complicated sigmoid diverticulitis with 3cm abscess behind the bladder and vaginal wall.  68-year-old lady with stage IV lung cancer [right lung] status post bronchoscopy, right lower lobe wedge lobectomy in 9/2023, completed 5 rounds of palliative radiation therapy, completed 3 rounds of chemotherapy most recently 4 weeks ago, being followed by medical  oncologist Cedric Delarosa MD at [facility], presents for several day duration of dysuria.     She denies having any hematuria, fecaluria, or pneumaturia.  She admits to having bilateral mild lower quadrant pain.  Denies having any fevers or chills, worsening shortness of air or wheezing.  She has baseline exertional dyspnea, known stage IV right-sided lung cancer.     Her prior history of sigmoid diverticulitis episodes include once in her 20s and another 1 in January 2023.  Both were treated nonsurgically.     ER workup:  Afebrile, vital signs stable.  WBC 3.8, hemoglobin 8.2, creatinine 0.85, UA negative, COVID test positive.  CT PE is notable for nearly resolved pulmonary emboli in the posterior basilar segment of the right lower lobe, notable known pulmonary and pleural metastatic disease on the right without interval changes.  CT abdomen pelvis is noted for a 3.1 cm abscess posterior to the posterior vaginal cuff and bladder wall and adjacent to the sigmoid colon.  Radiology stating that it is not amenable to percutaneous drainage at this time.     Abdominal surgeries: Laparoscopic cholecystectomy, open ELKE/BSO for menorrhagia and ovarian cyst, open right inguinal hernia repair with mesh x 2     Colonoscopy: 2 months ago at [facility] with known diverticulosis     Notable medical history: Prior history of DVT/PE previously on TLOV, former smoker with COPD, stage IV lung cancer, GERD, depression, restless leg syndrome, hypothyroidism, BMI 33.7    HOSPITAL COURSE:   The patient was admitted to my service. She was started on IV Abx with subsequent significant improvement in her original complaints of suprapubic pain with dysuria. Diet was kept at regular diet, she satted well on RA. She was given nasal spray for her nasal congestion but satted well on RA without tachypnea. IV Abx was switched to PO Abx. She had 2x BM that went well without significant abd pain. She was deemed stable for discharge home and benefited  from her hospital stay.     Regarding her leukopenia, discussed with patient about staying another day vs. Following up with her medical oncologist at [facility] within this week: she strongly opted for the latter option.     PHYSICAL EXAM:  GEN: Sitting in bed in NAD  Pulm: Symmetrical chest rise w/o respiratory distress  CV: RRR  GI: Soft, minimally TTP in the suprapubic region    DISCHARGE MEDICATIONS:   1. All previous home medications.   2. OTC Tylenol 325mg PO q6hr PRN and OTC Ibuprofen 200mg PO q6hr PRN [with meals]  3. Augmentin x6 day duration      DISCHARGE INSTRUCTIONS:  Finish course of your antibiotics [Augmentin x6 days more]  Diet: low residue diet, low fat  Nasal spray: Continue with current regiment - saline spray x1-2 spray per nostril q1-2hr PRN and Azelastin spray 1-2 spray per nostril q1-2hr PRN x4 days. Ok to use Flonase at home  RTC with medical oncology in light of your stage IV lung cancer and recent leukopenia  RTC in 1 week with repeat CT A/P IV contrast to discern interval changes to your known pelvic abscess  Schedule an appt with [facility] for an interval colonoscopy in 6-8 weeks given recent concerns for contained sigmoid diverticulitis with pelvic abscess  Call if Tmax>101.5F, worsening abd pain despite Augmentin use and OTC Tylenol 325mg PO q6hr PRN and Ibuprofen 200mg PO q6hr PRN with meals  Pain ctrl: OTC Tylenol 325mg PO q6hr PRN and Ibuprofen 200mg PO q6hr PRN with meals      Radha Pascual Jr, MD  12/12/2023  08:16 CST      Electronically signed by Radha Pascual Jr., MD at 12/12/23 0820       Discharge Order (From admission, onward)       Start     Ordered    12/12/23 0812  Discharge patient  Once        Expected Discharge Date: 12/12/23   Expected Discharge Time: Morning   Discharge Disposition: Home or Self Care   Physician of Record for Attribution - Please select from Treatment Team: RADHA PASCUAL JR [[id number]]   Review needed by CMO to determine  Physician of Record: No      Question Answer Comment   Physician of Record for Attribution - Please select from Treatment Team RADHA FIGUEROA JR    Review needed by CMO to determine Physician of Record No        12/12/23 0875

## 2023-12-12 NOTE — OUTREACH NOTE
Prep Survey      Flowsheet Row Responses   [facility]? [facility]   Is LACE score < 7 ? No   Eligibility Readm Mgmt   Discharge diagnosis Abscess of sigmoid colon due to diverticulitis   Does the patient have one of the following disease processes/diagnoses(primary or secondary)? Other   Does the patient have Home health ordered? No   Is there a DME ordered? No   Medication alerts for this patient see AVS   Prep survey completed? Yes            Pauline PACK - Registered Nurse

## 2023-12-12 NOTE — NURSING NOTE
Attempted IV insertion per patient request for IV change. Unsuccessful stick in right lower forearm and right upper forearm.

## 2023-12-12 NOTE — PLAN OF CARE
Problem: Adult Inpatient Plan of Care  Goal: Plan of Care Review  Outcome: Met  Flowsheets (Taken 12/12/2023 5837)  Outcome Evaluation: Patient IV CDI, IID. PO abx given per order. Covid precautions maintained. Scheduled pain medication given per order. Up adlib. voiding per toilet. VSS, safety maintained.

## 2023-12-12 NOTE — ED PROVIDER NOTES
Subjective   History of Present Illness  Patient is that she has been having some dysuria and pain in suprapubic area.  Currently has no fevers or chills.  States that she has been exposed to somebody with COVID because she was in the car with them recently.  States that she currently has no chest pain or shortness of breath.  Denies any new leg swelling or calf tenderness.        Review of Systems   All other systems reviewed and are negative.      Past Medical History:   Diagnosis Date   • Acid reflux    • Anemia    • Anxiety    • Arthritis    • Asthma     Mild COPD   • Curtis esophagus    • Bleeding disorder     Reflux acid overload   • Bunion Aug    Repaired with surgery   • Cancer 03/2023    Waiting on Biopsy to determine what kind and what stage NOW BONE CANCER   • Chest pain    • Chronic back pain    • Chronic neck pain    • Clotting disorder     Stomach bleeds from acid   • COPD (chronic obstructive pulmonary disease)    • Difficulty walking Dec 2022    After surgery wslking has become more and more difficult   • DVT (deep venous thrombosis)    • GI bleed    • Hammer toe Aug 22    Repaired surgery   • HTN (hypertension) 12/19/2018   • Hypothyroid    • Hypothyroidism    • Low back pain    • Lung cancer    • Migraine    • Nausea and vomiting 04/05/2023   • PE (pulmonary thromboembolism)    • Plantar fasciitis Years ago   • Renal lesion 04/05/2023   • Walter splints Years ago       Allergies   Allergen Reactions   • Erythromycin Swelling and Anaphylaxis     Throat swells   • Guaifenesin Anaphylaxis, Nausea And Vomiting and Swelling   • Hydromorphone Hcl Anaphylaxis and Other (See Comments)     Other reaction(s): Shock and/or Unconsciousness  Note: Anaphylaxis   • Ondansetron Hcl Other (See Comments)     Coded  Coded  Other reaction(s): Zofran  Note:  Allergic Reaction: Anaphylaxis   • Orphenadrine Anaphylaxis   • Codeine Rash   • Meperidine Other (See Comments)     Makes her an angry person       Past Surgical  History:   Procedure Laterality Date   • BRONCHOSCOPY N/A 2023    Procedure: BRONCHOSCOPY,THORACOSCOPY, MULTIPLE PLEURAL BIOPSY, RIGHT LUNG LOWER LOBE WEDGE RESECTION, TALC PLEURODESIS;  Surgeon: Agusto Thompson MD;  Location:  [facility];  Service: Cardiothoracic;  Laterality: N/A;   • BUNIONECTOMY     • CHOLECYSTECTOMY     • COLONOSCOPY     • CORRECTION HAMMER TOE     • DILATATION AND CURETTAGE     • FOOT FUSION Left 2022    Procedure: 1-2 Arthrodesis, Tarsal Metatarsal Joint 2 and 3 Arthrodesis;  Surgeon: Bud Maradiaga DPM;  Location:  [facility];  Service: Podiatry;  Laterality: Left;   • HAMMER TOE REPAIR Left 2022    Procedure: Hammertoe Repair 2-5,;  Surgeon: Bud Maradiaga DPM;  Location:  [facility];  Service: Podiatry;  Laterality: Left;   • HERNIA REPAIR     • HYSTERECTOMY     • JOINT REPLACEMENT     • OOPHORECTOMY     • REDUCTION MAMMAPLASTY  2021   • REPLACEMENT TOTAL KNEE      Right knee partial, left knee   • THORACOSCOPY Right 2023    Procedure: THORACOSCOPY with WEDGE RESECTION;  Surgeon: Agusto Thompson MD;  Location:  [facility];  Service: Cardiothoracic;  Laterality: Right;       Family History   Problem Relation Age of Onset   • Breast cancer Paternal Aunt    • Heart disease Mother    • Osteoporosis Mother    • Thyroid disease Mother         Lukemia can’t remember which kind.   • Cancer Mother         Pacemaker at 90 years old.   • Cancer Father         Father’s entire family  from Cancer   • Diabetes Father    • Thyroid disease Father    • Thyroid disease Maternal Aunt         Blood disorder   • Thyroid disease Maternal Uncle    • Thyroid disease Sister         Thyroid   • Ovarian cancer Neg Hx    • Uterine cancer Neg Hx    • Colon cancer Neg Hx        Social History     Socioeconomic History   • Marital status:    Tobacco Use   • Smoking status: Former     Packs/day: 1.50     Years: 46.00     Additional pack years: 0.00     Total pack years: 69.00      Types: Cigarettes     Start date: 1972     Quit date: 2018     Years since quittin.7     Passive exposure: Past   • Smokeless tobacco: Never   • Tobacco comments:     Pt started at age 17, then quit at age 31. Pt restarted smoking at age 32-35 and quit smoking in 2018   Vaping Use   • Vaping Use: Never used   Substance and Sexual Activity   • Alcohol use: Not Currently     Alcohol/week: 1.0 standard drink of alcohol     Types: 1 Drinks containing 0.5 oz of alcohol per week     Comment: A chocolate drink made with moonshine and creamer   • Drug use: No   • Sexual activity: Yes     Partners: Male     Birth control/protection: Surgical           Objective   Physical Exam  Vitals and nursing note reviewed.   Constitutional:       General: She is not in acute distress.     Appearance: Normal appearance. She is not toxic-appearing or diaphoretic.   HENT:      Head: Normocephalic and atraumatic.      Right Ear: External ear normal.      Left Ear: External ear normal.      Nose: Nose normal.      Mouth/Throat:      Mouth: Mucous membranes are moist.   Eyes:      General:         Right eye: No discharge.         Left eye: No discharge.      Extraocular Movements: Extraocular movements intact.      Conjunctiva/sclera: Conjunctivae normal.   Cardiovascular:      Rate and Rhythm: Normal rate.      Pulses: Normal pulses.   Pulmonary:      Effort: No respiratory distress.      Breath sounds: No rhonchi.   Abdominal:      General: Abdomen is flat.      Tenderness: There is abdominal tenderness (mild, suprapubic). There is no guarding or rebound.   Musculoskeletal:         General: No deformity or signs of injury.   Skin:     General: Skin is warm.      Coloration: Skin is not jaundiced.   Neurological:      Mental Status: She is alert and oriented to person, place, and time. Mental status is at baseline.   Psychiatric:         Mood and Affect: Mood normal.         Behavior: Behavior normal.         Thought Content:  Thought content normal.         Judgment: Judgment normal.         Procedures           ED Course  ED Course as of 12/12/23 0924   Tue Dec 12, 2023   0924 Hemoglobin stable and chronic. [NP]      ED Course User Index  [NP] Giovany Quijano MD                                             Medical Decision Making  68-year-old female presenting today with shortness of breath and suprapubic abdominal pain and dysuria.  Given her history of physical lamination plan to obtain CBC, BMP, CTA chest and CT abdomen pelvis.  CTA chest was obtained to evaluate for any PE given she is having shortness of breath and has a history of lung cancer.  She was found to have evidence of a small abscess around the bladder wall area and so general surgery was consulted for evaluation and admission.  Patient was admitted by general surgery for further management.    Amount and/or Complexity of Data Reviewed  Labs: ordered.  Radiology: ordered.  ECG/medicine tests: ordered.    Risk  Prescription drug management.  Decision regarding hospitalization.        Final diagnoses:   Pelvic abscess in female       ED Disposition  ED Disposition       ED Disposition   Decision to Admit    Condition   --    Comment   Level of Care: Med/Surg [1]   Diagnosis: Pelvic abscess in female [[id number]]   Admitting Physician: RADHA FIGUEROA JR [[id number]]   Attending Physician: RADHA FIGUEROA JR [[id number]]   Certification: I certify that inpatient hospital services are medically necessary for greater than 2 midnights.                 Reynaldo Jeter, DO  [address] [place] [id number]  [phone]    Follow up on 12/21/2023  @ 10:45am         Medication List        New Prescriptions      amoxicillin-clavulanate 875-125 MG per tablet  Commonly known as: AUGMENTIN  Take 1 tablet by mouth Every 12 (Twelve) Hours with food for 12 doses. Indications: Infection Within the Abdomen     azelastine 0.1 % nasal spray  Commonly known as: ASTELIN  1 spray into the  nostril(s) as directed by provider Daily for 4 doses. Use in each nostril as directed     sodium chloride 0.65 % nasal spray  1 spray into the nostril(s) as directed by provider As Needed for Congestion.               Where to Get Your Medications        These medications were sent to [facility] - [address] [address]      Hours: Monday to Friday 7 AM to 5 PM (Closed 12:30 PM to 1 PM) Phone: [phone]   amoxicillin-clavulanate 875-125 MG per tablet  azelastine 0.1 % nasal spray  sodium chloride 0.65 % nasal spray            Giovany Quijano MD  12/12/23 0113

## 2023-12-12 NOTE — PLAN OF CARE
Problem: Adult Inpatient Plan of Care  Goal: Plan of Care Review  Outcome: Ongoing, Progressing  Flowsheets (Taken 12/11/2023 4007)  Outcome Evaluation: Patient IV CDI, IV abx infusing per order. Covid precautions maintained. C/O pain, see MAR. Up adlib. Voiding per toilet. VSS, safety maintained.

## 2023-12-12 NOTE — DISCHARGE INSTRUCTIONS
Finish course of your antibiotics [Augmentin x6 days more]  Diet: low residue diet, low fat  Nasal spray: Continue with current regiment - saline spray x1-2 spray per nostril q1-2hr PRN and Azelastin spray 1-2 spray per nostril q1-2hr PRN x4 days. Ok to use Flonase at home  RTC with medical oncology in light of your stage IV lung cancer and recent leukopenia  RTC in 1 week with repeat CT A/P IV contrast to discern interval changes to your known pelvic abscess  Call if Tmax>101.5F, worsening abd pain despite Augmentin use and OTC Tylenol 325mg PO q6hr PRN and Ibuprofen 200mg PO q6hr PRN with meals  Pain ctrl: OTC Tylenol 325mg PO q6hr PRN and Ibuprofen 200mg PO q6hr PRN with meals

## 2023-12-12 NOTE — DISCHARGE SUMMARY
Discharge Summary    Patient Name:  Melly Cruz  YOB: 1955  [id number]    DATE OF ADMISSION: 12/10/2023    DATE OF DISCHARGE: 12/12/2023     FINAL DIAGNOSES:   Patient Active Problem List   Diagnosis    Malignant neoplasm of upper lobe of right lung    Hypothyroidism (acquired)    Chronic midline thoracic back pain    History of DVT in adulthood  2018    ALLISON (dyspnea on exertion)    Former heavy tobacco smoker    Arthritis of knee    Family history- stomach cancer    GERD (gastroesophageal reflux disease)    Hematemesis    HTN (hypertension)    Hyperglycemia    Iron deficiency anemia secondary to inadequate dietary iron intake    Slow transit constipation    Curtis's esophagus with dysplasia sees [facility]    History of pulmonary embolism    Arthritis of midfoot    Hallux valgus, left    Hammer toe of left foot    Former smoker    Degeneration of lumbar or lumbosacral intervertebral disc    Chronic bilateral low back pain without sciatica    Adult BMI 33.0-33.9 kg/sq m    Chest wall pain    Abscess of sigmoid colon due to diverticulitis    Pneumonia of both lungs due to infectious organism    Nausea and vomiting    Diarrhea    Renal lesion    Restless leg syndrome    Upper GI bleed    Class 2 severe obesity with body mass index (BMI) of 35 to 39.9 with serious comorbidity    Pleural effusion, right    Back pain    Multiple lung nodules    Class 2 obesity due to excess calories with body mass index (BMI) of 37.0 to 37.9 in adult    Dysuria    Pelvic abscess in female    Primary osteoarthritis of both knees    Recurrent major depressive disorder, in full remission    COVID-19 virus detected       CONSULTING SERVICES: None      PROCEDURES PERFORMED:   None    HISTORY:   68 y.o. female in consultation for complicated sigmoid diverticulitis with 3cm abscess behind the bladder and vaginal wall.  68-year-old lady with stage IV lung cancer [right lung] status post bronchoscopy, right lower lobe wedge  lobectomy in 9/2023, completed 5 rounds of palliative radiation therapy, completed 3 rounds of chemotherapy most recently 4 weeks ago, being followed by medical oncologist Cedric Delarosa MD at [facility], presents for several day duration of dysuria.     She denies having any hematuria, fecaluria, or pneumaturia.  She admits to having bilateral mild lower quadrant pain.  Denies having any fevers or chills, worsening shortness of air or wheezing.  She has baseline exertional dyspnea, known stage IV right-sided lung cancer.     Her prior history of sigmoid diverticulitis episodes include once in her 20s and another 1 in January 2023.  Both were treated nonsurgically.     ER workup:  Afebrile, vital signs stable.  WBC 3.8, hemoglobin 8.2, creatinine 0.85, UA negative, COVID test positive.  CT PE is notable for nearly resolved pulmonary emboli in the posterior basilar segment of the right lower lobe, notable known pulmonary and pleural metastatic disease on the right without interval changes.  CT abdomen pelvis is noted for a 3.1 cm abscess posterior to the posterior vaginal cuff and bladder wall and adjacent to the sigmoid colon.  Radiology stating that it is not amenable to percutaneous drainage at this time.     Abdominal surgeries: Laparoscopic cholecystectomy, open ELKE/BSO for menorrhagia and ovarian cyst, open right inguinal hernia repair with mesh x 2     Colonoscopy: 2 months ago at [facility] with known diverticulosis     Notable medical history: Prior history of DVT/PE previously on TLOV, former smoker with COPD, stage IV lung cancer, GERD, depression, restless leg syndrome, hypothyroidism, BMI 33.7    HOSPITAL COURSE:   The patient was admitted to my service. She was started on IV Abx with subsequent significant improvement in her original complaints of suprapubic pain with dysuria. Diet was kept at regular diet, she satted well on RA. She was given nasal spray for her nasal congestion but satted well on RA  without tachypnea. IV Abx was switched to PO Abx. She had 2x BM that went well without significant abd pain. She was deemed stable for discharge home and benefited from her hospital stay.     Regarding her leukopenia, discussed with patient about staying another day vs. Following up with her medical oncologist at [facility] within this week: she strongly opted for the latter option.     PHYSICAL EXAM:  GEN: Sitting in bed in NAD  Pulm: Symmetrical chest rise w/o respiratory distress  CV: RRR  GI: Soft, minimally TTP in the suprapubic region    DISCHARGE MEDICATIONS:   1. All previous home medications.   2. OTC Tylenol 325mg PO q6hr PRN and OTC Ibuprofen 200mg PO q6hr PRN [with meals]  3. Augmentin x6 day duration      DISCHARGE INSTRUCTIONS:  Finish course of your antibiotics [Augmentin x6 days more]  Diet: low residue diet, low fat  Nasal spray: Continue with current regiment - saline spray x1-2 spray per nostril q1-2hr PRN and Azelastin spray 1-2 spray per nostril q1-2hr PRN x4 days. Ok to use Flonase at home  RTC with medical oncology in light of your stage IV lung cancer and recent leukopenia  RTC in 1 week with repeat CT A/P IV contrast to discern interval changes to your known pelvic abscess  Schedule an appt with [facility] for an interval colonoscopy in 6-8 weeks given recent concerns for contained sigmoid diverticulitis with pelvic abscess  Call if Tmax>101.5F, worsening abd pain despite Augmentin use and OTC Tylenol 325mg PO q6hr PRN and Ibuprofen 200mg PO q6hr PRN with meals  Pain ctrl: OTC Tylenol 325mg PO q6hr PRN and Ibuprofen 200mg PO q6hr PRN with meals      Pablo Pascual Jr, MD  12/12/2023  08:16 CST

## 2023-12-19 ENCOUNTER — READMISSION MANAGEMENT (OUTPATIENT)
Dept: CALL CENTER | Facility: HOSPITAL | Age: 68
End: 2023-12-19
Payer: MEDICARE

## 2023-12-19 NOTE — OUTREACH NOTE
Medical Week 1 Survey      Flowsheet Row Responses   [facility] patient discharged from? [place]   Does the patient have one of the following disease processes/diagnoses(primary or secondary)? Other   Week 1 attempt successful? Yes   Call start time 1315   Call end time 1317   Discharge diagnosis Abscess of sigmoid colon due to diverticulitis   Person spoke with today (if not patient) and relationship Carmelo(spouse)   Meds reviewed with patient/caregiver? Yes   Is the patient having any side effects they believe may be caused by any medication additions or changes? No   Does the patient have all medications ordered at discharge? Yes   Is the patient taking all medications as directed (includes completed medication regime)? Yes   Medication comments antibiotic   Comments regarding appointments f/u appt. with PCP 12/21/23 @ 10:45am.   Does the patient have a primary care provider?  Yes   Does the patient have an appointment with their PCP within 7 days of discharge? Greater than 7 days  [spouse unsure]   Nursing Interventions Verified appointment date/time/provider   Has the patient kept scheduled appointments due by today? N/A   Has home health visited the patient within 72 hours of discharge? N/A   Psychosocial issues? No   Did the patient receive a copy of their discharge instructions? Yes   Nursing interventions Reviewed instructions with patient   What is the patient's perception of their health status since discharge? Improving   Is the patient/caregiver able to teach back signs and symptoms related to disease process for when to call PCP? Yes   Is the patient/caregiver able to teach back signs and symptoms related to disease process for when to call 911? Yes   Is the patient/caregiver able to teach back the hierarchy of who to call/visit for symptoms/problems? PCP, Specialist, Home health nurse, Urgent Care, ED, 911 Yes   If the patient is a current smoker, are they able to teach back resources for  cessation? Not a smoker   Week 1 call completed? Yes   Would this patient benefit from a Referral to [facility] Social Work? No   Is the patient interested in additional calls from an ambulatory ? No   Call end time 2830            Ruth WEIR - Registered Nurse

## 2023-12-20 LAB
QT INTERVAL: 354 MS
QTC INTERVAL: 425 MS

## 2023-12-22 ENCOUNTER — HOSPITAL ENCOUNTER (OUTPATIENT)
Dept: MAMMOGRAPHY | Facility: HOSPITAL | Age: 68
Discharge: HOME | End: 2023-12-22
Admitting: STUDENT IN AN ORGANIZED HEALTH CARE EDUCATION/TRAINING PROGRAM
Payer: MEDICARE

## 2023-12-22 DIAGNOSIS — M79.89 FAT NECROSIS: ICD-10-CM

## 2023-12-22 DIAGNOSIS — Z12.31 ENCOUNTER FOR SCREENING MAMMOGRAM FOR MALIGNANT NEOPLASM OF BREAST: ICD-10-CM

## 2023-12-22 PROCEDURE — 77067 SCR MAMMO BI INCL CAD: CPT

## 2023-12-22 PROCEDURE — 77063 BREAST TOMOSYNTHESIS BI: CPT

## 2023-12-26 ENCOUNTER — TRANSCRIBE ORDERS (OUTPATIENT)
Dept: ADMINISTRATIVE | Facility: HOSPITAL | Age: 68
End: 2023-12-26
Payer: MEDICARE

## 2023-12-26 DIAGNOSIS — K57.20 COLONIC DIVERTICULAR ABSCESS: Primary | ICD-10-CM

## 2024-01-02 ENCOUNTER — HOSPITAL ENCOUNTER (OUTPATIENT)
Dept: CT IMAGING | Facility: HOSPITAL | Age: 69
Discharge: HOME | End: 2024-01-02
Admitting: INTERNAL MEDICINE
Payer: MEDICARE

## 2024-01-02 DIAGNOSIS — K57.20 COLONIC DIVERTICULAR ABSCESS: ICD-10-CM

## 2024-01-02 LAB — CREAT BLDA-MCNC: 0.8 MG/DL (ref 0.6–1.3)

## 2024-01-02 PROCEDURE — 25510000001 IOPAMIDOL 61 % SOLUTION: Performed by: INTERNAL MEDICINE

## 2024-01-02 PROCEDURE — 82565 ASSAY OF CREATININE: CPT

## 2024-01-02 PROCEDURE — 74177 CT ABD & PELVIS W/CONTRAST: CPT

## 2024-01-02 RX ADMIN — IOPAMIDOL 100 ML: 612 INJECTION, SOLUTION INTRAVENOUS at 09:24

## 2024-01-08 ENCOUNTER — OFFICE VISIT (OUTPATIENT)
Dept: SURGERY | Facility: CLINIC | Age: 69
End: 2024-01-08
Payer: MEDICARE

## 2024-01-08 VITALS
SYSTOLIC BLOOD PRESSURE: 124 MMHG | HEIGHT: 64 IN | HEART RATE: 88 BPM | DIASTOLIC BLOOD PRESSURE: 82 MMHG | OXYGEN SATURATION: 96 % | WEIGHT: 192 LBS | BODY MASS INDEX: 32.78 KG/M2

## 2024-01-08 DIAGNOSIS — Z79.01 CHRONIC ANTICOAGULATION: ICD-10-CM

## 2024-01-08 DIAGNOSIS — Z98.890 HISTORY OF BREAST BIOPSY: ICD-10-CM

## 2024-01-08 DIAGNOSIS — K57.20 ABSCESS OF SIGMOID COLON DUE TO DIVERTICULITIS: Primary | ICD-10-CM

## 2024-01-08 DIAGNOSIS — E66.09 CLASS 1 OBESITY DUE TO EXCESS CALORIES WITH BODY MASS INDEX (BMI) OF 32.0 TO 32.9 IN ADULT, UNSPECIFIED WHETHER SERIOUS COMORBIDITY PRESENT: ICD-10-CM

## 2024-01-08 DIAGNOSIS — Z86.711 HISTORY OF PULMONARY EMBOLISM: ICD-10-CM

## 2024-01-08 NOTE — PROGRESS NOTES
Office Established Patient Note:     Referring Provider: No ref. provider found    No chief complaint on file.      Subjective .     History of present illness:  Melly Cruz is a 68 y.o. female who presents for diverticulitis follow up.  She was admitted with perforated diverticulitis with an abscess in November, 2023 treated with IV antibiotics. Her last colonoscopy was in the spring of 2023 at [facility]. She does have stage IV lung cancer on chemotherapy. CT on 1/2/24 showed resolution of the inflammation and the abscess. She endorses cramping pain intermittently after meals located in the lower abdomen L>R. The cramping pain will last a few hours to 1-2 days. She endorses temps up to 101 with these episodes. She endorses nausea. She will have intermittent constipation and diarrhea.     She denies any new breast masses, no nipple discharge and no skin changes.     Her BMI is 32. She is on therapeutic lovenox for pulmonary embolus. She is a non-smoker. PSH on the abdomen includes cholecystectomy, hysterectomy and two right inguinal hernia repairs with mesh.     In terms of her history of breast biopsy, she denies any new changes to her breasts - no new masses, no skin changes, no nipple discharge.     History  Past Medical History:   Diagnosis Date    Acid reflux     Anemia     Anxiety     Arthritis     Asthma     Mild COPD    Curtis esophagus     Bleeding disorder     Reflux acid overload    Bunion Aug    Repaired with surgery    Cancer 03/2023    Waiting on Biopsy to determine what kind and what stage NOW BONE CANCER    Chest pain     Chronic back pain     Chronic neck pain     Clotting disorder     Stomach bleeds from acid    COPD (chronic obstructive pulmonary disease)     Difficulty walking Dec 2022    After surgery wslking has become more and more difficult    DVT (deep venous thrombosis)     GI bleed     Hammer toe Aug 22    Repaired surgery    HTN (hypertension) 12/19/2018    Hypothyroid      Hypothyroidism     Low back pain     Lung cancer     Migraine     Nausea and vomiting 2023    PE (pulmonary thromboembolism)     Plantar fasciitis Years ago    Renal lesion 2023    Shin splints Years ago   ,   Past Surgical History:   Procedure Laterality Date    BRONCHOSCOPY N/A 2023    Procedure: BRONCHOSCOPY,THORACOSCOPY, MULTIPLE PLEURAL BIOPSY, RIGHT LUNG LOWER LOBE WEDGE RESECTION, TALC PLEURODESIS;  Surgeon: Agusto Thompson MD;  Location:  [facility];  Service: Cardiothoracic;  Laterality: N/A;    BUNIONECTOMY      CHOLECYSTECTOMY      COLONOSCOPY      CORRECTION HAMMER TOE      DILATATION AND CURETTAGE      FOOT FUSION Left 2022    Procedure: 1-2 Arthrodesis, Tarsal Metatarsal Joint 2 and 3 Arthrodesis;  Surgeon: Bud Maradiaga DPM;  Location:  [facility];  Service: Podiatry;  Laterality: Left;    HAMMER TOE REPAIR Left 2022    Procedure: Hammertoe Repair 2-5,;  Surgeon: Bud Maradiaga DPM;  Location:  [facility];  Service: Podiatry;  Laterality: Left;    HERNIA REPAIR      HYSTERECTOMY      JOINT REPLACEMENT      OOPHORECTOMY      REDUCTION MAMMAPLASTY  2021    REPLACEMENT TOTAL KNEE      Right knee partial, left knee    THORACOSCOPY Right 2023    Procedure: THORACOSCOPY with WEDGE RESECTION;  Surgeon: Agusto Thompson MD;  Location:  [facility];  Service: Cardiothoracic;  Laterality: Right;   ,   Family History   Problem Relation Age of Onset    Breast cancer Paternal Aunt     Heart disease Mother     Osteoporosis Mother     Thyroid disease Mother         Lukemia can’t remember which kind.    Cancer Mother         Pacemaker at 90 years old.    Cancer Father         Father’s entire family  from Cancer    Diabetes Father     Thyroid disease Father     Thyroid disease Maternal Aunt         Blood disorder    Thyroid disease Maternal Uncle     Thyroid disease Sister         Thyroid    Ovarian cancer Neg Hx     Uterine cancer Neg Hx     Colon cancer Neg Hx    ,    Social History     Tobacco Use    Smoking status: Former     Packs/day: 1.50     Years: 46.00     Additional pack years: 0.00     Total pack years: 69.00     Types: Cigarettes     Start date: 1972     Quit date: 2018     Years since quittin.7     Passive exposure: Past    Smokeless tobacco: Never    Tobacco comments:     Pt started at age 17, then quit at age 31. Pt restarted smoking at age 32-33 and quit smoking in 2018   Vaping Use    Vaping Use: Never used   Substance Use Topics    Alcohol use: Not Currently     Alcohol/week: 1.0 standard drink of alcohol     Types: 1 Drinks containing 0.5 oz of alcohol per week     Comment: A chocolate drink made with moonshine and creamer    Drug use: No   , (Not in a hospital admission)   and Allergies:  Erythromycin, Guaifenesin, Hydromorphone hcl, Ondansetron hcl, Orphenadrine, Codeine, and Meperidine    Current Outpatient Medications:     albuterol (PROVENTIL HFA;VENTOLIN HFA) 108 (90 Base) MCG/ACT inhaler, Inhale 2 puffs 4 (Four) Times a Day., Disp: 6.7 g, Rfl: 0    aluminum-magnesium hydroxide-simethicone (MAALOX MAX) 400-400-40 MG/5ML suspension, Take 30 mL by mouth Every 6 (Six) Hours As Needed for Indigestion or Heartburn., Disp: , Rfl:     buPROPion XL (WELLBUTRIN XL) 300 MG 24 hr tablet, Take 1 tablet by mouth Every Morning., Disp: , Rfl:     Cyanocobalamin (VITAMIN B-12 ER) 1000 MCG tablet controlled-release, Take 1,000 mcg by mouth Daily., Disp: , Rfl:     cyclobenzaprine (FLEXERIL) 10 MG tablet, Take 1 tablet by mouth 3 (Three) Times a Day As Needed for Muscle Spasms., Disp: , Rfl:     EPINEPHrine (EPIPEN) 0.3 MG/0.3ML solution auto-injector injection, Inject 0.3 mL into the appropriate muscle as directed by prescriber 1 (One) Time As Needed (allergic reaction)., Disp: , Rfl:     fluticasone (FLONASE) 50 MCG/ACT nasal spray, 1 spray into the nostril(s) as directed by provider 2 (Two) Times a Day., Disp: , Rfl:     folic acid (FOLVITE) 1 MG tablet,  "Take 1 tablet by mouth Daily., Disp: , Rfl:     HYDROcodone-acetaminophen (NORCO)  MG per tablet, Take 1 tablet by mouth Every 6 (Six) Hours As Needed (breakthrough pain)., Disp: , Rfl:     levothyroxine (SYNTHROID, LEVOTHROID) 150 MCG tablet, Take 1 tablet by mouth Every Morning., Disp: , Rfl:     lidocaine (XYLOCAINE) 5 % ointment, Apply 1 application topically to the appropriate area as directed Every 2 (Two) Hours As Needed for Mild Pain., Disp: 2500 g, Rfl: 5    megestrol (MEGACE) 40 MG/ML suspension, Take 5 mL by mouth Daily., Disp: , Rfl:     Morphine (MS CONTIN) 15 MG 12 hr tablet, Take 1 tablet by mouth 2 (Two) Times a Day., Disp: , Rfl:     nitroglycerin (NITROSTAT) 0.4 MG SL tablet, Place 1 tablet under the tongue Every 5 (Five) Minutes As Needed for Chest Pain. Take no more than 3 doses in 15 minutes., Disp: , Rfl:     pantoprazole (PROTONIX) 40 MG EC tablet, Take 1 tablet by mouth Daily., Disp: , Rfl:     pramipexole (MIRAPEX) 1 MG tablet, Take 1 tablet by mouth 2 (Two) Times a Day., Disp: , Rfl:     sodium chloride 0.65 % nasal spray, 1 spray into the nostril(s) as directed by provider As Needed for Congestion., Disp: 44 mL, Rfl: 0    sucralfate (CARAFATE) 1 GM/10ML suspension, Take 10 mL by mouth 4 (Four) Times a Day Before Meals & at Bedtime., Disp: 414 mL, Rfl: 0    tiZANidine (ZANAFLEX) 4 MG tablet, Take 0.5-1 tablets by mouth At Night As Needed for Muscle Spasms., Disp: , Rfl:     vitamin D (ERGOCALCIFEROL) 48351 units capsule capsule, Take 1 capsule by mouth Every 7 (Seven) Days. SUNDAY, Disp: , Rfl: 6    diclofenac (VOLTAREN) 75 MG EC tablet, Take 1 tablet by mouth 2 (Two) Times a Day As Needed (pain)., Disp: , Rfl:     Objective     Vital Signs   /82 (BP Location: Left arm, Patient Position: Sitting, Cuff Size: Adult)   Pulse 88   Ht 162.6 cm (64\")   Wt 87.1 kg (192 lb)   SpO2 96%   BMI 32.96 kg/m²      Physical Exam:  General appearance - alert, well appearing, and in no " distress  Mental status - alert, oriented to person, place, and time  Eyes - pupils equal and reactive, extraocular eye movements intact  Neck - supple, no significant adenopathy  Chest - no tachypnea, retractions or cyanosis  Heart - normal rate and regular rhythm  Abdomen - soft, mild TTP in LLQ, nondistended, no masses or organomegaly  Neurological - alert, oriented, normal speech, no focal findings or movement disorder noted  Musculoskeletal - no joint tenderness, deformity or swelling    Results Review:     The following data was reviewed by: Michelle Riley MD on 01/08/2024:  CT Abdomen Pelvis With Contrast (01/02/2024 09:22)   1. Recent CT scan demonstrated an extraluminal collection/abscess in the  pelvis, located adjacent to the vaginal cuff and in close proximity to  the sigmoid colon. This collection is no longer identified and the  surrounding inflammatory changes in the pelvis are resolved.  2. Mildly enlarged retrocrural lymph node and bilateral inguinal lymph  nodes are stable.  3. Hiatal hernia.  4. Extensive colonic diverticulosis.  CT Abdomen Pelvis With Contrast (12/10/2023 13:34)   3.1 cm peripherally enhancing fluid and gas collection within the region  of the vaginal cuff and abutting the posterior aspect of the bladder  wall. This appears somewhat inseparable from the adjacent sigmoid colon  and is most likely related to a contained sigmoid diverticular  perforation/abscess. Given the proximity to the bladder wall this could  explain patient's dysuria. This would not be amenable to percutaneous  drainage at this time.  Discharge Summary by Pablo Pascual Jr., MD (12/12/2023 08:16)   Progress Notes by Beatrice Birmingham MD (12/12/2022 10:15)   Mammo Screening Digital Tomosynthesis Bilateral With CAD (12/22/2023 13:37)   Summary:  1. Benign mammograms.  2. BI-RADS category 1.  3. Screening mammography is recommended in one year.    Assessment & Plan       Diagnoses and all orders  for this visit:    1. Abscess of sigmoid colon due to diverticulitis (Primary)    2. Class 1 obesity due to excess calories with body mass index (BMI) of 32.0 to 32.9 in adult, unspecified whether serious comorbidity present    3. Chronic anticoagulation    4. History of pulmonary embolism    5. History of breast biopsy       Ms. Cruz is a 68 year old who presents for two follow ups:     (1) Perforated diverticulitis treated with Iv antibiotics. Her follow up CT scan shows resolution of the abscess and no further inflammation of the colon in this area. I explained that the only surgical intervention I could offer at this time for her diverticulitis would be a Lisset's procedure due to her being on chemotherapy. Follow up in 3 months. If symptoms get worse or if you develop persistent symptoms, follow up sooner.     (2) Breast follow up - mammogram benign. She will return to routine screening.     This is two chronic problems. I have reviewed the Ct scans, notes and mammogram above. She is exceedingly high risk for any intervention due to her active cancer on chemotherapy, her anticoagulation and her history of PE.        Michelle Riley MD  01/11/24  12:25 CST

## 2024-01-11 NOTE — PATIENT INSTRUCTIONS

## 2024-01-29 ENCOUNTER — TRANSCRIBE ORDERS (OUTPATIENT)
Dept: ADMINISTRATIVE | Facility: HOSPITAL | Age: 69
End: 2024-01-29
Payer: MEDICARE

## 2024-01-29 DIAGNOSIS — C34.31 PRIMARY CANCER OF RIGHT LOWER LOBE OF LUNG: Primary | ICD-10-CM

## 2024-02-12 ENCOUNTER — HOSPITAL ENCOUNTER (OUTPATIENT)
Dept: CT IMAGING | Facility: HOSPITAL | Age: 69
Discharge: HOME | End: 2024-02-12
Admitting: INTERNAL MEDICINE
Payer: MEDICARE

## 2024-02-12 DIAGNOSIS — C34.31 PRIMARY CANCER OF RIGHT LOWER LOBE OF LUNG: ICD-10-CM

## 2024-02-12 LAB — CREAT BLDA-MCNC: 0.9 MG/DL (ref 0.6–1.3)

## 2024-02-12 PROCEDURE — 82565 ASSAY OF CREATININE: CPT

## 2024-02-12 PROCEDURE — 74177 CT ABD & PELVIS W/CONTRAST: CPT

## 2024-02-12 PROCEDURE — 71260 CT THORAX DX C+: CPT

## 2024-02-12 PROCEDURE — 25510000001 IOPAMIDOL 61 % SOLUTION: Performed by: INTERNAL MEDICINE

## 2024-02-12 RX ADMIN — IOPAMIDOL 100 ML: 612 INJECTION, SOLUTION INTRAVENOUS at 12:01

## 2024-02-19 ENCOUNTER — TRANSCRIBE ORDERS (OUTPATIENT)
Dept: ADMINISTRATIVE | Facility: HOSPITAL | Age: 69
End: 2024-02-19
Payer: MEDICARE

## 2024-02-19 DIAGNOSIS — C79.9 METASTATIC ADENOCARCINOMA: Primary | ICD-10-CM

## 2024-11-14 LAB
RAD ONC ARIA COURSE END DATE: NORMAL
RAD ONC ARIA COURSE ID: NORMAL
RAD ONC ARIA COURSE LAST TREATMENT DATE: NORMAL
RAD ONC ARIA COURSE START DATE: NORMAL
RAD ONC ARIA COURSE TREATMENT ELAPSED DAYS: 12
RAD ONC ARIA FIRST TREATMENT DATE: NORMAL
RAD ONC ARIA PLAN FRACTIONS TREATED TO DATE: 4
RAD ONC ARIA PLAN ID: NORMAL
RAD ONC ARIA PLAN NAME: NORMAL
RAD ONC ARIA PLAN PRESCRIBED DOSE PER FRACTION: 12.5 GY
RAD ONC ARIA PLAN PRIMARY REFERENCE POINT: NORMAL
RAD ONC ARIA PLAN TOTAL FRACTIONS PRESCRIBED: 4
RAD ONC ARIA PLAN TOTAL PRESCRIBED DOSE: 5000 CGY
RAD ONC ARIA REFERENCE POINT DOSAGE GIVEN TO DATE: 50 GY
RAD ONC ARIA REFERENCE POINT ID: NORMAL

## (undated) DEVICE — STRIP,CLOSURE,WOUND,MEDI-STRIP,1/2X4: Brand: MEDLINE

## (undated) DEVICE — SUT ETHLN 3/0 FS1 30IN 669H

## (undated) DEVICE — BALL PIN JURGAN .062 GRN

## (undated) DEVICE — PRECISION THIN (5.5 X 0.38 X 18.0MM)

## (undated) DEVICE — DRSNG TELFA PAD NONADH STR 1S 3X8IN

## (undated) DEVICE — ENDOPOUCH RETRIEVER SPECIMEN RETRIEVAL BAGS: Brand: ENDOPOUCH RETRIEVER

## (undated) DEVICE — 3M™ IOBAN™ 2 ANTIMICROBIAL INCISE DRAPE 6651EZ: Brand: IOBAN™ 2

## (undated) DEVICE — AUTOGRAFT HARVESTING SYSTEM: Brand: FASTGRAFTER

## (undated) DEVICE — 3M™ STERI-STRIP™ REINFORCED ADHESIVE SKIN CLOSURES, R1547, 1/2 IN X 4 IN (12 MM X 100 MM), 6 STRIPS/ENVELOPE: Brand: 3M™ STERI-STRIP™

## (undated) DEVICE — BNDG ELAS CO-FLEX SLF ADHR 4IN5YD LF STRL

## (undated) DEVICE — SPNG GZ WOVN 4X4IN 12PLY 10/BX STRL

## (undated) DEVICE — CONTAINER,SPECIMEN,OR STERILE,4OZ: Brand: MEDLINE

## (undated) DEVICE — SUT SILK 4/0 SUTUPAK TIES 24IN SA73H

## (undated) DEVICE — SUT VIC 0 UR6 27IN VCP603H

## (undated) DEVICE — SUT SILK 3/0 SUTUPAK TIES 24IN SA74H

## (undated) DEVICE — DISPOSABLE TOURNIQUET CUFF SINGLE BLADDER, SINGLE PORT AND QUICK CONNECT CONNECTOR: Brand: COLOR CUFF

## (undated) DEVICE — DRSNG WND GZ CURAD OIL EMULSION 3X8IN STRL PK/3

## (undated) DEVICE — BANDAGE,GAUZE,BULKEE II,4.5"X4.1YD,STRL: Brand: MEDLINE

## (undated) DEVICE — Device

## (undated) DEVICE — DRSNG WND GZ CURAD OIL EMULSION 3X3IN STRL

## (undated) DEVICE — 4.0MM EGG BUR

## (undated) DEVICE — SUT ETHLN 4/0 FS2 18IN 662H

## (undated) DEVICE — ELECTRD BLD EZ CLN MOD 6.5IN

## (undated) DEVICE — PREP SOL POVIDONE/IODINE BT 4OZ

## (undated) DEVICE — INTENDED FOR TISSUE SEPARATION, AND OTHER PROCEDURES THAT REQUIRE A SHARP SURGICAL BLADE TO PUNCTURE OR CUT.: Brand: BARD-PARKER ® STAINLESS STEEL BLADES

## (undated) DEVICE — KT NDL GUIDE STRL 18GA

## (undated) DEVICE — SUT SILK 2/0 FS BLK 18IN 685G

## (undated) DEVICE — SUT VIC 4/0 P3 18IN UD VCP494G

## (undated) DEVICE — ANTIBACTERIAL UNDYED BRAIDED (POLYGLACTIN 910), SYNTHETIC ABSORBABLE SUTURE: Brand: COATED VICRYL

## (undated) DEVICE — UTILITY MARKER W/MED LABELS: Brand: MEDLINE

## (undated) DEVICE — TOTAL TRAY, 16FR 10ML SIL FOLEY, URN: Brand: MEDLINE

## (undated) DEVICE — APPL CHLORAPREP HI/LITE 26ML ORNG

## (undated) DEVICE — PRECISION THIN (9.0 X 0.38 X 25.0MM)

## (undated) DEVICE — OASIS DRAIN, SINGLE, INLINE & ATS COMPATIBLE: Brand: OASIS

## (undated) DEVICE — SUT MNCRYL 4/0 PS2 27IN UD MCP426H

## (undated) DEVICE — BNDG ELAS W/CLIP 6IN 10YD LF STRL

## (undated) DEVICE — TRAP FLD MINIVAC MEGADYNE 100ML

## (undated) DEVICE — SUT SILK 2/0 SUTUPAK TIES 24IN SA75H

## (undated) DEVICE — BLD SAW LAPIPLASTY STRYKER 40X11MM 1P/U STRL

## (undated) DEVICE — COVER,MAYO STAND,STERILE: Brand: MEDLINE

## (undated) DEVICE — PK EXTRM 30

## (undated) DEVICE — SPONGE,LAP,12"X12",XR,ST,5/PK,40PK/CS: Brand: MEDLINE

## (undated) DEVICE — BAPTIST TURNOVER KIT: Brand: MEDLINE INDUSTRIES, INC.

## (undated) DEVICE — UNDERCAST PADDING: Brand: DEROYAL

## (undated) DEVICE — GLV SURG SENSICARE PI ORTHO SZ7.5 LF STRL

## (undated) DEVICE — APPL HEMO SURG ARISTA/AH/FLEXITIP XL 38CM

## (undated) DEVICE — KT DRP MINIVIEW STRL

## (undated) DEVICE — TBG PENCL TELESCP MEGADYNE SMOKE EVAC 10FT

## (undated) DEVICE — 3.0MM PRECISION ROUND

## (undated) DEVICE — 4-PORT MANIFOLD: Brand: NEPTUNE 2

## (undated) DEVICE — PK TURNOVER RM ADV

## (undated) DEVICE — DISSCT SECTO 1PC 1P/U 5MMX35CM STRL

## (undated) DEVICE — SUT VIC 5/0 P3 18IN UD VCP493G

## (undated) DEVICE — NDL HYPO PRECISIONGLIDE REG 25G 1 1/2

## (undated) DEVICE — 28 FR STRAIGHT – SOFT PVC CATHETER: Brand: PVC THORACIC CATHETERS

## (undated) DEVICE — ADHS LIQ MASTISOL 2/3ML

## (undated) DEVICE — GLV SURG BIOGEL LTX PF 6 1/2

## (undated) DEVICE — DRAPE,UTILITY,TAPE,15X26,STERILE: Brand: MEDLINE

## (undated) DEVICE — PAD MINOR UNIVERSAL: Brand: MEDLINE INDUSTRIES, INC.

## (undated) DEVICE — CVR UNIV C/ARM

## (undated) DEVICE — SUT PROLN 4/0 RB1 D/A 36IN 8557H

## (undated) DEVICE — CVR HNDL LIGHT RIGID

## (undated) DEVICE — SUT SILK 0 SUTUPAK TIES 24IN SA76G

## (undated) DEVICE — ECHELON FLEX POWERED PLUS ARTICULATING ENDOSCOPIC LINEAR CUTTER , 60MM: Brand: ECHELON FLEX

## (undated) DEVICE — DRP C/ARMOR

## (undated) DEVICE — KT ANTI FOG W/FLD AND SPNG

## (undated) DEVICE — SYR LUERLOK 20CC BX/50

## (undated) DEVICE — GLV SURG BIOGEL LTX PF 7 1/2